# Patient Record
Sex: MALE | Race: WHITE | NOT HISPANIC OR LATINO | URBAN - METROPOLITAN AREA
[De-identification: names, ages, dates, MRNs, and addresses within clinical notes are randomized per-mention and may not be internally consistent; named-entity substitution may affect disease eponyms.]

---

## 2017-02-22 ENCOUNTER — INPATIENT (INPATIENT)
Facility: HOSPITAL | Age: 77
LOS: 0 days | Discharge: HOME | DRG: 312 | End: 2017-02-23
Attending: INTERNAL MEDICINE | Admitting: HOSPITALIST
Payer: COMMERCIAL

## 2017-02-22 VITALS
TEMPERATURE: 98 F | HEIGHT: 74 IN | RESPIRATION RATE: 20 BRPM | SYSTOLIC BLOOD PRESSURE: 170 MMHG | DIASTOLIC BLOOD PRESSURE: 80 MMHG | OXYGEN SATURATION: 97 % | HEART RATE: 83 BPM | WEIGHT: 205.03 LBS

## 2017-02-22 DIAGNOSIS — R55 SYNCOPE AND COLLAPSE: ICD-10-CM

## 2017-02-22 DIAGNOSIS — I10 ESSENTIAL (PRIMARY) HYPERTENSION: ICD-10-CM

## 2017-02-22 DIAGNOSIS — I48.92 UNSPECIFIED ATRIAL FLUTTER: ICD-10-CM

## 2017-02-22 DIAGNOSIS — E78.5 HYPERLIPIDEMIA, UNSPECIFIED: ICD-10-CM

## 2017-02-22 DIAGNOSIS — K21.9 GASTRO-ESOPHAGEAL REFLUX DISEASE WITHOUT ESOPHAGITIS: ICD-10-CM

## 2017-02-22 DIAGNOSIS — E11.9 TYPE 2 DIABETES MELLITUS WITHOUT COMPLICATIONS: ICD-10-CM

## 2017-02-22 LAB
ALBUMIN SERPL ELPH-MCNC: 4.4 G/DL — SIGNIFICANT CHANGE UP (ref 3.3–5.2)
ALP SERPL-CCNC: 83 U/L — SIGNIFICANT CHANGE UP (ref 40–120)
ALT FLD-CCNC: 27 U/L — SIGNIFICANT CHANGE UP
ANION GAP SERPL CALC-SCNC: 13 MMOL/L — SIGNIFICANT CHANGE UP (ref 5–17)
APTT BLD: 31.8 SEC — SIGNIFICANT CHANGE UP (ref 27.5–37.4)
AST SERPL-CCNC: 22 U/L — SIGNIFICANT CHANGE UP
BILIRUB SERPL-MCNC: 0.9 MG/DL — SIGNIFICANT CHANGE UP (ref 0.4–2)
BUN SERPL-MCNC: 29 MG/DL — HIGH (ref 8–20)
CALCIUM SERPL-MCNC: 9.1 MG/DL — SIGNIFICANT CHANGE UP (ref 8.6–10.2)
CHLORIDE SERPL-SCNC: 105 MMOL/L — SIGNIFICANT CHANGE UP (ref 98–107)
CO2 SERPL-SCNC: 23 MMOL/L — SIGNIFICANT CHANGE UP (ref 22–29)
CREAT SERPL-MCNC: 0.72 MG/DL — SIGNIFICANT CHANGE UP (ref 0.5–1.3)
GLUCOSE SERPL-MCNC: 173 MG/DL — HIGH (ref 70–115)
HCT VFR BLD CALC: 44.2 % — SIGNIFICANT CHANGE UP (ref 42–52)
HGB BLD-MCNC: 15.9 G/DL — SIGNIFICANT CHANGE UP (ref 14–18)
INR BLD: 1.03 RATIO — SIGNIFICANT CHANGE UP (ref 0.88–1.16)
MCHC RBC-ENTMCNC: 32.7 PG — HIGH (ref 27–31)
MCHC RBC-ENTMCNC: 36 G/DL — SIGNIFICANT CHANGE UP (ref 32–36)
MCV RBC AUTO: 90.9 FL — SIGNIFICANT CHANGE UP (ref 80–94)
PLATELET # BLD AUTO: 180 K/UL — SIGNIFICANT CHANGE UP (ref 150–400)
POTASSIUM SERPL-MCNC: 3.7 MMOL/L — SIGNIFICANT CHANGE UP (ref 3.5–5.3)
POTASSIUM SERPL-SCNC: 3.7 MMOL/L — SIGNIFICANT CHANGE UP (ref 3.5–5.3)
PROT SERPL-MCNC: 7.3 G/DL — SIGNIFICANT CHANGE UP (ref 6.6–8.7)
PROTHROM AB SERPL-ACNC: 11.3 SEC — SIGNIFICANT CHANGE UP (ref 10–13.1)
RBC # BLD: 4.86 M/UL — SIGNIFICANT CHANGE UP (ref 4.6–6.2)
RBC # FLD: 13.3 % — SIGNIFICANT CHANGE UP (ref 11–15.6)
SODIUM SERPL-SCNC: 141 MMOL/L — SIGNIFICANT CHANGE UP (ref 135–145)
TROPONIN T SERPL-MCNC: <0.01 NG/ML — SIGNIFICANT CHANGE UP (ref 0–0.06)
WBC # BLD: 5.4 K/UL — SIGNIFICANT CHANGE UP (ref 4.8–10.8)
WBC # FLD AUTO: 5.4 K/UL — SIGNIFICANT CHANGE UP (ref 4.8–10.8)

## 2017-02-22 PROCEDURE — 99223 1ST HOSP IP/OBS HIGH 75: CPT

## 2017-02-22 PROCEDURE — 99285 EMERGENCY DEPT VISIT HI MDM: CPT

## 2017-02-22 PROCEDURE — 70450 CT HEAD/BRAIN W/O DYE: CPT | Mod: 26

## 2017-02-22 PROCEDURE — 71010: CPT | Mod: 26

## 2017-02-22 PROCEDURE — 93010 ELECTROCARDIOGRAM REPORT: CPT

## 2017-02-22 RX ORDER — PANTOPRAZOLE SODIUM 20 MG/1
40 TABLET, DELAYED RELEASE ORAL
Qty: 0 | Refills: 0 | Status: DISCONTINUED | OUTPATIENT
Start: 2017-02-22 | End: 2017-02-23

## 2017-02-22 RX ORDER — GLUCAGON INJECTION, SOLUTION 0.5 MG/.1ML
1 INJECTION, SOLUTION SUBCUTANEOUS ONCE
Qty: 0 | Refills: 0 | Status: DISCONTINUED | OUTPATIENT
Start: 2017-02-22 | End: 2017-02-23

## 2017-02-22 RX ORDER — DEXTROSE 50 % IN WATER 50 %
12.5 SYRINGE (ML) INTRAVENOUS ONCE
Qty: 0 | Refills: 0 | Status: DISCONTINUED | OUTPATIENT
Start: 2017-02-22 | End: 2017-02-23

## 2017-02-22 RX ORDER — SODIUM CHLORIDE 9 MG/ML
1000 INJECTION, SOLUTION INTRAVENOUS
Qty: 0 | Refills: 0 | Status: DISCONTINUED | OUTPATIENT
Start: 2017-02-22 | End: 2017-02-23

## 2017-02-22 RX ORDER — DEXTROSE 50 % IN WATER 50 %
25 SYRINGE (ML) INTRAVENOUS ONCE
Qty: 0 | Refills: 0 | Status: DISCONTINUED | OUTPATIENT
Start: 2017-02-22 | End: 2017-02-23

## 2017-02-22 RX ORDER — AMLODIPINE BESYLATE 2.5 MG/1
10 TABLET ORAL DAILY
Qty: 0 | Refills: 0 | Status: DISCONTINUED | OUTPATIENT
Start: 2017-02-22 | End: 2017-02-23

## 2017-02-22 RX ORDER — DEXTROSE 50 % IN WATER 50 %
1 SYRINGE (ML) INTRAVENOUS ONCE
Qty: 0 | Refills: 0 | Status: DISCONTINUED | OUTPATIENT
Start: 2017-02-22 | End: 2017-02-23

## 2017-02-22 RX ORDER — METOPROLOL TARTRATE 50 MG
100 TABLET ORAL DAILY
Qty: 0 | Refills: 0 | Status: DISCONTINUED | OUTPATIENT
Start: 2017-02-22 | End: 2017-02-23

## 2017-02-22 RX ORDER — LISINOPRIL 2.5 MG/1
40 TABLET ORAL DAILY
Qty: 0 | Refills: 0 | Status: DISCONTINUED | OUTPATIENT
Start: 2017-02-22 | End: 2017-02-23

## 2017-02-22 RX ORDER — ASPIRIN/CALCIUM CARB/MAGNESIUM 324 MG
81 TABLET ORAL DAILY
Qty: 0 | Refills: 0 | Status: DISCONTINUED | OUTPATIENT
Start: 2017-02-22 | End: 2017-02-23

## 2017-02-22 RX ORDER — ATORVASTATIN CALCIUM 80 MG/1
10 TABLET, FILM COATED ORAL AT BEDTIME
Qty: 0 | Refills: 0 | Status: DISCONTINUED | OUTPATIENT
Start: 2017-02-22 | End: 2017-02-23

## 2017-02-22 RX ORDER — INSULIN LISPRO 100/ML
VIAL (ML) SUBCUTANEOUS
Qty: 0 | Refills: 0 | Status: DISCONTINUED | OUTPATIENT
Start: 2017-02-22 | End: 2017-02-23

## 2017-02-22 RX ORDER — RAMIPRIL 5 MG
0 CAPSULE ORAL
Qty: 0 | Refills: 0 | COMMUNITY

## 2017-02-22 RX ORDER — ENOXAPARIN SODIUM 100 MG/ML
90 INJECTION SUBCUTANEOUS EVERY 12 HOURS
Qty: 0 | Refills: 0 | Status: DISCONTINUED | OUTPATIENT
Start: 2017-02-22 | End: 2017-02-23

## 2017-02-22 RX ADMIN — ENOXAPARIN SODIUM 90 MILLIGRAM(S): 100 INJECTION SUBCUTANEOUS at 23:34

## 2017-02-22 NOTE — H&P ADULT. - RS GEN PE MLT RESP DETAILS PC
breath sounds equal/no rales/no rhonchi/respirations non-labored/no intercostal retractions/no chest wall tenderness/good air movement/clear to auscultation bilaterally/airway patent

## 2017-02-22 NOTE — ED PROVIDER NOTE - PSH
Cosmetic Surgery  chin implant 2004  Male stress incontinence  s/p artifical urinary sphincter 5/2012  S/P Appendectomy  1950  S/P arthroscopy  right shoulder 12/11  S/P Colonoscopy with Polypectomy  2009  S/P prostatectomy  radical robotic 6/10  Varicose vein-s/p vein stripping 5 years ago

## 2017-02-22 NOTE — ED PROVIDER NOTE - PMH
Afib  2006 s/p ablation 2006 , afib resolved  Appendicitis    Benign colon polyp    Bilateral cataracts    DM (Diabetes Mellitus)  X 3 years  GERD (Gastroesophageal Reflux Disease)  X 10 years  HTN - Hypertension  X 10 years, controlled  Hyperlipidemia  X 4 years  Kidney stone  "passed on own"  Male stress incontinence    MVP (Mitral Valve Prolapse)  X 8 years  Prostate cancer  2010  Renal Calculi  2008  Varicose vein  (L) 5 years ago

## 2017-02-22 NOTE — ED PROVIDER NOTE - NS ED MD SCRIBE ATTENDING SCRIBE SECTIONS
DISPOSITION/VITAL SIGNS( Pullset)/HISTORY OF PRESENT ILLNESS/PHYSICAL EXAM/RESULTS/PAST MEDICAL/SURGICAL/SOCIAL HISTORY/REVIEW OF SYSTEMS

## 2017-02-22 NOTE — ED PROVIDER NOTE - OBJECTIVE STATEMENT
A 76 year old male pt with a hx of DM, HTN, presents to the ED s/p syncopal episode. The pt was talking to his daughter, walking towards her when he experienced a syncopal episode. Pt states he remembers hitting his head on the chair but does not remember anything else. He has not experienced syncope in the past and denies HA, dizziness, CP, arm pain, headache prior to the fall. He does report intermittent jaw tightness over the past two weeks, and has been feeling dizzy since the syncopal episode. The pt's daughter states he was talking to her when he suddenly fell to the ground. She states that she did not notice the pt experiencing any diaphoresis. His PMD is Dr. Willis and his cardiologist is Dr. Aaron Guevara. He has a hx of A-fib with ablation. No further complaints at this time.

## 2017-02-22 NOTE — ED ADULT NURSE NOTE - OBJECTIVE STATEMENT
pt presents to ED s/p syncopal episode. pt is a/3, states he just "passed out suddenly" and reports hitting head. reports loc "of approx 10 sec" and was witnessed by daughter. pt on cardiac monitor. denies sob, cp, dizziness, weakness. no further complaints.

## 2017-02-22 NOTE — ED ADULT TRIAGE NOTE - CHIEF COMPLAINT QUOTE
Patient arrived to ED today with c/o syncopal episode at about 6pm.  Patient states since he has felt dizziness after passing out.

## 2017-02-22 NOTE — ED PROVIDER NOTE - PROGRESS NOTE DETAILS
Case discussed with Dr. Aparicio([facility]). Pt. will be seen in the morning. Pt. currently is asymptomatic. Case discussed with Dr. Saucedo for admission. Head CT still pending. He will follow up the cat scan.

## 2017-02-22 NOTE — H&P ADULT. - HISTORY OF PRESENT ILLNESS
77 y/o M w/ hx htn, prostate ca s/p resection,  pre-diabetes,gerd,hld, afib s/p ablation twice. Presents to the ED c/o syncope. Pt was at home, standing up, speaking to his daughter, when suddenly he collapsed and hit his head. . he loc approx 10 min, no warning signs prior to the event.. After recovering, he became very lightheaded. Denies any cp, palpitations, weakness, headche, blurred vision, abd pain, urin incontinence.. Pt states that he goes for EPS studies q/6 m, as per pt everything has been wnl, and he was due for another EPS next month. 1st time he experienced this episode, no recent change to his bp meds.. no exacerb or atten fact.

## 2017-02-22 NOTE — ED PROVIDER NOTE - MEDICAL DECISION MAKING DETAILS
A 76 year old male pt presents to the ED s/p syncopal episode. Will check labs, CXR, EKG, CT head, and admit for syncopal episode.

## 2017-02-22 NOTE — H&P ADULT. - ASSESSMENT
Esperanza Aiken [id number] pmd Dr Willis cardio consult dr cm.   77 y/o M w/ hx htn, prostate ca s/p resection,  pre-diabetes,gerd,hld, afib s/p ablation twice. Presents to the ED c/o syncope. Pt was at home, standing up, speaking to his daughter, when suddenly he collapsed and hit his head. . he loc approx 10 min, no warning signs prior to the event.. After recovering, he became very lightheaded. Denies any cp, palpitations, weakness, headche, blurred vision, abd pain, urin incontinence.. Pt states that he goes for EPS studies q/6 m, as per pt everything has been wnl, and he was due for another EPS next month. 1st time he experienced this episode, no recent change to his bp meds.  no exacerb or atten fact.

## 2017-02-23 ENCOUNTER — TRANSCRIPTION ENCOUNTER (OUTPATIENT)
Age: 77
End: 2017-02-23

## 2017-02-23 VITALS
OXYGEN SATURATION: 97 % | RESPIRATION RATE: 20 BRPM | SYSTOLIC BLOOD PRESSURE: 107 MMHG | HEART RATE: 73 BPM | TEMPERATURE: 99 F | DIASTOLIC BLOOD PRESSURE: 68 MMHG

## 2017-02-23 LAB
ALBUMIN SERPL ELPH-MCNC: 4.2 G/DL — SIGNIFICANT CHANGE UP (ref 3.3–5.2)
ALP SERPL-CCNC: 75 U/L — SIGNIFICANT CHANGE UP (ref 40–120)
ALT FLD-CCNC: 25 U/L — SIGNIFICANT CHANGE UP
ANION GAP SERPL CALC-SCNC: 12 MMOL/L — SIGNIFICANT CHANGE UP (ref 5–17)
AST SERPL-CCNC: 20 U/L — SIGNIFICANT CHANGE UP
BASOPHILS # BLD AUTO: 0 K/UL — SIGNIFICANT CHANGE UP (ref 0–0.2)
BASOPHILS NFR BLD AUTO: 0.3 % — SIGNIFICANT CHANGE UP (ref 0–2)
BILIRUB SERPL-MCNC: 1.7 MG/DL — SIGNIFICANT CHANGE UP (ref 0.4–2)
BUN SERPL-MCNC: 19 MG/DL — SIGNIFICANT CHANGE UP (ref 8–20)
CALCIUM SERPL-MCNC: 8.5 MG/DL — LOW (ref 8.6–10.2)
CHLORIDE SERPL-SCNC: 103 MMOL/L — SIGNIFICANT CHANGE UP (ref 98–107)
CK MB CFR SERPL CALC: 6.6 NG/ML — SIGNIFICANT CHANGE UP (ref 0–6.7)
CK SERPL-CCNC: 260 U/L — HIGH (ref 30–200)
CO2 SERPL-SCNC: 27 MMOL/L — SIGNIFICANT CHANGE UP (ref 22–29)
CREAT SERPL-MCNC: 0.76 MG/DL — SIGNIFICANT CHANGE UP (ref 0.5–1.3)
EOSINOPHIL # BLD AUTO: 0.3 K/UL — SIGNIFICANT CHANGE UP (ref 0–0.5)
EOSINOPHIL NFR BLD AUTO: 4.1 % — SIGNIFICANT CHANGE UP (ref 0–5)
GLUCOSE SERPL-MCNC: 159 MG/DL — HIGH (ref 70–115)
HBA1C BLD-MCNC: 6.2 % — HIGH (ref 4–5.6)
HCT VFR BLD CALC: 44.9 % — SIGNIFICANT CHANGE UP (ref 42–52)
HGB BLD-MCNC: 15.9 G/DL — SIGNIFICANT CHANGE UP (ref 14–18)
LYMPHOCYTES # BLD AUTO: 1.6 K/UL — SIGNIFICANT CHANGE UP (ref 1–4.8)
LYMPHOCYTES # BLD AUTO: 25.6 % — SIGNIFICANT CHANGE UP (ref 20–55)
MCHC RBC-ENTMCNC: 32.3 PG — HIGH (ref 27–31)
MCHC RBC-ENTMCNC: 35.4 G/DL — SIGNIFICANT CHANGE UP (ref 32–36)
MCV RBC AUTO: 91.3 FL — SIGNIFICANT CHANGE UP (ref 80–94)
MONOCYTES # BLD AUTO: 0.6 K/UL — SIGNIFICANT CHANGE UP (ref 0–0.8)
MONOCYTES NFR BLD AUTO: 9.8 % — SIGNIFICANT CHANGE UP (ref 3–10)
NEUTROPHILS # BLD AUTO: 3.8 K/UL — SIGNIFICANT CHANGE UP (ref 1.8–8)
NEUTROPHILS NFR BLD AUTO: 60 % — SIGNIFICANT CHANGE UP (ref 37–73)
PLATELET # BLD AUTO: 168 K/UL — SIGNIFICANT CHANGE UP (ref 150–400)
POTASSIUM SERPL-MCNC: 3.9 MMOL/L — SIGNIFICANT CHANGE UP (ref 3.5–5.3)
POTASSIUM SERPL-SCNC: 3.9 MMOL/L — SIGNIFICANT CHANGE UP (ref 3.5–5.3)
PROT SERPL-MCNC: 6.8 G/DL — SIGNIFICANT CHANGE UP (ref 6.6–8.7)
RBC # BLD: 4.92 M/UL — SIGNIFICANT CHANGE UP (ref 4.6–6.2)
RBC # FLD: 13.4 % — SIGNIFICANT CHANGE UP (ref 11–15.6)
SODIUM SERPL-SCNC: 142 MMOL/L — SIGNIFICANT CHANGE UP (ref 135–145)
TROPONIN T SERPL-MCNC: <0.01 NG/ML — SIGNIFICANT CHANGE UP (ref 0–0.06)
WBC # BLD: 6.3 K/UL — SIGNIFICANT CHANGE UP (ref 4.8–10.8)
WBC # FLD AUTO: 6.3 K/UL — SIGNIFICANT CHANGE UP (ref 4.8–10.8)

## 2017-02-23 PROCEDURE — 85610 PROTHROMBIN TIME: CPT

## 2017-02-23 PROCEDURE — 85027 COMPLETE CBC AUTOMATED: CPT

## 2017-02-23 PROCEDURE — 36415 COLL VENOUS BLD VENIPUNCTURE: CPT

## 2017-02-23 PROCEDURE — 93010 ELECTROCARDIOGRAM REPORT: CPT

## 2017-02-23 PROCEDURE — 99285 EMERGENCY DEPT VISIT HI MDM: CPT | Mod: 25

## 2017-02-23 PROCEDURE — 83036 HEMOGLOBIN GLYCOSYLATED A1C: CPT

## 2017-02-23 PROCEDURE — 80053 COMPREHEN METABOLIC PANEL: CPT

## 2017-02-23 PROCEDURE — 85730 THROMBOPLASTIN TIME PARTIAL: CPT

## 2017-02-23 PROCEDURE — 93880 EXTRACRANIAL BILAT STUDY: CPT | Mod: 26

## 2017-02-23 PROCEDURE — 99238 HOSP IP/OBS DSCHRG MGMT 30/<: CPT

## 2017-02-23 PROCEDURE — 71045 X-RAY EXAM CHEST 1 VIEW: CPT

## 2017-02-23 PROCEDURE — 93005 ELECTROCARDIOGRAM TRACING: CPT

## 2017-02-23 PROCEDURE — 82553 CREATINE MB FRACTION: CPT

## 2017-02-23 PROCEDURE — 93880 EXTRACRANIAL BILAT STUDY: CPT

## 2017-02-23 PROCEDURE — 70450 CT HEAD/BRAIN W/O DYE: CPT

## 2017-02-23 PROCEDURE — 84484 ASSAY OF TROPONIN QUANT: CPT

## 2017-02-23 PROCEDURE — 82550 ASSAY OF CK (CPK): CPT

## 2017-02-23 RX ORDER — APIXABAN 2.5 MG/1
1 TABLET, FILM COATED ORAL
Qty: 60 | Refills: 0 | OUTPATIENT
Start: 2017-02-23 | End: 2017-03-25

## 2017-02-23 RX ORDER — APIXABAN 2.5 MG/1
5 TABLET, FILM COATED ORAL
Qty: 0 | Refills: 0 | Status: DISCONTINUED | OUTPATIENT
Start: 2017-02-23 | End: 2017-02-23

## 2017-02-23 RX ADMIN — Medication 100 MILLIGRAM(S): at 06:11

## 2017-02-23 RX ADMIN — AMLODIPINE BESYLATE 10 MILLIGRAM(S): 2.5 TABLET ORAL at 06:10

## 2017-02-23 RX ADMIN — Medication 81 MILLIGRAM(S): at 11:52

## 2017-02-23 RX ADMIN — ENOXAPARIN SODIUM 90 MILLIGRAM(S): 100 INJECTION SUBCUTANEOUS at 11:51

## 2017-02-23 RX ADMIN — PANTOPRAZOLE SODIUM 40 MILLIGRAM(S): 20 TABLET, DELAYED RELEASE ORAL at 08:26

## 2017-02-23 RX ADMIN — LISINOPRIL 40 MILLIGRAM(S): 2.5 TABLET ORAL at 06:11

## 2017-02-23 RX ADMIN — Medication 2: at 08:26

## 2017-02-23 NOTE — PROGRESS NOTE ADULT - ASSESSMENT
75 y/o M w/ hx htn, prostate ca s/p resection,  pre-diabetes,gerd,hld, afib s/p ablation twice. Presents to the ED c/o syncope. Pt was at home, standing up, speaking to his daughter, when suddenly he collapsed and hit his head. . he loc approx 10 min, no warning signs prior to the event.. After recovering, he became very lightheaded. Denies any cp, palpitations, weakness, headche, blurred vision, abd pain, urin incontinence.. Pt states that he goes for EPS studies q/6 m, as per pt everything has been wnl, and he was due for another EPS next month. 1st time he experienced this episode, no recent change to his bp meds.. no exacerb or atten fact.     CT head negative. NO acute ekg changes. Evaluated by Dr Aparicio cardiology, recommended eliquis for anticoagulation. Carotid duplex.    Assessment/plan:    1. Syncope: Evaluated by cardiology recommends carotid duplex. Outpatient follow up with EP in 1-2 days for loop recorder placement   Nuclear stress 10/29/15 No ischemia  EF=54%    Cardiac cath 12/30/15 EF=60%. Normal coronary anatomy    Holter 9/9/16 SR. Avg=60bpm.  OR=224/hr, single and paired bts, bigeminy, trigeminy. One 25bt run of VT at a rate of 102bpm.  /hr. 6bt SVT at rate of 116 bpm    Carotid 1/5/15 bilateral 16-49% stenosis    Echo 11/13/15  EF=55-60%, mild MR,TR and Tr AR, Aortic root=4.4cm and unchanged.    2. Atrial flutter; Start elquis BID      Discharge home if carotid duplex normal

## 2017-02-23 NOTE — DISCHARGE NOTE ADULT - CARE PROVIDER_API CALL
Demarco Sellers), Cardiac Electrophysiology; Cardiovascular Disease  [address]  [place] [address]  Phone: [phone]  Fax: [phone]

## 2017-02-23 NOTE — DISCHARGE NOTE ADULT - HOSPITAL COURSE
77 y/o M w/ hx htn, prostate ca s/p resection,  pre-diabetes,gerd,hld, afib s/p ablation twice. Presents to the ED c/o syncope. Pt was at home, standing up, speaking to his daughter, when suddenly he collapsed and hit his head. . he loc approx 10 min, no warning signs prior to the event.. After recovering, he became very lightheaded. Denies any cp, palpitations, weakness, headche, blurred vision, abd pain, urin incontinence.. Pt states that he goes for EPS studies q/6 m, as per pt everything has been wnl, and he was due for another EPS next month. 1st time he experienced this episode, no recent change to his bp meds.. no exacerb or atten fact.     CT head negative. NO acute ekg changes. Evaluated by Dr Aparicio cardiology, recommended eliquis for anticoagulation. Carotid duplex. Discharge home to follow up with EP in 1-2 days for loop recorder placement.

## 2017-02-23 NOTE — CONSULT NOTE ADULT - SUBJECTIVE AND OBJECTIVE BOX
[facility], [facility]                                                    [address], [address], [place] [address]                                                         PHONE: [phone]    FAX: [phone] [address], [address], [place] [address]                                                 PHONE: [phone]    FAX: [phone]  *******************************************************************************    Reason for Consult:    HPI:  JERRY COPPOLA is a 76y Male    PAST MEDICAL & SURGICAL HISTORY:  Male stress incontinence  Kidney stone: &quot;passed on own&quot;  Varicose vein: (L) 5 years ago  Bilateral cataracts  Appendicitis  Benign colon polyp  Prostate cancer: 2010  Afib: 2006 s/p ablation 2006 , afib resolved  Hyperlipidemia: X 4 years  GERD (Gastroesophageal Reflux Disease): X 10 years  DM (Diabetes Mellitus): X 3 years  Renal Calculi: 2008  MVP (Mitral Valve Prolapse): X 8 years  HTN - Hypertension: X 10 years, controlled  Male stress incontinence: s/p artifical urinary sphincter 5/2012  Varicose vein-s/p vein stripping 5 years ago  S/P arthroscopy: right shoulder 12/11  S/P prostatectomy: radical robotic 6/10  Cosmetic Surgery: chin implant 2004  S/P Colonoscopy with Polypectomy: 2009  S/P Appendectomy: 1950      No Known Allergies      MEDICATIONS  (STANDING):  enoxaparin Injectable 90milliGRAM(s) SubCutaneous every 12 hours  aspirin enteric coated 81milliGRAM(s) Oral daily  lisinopril 40milliGRAM(s) Oral daily  atorvastatin 10milliGRAM(s) Oral at bedtime  amLODIPine   Tablet 10milliGRAM(s) Oral daily  metoprolol 100milliGRAM(s) Oral daily  pantoprazole    Tablet 40milliGRAM(s) Oral before breakfast  insulin lispro (HumaLOG) corrective regimen sliding scale  SubCutaneous Before meals and at bedtime  dextrose 5%. 1000milliLiter(s) IV Continuous <Continuous>  dextrose 50% Injectable 12.5Gram(s) IV Push once  dextrose 50% Injectable 25Gram(s) IV Push once  dextrose 50% Injectable 25Gram(s) IV Push once    MEDICATIONS  (PRN):  dextrose Gel 1Dose(s) Oral once PRN Blood Glucose LESS THAN 70 milliGRAM(s)/deciliter  glucagon  Injectable 1milliGRAM(s) IntraMuscular once PRN Glucose LESS THAN 70 milligrams/deciliter      Social History: no active tobacco / EtOH / IVDA    Family History: No pertinent family history in first degree relatives      ROS: As noted above, otherwise unremarkable.    Vital Signs Last 24 Hrs  T(C): 36.4, Max: 36.4 (02-22 @ 18:43)  T(F): 97.5, Max: 97.5 (02-22 @ 18:43)  HR: 76 (76 - 83)  BP: 119/78 (119/78 - 170/80)  BP(mean): --  RR: 20 (20 - 20)  SpO2: 98% (97% - 99%)    I&O's Detail    I&O's Summary          PHYSICAL EXAM:  General: Appears well developed, well nourished, no acute distress  HEENT: Head: normocephalic, atraumatic  Eyes: Pupils equal and reactive  Neck: Supple, no carotid bruit, no JVD, no HJR  CARDIOVASCULAR: irreg irreg S1 and S2, no murmur, rub, or gallop  LUNGS: Clear to auscultation bilaterally, no rales, rhonchi or wheeze  ABDOMEN: Soft, nontender, non-distended, positive bowel sounds, no mass or bruit  EXTREMITIES: No edema, distal pulses WNL  SKIN: Warm and dry with normal turgor  NEURO: Alert & oriented x 3, grossly intact  PSYCH: normal mood and affect    LABS:                        15.9   6.3   )-----------( 168      ( 23 Feb 2017 07:16 )             44.9     23 Feb 2017 07:16    142    |  103    |  19.0   ----------------------------<  159    3.9     |  27.0   |  0.76     Ca    8.5        23 Feb 2017 07:16    TPro  6.8    /  Alb  4.2    /  TBili  1.7    /  DBili  x      /  AST  20     /  ALT  25     /  AlkPhos  75     23 Feb 2017 07:16    CARDIAC MARKERS ( 23 Feb 2017 07:16 )  x     / <0.01 ng/mL / 260 U/L / x     / 6.6 ng/mL  CARDIAC MARKERS ( 22 Feb 2017 19:11 )  x     / <0.01 ng/mL / x     / x     / x          PT/INR - ( 22 Feb 2017 19:11 )   PT: 11.3 sec;   INR: 1.03 ratio         PTT - ( 22 Feb 2017 19:11 )  PTT:31.8 sec    RADIOLOGY & ADDITIONAL STUDIES:    ECG: AF CVR NSSTT PVC    Head CT:IMPRESSION:  No hemorrhage or mass effect.     If there are new or persistent symptoms, consider further evaluation with   brain MRI if clinically warranted and if there are no contraindications.    CXR: IMPRESSION:      Clear lungs, as on 01/27/2009          Assessment and Plan:  In summary, JERRY COPPOLA is a 76y Male with cc of dizziness and a syncopal episode with head trauma.  AF with CVR on presentation. Pt past medical history significant for known PAF, HTN and hx of CM with improvement of LV function. Past AF ablation Dr Chacon at [facility] 2005. Hx of subsequent documented A Flutter.  Was in A flutter 1/11/17 when seen in office, rate controlled.  No CP or SOB. +dizziness and syncope with head injury.    Nuclear stress 10/29/15 No ischemia  EF=54%    Cardiac cath 12/30/15 EF=60%. Normal coronary anatomy    Holter 9/9/16 SR. Avg=60bpm.  OS=911/hr, single and paired bts, bigeminy, trigeminy. One 25bt run of VT at a rate of 102bpm.  /hr. 6bt SVT at rate of 116 bpm    Carotid 1/5/15 bilateral 16-49% stenosis    Echo 11/13/15  EF=55-60%, mild MR,TR and Tr AR, Aortic root=4.4cm and unchanged. [facility], [facility]                                                    [address], [address], [place] [address]                                                         PHONE: [phone]    FAX: [phone] [address], [address], [place] [address]                                                 PHONE: [phone]    FAX: [phone]  *******************************************************************************    Reason for Consult:    HPI:  JERRY COPPOLA is a 76y Male    PAST MEDICAL & SURGICAL HISTORY:  Male stress incontinence  Kidney stone: &quot;passed on own&quot;  Varicose vein: (L) 5 years ago  Bilateral cataracts  Appendicitis  Benign colon polyp  Prostate cancer: 2010  Afib: 2006 s/p ablation 2006 , afib resolved  Hyperlipidemia: X 4 years  GERD (Gastroesophageal Reflux Disease): X 10 years  DM (Diabetes Mellitus): X 3 years  Renal Calculi: 2008  MVP (Mitral Valve Prolapse): X 8 years  HTN - Hypertension: X 10 years, controlled  Male stress incontinence: s/p artifical urinary sphincter 5/2012  Varicose vein-s/p vein stripping 5 years ago  S/P arthroscopy: right shoulder 12/11  S/P prostatectomy: radical robotic 6/10  Cosmetic Surgery: chin implant 2004  S/P Colonoscopy with Polypectomy: 2009  S/P Appendectomy: 1950      No Known Allergies      MEDICATIONS  (STANDING):  enoxaparin Injectable 90milliGRAM(s) SubCutaneous every 12 hours  aspirin enteric coated 81milliGRAM(s) Oral daily  lisinopril 40milliGRAM(s) Oral daily  atorvastatin 10milliGRAM(s) Oral at bedtime  amLODIPine   Tablet 10milliGRAM(s) Oral daily  metoprolol 100milliGRAM(s) Oral daily  pantoprazole    Tablet 40milliGRAM(s) Oral before breakfast  insulin lispro (HumaLOG) corrective regimen sliding scale  SubCutaneous Before meals and at bedtime  dextrose 5%. 1000milliLiter(s) IV Continuous <Continuous>  dextrose 50% Injectable 12.5Gram(s) IV Push once  dextrose 50% Injectable 25Gram(s) IV Push once  dextrose 50% Injectable 25Gram(s) IV Push once    MEDICATIONS  (PRN):  dextrose Gel 1Dose(s) Oral once PRN Blood Glucose LESS THAN 70 milliGRAM(s)/deciliter  glucagon  Injectable 1milliGRAM(s) IntraMuscular once PRN Glucose LESS THAN 70 milligrams/deciliter      Social History: no active tobacco / EtOH / IVDA    Family History: No pertinent family history in first degree relatives      ROS: As noted above, otherwise unremarkable.    Vital Signs Last 24 Hrs  T(C): 36.4, Max: 36.4 (02-22 @ 18:43)  T(F): 97.5, Max: 97.5 (02-22 @ 18:43)  HR: 76 (76 - 83)  BP: 119/78 (119/78 - 170/80)  BP(mean): --  RR: 20 (20 - 20)  SpO2: 98% (97% - 99%)    I&O's Detail    I&O's Summary          PHYSICAL EXAM:  General: Appears well developed, well nourished, no acute distress  HEENT: Head: normocephalic, atraumatic  Eyes: Pupils equal and reactive  Neck: Supple, no carotid bruit, no JVD, no HJR  CARDIOVASCULAR: irreg irreg S1 and S2, no murmur, rub, or gallop  LUNGS: Clear to auscultation bilaterally, no rales, rhonchi or wheeze  ABDOMEN: Soft, nontender, non-distended, positive bowel sounds, no mass or bruit  EXTREMITIES: No edema, distal pulses WNL  SKIN: Warm and dry with normal turgor  NEURO: Alert & oriented x 3, grossly intact  PSYCH: normal mood and affect    LABS:                        15.9   6.3   )-----------( 168      ( 23 Feb 2017 07:16 )             44.9     23 Feb 2017 07:16    142    |  103    |  19.0   ----------------------------<  159    3.9     |  27.0   |  0.76     Ca    8.5        23 Feb 2017 07:16    TPro  6.8    /  Alb  4.2    /  TBili  1.7    /  DBili  x      /  AST  20     /  ALT  25     /  AlkPhos  75     23 Feb 2017 07:16    CARDIAC MARKERS ( 23 Feb 2017 07:16 )  x     / <0.01 ng/mL / 260 U/L / x     / 6.6 ng/mL  CARDIAC MARKERS ( 22 Feb 2017 19:11 )  x     / <0.01 ng/mL / x     / x     / x          PT/INR - ( 22 Feb 2017 19:11 )   PT: 11.3 sec;   INR: 1.03 ratio         PTT - ( 22 Feb 2017 19:11 )  PTT:31.8 sec    RADIOLOGY & ADDITIONAL STUDIES:    ECG: AF CVR NSSTT PVC    Head CT:IMPRESSION:  No hemorrhage or mass effect.     If there are new or persistent symptoms, consider further evaluation with   brain MRI if clinically warranted and if there are no contraindications.    CXR: IMPRESSION:      Clear lungs, as on 01/27/2009          Assessment and Plan:  In summary, JERRY COPPOLA is a 76y Male with cc of dizziness and a syncopal episode with no warning.  AF with CVR on presentation. Pt past medical history significant for known PAF, HTN and hx of CM with improvement of LV function. Hx of Type 2 NIDDM, former smoker, quit 42yrs ago.  Past AF ablation Dr Chacon at [facility] 2005. Hx of subsequent documented A Flutter.  Was in A flutter 1/11/17 when seen in office, rate controlled.  No CP or SOB. +dizziness and syncope with head injury.    Nuclear stress 10/29/15 No ischemia  EF=54%    Cardiac cath 12/30/15 EF=60%. Normal coronary anatomy    Holter 9/9/16 SR. Avg=60bpm.  IH=211/hr, single and paired bts, bigeminy, trigeminy. One 25bt run of VT at a rate of 102bpm.  /hr. 6bt SVT at rate of 116 bpm    Carotid 1/5/15 bilateral 16-49% stenosis    Echo 11/13/15  EF=55-60%, mild MR,TR and Tr AR, Aortic root=4.4cm and unchanged.    Imp: syncope.  Past AF ablation.  Recurrent A flutter January of this year.  Rate has been controlled in ER. CE negative x 2.  Head CT without acute findings.  Nonfocal exam.    - would place on eliquis 5mg po BID on DC due to persistent AF with controlled rate (currently on lovenox in hospital). Eliquis with low bleeding risk.  DW pt  - I have asked pt to see Dr. Sellers on DC for possible ILR  - Recent nation as above. CE negative. No acute ST changes  - ASA. lipitor. lisinopril 40mg daily  - metoprolol 100mg daily  - Normal coronaries on cath 12/30/15. Normal LV function.  No significant valve dysfunction.  - If carotid studies with no significant progression, recommend DC for outpt fu with Dr. Sellers this week.  Message left for Dr. Sellers regarding plan.

## 2017-02-23 NOTE — DISCHARGE NOTE ADULT - CARE PLAN
Principal Discharge DX:	Syncope, unspecified syncope type  Goal:	Monitor for recurrent episodes  Instructions for follow-up, activity and diet:	Follow up with Dr Vargas in 1-2 days for loop recorder placement  Secondary Diagnosis:	Atrial flutter, unspecified type  Instructions for follow-up, activity and diet:	Started on eliquis PO BID

## 2017-02-23 NOTE — PROGRESS NOTE ADULT - SUBJECTIVE AND OBJECTIVE BOX
INTERVAL HPI/OVERNIGHT EVENTS:    CC: Syncope    Patient seen and examined, feels fine. Dirk chest pain, palpitations or shortness of breath.       Vital Signs Last 24 Hrs  T(C): 37.1, Max: 37.1 (02-23 @ 08:51)  T(F): 98.7, Max: 98.7 (02-23 @ 08:51)  HR: 73 (73 - 83)  BP: 107/68 (107/68 - 170/80)  BP(mean): --  RR: 20 (20 - 20)  SpO2: 97% (97% - 99%)    PHYSICAL EXAM:    GENERAL: NAD, AOX3  HEAD:  Atraumatic, Normocephalic  EYES: EOMI, PERRLA, conjunctiva and sclera clear  ENMT: Moist mucous membranes  NECK: Supple, No JVD  NERVOUS SYSTEM:  Alert & Oriented X3, Motor Strength 5/5 B/L upper and lower extremities; DTRs 2+ intact and symmetric  CHEST/LUNG: Clear to auscultation bilaterally; No rales, rhonchi, wheezing, or rubs  HEART: Regular rate and rhythm; No murmurs, rubs, or gallops  ABDOMEN: Soft, Nontender, Nondistended; Bowel sounds present  EXTREMITIES:  2+ Peripheral Pulses, No clubbing, cyanosis, or edema        MEDICATIONS  (STANDING):  aspirin enteric coated 81milliGRAM(s) Oral daily  lisinopril 40milliGRAM(s) Oral daily  atorvastatin 10milliGRAM(s) Oral at bedtime  amLODIPine   Tablet 10milliGRAM(s) Oral daily  metoprolol 100milliGRAM(s) Oral daily  pantoprazole    Tablet 40milliGRAM(s) Oral before breakfast  insulin lispro (HumaLOG) corrective regimen sliding scale  SubCutaneous Before meals and at bedtime  dextrose 5%. 1000milliLiter(s) IV Continuous <Continuous>  dextrose 50% Injectable 12.5Gram(s) IV Push once  dextrose 50% Injectable 25Gram(s) IV Push once  dextrose 50% Injectable 25Gram(s) IV Push once  apixaban 5milliGRAM(s) Oral two times a day    MEDICATIONS  (PRN):  dextrose Gel 1Dose(s) Oral once PRN Blood Glucose LESS THAN 70 milliGRAM(s)/deciliter  glucagon  Injectable 1milliGRAM(s) IntraMuscular once PRN Glucose LESS THAN 70 milligrams/deciliter      Allergies    No Known Allergies    Intolerances          LABS:                          15.9   6.3   )-----------( 168      ( 23 Feb 2017 07:16 )             44.9     23 Feb 2017 07:16    142    |  103    |  19.0   ----------------------------<  159    3.9     |  27.0   |  0.76     Ca    8.5        23 Feb 2017 07:16    TPro  6.8    /  Alb  4.2    /  TBili  1.7    /  DBili  x      /  AST  20     /  ALT  25     /  AlkPhos  75     23 Feb 2017 07:16    PT/INR - ( 22 Feb 2017 19:11 )   PT: 11.3 sec;   INR: 1.03 ratio         PTT - ( 22 Feb 2017 19:11 )  PTT:31.8 sec      RADIOLOGY & ADDITIONAL TESTS:

## 2017-02-23 NOTE — DISCHARGE NOTE ADULT - PATIENT PORTAL LINK FT
“You can access the [employer] Patient Portal, offered by [facility], by registering with the following website: [email or web address]”

## 2017-02-23 NOTE — SBIRT NOTE. - NSSBIRTSERVICES_GEN_A_ED_FT
Provided SBIRT services: Full screen positive. Brief Intervention Performed. Screening results were reviewed with the patient and patient was provided information about healthy guidelines and potential negative consequences associated with level of risk. Motivation and readiness to reduce or stop use was discussed and goals and activities to make changes were suggested/offered.  Audit Score: 6  DAST Score: 0  Duration = 15 Minutes

## 2017-02-23 NOTE — DISCHARGE NOTE ADULT - PLAN OF CARE
Monitor for recurrent episodes Follow up with Dr Vargas in 1-2 days for loop recorder placement Started on eliquis PO BID

## 2017-06-07 ENCOUNTER — EMERGENCY (EMERGENCY)
Facility: HOSPITAL | Age: 77
LOS: 1 days | Discharge: HOME | End: 2017-06-07
Attending: EMERGENCY MEDICINE
Payer: MEDICARE

## 2017-06-07 VITALS
DIASTOLIC BLOOD PRESSURE: 64 MMHG | TEMPERATURE: 98 F | HEART RATE: 56 BPM | SYSTOLIC BLOOD PRESSURE: 117 MMHG | HEIGHT: 74 IN | OXYGEN SATURATION: 99 % | WEIGHT: 205.03 LBS | RESPIRATION RATE: 20 BRPM

## 2017-06-07 VITALS
HEART RATE: 55 BPM | TEMPERATURE: 98 F | OXYGEN SATURATION: 100 % | DIASTOLIC BLOOD PRESSURE: 63 MMHG | SYSTOLIC BLOOD PRESSURE: 112 MMHG | RESPIRATION RATE: 20 BRPM

## 2017-06-07 DIAGNOSIS — Z98.890 OTHER SPECIFIED POSTPROCEDURAL STATES: Chronic | ICD-10-CM

## 2017-06-07 LAB
ALBUMIN SERPL ELPH-MCNC: 3.7 G/DL — SIGNIFICANT CHANGE UP (ref 3.3–5.2)
ALP SERPL-CCNC: 77 U/L — SIGNIFICANT CHANGE UP (ref 40–120)
ALT FLD-CCNC: 20 U/L — SIGNIFICANT CHANGE UP
ANION GAP SERPL CALC-SCNC: 11 MMOL/L — SIGNIFICANT CHANGE UP (ref 5–17)
AST SERPL-CCNC: 20 U/L — SIGNIFICANT CHANGE UP
BASOPHILS # BLD AUTO: 0 K/UL — SIGNIFICANT CHANGE UP (ref 0–0.2)
BASOPHILS NFR BLD AUTO: 0.5 % — SIGNIFICANT CHANGE UP (ref 0–2)
BILIRUB SERPL-MCNC: 0.9 MG/DL — SIGNIFICANT CHANGE UP (ref 0.4–2)
BUN SERPL-MCNC: 28 MG/DL — HIGH (ref 8–20)
CALCIUM SERPL-MCNC: 8.6 MG/DL — SIGNIFICANT CHANGE UP (ref 8.6–10.2)
CHLORIDE SERPL-SCNC: 107 MMOL/L — SIGNIFICANT CHANGE UP (ref 98–107)
CK MB CFR SERPL CALC: 8.8 NG/ML — HIGH (ref 0–6.7)
CK SERPL-CCNC: 294 U/L — HIGH (ref 30–200)
CO2 SERPL-SCNC: 25 MMOL/L — SIGNIFICANT CHANGE UP (ref 22–29)
CREAT SERPL-MCNC: 0.86 MG/DL — SIGNIFICANT CHANGE UP (ref 0.5–1.3)
EOSINOPHIL # BLD AUTO: 0.2 K/UL — SIGNIFICANT CHANGE UP (ref 0–0.5)
EOSINOPHIL NFR BLD AUTO: 4.5 % — SIGNIFICANT CHANGE UP (ref 0–5)
GLUCOSE SERPL-MCNC: 142 MG/DL — HIGH (ref 70–115)
HCT VFR BLD CALC: 43 % — SIGNIFICANT CHANGE UP (ref 42–52)
HGB BLD-MCNC: 14.8 G/DL — SIGNIFICANT CHANGE UP (ref 14–18)
LYMPHOCYTES # BLD AUTO: 1.3 K/UL — SIGNIFICANT CHANGE UP (ref 1–4.8)
LYMPHOCYTES # BLD AUTO: 30 % — SIGNIFICANT CHANGE UP (ref 20–55)
MAGNESIUM SERPL-MCNC: 2 MG/DL — SIGNIFICANT CHANGE UP (ref 1.6–2.6)
MCHC RBC-ENTMCNC: 31.4 PG — HIGH (ref 27–31)
MCHC RBC-ENTMCNC: 34.4 G/DL — SIGNIFICANT CHANGE UP (ref 32–36)
MCV RBC AUTO: 91.3 FL — SIGNIFICANT CHANGE UP (ref 80–94)
MONOCYTES # BLD AUTO: 0.5 K/UL — SIGNIFICANT CHANGE UP (ref 0–0.8)
MONOCYTES NFR BLD AUTO: 11.9 % — HIGH (ref 3–10)
NEUTROPHILS # BLD AUTO: 2.4 K/UL — SIGNIFICANT CHANGE UP (ref 1.8–8)
NEUTROPHILS NFR BLD AUTO: 53.1 % — SIGNIFICANT CHANGE UP (ref 37–73)
PLATELET # BLD AUTO: 142 K/UL — LOW (ref 150–400)
POTASSIUM SERPL-MCNC: 3.5 MMOL/L — SIGNIFICANT CHANGE UP (ref 3.5–5.3)
POTASSIUM SERPL-SCNC: 3.5 MMOL/L — SIGNIFICANT CHANGE UP (ref 3.5–5.3)
PROT SERPL-MCNC: 6 G/DL — LOW (ref 6.6–8.7)
RBC # BLD: 4.71 M/UL — SIGNIFICANT CHANGE UP (ref 4.6–6.2)
RBC # FLD: 13.1 % — SIGNIFICANT CHANGE UP (ref 11–15.6)
SODIUM SERPL-SCNC: 143 MMOL/L — SIGNIFICANT CHANGE UP (ref 135–145)
TROPONIN T SERPL-MCNC: <0.01 NG/ML — SIGNIFICANT CHANGE UP (ref 0–0.06)
TROPONIN T SERPL-MCNC: <0.01 NG/ML — SIGNIFICANT CHANGE UP (ref 0–0.06)
WBC # BLD: 4.4 K/UL — LOW (ref 4.8–10.8)
WBC # FLD AUTO: 4.4 K/UL — LOW (ref 4.8–10.8)

## 2017-06-07 PROCEDURE — 99284 EMERGENCY DEPT VISIT MOD MDM: CPT

## 2017-06-07 PROCEDURE — 83735 ASSAY OF MAGNESIUM: CPT

## 2017-06-07 PROCEDURE — 99284 EMERGENCY DEPT VISIT MOD MDM: CPT | Mod: 25

## 2017-06-07 PROCEDURE — 84484 ASSAY OF TROPONIN QUANT: CPT

## 2017-06-07 PROCEDURE — 82550 ASSAY OF CK (CPK): CPT

## 2017-06-07 PROCEDURE — 71045 X-RAY EXAM CHEST 1 VIEW: CPT

## 2017-06-07 PROCEDURE — 93005 ELECTROCARDIOGRAM TRACING: CPT

## 2017-06-07 PROCEDURE — 93010 ELECTROCARDIOGRAM REPORT: CPT

## 2017-06-07 PROCEDURE — 71010: CPT | Mod: 26

## 2017-06-07 PROCEDURE — 82553 CREATINE MB FRACTION: CPT

## 2017-06-07 PROCEDURE — 85027 COMPLETE CBC AUTOMATED: CPT

## 2017-06-07 PROCEDURE — 80053 COMPREHEN METABOLIC PANEL: CPT

## 2017-06-07 RX ORDER — ASPIRIN/CALCIUM CARB/MAGNESIUM 324 MG
0 TABLET ORAL
Qty: 0 | Refills: 0 | COMMUNITY

## 2017-06-07 RX ORDER — PANTOPRAZOLE SODIUM 20 MG/1
0 TABLET, DELAYED RELEASE ORAL
Qty: 0 | Refills: 0 | COMMUNITY

## 2017-06-07 RX ORDER — ATORVASTATIN CALCIUM 80 MG/1
0 TABLET, FILM COATED ORAL
Qty: 0 | Refills: 0 | COMMUNITY

## 2017-06-07 RX ORDER — RAMIPRIL 5 MG
1 CAPSULE ORAL
Qty: 0 | Refills: 0 | COMMUNITY

## 2017-06-07 NOTE — ED PROVIDER NOTE - OBJECTIVE STATEMENT
75 yo male with h/o a.flutter (s/p cardioversion several months ago) and currently on eliquis, until he stopped it yesterday for a dental procedure, presents to ER with near syncopal episodes.  symptoms started yesterday. 75 yo male with h/o a.flutter (s/p cardioversion several months ago) and currently on eliquis, until he stopped it yesterday for a dental procedure, presents to ER with near syncopal episodes.  symptoms started yesterday.  not exertional.  pt feels "woozy" when symptoms occur. no palpitations.

## 2017-06-07 NOTE — CONSULT NOTE ADULT - SUBJECTIVE AND OBJECTIVE BOX
[facility]                                                    [address], [address], [place] [address]                                                         PHONE: [phone]    FAX: [phone] [address], [address], [place] [address]                                                 PHONE: [phone]    FAX: [phone]  *******************************************************************************    Reason for Consult: Dizziness    HPI:  JERRY COPPOLA is a 76y Male with h/o AF s/p ablation & AFlutter s/p cardioversion, DM, HTN, HL who recently returned from a cruise and now reports 1 day of intermittent dizzy spells lasting 5 seconds at a time.  He recently decreased salt in his diet and reports BP has been lower (usually SBP 140s most recently 113/58 at home).  EKG with AFlutter.  No chest pain, SOB, palpitations, LH, syncope, orthopnea, PND, LE edema.  Patient holding Eliquis for 2 days for a dental cleaning this week (okay as per EP Dr. Sellers).    PAST MEDICAL & SURGICAL HISTORY:  Male stress incontinence  Kidney stone: &quot;passed on own&quot;  Varicose vein: (L) 5 years ago  Bilateral cataracts  Appendicitis  Benign colon polyp  Prostate cancer: 2010  Afib: 2006 s/p ablation 2006 , afib resolved  Hyperlipidemia: X 4 years  GERD (Gastroesophageal Reflux Disease): X 10 years  DM (Diabetes Mellitus): X 3 years  Renal Calculi: 2008  MVP (Mitral Valve Prolapse): X 8 years  HTN - Hypertension: X 10 years, controlled  S/P ablation operation for arrhythmia  Male stress incontinence: s/p artifical urinary sphincter 5/2012  Varicose vein-s/p vein stripping 5 years ago  S/P arthroscopy: right shoulder 12/11  S/P prostatectomy: radical robotic 6/10  Cosmetic Surgery: chin implant 2004  S/P Colonoscopy with Polypectomy: 2009  S/P Appendectomy: 1950      No Known Allergies      MEDICATIONS  (STANDING):    MEDICATIONS  (PRN):      Social History: no active tobacco / EtOH / IVDA    Family History: No pertinent family history in first degree relatives      ROS: As noted above, otherwise unremarkable.    Vital Signs Last 24 Hrs  T(C): 36.4, Max: 36.4 (06-07 @ 03:09)  T(F): 97.5, Max: 97.5 (06-07 @ 03:09)  HR: 56 (56 - 56)  BP: 117/64 (117/64 - 117/64)  BP(mean): --  RR: 20 (20 - 20)  SpO2: 99% (99% - 99%)    I&O's Detail    I&O's Summary          PHYSICAL EXAM:  General: Appears well developed, well nourished, no acute distress  HEENT: Head: normocephalic, atraumatic  Eyes: Pupils equal and reactive  Neck: Supple, no carotid bruit, no JVD, no HJR  CARDIOVASCULAR: Normal S1 and S2, no murmur, rub, or gallop  LUNGS: Clear to auscultation bilaterally, no rales, rhonchi or wheeze  ABDOMEN: Soft, nontender, non-distended, positive bowel sounds, no mass or bruit  EXTREMITIES: No edema, distal pulses WNL  SKIN: Warm and dry with normal turgor  NEURO: Alert & oriented x 3, grossly intact  PSYCH: normal mood and affect    LABS:                        14.8   4.4   )-----------( 142      ( 07 Jun 2017 03:55 )             43.0     06-07    143  |  107  |  28.0<H>  ----------------------------<  142<H>  3.5   |  25.0  |  0.86    Ca    8.6      07 Jun 2017 03:55  Mg     2.0     06-07    TPro  6.0<L>  /  Alb  3.7  /  TBili  0.9  /  DBili  x   /  AST  20  /  ALT  20  /  AlkPhos  77  06-07    CARDIAC MARKERS ( 07 Jun 2017 03:55 )  x     / <0.01 ng/mL / 294 U/L / x     / 8.8 ng/mL            RADIOLOGY & ADDITIONAL STUDIES:    ECG: AFlutter w/ 4:1 conduction, NSRA      Assessment and Plan:  In summary, JERRY COPPOLA is a 76M p/w dizziness, h/o AF s/p ablation & AFlutter s/p cardioversion, DM, HTN, HL  - Stable cardiovascular status, no evidence of ischemia or CHF clinically, no chest pain or acute EKG changes, troponin negative.  Continue medical management.  If repeat troponin & EKG negative, plan for d/c home with outpatient follow-up at [facility] with Dr. Sellers (EP) this week.  I discussed with the patient potential admission to the hospital and monitoring vs outpatient follow-up & testing.  He clearly understands the risks, benefits & alternatives is requesting outpatient work-up which will be arranged.  - Monitor on telemetry for now  - HR well-controlled in AFlutter, BP stable.  Patient to monitor his BP closely at home and will log his readings.  He will continue Metoprolol as prescribed but if his BP is lower than baseline he will hold his Emily & Ramipril.  He will call the office with any increased or low BP readings.  - Please call with any cardiovascular questions/issues.    We will follow with you.  Thank you for allowing me to participate in the care of your patient.      Sincerely,    Mulugeta Marcial MD

## 2017-06-07 NOTE — ED ADULT NURSE NOTE - PSH
Cosmetic Surgery  chin implant 2004  Male stress incontinence  s/p artifical urinary sphincter 5/2012  S/P ablation operation for arrhythmia    S/P Appendectomy  1950  S/P arthroscopy  right shoulder 12/11  S/P Colonoscopy with Polypectomy  2009  S/P prostatectomy  radical robotic 6/10  Varicose vein-s/p vein stripping 5 years ago

## 2017-06-07 NOTE — ED PROVIDER NOTE - PROGRESS NOTE DETAILS
asymptomatic in ER.  seen by cardiology, dr. urena, in ER  agrees with plan to d/c if second CE is negative.  follow up in office.

## 2017-06-07 NOTE — ED ADULT NURSE NOTE - OBJECTIVE STATEMENT
Pt states "I took my blood pressure yesterday and it was lower than usual for me, in the middle of the night around 0230 I was tossing and turning and couldn't sleep and felt like I was having palpitations, took my blood pressure again and it was low for me", pt denies n/v, denies dizziness/weakness, denies pain, had a cardioversion 4 months ago @ [facility], hx of arrythmia and ablation

## 2017-06-07 NOTE — ED ADULT NURSE REASSESSMENT NOTE - NS ED NURSE REASSESS COMMENT FT1
Pt able to ambulate safely and steadily w/out assistance, denies pain/n/v, denies palpitations, preparing for d/c home with wife, pt made aware to come back to ED if symptoms progress

## 2017-12-05 ENCOUNTER — APPOINTMENT (OUTPATIENT)
Dept: DERMATOLOGY | Facility: CLINIC | Age: 77
End: 2017-12-05

## 2017-12-20 ENCOUNTER — APPOINTMENT (OUTPATIENT)
Dept: PULMONOLOGY | Facility: CLINIC | Age: 77
End: 2017-12-20

## 2018-04-24 ENCOUNTER — APPOINTMENT (OUTPATIENT)
Dept: PULMONOLOGY | Facility: CLINIC | Age: 78
End: 2018-04-24
Payer: MEDICARE

## 2018-04-24 VITALS
WEIGHT: 214 LBS | HEART RATE: 68 BPM | DIASTOLIC BLOOD PRESSURE: 70 MMHG | OXYGEN SATURATION: 97 % | BODY MASS INDEX: 28.67 KG/M2 | SYSTOLIC BLOOD PRESSURE: 120 MMHG | HEIGHT: 72.44 IN

## 2018-04-24 DIAGNOSIS — G47.33 OBSTRUCTIVE SLEEP APNEA (ADULT) (PEDIATRIC): ICD-10-CM

## 2018-04-24 PROCEDURE — 99204 OFFICE O/P NEW MOD 45 MIN: CPT

## 2018-04-24 RX ORDER — METOPROLOL SUCCINATE 50 MG/1
50 TABLET, EXTENDED RELEASE ORAL
Refills: 0 | Status: ACTIVE | COMMUNITY

## 2018-08-01 ENCOUNTER — INPATIENT (INPATIENT)
Facility: HOSPITAL | Age: 78
LOS: 1 days | Discharge: HOME | DRG: 310 | End: 2018-08-03
Attending: INTERNAL MEDICINE | Admitting: INTERNAL MEDICINE
Payer: MEDICARE

## 2018-08-01 VITALS — HEIGHT: 72 IN | WEIGHT: 199.96 LBS

## 2018-08-01 DIAGNOSIS — E11.9 TYPE 2 DIABETES MELLITUS WITHOUT COMPLICATIONS: ICD-10-CM

## 2018-08-01 DIAGNOSIS — R00.1 BRADYCARDIA, UNSPECIFIED: ICD-10-CM

## 2018-08-01 DIAGNOSIS — I48.92 UNSPECIFIED ATRIAL FLUTTER: ICD-10-CM

## 2018-08-01 DIAGNOSIS — Z98.890 OTHER SPECIFIED POSTPROCEDURAL STATES: Chronic | ICD-10-CM

## 2018-08-01 DIAGNOSIS — R74.0 NONSPECIFIC ELEVATION OF LEVELS OF TRANSAMINASE AND LACTIC ACID DEHYDROGENASE [LDH]: ICD-10-CM

## 2018-08-01 LAB
ALBUMIN SERPL ELPH-MCNC: 3.9 G/DL — SIGNIFICANT CHANGE UP (ref 3.3–5.2)
ALP SERPL-CCNC: 128 U/L — HIGH (ref 40–120)
ALT FLD-CCNC: 324 U/L — HIGH
ANION GAP SERPL CALC-SCNC: 14 MMOL/L — SIGNIFICANT CHANGE UP (ref 5–17)
APTT BLD: 35.4 SEC — SIGNIFICANT CHANGE UP (ref 27.5–37.4)
AST SERPL-CCNC: 290 U/L — HIGH
BASOPHILS # BLD AUTO: 0 K/UL — SIGNIFICANT CHANGE UP (ref 0–0.2)
BILIRUB SERPL-MCNC: 3.2 MG/DL — HIGH (ref 0.4–2)
BUN SERPL-MCNC: 38 MG/DL — HIGH (ref 8–20)
CALCIUM SERPL-MCNC: 8.9 MG/DL — SIGNIFICANT CHANGE UP (ref 8.6–10.2)
CHLORIDE SERPL-SCNC: 103 MMOL/L — SIGNIFICANT CHANGE UP (ref 98–107)
CK SERPL-CCNC: 86 U/L — SIGNIFICANT CHANGE UP (ref 30–200)
CO2 SERPL-SCNC: 25 MMOL/L — SIGNIFICANT CHANGE UP (ref 22–29)
CREAT SERPL-MCNC: 1.22 MG/DL — SIGNIFICANT CHANGE UP (ref 0.5–1.3)
EOSINOPHIL # BLD AUTO: 0.1 K/UL — SIGNIFICANT CHANGE UP (ref 0–0.5)
EOSINOPHIL NFR BLD AUTO: 1 % — SIGNIFICANT CHANGE UP (ref 0–6)
GLUCOSE BLDC GLUCOMTR-MCNC: 143 MG/DL — HIGH (ref 70–99)
GLUCOSE BLDC GLUCOMTR-MCNC: 175 MG/DL — HIGH (ref 70–99)
GLUCOSE BLDC GLUCOMTR-MCNC: 182 MG/DL — HIGH (ref 70–99)
GLUCOSE SERPL-MCNC: 188 MG/DL — HIGH (ref 70–115)
HCT VFR BLD CALC: 45.8 % — SIGNIFICANT CHANGE UP (ref 42–52)
HGB BLD-MCNC: 15.5 G/DL — SIGNIFICANT CHANGE UP (ref 14–18)
INR BLD: 1.28 RATIO — HIGH (ref 0.88–1.16)
LYMPHOCYTES # BLD AUTO: 0.7 K/UL — LOW (ref 1–4.8)
LYMPHOCYTES # BLD AUTO: 6 % — LOW (ref 20–55)
MCHC RBC-ENTMCNC: 31.3 PG — HIGH (ref 27–31)
MCHC RBC-ENTMCNC: 33.8 G/DL — SIGNIFICANT CHANGE UP (ref 32–36)
MCV RBC AUTO: 92.5 FL — SIGNIFICANT CHANGE UP (ref 80–94)
MONOCYTES # BLD AUTO: 0.7 K/UL — SIGNIFICANT CHANGE UP (ref 0–0.8)
MONOCYTES NFR BLD AUTO: 5 % — SIGNIFICANT CHANGE UP (ref 3–10)
NEUTROPHILS # BLD AUTO: 10.1 K/UL — HIGH (ref 1.8–8)
NEUTROPHILS NFR BLD AUTO: 86 % — HIGH (ref 37–73)
NEUTS BAND # BLD: 2 % — SIGNIFICANT CHANGE UP (ref 0–8)
PLAT MORPH BLD: NORMAL — SIGNIFICANT CHANGE UP
PLATELET # BLD AUTO: 198 K/UL — SIGNIFICANT CHANGE UP (ref 150–400)
POTASSIUM SERPL-MCNC: 4.1 MMOL/L — SIGNIFICANT CHANGE UP (ref 3.5–5.3)
POTASSIUM SERPL-SCNC: 4.1 MMOL/L — SIGNIFICANT CHANGE UP (ref 3.5–5.3)
PROT SERPL-MCNC: 6.4 G/DL — LOW (ref 6.6–8.7)
PROTHROM AB SERPL-ACNC: 14.2 SEC — HIGH (ref 9.8–12.7)
RBC # BLD: 4.95 M/UL — SIGNIFICANT CHANGE UP (ref 4.6–6.2)
RBC # FLD: 13.6 % — SIGNIFICANT CHANGE UP (ref 11–15.6)
RBC BLD AUTO: NORMAL — SIGNIFICANT CHANGE UP
SODIUM SERPL-SCNC: 142 MMOL/L — SIGNIFICANT CHANGE UP (ref 135–145)
TROPONIN T SERPL-MCNC: <0.01 NG/ML — SIGNIFICANT CHANGE UP (ref 0–0.06)
TROPONIN T SERPL-MCNC: <0.01 NG/ML — SIGNIFICANT CHANGE UP (ref 0–0.06)
WBC # BLD: 11.5 K/UL — HIGH (ref 4.8–10.8)
WBC # FLD AUTO: 11.5 K/UL — HIGH (ref 4.8–10.8)

## 2018-08-01 PROCEDURE — 99285 EMERGENCY DEPT VISIT HI MDM: CPT

## 2018-08-01 PROCEDURE — 71045 X-RAY EXAM CHEST 1 VIEW: CPT | Mod: 26

## 2018-08-01 PROCEDURE — 99223 1ST HOSP IP/OBS HIGH 75: CPT | Mod: AI

## 2018-08-01 PROCEDURE — 93010 ELECTROCARDIOGRAM REPORT: CPT

## 2018-08-01 RX ORDER — AMLODIPINE BESYLATE AND OLMESARTRAN MEDOXOMIL 10; 40 MG/1; MG/1
1 TABLET, FILM COATED ORAL
Qty: 0 | Refills: 0 | COMMUNITY

## 2018-08-01 RX ORDER — SODIUM CHLORIDE 9 MG/ML
1000 INJECTION, SOLUTION INTRAVENOUS
Qty: 0 | Refills: 0 | Status: DISCONTINUED | OUTPATIENT
Start: 2018-08-01 | End: 2018-08-03

## 2018-08-01 RX ORDER — PANTOPRAZOLE SODIUM 20 MG/1
40 TABLET, DELAYED RELEASE ORAL
Qty: 0 | Refills: 0 | Status: DISCONTINUED | OUTPATIENT
Start: 2018-08-01 | End: 2018-08-03

## 2018-08-01 RX ORDER — METOPROLOL TARTRATE 50 MG
0 TABLET ORAL
Qty: 0 | Refills: 0 | COMMUNITY

## 2018-08-01 RX ORDER — GLUCAGON INJECTION, SOLUTION 0.5 MG/.1ML
1 INJECTION, SOLUTION SUBCUTANEOUS ONCE
Qty: 0 | Refills: 0 | Status: DISCONTINUED | OUTPATIENT
Start: 2018-08-01 | End: 2018-08-03

## 2018-08-01 RX ORDER — DEXTROSE 50 % IN WATER 50 %
12.5 SYRINGE (ML) INTRAVENOUS ONCE
Qty: 0 | Refills: 0 | Status: DISCONTINUED | OUTPATIENT
Start: 2018-08-01 | End: 2018-08-03

## 2018-08-01 RX ORDER — DEXTROSE 50 % IN WATER 50 %
25 SYRINGE (ML) INTRAVENOUS ONCE
Qty: 0 | Refills: 0 | Status: DISCONTINUED | OUTPATIENT
Start: 2018-08-01 | End: 2018-08-03

## 2018-08-01 RX ORDER — DEXTROSE 50 % IN WATER 50 %
15 SYRINGE (ML) INTRAVENOUS ONCE
Qty: 0 | Refills: 0 | Status: DISCONTINUED | OUTPATIENT
Start: 2018-08-01 | End: 2018-08-03

## 2018-08-01 RX ORDER — INSULIN LISPRO 100/ML
VIAL (ML) SUBCUTANEOUS
Qty: 0 | Refills: 0 | Status: DISCONTINUED | OUTPATIENT
Start: 2018-08-01 | End: 2018-08-03

## 2018-08-01 RX ORDER — ASPIRIN/CALCIUM CARB/MAGNESIUM 324 MG
1 TABLET ORAL
Qty: 0 | Refills: 0 | COMMUNITY

## 2018-08-01 RX ORDER — SODIUM CHLORIDE 9 MG/ML
3 INJECTION INTRAMUSCULAR; INTRAVENOUS; SUBCUTANEOUS ONCE
Qty: 0 | Refills: 0 | Status: COMPLETED | OUTPATIENT
Start: 2018-08-01 | End: 2018-08-01

## 2018-08-01 RX ORDER — APIXABAN 2.5 MG/1
5 TABLET, FILM COATED ORAL EVERY 12 HOURS
Qty: 0 | Refills: 0 | Status: DISCONTINUED | OUTPATIENT
Start: 2018-08-01 | End: 2018-08-03

## 2018-08-01 RX ORDER — FLECAINIDE ACETATE 50 MG
50 TABLET ORAL EVERY 12 HOURS
Qty: 0 | Refills: 0 | Status: DISCONTINUED | OUTPATIENT
Start: 2018-08-01 | End: 2018-08-03

## 2018-08-01 RX ORDER — AMLODIPINE BESYLATE 2.5 MG/1
10 TABLET ORAL DAILY
Qty: 0 | Refills: 0 | Status: DISCONTINUED | OUTPATIENT
Start: 2018-08-01 | End: 2018-08-03

## 2018-08-01 RX ADMIN — SODIUM CHLORIDE 3 MILLILITER(S): 9 INJECTION INTRAMUSCULAR; INTRAVENOUS; SUBCUTANEOUS at 09:17

## 2018-08-01 RX ADMIN — APIXABAN 5 MILLIGRAM(S): 2.5 TABLET, FILM COATED ORAL at 19:33

## 2018-08-01 RX ADMIN — Medication 2: at 19:35

## 2018-08-01 RX ADMIN — Medication 50 MILLIGRAM(S): at 19:33

## 2018-08-01 RX ADMIN — Medication 50 MILLIGRAM(S): at 14:52

## 2018-08-01 NOTE — ED ADULT TRIAGE NOTE - CHIEF COMPLAINT QUOTE
Patient states that he has been having dizziness since last night, patient states that he feels like his pulse is low and has had issues like this in the past. Patient states that he was also having mid sternal chest pressure last night but denies any pain at this time.

## 2018-08-01 NOTE — ED PROVIDER NOTE - PROGRESS NOTE DETAILS
Pt seen and eval by Cardio/Dr. LISSA Guevara and requesting admission for monitoring and hold B-blocker.  Case d/w Hospitalist/Dr. Pena and will admit

## 2018-08-01 NOTE — ED PROVIDER NOTE - OBJECTIVE STATEMENT
76 yo M presents to ED with wife c/o feeling dizzy this AM.  Pt checked his pulse and noted it ti be in the low 40's.  Pt admits to episode of chest pain lasting a few hours yesterday, but did not seek medical attention.  Pt with hx of A-fib, s/p ablations in the past and now on Metoprolol and Flecainide daily.  Pt denies any assoc syncope, SOB, N/V, diaphoresis ot focal weakness.  Pt followed by [facility]/Dr. LISSA Guevara and Dr. Sellers.  Pt did not take any of his AM meds

## 2018-08-01 NOTE — H&P ADULT - ASSESSMENT
76 yo M w/ hx Aflutter s/p ablation, DM2, prostate CA presents with dizziness this am associated with nausea and CHACON.  Night prior, patient states he had substernal "anginal" chest pressure without radiation. Resolved after 2 hrs.  Currently without symptoms. in ER found to have bradycardia.

## 2018-08-01 NOTE — H&P ADULT - HISTORY OF PRESENT ILLNESS
78 yo M w/ hx Aflutter s/p ablation, DM2, prostate CA presents with dizziness this am associated with nausea and CHACON.  Night prior, patient states he had substernal "anginal" chest pressure without radiation. Resolved after 2 hrs.  Currently without symptoms.  recent echo/stress test as outpatient with cardiology.   In ER found to have bradycardia.

## 2018-08-01 NOTE — CONSULT NOTE ADULT - ASSESSMENT
77M with hx of AF, S/P ablation therapy.  Presents to ER with c/o chest discomfort.  Now resolved.  Noted to have HR of 40 in the ER. CP free now.    Suggest monitor in telemetry bed, D/C Lopressor. Ck TFTs, troponins, ECG.  May demonstrate need for PPM.  Thank you, will follow.

## 2018-08-01 NOTE — CONSULT NOTE ADULT - SUBJECTIVE AND OBJECTIVE BOX
77M with lightheadedness, dizziness with brief CP.  Now resolved.  Noted to have low HR in the ER.  Hx of AF ablation in the past, HTN, MVP, HL.       REVIEW OF SYSTEMS:  CONSTITUTIONAL: No fever, weight loss, or fatigue  EYES: No eye pain, visual disturbances, or discharge  ENMT:  No difficulty hearing, tinnitus, vertigo; No sinus or throat pain  NECK: No pain or stiffness  RESPIRATORY: No cough, wheezing, chills or hemoptysis; No Shortness of Breath  GASTROINTESTINAL: No abdominal or epigastric pain. No nausea, vomiting, or hematemesis; No diarrhea or constipation. No melena or hematochezia.  GENITOURINARY: No dysuria, frequency, hematuria, or incontinence  NEUROLOGICAL: No headaches, memory loss, loss of strength, numbness, or tremors  SKIN: No itching, burning, rashes, or lesions   LYMPH Nodes: No enlarged glands  ENDOCRINE: No heat or cold intolerance; No hair loss  MUSCULOSKELETAL: No joint pain or swelling; No muscle, back, or extremity pain  PSYCHIATRIC: No depression, anxiety, mood swings, or difficulty sleeping  HEME/LYMPH: No easy bruising, or bleeding gums  ALLERY AND IMMUNOLOGIC: No hives or eczema	    Vital Signs Last 24 Hrs  T(C): 36.7 (01 Aug 2018 07:51), Max: 36.7 (01 Aug 2018 07:51)  T(F): 98 (01 Aug 2018 07:51), Max: 98 (01 Aug 2018 07:51)  HR: 41 (01 Aug 2018 09:17) (41 - 48)  BP: 114/56 (01 Aug 2018 09:17) (112/55 - 139/65)  BP(mean): --  RR: 18 (01 Aug 2018 08:10) (18 - 18)  SpO2: 99% (01 Aug 2018 08:10) (97% - 99%)    Daily Height in cm: 182.88 (01 Aug 2018 07:47)    Daily     I&O's Detail      PHYSICAL EXAM:  Appearance: Normal, well nourished		  Cardiovascular: Normal S1 S2, No JVD, No cardiac murmurs, No carotid bruits, No peripheral edema  Respiratory: Lungs clear to auscultation	  Gastrointestinal:  Soft, Non-tender, + BS, no bruits	  Skin: No rashes, No ecchymoses, No cyanosis  Neurologic: Grossly non-focal with full strength in all four extremities  Extremities: Normal range of motion, No clubbing, cyanosis or edema  Vascular: Peripheral pulses palpable 2+ bilaterally      INTERPRETATION OF TELEMETRY:    ECG: Sinus bradycardia    LABS:                        15.5   11.5  )-----------( 198      ( 01 Aug 2018 08:24 )             45.8     08-01    142  |  103  |  38.0<H>  ----------------------------<  188<H>  4.1   |  25.0  |  1.22    Ca    8.9      01 Aug 2018 08:24    TPro  6.4<L>  /  Alb  3.9  /  TBili  3.2<H>  /  DBili  x   /  AST  290<H>  /  ALT  324<H>  /  AlkPhos  128<H>  08-01    CARDIAC MARKERS ( 01 Aug 2018 08:24 )  x     / <0.01 ng/mL / 86 U/L / x     / x          PT/INR - ( 01 Aug 2018 08:24 )   PT: 14.2 sec;   INR: 1.28 ratio         PTT - ( 01 Aug 2018 08:24 )  PTT:35.4 sec    I&O's Summary    BNP  RADIOLOGY & ADDITIONAL STUDIES:

## 2018-08-01 NOTE — ED ADULT NURSE NOTE - OBJECTIVE STATEMENT
76 y/o male presents from home with CP that lasted for 2-3 hours last night and dissipated with new onset of dizziness this morning. Pt. is bradycardic and irregular on monitor. Placed on cardiac pads. S1,S2 distant and radial pulses +1 B/L. Skin is warm, dry, and non diaphoretic. Pt. denies any CP at this time. Will continue to monitor pt.

## 2018-08-01 NOTE — ED PROVIDER NOTE - MEDICAL DECISION MAKING DETAILS
EKG slow A-fib with no acute changes , Check labs, monitor, pacer pads in place and Cardio consult/Dr. LISSA Guevara

## 2018-08-01 NOTE — ED ADULT NURSE REASSESSMENT NOTE - NS ED NURSE REASSESS COMMENT FT1
Informed MD [place] of pt. dizziness. Placed patient in supine position and alleviated dizziness. Awaiting Cardiac consult.

## 2018-08-01 NOTE — ED ADULT NURSE NOTE - INTERVENTIONS DEFINITIONS
Physically safe environment: no spills, clutter or unnecessary equipment/Instruct patient to call for assistance/[place] to call system/Call bell, personal items and telephone within reach/Non-slip footwear when patient is off stretcher/Stretcher in lowest position, wheels locked, appropriate side rails in place

## 2018-08-02 LAB
ALBUMIN SERPL ELPH-MCNC: 3.4 G/DL — SIGNIFICANT CHANGE UP (ref 3.3–5.2)
ALP SERPL-CCNC: 111 U/L — SIGNIFICANT CHANGE UP (ref 40–120)
ALT FLD-CCNC: 196 U/L — HIGH
ANION GAP SERPL CALC-SCNC: 11 MMOL/L — SIGNIFICANT CHANGE UP (ref 5–17)
AST SERPL-CCNC: 88 U/L — HIGH
BASOPHILS # BLD AUTO: 0 K/UL — SIGNIFICANT CHANGE UP (ref 0–0.2)
BASOPHILS NFR BLD AUTO: 0.3 % — SIGNIFICANT CHANGE UP (ref 0–2)
BILIRUB SERPL-MCNC: 1.4 MG/DL — SIGNIFICANT CHANGE UP (ref 0.4–2)
BUN SERPL-MCNC: 39 MG/DL — HIGH (ref 8–20)
CALCIUM SERPL-MCNC: 8.6 MG/DL — SIGNIFICANT CHANGE UP (ref 8.6–10.2)
CHLORIDE SERPL-SCNC: 105 MMOL/L — SIGNIFICANT CHANGE UP (ref 98–107)
CO2 SERPL-SCNC: 25 MMOL/L — SIGNIFICANT CHANGE UP (ref 22–29)
CREAT SERPL-MCNC: 1.03 MG/DL — SIGNIFICANT CHANGE UP (ref 0.5–1.3)
EOSINOPHIL # BLD AUTO: 0.2 K/UL — SIGNIFICANT CHANGE UP (ref 0–0.5)
EOSINOPHIL NFR BLD AUTO: 2.8 % — SIGNIFICANT CHANGE UP (ref 0–5)
GLUCOSE BLDC GLUCOMTR-MCNC: 144 MG/DL — HIGH (ref 70–99)
GLUCOSE BLDC GLUCOMTR-MCNC: 146 MG/DL — HIGH (ref 70–99)
GLUCOSE BLDC GLUCOMTR-MCNC: 159 MG/DL — HIGH (ref 70–99)
GLUCOSE BLDC GLUCOMTR-MCNC: 187 MG/DL — HIGH (ref 70–99)
GLUCOSE BLDC GLUCOMTR-MCNC: 197 MG/DL — HIGH (ref 70–99)
GLUCOSE SERPL-MCNC: 145 MG/DL — HIGH (ref 70–115)
HBA1C BLD-MCNC: 6.2 % — HIGH (ref 4–5.6)
HCT VFR BLD CALC: 43.3 % — SIGNIFICANT CHANGE UP (ref 42–52)
HGB BLD-MCNC: 14.3 G/DL — SIGNIFICANT CHANGE UP (ref 14–18)
LYMPHOCYTES # BLD AUTO: 1 K/UL — SIGNIFICANT CHANGE UP (ref 1–4.8)
LYMPHOCYTES # BLD AUTO: 12.6 % — LOW (ref 20–55)
MCHC RBC-ENTMCNC: 30.6 PG — SIGNIFICANT CHANGE UP (ref 27–31)
MCHC RBC-ENTMCNC: 33 G/DL — SIGNIFICANT CHANGE UP (ref 32–36)
MCV RBC AUTO: 92.5 FL — SIGNIFICANT CHANGE UP (ref 80–94)
MONOCYTES # BLD AUTO: 0.9 K/UL — HIGH (ref 0–0.8)
MONOCYTES NFR BLD AUTO: 11.9 % — HIGH (ref 3–10)
NEUTROPHILS # BLD AUTO: 5.7 K/UL — SIGNIFICANT CHANGE UP (ref 1.8–8)
NEUTROPHILS NFR BLD AUTO: 72.1 % — SIGNIFICANT CHANGE UP (ref 37–73)
PLATELET # BLD AUTO: 144 K/UL — LOW (ref 150–400)
POTASSIUM SERPL-MCNC: 4.1 MMOL/L — SIGNIFICANT CHANGE UP (ref 3.5–5.3)
POTASSIUM SERPL-SCNC: 4.1 MMOL/L — SIGNIFICANT CHANGE UP (ref 3.5–5.3)
PROT SERPL-MCNC: 5.8 G/DL — LOW (ref 6.6–8.7)
RBC # BLD: 4.68 M/UL — SIGNIFICANT CHANGE UP (ref 4.6–6.2)
RBC # FLD: 13.4 % — SIGNIFICANT CHANGE UP (ref 11–15.6)
SODIUM SERPL-SCNC: 141 MMOL/L — SIGNIFICANT CHANGE UP (ref 135–145)
TROPONIN T SERPL-MCNC: <0.01 NG/ML — SIGNIFICANT CHANGE UP (ref 0–0.06)
TSH SERPL-MCNC: 1.23 UIU/ML — SIGNIFICANT CHANGE UP (ref 0.27–4.2)
WBC # BLD: 7.8 K/UL — SIGNIFICANT CHANGE UP (ref 4.8–10.8)
WBC # FLD AUTO: 7.8 K/UL — SIGNIFICANT CHANGE UP (ref 4.8–10.8)

## 2018-08-02 PROCEDURE — 99232 SBSQ HOSP IP/OBS MODERATE 35: CPT

## 2018-08-02 RX ADMIN — AMLODIPINE BESYLATE 10 MILLIGRAM(S): 2.5 TABLET ORAL at 05:50

## 2018-08-02 RX ADMIN — APIXABAN 5 MILLIGRAM(S): 2.5 TABLET, FILM COATED ORAL at 09:05

## 2018-08-02 RX ADMIN — PANTOPRAZOLE SODIUM 40 MILLIGRAM(S): 20 TABLET, DELAYED RELEASE ORAL at 05:50

## 2018-08-02 RX ADMIN — Medication 2: at 17:30

## 2018-08-02 RX ADMIN — APIXABAN 5 MILLIGRAM(S): 2.5 TABLET, FILM COATED ORAL at 21:00

## 2018-08-02 RX ADMIN — Medication 50 MILLIGRAM(S): at 17:52

## 2018-08-02 NOTE — PROGRESS NOTE ADULT - SUBJECTIVE AND OBJECTIVE BOX
seen for bradycardia    mild dizziness on ambulation  no cp/sob  ros otherwise negative  telemetry with HR 40-50s     MEDICATIONS  (STANDING):  amLODIPine   Tablet 10 milliGRAM(s) Oral daily  apixaban 5 milliGRAM(s) Oral every 12 hours  dextrose 5%. 1000 milliLiter(s) (50 mL/Hr) IV Continuous <Continuous>  dextrose 50% Injectable 12.5 Gram(s) IV Push once  dextrose 50% Injectable 25 Gram(s) IV Push once  dextrose 50% Injectable 25 Gram(s) IV Push once  flecainide 50 milliGRAM(s) Oral every 12 hours  insulin lispro (HumaLOG) corrective regimen sliding scale   SubCutaneous three times a day before meals  pantoprazole    Tablet 40 milliGRAM(s) Oral before breakfast    MEDICATIONS  (PRN):  dextrose 40% Gel 15 Gram(s) Oral once PRN Blood Glucose LESS THAN 70 milliGRAM(s)/deciliter  glucagon  Injectable 1 milliGRAM(s) IntraMuscular once PRN Glucose LESS THAN 70 milligrams/deciliter      Allergies    No Known Allergies      Vital Signs Last 24 Hrs  T(C): 36.9 (02 Aug 2018 11:25), Max: 37.6 (02 Aug 2018 05:48)  T(F): 98.4 (02 Aug 2018 11:25), Max: 99.6 (02 Aug 2018 05:48)  HR: 54 (02 Aug 2018 11:25) (29 - 54)  BP: 122/78 (02 Aug 2018 11:25) (113/57 - 144/64)  BP(mean): --  RR: 18 (02 Aug 2018 11:25) (18 - 18)  SpO2: 98% (02 Aug 2018 11:25) (95% - 99%)    PHYSICAL EXAM:    GENERAL: NAD  CHEST/LUNG: Clear to percussion bilaterally;  HEART: Regular rate and rhythm; S1 S2  ABDOMEN: Soft, Nontender, Nondistended; Bowel sounds present  EXTREMITIES:  no edema  NERVOUS SYSTEM:  Alert & Oriented X3, nonfocal    LABS:                        14.3   7.8   )-----------( 144      ( 02 Aug 2018 05:55 )             43.3     08-02    141  |  105  |  39.0<H>  ----------------------------<  145<H>  4.1   |  25.0  |  1.03    Ca    8.6      02 Aug 2018 05:55    TPro  5.8<L>  /  Alb  3.4  /  TBili  1.4  /  DBili  x   /  AST  88<H>  /  ALT  196<H>  /  AlkPhos  111  08-02    PT/INR - ( 01 Aug 2018 08:24 )   PT: 14.2 sec;   INR: 1.28 ratio         PTT - ( 01 Aug 2018 08:24 )  PTT:35.4 sec      CAPILLARY BLOOD GLUCOSE      POCT Blood Glucose.: 197 mg/dL (02 Aug 2018 11:55)  POCT Blood Glucose.: 146 mg/dL (02 Aug 2018 08:14)  POCT Blood Glucose.: 182 mg/dL (01 Aug 2018 23:06)  POCT Blood Glucose.: 175 mg/dL (01 Aug 2018 18:10)  POCT Blood Glucose.: 143 mg/dL (01 Aug 2018 12:49)        RADIOLOGY & ADDITIONAL TESTS:

## 2018-08-02 NOTE — PROGRESS NOTE ADULT - SUBJECTIVE AND OBJECTIVE BOX
INTERVAL HISTORY: Denies CP,SOB,palpitation  	  MEDICATIONS:  amLODIPine   Tablet 10 milliGRAM(s) Oral daily  flecainide 50 milliGRAM(s) Oral every 12 hours  pantoprazole    Tablet 40 milliGRAM(s) Oral before breakfast  dextrose 40% Gel 15 Gram(s) Oral once PRN  dextrose 50% Injectable 12.5 Gram(s) IV Push once  dextrose 50% Injectable 25 Gram(s) IV Push once  dextrose 50% Injectable 25 Gram(s) IV Push once  glucagon  Injectable 1 milliGRAM(s) IntraMuscular once PRN  insulin lispro (HumaLOG) corrective regimen sliding scale   SubCutaneous three times a day before meals  apixaban 5 milliGRAM(s) Oral every 12 hours  dextrose 5%. 1000 milliLiter(s) IV Continuous <Continuous>        PHYSICAL EXAM:  T(C): 37.6 (08-02-18 @ 05:48), Max: 37.6 (08-02-18 @ 05:48)  HR: 38 (08-02-18 @ 05:48) (29 - 50)  BP: 126/52 (08-02-18 @ 05:48) (113/57 - 144/64)  RR: 18 (08-02-18 @ 05:48) (18 - 18)  SpO2: 96% (08-02-18 @ 05:48) (95% - 99%)  Wt(kg): --  I&O's Summary    01 Aug 2018 07:01  -  02 Aug 2018 07:00  --------------------------------------------------------  IN: 120 mL / OUT: 280 mL / NET: -160 mL          Appearance: Normal	  HEENT:   Normal oral mucosa  Cardiovascular: Normal S1 S2, No JVD, No murmurs, No edema  Respiratory: Lungs clear to auscultation	  Psychiatry: A & O x 3, Mood & affect appropriate  Gastrointestinal:  Soft, Non-tender, + BS	  Skin: No rashes, No ecchymoses, No cyanosis  Neurologic: Non-focal  Extremities: Normal range of motion, No clubbing, cyanosis or edema  Vascular: Peripheral pulses palpable 2+ bilaterally    TELEMETRY: A Flutter HR 50s	        LABS:	 	                          14.3   7.8   )-----------( 144      ( 02 Aug 2018 05:55 )             43.3     08-02    141  |  105  |  39.0<H>  ----------------------------<  145<H>  4.1   |  25.0  |  1.03    Ca    8.6      02 Aug 2018 05:55    TPro  5.8<L>  /  Alb  3.4  /  TBili  1.4  /  DBili  x   /  AST  88<H>  /  ALT  196<H>  /  AlkPhos  111  08-02      HgA1c: Hemoglobin A1C, Whole Blood: 6.2 % (08-02 @ 05:55)    TSH: Thyroid Stimulating Hormone, Serum: 1.23 uIU/mL (08-02 @ 05:55)      ASSESSMENT/PLAN: 77M with hx of AF, S/P ablation therapy.  Presents to ER with c/o chest discomfort.  Now resolved.  Noted to have HR of 40 in the ER. Now off of Beta Blocker due to bradycardia.   Continue to monitor in telemetry bed.  Ck TFTs.  May demonstrate need for PPM.

## 2018-08-03 ENCOUNTER — TRANSCRIPTION ENCOUNTER (OUTPATIENT)
Age: 78
End: 2018-08-03

## 2018-08-03 VITALS
TEMPERATURE: 98 F | HEART RATE: 52 BPM | RESPIRATION RATE: 18 BRPM | SYSTOLIC BLOOD PRESSURE: 122 MMHG | OXYGEN SATURATION: 97 % | DIASTOLIC BLOOD PRESSURE: 72 MMHG

## 2018-08-03 LAB
ALBUMIN SERPL ELPH-MCNC: 3.5 G/DL — SIGNIFICANT CHANGE UP (ref 3.3–5.2)
ALP SERPL-CCNC: 108 U/L — SIGNIFICANT CHANGE UP (ref 40–120)
ALT FLD-CCNC: 127 U/L — HIGH
ANION GAP SERPL CALC-SCNC: 12 MMOL/L — SIGNIFICANT CHANGE UP (ref 5–17)
AST SERPL-CCNC: 37 U/L — SIGNIFICANT CHANGE UP
BILIRUB SERPL-MCNC: 1.6 MG/DL — SIGNIFICANT CHANGE UP (ref 0.4–2)
BUN SERPL-MCNC: 23 MG/DL — HIGH (ref 8–20)
CALCIUM SERPL-MCNC: 8.7 MG/DL — SIGNIFICANT CHANGE UP (ref 8.6–10.2)
CHLORIDE SERPL-SCNC: 105 MMOL/L — SIGNIFICANT CHANGE UP (ref 98–107)
CO2 SERPL-SCNC: 23 MMOL/L — SIGNIFICANT CHANGE UP (ref 22–29)
CREAT SERPL-MCNC: 0.95 MG/DL — SIGNIFICANT CHANGE UP (ref 0.5–1.3)
GLUCOSE BLDC GLUCOMTR-MCNC: 158 MG/DL — HIGH (ref 70–99)
GLUCOSE SERPL-MCNC: 147 MG/DL — HIGH (ref 70–115)
POTASSIUM SERPL-MCNC: 4.1 MMOL/L — SIGNIFICANT CHANGE UP (ref 3.5–5.3)
POTASSIUM SERPL-SCNC: 4.1 MMOL/L — SIGNIFICANT CHANGE UP (ref 3.5–5.3)
PROT SERPL-MCNC: 6.4 G/DL — LOW (ref 6.6–8.7)
SODIUM SERPL-SCNC: 140 MMOL/L — SIGNIFICANT CHANGE UP (ref 135–145)

## 2018-08-03 PROCEDURE — 99239 HOSP IP/OBS DSCHRG MGMT >30: CPT

## 2018-08-03 RX ORDER — METOPROLOL TARTRATE 50 MG
50 TABLET ORAL
Qty: 0 | Refills: 0 | COMMUNITY

## 2018-08-03 RX ORDER — FLECAINIDE ACETATE 50 MG
1 TABLET ORAL
Qty: 0 | Refills: 0 | COMMUNITY

## 2018-08-03 RX ORDER — RAMIPRIL 5 MG
1 CAPSULE ORAL
Qty: 0 | Refills: 0 | COMMUNITY

## 2018-08-03 RX ORDER — AMLODIPINE BESYLATE 2.5 MG/1
1 TABLET ORAL
Qty: 0 | Refills: 0 | COMMUNITY

## 2018-08-03 RX ADMIN — APIXABAN 5 MILLIGRAM(S): 2.5 TABLET, FILM COATED ORAL at 09:20

## 2018-08-03 RX ADMIN — AMLODIPINE BESYLATE 10 MILLIGRAM(S): 2.5 TABLET ORAL at 05:56

## 2018-08-03 RX ADMIN — PANTOPRAZOLE SODIUM 40 MILLIGRAM(S): 20 TABLET, DELAYED RELEASE ORAL at 05:56

## 2018-08-03 RX ADMIN — Medication 50 MILLIGRAM(S): at 05:56

## 2018-08-03 RX ADMIN — Medication 2: at 09:20

## 2018-08-03 NOTE — DISCHARGE NOTE ADULT - MEDICATION SUMMARY - MEDICATIONS TO STOP TAKING
I will STOP taking the medications listed below when I get home from the hospital:    amlodipine-olmesartan 10 mg-40 mg oral tablet  -- 1 tab(s) by mouth once a day    flecainide 50 mg oral tablet  -- 1 tab(s) by mouth every 12 hours    metoprolol  -- 50 milligram(s) by mouth 2 times a day I will STOP taking the medications listed below when I get home from the hospital:    ramipril 10 mg oral capsule  -- 1 cap(s) by mouth once a day    flecainide 50 mg oral tablet  -- 1 tab(s) by mouth every 12 hours    metoprolol  -- 50 milligram(s) by mouth 2 times a day    Norvasc 10 mg oral tablet  -- 1 tab(s) by mouth once a day

## 2018-08-03 NOTE — DISCHARGE NOTE ADULT - HOSPITAL COURSE
76 yo M w/ hx Aflutter s/p ablation, DM2, prostate CA presents with dizziness this am associated with nausea and CHACON.  Night prior, patient states he had substernal "anginal" chest pressure without radiation. Resolved after 2 hrs.  Currently without symptoms. in ER found to have bradycardia.   Monitored off metoprolol with improvement in HR.  per cardiology, fleicainide to be stopped as well and to follow up as outpatient.  patient already has a loop recorder.    stable for discharge.  dc planning 32 minutes.  vitals stable HR 60's. no acute complaints rrr s1s2 ctab soft abdomen no LE edema nonfocal neuro.

## 2018-08-03 NOTE — PROGRESS NOTE ADULT - SUBJECTIVE AND OBJECTIVE BOX
INTERVAL HISTORY: Feeling well; no CP, palpitations, shortness of breath, syncope or near syncope  	  MEDICATIONS:  amLODIPine   Tablet 10 milliGRAM(s) Oral daily  flecainide 50 milliGRAM(s) Oral every 12 hours  pantoprazole    Tablet 40 milliGRAM(s) Oral before breakfast  dextrose 40% Gel 15 Gram(s) Oral once PRN  dextrose 50% Injectable 12.5 Gram(s) IV Push once  dextrose 50% Injectable 25 Gram(s) IV Push once  dextrose 50% Injectable 25 Gram(s) IV Push once  glucagon  Injectable 1 milliGRAM(s) IntraMuscular once PRN  insulin lispro (HumaLOG) corrective regimen sliding scale   SubCutaneous three times a day before meals  apixaban 5 milliGRAM(s) Oral every 12 hours  dextrose 5%. 1000 milliLiter(s) IV Continuous <Continuous>      PHYSICAL EXAM:  Vital Signs Last 24 Hrs  T(C): 37.1 (03 Aug 2018 05:52), Max: 37.4 (02 Aug 2018 16:38)  T(F): 98.7 (03 Aug 2018 05:52), Max: 99.4 (02 Aug 2018 16:38)  HR: 72 (03 Aug 2018 05:52) (54 - 72)  BP: 156/70 (03 Aug 2018 05:52) (122/78 - 156/70)  BP(mean): --  RR: 18 (03 Aug 2018 05:52) (18 - 18)  SpO2: 96% (03 Aug 2018 05:52) (96% - 98%)    GENERAL: sitting up comfortably in bed in nad	  Neck: supple without JVD or bruit  Cardiovascular: irregularly irregular bradycardia;  S1 S2, No JVD, No murmurs, No edema  Respiratory: Lungs clear to auscultation	  Psychiatry: A & O x 3, Mood & affect appropriate  Gastrointestinal:  Soft, Non-tender, + BS	  Skin: No rashes, No ecchymoses, No cyanosis  Neurologic: Non-focal  Extremities: Normal range of motion, No clubbing, cyanosis or edema  Vascular: Peripheral pulses palpable 2+ bilaterally    TELEMETRY: A Flutter HR 50s	        LABS:	 	                          14.3   7.8   )-----------( 144      ( 02 Aug 2018 05:55 )             43.3     08-02    141  |  105  |  39.0<H>  ----------------------------<  145<H>  4.1   |  25.0  |  1.03    Ca    8.6      02 Aug 2018 05:55    TPro  5.8<L>  /  Alb  3.4  /  TBili  1.4  /  DBili  x   /  AST  88<H>  /  ALT  196<H>  /  AlkPhos  111  08-02      HgA1c: Hemoglobin A1C, Whole Blood: 6.2 % (08-02 @ 05:55)    TSH: Thyroid Stimulating Hormone, Serum: 1.23 uIU/mL (08-02 @ 05:55)    Echocardiogram (    ASSESSMENT/PLAN: 77 y.o. man with significant history of paroxsymal afib (s/p ablation) and paroxsymal flutter (s/p implantable loop recorder), borderline DMII, admitted with symptomatic bradycardia.     - The patient has been chest pain free since admission without ischemic ECG abnormalities and negative serial troponin and has ruled out for acute MI. Plan for outpatient stress testing if not done recently for further ischemic risk stratification.  - Symptomatic bradycardia: now with marked clinical and symptomatic improvement following withdrawal of beta blocker.  Will also hold flecainide given persistent flutter off beta blockers.   - Persistent flutter with elevated CHADSVASc score: continue eliquis at current dose; risks, benefits, and alternatives discussed.   - TSH normal  - No further inpatient cardiovascular work up indicated.   - Early follow up next week with Dr. Sellers regarding next best steps in the management of atrial flutter and bradycardia.

## 2018-08-03 NOTE — DISCHARGE NOTE ADULT - CARE PLAN
Principal Discharge DX:	Symptomatic bradycardia  Goal:	prevention  Assessment and plan of treatment:	hold metoprolol and fleicanide.  can use a pulse oximeter to monitor your heart rate  follow up with Dr Sellers in 1-2 weeks.  Secondary Diagnosis:	Atrial flutter by electrocardiogram  Assessment and plan of treatment:	continue eliquis.  Secondary Diagnosis:	Transaminitis  Assessment and plan of treatment:	resolved  repeat as outpatient  Secondary Diagnosis:	Type 2 diabetes mellitus without complication, without long-term current use of insulin  Assessment and plan of treatment:	continue home medications

## 2018-08-03 NOTE — DISCHARGE NOTE ADULT - MEDICATION SUMMARY - MEDICATIONS TO TAKE
I will START or STAY ON the medications listed below when I get home from the hospital:    ramipril 10 mg oral capsule  -- 1 cap(s) by mouth once a day  -- Indication: For hypertension    Eliquis 5 mg oral tablet  -- 1 tab(s) by mouth 2 times a day  -- Indication: For Atrial flutter by electrocardiogram    glipiZIDE 5 mg oral tablet  -- 1 tab(s) by mouth once a day  -- Indication: For diabetes    atorvastatin 10 mg oral tablet  -- 1 tab(s) by mouth once a day  -- Indication: For high cholesterol    Norvasc 10 mg oral tablet  -- 1 tab(s) by mouth once a day  -- Indication: For hypertension    pantoprazole 40 mg oral delayed release tablet  -- 1 tab(s) by mouth once a day  -- Indication: For reflux I will START or STAY ON the medications listed below when I get home from the hospital:    Eliquis 5 mg oral tablet  -- 1 tab(s) by mouth 2 times a day  -- Indication: For Atrial flutter by electrocardiogram    glipiZIDE 5 mg oral tablet  -- 1 tab(s) by mouth once a day  -- Indication: For diabetes    atorvastatin 10 mg oral tablet  -- 1 tab(s) by mouth once a day  -- Indication: For high cholesterol    amlodipine-olmesartan 10 mg-20 mg oral tablet  -- 1 tab(s) by mouth once a day  -- Indication: For hypertension    pantoprazole 40 mg oral delayed release tablet  -- 1 tab(s) by mouth once a day  -- Indication: For reflux

## 2018-08-03 NOTE — DISCHARGE NOTE ADULT - SECONDARY DIAGNOSIS.
Atrial flutter by electrocardiogram Transaminitis Type 2 diabetes mellitus without complication, without long-term current use of insulin

## 2018-08-03 NOTE — DISCHARGE NOTE ADULT - PLAN OF CARE
prevention hold metoprolol and fleicanide.  can use a pulse oximeter to monitor your heart rate  follow up with Dr Sellers in 1-2 weeks. continue eliquis. resolved  repeat as outpatient continue home medications

## 2018-08-03 NOTE — DISCHARGE NOTE ADULT - CARE PROVIDER_API CALL
Demarco Sellers), Cardiac Electrophysiology; Cardiovascular Disease  [address]  [address]  [place] [address]  Phone: [phone]  Fax: [phone]

## 2018-08-05 PROCEDURE — 85610 PROTHROMBIN TIME: CPT

## 2018-08-05 PROCEDURE — 71045 X-RAY EXAM CHEST 1 VIEW: CPT

## 2018-08-05 PROCEDURE — 82962 GLUCOSE BLOOD TEST: CPT

## 2018-08-05 PROCEDURE — 80053 COMPREHEN METABOLIC PANEL: CPT

## 2018-08-05 PROCEDURE — 36415 COLL VENOUS BLD VENIPUNCTURE: CPT

## 2018-08-05 PROCEDURE — 84443 ASSAY THYROID STIM HORMONE: CPT

## 2018-08-05 PROCEDURE — 85027 COMPLETE CBC AUTOMATED: CPT

## 2018-08-05 PROCEDURE — 85730 THROMBOPLASTIN TIME PARTIAL: CPT

## 2018-08-05 PROCEDURE — 99285 EMERGENCY DEPT VISIT HI MDM: CPT | Mod: 25

## 2018-08-05 PROCEDURE — 84484 ASSAY OF TROPONIN QUANT: CPT

## 2018-08-05 PROCEDURE — 83036 HEMOGLOBIN GLYCOSYLATED A1C: CPT

## 2018-08-05 PROCEDURE — 82550 ASSAY OF CK (CPK): CPT

## 2018-08-05 PROCEDURE — 93005 ELECTROCARDIOGRAM TRACING: CPT

## 2018-08-10 ENCOUNTER — INPATIENT (INPATIENT)
Facility: HOSPITAL | Age: 78
LOS: 3 days | Discharge: OTHER INSTITUTION | DRG: 435 | End: 2018-08-14
Attending: INTERNAL MEDICINE | Admitting: FAMILY MEDICINE
Payer: MEDICARE

## 2018-08-10 VITALS — WEIGHT: 199.96 LBS | HEIGHT: 72 IN

## 2018-08-10 DIAGNOSIS — Z98.890 OTHER SPECIFIED POSTPROCEDURAL STATES: Chronic | ICD-10-CM

## 2018-08-10 LAB
ALBUMIN SERPL ELPH-MCNC: 3.9 G/DL — SIGNIFICANT CHANGE UP (ref 3.3–5.2)
ALP SERPL-CCNC: 292 U/L — HIGH (ref 40–120)
ALT FLD-CCNC: 301 U/L — HIGH
ANION GAP SERPL CALC-SCNC: 14 MMOL/L — SIGNIFICANT CHANGE UP (ref 5–17)
APPEARANCE UR: CLEAR — SIGNIFICANT CHANGE UP
APTT BLD: 41.5 SEC — HIGH (ref 27.5–37.4)
AST SERPL-CCNC: 150 U/L — HIGH
BASOPHILS # BLD AUTO: 0 K/UL — SIGNIFICANT CHANGE UP (ref 0–0.2)
BASOPHILS NFR BLD AUTO: 0.3 % — SIGNIFICANT CHANGE UP (ref 0–2)
BILIRUB SERPL-MCNC: 6.7 MG/DL — HIGH (ref 0.4–2)
BILIRUB UR-MCNC: ABNORMAL
BLD GP AB SCN SERPL QL: SIGNIFICANT CHANGE UP
BUN SERPL-MCNC: 27 MG/DL — HIGH (ref 8–20)
CALCIUM SERPL-MCNC: 9.1 MG/DL — SIGNIFICANT CHANGE UP (ref 8.6–10.2)
CHLORIDE SERPL-SCNC: 99 MMOL/L — SIGNIFICANT CHANGE UP (ref 98–107)
CO2 SERPL-SCNC: 27 MMOL/L — SIGNIFICANT CHANGE UP (ref 22–29)
COLOR SPEC: ABNORMAL
COMMENT - URINE: SIGNIFICANT CHANGE UP
CREAT SERPL-MCNC: 0.86 MG/DL — SIGNIFICANT CHANGE UP (ref 0.5–1.3)
DIFF PNL FLD: NEGATIVE — SIGNIFICANT CHANGE UP
EOSINOPHIL # BLD AUTO: 0.1 K/UL — SIGNIFICANT CHANGE UP (ref 0–0.5)
EOSINOPHIL NFR BLD AUTO: 1.9 % — SIGNIFICANT CHANGE UP (ref 0–5)
EPI CELLS # UR: SIGNIFICANT CHANGE UP
GLUCOSE SERPL-MCNC: 197 MG/DL — HIGH (ref 70–115)
GLUCOSE UR QL: NEGATIVE MG/DL — SIGNIFICANT CHANGE UP
HCT VFR BLD CALC: 43.1 % — SIGNIFICANT CHANGE UP (ref 42–52)
HGB BLD-MCNC: 15.1 G/DL — SIGNIFICANT CHANGE UP (ref 14–18)
HYALINE CASTS # UR AUTO: ABNORMAL /LPF
INR BLD: 1.38 RATIO — HIGH (ref 0.88–1.16)
KETONES UR-MCNC: NEGATIVE — SIGNIFICANT CHANGE UP
LEUKOCYTE ESTERASE UR-ACNC: NEGATIVE — SIGNIFICANT CHANGE UP
LYMPHOCYTES # BLD AUTO: 1 K/UL — SIGNIFICANT CHANGE UP (ref 1–4.8)
LYMPHOCYTES # BLD AUTO: 14.1 % — LOW (ref 20–55)
MCHC RBC-ENTMCNC: 31.7 PG — HIGH (ref 27–31)
MCHC RBC-ENTMCNC: 35 G/DL — SIGNIFICANT CHANGE UP (ref 32–36)
MCV RBC AUTO: 90.5 FL — SIGNIFICANT CHANGE UP (ref 80–94)
MONOCYTES # BLD AUTO: 0.6 K/UL — SIGNIFICANT CHANGE UP (ref 0–0.8)
MONOCYTES NFR BLD AUTO: 8.5 % — SIGNIFICANT CHANGE UP (ref 3–10)
NEUTROPHILS # BLD AUTO: 5.1 K/UL — SIGNIFICANT CHANGE UP (ref 1.8–8)
NEUTROPHILS NFR BLD AUTO: 74.9 % — HIGH (ref 37–73)
NITRITE UR-MCNC: NEGATIVE — SIGNIFICANT CHANGE UP
PH UR: 5 — SIGNIFICANT CHANGE UP (ref 5–8)
PLATELET # BLD AUTO: 221 K/UL — SIGNIFICANT CHANGE UP (ref 150–400)
POTASSIUM SERPL-MCNC: 4 MMOL/L — SIGNIFICANT CHANGE UP (ref 3.5–5.3)
POTASSIUM SERPL-SCNC: 4 MMOL/L — SIGNIFICANT CHANGE UP (ref 3.5–5.3)
PROT SERPL-MCNC: 6.9 G/DL — SIGNIFICANT CHANGE UP (ref 6.6–8.7)
PROT UR-MCNC: NEGATIVE MG/DL — SIGNIFICANT CHANGE UP
PROTHROM AB SERPL-ACNC: 15.3 SEC — HIGH (ref 9.8–12.7)
RBC # BLD: 4.76 M/UL — SIGNIFICANT CHANGE UP (ref 4.6–6.2)
RBC # FLD: 13.3 % — SIGNIFICANT CHANGE UP (ref 11–15.6)
RBC CASTS # UR COMP ASSIST: SIGNIFICANT CHANGE UP /HPF (ref 0–4)
SODIUM SERPL-SCNC: 140 MMOL/L — SIGNIFICANT CHANGE UP (ref 135–145)
SP GR SPEC: 1.01 — SIGNIFICANT CHANGE UP (ref 1.01–1.02)
TYPE + AB SCN PNL BLD: SIGNIFICANT CHANGE UP
UROBILINOGEN FLD QL: 4 MG/DL
WBC # BLD: 6.8 K/UL — SIGNIFICANT CHANGE UP (ref 4.8–10.8)
WBC # FLD AUTO: 6.8 K/UL — SIGNIFICANT CHANGE UP (ref 4.8–10.8)
WBC UR QL: SIGNIFICANT CHANGE UP

## 2018-08-10 PROCEDURE — 74176 CT ABD & PELVIS W/O CONTRAST: CPT | Mod: 26

## 2018-08-10 PROCEDURE — 71045 X-RAY EXAM CHEST 1 VIEW: CPT | Mod: 26

## 2018-08-10 PROCEDURE — 99285 EMERGENCY DEPT VISIT HI MDM: CPT

## 2018-08-10 RX ORDER — ACETAMINOPHEN 500 MG
975 TABLET ORAL ONCE
Qty: 0 | Refills: 0 | Status: COMPLETED | OUTPATIENT
Start: 2018-08-10 | End: 2018-08-10

## 2018-08-10 RX ORDER — SODIUM CHLORIDE 9 MG/ML
1000 INJECTION INTRAMUSCULAR; INTRAVENOUS; SUBCUTANEOUS ONCE
Qty: 0 | Refills: 0 | Status: COMPLETED | OUTPATIENT
Start: 2018-08-10 | End: 2018-08-10

## 2018-08-10 RX ADMIN — SODIUM CHLORIDE 1000 MILLILITER(S): 9 INJECTION INTRAMUSCULAR; INTRAVENOUS; SUBCUTANEOUS at 18:06

## 2018-08-10 RX ADMIN — SODIUM CHLORIDE 1000 MILLILITER(S): 9 INJECTION INTRAMUSCULAR; INTRAVENOUS; SUBCUTANEOUS at 19:00

## 2018-08-10 RX ADMIN — Medication 975 MILLIGRAM(S): at 18:06

## 2018-08-10 NOTE — ED PROVIDER NOTE - SHIFT CHANGE DETAILS
SEEN WITH PA JAYLEN   CVA TENDERNESS AND DARK URINE ON ELOQUIS  FOLLOW UP LABS AND UA, PT/INR  HAS CHRONIC INTERMITTENT CHEST TIGHTNESS - RECENT ADMISSION AND EVALUATION FOR SAME - NO CHANGES FOUND. NEXT CARDIO APPT IS 2 WEEKS  DISPO

## 2018-08-10 NOTE — ED PROVIDER NOTE - CARE PLAN
Principal Discharge DX:	Hematuria, unspecified type Principal Discharge DX:	Abdominal pain  Secondary Diagnosis:	Bile duct abnormality  Secondary Diagnosis:	Elevated LFTs

## 2018-08-10 NOTE — ED PROVIDER NOTE - MEDICAL DECISION MAKING DETAILS
78 yo male presents with hematuria. Will complete labs, renal stone CT, and UA to eval for renal stone. reevaluate.

## 2018-08-10 NOTE — ED PROVIDER NOTE - PROGRESS NOTE DETAILS
pt is sitting comfortably in bed and in no distress. He is informed of his lab and urine results and notified that we are now going to have a u/s done to examine his gallbladder. US results as noted.  Pt continues to have pain.  Case d/w Hospitalist/Dr. Benton and will admit

## 2018-08-10 NOTE — ED ADULT NURSE NOTE - CHPI ED NUR RELIEVING FX
PLASTIC SURGERY OPERATIVE NOTE  Patient Name:  Lilli Steven     Date of Service:  3/21/2018     PREOPERATIVE DIAGNOSES:   1.  ADBOMINAL SCAR    POSTOPERATIVE DIAGNOSES:   1.  ADBOMINAL SACR    SURGEON:  Inés Harrell MD   OR STAFF:  Circulator: Cici Wu RN  Relief Scrub: Dorian Arevalo  Scrub Person: Ashley Cantrell     ANESTHESIA:  General     ESTIMATED BLOOD LOSS:  * No blood loss amount entered *   SPECIMEN(S):  * No specimens in log *     INDICATIONS:    Patient is a 70-year-old female who underwent a hernia repair approximately 1 year ago.  Her surgery was complicated by postoperative seromas and infection.  This necessitated secondary healing of a portion of her abdominal incision.  The wound has now been completely healed for 6 months but the patient has abdominal wall discomfort and contour irregularity secondary to the tethering of the scar to the abdominal wall.  She presents for a scar revision.    PROCEDURES:   1.  Abdominal scar revision with complex closure of 30 cm      DESCRIPTION OF PROCEDURE:  Patient was marked for incisions and taken to the operating room.  General anesthesia was administered.  The abdominal area was prepped and draped in a sterile manner.  An incision was made along the lower abdomen following the previous scar and dissection was carried down to underlying abdominal wall.  There is dense scar associated with the area involved and secondary healing.  The scar tissue was released with electrocautery.  Dissection was carried cephalad as the patient also had dense scar around her umbilicus from her previous seroma.  This was released as well with electrocautery.  Hemostasis was achieved.    Patient was brought the upright position excess skin on the lower abdomen was then marked.  Patient was returned to supine position this excess skin was sharply excised.  A 15 Georgian RITCHIE drain was placed along the lower abdomen and a second was placed in the epigastric  area.  The abdominal incision was then reapproximated interrupted 2-0 PDS in the superficial fascia followed by interrupted 3-0 Monocryl in the subcutaneous tissue and a running 4-0 subcuticular Monocryl suture.  Prior to closure, small amount liposuction was done to improve contours.    The umbilicus was then re--inset using interrupted 5-0 nylon suture.  Xeroform and a light dressing was applied.    Xeroform and a light dressing were applied.  Abdominal binder was placed.  There was 450 cc of epinephrine solution infiltrated and 450 cc of lipoaspirate obtained.    IMPLANTS:  * No implants in log *    Inés Harrell MD  Plastic Surgery  [facility]  [phone] (office)       none

## 2018-08-10 NOTE — ED PROVIDER NOTE - OBJECTIVE STATEMENT
76 yo male with a pmh of afib, a flutter, HTN, HLD, mitral valve prolapse, DM, and GERD presents with dark colored urine. This started 2 days ago and he describes it as being chocolate colored. 76 yo male with a pmh of afib, a flutter, HTN, HLD, mitral valve prolapse, DM, and GERD presents with dark colored urine. This started 2 days ago and he describes the urine as being chocolate colored. He has pain to his bilateral flank area that is dull in nature and rated a 5/10. The pain has been intermittent and he also admits to episodes of chills and nausea yesterday. He has not noted anything that aggravates or relieves the pain and states it does not radiate. He has not had any episodes of vomiting and denies any pain or frequency in urination. He complains of chest tightness at this time that feel the same as the tightness he fells intermittent on a regular bases. He denies any fevers, vomiting, abdominal pain, chest pain, or SOB.

## 2018-08-10 NOTE — ED PROVIDER NOTE - ATTENDING CONTRIBUTION TO CARE
MD/PA VISIT  PT ON ELOQUIS. DARK URINE . SLIGHT CVA TENDER. CHECK LABS AND URINE.  AGREE WITH HX PE TX DISPO

## 2018-08-10 NOTE — ED ADULT NURSE NOTE - NSIMPLEMENTINTERV_GEN_ALL_ED
Implemented All Universal Safety Interventions:  [place] to call system. Call bell, personal items and telephone within reach. Instruct patient to call for assistance. Room bathroom lighting operational. Non-slip footwear when patient is off stretcher. Physically safe environment: no spills, clutter or unnecessary equipment. Stretcher in lowest position, wheels locked, appropriate side rails in place.

## 2018-08-11 DIAGNOSIS — K83.8 OTHER SPECIFIED DISEASES OF BILIARY TRACT: ICD-10-CM

## 2018-08-11 DIAGNOSIS — E78.5 HYPERLIPIDEMIA, UNSPECIFIED: ICD-10-CM

## 2018-08-11 DIAGNOSIS — I48.2 CHRONIC ATRIAL FIBRILLATION: ICD-10-CM

## 2018-08-11 DIAGNOSIS — E11.9 TYPE 2 DIABETES MELLITUS WITHOUT COMPLICATIONS: ICD-10-CM

## 2018-08-11 DIAGNOSIS — R10.9 UNSPECIFIED ABDOMINAL PAIN: ICD-10-CM

## 2018-08-11 DIAGNOSIS — K63.5 POLYP OF COLON: ICD-10-CM

## 2018-08-11 DIAGNOSIS — K21.9 GASTRO-ESOPHAGEAL REFLUX DISEASE WITHOUT ESOPHAGITIS: ICD-10-CM

## 2018-08-11 LAB
ALBUMIN SERPL ELPH-MCNC: 3.5 G/DL — SIGNIFICANT CHANGE UP (ref 3.3–5.2)
ALP SERPL-CCNC: 272 U/L — HIGH (ref 40–120)
ALT FLD-CCNC: 266 U/L — HIGH
ANION GAP SERPL CALC-SCNC: 12 MMOL/L — SIGNIFICANT CHANGE UP (ref 5–17)
AST SERPL-CCNC: 128 U/L — HIGH
BILIRUB SERPL-MCNC: 6.7 MG/DL — HIGH (ref 0.4–2)
BUN SERPL-MCNC: 15 MG/DL — SIGNIFICANT CHANGE UP (ref 8–20)
CALCIUM SERPL-MCNC: 8.8 MG/DL — SIGNIFICANT CHANGE UP (ref 8.6–10.2)
CHLORIDE SERPL-SCNC: 100 MMOL/L — SIGNIFICANT CHANGE UP (ref 98–107)
CO2 SERPL-SCNC: 27 MMOL/L — SIGNIFICANT CHANGE UP (ref 22–29)
CREAT SERPL-MCNC: 0.64 MG/DL — SIGNIFICANT CHANGE UP (ref 0.5–1.3)
CULTURE RESULTS: NO GROWTH — SIGNIFICANT CHANGE UP
GLUCOSE BLDC GLUCOMTR-MCNC: 143 MG/DL — HIGH (ref 70–99)
GLUCOSE BLDC GLUCOMTR-MCNC: 148 MG/DL — HIGH (ref 70–99)
GLUCOSE BLDC GLUCOMTR-MCNC: 204 MG/DL — HIGH (ref 70–99)
GLUCOSE SERPL-MCNC: 203 MG/DL — HIGH (ref 70–115)
LIDOCAIN IGE QN: 22 U/L — SIGNIFICANT CHANGE UP (ref 22–51)
POTASSIUM SERPL-MCNC: 3.9 MMOL/L — SIGNIFICANT CHANGE UP (ref 3.5–5.3)
POTASSIUM SERPL-SCNC: 3.9 MMOL/L — SIGNIFICANT CHANGE UP (ref 3.5–5.3)
PROT SERPL-MCNC: 6.3 G/DL — LOW (ref 6.6–8.7)
SODIUM SERPL-SCNC: 139 MMOL/L — SIGNIFICANT CHANGE UP (ref 135–145)
SPECIMEN SOURCE: SIGNIFICANT CHANGE UP

## 2018-08-11 PROCEDURE — 99222 1ST HOSP IP/OBS MODERATE 55: CPT

## 2018-08-11 PROCEDURE — 76705 ECHO EXAM OF ABDOMEN: CPT | Mod: 26

## 2018-08-11 PROCEDURE — 12345: CPT

## 2018-08-11 PROCEDURE — 99223 1ST HOSP IP/OBS HIGH 75: CPT

## 2018-08-11 PROCEDURE — 99497 ADVNCD CARE PLAN 30 MIN: CPT | Mod: 25

## 2018-08-11 RX ORDER — DEXTROSE 50 % IN WATER 50 %
25 SYRINGE (ML) INTRAVENOUS ONCE
Qty: 0 | Refills: 0 | Status: DISCONTINUED | OUTPATIENT
Start: 2018-08-11 | End: 2018-08-14

## 2018-08-11 RX ORDER — MORPHINE SULFATE 50 MG/1
2 CAPSULE, EXTENDED RELEASE ORAL EVERY 6 HOURS
Qty: 0 | Refills: 0 | Status: DISCONTINUED | OUTPATIENT
Start: 2018-08-11 | End: 2018-08-12

## 2018-08-11 RX ORDER — MORPHINE SULFATE 50 MG/1
2 CAPSULE, EXTENDED RELEASE ORAL EVERY 4 HOURS
Qty: 0 | Refills: 0 | Status: DISCONTINUED | OUTPATIENT
Start: 2018-08-11 | End: 2018-08-12

## 2018-08-11 RX ORDER — AMLODIPINE BESYLATE AND OLMESARTRAN MEDOXOMIL 10; 40 MG/1; MG/1
1 TABLET, FILM COATED ORAL
Qty: 0 | Refills: 0 | COMMUNITY

## 2018-08-11 RX ORDER — MORPHINE SULFATE 50 MG/1
4 CAPSULE, EXTENDED RELEASE ORAL ONCE
Qty: 0 | Refills: 0 | Status: DISCONTINUED | OUTPATIENT
Start: 2018-08-11 | End: 2018-08-11

## 2018-08-11 RX ORDER — SODIUM CHLORIDE 9 MG/ML
1000 INJECTION, SOLUTION INTRAVENOUS
Qty: 0 | Refills: 0 | Status: DISCONTINUED | OUTPATIENT
Start: 2018-08-11 | End: 2018-08-14

## 2018-08-11 RX ORDER — GLUCAGON INJECTION, SOLUTION 0.5 MG/.1ML
1 INJECTION, SOLUTION SUBCUTANEOUS ONCE
Qty: 0 | Refills: 0 | Status: DISCONTINUED | OUTPATIENT
Start: 2018-08-11 | End: 2018-08-14

## 2018-08-11 RX ORDER — DEXTROSE 50 % IN WATER 50 %
12.5 SYRINGE (ML) INTRAVENOUS ONCE
Qty: 0 | Refills: 0 | Status: DISCONTINUED | OUTPATIENT
Start: 2018-08-11 | End: 2018-08-14

## 2018-08-11 RX ORDER — PANTOPRAZOLE SODIUM 20 MG/1
40 TABLET, DELAYED RELEASE ORAL DAILY
Qty: 0 | Refills: 0 | Status: DISCONTINUED | OUTPATIENT
Start: 2018-08-11 | End: 2018-08-14

## 2018-08-11 RX ORDER — DEXTROSE 50 % IN WATER 50 %
15 SYRINGE (ML) INTRAVENOUS ONCE
Qty: 0 | Refills: 0 | Status: DISCONTINUED | OUTPATIENT
Start: 2018-08-11 | End: 2018-08-14

## 2018-08-11 RX ORDER — INSULIN LISPRO 100/ML
VIAL (ML) SUBCUTANEOUS
Qty: 0 | Refills: 0 | Status: DISCONTINUED | OUTPATIENT
Start: 2018-08-11 | End: 2018-08-14

## 2018-08-11 RX ORDER — APIXABAN 2.5 MG/1
5 TABLET, FILM COATED ORAL EVERY 12 HOURS
Qty: 0 | Refills: 0 | Status: DISCONTINUED | OUTPATIENT
Start: 2018-08-11 | End: 2018-08-11

## 2018-08-11 RX ORDER — SODIUM CHLORIDE 9 MG/ML
1000 INJECTION, SOLUTION INTRAVENOUS
Qty: 0 | Refills: 0 | Status: COMPLETED | OUTPATIENT
Start: 2018-08-11 | End: 2018-08-11

## 2018-08-11 RX ORDER — AMLODIPINE BESYLATE 2.5 MG/1
10 TABLET ORAL DAILY
Qty: 0 | Refills: 0 | Status: DISCONTINUED | OUTPATIENT
Start: 2018-08-11 | End: 2018-08-14

## 2018-08-11 RX ORDER — SODIUM CHLORIDE 9 MG/ML
1000 INJECTION, SOLUTION INTRAVENOUS
Qty: 0 | Refills: 0 | Status: DISCONTINUED | OUTPATIENT
Start: 2018-08-11 | End: 2018-08-12

## 2018-08-11 RX ORDER — LOSARTAN POTASSIUM 100 MG/1
100 TABLET, FILM COATED ORAL DAILY
Qty: 0 | Refills: 0 | Status: DISCONTINUED | OUTPATIENT
Start: 2018-08-11 | End: 2018-08-14

## 2018-08-11 RX ADMIN — AMLODIPINE BESYLATE 10 MILLIGRAM(S): 2.5 TABLET ORAL at 12:55

## 2018-08-11 RX ADMIN — PANTOPRAZOLE SODIUM 40 MILLIGRAM(S): 20 TABLET, DELAYED RELEASE ORAL at 12:16

## 2018-08-11 RX ADMIN — MORPHINE SULFATE 2 MILLIGRAM(S): 50 CAPSULE, EXTENDED RELEASE ORAL at 20:57

## 2018-08-11 RX ADMIN — LOSARTAN POTASSIUM 100 MILLIGRAM(S): 100 TABLET, FILM COATED ORAL at 12:55

## 2018-08-11 RX ADMIN — Medication 4: at 12:15

## 2018-08-11 RX ADMIN — MORPHINE SULFATE 4 MILLIGRAM(S): 50 CAPSULE, EXTENDED RELEASE ORAL at 05:39

## 2018-08-11 RX ADMIN — MORPHINE SULFATE 2 MILLIGRAM(S): 50 CAPSULE, EXTENDED RELEASE ORAL at 14:55

## 2018-08-11 RX ADMIN — SODIUM CHLORIDE 120 MILLILITER(S): 9 INJECTION, SOLUTION INTRAVENOUS at 06:08

## 2018-08-11 RX ADMIN — MORPHINE SULFATE 2 MILLIGRAM(S): 50 CAPSULE, EXTENDED RELEASE ORAL at 15:10

## 2018-08-11 RX ADMIN — MORPHINE SULFATE 4 MILLIGRAM(S): 50 CAPSULE, EXTENDED RELEASE ORAL at 05:09

## 2018-08-11 RX ADMIN — MORPHINE SULFATE 2 MILLIGRAM(S): 50 CAPSULE, EXTENDED RELEASE ORAL at 21:20

## 2018-08-11 NOTE — H&P ADULT - HISTORY OF PRESENT ILLNESS
78 yo male with a pmh/o HTN, prediabetes, HLD, PVC, MVP, GERD, atrial fibrillation, who presents to ED c/o 2 day h/o dark urine associated with dull b/l flank pain which has been intermittent and fluctuating in intensity up to 10/10 at times and is associated with nausea and is dull/achy and sometimes sharp in nature. Pt denies any a/a factors. Pt denies fever, vomiting, HA, cp, palpitations, diarrhea, sick contacts, recent travel, recent medication changes.

## 2018-08-11 NOTE — H&P ADULT - PROBLEM SELECTOR PLAN 1
admit to any bed  GI consulted for possible MRCP  NPO except meds  pain control with IV medication  VCD boots

## 2018-08-11 NOTE — CONSULT NOTE ADULT - PROBLEM SELECTOR RECOMMENDATION 9
most likely pancreatic or cholangiocarcinoma with CBD stone somewhat less likely but no excluded. The right flank pain may due due to the process, ie pancreatic or cholangioca. Suggest abdominal sono to look for gallstone in gallbladdder and CBD. Needs IV fluids while NPO. Also need cardiac f/u regarding wether MRI is possible in presence of loop recorder. Also suggest repeat cat scan with oral and IV contrast if MRI not possible. May ultimately need EUS and ERCP but Dr. Silver away for next two weeks. Hold eliquis

## 2018-08-11 NOTE — ED ADULT NURSE REASSESSMENT NOTE - NS ED NURSE REASSESS COMMENT FT1
Report received at change of shift from outgoing RICHARD Chin and assumed care of pt at that time. Pt received on stretcher awake, alert, oriented x 4, no acute distress. Report given and pt endorsed to RICHARD Busby for follow up and continuity of care.

## 2018-08-11 NOTE — CONSULT NOTE ADULT - SUBJECTIVE AND OBJECTIVE BOX
[facility]                                                    [address], [address], [place] [address]                                                         PHONE: [phone]    FAX: [phone] [address], [address], [place] [address]                                                 PHONE: [phone]    FAX: [phone]  *******************************************************************************    Reason for Consult: clearance for ERCP    HPI:  JERRY COPPOLA is a 77y Male  HTN, prediabetes, HL, PVC, MVP, GERD, chronic  atrial fibrillation and CM with normalization of LV function, ILR, who presents to ED c/o 2 day h/o dark urine associated with dull right  flank pain which has been intermittent and fluctuating in intensity up to 10/10 in intensity. No CP SOB, palpitations, PND or orthopnea. Recent hospitalization for slow HR for which flecainide and metoprolol were DC'd. Past AF ablation 2005.  Pt with ILR. Asked to see pt for clearance to proceed with MRCP.    Nuclear stress 6/20/18 no ischemia EF=66%    Echo 3/22/17 EF=60%. Aortic root=4.3cm. Mild MVP, mild MR.    PAST MEDICAL & SURGICAL HISTORY:  Male stress incontinence  Kidney stone: &quot;passed on own&quot;  Varicose vein: (L) 5 years ago  Bilateral cataracts  Appendicitis  Benign colon polyp  Prostate cancer: 2010  Afib: 2006 s/p ablation 2006 , afib resolved  Hyperlipidemia: X 4 years  GERD (Gastroesophageal Reflux Disease): X 10 years  DM (Diabetes Mellitus): X 3 years  Renal Calculi: 2008  MVP (Mitral Valve Prolapse): X 8 years  HTN - Hypertension: X 10 years, controlled  S/P ablation operation for arrhythmia  Male stress incontinence: s/p artifical urinary sphincter 5/2012  Varicose vein-s/p vein stripping 5 years ago  S/P arthroscopy: right shoulder 12/11  S/P prostatectomy: radical robotic 6/10  Cosmetic Surgery: chin implant 2004  S/P Colonoscopy with Polypectomy: 2009  S/P Appendectomy: 1950      No Known Allergies      MEDICATIONS  (STANDING):  amLODIPine   Tablet 10 milliGRAM(s) Oral daily  dextrose 5%. 1000 milliLiter(s) (50 mL/Hr) IV Continuous <Continuous>  dextrose 50% Injectable 12.5 Gram(s) IV Push once  dextrose 50% Injectable 25 Gram(s) IV Push once  dextrose 50% Injectable 25 Gram(s) IV Push once  insulin lispro (HumaLOG) corrective regimen sliding scale   SubCutaneous three times a day before meals  losartan 100 milliGRAM(s) Oral daily  multiple electrolytes Injection Type 1 1000 milliLiter(s) (75 mL/Hr) IV Continuous <Continuous>  pantoprazole  Injectable 40 milliGRAM(s) IV Push daily    MEDICATIONS  (PRN):  dextrose 40% Gel 15 Gram(s) Oral once PRN Blood Glucose LESS THAN 70 milliGRAM(s)/deciliter  glucagon  Injectable 1 milliGRAM(s) IntraMuscular once PRN Glucose LESS THAN 70 milligrams/deciliter  morphine  - Injectable 2 milliGRAM(s) IV Push every 4 hours PRN Severe Pain (7 - 10)  morphine  - Injectable 2 milliGRAM(s) IV Push every 6 hours PRN Moderate Pain (4 - 6)      Social History: former tobacco / No EtOH /No  IVDA    Family History: No pertinent family history in first degree relatives    Vital Signs Last 24 Hrs  T(C): 36.8 (11 Aug 2018 08:07), Max: 36.8 (11 Aug 2018 08:07)  T(F): 98.2 (11 Aug 2018 08:07), Max: 98.2 (11 Aug 2018 08:07)  HR: 92 (11 Aug 2018 08:07) (92 - 126)  BP: 144/94 (11 Aug 2018 08:07) (114/76 - 144/94)  BP(mean): --  RR: 16 (11 Aug 2018 08:07) (16 - 20)  SpO2: 97% (11 Aug 2018 08:07) (96% - 98%)    I&O's Detail    I&O's Summary      PHYSICAL EXAM:  General: Appears well developed, well nourished, no acute distress  HEENT: Head: normocephalic, atraumatic  Eyes: Pupils equal and reactive  Neck: Supple, no carotid bruit, no JVD, no HJR  CARDIOVASCULAR: irreg irreg S1 and S2, no murmur, rub, or gallop  LUNGS: Clear to auscultation bilaterally, no rales, rhonchi or wheeze  ABDOMEN: Soft, nontender, non-distended, positive bowel sounds, no mass or bruit  EXTREMITIES: No edema, distal pulses WNL  SKIN: Warm and dry with normal turgor  NEURO: Alert & oriented x 3, grossly intact  PSYCH: normal mood and affect    REVIEW OF SYSTEMS:  CONSTITUTIONAL: No fever, weight loss, or fatigue  EYES: No eye pain, visual disturbances, or discharge  ENMT:  No difficulty hearing, tinnitus, vertigo; No sinus or throat pain  NECK: No pain or stiffness  RESPIRATORY: No cough, wheezing, chills or hemoptysis; No Shortness of Breath  CARDIOVASCULAR: No chest pain, palpitations, passing out, dizziness, or leg swelling  GASTROINTESTINAL: No abdominal or epigastric pain. No nausea, vomiting, or hematemesis; No diarrhea or constipation. No melena or hematochezia.  GENITOURINARY: No dysuria, frequency, hematuria, or incontinence +dark urine  NEUROLOGICAL: No headaches, memory loss, loss of strength, numbness, or tremors  SKIN: No itching, burning, rashes, or lesions   LYMPH Nodes: No enlarged glands  ENDOCRINE: No heat or cold intolerance; No hair loss  MUSCULOSKELETAL: No joint pain or swelling; No muscle, back, or extremity pain  PSYCHIATRIC: No depression, anxiety, mood swings, or difficulty sleeping  HEME/LYMPH: No easy bruising, or bleeding gums  ALLERY AND IMMUNOLOGIC: No hives or eczema    LABS:                        15.1   6.8   )-----------( 221      ( 10 Aug 2018 18:19 )             43.1     08-10    140  |  99  |  27.0<H>  ----------------------------<  197<H>  4.0   |  27.0  |  0.86    Ca    9.1      10 Aug 2018 18:19    TPro  6.9  /  Alb  3.9  /  TBili  6.7<H>  /  DBili  x   /  AST  150<H>  /  ALT  301<H>  /  AlkPhos  292<H>  08-10        PT/INR - ( 10 Aug 2018 18:20 )   PT: 15.3 sec;   INR: 1.38 ratio         PTT - ( 10 Aug 2018 18:20 )  PTT:41.5 sec    RADIOLOGY & ADDITIONAL STUDIES:    ECG: AF CVR LAD PVC    < from: US Gallbladder (08.11.18 @ 02:51) >  IMPRESSION:    No gallstones are seen. The CBD is dilated measuring up to 17 mm.   MRI/MRCP recommended for further evaluation if there are no   contraindications to evaluate for cause of biliary ductal dilatation.    < end of copied text >    < from: CT Renal Stone Hunt (08.10.18 @ 20:07) >  IMPRESSION:   No renal calculi or obstruction recognized.        Severe extrahepatic biliary duct obstruction with a 2 cm dilatation of   common bile duct and marked intrahepatic blurry ductal dilatation as   described. Further investigation recommended.     CT scan    < end of copied text >    < from: Xray Chest 1 View- PORTABLE-Urgent (08.10.18 @ 16:58) >  IMPRESSION: No evidence of active chest disease.       < end of copied text >    Assessment and Plan:  In summary, JERRY COPPOLA is a 77y Male with past medical history significant for HTN, prediabetes, HL, PVC, MVP, GERD, chronic  atrial fibrillation and CM with normalization of LV function, ILR, who presents to ED c/o 2 day h/o dark urine associated with dull right  flank pain which has been intermittent and fluctuating in intensity up to 10/10 in intensity. No CP SOB, palpitations, PND or orthopnea. Recent hospitalization for slow HR for which flecainide and metoprolol were DC'd. Past AF ablation 2005.  Pt with ILR. Asked to see pt for clearance to proceed with MRCP.    - CAF. Rate is controlled. Will maintain outpt cardiac medications. Apixaban on hold for impending procedures    - ILR.  Pt has an ILR. This does not preclude the pt from proceeding with MRI    - HTN. BP is controlled on his current medications. Maintain amlodipine and losartan.    - Obstructive Jaundice. Pt is NPO. ILR does not preclude MRI or nation for obstructive jaundice    - Clearance: No active CP, SOB or symptoms to suggest hemodynamic compromise.  No clear cut cardiac contraindication to proceeding with GI nation as planned. Pt is cleared to proceed.    Thank you for allowing me to participate in the care of your patient.
Patient is a 77y old  Male who presents with a chief complaint of abdominal pain (11 Aug 2018 05:45)      HPI:  76 yo male with a pmh/o HTN, prediabetes, HLD, PVC, MVP, GERD, atrial fibrillation, who presents to ED c/o 2 day h/o dark urine associated with dull right  flank pain which has been intermittent and fluctuating in intensity up to 10/10 at times and is associated with nausea and is dull/achy and sometimes sharp in nature. Pt denies any a/a factors. Pt denies fever, vomiting, HA, cp, palpitations, diarrhea, sick contacts, recent travel, recent medication changes. (11 Aug 2018 05:45)   Today for the first time he notes jaundice. The cat scan shows a dilated CBD up to two cm with some type of soft tissue dnesity in the distal duct. The cat scan was without oral or IV contrast. For the past three weeks there is a decrease in his appetite and 10 pound weight loss. No prior hx of liver disease or alcohol abuse. He takes Eliquis for a fib and has a loop recorder with an episode of syncopy about 6 months ago. There is no abdominal pain or vomiting. He has GERD and takes pantoprazole every toehr day. Prior EGD by Dr. Mars unremarkable according to patient. He has prior colon polyps but none on his last colonoscopy one year ago by same GI doc. There is no diarrhea, constipation, rectal bleeding or melena.      REVIEW OF SYSTEMS:    CONSTITUTIONAL: No fever, weight loss, or fatigue  EYES: No eye pain, visual disturbances, or discharge  ENMT:  No difficulty hearing, tinnitus, vertigo; No sinus or throat pain  NECK: No pain or stiffness  RESPIRATORY: No cough, wheezing, chills or hemoptysis; No shortness of breath  CARDIOVASCULAR: No chest pain, palpitations, dizziness, or leg swelling  GASTROINTESTINAL: as above  NEUROLOGICAL: No headaches, memory loss, loss of strength, numbness, or tremors  SKIN: No itching, burning, rashes, or lesions   LYMPH NODES: No enlarged glands  MUSCULOSKELETAL: No joint pain or swelling; No muscle, back, or extremity pain  PSYCHIATRIC: No depression, anxiety, mood swings, or difficulty sleeping  HEME/LYMPH: No easy bruising, or bleeding gums  ALLERY AND IMMUNOLOGIC: No hives or eczema      PAST MEDICAL & SURGICAL HISTORY:  Male stress incontinence  Kidney stones  Varicose vein: (L) 5 years ago  Bilateral cataracts  Appendicitis  Benign colon polyp  Prostate cancer: 2010  Afib: 2006 s/p ablation 2006 , afib resolved  Hyperlipidemia: X 4 years  GERD (Gastroesophageal Reflux Disease): X 10 years  DM (Diabetes Mellitus): X 3 years  Renal Calculi: 2008  MVP (Mitral Valve Prolapse): X 8 years  HTN - Hypertension: X 10 years, controlled  S/P ablation operation for arrhythmia  Male stress incontinence: s/p artifical urinary sphincter 5/2012  Varicose vein-s/p vein stripping 5 years ago  S/P arthroscopy: right shoulder 12/11  S/P prostatectomy: radical robotic 6/10  Cosmetic Surgery: chin implant 2004  S/P Colonoscopy with Polypectomy: 2009  S/P Appendectomy: 1950      FAMILY HISTORY:  No pertinent family history in first degree relatives      SOCIAL HISTORY:  Smoking Status: [ ] Current, [ X] Former, [ ] Never-quit cigs many years ago  Pack Years:  Alcohol Use: rare    Home Medications:  amlodipine-olmesartan 10 mg-40 mg oral tablet: 1 tab(s) orally once a day (11 Aug 2018 06:19)  atorvastatin 10 mg oral tablet: 1 tab(s) orally once a day (11 Aug 2018 06:19)  Eliquis 5 mg oral tablet: 1 tab(s) orally 2 times a day (11 Aug 2018 06:19)  glipiZIDE 5 mg oral tablet: 1 tab(s) orally once a day (11 Aug 2018 06:19)  pantoprazole 40 mg oral delayed release tablet: 1 tab(s) orally once a day (11 Aug 2018 06:19)      MEDICATIONS:  MEDICATIONS  (STANDING):  amLODIPine   Tablet 10 milliGRAM(s) Oral daily  dextrose 5%. 1000 milliLiter(s) (50 mL/Hr) IV Continuous <Continuous>  dextrose 50% Injectable 12.5 Gram(s) IV Push once  dextrose 50% Injectable 25 Gram(s) IV Push once  dextrose 50% Injectable 25 Gram(s) IV Push once  insulin lispro (HumaLOG) corrective regimen sliding scale   SubCutaneous three times a day before meals  losartan 100 milliGRAM(s) Oral daily  pantoprazole  Injectable 40 milliGRAM(s) IV Push daily    MEDICATIONS  (PRN):  dextrose 40% Gel 15 Gram(s) Oral once PRN Blood Glucose LESS THAN 70 milliGRAM(s)/deciliter  glucagon  Injectable 1 milliGRAM(s) IntraMuscular once PRN Glucose LESS THAN 70 milligrams/deciliter  morphine  - Injectable 2 milliGRAM(s) IV Push every 4 hours PRN Severe Pain (7 - 10)  morphine  - Injectable 2 milliGRAM(s) IV Push every 6 hours PRN Moderate Pain (4 - 6)      Allergies    No Known Allergies    Intolerances        Vital Signs Last 24 Hrs  T(C): 36.1 (11 Aug 2018 03:14), Max: 36.5 (10 Aug 2018 14:58)  T(F): 97 (11 Aug 2018 03:14), Max: 97.7 (10 Aug 2018 14:58)  HR: 94 (11 Aug 2018 03:14) (94 - 126)  BP: 138/76 (11 Aug 2018 03:14) (114/76 - 138/76)  BP(mean): --  RR: 18 (11 Aug 2018 03:14) (18 - 20)  SpO2: 98% (11 Aug 2018 03:14) (96% - 98%)        PHYSICAL EXAM:    General: Well developed; well nourished; in no acute distress, jaundiced  HEENT: MMM, conjunctiva and scleral icterus  Lungs: Clear  Heart: irregularly irregular, No Murmurs. loop recorder palpable in left upper chest  Gastrointestinal: Soft, non-tender non-distended; Normal bowel sounds; No rebound or guarding; No Organomegaly & No Masses  Extremities: Normal range of motion, No clubbing, cyanosis or edema  Neurological: Alert and oriented x3, Non-focal  Skin: Warm and dry. No obvious rash  Rectal: No Masses, soft brown stool      LABS:                        15.1   6.8   )-----------( 221      ( 10 Aug 2018 18:19 )             43.1     08-10    140  |  99  |  27.0<H>  ----------------------------<  197<H>  4.0   |  27.0  |  0.86    Ca    9.1      10 Aug 2018 18:19    TPro  6.9  /  Alb  3.9  /  TBili  6.7<H>  /  DBili  x   /  AST  150<H>  /  ALT  301<H>  /  AlkPhos  292<H>  08-10          RADIOLOGY & ADDITIONAL STUDIES:     < from: CT Renal Stone Hunt (08.10.18 @ 20:07) >    INTERPRETATION:  CT renal stone   (CT of the abdomen and pelvis without   contrast)      CLINICAL INFORMATION:  Bilateral Renal colic.    TECHNIQUE:  Contiguous axial 5 mm sections were obtained using single   helical acquisition through the abdomen and pelvis and were reconstructed   as axial 5 mm sections.  Higher resolution axial and coronal images were   reconstructed through  the kidneys.   Oral and intravenous contrast were   withheld for this indication.      FINDINGS:   CT abdomen 2/26/2009 available for review.  There is extrahepatic biliary duct obstruction with a soft tissue density   in the distal common bile duct of indeterminate etiology,bile duct   measuring 2.3 cm. There is a moderate to severe intrahepatic biliary   ductal dilatation . gallbladder is mildly distended . etiology of   obstruction unclear about this time.    The lung bases are clear.         The liver demonstrates homogeneous attenuation with no focal lesion,   allowing for the noncontrast technique.  Hepatic size and contours are   maintained.  Hepatic and portal veins are not displaced.  The pancreas   atrophic. The spleen is normal in size.    No adrenal masses are found.      No renal calculi are seen.  No perinephric infiltration is seen.  No   hydronephrosis is present.  No suspicious renal mass is recognized,   allowing for the noncontrast technique.  The ureters are not dilated.  No   calculi are recognized in the course of either ureter.  No calculi are   found in the bladder.    Penile implant device noted. Status post prostatectomy. No enlarged lymph   nodes are found.  No ascites is present.  The osseous structures are   intact.    The bowel appears unremarkable.  No obstruction, perforation or abscess   is recognized.       Degenerative spondylosis of thoracal lumbar spine noted without fracture.    IMPRESSION:   No renal calculi or obstruction recognized.        Severe extrahepatic biliary duct obstruction with a 2 cm dilatation of   common bile duct and marked intrahepatic blurry ductal dilatation as   described. Further investigation recommended.     CT scan                CURT JOSHI M.D., ATTENDING RADIOLOGIST  This documenthas been electronically signed. Aug 10 2018  8:25PM                  < end of copied text >

## 2018-08-11 NOTE — CHART NOTE - NSCHARTNOTEFT_GEN_A_CORE
CC: Flank pain       Overnight/AM events:  Patient seen and examined at bedside. No acute events overnight. Patient reports pain is feeling slightly better, has noticed that he has noticed himself becoming more yellow, has had a decrease in his appetite, weight loss of ten pounds over two weeks. Pt understands the plan of care and is in agreement with further cardio eval and GI work up as needed. Patient denies chest pain, SOB, N/V, fever, chills, dysuria or any other complaints. All remainder ROS negative.       Vital Signs Last 24 Hrs  T(C): 36.8 (11 Aug 2018 14:04), Max: 36.8 (11 Aug 2018 08:07)  T(F): 98.3 (11 Aug 2018 14:04), Max: 98.3 (11 Aug 2018 14:04)  HR: 100 (11 Aug 2018 14:04) (92 - 100)  BP: 149/75 (11 Aug 2018 14:04) (138/76 - 169/85)  BP(mean): --  RR: 16 (11 Aug 2018 14:04) (16 - 18)  SpO2: 96% (11 Aug 2018 14:04) (93% - 98%)      CONSTITUTIONAL: Well appearing, well nourished, awake, alert and in no apparent distress  EYES: Icteric sclera   CARDIAC: Normal rate, regular rhythm.  Heart sounds S1, S2.  No murmurs, rubs or gallops   RESPIRATORY: Breath sounds clear and equal bilaterally. No wheezes, rhales or rhonchi  GASTROENTEROLOGY: Soft, NT ND BS + normoactive   EXTREMITIES: No edema, cyanosis or deformity   NEUROLOGICAL: Alert and oriented, no focal deficits, no motor or sensory deficits.  SKIN: No rash, skin turgor wnl, jaundice         A/P: 77 y.o. male with hx of HTN, prediabetes, HLD, PVC, MVP, GERD and atrial fibrillation on eliquis, who presented to the ED c/o 2 days of dark urine with associated b/l flank pain, noted jaundice on exam with transaminitis/ elevated bilirubin on labs and pt admitted with obstructive jaundice unclear etiology. CT renal hunt showed severe extrahepatic biliary duct obstruction with a 2 cm dilatation of common bile duct and marked intrahepatic blurry ductal dilatation, upon further imaging with US showed no evidence of gallstones and CBD dilated measuring up to 17 mm. GI consulted and recommended further imaging with MRCP if ILR is compatible. Cardio consulted and ILR is compatible with MRCP, MRCP ordered. Will evaluate further for possible stricture/ mass or even stone. Cardio clearance noted and appreciated for further GI work up if needed. Pain control with morphine prn based on pain scale. IVF x 24 hours, d/w GI and pt can proceed with low fat/ low fiber diet. If further imaging/ intervention needed with EUS/ERCP pt may need to be transferred to [place] Monday. A fib currently rate controlled on current regimen and will c/w holding eliquis in aniticpation of possible need of procedure. If procedure delayed will d/w cardio for need for interim AC with heparin ggt. HTN with stable bp c/w norvasc and losartan. GERD c/w PPI po qd. HLD holding statin due to transaminitis. DM2 HBA1C: 6.2, pt takes glipizide at home, holding po meds and will c/w accuchecks ACHS TID, HSS and hypoglycemia protocol while inpatient. DVT ppx held in anticipation of need for procedures, pt is ambulating/ scd fabrice b/l. CC: Flank pain       Overnight/AM events:  Patient seen and examined at bedside. No acute events overnight. Patient reports pain is feeling slightly better, has noticed that he has noticed himself becoming more yellow, has had a decrease in his appetite, weight loss of ten pounds over two weeks. Pt understands the plan of care and is in agreement with further cardio eval and GI work up as needed. Patient denies chest pain, SOB, N/V, fever, chills, dysuria or any other complaints. All remainder ROS negative.       Vital Signs Last 24 Hrs  T(C): 36.8 (11 Aug 2018 14:04), Max: 36.8 (11 Aug 2018 08:07)  T(F): 98.3 (11 Aug 2018 14:04), Max: 98.3 (11 Aug 2018 14:04)  HR: 100 (11 Aug 2018 14:04) (92 - 100)  BP: 149/75 (11 Aug 2018 14:04) (138/76 - 169/85)  BP(mean): --  RR: 16 (11 Aug 2018 14:04) (16 - 18)  SpO2: 96% (11 Aug 2018 14:04) (93% - 98%)      CONSTITUTIONAL: Well appearing, well nourished, awake, alert and in no apparent distress  EYES: Icteric sclera   CARDIAC: Irregular irregular.  Heart sounds S1, S2.  No murmurs, rubs or gallops   RESPIRATORY: Breath sounds clear and equal bilaterally. No wheezes, rhales or rhonchi  GASTROENTEROLOGY: Soft, NT ND BS + normoactive   EXTREMITIES: No edema, cyanosis or deformity   NEUROLOGICAL: Alert and oriented, no focal deficits, no motor or sensory deficits.  SKIN: No rash, skin turgor wnl, jaundice         A/P: 77 y.o. male with hx of HTN, prediabetes, HLD, PVC, MVP, GERD and atrial fibrillation on eliquis, who presented to the ED c/o 2 days of dark urine with associated b/l flank pain, noted jaundice on exam with transaminitis/ elevated bilirubin on labs and pt admitted with obstructive jaundice unclear etiology. CT renal hunt showed severe extrahepatic biliary duct obstruction with a 2 cm dilatation of common bile duct and marked intrahepatic blurry ductal dilatation, upon further imaging with US showed no evidence of gallstones and CBD dilated measuring up to 17 mm. GI consulted and recommended further imaging with MRCP if ILR is compatible. Cardio consulted and ILR is compatible with MRCP, MRCP ordered. Will evaluate further for possible stricture/ mass or even stone. Cardio clearance noted and appreciated for further GI work up if needed. Pain control with morphine prn based on pain scale. IVF x 24 hours, d/w GI and pt can proceed with low fat/ low fiber diet. If further imaging/ intervention needed with EUS/ERCP pt may need to be transferred to [place] Monday. A fib currently rate controlled on current regimen and will c/w holding eliquis in aniticpation of possible need of procedure. If procedure delayed will d/w cardio for need for interim AC with heparin ggt. HTN with stable bp c/w norvasc and losartan. GERD c/w PPI po qd. HLD holding statin due to transaminitis. DM2 HBA1C: 6.2, pt takes glipizide at home, holding po meds and will c/w accuchecks ACHS TID, HSS and hypoglycemia protocol while inpatient. DVT ppx held in anticipation of need for procedures, pt is ambulating/ scd boots b/l.

## 2018-08-11 NOTE — H&P ADULT - ATTENDING COMMENTS
30 min time spent discussing advanced care planning including code status, existence of health care proxy, plan of treatment, consultants if called, and prognosis. Pt in agreement with above, all questions answered and concerns addressed.      FULL CODE  agree to NPO for possible MRCP

## 2018-08-12 LAB
ALBUMIN SERPL ELPH-MCNC: 3.6 G/DL — SIGNIFICANT CHANGE UP (ref 3.3–5.2)
ALP SERPL-CCNC: 300 U/L — HIGH (ref 40–120)
ALT FLD-CCNC: 236 U/L — HIGH
ANION GAP SERPL CALC-SCNC: 17 MMOL/L — SIGNIFICANT CHANGE UP (ref 5–17)
AST SERPL-CCNC: 118 U/L — HIGH
BASOPHILS # BLD AUTO: 0 K/UL — SIGNIFICANT CHANGE UP (ref 0–0.2)
BASOPHILS NFR BLD AUTO: 0.3 % — SIGNIFICANT CHANGE UP (ref 0–2)
BILIRUB DIRECT SERPL-MCNC: 6.1 MG/DL — HIGH (ref 0–0.3)
BILIRUB INDIRECT FLD-MCNC: 2 MG/DL — HIGH (ref 0.2–1)
BILIRUB SERPL-MCNC: 8.1 MG/DL — HIGH (ref 0.4–2)
BILIRUB SERPL-MCNC: 8.1 MG/DL — HIGH (ref 0.4–2)
BUN SERPL-MCNC: 12 MG/DL — SIGNIFICANT CHANGE UP (ref 8–20)
CALCIUM SERPL-MCNC: 8.9 MG/DL — SIGNIFICANT CHANGE UP (ref 8.6–10.2)
CHLORIDE SERPL-SCNC: 100 MMOL/L — SIGNIFICANT CHANGE UP (ref 98–107)
CO2 SERPL-SCNC: 23 MMOL/L — SIGNIFICANT CHANGE UP (ref 22–29)
CREAT SERPL-MCNC: 0.5 MG/DL — SIGNIFICANT CHANGE UP (ref 0.5–1.3)
EOSINOPHIL # BLD AUTO: 0.1 K/UL — SIGNIFICANT CHANGE UP (ref 0–0.5)
EOSINOPHIL NFR BLD AUTO: 1.6 % — SIGNIFICANT CHANGE UP (ref 0–5)
GLUCOSE BLDC GLUCOMTR-MCNC: 176 MG/DL — HIGH (ref 70–99)
GLUCOSE BLDC GLUCOMTR-MCNC: 192 MG/DL — HIGH (ref 70–99)
GLUCOSE BLDC GLUCOMTR-MCNC: 204 MG/DL — HIGH (ref 70–99)
GLUCOSE SERPL-MCNC: 144 MG/DL — HIGH (ref 70–115)
HCT VFR BLD CALC: 43.5 % — SIGNIFICANT CHANGE UP (ref 42–52)
HGB BLD-MCNC: 15.1 G/DL — SIGNIFICANT CHANGE UP (ref 14–18)
LYMPHOCYTES # BLD AUTO: 1 K/UL — SIGNIFICANT CHANGE UP (ref 1–4.8)
LYMPHOCYTES # BLD AUTO: 14.9 % — LOW (ref 20–55)
MCHC RBC-ENTMCNC: 31.9 PG — HIGH (ref 27–31)
MCHC RBC-ENTMCNC: 34.7 G/DL — SIGNIFICANT CHANGE UP (ref 32–36)
MCV RBC AUTO: 92 FL — SIGNIFICANT CHANGE UP (ref 80–94)
MONOCYTES # BLD AUTO: 0.8 K/UL — SIGNIFICANT CHANGE UP (ref 0–0.8)
MONOCYTES NFR BLD AUTO: 11.5 % — HIGH (ref 3–10)
NEUTROPHILS # BLD AUTO: 5 K/UL — SIGNIFICANT CHANGE UP (ref 1.8–8)
NEUTROPHILS NFR BLD AUTO: 71.4 % — SIGNIFICANT CHANGE UP (ref 37–73)
PLATELET # BLD AUTO: 229 K/UL — SIGNIFICANT CHANGE UP (ref 150–400)
POTASSIUM SERPL-MCNC: 3.3 MMOL/L — LOW (ref 3.5–5.3)
POTASSIUM SERPL-SCNC: 3.3 MMOL/L — LOW (ref 3.5–5.3)
PROT SERPL-MCNC: 6.6 G/DL — SIGNIFICANT CHANGE UP (ref 6.6–8.7)
RBC # BLD: 4.73 M/UL — SIGNIFICANT CHANGE UP (ref 4.6–6.2)
RBC # FLD: 13.3 % — SIGNIFICANT CHANGE UP (ref 11–15.6)
SODIUM SERPL-SCNC: 140 MMOL/L — SIGNIFICANT CHANGE UP (ref 135–145)
WBC # BLD: 6.9 K/UL — SIGNIFICANT CHANGE UP (ref 4.8–10.8)
WBC # FLD AUTO: 6.9 K/UL — SIGNIFICANT CHANGE UP (ref 4.8–10.8)

## 2018-08-12 PROCEDURE — 99233 SBSQ HOSP IP/OBS HIGH 50: CPT

## 2018-08-12 PROCEDURE — 99232 SBSQ HOSP IP/OBS MODERATE 35: CPT

## 2018-08-12 RX ORDER — POTASSIUM CHLORIDE 20 MEQ
40 PACKET (EA) ORAL ONCE
Qty: 0 | Refills: 0 | Status: COMPLETED | OUTPATIENT
Start: 2018-08-12 | End: 2018-08-12

## 2018-08-12 RX ORDER — POLYETHYLENE GLYCOL 3350 17 G/17G
17 POWDER, FOR SOLUTION ORAL DAILY
Qty: 0 | Refills: 0 | Status: DISCONTINUED | OUTPATIENT
Start: 2018-08-12 | End: 2018-08-14

## 2018-08-12 RX ORDER — DIPHENHYDRAMINE HCL 50 MG
25 CAPSULE ORAL EVERY 6 HOURS
Qty: 0 | Refills: 0 | Status: DISCONTINUED | OUTPATIENT
Start: 2018-08-12 | End: 2018-08-14

## 2018-08-12 RX ORDER — ALPRAZOLAM 0.25 MG
0.5 TABLET ORAL ONCE
Qty: 0 | Refills: 0 | Status: DISCONTINUED | OUTPATIENT
Start: 2018-08-12 | End: 2018-08-13

## 2018-08-12 RX ORDER — SODIUM CHLORIDE 9 MG/ML
1000 INJECTION, SOLUTION INTRAVENOUS
Qty: 0 | Refills: 0 | Status: DISCONTINUED | OUTPATIENT
Start: 2018-08-12 | End: 2018-08-13

## 2018-08-12 RX ORDER — MORPHINE SULFATE 50 MG/1
2 CAPSULE, EXTENDED RELEASE ORAL EVERY 4 HOURS
Qty: 0 | Refills: 0 | Status: DISCONTINUED | OUTPATIENT
Start: 2018-08-12 | End: 2018-08-14

## 2018-08-12 RX ORDER — MORPHINE SULFATE 50 MG/1
3 CAPSULE, EXTENDED RELEASE ORAL EVERY 4 HOURS
Qty: 0 | Refills: 0 | Status: DISCONTINUED | OUTPATIENT
Start: 2018-08-12 | End: 2018-08-14

## 2018-08-12 RX ADMIN — MORPHINE SULFATE 2 MILLIGRAM(S): 50 CAPSULE, EXTENDED RELEASE ORAL at 04:29

## 2018-08-12 RX ADMIN — Medication 4: at 08:11

## 2018-08-12 RX ADMIN — Medication 2: at 11:29

## 2018-08-12 RX ADMIN — AMLODIPINE BESYLATE 10 MILLIGRAM(S): 2.5 TABLET ORAL at 04:30

## 2018-08-12 RX ADMIN — POLYETHYLENE GLYCOL 3350 17 GRAM(S): 17 POWDER, FOR SOLUTION ORAL at 16:57

## 2018-08-12 RX ADMIN — SODIUM CHLORIDE 75 MILLILITER(S): 9 INJECTION, SOLUTION INTRAVENOUS at 04:35

## 2018-08-12 RX ADMIN — MORPHINE SULFATE 2 MILLIGRAM(S): 50 CAPSULE, EXTENDED RELEASE ORAL at 04:59

## 2018-08-12 RX ADMIN — SODIUM CHLORIDE 75 MILLILITER(S): 9 INJECTION, SOLUTION INTRAVENOUS at 00:07

## 2018-08-12 RX ADMIN — SODIUM CHLORIDE 60 MILLILITER(S): 9 INJECTION, SOLUTION INTRAVENOUS at 18:01

## 2018-08-12 RX ADMIN — Medication 25 MILLIGRAM(S): at 12:45

## 2018-08-12 RX ADMIN — MORPHINE SULFATE 2 MILLIGRAM(S): 50 CAPSULE, EXTENDED RELEASE ORAL at 19:48

## 2018-08-12 RX ADMIN — Medication 40 MILLIEQUIVALENT(S): at 12:45

## 2018-08-12 RX ADMIN — Medication 25 MILLIGRAM(S): at 20:25

## 2018-08-12 RX ADMIN — MORPHINE SULFATE 2 MILLIGRAM(S): 50 CAPSULE, EXTENDED RELEASE ORAL at 20:47

## 2018-08-12 RX ADMIN — PANTOPRAZOLE SODIUM 40 MILLIGRAM(S): 20 TABLET, DELAYED RELEASE ORAL at 11:30

## 2018-08-12 RX ADMIN — LOSARTAN POTASSIUM 100 MILLIGRAM(S): 100 TABLET, FILM COATED ORAL at 04:30

## 2018-08-12 RX ADMIN — Medication 2: at 16:57

## 2018-08-12 NOTE — PROGRESS NOTE ADULT - SUBJECTIVE AND OBJECTIVE BOX
Pt seen and examined f/u obstructive jaundice  Last nite he experienced 2-3 hours of diffuse abdominal ache with nausea and recurrant right flank pain that has resolved. No vomiting. Tolerating diet. C/O pruritis. Sono without gallstones but with already noted biliary dilation. Cardiac consult noted and state loop recorder does not preclude MRCP which has been ordered. No BM for three days    REVIEW OF SYSTEMS:    CONSTITUTIONAL: No fever, weight loss, or fatigue  EYES: No eye pain, visual disturbances, or discharge  ENMT:  No difficulty hearing, tinnitus, vertigo; No sinus or throat pain  RESPIRATORY: No cough, wheezing, chills or hemoptysis; No shortness of breath  CARDIOVASCULAR: No chest pain, palpitations, dizziness, or leg swelling  GASTROINTESTINAL: No abdominal or epigastric pain. No nausea, vomiting, or hematemesis; No diarrhea or constipation. No melena or hematochezia.    MEDICATIONS:  MEDICATIONS  (STANDING):  amLODIPine   Tablet 10 milliGRAM(s) Oral daily  dextrose 5%. 1000 milliLiter(s) (50 mL/Hr) IV Continuous <Continuous>  dextrose 50% Injectable 12.5 Gram(s) IV Push once  dextrose 50% Injectable 25 Gram(s) IV Push once  dextrose 50% Injectable 25 Gram(s) IV Push once  insulin lispro (HumaLOG) corrective regimen sliding scale   SubCutaneous three times a day before meals  losartan 100 milliGRAM(s) Oral daily  multiple electrolytes Injection Type 1 1000 milliLiter(s) (60 mL/Hr) IV Continuous <Continuous>  pantoprazole  Injectable 40 milliGRAM(s) IV Push daily    MEDICATIONS  (PRN):  ALPRAZolam 0.5 milliGRAM(s) Oral once PRN 30 minutes before MRCP  dextrose 40% Gel 15 Gram(s) Oral once PRN Blood Glucose LESS THAN 70 milliGRAM(s)/deciliter  glucagon  Injectable 1 milliGRAM(s) IntraMuscular once PRN Glucose LESS THAN 70 milligrams/deciliter  morphine  - Injectable 3 milliGRAM(s) IV Push every 4 hours PRN Severe Pain (7 - 10)  morphine  - Injectable 2 milliGRAM(s) IV Push every 4 hours PRN Moderate Pain (4 - 6)      Allergies    No Known Allergies    Intolerances        Vital Signs Last 24 Hrs  T(C): 36.3 (12 Aug 2018 08:52), Max: 36.9 (11 Aug 2018 16:41)  T(F): 97.4 (12 Aug 2018 08:52), Max: 98.5 (11 Aug 2018 16:41)  HR: 104 (12 Aug 2018 08:52) (81 - 114)  BP: 151/97 (12 Aug 2018 08:52) (140/86 - 152/84)  BP(mean): --  RR: 20 (12 Aug 2018 08:52) (16 - 20)  SpO2: 95% (12 Aug 2018 08:52) (95% - 97%)     @ 07:01  -   @ 07:00  --------------------------------------------------------  IN: 900 mL / OUT: 1800 mL / NET: -900 mL        PHYSICAL EXAM:    General: Well developed; well nourished; in no acute distress, jaundiced  HEENT: MMM, conjunctiva and sclera clear  Gastrointestinal:Abdomen: Soft non-tender non-distended; Normal bowel sounds; No hepatosplenomegaly  Extremities: no cyanosis, clubbing or edema.  Skin: Warm and dry. No obvious rash    LABS:      CBC Full  -  ( 12 Aug 2018 09:04 )  WBC Count : 6.9 K/uL  Hemoglobin : 15.1 g/dL  Hematocrit : 43.5 %  Platelet Count - Automated : 229 K/uL  Mean Cell Volume : 92.0 fl  Mean Cell Hemoglobin : 31.9 pg  Mean Cell Hemoglobin Concentration : 34.7 g/dL  Auto Neutrophil # : 5.0 K/uL  Auto Lymphocyte # : 1.0 K/uL  Auto Monocyte # : 0.8 K/uL  Auto Eosinophil # : 0.1 K/uL  Auto Basophil # : 0.0 K/uL  Auto Neutrophil % : 71.4 %  Auto Lymphocyte % : 14.9 %  Auto Monocyte % : 11.5 %  Auto Eosinophil % : 1.6 %  Auto Basophil % : 0.3 %        140  |  100  |  12.0  ----------------------------<  144<H>  3.3<L>   |  23.0  |  0.50    Ca    8.9      12 Aug 2018 09:04    TPro  6.6  /  Alb  3.6  /  TBili  8.1<H>  /  DBili  6.1<H>  /  AST  118<H>  /  ALT  236<H>  /  AlkPhos  300<H>  08-12    PT/INR - ( 10 Aug 2018 18:20 )   PT: 15.3 sec;   INR: 1.38 ratio         PTT - ( 10 Aug 2018 18:20 )  PTT:41.5 sec      Urinalysis Basic - ( 10 Aug 2018 18:57 )    Color: Tisha / Appearance: Clear / S.015 / pH: x  Gluc: x / Ketone: Negative  / Bili: Moderate / Urobili: 4 mg/dL   Blood: x / Protein: Negative mg/dL / Nitrite: Negative   Leuk Esterase: Negative / RBC: 0-2 /HPF / WBC 0-2   Sq Epi: x / Non Sq Epi: Occasional / Bacteria: x                RADIOLOGY & ADDITIONAL STUDIES (The following images were personally reviewed):  < from: US Gallbladder (18 @ 02:51) >  EXAM:  US GALLBLADDER                          PROCEDURE DATE:  2018          INTERPRETATION:  2018 3:30 AM    CLINICAL HISTORY:  Pain.    TECHNIQUE: Ultrasound of the gallbladder    COMPARISON: CT performed earlier    FINDINGS:    The gallbladder is distended with mild wall thickening measuring up to 5   mm. Tiny focus of comet tail artifact noted along the fundus which may be   related to focal adenomyomatosis. No gallstones are seen.    As seen on the recent CT, the CBD is dilated measuring up to 17 mm within   the pancreatic head. Negative sonographic Hogue sign.    IMPRESSION:    No gallstones are seen. The CBD is dilated measuring up to 17 mm.   MRI/MRCP recommended for further evaluation if there are no   contraindications to evaluate for cause of biliary ductal dilatation.                KATELYN HORN M.D., ATTENDING RADIOLOGIST  This document has been electronically signed. Aug 11 2018  3:37AM                < end of copied text >

## 2018-08-12 NOTE — PROGRESS NOTE ADULT - ASSESSMENT
77 y.o. male with hx of HTN, prediabetes, HLD, PVC, MVP, GERD and atrial fibrillation on eliquis, who presented to the ED c/o 2 days of dark urine with associated b/l flank pain, noted jaundice on exam with transaminitis/ elevated bilirubin on labs and pt admitted with obstructive jaundice unclear etiology. CT renal hunt showed severe extrahepatic biliary duct obstruction with a 2 cm dilatation of common bile duct and marked intrahepatic blurry ductal dilatation, upon further imaging with US showed no evidence of gallstones and CBD dilated measuring up to 17 mm. GI consulted and recommended further imaging with MRCP. As per cardio ILR is compatible so pt pending MRCP for further evaluation.       1- Obstructive jaundice unclear etiology will evaluate further for possible stricture/ mass, stone vs other etiology   -pt with complaints of itching, intermittent right flank pain and dull R sided abd pain 3/10- pain not well controlled   -transaminitis and elevated bilirubin persists   -started benadryl 25mg po q8hrs prn   -will consider cholestyramine if no improvement with itching   -c/w miralax for constipation   -c/w low fat/low fiber diet   -Low dose xanax 30 minutes prior to MRCP b/c pt has claustrophobia   -c/w morphine 2-3 mg IV q6hrs prn dependent on pain scale adjusted dosages   -pending MRCP for further evaluation and possible need for further intervention with EUS/ERCP depending on results.  -Cardio clearance noted and appreciated for further GI work up if needed. AC on hold for now.   -GI f/u noted and appreciated      2- Atrial fibrillation - rate controlled  -holding eliquis for possible need for procedure   -ILR in place and is compatible with MRI as per cardio   -cardio f/u noted and appreciated     3-HTN  -BP stable   - c/w norvasc 10mg po qd   -c/w losartan 100mg po qd     4-HLD  - holding statin due to transaminitis    5-DM2 HBA1C: 6.2  - pt takes glipizide at home, holding po meds  - c/w accuchecks ACHS TID  -c/w HSS while eating and hold when npo  -c/w hypoglycemia protocol     6-DVT ppx held in anticipation of need for procedures  -pt is ambulating/ scd boots b/l    Dispo: Depending on findings of MRCP if intervention needed possible transfer. 77 y.o. male with hx of HTN, prediabetes, HLD, PVC, MVP, GERD and atrial fibrillation on eliquis, who presented to the ED c/o 2 days of dark urine with associated b/l flank pain, noted jaundice on exam with transaminitis/ elevated bilirubin on labs and pt admitted with obstructive jaundice unclear etiology. CT renal hunt showed severe extrahepatic biliary duct obstruction with a 2 cm dilatation of common bile duct and marked intrahepatic blurry ductal dilatation, upon further imaging with US showed no evidence of gallstones and CBD dilated measuring up to 17 mm. GI consulted and recommended further imaging with MRCP. As per cardio ILR is compatible so pt pending MRCP for further evaluation.       1- Obstructive jaundice unclear etiology will evaluate further for possible stricture/ mass, stone vs other etiology   -pt with complaints of itching, intermittent right flank pain and dull R sided abd pain 3/10- pain not well controlled   -transaminitis and elevated bilirubin persists   -will c/w trending labs   -started benadryl 25mg po q8hrs prn   -will consider cholestyramine if no improvement with itching   -c/w miralax for constipation   -c/w low fat/low fiber diet   -Low dose xanax 30 minutes prior to MRCP b/c pt has claustrophobia   -c/w morphine 2-3 mg IV q6hrs prn dependent on pain scale adjusted dosages   -hypokalemia replaced with potassium chloride 40meq x 1 dose and repeat labs for am ordered   -pending MRCP for further evaluation and possible need for further intervention with EUS/ERCP depending on results.  -Cardio clearance noted and appreciated for further GI work up if needed. AC on hold for now.   -GI f/u noted and appreciated      2- Atrial fibrillation - rate controlled  -holding eliquis for possible need for procedure   -ILR in place and is compatible with MRI as per cardio   -cardio f/u noted and appreciated     3-HTN  -BP stable   - c/w norvasc 10mg po qd   -c/w losartan 100mg po qd     4-HLD  - holding statin due to transaminitis    5-DM2 HBA1C: 6.2  - pt takes glipizide at home, holding po meds  - c/w accuchecks ACHS TID  -c/w HSS while eating and hold when npo  -c/w hypoglycemia protocol     6-DVT ppx held in anticipation of need for procedures  -pt is ambulating/ scd boots b/l    Dispo: Depending on findings of MRCP if intervention needed possible transfer.

## 2018-08-12 NOTE — PROGRESS NOTE ADULT - SUBJECTIVE AND OBJECTIVE BOX
Patient is a 77y old  Male who presents with a chief complaint of abdominal pain (11 Aug 2018 05:45)      HEALTH ISSUES - PROBLEM Dx:  Benign colon polyp: Benign colon polyp  Obstructive jaundice: Obstructive jaundice  Hyperlipidemia, unspecified hyperlipidemia type: Hyperlipidemia, unspecified hyperlipidemia type  Gastroesophageal reflux disease without esophagitis: Gastroesophageal reflux disease without esophagitis  Type 2 diabetes mellitus without complication, without long-term current use of insulin: Type 2 diabetes mellitus without complication, without long-term current use of insulin  Chronic atrial fibrillation: Chronic atrial fibrillation  Common bile duct dilatation: Common bile duct dilatation  Intractable abdominal pain: Intractable abdominal pain      INTERVAL HPI/OVERNIGHT EVENTS:  Patient seen and examined at bedside. No acute events overnight. Patient states he is feeling very itchy, pain persists but remains dull 3/10 and when really awful is 5-7/10/ Reports that current pain control regimen is not sufficient. Aside from this has no other complaints and understands that he will proceed with the MRCP. Wife at bedside and also understands the plan of care. Patient denies chest pain, SOB,  N/V, fever, chills, dysuria or any other complaints. All remainder ROS negative.       Vital Signs Last 24 Hrs  T(C): 36.3 (12 Aug 2018 08:52), Max: 36.9 (11 Aug 2018 16:41)  T(F): 97.4 (12 Aug 2018 08:52), Max: 98.5 (11 Aug 2018 16:41)  HR: 104 (12 Aug 2018 08:52) (81 - 114)  BP: 151/97 (12 Aug 2018 08:52) (140/86 - 152/84)  BP(mean): --  RR: 20 (12 Aug 2018 08:52) (16 - 20)  SpO2: 95% (12 Aug 2018 08:52) (95% - 97%)    CAPILLARY BLOOD GLUCOSE  POCT Blood Glucose.: 192 mg/dL (12 Aug 2018 11:27)  POCT Blood Glucose.: 204 mg/dL (12 Aug 2018 08:09)  POCT Blood Glucose.: 148 mg/dL (11 Aug 2018 16:37)      I&O's Summary    11 Aug 2018 07:01  -  12 Aug 2018 07:00  --------------------------------------------------------  IN: 900 mL / OUT: 1800 mL / NET: -900 mL      CONSTITUTIONAL: Well appearing, well nourished, awake, alert and in no apparent distress  EYES: Icteric sclera   CARDIAC: Irregular irregular.  Heart sounds S1, S2.  No murmurs, rubs or gallops   Loop recorder in place   RESPIRATORY: Breath sounds clear and equal bilaterally. No wheezes, rhales or rhonchi  GASTROENTEROLOGY: Soft, NT ND BS + normoactive   EXTREMITIES: No edema, cyanosis or deformity   NEUROLOGICAL: Alert and oriented, no focal deficits, no motor or sensory deficits.  SKIN: No rash, skin turgor wnl, jaundice     MEDICATIONS  (STANDING):  amLODIPine   Tablet 10 milliGRAM(s) Oral daily  dextrose 5%. 1000 milliLiter(s) (50 mL/Hr) IV Continuous <Continuous>  dextrose 50% Injectable 12.5 Gram(s) IV Push once  dextrose 50% Injectable 25 Gram(s) IV Push once  dextrose 50% Injectable 25 Gram(s) IV Push once  insulin lispro (HumaLOG) corrective regimen sliding scale   SubCutaneous three times a day before meals  losartan 100 milliGRAM(s) Oral daily  multiple electrolytes Injection Type 1 1000 milliLiter(s) (60 mL/Hr) IV Continuous <Continuous>  pantoprazole  Injectable 40 milliGRAM(s) IV Push daily  polyethylene glycol 3350 17 Gram(s) Oral daily    MEDICATIONS  (PRN):  ALPRAZolam 0.5 milliGRAM(s) Oral once PRN 30 minutes before MRCP  dextrose 40% Gel 15 Gram(s) Oral once PRN Blood Glucose LESS THAN 70 milliGRAM(s)/deciliter  diphenhydrAMINE   Capsule 25 milliGRAM(s) Oral every 6 hours PRN Rash and/or Itching  glucagon  Injectable 1 milliGRAM(s) IntraMuscular once PRN Glucose LESS THAN 70 milligrams/deciliter  morphine  - Injectable 3 milliGRAM(s) IV Push every 4 hours PRN Severe Pain (7 - 10)  morphine  - Injectable 2 milliGRAM(s) IV Push every 4 hours PRN Moderate Pain (4 - 6)      LABS:                        15.1   6.9   )-----------( 229      ( 12 Aug 2018 09:04 )             43.5     08-12    140  |  100  |  12.0  ----------------------------<  144<H>  3.3<L>   |  23.0  |  0.50    Ca    8.9      12 Aug 2018 09:04    TPro  6.6  /  Alb  3.6  /  TBili  8.1<H>  /  DBili  6.1<H>  /  AST  118<H>  /  ALT  236<H>  /  AlkPhos  300<H>      PT/INR - ( 10 Aug 2018 18:20 )   PT: 15.3 sec;   INR: 1.38 ratio         PTT - ( 10 Aug 2018 18:20 )  PTT:41.5 sec  Urinalysis Basic - ( 10 Aug 2018 18:57 )    Color: Tisha / Appearance: Clear / S.015 / pH: x  Gluc: x / Ketone: Negative  / Bili: Moderate / Urobili: 4 mg/dL   Blood: x / Protein: Negative mg/dL / Nitrite: Negative   Leuk Esterase: Negative / RBC: 0-2 /HPF / WBC 0-2   Sq Epi: x / Non Sq Epi: Occasional / Bacteria: x      LIVER FUNCTIONS - ( 12 Aug 2018 09:04 )  Alb: 3.6 g/dL / Pro: 6.6 g/dL / ALK PHOS: 300 U/L / ALT: 236 U/L / AST: 118 U/L / GGT: x             RADIOLOGY & ADDITIONAL TESTS:  No new imaging studies

## 2018-08-13 LAB
ALBUMIN SERPL ELPH-MCNC: 3.7 G/DL — SIGNIFICANT CHANGE UP (ref 3.3–5.2)
ALP SERPL-CCNC: 326 U/L — HIGH (ref 40–120)
ALT FLD-CCNC: 218 U/L — HIGH
ANION GAP SERPL CALC-SCNC: 16 MMOL/L — SIGNIFICANT CHANGE UP (ref 5–17)
AST SERPL-CCNC: 119 U/L — HIGH
BILIRUB DIRECT SERPL-MCNC: 6.7 MG/DL — HIGH (ref 0–0.3)
BILIRUB DIRECT SERPL-MCNC: 6.7 MG/DL — HIGH (ref 0–0.3)
BILIRUB INDIRECT FLD-MCNC: 2 MG/DL — HIGH (ref 0.2–1)
BILIRUB INDIRECT FLD-MCNC: 2 MG/DL — HIGH (ref 0.2–1)
BILIRUB SERPL-MCNC: 8.7 MG/DL — HIGH (ref 0.4–2)
BILIRUB SERPL-MCNC: 8.7 MG/DL — HIGH (ref 0.4–2)
BUN SERPL-MCNC: 14 MG/DL — SIGNIFICANT CHANGE UP (ref 8–20)
CALCIUM SERPL-MCNC: 9.2 MG/DL — SIGNIFICANT CHANGE UP (ref 8.6–10.2)
CHLORIDE SERPL-SCNC: 102 MMOL/L — SIGNIFICANT CHANGE UP (ref 98–107)
CO2 SERPL-SCNC: 23 MMOL/L — SIGNIFICANT CHANGE UP (ref 22–29)
CREAT SERPL-MCNC: 0.52 MG/DL — SIGNIFICANT CHANGE UP (ref 0.5–1.3)
GLUCOSE BLDC GLUCOMTR-MCNC: 167 MG/DL — HIGH (ref 70–99)
GLUCOSE BLDC GLUCOMTR-MCNC: 190 MG/DL — HIGH (ref 70–99)
GLUCOSE BLDC GLUCOMTR-MCNC: 209 MG/DL — HIGH (ref 70–99)
GLUCOSE BLDC GLUCOMTR-MCNC: 220 MG/DL — HIGH (ref 70–99)
GLUCOSE SERPL-MCNC: 165 MG/DL — HIGH (ref 70–115)
HCT VFR BLD CALC: 45.2 % — SIGNIFICANT CHANGE UP (ref 42–52)
HGB BLD-MCNC: 15.7 G/DL — SIGNIFICANT CHANGE UP (ref 14–18)
INR BLD: 1.12 RATIO — SIGNIFICANT CHANGE UP (ref 0.88–1.16)
MCHC RBC-ENTMCNC: 31.5 PG — HIGH (ref 27–31)
MCHC RBC-ENTMCNC: 34.7 G/DL — SIGNIFICANT CHANGE UP (ref 32–36)
MCV RBC AUTO: 90.6 FL — SIGNIFICANT CHANGE UP (ref 80–94)
PLATELET # BLD AUTO: 260 K/UL — SIGNIFICANT CHANGE UP (ref 150–400)
POTASSIUM SERPL-MCNC: 3.4 MMOL/L — LOW (ref 3.5–5.3)
POTASSIUM SERPL-SCNC: 3.4 MMOL/L — LOW (ref 3.5–5.3)
PROT SERPL-MCNC: 7 G/DL — SIGNIFICANT CHANGE UP (ref 6.6–8.7)
PROTHROM AB SERPL-ACNC: 12.4 SEC — SIGNIFICANT CHANGE UP (ref 9.8–12.7)
RBC # BLD: 4.99 M/UL — SIGNIFICANT CHANGE UP (ref 4.6–6.2)
RBC # FLD: 13.7 % — SIGNIFICANT CHANGE UP (ref 11–15.6)
SODIUM SERPL-SCNC: 141 MMOL/L — SIGNIFICANT CHANGE UP (ref 135–145)
WBC # BLD: 9.6 K/UL — SIGNIFICANT CHANGE UP (ref 4.8–10.8)
WBC # FLD AUTO: 9.6 K/UL — SIGNIFICANT CHANGE UP (ref 4.8–10.8)

## 2018-08-13 PROCEDURE — 99233 SBSQ HOSP IP/OBS HIGH 50: CPT

## 2018-08-13 PROCEDURE — 74183 MRI ABD W/O CNTR FLWD CNTR: CPT | Mod: 26

## 2018-08-13 PROCEDURE — 93010 ELECTROCARDIOGRAM REPORT: CPT

## 2018-08-13 RX ORDER — METOPROLOL TARTRATE 50 MG
25 TABLET ORAL
Qty: 0 | Refills: 0 | Status: DISCONTINUED | OUTPATIENT
Start: 2018-08-13 | End: 2018-08-14

## 2018-08-13 RX ORDER — POTASSIUM CHLORIDE 20 MEQ
20 PACKET (EA) ORAL ONCE
Qty: 0 | Refills: 0 | Status: COMPLETED | OUTPATIENT
Start: 2018-08-13 | End: 2018-08-13

## 2018-08-13 RX ADMIN — MORPHINE SULFATE 2 MILLIGRAM(S): 50 CAPSULE, EXTENDED RELEASE ORAL at 04:01

## 2018-08-13 RX ADMIN — Medication 25 MILLIGRAM(S): at 16:57

## 2018-08-13 RX ADMIN — AMLODIPINE BESYLATE 10 MILLIGRAM(S): 2.5 TABLET ORAL at 05:20

## 2018-08-13 RX ADMIN — LOSARTAN POTASSIUM 100 MILLIGRAM(S): 100 TABLET, FILM COATED ORAL at 05:20

## 2018-08-13 RX ADMIN — MORPHINE SULFATE 2 MILLIGRAM(S): 50 CAPSULE, EXTENDED RELEASE ORAL at 15:36

## 2018-08-13 RX ADMIN — POLYETHYLENE GLYCOL 3350 17 GRAM(S): 17 POWDER, FOR SOLUTION ORAL at 11:32

## 2018-08-13 RX ADMIN — Medication 0.5 MILLIGRAM(S): at 08:48

## 2018-08-13 RX ADMIN — Medication 20 MILLIEQUIVALENT(S): at 15:32

## 2018-08-13 RX ADMIN — Medication 4: at 16:15

## 2018-08-13 RX ADMIN — MORPHINE SULFATE 2 MILLIGRAM(S): 50 CAPSULE, EXTENDED RELEASE ORAL at 15:50

## 2018-08-13 RX ADMIN — PANTOPRAZOLE SODIUM 40 MILLIGRAM(S): 20 TABLET, DELAYED RELEASE ORAL at 11:33

## 2018-08-13 RX ADMIN — Medication 2: at 11:39

## 2018-08-13 RX ADMIN — MORPHINE SULFATE 2 MILLIGRAM(S): 50 CAPSULE, EXTENDED RELEASE ORAL at 04:55

## 2018-08-13 NOTE — PROGRESS NOTE ADULT - SUBJECTIVE AND OBJECTIVE BOX
Patient is a 77y old  Male who presents with a chief complaint of abdominal pain (11 Aug 2018 05:45)      HEALTH ISSUES - PROBLEM Dx:  Benign colon polyp: Benign colon polyp  Obstructive jaundice: Obstructive jaundice  Hyperlipidemia, unspecified hyperlipidemia type: Hyperlipidemia, unspecified hyperlipidemia type  Gastroesophageal reflux disease without esophagitis: Gastroesophageal reflux disease without esophagitis  Type 2 diabetes mellitus without complication, without long-term current use of insulin: Type 2 diabetes mellitus without complication, without long-term current use of insulin  Chronic atrial fibrillation: Chronic atrial fibrillation  Common bile duct dilatation: Common bile duct dilatation  Intractable abdominal pain: Intractable abdominal pain    INTERVAL HPI/OVERNIGHT EVENTS:  Patient seen and examined at bedside. No acute events overnight. Patient reports feeling well, states his abdominal pain is well controlled on the current regimen, itching persists but improves with the benadryl. Pt had MRCP and is anxious to know the results. Patient denies chest pain, SOB,  N/V, fever, chills, dysuria or any other complaints. All remainder ROS negative.     Vital Signs Last 24 Hrs  T(C): 37 (13 Aug 2018 08:49), Max: 37.2 (12 Aug 2018 23:51)  T(F): 98.6 (13 Aug 2018 08:49), Max: 99 (12 Aug 2018 23:51)  HR: 115 (13 Aug 2018 11:14) (93 - 123)  BP: 125/88 (13 Aug 2018 11:14) (124/76 - 158/101)  BP(mean): --  RR: 18 (13 Aug 2018 08:49) (18 - 20)  SpO2: 98% (13 Aug 2018 08:01) (96% - 98%)    CAPILLARY BLOOD GLUCOSE  POCT Blood Glucose.: 190 mg/dL (13 Aug 2018 11:30)  POCT Blood Glucose.: 167 mg/dL (13 Aug 2018 07:54)  POCT Blood Glucose.: 176 mg/dL (12 Aug 2018 16:56)      I&O's Summary    12 Aug 2018 07:01  -  13 Aug 2018 07:00  --------------------------------------------------------  IN: 660 mL / OUT: 1000 mL / NET: -340 mL        CONSTITUTIONAL: Well appearing, well nourished, awake, alert and in no apparent distress  EYES: Icteric sclera   CARDIAC: Irregular irregular.  Heart sounds S1, S2.  No murmurs, rubs or gallops   Loop recorder in place   RESPIRATORY: Breath sounds clear and equal bilaterally. No wheezes, rhales or rhonchi  GASTROENTEROLOGY: Soft, NT ND BS + normoactive   EXTREMITIES: No edema, cyanosis or deformity   NEUROLOGICAL: Alert and oriented, no focal deficits, no motor or sensory deficits.  SKIN: No rash, skin turgor wnl, jaundice     MEDICATIONS  (STANDING):  amLODIPine   Tablet 10 milliGRAM(s) Oral daily  dextrose 5%. 1000 milliLiter(s) (50 mL/Hr) IV Continuous <Continuous>  dextrose 50% Injectable 12.5 Gram(s) IV Push once  dextrose 50% Injectable 25 Gram(s) IV Push once  dextrose 50% Injectable 25 Gram(s) IV Push once  insulin lispro (HumaLOG) corrective regimen sliding scale   SubCutaneous three times a day before meals  losartan 100 milliGRAM(s) Oral daily  multiple electrolytes Injection Type 1 1000 milliLiter(s) (60 mL/Hr) IV Continuous <Continuous>  pantoprazole  Injectable 40 milliGRAM(s) IV Push daily  polyethylene glycol 3350 17 Gram(s) Oral daily  potassium chloride    Tablet ER 20 milliEquivalent(s) Oral once    MEDICATIONS  (PRN):  dextrose 40% Gel 15 Gram(s) Oral once PRN Blood Glucose LESS THAN 70 milliGRAM(s)/deciliter  diphenhydrAMINE   Capsule 25 milliGRAM(s) Oral every 6 hours PRN Rash and/or Itching  glucagon  Injectable 1 milliGRAM(s) IntraMuscular once PRN Glucose LESS THAN 70 milligrams/deciliter  morphine  - Injectable 3 milliGRAM(s) IV Push every 4 hours PRN Severe Pain (7 - 10)  morphine  - Injectable 2 milliGRAM(s) IV Push every 4 hours PRN Moderate Pain (4 - 6)      LABS:                        15.7   9.6   )-----------( 260      ( 13 Aug 2018 08:55 )             45.2     08-13    141  |  102  |  14.0  ----------------------------<  165<H>  3.4<L>   |  23.0  |  0.52    Ca    9.2      13 Aug 2018 08:55    TPro  7.0  /  Alb  3.7  /  TBili  8.7<H>  /  DBili  6.7<H>  /  AST  119<H>  /  ALT  218<H>  /  AlkPhos  326<H>  08-13    PT/INR - ( 13 Aug 2018 08:55 )   PT: 12.4 sec;   INR: 1.12 ratio             LIVER FUNCTIONS - ( 13 Aug 2018 08:55 )  Alb: 3.7 g/dL / Pro: 7.0 g/dL / ALK PHOS: 326 U/L / ALT: 218 U/L / AST: 119 U/L / GGT: x             RADIOLOGY & ADDITIONAL TESTS:  No new imaging studies

## 2018-08-13 NOTE — PROGRESS NOTE ADULT - ASSESSMENT
77 y.o. male with hx of HTN, prediabetes, HLD, PVC, MVP, GERD and atrial fibrillation on eliquis, who presented to the ED c/o 2 days of dark urine with associated b/l flank pain, noted jaundice on exam with transaminitis/ elevated bilirubin (D>I) on labs and pt admitted with obstructive jaundice of unclear etiology. CT renal hunt showed severe extrahepatic biliary duct obstruction with a 2 cm dilatation of common bile duct and marked intrahepatic blurry ductal dilatation, upon further imaging with US showed no evidence of gallstones and CBD dilated measuring up to 17 mm. GI consulted and recommended further imaging with MRCP. As per cardio ILR is compatible so pt pending MRCP for further evaluation.       1- Obstructive jaundice unclear etiology will evaluate further for possible stricture/ mass, stone vs other etiology   -pt with improvement in itching and abdominal pain   -transaminitis and elevated bilirubin persists   -will c/w trending labs   -c/w benadryl 25mg po q8hrs prn   -will consider cholestyramine if no improvement with itching   -c/w miralax for constipation   -c/w low fat/low fiber diet   -c/w morphine 2-3 mg IV q6hrs prn dependent on pain scale adjusted dosages   -hypokalemia replaced with potassium chloride 20meq x 1 dose and repeat labs for am ordered   -pending MRCP for further evaluation and possible need for further intervention with EUS/ERCP depending on results.  -Cardio clearance noted and appreciated for further GI work up if needed. AC on hold for now.   -GI f/u noted and appreciated      2- Atrial fibrillation - rate uncontrolled  -holding eliquis for possible need for procedure, has been held for 3 days will ask cardio if pt needs interim full dose lovenox   -ILR in place and is compatible with MRI as per cardio   -pt could benefit from low dose BB, reported he was recently taken off of flecainide and that he was on BB prior to this but was discontinued   -cardio f/u noted and appreciated and requested follow up for medication adjustment and optimization     3-HTN  -BP stable   - c/w norvasc 10mg po qd   -c/w losartan 100mg po qd     4-HLD  - holding statin due to transaminitis    5-DM2 HBA1C: 6.2  - pt takes glipizide at home, holding po meds  -fs stable   - c/w accuchecks ACHS TID  -c/w HSS while eating and hold when npo  -c/w hypoglycemia protocol     6-DVT ppx held in anticipation of need for procedures  -pt is ambulating/ scd boots b/l    Dispo: Depending on findings of MRCP if intervention needed possible transfer. 77 y.o. male with hx of HTN, prediabetes, HLD, PVC, MVP, GERD and atrial fibrillation on eliquis, who presented to the ED c/o 2 days of dark urine with associated b/l flank pain, noted jaundice on exam with transaminitis/ elevated bilirubin (D>I) on labs and pt admitted with obstructive jaundice of unclear etiology. CT renal hunt showed severe extrahepatic biliary duct obstruction with a 2 cm dilatation of common bile duct and marked intrahepatic blurry ductal dilatation, upon further imaging with US showed no evidence of gallstones and CBD dilated measuring up to 17 mm. GI consulted and recommended further imaging with MRCP. As per cardio ILR is compatible so pt pending MRCP for further evaluation.       1- Obstructive jaundice unclear etiology will evaluate further for possible stricture/ mass, stone vs other etiology   -pt with improvement in itching and abdominal pain   -transaminitis and elevated bilirubin persists   -will c/w trending labs   -c/w benadryl 25mg po q8hrs prn   -will consider cholestyramine if no improvement with itching   -c/w miralax for constipation   -c/w low fat/low fiber diet   -c/w morphine 2-3 mg IV q6hrs prn dependent on pain scale adjusted dosages   -hypokalemia replaced with potassium chloride 20meq x 1 dose and repeat labs for am ordered   -pending MRCP for further evaluation and possible need for further intervention with EUS/ERCP depending on results.  -Cardio clearance noted and appreciated for further GI work up if needed. AC on hold for now.   -GI f/u noted and appreciated      2- Atrial fibrillation - rate uncontrolled  -holding eliquis for possible need for procedure, has been held for 3 days no need for bridging as per cardio at this time   -ILR in place and is compatible with MRI as per cardio   -pt could benefit from low dose BB, reported he was recently taken off of flecainide and that he was on BB prior to this but was discontinued   -cardio f/u noted and appreciated and requested follow up for medication adjustment and optimization and d/w Dr. Kraus will initiate low dose BB     3-HTN  -BP stable   - c/w norvasc 10mg po qd   -c/w losartan 100mg po qd     4-HLD  - holding statin due to transaminitis    5-DM2 HBA1C: 6.2  - pt takes glipizide at home, holding po meds  -fs stable   - c/w accuchecks ACHS TID  -c/w HSS while eating and hold when npo  -c/w hypoglycemia protocol     6-DVT ppx held in anticipation of need for procedures  -pt is ambulating/ scd boots b/l    Dispo: Depending on findings of MRCP if intervention needed possible transfer.

## 2018-08-14 ENCOUNTER — INPATIENT (INPATIENT)
Facility: HOSPITAL | Age: 78
LOS: 6 days | Discharge: HOME | DRG: 444 | End: 2018-08-21
Attending: HOSPITALIST | Admitting: HOSPITALIST
Payer: MEDICARE

## 2018-08-14 ENCOUNTER — TRANSCRIPTION ENCOUNTER (OUTPATIENT)
Age: 78
End: 2018-08-14

## 2018-08-14 VITALS
RESPIRATION RATE: 18 BRPM | WEIGHT: 201.72 LBS | HEART RATE: 98 BPM | SYSTOLIC BLOOD PRESSURE: 154 MMHG | DIASTOLIC BLOOD PRESSURE: 94 MMHG | OXYGEN SATURATION: 98 % | HEIGHT: 72 IN | TEMPERATURE: 98 F

## 2018-08-14 VITALS
TEMPERATURE: 98 F | SYSTOLIC BLOOD PRESSURE: 147 MMHG | RESPIRATION RATE: 18 BRPM | DIASTOLIC BLOOD PRESSURE: 85 MMHG | HEART RATE: 121 BPM

## 2018-08-14 DIAGNOSIS — R10.30 LOWER ABDOMINAL PAIN, UNSPECIFIED: ICD-10-CM

## 2018-08-14 DIAGNOSIS — Z98.890 OTHER SPECIFIED POSTPROCEDURAL STATES: Chronic | ICD-10-CM

## 2018-08-14 LAB
ALBUMIN SERPL ELPH-MCNC: 3.2 G/DL — LOW (ref 3.3–5.2)
ALP SERPL-CCNC: 295 U/L — HIGH (ref 40–120)
ALT FLD-CCNC: 176 U/L — HIGH
ANION GAP SERPL CALC-SCNC: 14 MMOL/L — SIGNIFICANT CHANGE UP (ref 5–17)
APPEARANCE UR: CLEAR — SIGNIFICANT CHANGE UP
AST SERPL-CCNC: 97 U/L — HIGH
BILIRUB DIRECT SERPL-MCNC: 8.2 MG/DL — HIGH (ref 0–0.3)
BILIRUB DIRECT SERPL-MCNC: 8.2 MG/DL — HIGH (ref 0–0.3)
BILIRUB INDIRECT FLD-MCNC: 2.3 MG/DL — HIGH (ref 0.2–1)
BILIRUB INDIRECT FLD-MCNC: 2.3 MG/DL — HIGH (ref 0.2–1)
BILIRUB SERPL-MCNC: 10.5 MG/DL — HIGH (ref 0.4–2)
BILIRUB SERPL-MCNC: 10.5 MG/DL — HIGH (ref 0.4–2)
BILIRUB UR-MCNC: ABNORMAL
BUN SERPL-MCNC: 15 MG/DL — SIGNIFICANT CHANGE UP (ref 8–20)
CALCIUM SERPL-MCNC: 8.8 MG/DL — SIGNIFICANT CHANGE UP (ref 8.6–10.2)
CANCER AG125 SERPL-ACNC: 10 U/ML — SIGNIFICANT CHANGE UP
CANCER AG19-9 SERPL-ACNC: 76.3 U/ML — HIGH
CEA SERPL-MCNC: 4.2 NG/ML — HIGH (ref 0–3.8)
CHLORIDE SERPL-SCNC: 103 MMOL/L — SIGNIFICANT CHANGE UP (ref 98–107)
CO2 SERPL-SCNC: 25 MMOL/L — SIGNIFICANT CHANGE UP (ref 22–29)
COLOR SPEC: YELLOW — SIGNIFICANT CHANGE UP
CREAT SERPL-MCNC: 0.71 MG/DL — SIGNIFICANT CHANGE UP (ref 0.5–1.3)
DIFF PNL FLD: ABNORMAL
EPI CELLS # UR: SIGNIFICANT CHANGE UP
GLUCOSE BLDC GLUCOMTR-MCNC: 164 MG/DL — HIGH (ref 70–99)
GLUCOSE BLDC GLUCOMTR-MCNC: 168 MG/DL — HIGH (ref 70–99)
GLUCOSE BLDC GLUCOMTR-MCNC: 187 MG/DL — HIGH (ref 70–99)
GLUCOSE SERPL-MCNC: 191 MG/DL — HIGH (ref 70–115)
GLUCOSE UR QL: NEGATIVE MG/DL — SIGNIFICANT CHANGE UP
KETONES UR-MCNC: ABNORMAL
LACTATE SERPL-SCNC: 1.3 MMOL/L — SIGNIFICANT CHANGE UP (ref 0.5–2)
LEUKOCYTE ESTERASE UR-ACNC: ABNORMAL
NITRITE UR-MCNC: POSITIVE
PH UR: 6 — SIGNIFICANT CHANGE UP (ref 5–8)
POTASSIUM SERPL-MCNC: 3.4 MMOL/L — LOW (ref 3.5–5.3)
POTASSIUM SERPL-SCNC: 3.4 MMOL/L — LOW (ref 3.5–5.3)
PROCALCITONIN SERPL-MCNC: 0.83 NG/ML — HIGH (ref 0–0.04)
PROT SERPL-MCNC: 6.3 G/DL — LOW (ref 6.6–8.7)
PROT UR-MCNC: 100 MG/DL
SODIUM SERPL-SCNC: 142 MMOL/L — SIGNIFICANT CHANGE UP (ref 135–145)
SP GR SPEC: 1.01 — SIGNIFICANT CHANGE UP (ref 1.01–1.02)
UROBILINOGEN FLD QL: 8 MG/DL
WBC UR QL: SIGNIFICANT CHANGE UP

## 2018-08-14 PROCEDURE — 93005 ELECTROCARDIOGRAM TRACING: CPT

## 2018-08-14 PROCEDURE — 82248 BILIRUBIN DIRECT: CPT

## 2018-08-14 PROCEDURE — 82378 CARCINOEMBRYONIC ANTIGEN: CPT

## 2018-08-14 PROCEDURE — 86901 BLOOD TYPING SEROLOGIC RH(D): CPT

## 2018-08-14 PROCEDURE — 83605 ASSAY OF LACTIC ACID: CPT

## 2018-08-14 PROCEDURE — 76705 ECHO EXAM OF ABDOMEN: CPT

## 2018-08-14 PROCEDURE — 82962 GLUCOSE BLOOD TEST: CPT

## 2018-08-14 PROCEDURE — 36415 COLL VENOUS BLD VENIPUNCTURE: CPT

## 2018-08-14 PROCEDURE — 93010 ELECTROCARDIOGRAM REPORT: CPT

## 2018-08-14 PROCEDURE — 83690 ASSAY OF LIPASE: CPT

## 2018-08-14 PROCEDURE — 71045 X-RAY EXAM CHEST 1 VIEW: CPT

## 2018-08-14 PROCEDURE — 81001 URINALYSIS AUTO W/SCOPE: CPT

## 2018-08-14 PROCEDURE — 84145 PROCALCITONIN (PCT): CPT

## 2018-08-14 PROCEDURE — 86304 IMMUNOASSAY TUMOR CA 125: CPT

## 2018-08-14 PROCEDURE — 87086 URINE CULTURE/COLONY COUNT: CPT

## 2018-08-14 PROCEDURE — 86850 RBC ANTIBODY SCREEN: CPT

## 2018-08-14 PROCEDURE — 85610 PROTHROMBIN TIME: CPT

## 2018-08-14 PROCEDURE — 80048 BASIC METABOLIC PNL TOTAL CA: CPT

## 2018-08-14 PROCEDURE — 99223 1ST HOSP IP/OBS HIGH 75: CPT | Mod: GC

## 2018-08-14 PROCEDURE — 74176 CT ABD & PELVIS W/O CONTRAST: CPT

## 2018-08-14 PROCEDURE — 86900 BLOOD TYPING SEROLOGIC ABO: CPT

## 2018-08-14 PROCEDURE — 99233 SBSQ HOSP IP/OBS HIGH 50: CPT

## 2018-08-14 PROCEDURE — 74183 MRI ABD W/O CNTR FLWD CNTR: CPT

## 2018-08-14 PROCEDURE — 85730 THROMBOPLASTIN TIME PARTIAL: CPT

## 2018-08-14 PROCEDURE — 80053 COMPREHEN METABOLIC PANEL: CPT

## 2018-08-14 PROCEDURE — 87040 BLOOD CULTURE FOR BACTERIA: CPT

## 2018-08-14 PROCEDURE — 86301 IMMUNOASSAY TUMOR CA 19-9: CPT

## 2018-08-14 PROCEDURE — 96361 HYDRATE IV INFUSION ADD-ON: CPT

## 2018-08-14 PROCEDURE — 85027 COMPLETE CBC AUTOMATED: CPT

## 2018-08-14 PROCEDURE — 96374 THER/PROPH/DIAG INJ IV PUSH: CPT

## 2018-08-14 PROCEDURE — 80076 HEPATIC FUNCTION PANEL: CPT

## 2018-08-14 PROCEDURE — 99285 EMERGENCY DEPT VISIT HI MDM: CPT | Mod: 25

## 2018-08-14 PROCEDURE — 99239 HOSP IP/OBS DSCHRG MGMT >30: CPT

## 2018-08-14 RX ORDER — MORPHINE SULFATE 50 MG/1
2 CAPSULE, EXTENDED RELEASE ORAL EVERY 6 HOURS
Qty: 0 | Refills: 0 | Status: DISCONTINUED | OUTPATIENT
Start: 2018-08-14 | End: 2018-08-21

## 2018-08-14 RX ORDER — CEFEPIME 1 G/1
INJECTION, POWDER, FOR SOLUTION INTRAMUSCULAR; INTRAVENOUS
Qty: 0 | Refills: 0 | Status: DISCONTINUED | OUTPATIENT
Start: 2018-08-14 | End: 2018-08-14

## 2018-08-14 RX ORDER — IBUPROFEN 200 MG
600 TABLET ORAL EVERY 8 HOURS
Qty: 0 | Refills: 0 | Status: DISCONTINUED | OUTPATIENT
Start: 2018-08-14 | End: 2018-08-14

## 2018-08-14 RX ORDER — AMLODIPINE BESYLATE 2.5 MG/1
1 TABLET ORAL
Qty: 0 | Refills: 0 | COMMUNITY
Start: 2018-08-14

## 2018-08-14 RX ORDER — SODIUM CHLORIDE 9 MG/ML
1000 INJECTION, SOLUTION INTRAVENOUS
Qty: 0 | Refills: 0 | Status: DISCONTINUED | OUTPATIENT
Start: 2018-08-14 | End: 2018-08-14

## 2018-08-14 RX ORDER — IBUPROFEN 200 MG
1 TABLET ORAL
Qty: 0 | Refills: 0 | COMMUNITY
Start: 2018-08-14

## 2018-08-14 RX ORDER — POLYETHYLENE GLYCOL 3350 17 G/17G
17 POWDER, FOR SOLUTION ORAL
Qty: 0 | Refills: 0 | COMMUNITY
Start: 2018-08-14

## 2018-08-14 RX ORDER — ATORVASTATIN CALCIUM 80 MG/1
10 TABLET, FILM COATED ORAL AT BEDTIME
Qty: 0 | Refills: 0 | Status: DISCONTINUED | OUTPATIENT
Start: 2018-08-14 | End: 2018-08-21

## 2018-08-14 RX ORDER — METOPROLOL TARTRATE 50 MG
12.5 TABLET ORAL
Qty: 0 | Refills: 0 | Status: DISCONTINUED | OUTPATIENT
Start: 2018-08-14 | End: 2018-08-14

## 2018-08-14 RX ORDER — LOSARTAN POTASSIUM 100 MG/1
1 TABLET, FILM COATED ORAL
Qty: 0 | Refills: 0 | COMMUNITY
Start: 2018-08-14

## 2018-08-14 RX ORDER — CEFEPIME 1 G/1
2 INJECTION, POWDER, FOR SOLUTION INTRAMUSCULAR; INTRAVENOUS
Qty: 0 | Refills: 0 | COMMUNITY
Start: 2018-08-14

## 2018-08-14 RX ORDER — DIPHENHYDRAMINE HCL 50 MG
1 CAPSULE ORAL
Qty: 0 | Refills: 0 | COMMUNITY
Start: 2018-08-14

## 2018-08-14 RX ORDER — APIXABAN 2.5 MG/1
1 TABLET, FILM COATED ORAL
Qty: 0 | Refills: 0 | COMMUNITY

## 2018-08-14 RX ORDER — IBUPROFEN 200 MG
600 TABLET ORAL EVERY 8 HOURS
Qty: 0 | Refills: 0 | Status: DISCONTINUED | OUTPATIENT
Start: 2018-08-14 | End: 2018-08-21

## 2018-08-14 RX ORDER — ATORVASTATIN CALCIUM 80 MG/1
1 TABLET, FILM COATED ORAL
Qty: 0 | Refills: 0 | COMMUNITY

## 2018-08-14 RX ORDER — INSULIN LISPRO 100/ML
0 VIAL (ML) SUBCUTANEOUS
Qty: 0 | Refills: 0 | COMMUNITY

## 2018-08-14 RX ORDER — CEFEPIME 1 G/1
2000 INJECTION, POWDER, FOR SOLUTION INTRAMUSCULAR; INTRAVENOUS ONCE
Qty: 0 | Refills: 0 | Status: COMPLETED | OUTPATIENT
Start: 2018-08-14 | End: 2018-08-14

## 2018-08-14 RX ORDER — DIPHENHYDRAMINE HCL 50 MG
25 CAPSULE ORAL EVERY 6 HOURS
Qty: 0 | Refills: 0 | Status: DISCONTINUED | OUTPATIENT
Start: 2018-08-14 | End: 2018-08-21

## 2018-08-14 RX ORDER — MORPHINE SULFATE 50 MG/1
2 CAPSULE, EXTENDED RELEASE ORAL
Qty: 0 | Refills: 0 | COMMUNITY
Start: 2018-08-14

## 2018-08-14 RX ORDER — METOPROLOL TARTRATE 50 MG
12.5 TABLET ORAL
Qty: 0 | Refills: 0 | COMMUNITY
Start: 2018-08-14

## 2018-08-14 RX ORDER — AMLODIPINE BESYLATE AND OLMESARTRAN MEDOXOMIL 10; 40 MG/1; MG/1
1 TABLET, FILM COATED ORAL
Qty: 0 | Refills: 0 | COMMUNITY

## 2018-08-14 RX ORDER — CEFEPIME 1 G/1
2000 INJECTION, POWDER, FOR SOLUTION INTRAMUSCULAR; INTRAVENOUS EVERY 8 HOURS
Qty: 0 | Refills: 0 | Status: DISCONTINUED | OUTPATIENT
Start: 2018-08-14 | End: 2018-08-14

## 2018-08-14 RX ADMIN — MORPHINE SULFATE 2 MILLIGRAM(S): 50 CAPSULE, EXTENDED RELEASE ORAL at 14:36

## 2018-08-14 RX ADMIN — LOSARTAN POTASSIUM 100 MILLIGRAM(S): 100 TABLET, FILM COATED ORAL at 04:29

## 2018-08-14 RX ADMIN — Medication 12.5 MILLIGRAM(S): at 17:42

## 2018-08-14 RX ADMIN — MORPHINE SULFATE 2 MILLIGRAM(S): 50 CAPSULE, EXTENDED RELEASE ORAL at 14:21

## 2018-08-14 RX ADMIN — Medication 600 MILLIGRAM(S): at 17:03

## 2018-08-14 RX ADMIN — MORPHINE SULFATE 2 MILLIGRAM(S): 50 CAPSULE, EXTENDED RELEASE ORAL at 01:02

## 2018-08-14 RX ADMIN — PANTOPRAZOLE SODIUM 40 MILLIGRAM(S): 20 TABLET, DELAYED RELEASE ORAL at 11:48

## 2018-08-14 RX ADMIN — Medication 25 MILLIGRAM(S): at 04:29

## 2018-08-14 RX ADMIN — MORPHINE SULFATE 2 MILLIGRAM(S): 50 CAPSULE, EXTENDED RELEASE ORAL at 01:17

## 2018-08-14 RX ADMIN — CEFEPIME 100 MILLIGRAM(S): 1 INJECTION, POWDER, FOR SOLUTION INTRAMUSCULAR; INTRAVENOUS at 16:51

## 2018-08-14 RX ADMIN — Medication 600 MILLIGRAM(S): at 16:13

## 2018-08-14 RX ADMIN — SODIUM CHLORIDE 75 MILLILITER(S): 9 INJECTION, SOLUTION INTRAVENOUS at 16:14

## 2018-08-14 RX ADMIN — SODIUM CHLORIDE 40 MILLILITER(S): 9 INJECTION, SOLUTION INTRAVENOUS at 09:23

## 2018-08-14 RX ADMIN — AMLODIPINE BESYLATE 10 MILLIGRAM(S): 2.5 TABLET ORAL at 04:29

## 2018-08-14 NOTE — H&P ADULT - NSHPREVIEWOFSYSTEMS_GEN_ALL_CORE
CONSTITUTIONAL: No weakness, fevers, POSITIVE chills  EYES/ENT: No visual changes;  No vertigo or throat pain   NECK: No pain or stiffness  RESPIRATORY: No cough, wheezing, hemoptysis; No shortness of breath  CARDIOVASCULAR: No chest pain or palpitations  GASTROINTESTINAL: No abdominal or epigastric pain. No nausea, vomiting, or hematemesis; No diarrhea or constipation. No melena or hematochezia, POSITIVE jaundice  GENITOURINARY: No dysuria, frequency or hematuria  NEUROLOGICAL: No numbness or weakness  SKIN: No itching, burning, rashes, or lesions   All other review of systems is negative unless indicated above.

## 2018-08-14 NOTE — H&P ADULT - ASSESSMENT
76 yo man with PMH of HTN, prediabetes, HLD, afib on eliquis with ILR, GERD initially presented to [place] ED on 8/9 with 2 days of dark urine found to have elevated direct bilirubin, jaundice with MRCP showing 1.8 cm mass in distal CBD transferred to [facility] for ERCP

## 2018-08-14 NOTE — DISCHARGE NOTE ADULT - SECONDARY DIAGNOSIS.
Chronic atrial fibrillation Essential hypertension Hyperlipidemia, unspecified hyperlipidemia type Prediabetes Gastroesophageal reflux disease without esophagitis

## 2018-08-14 NOTE — DISCHARGE NOTE ADULT - HOSPITAL COURSE
77 y.o. male with hx of HTN, prediabetes, HLD, PVC, MVP, GERD and atrial fibrillation on eliquis, who presented to the ED c/o 2 days of dark urine with associated b/l flank pain, noted jaundice on exam with transaminitis/ elevated bilirubin (D>I) on labs and pt admitted with obstructive jaundice of unclear etiology. CT renal hunt showed severe extrahepatic biliary duct obstruction with a 2 cm dilatation of common bile duct and marked intrahepatic blurry ductal dilatation, upon further imaging with US showed no evidence of gallstones and CBD dilated measuring up to 17 mm. GI consulted and recommended further imaging with MRCP. 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 Given current findings and need for interventional GI, pt to be transferred for further work up for ERCP/EUS; stent  placement and biopsy. Recommend oncology consultation at the facility. CEA elevated,  wnl, CA 19-9 pending results. Pt also is followed by [facility], eliquis for A fib held for impending procedures and as per cardio group no need for bridging with full dose lovenox. Has had episodes of A fib RVR to 120 range so low dose metoprolol was started 12.5mg po bid with improvement in rate. Pt is sensitive to BB and recently had flecainide discontinued so please monitor bp/HR closely on BB therapy. Patient is medically stable to be transferred.       Discharge planning took 45 minutes 77 y.o. male with hx of HTN, prediabetes, HLD, PVC, MVP, GERD and atrial fibrillation on eliquis, who presented to the ED c/o 2 days of dark urine with associated b/l flank pain, noted jaundice on exam with transaminitis/ elevated bilirubin (D>I) on labs and pt admitted with obstructive jaundice of unclear etiology. CT renal hunt showed severe extrahepatic biliary duct obstruction with a 2 cm dilatation of common bile duct and marked intrahepatic blurry ductal dilatation, upon further imaging with US showed no evidence of gallstones and CBD dilated measuring up to 17 mm. GI consulted and recommended further imaging with MRCP. 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 Given current findings and need for interventional GI, pt to be transferred for further work up for ERCP/EUS; stent  placement and biopsy. Recommend oncology consultation at the facility. CEA elevated,  wnl, CA 19-9 pending results. Pt placed on NPO for now. Pt also is followed by [facility], eliquis for A fib held for impending procedures and as per cardio group no need for bridging with full dose lovenox. Has had episodes of A fib RVR to 120 range so low dose metoprolol was started 12.5mg po bid with improvement in rate. Pt is sensitive to BB and recently had flecainide discontinued so please monitor bp/HR closely on BB therapy. Patient is medically stable to be transferred.       Discharge planning took 45 minutes 77 y.o. male with hx of HTN, prediabetes, HLD, PVC, MVP, GERD and atrial fibrillation on eliquis, who presented to the ED c/o 2 days of dark urine with associated b/l flank pain, noted jaundice on exam with transaminitis/ elevated bilirubin (D>I) on labs and pt admitted with obstructive jaundice of unclear etiology. CT renal hunt showed severe extrahepatic biliary duct obstruction with a 2 cm dilatation of common bile duct and marked intrahepatic blurry ductal dilatation, upon further imaging with US showed no evidence of gallstones and CBD dilated measuring up to 17 mm. GI consulted and recommended further imaging with MRCP. 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 Given current findings and need for interventional GI, pt to be transferred for further work up for ERCP/EUS; stent  placement and biopsy. Recommend oncology consultation at the facility. CEA elevated,  wnl, CA 19-9 pending results. Course complicated by patient spiking fever tmax: 100.5, pan cx sent and pt empirically started on cefepime for suspected cholangitis. Pt placed on NPO for now. Pt also is followed by [facility], eliquis for A fib held for impending procedures and as per cardio group no need for bridging with full dose lovenox. Has had episodes of A fib RVR to 120 range so low dose metoprolol was started 12.5mg po bid with improvement in rate. Pt is sensitive to BB and recently had flecainide discontinued so please monitor bp/HR closely on BB therapy. Patient is medically stable to be transferred.       Discharge planning took 45 minutes

## 2018-08-14 NOTE — DISCHARGE NOTE ADULT - PLAN OF CARE
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 Patient to be transferred for further management with ERCP/EUS, stent placement and biopsy. Continue with benadryl prn for itching, morphine prn for pain. Eliquis is on hold for pending procedures and there is no need for bridging at this time, when procedures are completed please restart anticoagulation. Recently initiated metoprolol 25mg orally twice a day on 8/13/18, which dropped patients HR from 120 to 60 range, decreased dose of metoprolol to 12.5mg orally twice a day. Monitor HR closely, patient is very sensitive to BB therapy. Continue with losartan, metoprolol and norvasc. Atorvastatin on hold due to transaminitis 3x upper limit of normal. HBA1C: 6.3 Continue with low carb diet. Patient's on glipizide which was being held at this time. Continue with protonix therapy. 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 Patient to be transferred for further management with ERCP/EUS, stent placement and biopsy. Continue with benadryl prn for itching, morphine prn for pain. Patient had fever spike, 100.5, pan cultures were sent, given high risk for cholangitis patient started empirically on cefepime.

## 2018-08-14 NOTE — DISCHARGE NOTE ADULT - MEDICATION SUMMARY - MEDICATIONS TO TAKE
I will START or STAY ON the medications listed below when I get home from the hospital:    morphine  -- 2 milligram(s) intravenous every 6 hours, As Needed moderate pain   -- Indication: For moderate pain     losartan 100 mg oral tablet  -- 1 tab(s) by mouth once a day  -- Indication: For Hypertension     HumaLOG 100 units/mL subcutaneous solution  -- 2 Unit(s) if Glucose 151 - 200  4 Unit(s) if Glucose 201 - 250  6 Unit(s) if Glucose 251 - 300  8 Unit(s) if Glucose 301 - 350  10 Unit(s) if Glucose 351 - 400  12 Unit(s) if Glucose Greater Than 400  -- Indication: For diabetes mellitus type 2     diphenhydrAMINE 25 mg oral capsule  -- 1 cap(s) by mouth every 6 hours, As needed, Rash and/or Itching  -- Indication: For Itching     metoprolol  -- 12.5 milligram(s) by mouth 2 times a day  -- Indication: For Atrial fibrillation     amLODIPine 10 mg oral tablet  -- 1 tab(s) by mouth once a day  -- Indication: For Hypertension     polyethylene glycol 3350 oral powder for reconstitution  -- 17 gram(s) by mouth once a day, As Needed  -- Indication: For Constipation     pantoprazole 40 mg oral delayed release tablet  -- 1 tab(s) by mouth once a day  -- Indication: For prophylaxis I will START or STAY ON the medications listed below when I get home from the hospital:    morphine  -- 2 milligram(s) intravenous every 6 hours, As Needed moderate pain   -- Indication: For moderate pain     ibuprofen 600 mg oral tablet  -- 1 tab(s) by mouth every 8 hours, As needed, fever > 100.4  -- Indication: For fever     losartan 100 mg oral tablet  -- 1 tab(s) by mouth once a day  -- Indication: For Hypertension     HumaLOG 100 units/mL subcutaneous solution  -- 2 Unit(s) if Glucose 151 - 200  4 Unit(s) if Glucose 201 - 250  6 Unit(s) if Glucose 251 - 300  8 Unit(s) if Glucose 301 - 350  10 Unit(s) if Glucose 351 - 400  12 Unit(s) if Glucose Greater Than 400  -- Indication: For diabetes mellitus type 2     diphenhydrAMINE 25 mg oral capsule  -- 1 cap(s) by mouth every 6 hours, As needed, Rash and/or Itching  -- Indication: For Itching     metoprolol  -- 12.5 milligram(s) by mouth 2 times a day  -- Indication: For Atrial fibrillation     amLODIPine 10 mg oral tablet  -- 1 tab(s) by mouth once a day  -- Indication: For Hypertension     cefepime  -- 2 gram(s) intravenous every 8 hours  -- Indication: For suspected cholangitis - fever     polyethylene glycol 3350 oral powder for reconstitution  -- 17 gram(s) by mouth once a day, As Needed  -- Indication: For Constipation     pantoprazole 40 mg oral delayed release tablet  -- 1 tab(s) by mouth once a day  -- Indication: For prophylaxis

## 2018-08-14 NOTE — H&P ADULT - PROBLEM SELECTOR PLAN 5
Diet: NPO for Interventional GI   VTE: holding in setting of possible procedure tomorrow. - obtaining CMP with magnesium and phosphorus for repletion. - UA showing positive nitrite and LEC  - pt has AMS urinary control system  - no dysuria, increase frequency at this time  - if patient needs indwelling catheter should be adminstered through urology

## 2018-08-14 NOTE — PROGRESS NOTE ADULT - PROVIDER SPECIALTY LIST ADULT
Cardiology
Cardiology
Gastroenterology
Gastroenterology
Internal Medicine

## 2018-08-14 NOTE — PROGRESS NOTE ADULT - ASSESSMENT
77 y.o. male with hx of HTN, prediabetes, HLD, PVC, MVP, GERD and atrial fibrillation on eliquis, who presented to the ED c/o 2 days of dark urine with associated b/l flank pain, noted jaundice on exam with transaminitis/ elevated bilirubin (D>I) on labs and pt admitted with obstructive jaundice of unclear etiology. CT renal hunt showed severe extrahepatic biliary duct obstruction with a 2 cm dilatation of common bile duct and marked intrahepatic blurry ductal dilatation, upon further imaging with US showed no evidence of gallstones and CBD dilated measuring up to 17 mm. GI consulted and recommended further imaging with MRCP. 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 Given current findings and need for interventional GI, pt to be transferred for further work up for ERCP/EUS; stent  placement and biopsy. Recommend oncology consultation at the facility. CEA elevated,  wnl, CA 19-9 pending results. Pt placed on NPO for now. Pt also is followed by [facility], eliquis for A fib held for impending procedures and as per cardio group no need for bridging with full dose lovenox. Has had episodes of A fib RVR to 120 range so low dose metoprolol was started 12.5mg po bid with improvement in rate. Pt is sensitive to BB and recently had flecainide discontinued so please monitor bp/HR closely on BB therapy. Patient is medically stable to be transferred.     1- Obstructive jaundice secondary to soft tissue mass (1.8cm) unclear if ampullary vs cholangiocarcinoma   -pt with improvement in itching and abdominal pain   -transaminitis and elevated bilirubin persists   -c/w benadryl 25mg po q8hrs prn     -c/w miralax for constipation   -c/w morphine 2-3 mg IV q6hrs prn dependent on pain scale adjusted dosages ed   -GI f/u noted and appreciated a.d D/w Dr. Lam pt planned for transfer to [facility]. d/w hospitalist Dr. Benavidez and GI fellow for acceptance. Pt kept NPO for now for possible ERCP/ EUS stent and bx.     2- Atrial fibrillation - rate better controlled HR <100   -c/w decreased dose of metoprolol 12.5mg po bid   -holding eliquis for possible need for procedure, has been held for 4 days no need for bridging as per cardio at this time   -cardio f/u noted and appreciated      3-HTN  -BP stable   - c/w norvasc 10mg po qd   -c/w losartan 100mg po qd     4-HLD  - holding statin due to transaminitis    5-DM2 HBA1C: 6.2  - pt takes glipizide at home, holding po meds  -fs stable   - c/w accuchecks ACHS TID  -c/w HSS while eating and hold when npo  -c/w hypoglycemia protocol     6-DVT ppx held in anticipation of need for procedures  -pt is ambulating/ scd boots b/l    Dispo: Transfer to [facility] when bed available for further work up and management.

## 2018-08-14 NOTE — DISCHARGE NOTE ADULT - CARE PROVIDER_API CALL
Derek Guajardo), Gastroenterology; Internal Medicine  [address] [place] [address]  Phone: [phone]  Fax: [phone]

## 2018-08-14 NOTE — H&P ADULT - PROBLEM SELECTOR PLAN 3
- patient currently normotensive  - hold amlodpine and olesartan  - vitals q4 - patient currently normotensive  - hold amlodpine and olesartan; c/w IVF and reassess  - vitals q4

## 2018-08-14 NOTE — H&P ADULT - NSHPLABSRESULTS_GEN_ALL_CORE
15.7   9.6   )-----------( 260      ( 13 Aug 2018 08:55 )             45.2   08-14    142  |  103  |  15.0  ----------------------------<  191<H>  3.4<L>   |  25.0  |  0.71    Ca    8.8      14 Aug 2018 08:29    TPro  6.3<L>  /  Alb  3.2<L>  /  TBili  10.5<H>  /  DBili  8.2<H>  /  AST  97<H>  /  ALT  176<H>  /  AlkPhos  295<H>  08-14  < from: MR MRCP w/wo IV Cont (08.13.18 @ 12:16) >    1.8 cm enhancing soft tissue mass in the distal common bile duct causing   moderate biliary dilatation. Ampullary neoplasm versus   cholangiocarcinoma. 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 No pancreatic head mass. No   evidence of metastatic disease in the abdomen.    < end of copied text > Labs reviewed    15.7   9.6   )-----------( 260      ( 13 Aug 2018 08:55 )             45.2   08-14    142  |  103  |  15.0  ----------------------------<  191<H>  3.4<L>   |  25.0  |  0.71    Ca    8.8      14 Aug 2018 08:29    TPro  6.3<L>  /  Alb  3.2<L>  /  TBili  10.5<H>  /  DBili  8.2<H>  /  AST  97<H>  /  ALT  176<H>  /  AlkPhos  295<H>  08-14  MR MRCP w/wo IV Cont reviewed    1.8 cm enhancing soft tissue mass in the distal common bile duct causing   moderate biliary dilatation. Ampullary neoplasm versus   cholangiocarcinoma. 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 No pancreatic head mass. No   evidence of metastatic disease in the abdomen.    EKG ordered

## 2018-08-14 NOTE — H&P ADULT - PROBLEM SELECTOR PLAN 1
- pt currently afebrile T 98.4, normotensive BP systolic 133. Jaundice in sclera and under tongue on exam. No RUQ pain, negative murphys. A+Ox3. On exam initially patient actively rigoring. Cites no change in urinary frequency but dark urine and light stools.   - given findings on MRCP 1.8 cm mass in distal CBD suspicion for cholangiocarcinoma, pt will need ERCP. Dr. Luca Guevara notified of transfer.  - no leukocytosis, normal lactate, afebrile, tachycardic on monitor, normotensive. Not actively septic however rigoring specific for GNR bacteremia most likely 2/2 moderate cholangitis (no cardiovascular, neurologic, respiratory, renal, hematologic dysfunction). Will treat with zosyn.  - pt has pruritis will treat with benadryl   - f/u blood cultures from [place] taking 8/14  - NPO at midnight for procedures  - hold eliquis - pt currently afebrile T 98.4, normotensive BP systolic 133. Jaundice in sclera . No RUQ pain, negative murphys. A+Ox3. On exam initially patient actively rigoring.  no change in urinary frequency but dark urine and light stools.   - given findings on MRCP 1.8 cm mass in distal CBD suspicion for cholangiocarcinoma, pt will need EUS/ERCP w/ possible stenting; Dr. Luca Guevara notified of transfer.  - no leukocytosis, normal lactate, afebrile, tachycardic on monitor, normotensive. Not actively septic however rigoring suspicious for GNR bacteremia most likely 2/2 moderate cholangitis (no cardiovascular, neurologic, respiratory, renal, hematologic dysfunction). Will treat with IV zosyn.  - pt has pruritis will treat with benadryl   - f/u blood cultures from [place] taken 8/14  - NPO at midnight for procedures  - hold eliquis (no need for bridging per cardiology at [place])

## 2018-08-14 NOTE — PROGRESS NOTE ADULT - ASSESSMENT
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    Will need EUS and possible ERCP and bx ans possible stenting prior to consideration for surgical intervention.    Contacted Dr Luca Guevara, Interventional GI at [facility].  Willing to accept pt in transfer for procedures and continued work up.  Discussed with medicine, who will arrange for transfer.  EUS/ advanced ERCP not currently available here.

## 2018-08-14 NOTE — PROGRESS NOTE ADULT - SUBJECTIVE AND OBJECTIVE BOX
Patient is a 77y old  Male who presents with a chief complaint of abdominal pain (14 Aug 2018 08:03)      HEALTH ISSUES - PROBLEM Dx:  Benign colon polyp: Benign colon polyp  Obstructive jaundice: Obstructive jaundice  Hyperlipidemia, unspecified hyperlipidemia type: Hyperlipidemia, unspecified hyperlipidemia type  Gastroesophageal reflux disease without esophagitis: Gastroesophageal reflux disease without esophagitis  Type 2 diabetes mellitus without complication, without long-term current use of insulin: Type 2 diabetes mellitus without complication, without long-term current use of insulin  Chronic atrial fibrillation: Chronic atrial fibrillation  Common bile duct dilatation: Common bile duct dilatation  Intractable abdominal pain: Intractable abdominal pain      INTERVAL HPI/OVERNIGHT EVENTS:  Patient seen and examined at bedside. No acute events overnight. Patient states he is feeling well, his spirits are low, wife at bedside and just concerned. D/w them will keep pt npo in an attempt to get him transferred and see if possible procedure can be done today. They are in agreement with the plan of care and understand that once bed available will be transferred. Patient denies chest pain, SOB, N/V, fever, chills, dysuria or any other complaints. All remainder ROS negative.     Vital Signs Last 24 Hrs  T(C): 37.3 (14 Aug 2018 08:21), Max: 37.5 (13 Aug 2018 23:38)  T(F): 99.2 (14 Aug 2018 08:21), Max: 99.5 (13 Aug 2018 23:38)  HR: 108 (14 Aug 2018 08:21) (59 - 121)  BP: 150/80 (14 Aug 2018 08:21) (150/80 - 159/77)  BP(mean): --  RR: 18 (14 Aug 2018 08:21) (18 - 18)  SpO2: 98% (14 Aug 2018 08:21) (97% - 98%)    CAPILLARY BLOOD GLUCOSE  POCT Blood Glucose.: 168 mg/dL (14 Aug 2018 11:08)  POCT Blood Glucose.: 187 mg/dL (14 Aug 2018 08:16)  POCT Blood Glucose.: 220 mg/dL (13 Aug 2018 20:47)  POCT Blood Glucose.: 209 mg/dL (13 Aug 2018 16:14)      CONSTITUTIONAL: Well appearing, well nourished, awake, alert and in no apparent distress  EYES: Icteric sclera   CARDIAC: Irregular irregular.  Heart sounds S1, S2.  No murmurs, rubs or gallops   Loop recorder in place   RESPIRATORY: Breath sounds clear and equal bilaterally. No wheezes, rhales or rhonchi  GASTROENTEROLOGY: Soft, NT ND BS + normoactive   EXTREMITIES: No edema, cyanosis or deformity   NEUROLOGICAL: Alert and oriented, no focal deficits, no motor or sensory deficits.  SKIN: No rash, skin turgor wnl, jaundice     MEDICATIONS  (STANDING):  amLODIPine   Tablet 10 milliGRAM(s) Oral daily  dextrose 5%. 1000 milliLiter(s) (50 mL/Hr) IV Continuous <Continuous>  dextrose 50% Injectable 12.5 Gram(s) IV Push once  dextrose 50% Injectable 25 Gram(s) IV Push once  dextrose 50% Injectable 25 Gram(s) IV Push once  insulin lispro (HumaLOG) corrective regimen sliding scale   SubCutaneous three times a day before meals  losartan 100 milliGRAM(s) Oral daily  metoprolol tartrate 12.5 milliGRAM(s) Oral two times a day  multiple electrolytes Injection Type 1 1000 milliLiter(s) (40 mL/Hr) IV Continuous <Continuous>  pantoprazole  Injectable 40 milliGRAM(s) IV Push daily  polyethylene glycol 3350 17 Gram(s) Oral daily    MEDICATIONS  (PRN):  dextrose 40% Gel 15 Gram(s) Oral once PRN Blood Glucose LESS THAN 70 milliGRAM(s)/deciliter  diphenhydrAMINE   Capsule 25 milliGRAM(s) Oral every 6 hours PRN Rash and/or Itching  glucagon  Injectable 1 milliGRAM(s) IntraMuscular once PRN Glucose LESS THAN 70 milligrams/deciliter  morphine  - Injectable 3 milliGRAM(s) IV Push every 4 hours PRN Severe Pain (7 - 10)  morphine  - Injectable 2 milliGRAM(s) IV Push every 4 hours PRN Moderate Pain (4 - 6)      LABS:                        15.7   9.6   )-----------( 260      ( 13 Aug 2018 08:55 )             45.2     08-14    142  |  103  |  15.0  ----------------------------<  191<H>  3.4<L>   |  25.0  |  0.71    Ca    8.8      14 Aug 2018 08:29    TPro  6.3<L>  /  Alb  3.2<L>  /  TBili  10.5<H>  /  DBili  8.2<H>  /  AST  97<H>  /  ALT  176<H>  /  AlkPhos  295<H>  08-14    PT/INR - ( 13 Aug 2018 08:55 )   PT: 12.4 sec;   INR: 1.12 ratio             LIVER FUNCTIONS - ( 14 Aug 2018 08:29 )  Alb: 3.2 g/dL / Pro: 6.3 g/dL / ALK PHOS: 295 U/L / ALT: 176 U/L / AST: 97 U/L / GGT: x             RADIOLOGY & ADDITIONAL TESTS:  No new imaging studies

## 2018-08-14 NOTE — PROGRESS NOTE ADULT - SUBJECTIVE AND OBJECTIVE BOX
Patient seen and examined;  feels OK.  Still with pruritis.  No fever.  No anorexia.  Eliquis held since admitted here 2 days ago.    MRCP results reviewed.  No Gallstones.  Distal CBD mass, 1.8 cm.  with proximal Biliary dilitation.  No adenopathy.  No liver mets.  Suggestive of Primary cholangiocarcinoma over Pancreatic neoplasm.      PAST MEDICAL & SURGICAL HISTORY:  Male stress incontinence  Kidney stone: &quot;passed on own&quot;  Varicose vein: (L) 5 years ago  Bilateral cataracts  Appendicitis  Benign colon polyp  Prostate cancer: 2010  Afib: 2006 s/p ablation 2006 , afib resolved  Hyperlipidemia: X 4 years  GERD (Gastroesophageal Reflux Disease): X 10 years  DM (Diabetes Mellitus): X 3 years  Renal Calculi: 2008  MVP (Mitral Valve Prolapse): X 8 years  HTN - Hypertension: X 10 years, controlled  S/P ablation operation for arrhythmia  Male stress incontinence: s/p artifical urinary sphincter 5/2012  Varicose vein-s/p vein stripping 5 years ago  S/P arthroscopy: right shoulder 12/11  S/P prostatectomy: radical robotic 6/10  Cosmetic Surgery: chin implant 2004  S/P Colonoscopy with Polypectomy: 2009  S/P Appendectomy: 1950      ROS:  No Heartburn, regurgitation, dysphagia, odynophagia.  No dyspepsia  No abdominal pain.    No Nausea, vomiting.  No Bleeding.  No hematemesis.   No diarrhea.    No hematochesia.  No weight loss, anorexia.  No edema.      MEDICATIONS  (STANDING):  amLODIPine   Tablet 10 milliGRAM(s) Oral daily  dextrose 5%. 1000 milliLiter(s) (50 mL/Hr) IV Continuous <Continuous>  dextrose 50% Injectable 12.5 Gram(s) IV Push once  dextrose 50% Injectable 25 Gram(s) IV Push once  dextrose 50% Injectable 25 Gram(s) IV Push once  insulin lispro (HumaLOG) corrective regimen sliding scale   SubCutaneous three times a day before meals  losartan 100 milliGRAM(s) Oral daily  metoprolol tartrate 12.5 milliGRAM(s) Oral two times a day  multiple electrolytes Injection Type 1 1000 milliLiter(s) (40 mL/Hr) IV Continuous <Continuous>  pantoprazole  Injectable 40 milliGRAM(s) IV Push daily  polyethylene glycol 3350 17 Gram(s) Oral daily    MEDICATIONS  (PRN):  dextrose 40% Gel 15 Gram(s) Oral once PRN Blood Glucose LESS THAN 70 milliGRAM(s)/deciliter  diphenhydrAMINE   Capsule 25 milliGRAM(s) Oral every 6 hours PRN Rash and/or Itching  glucagon  Injectable 1 milliGRAM(s) IntraMuscular once PRN Glucose LESS THAN 70 milligrams/deciliter  morphine  - Injectable 3 milliGRAM(s) IV Push every 4 hours PRN Severe Pain (7 - 10)  morphine  - Injectable 2 milliGRAM(s) IV Push every 4 hours PRN Moderate Pain (4 - 6)      Allergies    No Known Allergies    Intolerances        Vital Signs Last 24 Hrs  T(C): 37.3 (14 Aug 2018 08:21), Max: 37.5 (13 Aug 2018 23:38)  T(F): 99.2 (14 Aug 2018 08:21), Max: 99.5 (13 Aug 2018 23:38)  HR: 108 (14 Aug 2018 08:21) (59 - 121)  BP: 150/80 (14 Aug 2018 08:21) (150/80 - 159/77)  BP(mean): --  RR: 18 (14 Aug 2018 08:21) (18 - 18)  SpO2: 98% (14 Aug 2018 08:21) (97% - 98%)    PHYSICAL EXAM:    GENERAL: NAD, well-groomed, well-developed  HEAD:  Atraumatic, Normocephalic  EYES: EOMI, PERRLA, conjunctiva and sclera clear  ENMT: No tonsillar erythema, exudates, or enlargement; Moist mucous membranes, Good dentition, No lesions  NECK: Supple, No JVD, Normal thyroid  CHEST/LUNG: Clear to percussion bilaterally; No rales, rhonchi, wheezing, or rubs  HEART: Regular rate and rhythm; No murmurs, rubs, or gallops  ABDOMEN: Soft, Nontender, Nondistended; Bowel sounds present  EXTREMITIES:  2+ Peripheral Pulses, No clubbing, cyanosis, or edema  LYMPH: No lymphadenopathy noted  SKIN: No rashes or lesions      LABS:                        15.7   9.6   )-----------( 260      ( 13 Aug 2018 08:55 )             45.2     08-14    142  |  103  |  15.0  ----------------------------<  191<H>  3.4<L>   |  25.0  |  0.71    Ca    8.8      14 Aug 2018 08:29    TPro  6.3<L>  /  Alb  3.2<L>  /  TBili  10.5<H>  /  DBili  8.2<H>  /  AST  97<H>  /  ALT  176<H>  /  AlkPhos  295<H>  08-14    PT/INR - ( 13 Aug 2018 08:55 )   PT: 12.4 sec;   INR: 1.12 ratio                  RADIOLOGY & ADDITIONAL STUDIES:  MRCP results as above.

## 2018-08-14 NOTE — H&P ADULT - PROBLEM SELECTOR PLAN 4
- UA showing positive nitrite and LEC  - pt has AMS urinary control system  - no dysuria, increase frequency at this time  - if patient needs indwelling catheter should be adminstered through urology on glipizide at home  currently NPO   c/w sliding scale

## 2018-08-14 NOTE — PROGRESS NOTE ADULT - SUBJECTIVE AND OBJECTIVE BOX
[facility]                                                    [address], [address], [place] [address]                                                         PHONE: [phone]    FAX: [phone] [address], [address], [place] [address]                                                 PHONE: [phone]    FAX: [phone]  *******************************************************************************  cc: clearance for ERCP    HPI:  JERRY COPPOLA is a 77y Male  HTN, prediabetes, HL, PVC, MVP, GERD, chronic  atrial fibrillation and CM with normalization of LV function, ILR, who presents to ED c/o 2 day h/o dark urine associated with dull right  flank pain which has been intermittent and fluctuating in intensity up to 10/10 in intensity. No CP SOB, palpitations, PND or orthopnea. Recent hospitalization for slow HR for which flecainide and metoprolol were DC'd. Past AF ablation 2005.  Pt with ILR. Asked to see pt for clearance to proceed with MRCP. Now s/p MRCP    Nuclear stress 6/20/18 no ischemia EF=66%    Echo 3/22/17 EF=60%. Aortic root=4.3cm. Mild MVP, mild MR.    PAST MEDICAL & SURGICAL HISTORY:  Male stress incontinence  Kidney stone: &quot;passed on own&quot;  Varicose vein: (L) 5 years ago  Bilateral cataracts  Appendicitis  Benign colon polyp  Prostate cancer: 2010  Afib: 2006 s/p ablation 2006 , afib resolved  Hyperlipidemia: X 4 years  GERD (Gastroesophageal Reflux Disease): X 10 years  DM (Diabetes Mellitus): X 3 years  Renal Calculi: 2008  MVP (Mitral Valve Prolapse): X 8 years  HTN - Hypertension: X 10 years, controlled  S/P ablation operation for arrhythmia  Male stress incontinence: s/p artifical urinary sphincter 5/2012  Varicose vein-s/p vein stripping 5 years ago  S/P arthroscopy: right shoulder 12/11  S/P prostatectomy: radical robotic 6/10  Cosmetic Surgery: chin implant 2004  S/P Colonoscopy with Polypectomy: 2009  S/P Appendectomy: 1950      No Known Allergies    Family Hx: no pertinent family hx in 1st degree relatives    Social History: former tobacco / No EtOH /No  IVDA    Overnight events/Subjective Assessment: no new symptoms      MEDICATIONS  (STANDING):  amLODIPine   Tablet 10 milliGRAM(s) Oral daily  dextrose 5%. 1000 milliLiter(s) (50 mL/Hr) IV Continuous <Continuous>  dextrose 50% Injectable 12.5 Gram(s) IV Push once  dextrose 50% Injectable 25 Gram(s) IV Push once  dextrose 50% Injectable 25 Gram(s) IV Push once  insulin lispro (HumaLOG) corrective regimen sliding scale   SubCutaneous three times a day before meals  losartan 100 milliGRAM(s) Oral daily  metoprolol tartrate 25 milliGRAM(s) Oral two times a day  pantoprazole  Injectable 40 milliGRAM(s) IV Push daily  polyethylene glycol 3350 17 Gram(s) Oral daily    MEDICATIONS  (PRN):  dextrose 40% Gel 15 Gram(s) Oral once PRN Blood Glucose LESS THAN 70 milliGRAM(s)/deciliter  diphenhydrAMINE   Capsule 25 milliGRAM(s) Oral every 6 hours PRN Rash and/or Itching  glucagon  Injectable 1 milliGRAM(s) IntraMuscular once PRN Glucose LESS THAN 70 milligrams/deciliter  morphine  - Injectable 3 milliGRAM(s) IV Push every 4 hours PRN Severe Pain (7 - 10)  morphine  - Injectable 2 milliGRAM(s) IV Push every 4 hours PRN Moderate Pain (4 - 6)      Vital Signs Last 24 Hrs  T(C): 37.5 (13 Aug 2018 23:38), Max: 37.5 (13 Aug 2018 23:38)  T(F): 99.5 (13 Aug 2018 23:38), Max: 99.5 (13 Aug 2018 23:38)  HR: 90 (13 Aug 2018 23:38) (59 - 123)  BP: 157/89 (13 Aug 2018 23:38) (124/76 - 159/77)  BP(mean): --  RR: 18 (13 Aug 2018 23:38) (18 - 18)  SpO2: 97% (13 Aug 2018 23:38) (97% - 98%)    I&O's Detail    I&O's Summary          PHYSICAL EXAM:  General: Appears well developed, well nourished, no acute distress  HEENT: Head: normocephalic, atraumatic  Eyes: Pupils equal and reactive  Neck: Supple, no carotid bruit, no JVD, no HJR  CARDIOVASCULAR: Normal S1 and S2, no murmur, rub, or gallop  LUNGS: Clear to auscultation bilaterally, no rales, rhonchi or wheeze  ABDOMEN: Soft, nontender, non-distended, positive bowel sounds, no mass or bruit  EXTREMITIES: No edema, distal pulses WNL  SKIN: Warm and dry with normal turgor  NEURO: Alert & oriented x 3, grossly intact  PSYCH: normal mood and affect    REVIEW OF SYSTEMS:  CONSTITUTIONAL: No fever, weight loss, or fatigue  EYES: No eye pain, visual disturbances, or discharge  ENMT:  No difficulty hearing, tinnitus, vertigo; No sinus or throat pain  NECK: No pain or stiffness  RESPIRATORY: No cough, wheezing, chills or hemoptysis; No Shortness of Breath  CARDIOVASCULAR: No chest pain, palpitations, passing out, dizziness, or leg swelling  GASTROINTESTINAL: No abdominal or epigastric pain. No nausea, vomiting, or hematemesis; No diarrhea or constipation. No melena or hematochezia.  GENITOURINARY: No dysuria, frequency, hematuria, or incontinence  NEUROLOGICAL: No headaches, memory loss, loss of strength, numbness, or tremors  SKIN: No itching, burning, rashes, or lesions   LYMPH Nodes: No enlarged glands  ENDOCRINE: No heat or cold intolerance; No hair loss  MUSCULOSKELETAL: No joint pain or swelling; No muscle, back, or extremity pain  PSYCHIATRIC: No depression, anxiety, mood swings, or difficulty sleeping  HEME/LYMPH: No easy bruising, or bleeding gums  ALLERGY AND IMMUNOLOGIC: No hives or eczema        LABS:                        15.7   9.6   )-----------( 260      ( 13 Aug 2018 08:55 )             45.2     08-13    141  |  102  |  14.0  ----------------------------<  165<H>  3.4<L>   |  23.0  |  0.52    Ca    9.2      13 Aug 2018 08:55    TPro  7.0  /  Alb  3.7  /  TBili  8.7<H>  /  DBili  6.7<H>  /  AST  119<H>  /  ALT  218<H>  /  AlkPhos  326<H>  08-13        PT/INR - ( 13 Aug 2018 08:55 )   PT: 12.4 sec;   INR: 1.12 ratio           serum  Lipids:   Hemoglobin A1C, Whole Blood: 6.2 % (08-02 @ 05:55)    Thyroid Stimulating Hormone, Serum: 1.23 uIU/mL (08-02 @ 05:55)      RADIOLOGY & ADDITIONAL STUDIES:    < from: MR MRCP w/wo IV Cont (08.13.18 @ 12:16) >  IMPRESSION:    1.8 cm enhancing soft tissue mass in the distal common bile duct causing   moderate biliary dilatation. Ampullary neoplasm versus   cholangiocarcinoma. 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 No pancreatic head mass. No   evidence of metastatic disease in the abdomen.    < end of copied text >      ASSESSMENT AND PLAN:  In summary, JERRY COPPOLA is a 77y Male with past medical history significant for   HTN, prediabetes, HL, PVC, MVP, GERD, chronic  atrial fibrillation and CM with normalization of LV function, ILR, who presents to ED c/o 2 day h/o dark urine associated with dull right  flank pain which has been intermittent and fluctuating in intensity up to 10/10 in intensity. No CP SOB, palpitations, PND or orthopnea. Recent hospitalization for slow HR for which flecainide and metoprolol were DC'd. Past AF ablation 2005.  Pt with ILR. Asked to see pt for clearance to proceed with MRCP. Now s/p MRCP    - CAF. Rate is controlled. Will maintain outpt cardiac medications. Apixaban on hold for impending procedures    - Increased HR. Pt with increased HR yesterday. Hx of bradyarrhythmia on flecainide and metoprolol on prior admit.  Will lower metoprolol to 12.5mg po BID.    - ILR.  Pt has an ILR. This does not preclude MRI procedures.    - HTN. BP is controlled on his current medications. Maintain amlodipine and losartan. Low dose metoprolol    - Obstructive Jaundice. s/p MRCP. Findings: 1.8 cm enhancing soft tissue mass in the distal common bile duct causing moderate biliary dilatation. Ampullary neoplasm versus cholangiocarcinoma. 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 No pancreatic head mass. No   evidence of metastatic disease in the abdomen.        - Clearance: No active CP, SOB or symptoms to suggest hemodynamic compromise.  No clear cut cardiac contraindication to proceeding with GI nation/surgery as needed.  Recent nuclear stress no ischemia. Normal LV function. Pt is cleared to proceed.      Jeri Aparicio MD

## 2018-08-14 NOTE — DISCHARGE NOTE ADULT - ADDITIONAL INSTRUCTIONS
Please follow up with hospitalist at the accepting facility, further management and recommendations as per gastroenterologist, likely ERCP/EUS with stent placement and biopsy. Recommend involvement of oncology.

## 2018-08-14 NOTE — DISCHARGE NOTE ADULT - PATIENT PORTAL LINK FT
You can access the [facility], offered by [facility], by registering with the following website: [email or web address]

## 2018-08-14 NOTE — H&P ADULT - PSH
Cosmetic Surgery  chin implant 2004  Male stress incontinence  s/p artifical urinary sphincter 5/2012  S/P ablation operation for arrhythmia    S/P Appendectomy  1950  S/P arthroscopy  right shoulder 12/11  S/P Colonoscopy with Polypectomy  2009  S/P prostatectomy  radical robotic 6/10  Varicose vein-s/p vein stripping 5 years ago Cosmetic Surgery  chin implant 2004  Male stress incontinence  s/p artifical urinary sphincter 5/2012  S/P ablation operation for arrhythmia    S/P Appendectomy  1950  S/P arthroscopy  right shoulder 12/11  S/P Colonoscopy with Polypectomy  2009  S/P prostatectomy  radical robotic 6/10  Status post left knee replacement    Varicose vein-s/p vein stripping 5 years ago

## 2018-08-14 NOTE — H&P ADULT - NSHPPHYSICALEXAM_GEN_ALL_CORE
General: WN/WD NAD  Neurology: A&Ox3, nonfocal, BERRY x 4  Eyes: PERRLA/ EOMI, Gross vision intact, yellow sclera b/l  ENT/Neck: Neck supple, trachea midline, No JVD, Gross hearing intact, yellow membrane sublingual  Respiratory: CTA B/L, No wheezing, rales, rhonchi  CV: RRR, S1S2, no murmurs, rubs or gallops  Abdominal: Soft, NT, ND +BS,  negative murphys  Extremities: No edema, + peripheral pulses  Skin: No Rashes, Hematoma, Ecchymosis  Incisions:   Tubes: PHYSICAL EXAM:  Vital Signs Last 24 Hrs  T(C): 36.9 (08-15-18 @ 01:19)  T(F): 98.4 (08-15-18 @ 01:19), Max: 100.5 (08-14-18 @ 15:20)  HR: 122 (08-15-18 @ 01:19) (98 - 122)  BP: 110/72 (08-15-18 @ 01:19)  BP(mean): --  RR: 18 (08-15-18 @ 01:19) (18 - 18)  SpO2: 96% (08-15-18 @ 01:19) (96% - 98%)  Wt(kg): --    Constitutional: NAD, awake and alert  EYES: EOMI; +icterus   ENMT:  Normal Hearing, no tonsillar exudates ; +dry mucous membrane  Neck: Soft and supple, No JVD  Lungs: Breath sounds are clear bilaterally, No wheezing, rales or rhonchi  Heart: S1 and S2, irregularly irregular  Abdomen: Bowel Sounds present, soft, nontender, nondistended, no guarding, no rebound  Extremities: No cyanosis or clubbing; warm to touch  Vascular: 2+ peripheral pulses lower ex  Neurological: A/O x 3, no focal deficits  Musculoskeletal: 5/5 strength b/l upper and lower extremities  Skin: No rashes  Psych: no depression or anhedonia  HEME: no bruises, no nose bleeds

## 2018-08-14 NOTE — DISCHARGE NOTE ADULT - MEDICATION SUMMARY - MEDICATIONS TO STOP TAKING
I will STOP taking the medications listed below when I get home from the hospital:    atorvastatin 10 mg oral tablet  -- 1 tab(s) by mouth once a day    glipiZIDE 5 mg oral tablet  -- 1 tab(s) by mouth once a day    Eliquis 5 mg oral tablet  -- 1 tab(s) by mouth 2 times a day

## 2018-08-14 NOTE — DISCHARGE NOTE ADULT - CARE PLAN
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 Patient to be transferred for further management with ERCP/EUS, stent placement and biopsy. Continue with benadryl prn for itching, morphine prn for pain.  Secondary Diagnosis:	Chronic atrial fibrillation  Assessment and plan of treatment:	Eliquis is on hold for pending procedures and there is no need for bridging at this time, when procedures are completed please restart anticoagulation. Recently initiated metoprolol 25mg orally twice a day on 8/13/18, which dropped patients HR from 120 to 60 range, decreased dose of metoprolol to 12.5mg orally twice a day. Monitor HR closely, patient is very sensitive to BB therapy.  Secondary Diagnosis:	Essential hypertension  Assessment and plan of treatment:	Continue with losartan, metoprolol and norvasc.  Secondary Diagnosis:	Hyperlipidemia, unspecified hyperlipidemia type  Assessment and plan of treatment:	Atorvastatin on hold due to transaminitis 3x upper limit of normal.  Secondary Diagnosis:	Prediabetes  Goal:	HBA1C: 6.3  Assessment and plan of treatment:	Continue with low carb diet. Patient's on glipizide which was being held at this time.  Secondary Diagnosis:	Gastroesophageal reflux disease without esophagitis  Assessment and plan of treatment:	Continue with protonix therapy. 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 Patient to be transferred for further management with ERCP/EUS, stent placement and biopsy. Continue with benadryl prn for itching, morphine prn for pain. Patient had fever spike, 100.5, pan cultures were sent, given high risk for cholangitis patient started empirically on cefepime.  Secondary Diagnosis:	Chronic atrial fibrillation  Assessment and plan of treatment:	Eliquis is on hold for pending procedures and there is no need for bridging at this time, when procedures are completed please restart anticoagulation. Recently initiated metoprolol 25mg orally twice a day on 8/13/18, which dropped patients HR from 120 to 60 range, decreased dose of metoprolol to 12.5mg orally twice a day. Monitor HR closely, patient is very sensitive to BB therapy.  Secondary Diagnosis:	Essential hypertension  Assessment and plan of treatment:	Continue with losartan, metoprolol and norvasc.  Secondary Diagnosis:	Hyperlipidemia, unspecified hyperlipidemia type  Assessment and plan of treatment:	Atorvastatin on hold due to transaminitis 3x upper limit of normal.  Secondary Diagnosis:	Prediabetes  Goal:	HBA1C: 6.3  Assessment and plan of treatment:	Continue with low carb diet. Patient's on glipizide which was being held at this time.  Secondary Diagnosis:	Gastroesophageal reflux disease without esophagitis  Assessment and plan of treatment:	Continue with protonix therapy.

## 2018-08-14 NOTE — H&P ADULT - PROBLEM SELECTOR PLAN 2
- pt has history of atiral fibrillation for several years with albation in 05. Has ILR. Followed by [facility]. During admission at [facility] patient had RVR event and was started on low dose metoprolol 12.5 mg bid. Per documentation review patient had recent hospitalization for slow HR for which metoprolol and fecainde were discontinued.   - in setting of possible active systemic infection will hold metoprolol  - telemetry monitoring  - pt on Eliquis 5 mg bid, will hold in setting of possible GI intervention - pt has history of atiral fibrillation for several years with albation in 05. Has ILR. Followed by [place] [facility] group. During admission at [facility] patient had RVR event and was started on low dose metoprolol 12.5 mg bid. Per documentation review patient had recent hospitalization for slow HR for which metoprolol and fecainde were discontinued.   - in setting of possible active systemic infection will hold metoprolol  - telemetry monitoring  - pt on Eliquis 5 mg bid, will hold in setting of possible GI intervention  - noted hypokalemia on prior labs, obtaining stat labs now for repletion. - pt has history of atiral fibrillation for several years with ablation in 2005. Has ILR. Followed by [facility] group. During admission at [facility] patient had RVR event and was started on low dose metoprolol 12.5 mg bid. Per documentation review patient had recent hospitalization for slow HR for which metoprolol and fecainde were discontinued.   - in setting of possible active systemic infection will hold metoprolol for now, unless continues to be in RVR; will give fluid and reassess  - telemetry monitoring  - pt on Eliquis 5 mg bid, will hold in setting of possible GI intervention  - noted hypokalemia on prior labs, obtaining stat labs now for repletion.

## 2018-08-14 NOTE — DISCHARGE NOTE ADULT - MEDICATION SUMMARY - MEDICATIONS TO CHANGE
I will SWITCH the dose or number of times a day I take the medications listed below when I get home from the hospital:    amlodipine-olmesartan 10 mg-40 mg oral tablet  -- 1 tab(s) by mouth once a day

## 2018-08-14 NOTE — H&P ADULT - HISTORY OF PRESENT ILLNESS
The patient is a 76 yo man with PMH of HTN, prediabetes, HLD, afib on eliquis with ILR, GERD initially presented to [facility] ED on 8/9 with 2 days of dark urine. The patient was fine without symptoms on 8/8. No fevers, chills, SOB, CP, abdominal pain, hematuria, dysuria at time of admission to [facility]. On admission patient noted to have scleral jaundice, transaminitis, elevated direct bilirubin and was admitted for obstructive jaundice. Initial CT stone hunt showed severe extrahepatic biliary duct obstruction with 2 cm dilation of CBD with marked intrahepatic biliary duct dilation. RUQ US showed no evidence of gallstones. The patient underwent an MRCP per GI after cardiac clearence for LR. 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 During admission patient spiked T max 100.5 and was started on cefepime for suspected cholangitis. The patient developed episode of Afib with RVR to 120's was started on low dose metoprolol 12.5 mg po. Oncologic w/u revelaed an elevated CEA level 4.2, elevated CA 19-9 to 76.3 and normal CA-125. The patient was subsequently transferred to [facility] for advanced endoscopy intervation requiring ERCP with possible stent and biopsy.     Physicians:   Dr. Bina Willis [phone] (PCP)  Dr. Aaron Guevara [phone] (Cardiology)  Dr. Danielle Sellers [phone] (EP) The patient is a 78 yo man with PMH of HTN, prediabetes, HLD, afib on eliquis with ILR, GERD initially presented to [facility] ED on 8/9 with 2 days of dark urine. The patient was fine without symptoms on 8/8. No fevers, chills, SOB, CP, abdominal pain, hematuria, dysuria at time of admission to [facility]. On admission patient noted to have scleral jaundice, transaminitis, elevated direct bilirubin and was admitted for obstructive jaundice. Initial CT stone hunt showed severe extrahepatic biliary duct obstruction with 2 cm dilation of CBD with marked intrahepatic biliary duct dilation. RUQ US showed no evidence of gallstones. The patient underwent an MRCP per GI after cardiac clearence for LR. 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 During admission patient spiked T max 100.5 and was started on cefepime for suspected cholangitis. The patient developed episode of Afib with RVR to 120's was started on low dose metoprolol 12.5 mg po. Oncologic w/u revelaed an elevated CEA level 4.2, elevated CA 19-9 to 76.3 and normal CA-125. The patient was subsequently transferred to [facility] for advanced endoscopy intervation requiring ERCP with possible stent and biopsy. On presentation at [facility] the patient was actively rigoring with jaundice with continued dark urine and pale stools. No complaints of fever or abdominal pain.     Physicians:   Dr. Bina Willis [phone] (PCP)  Dr. Aaron Guevara [phone] (Cardiology)  Dr. Danielle Sellers [phone] (EP)

## 2018-08-14 NOTE — H&P ADULT - PROBLEM SELECTOR PLAN 6
Diet: NPO for Interventional GI   VTE: holding in setting of possible procedure tomorrow. - obtaining CMP with magnesium and phosphorus for repletion.

## 2018-08-15 ENCOUNTER — RESULT REVIEW (OUTPATIENT)
Age: 78
End: 2018-08-15

## 2018-08-15 DIAGNOSIS — Z96.652 PRESENCE OF LEFT ARTIFICIAL KNEE JOINT: Chronic | ICD-10-CM

## 2018-08-15 DIAGNOSIS — E11.9 TYPE 2 DIABETES MELLITUS WITHOUT COMPLICATIONS: ICD-10-CM

## 2018-08-15 DIAGNOSIS — K83.8 OTHER SPECIFIED DISEASES OF BILIARY TRACT: ICD-10-CM

## 2018-08-15 DIAGNOSIS — I10 ESSENTIAL (PRIMARY) HYPERTENSION: ICD-10-CM

## 2018-08-15 DIAGNOSIS — Q64.9 CONGENITAL MALFORMATION OF URINARY SYSTEM, UNSPECIFIED: ICD-10-CM

## 2018-08-15 DIAGNOSIS — Z29.9 ENCOUNTER FOR PROPHYLACTIC MEASURES, UNSPECIFIED: ICD-10-CM

## 2018-08-15 DIAGNOSIS — E87.8 OTHER DISORDERS OF ELECTROLYTE AND FLUID BALANCE, NOT ELSEWHERE CLASSIFIED: ICD-10-CM

## 2018-08-15 DIAGNOSIS — I48.2 CHRONIC ATRIAL FIBRILLATION: ICD-10-CM

## 2018-08-15 DIAGNOSIS — I48.91 UNSPECIFIED ATRIAL FIBRILLATION: ICD-10-CM

## 2018-08-15 LAB
ALBUMIN SERPL ELPH-MCNC: 2.9 G/DL — LOW (ref 3.3–5)
ALBUMIN SERPL ELPH-MCNC: 3.6 G/DL — SIGNIFICANT CHANGE UP (ref 3.3–5)
ALP SERPL-CCNC: 273 U/L — HIGH (ref 40–120)
ALP SERPL-CCNC: 320 U/L — HIGH (ref 40–120)
ALT FLD-CCNC: 153 U/L — HIGH (ref 10–45)
ALT FLD-CCNC: 171 U/L — HIGH (ref 10–45)
ANION GAP SERPL CALC-SCNC: 13 MMOL/L — SIGNIFICANT CHANGE UP (ref 5–17)
ANION GAP SERPL CALC-SCNC: 14 MMOL/L — SIGNIFICANT CHANGE UP (ref 5–17)
AST SERPL-CCNC: 79 U/L — HIGH (ref 10–40)
AST SERPL-CCNC: 88 U/L — HIGH (ref 10–40)
BASOPHILS # BLD AUTO: 0.02 K/UL — SIGNIFICANT CHANGE UP (ref 0–0.2)
BASOPHILS NFR BLD AUTO: 0.2 % — SIGNIFICANT CHANGE UP (ref 0–2)
BILIRUB SERPL-MCNC: 14.2 MG/DL — HIGH (ref 0.2–1.2)
BILIRUB SERPL-MCNC: 16.2 MG/DL — HIGH (ref 0.2–1.2)
BUN SERPL-MCNC: 22 MG/DL — SIGNIFICANT CHANGE UP (ref 7–23)
BUN SERPL-MCNC: 22 MG/DL — SIGNIFICANT CHANGE UP (ref 7–23)
CALCIUM SERPL-MCNC: 8.6 MG/DL — SIGNIFICANT CHANGE UP (ref 8.4–10.5)
CALCIUM SERPL-MCNC: 8.8 MG/DL — SIGNIFICANT CHANGE UP (ref 8.4–10.5)
CHLORIDE SERPL-SCNC: 101 MMOL/L — SIGNIFICANT CHANGE UP (ref 96–108)
CHLORIDE SERPL-SCNC: 101 MMOL/L — SIGNIFICANT CHANGE UP (ref 96–108)
CO2 SERPL-SCNC: 23 MMOL/L — SIGNIFICANT CHANGE UP (ref 22–31)
CO2 SERPL-SCNC: 24 MMOL/L — SIGNIFICANT CHANGE UP (ref 22–31)
CREAT SERPL-MCNC: 0.94 MG/DL — SIGNIFICANT CHANGE UP (ref 0.5–1.3)
CREAT SERPL-MCNC: 0.95 MG/DL — SIGNIFICANT CHANGE UP (ref 0.5–1.3)
CULTURE RESULTS: SIGNIFICANT CHANGE UP
EOSINOPHIL # BLD AUTO: 0.03 K/UL — SIGNIFICANT CHANGE UP (ref 0–0.5)
EOSINOPHIL NFR BLD AUTO: 0.3 % — SIGNIFICANT CHANGE UP (ref 0–6)
GLUCOSE BLDC GLUCOMTR-MCNC: 167 MG/DL — HIGH (ref 70–99)
GLUCOSE BLDC GLUCOMTR-MCNC: 177 MG/DL — HIGH (ref 70–99)
GLUCOSE BLDC GLUCOMTR-MCNC: 180 MG/DL — HIGH (ref 70–99)
GLUCOSE BLDC GLUCOMTR-MCNC: 193 MG/DL — HIGH (ref 70–99)
GLUCOSE BLDC GLUCOMTR-MCNC: 202 MG/DL — HIGH (ref 70–99)
GLUCOSE SERPL-MCNC: 176 MG/DL — HIGH (ref 70–99)
GLUCOSE SERPL-MCNC: 200 MG/DL — HIGH (ref 70–99)
HCT VFR BLD CALC: 41.8 % — SIGNIFICANT CHANGE UP (ref 39–50)
HCT VFR BLD CALC: 44.2 % — SIGNIFICANT CHANGE UP (ref 39–50)
HGB BLD-MCNC: 14.2 G/DL — SIGNIFICANT CHANGE UP (ref 13–17)
HGB BLD-MCNC: 15.1 G/DL — SIGNIFICANT CHANGE UP (ref 13–17)
IMM GRANULOCYTES NFR BLD AUTO: 0.3 % — SIGNIFICANT CHANGE UP (ref 0–1.5)
LYMPHOCYTES # BLD AUTO: 0.35 K/UL — LOW (ref 1–3.3)
LYMPHOCYTES # BLD AUTO: 3.7 % — LOW (ref 13–44)
MAGNESIUM SERPL-MCNC: 1.6 MG/DL — SIGNIFICANT CHANGE UP (ref 1.6–2.6)
MAGNESIUM SERPL-MCNC: 1.7 MG/DL — SIGNIFICANT CHANGE UP (ref 1.6–2.6)
MANUAL SMEAR VERIFICATION: SIGNIFICANT CHANGE UP
MCHC RBC-ENTMCNC: 31.6 PG — SIGNIFICANT CHANGE UP (ref 27–34)
MCHC RBC-ENTMCNC: 32.1 PG — SIGNIFICANT CHANGE UP (ref 27–34)
MCHC RBC-ENTMCNC: 34 GM/DL — SIGNIFICANT CHANGE UP (ref 32–36)
MCHC RBC-ENTMCNC: 34.1 GM/DL — SIGNIFICANT CHANGE UP (ref 32–36)
MCV RBC AUTO: 92.9 FL — SIGNIFICANT CHANGE UP (ref 80–100)
MCV RBC AUTO: 94 FL — SIGNIFICANT CHANGE UP (ref 80–100)
MONOCYTES # BLD AUTO: 0.74 K/UL — SIGNIFICANT CHANGE UP (ref 0–0.9)
MONOCYTES NFR BLD AUTO: 7.8 % — SIGNIFICANT CHANGE UP (ref 2–14)
NEUTROPHILS # BLD AUTO: 8.37 K/UL — HIGH (ref 1.8–7.4)
NEUTROPHILS NFR BLD AUTO: 87.7 % — HIGH (ref 43–77)
PHOSPHATE SERPL-MCNC: 1.9 MG/DL — LOW (ref 2.5–4.5)
PHOSPHATE SERPL-MCNC: 2.6 MG/DL — SIGNIFICANT CHANGE UP (ref 2.5–4.5)
PLAT MORPH BLD: NORMAL — SIGNIFICANT CHANGE UP
PLATELET # BLD AUTO: 218 K/UL — SIGNIFICANT CHANGE UP (ref 150–400)
PLATELET # BLD AUTO: 222 K/UL — SIGNIFICANT CHANGE UP (ref 150–400)
POTASSIUM SERPL-MCNC: 3.3 MMOL/L — LOW (ref 3.5–5.3)
POTASSIUM SERPL-MCNC: 3.4 MMOL/L — LOW (ref 3.5–5.3)
POTASSIUM SERPL-SCNC: 3.3 MMOL/L — LOW (ref 3.5–5.3)
POTASSIUM SERPL-SCNC: 3.4 MMOL/L — LOW (ref 3.5–5.3)
PROT SERPL-MCNC: 5.7 G/DL — LOW (ref 6–8.3)
PROT SERPL-MCNC: 6.4 G/DL — SIGNIFICANT CHANGE UP (ref 6–8.3)
RBC # BLD: 4.5 M/UL — SIGNIFICANT CHANGE UP (ref 4.2–5.8)
RBC # BLD: 4.7 M/UL — SIGNIFICANT CHANGE UP (ref 4.2–5.8)
RBC # FLD: 13.2 % — SIGNIFICANT CHANGE UP (ref 10.3–14.5)
RBC # FLD: 14 % — SIGNIFICANT CHANGE UP (ref 10.3–14.5)
RBC BLD AUTO: SIGNIFICANT CHANGE UP
SODIUM SERPL-SCNC: 137 MMOL/L — SIGNIFICANT CHANGE UP (ref 135–145)
SODIUM SERPL-SCNC: 139 MMOL/L — SIGNIFICANT CHANGE UP (ref 135–145)
SPECIMEN SOURCE: SIGNIFICANT CHANGE UP
WBC # BLD: 9.3 K/UL — SIGNIFICANT CHANGE UP (ref 3.8–10.5)
WBC # BLD: 9.54 K/UL — SIGNIFICANT CHANGE UP (ref 3.8–10.5)
WBC # FLD AUTO: 9.3 K/UL — SIGNIFICANT CHANGE UP (ref 3.8–10.5)
WBC # FLD AUTO: 9.54 K/UL — SIGNIFICANT CHANGE UP (ref 3.8–10.5)

## 2018-08-15 PROCEDURE — 88305 TISSUE EXAM BY PATHOLOGIST: CPT | Mod: 26

## 2018-08-15 PROCEDURE — 99223 1ST HOSP IP/OBS HIGH 75: CPT | Mod: GC

## 2018-08-15 PROCEDURE — 43259 EGD US EXAM DUODENUM/JEJUNUM: CPT | Mod: GC

## 2018-08-15 PROCEDURE — 93010 ELECTROCARDIOGRAM REPORT: CPT

## 2018-08-15 PROCEDURE — 74330 X-RAY BILE/PANC ENDOSCOPY: CPT | Mod: 26,GC

## 2018-08-15 PROCEDURE — 88312 SPECIAL STAINS GROUP 1: CPT | Mod: 26

## 2018-08-15 PROCEDURE — 99233 SBSQ HOSP IP/OBS HIGH 50: CPT

## 2018-08-15 PROCEDURE — 43274 ERCP DUCT STENT PLACEMENT: CPT | Mod: GC

## 2018-08-15 RX ORDER — MAGNESIUM SULFATE 500 MG/ML
1 VIAL (ML) INJECTION ONCE
Qty: 0 | Refills: 0 | Status: COMPLETED | OUTPATIENT
Start: 2018-08-15 | End: 2018-08-15

## 2018-08-15 RX ORDER — METOPROLOL TARTRATE 50 MG
2.5 TABLET ORAL ONCE
Qty: 0 | Refills: 0 | Status: DISCONTINUED | OUTPATIENT
Start: 2018-08-15 | End: 2018-08-15

## 2018-08-15 RX ORDER — GLUCAGON INJECTION, SOLUTION 0.5 MG/.1ML
1 INJECTION, SOLUTION SUBCUTANEOUS ONCE
Qty: 0 | Refills: 0 | Status: DISCONTINUED | OUTPATIENT
Start: 2018-08-15 | End: 2018-08-21

## 2018-08-15 RX ORDER — METOPROLOL TARTRATE 50 MG
5 TABLET ORAL ONCE
Qty: 0 | Refills: 0 | Status: COMPLETED | OUTPATIENT
Start: 2018-08-15 | End: 2018-08-15

## 2018-08-15 RX ORDER — PIPERACILLIN AND TAZOBACTAM 4; .5 G/20ML; G/20ML
3.38 INJECTION, POWDER, LYOPHILIZED, FOR SOLUTION INTRAVENOUS EVERY 8 HOURS
Qty: 0 | Refills: 0 | Status: DISCONTINUED | OUTPATIENT
Start: 2018-08-15 | End: 2018-08-21

## 2018-08-15 RX ORDER — METOPROLOL TARTRATE 50 MG
25 TABLET ORAL
Qty: 0 | Refills: 0 | Status: DISCONTINUED | OUTPATIENT
Start: 2018-08-15 | End: 2018-08-21

## 2018-08-15 RX ORDER — METOPROLOL TARTRATE 50 MG
2.5 TABLET ORAL ONCE
Qty: 0 | Refills: 0 | Status: COMPLETED | OUTPATIENT
Start: 2018-08-15 | End: 2018-08-15

## 2018-08-15 RX ORDER — SODIUM CHLORIDE 9 MG/ML
1000 INJECTION INTRAMUSCULAR; INTRAVENOUS; SUBCUTANEOUS
Qty: 0 | Refills: 0 | Status: DISCONTINUED | OUTPATIENT
Start: 2018-08-15 | End: 2018-08-18

## 2018-08-15 RX ORDER — DEXTROSE 50 % IN WATER 50 %
25 SYRINGE (ML) INTRAVENOUS ONCE
Qty: 0 | Refills: 0 | Status: DISCONTINUED | OUTPATIENT
Start: 2018-08-15 | End: 2018-08-21

## 2018-08-15 RX ORDER — POTASSIUM CHLORIDE 20 MEQ
10 PACKET (EA) ORAL
Qty: 0 | Refills: 0 | Status: COMPLETED | OUTPATIENT
Start: 2018-08-15 | End: 2018-08-15

## 2018-08-15 RX ORDER — POTASSIUM CHLORIDE 20 MEQ
20 PACKET (EA) ORAL ONCE
Qty: 0 | Refills: 0 | Status: DISCONTINUED | OUTPATIENT
Start: 2018-08-15 | End: 2018-08-15

## 2018-08-15 RX ORDER — METOPROLOL TARTRATE 50 MG
12.5 TABLET ORAL
Qty: 0 | Refills: 0 | Status: DISCONTINUED | OUTPATIENT
Start: 2018-08-15 | End: 2018-08-15

## 2018-08-15 RX ORDER — SODIUM CHLORIDE 9 MG/ML
1000 INJECTION, SOLUTION INTRAVENOUS
Qty: 0 | Refills: 0 | Status: DISCONTINUED | OUTPATIENT
Start: 2018-08-15 | End: 2018-08-21

## 2018-08-15 RX ORDER — DEXTROSE 50 % IN WATER 50 %
15 SYRINGE (ML) INTRAVENOUS ONCE
Qty: 0 | Refills: 0 | Status: DISCONTINUED | OUTPATIENT
Start: 2018-08-15 | End: 2018-08-21

## 2018-08-15 RX ORDER — DEXTROSE 50 % IN WATER 50 %
12.5 SYRINGE (ML) INTRAVENOUS ONCE
Qty: 0 | Refills: 0 | Status: DISCONTINUED | OUTPATIENT
Start: 2018-08-15 | End: 2018-08-21

## 2018-08-15 RX ORDER — INSULIN LISPRO 100/ML
VIAL (ML) SUBCUTANEOUS EVERY 6 HOURS
Qty: 0 | Refills: 0 | Status: DISCONTINUED | OUTPATIENT
Start: 2018-08-15 | End: 2018-08-17

## 2018-08-15 RX ADMIN — PIPERACILLIN AND TAZOBACTAM 25 GRAM(S): 4; .5 INJECTION, POWDER, LYOPHILIZED, FOR SOLUTION INTRAVENOUS at 21:55

## 2018-08-15 RX ADMIN — Medication 100 GRAM(S): at 20:17

## 2018-08-15 RX ADMIN — Medication 100 MILLIEQUIVALENT(S): at 10:41

## 2018-08-15 RX ADMIN — PIPERACILLIN AND TAZOBACTAM 25 GRAM(S): 4; .5 INJECTION, POWDER, LYOPHILIZED, FOR SOLUTION INTRAVENOUS at 14:09

## 2018-08-15 RX ADMIN — Medication 100 MILLIEQUIVALENT(S): at 08:59

## 2018-08-15 RX ADMIN — Medication 25 MILLIGRAM(S): at 20:17

## 2018-08-15 RX ADMIN — Medication 1: at 14:12

## 2018-08-15 RX ADMIN — SODIUM CHLORIDE 100 MILLILITER(S): 9 INJECTION INTRAMUSCULAR; INTRAVENOUS; SUBCUTANEOUS at 01:39

## 2018-08-15 RX ADMIN — Medication 2: at 09:16

## 2018-08-15 RX ADMIN — Medication 100 MILLIEQUIVALENT(S): at 05:56

## 2018-08-15 RX ADMIN — Medication 5 MILLIGRAM(S): at 14:05

## 2018-08-15 RX ADMIN — ATORVASTATIN CALCIUM 10 MILLIGRAM(S): 80 TABLET, FILM COATED ORAL at 21:55

## 2018-08-15 RX ADMIN — Medication 2.5 MILLIGRAM(S): at 10:29

## 2018-08-15 NOTE — PROGRESS NOTE ADULT - SUBJECTIVE AND OBJECTIVE BOX
Pre-Endoscopy Evaluation      Referring Physician:                                    Procedure: EUS/ERCP    Indication for Procedure:    Pertinent History:    Sedation by Anesthesia [X]    PAST MEDICAL & SURGICAL HISTORY:  Male stress incontinence  Kidney stone: &quot;passed on own&quot;  Varicose vein: (L) 5 years ago  Bilateral cataracts  Appendicitis  Benign colon polyp  Prostate cancer: 2010  Afib: 2006 s/p ablation 2006 , afib resolved  Hyperlipidemia: X 4 years  GERD (Gastroesophageal Reflux Disease): X 10 years  DM (Diabetes Mellitus): X 3 years  Renal Calculi: 2008  MVP (Mitral Valve Prolapse): X 8 years  HTN - Hypertension: X 10 years, controlled  Status post left knee replacement  S/P ablation operation for arrhythmia  Male stress incontinence: s/p artifical urinary sphincter 5/2012  Varicose vein-s/p vein stripping 5 years ago  S/P arthroscopy: right shoulder 12/11  S/P prostatectomy: radical robotic 6/10  Cosmetic Surgery: chin implant 2004  S/P Colonoscopy with Polypectomy: 2009  S/P Appendectomy: 1950      PMH of Gastroparesis [ ]  Gastric Surgery [ ]  Gastric Outlet Obstruction [ ]    Allergies;    No Known Allergies    Intolerances:    Latex allergy: [ ] yes [X] no    Medications:MEDICATIONS  (STANDING):  atorvastatin 10 milliGRAM(s) Oral at bedtime  dextrose 5%. 1000 milliLiter(s) (50 mL/Hr) IV Continuous <Continuous>  dextrose 50% Injectable 12.5 Gram(s) IV Push once  dextrose 50% Injectable 25 Gram(s) IV Push once  dextrose 50% Injectable 25 Gram(s) IV Push once  insulin lispro (HumaLOG) corrective regimen sliding scale   SubCutaneous every 6 hours  magnesium sulfate  IVPB 1 Gram(s) IV Intermittent once  metoprolol tartrate 25 milliGRAM(s) Oral two times a day  piperacillin/tazobactam IVPB. 3.375 Gram(s) IV Intermittent every 8 hours  sodium chloride 0.9%. 1000 milliLiter(s) (100 mL/Hr) IV Continuous <Continuous>    MEDICATIONS  (PRN):  dextrose 40% Gel 15 Gram(s) Oral once PRN Blood Glucose LESS THAN 70 milliGRAM(s)/deciliter  diphenhydrAMINE   Capsule 25 milliGRAM(s) Oral every 6 hours PRN Rash and/or Itching  glucagon  Injectable 1 milliGRAM(s) IntraMuscular once PRN Glucose LESS THAN 70 milligrams/deciliter  ibuprofen  Tablet 600 milliGRAM(s) Oral every 8 hours PRN fever > 100.4  morphine  - Injectable 2 milliGRAM(s) IV Push every 6 hours PRN Severe Pain (7 - 10)      Smoking: [ ] yes  [X] no    AICD/PPM: [ ] yes   [X] no    Pertinent lab data:                        14.2   9.54  )-----------( 218      ( 15 Aug 2018 07:31 )             41.8     08-15    139  |  101  |  22  ----------------------------<  200<H>  3.3<L>   |  24  |  0.95    Ca    8.6      15 Aug 2018 05:57  Phos  2.6     08-15  Mg     1.7     08-15    TPro  5.7<L>  /  Alb  2.9<L>  /  TBili  14.2<H>  /  DBili  x   /  AST  79<H>  /  ALT  153<H>  /  AlkPhos  273<H>  08-15            Physical Examination:  Daily Height in cm: 182.88 (14 Aug 2018 21:37)    Daily   Vital Signs Last 24 Hrs  T(C): 36.6 (15 Aug 2018 13:58), Max: 38.1 (14 Aug 2018 15:20)  T(F): 97.8 (15 Aug 2018 13:58), Max: 100.5 (14 Aug 2018 15:20)  HR: 116 (15 Aug 2018 13:58) (98 - 124)  BP: 127/80 (15 Aug 2018 13:58) (110/72 - 154/94)  BP(mean): --  RR: 18 (15 Aug 2018 13:58) (18 - 18)  SpO2: 97% (15 Aug 2018 13:58) (96% - 98%)    Drug Dosing Weight  Height (cm): 182.88 (14 Aug 2018 21:37)  Weight (kg): 91.5 (14 Aug 2018 21:37)  BMI (kg/m2): 27.4 (14 Aug 2018 21:37)  BSA (m2): 2.14 (14 Aug 2018 21:37)    Constitutional: NAD  Neck:  No JVD  Respiratory: CTAB/L  Cardiovascular: S1 and S2  Gastrointestinal: BS+, soft, NT/ND  Extremities: No peripheral edema  Neurological: A/O x 3, no focal deficits  : No Ness  Skin: No rashes    Comments:    ASA Class: I [ ]  II [ ]  III [ ]  IV [ ]    The patient is a suitable candidate for the planned procedure unless box checked [ ]  No, explain:

## 2018-08-15 NOTE — CONSULT NOTE ADULT - NSHPATTENDINGPLANDISCUSS_GEN_ALL_CORE
cardiology fellow, Dr. CHINA Harrell; patient seen and examined.  Hx., exam and labs as above.  I agree with the assessment and recommendations.

## 2018-08-15 NOTE — PROGRESS NOTE ADULT - PROBLEM SELECTOR PLAN 3
Pt is followed by Dr Sellers   COnt Metoprolol which will be increased to 25 mg BID   WIll contact the EP   Cardio consult   will get TSH   pt has history of atiral fibrillation for several years with ablation in 2005. Has ILR. Followed by [facility]. During admission at [facility] patient had RVR event and was started on low dose metoprolol 12.5 mg bid. Per documentation review patient had recent hospitalization for slow HR for which metoprolol and fecainde were discontinued.   - in setting of possible active systemic infection will hold metoprolol for now, unless continues to be in RVR; will give fluid and reassess  - telemetry monitoring  - pt on Eliquis 5 mg bid, will hold in setting of possible GI intervention  - noted hypokalemia on prior labs, obtaining stat labs now for repletion. Pt is followed by Dr Sellers   COnt Metoprolol which will be increased to 25 mg BID   WIll contact Pt's  EP   Cardio consult   pt has history of atiral fibrillation for several years with ablation in 2005. Has ILR. Followed by [place] [facility] group. During admission at [facility] patient had RVR event and was started on low dose metoprolol 12.5 mg bid. Per documentation review patient had recent hospitalization for slow HR for which metoprolol and flecainide were discontinued.   - telemetry monitoring  - pt on Eliquis 5 mg bid, held in setting of possible GI intervention/ to be restarted once ok with GI   - noted hypokalemia / repeleated / Mg supplemented   f/up repeat electrolytes

## 2018-08-15 NOTE — CONSULT NOTE ADULT - ASSESSMENT
Impression:  1. Obstructive jaundice- DDX includes cholangiocarcinoma, ampullary neoplasm, pancreatic head lesion, less likely cbd stone    Plan:  1. Recommend ERCP/EUS today  2. Keep NPO  3. Adequate control of Afib  4. hold AC before ERCP

## 2018-08-15 NOTE — CONSULT NOTE ADULT - SUBJECTIVE AND OBJECTIVE BOX
Patient seen and evaluated @ 1:30 PM  Chief Complaint: Jaundice    HPI:  76 yo man with PMH of HTN, prediabetes, HLD, afib s/p ablation 2004/2005 and DCCV 2017 on eliquis with ILR, GERD initially presented to [facility] ED on 8/9 with 2 days of dark urine. The patient was fine without symptoms on 8/8. No fevers, chills, SOB, CP, abdominal pain, hematuria, dysuria at time of admission to [facility]. On admission patient noted to have scleral jaundice, transaminitis, elevated direct bilirubin and was admitted for obstructive jaundice. Initial CT stone hunt showed severe extrahepatic biliary duct obstruction with 2 cm dilation of CBD with marked intrahepatic biliary duct dilation. RUQ US showed no evidence of gallstones. The patient underwent an MRCP per GI after cardiac clearence for LR. 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 During admission patient spiked T max 100.5 and was started on cefepime for suspected cholangitis. The patient developed episode of Afib with RVR to 120's was started on low dose metoprolol 12.5 mg po. Oncologic w/u revelaed an elevated CEA level 4.2, elevated CA 19-9 to 76.3 and normal CA-125. The patient was subsequently transferred to [facility] for advanced endoscopy intervation requiring ERCP with possible stent and biopsy. On presentation at [place] the patient was actively rigoring with jaundice with continued dark urine and pale stools. No complaints of fever or abdominal pain.    2 weeks prior patient discontinued metoprolol 50 mg and flecainide due to HR in 30s in ED. No plan for PPM at that time. Patient monitored off meds. At [facility] reportedly receiving 12.5 mg metoprolol.     Patient here with afib 90-120s, no pauses or slow rates. Received 2.5 mg IV metoprolol x 2.    Prior TTE 3/2017 with EF 60%, mild MR. Nuclear stress test 6/20 wnl.    Physicians:   Dr. Bina Willis [phone] (PCP)  Dr. Aaron Guevara [phone] (Cardiology)  Dr. Danielle Sellers [phone] (EP) (14 Aug 2018 23:50)    PMH:   Male stress incontinence  Kidney stone  Varicose vein  Bilateral cataracts  Appendicitis  Benign colon polyp  Prostate cancer  Afib  Hyperlipidemia  GERD (Gastroesophageal Reflux Disease)  DM (Diabetes Mellitus)  Renal Calculi  MVP (Mitral Valve Prolapse)  HTN - Hypertension    PSH:   Status post left knee replacement  S/P ablation operation for arrhythmia  Male stress incontinence  Varicose vein-s/p vein stripping 5 years ago  S/P arthroscopy  S/P prostatectomy  Cosmetic Surgery  S/P Colonoscopy with Polypectomy  S/P Appendectomy    Medications:   atorvastatin 10 milliGRAM(s) Oral at bedtime  dextrose 40% Gel 15 Gram(s) Oral once PRN  dextrose 5%. 1000 milliLiter(s) IV Continuous <Continuous>  dextrose 50% Injectable 12.5 Gram(s) IV Push once  dextrose 50% Injectable 25 Gram(s) IV Push once  dextrose 50% Injectable 25 Gram(s) IV Push once  diphenhydrAMINE   Capsule 25 milliGRAM(s) Oral every 6 hours PRN  glucagon  Injectable 1 milliGRAM(s) IntraMuscular once PRN  ibuprofen  Tablet 600 milliGRAM(s) Oral every 8 hours PRN  insulin lispro (HumaLOG) corrective regimen sliding scale   SubCutaneous every 6 hours  metoprolol tartrate 25 milliGRAM(s) Oral two times a day  morphine  - Injectable 2 milliGRAM(s) IV Push every 6 hours PRN  piperacillin/tazobactam IVPB. 3.375 Gram(s) IV Intermittent every 8 hours  sodium chloride 0.9%. 1000 milliLiter(s) IV Continuous <Continuous>    Allergies:  No Known Allergies    FAMILY HISTORY:  No pertinent family history in first degree relatives    Social History:  [place]    Review of Systems:  Constitutional: [ ] Fever [ ] Chills [ ] Fatigue [ ] Weight change   HEENT: [ ] Blurred vision [ ] Eye Pain [ ] Headache [ ] Runny nose [ ] Sore Throat   Respiratory: [ ] Cough [ ] Wheezing [ ] Shortness of breath  Cardiovascular: [ ] Chest Pain [ ] Palpitations [ ] CHACON [ ] PND [ ] Orthopnea  Gastrointestinal: [ ] Abdominal Pain [ ] Diarrhea [ ] Constipation [ ] Hemorrhoids [ ] Nausea [ ] Vomiting  Genitourinary: [ ] Nocturia [ ] Dysuria [ ] Incontinence  Extremities: [ ] Swelling [ ] Joint Pain  Neurologic: [ ] Focal deficit [ ] Paresthesias [ ] Syncope  Lymphatic: [ ] Swelling [ ] Lymphadenopathy   Skin: [ ] Rash [ ] Ecchymoses [ ] Wounds [ ] Lesions  Psychiatry: [ ] Depression [ ] Suicidal/Homicidal Ideation [ ] Anxiety [ ] Sleep Disturbances  [x] 10 point review of systems is otherwise negative except as mentioned above            [ ]Unable to obtain    Physical Exam:  T(C): 36.6 (08-15-18 @ 13:58), Max: 38.1 (08-14-18 @ 15:20)  HR: 116 (08-15-18 @ 13:58) (98 - 124)  BP: 127/80 (08-15-18 @ 13:58) (110/72 - 154/94)  RR: 18 (08-15-18 @ 13:58) (18 - 18)  SpO2: 97% (08-15-18 @ 13:58) (96% - 98%)  Wt(kg): --    08-14 @ 07:01  -  08-15 @ 07:00  --------------------------------------------------------  IN: 720 mL / OUT: 2 mL / NET: 718 mL    08-15 @ 07:01  -  08-15 @ 14:12  --------------------------------------------------------  IN: 200 mL / OUT: 401 mL / NET: -201 mL      Daily Height in cm: 182.88 (14 Aug 2018 21:37)    Daily     Appearance: NAD  Eyes: PERRL, EOMI  HENT: Normal oral mus[place]a, NC/AT  Cardiovascular: normal S1 and S2, RRR, no m/r/g, no edema, normal JVP  Respiratory: Clear to auscultation bilaterally  Gastrointestinal: Soft, non-tender, non-distended, BS+  Musculoskeletal: No clubbing, no joint deformity   Neurologic: Non-focal  Lymphatic: No lymphadenopathy  Psychiatry: AAOx3, mood & affect appropriate  Skin: No rashes, no ecchymoses, no cyanosis    Cardiovascular Diagnostic Testing:  ECG: sinus tachycardia 120s    Echo:  as above    Stress Testing: as above    Cath: none    Interpretation of Telemetry: afib 90-120s    Imaging:  CXR  IMPRESSION: No evidence of active chest disease.     Labs:                        14.2   9.54  )-----------( 218      ( 15 Aug 2018 07:31 )             41.8     08-15    139  |  101  |  22  ----------------------------<  200<H>  3.3<L>   |  24  |  0.95    Ca    8.6      15 Aug 2018 05:57  Phos  2.6     08-15  Mg     1.7     08-15    TPro  5.7<L>  /  Alb  2.9<L>  /  TBili  14.2<H>  /  DBili  x   /  AST  79<H>  /  ALT  153<H>  /  AlkPhos  273<H>  08-15 76 yo man with PMH of HTN, prediabetes, HLD, afib s/p ablation 2004/2005 and DCCV 2017 on Eliquis with ILR, GERD.    Initially presented to [facility] ED on 8/9 with 2 days of dark urine. No fevers, chills, SOB, CP, abdominal pain, hematuria, dysuria at time of admission to [facility]. On admission, patient noted to have scleral jaundice, transaminitis, elevated direct bilirubin and was admitted for obstructive jaundice.     CT showed severe extrahepatic biliary duct obstruction with 2 cm dilation of CBD with marked intrahepatic biliary duct dilation. RUQ US showed no evidence of gallstones. The patient underwent an MRCP per GI after cardiac clearence for LR. 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 During admission, patient spiked T max 100.5 and was started on cefepime for suspected cholangitis.     The patient developed episode of Afib with RVR to 120's was started on low dose metoprolol 12.5 mg po. Oncologic w/u revealed an elevated CEA level 4.2, elevated CA 19-9 to 76.3 and normal CA-125. The patient was subsequently transferred to [facility] for advanced endoscopy intervention requiring ERCP with possible stent and biopsy. On presentation, at [place] the patient was actively rigoring with jaundice with continued dark urine and pale stools. No complaints of fever or abdominal pain.    2 weeks prior patient discontinued metoprolol 50 mg and flecainide due to HR in 30s in ED. No plan for PPM at that time. Patient monitored off meds. At [facility], reportedly receiving 12.5 mg metoprolol.     Patient here with afib 90-120s, no pauses or slow rates. Received 2.5 mg IV metoprolol x 2.    Prior TTE 3/2017 with EF 60%, mild MR. Nuclear stress test 6/20 wnl.    Physicians:   Dr. Bina Willis [phone] (PCP)  Dr. Aaron Guevara [phone] (Cardiology)  Dr. Danielle Sellers [phone] (EP) (14 Aug 2018 23:50)    PMH:   Male stress incontinence  Kidney stone  Varicose vein  Bilateral cataracts  Appendicitis  Benign colon polyp  Prostate cancer  Afib  Hyperlipidemia  GERD (Gastroesophageal Reflux Disease)  DM (Diabetes Mellitus)  Renal Calculi  MVP (Mitral Valve Prolapse)  HTN - Hypertension    PSH:   Status post left knee replacement  S/P ablation operation for arrhythmia  Male stress incontinence  Varicose vein-s/p vein stripping 5 years ago  S/P arthroscopy  S/P prostatectomy  Cosmetic Surgery  S/P Colonoscopy with Polypectomy  S/P Appendectomy    Medications:   atorvastatin 10 milliGRAM(s) Oral at bedtime  dextrose 40% Gel 15 Gram(s) Oral once PRN  dextrose 5%. 1000 milliLiter(s) IV Continuous <Continuous>  dextrose 50% Injectable 12.5 Gram(s) IV Push once  dextrose 50% Injectable 25 Gram(s) IV Push once  dextrose 50% Injectable 25 Gram(s) IV Push once  diphenhydrAMINE   Capsule 25 milliGRAM(s) Oral every 6 hours PRN  glucagon  Injectable 1 milliGRAM(s) IntraMuscular once PRN  ibuprofen  Tablet 600 milliGRAM(s) Oral every 8 hours PRN  insulin lispro (HumaLOG) corrective regimen sliding scale   SubCutaneous every 6 hours  metoprolol tartrate 25 milliGRAM(s) Oral two times a day  morphine  - Injectable 2 milliGRAM(s) IV Push every 6 hours PRN  piperacillin/tazobactam IVPB. 3.375 Gram(s) IV Intermittent every 8 hours  sodium chloride 0.9%. 1000 milliLiter(s) IV Continuous <Continuous>    Allergies:  No Known Allergies    FAMILY HISTORY:  No family history heart dz.  in first degree relatives    Social History:  No tobacco or ETOH    Review of Systems:  Constitutional: [ ] Fever [ ] Chills [ ] Fatigue [ ] Weight change   HEENT: [ ] Blurred vision [ ] Eye Pain [ ] Headache [ ] Runny nose [ ] Sore Throat   Respiratory: [ ] Cough [ ] Wheezing [ ] Shortness of breath  Cardiovascular: [ ] Chest Pain [ ] Palpitations [ ] CHACON [ ] PND [ ] Orthopnea  Gastrointestinal: [ ] Abdominal Pain [ ] Diarrhea [ ] Constipation [ ] Hemorrhoids [ ] Nausea [ ] Vomiting  Genitourinary: [ ] Nocturia [ ] Dysuria [ ] Incontinence  Extremities: [ ] Swelling [ ] Joint Pain  Neurologic: [ ] Focal deficit [ ] Paresthesias [ ] Syncope  Lymphatic: [ ] Swelling [ ] Lymphadenopathy   Skin: [ ] Rash [ ] Ecchymoses [ ] Wounds [ ] Lesions  Psychiatry: [ ] Depression [ ] Suicidal/Homicidal Ideation [ ] Anxiety [ ] Sleep Disturbances  [x] 10 point review of systems is otherwise negative except as mentioned above              Physical Exam:  T(C): 36.6 (08-15-18 @ 13:58), Max: 38.1 (08-14-18 @ 15:20)  HR: 116 (08-15-18 @ 13:58) (98 - 124)  BP: 127/80 (08-15-18 @ 13:58) (110/72 - 154/94)  RR: 18 (08-15-18 @ 13:58) (18 - 18)  SpO2: 97% (08-15-18 @ 13:58) (96% - 98%)  Daily Height in cm: 182.88 (14 Aug 2018 21:37)        Appearance: NAD  Eyes: PERRL, EOMI  HENT: Normal oral mucosa, NC/AT  Cardiovascular: normal S1 and S2, RRR, no m/r/g, no edema, normal JVP  Respiratory: Clear to auscultation bilaterally  Gastrointestinal: Soft, non-tender, non-distended, BS+  Musculoskeletal: No clubbing, no joint deformity   Neurologic: Non-focal  Lymphatic: No lymphadenopathy  Psychiatry: AAOx3, mood & affect appropriate  Skin: No rashes, no ecchymoses, no cyanosis      ECG: sinus tachycardia at 103 BPM.  Normal intervals.  Axis -22 degrees.   MINIMAL VOLTAGE CRITERIA FOR LVH, MAY BE NORMAL VARIANT  NONSPECIFIC ST AND T WAVE ABNORMALITY    Interpretation of Telemetry: afib 90-120s        Imaging:  CXR  IMPRESSION: No evidence of active chest disease.     Labs:                        14.2   9.54  )-----------( 218      ( 15 Aug 2018 07:31 )             41.8     08-15    139  |  101  |  22  ----------------------------<  200<H>  3.3<L>   |  24  |  0.95    Ca    8.6      15 Aug 2018 05:57  Phos  2.6     08-15  Mg     1.7     08-15    TPro  5.7<L>  /  Alb  2.9<L>  /  TBili  14.2<H>  /  DBili  x   /  AST  79<H>  /  ALT  153<H>  /  AlkPhos  273<H>  08-15

## 2018-08-15 NOTE — PROGRESS NOTE ADULT - PROBLEM SELECTOR PLAN 2
GLucose is currently controlled   pt is NPO for Procedure today GLucose is currently controlled   pt is NPO for Procedure today  MOnitor blood Gluocose

## 2018-08-15 NOTE — PROGRESS NOTE ADULT - SUBJECTIVE AND OBJECTIVE BOX
Patient is a 77y old  Male who presents with a chief complaint of ERCP (14 Aug 2018 23:50)      INTERVAL HPI/OVERNIGHT EVENTS: Tele  : A fib 90 -125     NO chest pain , no SOB , no abd pain          Medications:MEDICATIONS  (STANDING):  atorvastatin 10 milliGRAM(s) Oral at bedtime  dextrose 5%. 1000 milliLiter(s) (50 mL/Hr) IV Continuous <Continuous>  dextrose 50% Injectable 12.5 Gram(s) IV Push once  dextrose 50% Injectable 25 Gram(s) IV Push once  dextrose 50% Injectable 25 Gram(s) IV Push once  insulin lispro (HumaLOG) corrective regimen sliding scale   SubCutaneous every 6 hours  metoprolol tartrate 25 milliGRAM(s) Oral two times a day  metoprolol tartrate Injectable 5 milliGRAM(s) IV Push once  piperacillin/tazobactam IVPB. 3.375 Gram(s) IV Intermittent every 8 hours  sodium chloride 0.9%. 1000 milliLiter(s) (100 mL/Hr) IV Continuous <Continuous>    MEDICATIONS  (PRN):  dextrose 40% Gel 15 Gram(s) Oral once PRN Blood Glucose LESS THAN 70 milliGRAM(s)/deciliter  diphenhydrAMINE   Capsule 25 milliGRAM(s) Oral every 6 hours PRN Rash and/or Itching  glucagon  Injectable 1 milliGRAM(s) IntraMuscular once PRN Glucose LESS THAN 70 milligrams/deciliter  ibuprofen  Tablet 600 milliGRAM(s) Oral every 8 hours PRN fever > 100.4  morphine  - Injectable 2 milliGRAM(s) IV Push every 6 hours PRN Severe Pain (7 - 10)      Allergies: Allergies    No Known Allergies    Intolerances        REVIEW OF SYSTEMS:  CONSTITUTIONAL: No fever  NECK: No pain or stiffness  RESPIRATORY: No cough,  No shortness of breath  CARDIOVASCULAR: No chest pain, or leg swelling  GASTROINTESTINAL: No abdominal or epigastric pain. No nausea, vomiting, or hematemesis; No diarrhea or constipation. No melena or hematochezia.  GENITOURINARY: No dysuria  NEUROLOGICAL: No headaches  SKIN: No itching, burning, rashes, or lesions   LYMPH NODES: No enlarged glands      T(C): 37 (08-15-18 @ 11:21), Max: 38.1 (18 @ 15:20)  HR: 98 (08-15-18 @ 11:21) (98 - 124)  BP: 134/84 (08-15-18 @ 11:21) (110/72 - 154/94)  RR: 18 (08-15-18 @ 11:21) (18 - 18)  SpO2: 96% (08-15-18 @ 11:21) (96% - 98%)  Wt(kg): --Vital Signs Last 24 Hrs  T(C): 37 (15 Aug 2018 11:21), Max: 38.1 (14 Aug 2018 15:20)  T(F): 98.6 (15 Aug 2018 11:21), Max: 100.5 (14 Aug 2018 15:20)  HR: 98 (15 Aug 2018 11:21) (98 - 124)  BP: 134/84 (15 Aug 2018 11:21) (110/72 - 154/94)  BP(mean): --  RR: 18 (15 Aug 2018 11:21) (18 - 18)  SpO2: 96% (15 Aug 2018 11:21) (96% - 98%)  I&O's Summary    14 Aug 2018 07:  -  15 Aug 2018 07:00  --------------------------------------------------------  IN: 720 mL / OUT: 2 mL / NET: 718 mL    15 Aug 2018 07:01  -  15 Aug 2018 13:52  --------------------------------------------------------  IN: 200 mL / OUT: 201 mL / NET: -1 mL      Last Menstrual Period      PHYSICAL EXAM:  GENERAL: NAD, well-groomed, well-developed  HEAD:  Atraumatic, Normocephalic  EYES: EOMI, PERRLA, conjunctiva and sclera clear  NECK: Supple, No JVD, Normal thyroid  NERVOUS SYSTEM:  Alert & Oriented X3, Good concentration  CHEST/LUNG: Clear to percussion bilaterally; No rales, rhonchi, wheezing, or rubs  HEART: Irregular rate and rhythm; No murmurs, rubs, or gallops  ABDOMEN: Soft, Nontender, Nondistended; Bowel sounds present  EXTREMITIES:  2+ Peripheral Pulses, No clubbing, cyanosis, or edema  SKIN: POs jaundice     Consultant(s) Notes Reviewed:  [x ] YES  [ ] NO  Care Discussed with Consultants/Other Providers [ x] YES  [ ] NO  Name of Consultant  LABS:                        14.2   9.54  )-----------( 218      ( 15 Aug 2018 07:31 )             41.8     08-15    139  |  101  |  22  ----------------------------<  200<H>  3.3<L>   |  24  |  0.95    Ca    8.6      15 Aug 2018 05:57  Phos  2.6     08-15  Mg     1.7     08-15    TPro  5.7<L>  /  Alb  2.9<L>  /  TBili  14.2<H>  /  DBili  x   /  AST  79<H>  /  ALT  153<H>  /  AlkPhos  273<H>  08-15      Urinalysis Basic - ( 14 Aug 2018 17:13 )    Color: Yellow / Appearance: Clear / S.015 / pH: x  Gluc: x / Ketone: Moderate  / Bili: Large / Urobili: 8 mg/dL   Blood: x / Protein: 100 mg/dL / Nitrite: Positive   Leuk Esterase: Trace / RBC: x / WBC 0-2   Sq Epi: x / Non Sq Epi: Occasional / Bacteria: x      CAPILLARY BLOOD GLUCOSE      POCT Blood Glucose.: 180 mg/dL (15 Aug 2018 11:51)  POCT Blood Glucose.: 202 mg/dL (15 Aug 2018 08:55)  POCT Blood Glucose.: 193 mg/dL (15 Aug 2018 07:44)  POCT Blood Glucose.: 177 mg/dL (15 Aug 2018 00:21)  POCT Blood Glucose.: 164 mg/dL (14 Aug 2018 16:16)        Urinalysis Basic - ( 14 Aug 2018 17:13 )    Color: Yellow / Appearance: Clear / S.015 / pH: x  Gluc: x / Ketone: Moderate  / Bili: Large / Urobili: 8 mg/dL   Blood: x / Protein: 100 mg/dL / Nitrite: Positive   Leuk Esterase: Trace / RBC: x / WBC 0-2   Sq Epi: x / Non Sq Epi: Occasional / Bacteria: x        RADIOLOGY & ADDITIONAL TESTS:  EKG :     Imaging Personally Reviewed:  [ ] YES  [ ] NO  HEALTH ISSUES - PROBLEM Dx:  T2DM (type 2 diabetes mellitus): T2DM (type 2 diabetes mellitus)  Electrolyte abnormality: Electrolyte abnormality  Prophylactic measure: Prophylactic measure  Urinary anomaly: Urinary anomaly  Hypertension: Hypertension  Afib: Afib  Obstructive jaundice: Obstructive jaundice Patient is a 77y old  Male who presents with a chief complaint of ERCP (14 Aug 2018 23:50)      INTERVAL HPI/OVERNIGHT EVENTS: Tele  : A fib 90 -125     NO chest pain , no SOB , no abd pain     Medications:MEDICATIONS  (STANDING):  atorvastatin 10 milliGRAM(s) Oral at bedtime  dextrose 5%. 1000 milliLiter(s) (50 mL/Hr) IV Continuous <Continuous>  dextrose 50% Injectable 12.5 Gram(s) IV Push once  dextrose 50% Injectable 25 Gram(s) IV Push once  dextrose 50% Injectable 25 Gram(s) IV Push once  insulin lispro (HumaLOG) corrective regimen sliding scale   SubCutaneous every 6 hours  metoprolol tartrate 25 milliGRAM(s) Oral two times a day  metoprolol tartrate Injectable 5 milliGRAM(s) IV Push once  piperacillin/tazobactam IVPB. 3.375 Gram(s) IV Intermittent every 8 hours  sodium chloride 0.9%. 1000 milliLiter(s) (100 mL/Hr) IV Continuous <Continuous>    MEDICATIONS  (PRN):  dextrose 40% Gel 15 Gram(s) Oral once PRN Blood Glucose LESS THAN 70 milliGRAM(s)/deciliter  diphenhydrAMINE   Capsule 25 milliGRAM(s) Oral every 6 hours PRN Rash and/or Itching  glucagon  Injectable 1 milliGRAM(s) IntraMuscular once PRN Glucose LESS THAN 70 milligrams/deciliter  ibuprofen  Tablet 600 milliGRAM(s) Oral every 8 hours PRN fever > 100.4  morphine  - Injectable 2 milliGRAM(s) IV Push every 6 hours PRN Severe Pain (7 - 10)      Allergies: Allergies    No Known Allergies    Intolerances        REVIEW OF SYSTEMS:  CONSTITUTIONAL: No fever  NECK: No pain or stiffness  RESPIRATORY: No cough,  No shortness of breath  CARDIOVASCULAR: No chest pain, or leg swelling  GASTROINTESTINAL: No abdominal or epigastric pain. No nausea, vomiting, or hematemesis; No diarrhea or constipation. No melena or hematochezia.  GENITOURINARY: No dysuria  NEUROLOGICAL: No headaches  SKIN: pos jaundice   LYMPH NODES: No enlarged glands      T(C): 37 (08-15-18 @ 11:21), Max: 38.1 (18 @ 15:20)  HR: 98 (08-15-18 @ 11:21) (98 - 124)  BP: 134/84 (08-15-18 @ 11:21) (110/72 - 154/94)  RR: 18 (08-15-18 @ 11:21) (18 - 18)  SpO2: 96% (08-15-18 @ 11:21) (96% - 98%)  Wt(kg): --Vital Signs Last 24 Hrs  T(C): 37 (15 Aug 2018 11:21), Max: 38.1 (14 Aug 2018 15:20)  T(F): 98.6 (15 Aug 2018 11:21), Max: 100.5 (14 Aug 2018 15:20)  HR: 98 (15 Aug 2018 11:21) (98 - 124)  BP: 134/84 (15 Aug 2018 11:21) (110/72 - 154/94)  BP(mean): --  RR: 18 (15 Aug 2018 11:21) (18 - 18)  SpO2: 96% (15 Aug 2018 11:21) (96% - 98%)  I&O's Summary    14 Aug 2018 07:01  -  15 Aug 2018 07:00  --------------------------------------------------------  IN: 720 mL / OUT: 2 mL / NET: 718 mL    15 Aug 2018 07:01  -  15 Aug 2018 13:52  --------------------------------------------------------  IN: 200 mL / OUT: 201 mL / NET: -1 mL      Last Menstrual Period      PHYSICAL EXAM:  GENERAL: NAD, well-groomed, well-developed  HEAD:  Atraumatic, Normocephalic  EYES: EOMI, PERRLA, conjunctiva and sclera clear  NECK: Supple, No JVD, Normal thyroid  NERVOUS SYSTEM:  Alert & Oriented X3, Good concentration  CHEST/LUNG: Clear to percussion bilaterally; No rales, rhonchi, wheezing, or rubs  HEART: Irregular rate and rhythm; No murmurs, rubs, or gallops  ABDOMEN: Soft, Nontender, Nondistended; Bowel sounds present  EXTREMITIES:  2+ Peripheral Pulses, No clubbing, cyanosis, or edema  SKIN: POs jaundice     Consultant(s) Notes Reviewed:  [x ] YES  [ ] NO  Care Discussed with Consultants/Other Providers [ x] YES  [ ] NO  Name of Consultant  LABS:                        14.2   9.54  )-----------( 218      ( 15 Aug 2018 07:31 )             41.8     08-15    139  |  101  |  22  ----------------------------<  200<H>  3.3<L>   |  24  |  0.95    Ca    8.6      15 Aug 2018 05:57  Phos  2.6     -15  Mg     1.7     08-15    TPro  5.7<L>  /  Alb  2.9<L>  /  TBili  14.2<H>  /  DBili  x   /  AST  79<H>  /  ALT  153<H>  /  AlkPhos  273<H>  08-15      Urinalysis Basic - ( 14 Aug 2018 17:13 )    Color: Yellow / Appearance: Clear / S.015 / pH: x  Gluc: x / Ketone: Moderate  / Bili: Large / Urobili: 8 mg/dL   Blood: x / Protein: 100 mg/dL / Nitrite: Positive   Leuk Esterase: Trace / RBC: x / WBC 0-2   Sq Epi: x / Non Sq Epi: Occasional / Bacteria: x      CAPILLARY BLOOD GLUCOSE      POCT Blood Glucose.: 180 mg/dL (15 Aug 2018 11:51)  POCT Blood Glucose.: 202 mg/dL (15 Aug 2018 08:55)  POCT Blood Glucose.: 193 mg/dL (15 Aug 2018 07:44)  POCT Blood Glucose.: 177 mg/dL (15 Aug 2018 00:21)  POCT Blood Glucose.: 164 mg/dL (14 Aug 2018 16:16)        Urinalysis Basic - ( 14 Aug 2018 17:13 )    Color: Yellow / Appearance: Clear / S.015 / pH: x  Gluc: x / Ketone: Moderate  / Bili: Large / Urobili: 8 mg/dL   Blood: x / Protein: 100 mg/dL / Nitrite: Positive   Leuk Esterase: Trace / RBC: x / WBC 0-2   Sq Epi: x / Non Sq Epi: Occasional / Bacteria: x        RADIOLOGY & ADDITIONAL TESTS:  EKG :     Imaging Personally Reviewed:  [ ] YES  [ ] NO  HEALTH ISSUES - PROBLEM Dx:  T2DM (type 2 diabetes mellitus): T2DM (type 2 diabetes mellitus)  Electrolyte abnormality: Electrolyte abnormality  Prophylactic measure: Prophylactic measure  Urinary anomaly: Urinary anomaly  Hypertension: Hypertension  Afib: Afib  Obstructive jaundice: Obstructive jaundice

## 2018-08-15 NOTE — CONSULT NOTE ADULT - SUBJECTIVE AND OBJECTIVE BOX
ADVANCED GASTROENTEROLOGY      Chief Complaint:  Patient is a 77y old  Male who presents with a chief complaint of ERCP (14 Aug 2018 23:50)      Interval Events: 78 yo man with PMH of HTN, prediabetes, HLD, afib on eliquis with ILR, GERD initially presented to [place] ED on  with 2 days of dark urine. The patient was fine without symptoms on . No fevers, chills, SOB, CP, abdominal pain, hematuria, dysuria at time of admission to [place]. On admission patient noted to have scleral jaundice, transaminitis, elevated direct bilirubin and was admitted for obstructive jaundice. Initial CT stone hunt showed severe extrahepatic biliary duct obstruction with 2 cm dilation of CBD with marked intrahepatic biliary duct dilation. RUQ US showed no evidence of gallstones. The patient underwent an MRCP per GI after cardiac clearence for LR. 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 During admission patient spiked T max 100.5 and was started on cefepime for suspected cholangitis. The patient developed episode of Afib with RVR to 120's was started on low dose metoprolol 12.5 mg po. Oncologic w/u revelaed an elevated CEA level 4.2, elevated CA 19-9 to 76.3 and normal CA-125. The patient was subsequently transferred to [facility] for advanced endoscopy intervation requiring ERCP with possible stent and biopsy. On presentation at [place] the patient was actively rigoring with jaundice with continued dark urine and pale stools. No complaints of fever or abdominal pain.       Allergies:  No Known Allergies      Hospital Medications:  atorvastatin 10 milliGRAM(s) Oral at bedtime  dextrose 40% Gel 15 Gram(s) Oral once PRN  dextrose 5%. 1000 milliLiter(s) IV Continuous <Continuous>  dextrose 50% Injectable 12.5 Gram(s) IV Push once  dextrose 50% Injectable 25 Gram(s) IV Push once  dextrose 50% Injectable 25 Gram(s) IV Push once  diphenhydrAMINE   Capsule 25 milliGRAM(s) Oral every 6 hours PRN  glucagon  Injectable 1 milliGRAM(s) IntraMuscular once PRN  ibuprofen  Tablet 600 milliGRAM(s) Oral every 8 hours PRN  insulin lispro (HumaLOG) corrective regimen sliding scale   SubCutaneous every 6 hours  morphine  - Injectable 2 milliGRAM(s) IV Push every 6 hours PRN  piperacillin/tazobactam IVPB. 3.375 Gram(s) IV Intermittent every 8 hours  potassium chloride  10 mEq/100 mL IVPB 10 milliEquivalent(s) IV Intermittent every 1 hour  sodium chloride 0.9%. 1000 milliLiter(s) IV Continuous <Continuous>      PMHX/PSHX:  Male stress incontinence  Kidney stone  Varicose vein  Bilateral cataracts  Appendicitis  Benign colon polyp  Prostate cancer  Afib  Hyperlipidemia  GERD (Gastroesophageal Reflux Disease)  DM (Diabetes Mellitus)  Renal Calculi  MVP (Mitral Valve Prolapse)  HTN - Hypertension  Status post left knee replacement  S/P ablation operation for arrhythmia  Male stress incontinence  Varicose vein-s/p vein stripping 5 years ago  S/P arthroscopy  S/P prostatectomy  Cosmetic Surgery  S/P Colonoscopy with Polypectomy  S/P Appendectomy      Family history:  no pertinent history in first degree relative    ROS:     General:  No fevers, chills  Eyes:  Good vision  ENT:  No odynophagia, dysphagia  CV:  No pain, palpitations  Resp:  No dyspnea, cough, tachypnea, wheezing  GI:  See HPI  Muscle:  No pain, weakness  Skin:  No rash, edema      PHYSICAL EXAM:     GENERAL:  No distress  HEENT: conjunctivae clear  CHEST:  Full & symmetric excursion, no increased effort  HEART:  Regular rhythm  ABDOMEN:  Soft, non-tender, non-distended  EXTREMITIES:  no edema  SKIN:  Dry/warm  NEURO:  Alert    Vital Signs:  Vital Signs Last 24 Hrs  T(C): 36.9 (15 Aug 2018 08:49), Max: 38.1 (14 Aug 2018 15:20)  T(F): 98.4 (15 Aug 2018 08:49), Max: 100.5 (14 Aug 2018 15:20)  HR: 124 (15 Aug 2018 08:49) (98 - 124)  BP: 110/72 (15 Aug 2018 08:49) (110/72 - 154/94)  BP(mean): --  RR: 18 (15 Aug 2018 08:49) (18 - 18)  SpO2: 97% (15 Aug 2018 08:49) (96% - 98%)  Daily Height in cm: 182.88 (14 Aug 2018 21:37)    Daily     LABS:                        14.2   9.54  )-----------( 218      ( 15 Aug 2018 07:31 )             41.8     -15    139  |  101  |  22  ----------------------------<  200<H>  3.3<L>   |  24  |  0.95    Ca    8.6      15 Aug 2018 05:57  Phos  2.6     08-15  Mg     1.7     08-15    TPro  5.7<L>  /  Alb  2.9<L>  /  TBili  14.2<H>  /  DBili  x   /  AST  79<H>  /  ALT  153<H>  /  AlkPhos  273<H>  08-15    LIVER FUNCTIONS - ( 15 Aug 2018 05:57 )  Alb: 2.9 g/dL / Pro: 5.7 g/dL / ALK PHOS: 273 U/L / ALT: 153 U/L / AST: 79 U/L / GGT: x             Urinalysis Basic - ( 14 Aug 2018 17:13 )    Color: Yellow / Appearance: Clear / S.015 / pH: x  Gluc: x / Ketone: Moderate  / Bili: Large / Urobili: 8 mg/dL   Blood: x / Protein: 100 mg/dL / Nitrite: Positive   Leuk Esterase: Trace / RBC: x / WBC 0-2   Sq Epi: x / Non Sq Epi: Occasional / Bacteria: x          Imaging:    < from: MR MRCP w/wo IV Cont (18 @ 12:16) >    FINDINGS:    LIVER: Normal. No focal lesion.  SPLEEN: Normal.  PANCREAS: No solid mass. Multiple cystically dilated sidebranches   measuring up to 1.1 cm in the head (2; 21) without main duct dilatation.    GALLBLADDER/BILIARY TREE: 1.8 cm enhancing soft tissue mass in the distal   common bile duct (9; 21) with moderate intra and extrahepatic biliary   dilatation. Common duct measures up to 20 mm. Normal gallbladder.    ADRENALS: Normal.  KIDNEYS: Symmetric nephrograms. No hydronephrosis. Cyst in the left lower   pole with small layering hemorrhagic fluid level.  LYMPHADENOPATHY/RETROPERITONEUM: No adenopathy.  VASCULATURE: Aortic atherosclerosis without aneurysm.  BOWEL: No bowel-related abnormality.  PERITONEUM/ABDOMINAL WALL: No ascites or implants.  SKELETAL: No aggressive lesion.  LUNG BASES: Clear.    IMPRESSION:    1.8 cm enhancing soft tissue mass in the distal common bile duct causing   moderate biliary dilatation. Ampullary neoplasm versus   cholangiocarcinoma. 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 No pancreatic head mass. No   evidence of metastatic disease in the abdomen.        < end of copied text >

## 2018-08-15 NOTE — CONSULT NOTE ADULT - ASSESSMENT
76 yo man with PMH of HTN, prediabetes, HLD, afib s/p ablation 2004/2005 and DCCV 2017 on eliquis with ILR, GERD initially presented to [place] ED on 8/9 with 2 days of dark urine with CBD mass for ERCP.  AFib with RVR, suspicion of tachy-georges syndrome, CHADsVASC 4  Asymptomatic    #AFib with RVR  -- metoprolol 25 mg BID  -- monitor on tele  -- if bradycardia, would notify EP for possible PPM  -- have ILR interrogated (call [phone])  -- currently deferring AC for planned procedures    #HTN  -- continue metoprolol    #HLD  -- continue atorvastatin    Bryan Harrell MD  [id number] 76 yo man with PMH of HTN, prediabetes, HLD, afib s/p ablation 2004/2005 and DCCV 2017 on Eliquis with ILR, GERD.  Initially presented to [place] ED on 8/9 with 2 days of dark urine with CBD mass for ERCP.  AFib with RVR, suspicion of tachy-georges syndrome, CHADsVASC 4  Asymptomatic    #AFib with RVR  -- metoprolol 25 mg BID  -- monitor on tele  -- if bradycardia, would notify EP for possible PPM  -- have ILR interrogated (call [phone])  -- currently deferring A/C for planned procedures    #HTN  -- continue metoprolol    #HLD  -- continue atorvastatin    Bryan Harrell MD  [id number]    Ryder Allan M.D.  Cardiology Consult Service  [phone] or [phone]

## 2018-08-15 NOTE — CONSULT NOTE ADULT - ATTENDING COMMENTS
Agree with above. Pt transferred from [facility] where he initially presented with jaundice and pruritus. Imaging performed there showing significant CBD dilatation with concern for distal CBD/ampullary mass lesion. LFTs elevated. Concern for ampullary ca vs distal cholangio ca vs biliary stone disease. Will plan for EUS/ERCP today.

## 2018-08-15 NOTE — PROGRESS NOTE ADULT - PROBLEM SELECTOR PLAN 1
pt currently afebrile T 98.4, normotensive BP systolic 133. Jaundice in sclera . No RUQ pain, negative murphys. A+Ox3. On exam initially patient actively rigoring.  no change in urinary frequency but dark urine and light stools.   - given findings on MRCP 1.8 cm mass in distal CBD suspicion for cholangiocarcinoma, pt will need EUS/ERCP w/ possible stenting; Dr. Luca Guevara notified of transfer.  - no leukocytosis, normal lactate, afebrile, tachycardic on monitor, normotensive. Not actively septic however rigoring suspicious for GNR bacteremia most likely 2/2 moderate cholangitis (no cardiovascular, neurologic, respiratory, renal, hematologic dysfunction). Will treat with IV zosyn.  - pt has pruritis will treat with benadryl   - f/u blood cultures from [place] taken 8/14  - NPO at midnight for procedures  - hold eliquis (no need for bridging per cardiology at [place]).  GI has seen patient and pt is scheduled for ERCP/EUS today -pt currently afebrile   - given findings on MRCP 1.8 cm mass in distal CBD suspicion for cholangiocarcinoma, pt will need EUS/ERCP w/ possible stenting to be done today   - no leukocytosis, normal lactate, afebrile, tachycardic on monitor, normotensive. - - -C/W  Will treat with IV zosyn.  - c/w benadryl   - f/u blood cultures from [place] taken 8/14  - NPO at midnight for procedures  - hold eliquis (no need for bridging per cardiology at [place]).  GI has seen patient and pt is scheduled for ERCP/EUS today

## 2018-08-15 NOTE — PROGRESS NOTE ADULT - ASSESSMENT
The patient is a 76 yo man with PMH of HTN, prediabetes, HLD, afib on eliquis with ILR, MVP,GERD initially presented to [place] ED on 8/9 with 2 days of dark urine. The patient was fine without symptoms on 8/8. No fevers, chills, SOB, CP, abdominal pain, hematuria, dysuria at time of admission to [place]. On admission patient noted to have scleral jaundice, transaminitis, elevated direct bilirubin and was admitted for obstructive jaundice. Initial CT stone hunt showed severe extrahepatic biliary duct obstruction with 2 cm dilation of CBD with marked intrahepatic biliary duct dilation. RUQ US showed no evidence of gallstones. The patient underwent an MRCP per GI after cardiac clearence for LR. 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  During admission patient spiked T max 100.5 and was started on cefepime for suspected cholangitis. The patient developed episode of Afib with RVR to 120's was started on low dose metoprolol 12.5 mg po. Oncologic w/u revelaed an elevated CEA level 4.2, elevated CA 19-9 to 76.3 and normal CA-125. The patient was subsequently transferred to [facility] for advanced endoscopy intervation requiring ERCP with possible stent and biopsy The patient is a 76 yo man with PMH of HTN, prediabetes, HLD, afib on eliquis with ILR, MVP,GERD initially presented to [place] ED on 8/9 with 2 days of dark urine. The patient was fine without symptoms on 8/8. No fevers, chills, SOB, CP, abdominal pain, hematuria, dysuria at time of admission to [place]. On admission patient noted to have scleral jaundice, transaminitis, elevated direct bilirubin and was admitted for obstructive jaundice. Initial CT stone hunt showed severe extrahepatic biliary duct obstruction with 2 cm dilation of CBD with marked intrahepatic biliary duct dilation. RUQ US showed no evidence of gallstones. The patient underwent an MRCP per GI after cardiac clearence for LR. 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  During admission patient spiked T max 100.5 and was started on cefepime for suspected cholangitis. The patient developed episode of Afib with RVR to 120's was started on low dose metoprolol 12.5 mg po. Oncologic w/u revelaed an elevated CEA level 4.2, elevated CA 19-9 to 76.3 and normal CA-125. The patient was subsequently transferred to [facility] for advanced endoscopy intervation requiring ERCP/EUS with possible stent and biopsy

## 2018-08-16 DIAGNOSIS — K83.9 DISEASE OF BILIARY TRACT, UNSPECIFIED: ICD-10-CM

## 2018-08-16 LAB
ALBUMIN SERPL ELPH-MCNC: 3.1 G/DL — LOW (ref 3.3–5)
ALP SERPL-CCNC: 272 U/L — HIGH (ref 40–120)
ALT FLD-CCNC: 134 U/L — HIGH (ref 10–45)
ANION GAP SERPL CALC-SCNC: 12 MMOL/L — SIGNIFICANT CHANGE UP (ref 5–17)
AST SERPL-CCNC: 85 U/L — HIGH (ref 10–40)
BILIRUB DIRECT SERPL-MCNC: 9.6 MG/DL — HIGH (ref 0–0.2)
BILIRUB INDIRECT FLD-MCNC: 2.8 MG/DL — HIGH (ref 0.2–1)
BILIRUB SERPL-MCNC: 12.4 MG/DL — HIGH (ref 0.2–1.2)
BUN SERPL-MCNC: 28 MG/DL — HIGH (ref 7–23)
CALCIUM SERPL-MCNC: 8.6 MG/DL — SIGNIFICANT CHANGE UP (ref 8.4–10.5)
CHLORIDE SERPL-SCNC: 104 MMOL/L — SIGNIFICANT CHANGE UP (ref 96–108)
CO2 SERPL-SCNC: 23 MMOL/L — SIGNIFICANT CHANGE UP (ref 22–31)
CREAT SERPL-MCNC: 0.99 MG/DL — SIGNIFICANT CHANGE UP (ref 0.5–1.3)
CULTURE RESULTS: NO GROWTH — SIGNIFICANT CHANGE UP
GLUCOSE BLDC GLUCOMTR-MCNC: 146 MG/DL — HIGH (ref 70–99)
GLUCOSE BLDC GLUCOMTR-MCNC: 201 MG/DL — HIGH (ref 70–99)
GLUCOSE BLDC GLUCOMTR-MCNC: 203 MG/DL — HIGH (ref 70–99)
GLUCOSE BLDC GLUCOMTR-MCNC: 220 MG/DL — HIGH (ref 70–99)
GLUCOSE BLDC GLUCOMTR-MCNC: 259 MG/DL — HIGH (ref 70–99)
GLUCOSE SERPL-MCNC: 231 MG/DL — HIGH (ref 70–99)
HCT VFR BLD CALC: 39.8 % — SIGNIFICANT CHANGE UP (ref 39–50)
HGB BLD-MCNC: 13.5 G/DL — SIGNIFICANT CHANGE UP (ref 13–17)
MCHC RBC-ENTMCNC: 31.5 PG — SIGNIFICANT CHANGE UP (ref 27–34)
MCHC RBC-ENTMCNC: 33.9 GM/DL — SIGNIFICANT CHANGE UP (ref 32–36)
MCV RBC AUTO: 93 FL — SIGNIFICANT CHANGE UP (ref 80–100)
PLATELET # BLD AUTO: 232 K/UL — SIGNIFICANT CHANGE UP (ref 150–400)
POTASSIUM SERPL-MCNC: 4.2 MMOL/L — SIGNIFICANT CHANGE UP (ref 3.5–5.3)
POTASSIUM SERPL-SCNC: 4.2 MMOL/L — SIGNIFICANT CHANGE UP (ref 3.5–5.3)
PROT SERPL-MCNC: 5.7 G/DL — LOW (ref 6–8.3)
RBC # BLD: 4.28 M/UL — SIGNIFICANT CHANGE UP (ref 4.2–5.8)
RBC # FLD: 14.3 % — SIGNIFICANT CHANGE UP (ref 10.3–14.5)
SODIUM SERPL-SCNC: 139 MMOL/L — SIGNIFICANT CHANGE UP (ref 135–145)
SPECIMEN SOURCE: SIGNIFICANT CHANGE UP
WBC # BLD: 6.58 K/UL — SIGNIFICANT CHANGE UP (ref 3.8–10.5)
WBC # FLD AUTO: 6.58 K/UL — SIGNIFICANT CHANGE UP (ref 3.8–10.5)

## 2018-08-16 PROCEDURE — 99233 SBSQ HOSP IP/OBS HIGH 50: CPT

## 2018-08-16 PROCEDURE — 47534 PLMT BILIARY DRAINAGE CATH: CPT

## 2018-08-16 PROCEDURE — 99233 SBSQ HOSP IP/OBS HIGH 50: CPT | Mod: GC

## 2018-08-16 RX ORDER — HYDROMORPHONE HYDROCHLORIDE 2 MG/ML
0.25 INJECTION INTRAMUSCULAR; INTRAVENOUS; SUBCUTANEOUS
Qty: 0 | Refills: 0 | Status: DISCONTINUED | OUTPATIENT
Start: 2018-08-16 | End: 2018-08-16

## 2018-08-16 RX ADMIN — ATORVASTATIN CALCIUM 10 MILLIGRAM(S): 80 TABLET, FILM COATED ORAL at 21:15

## 2018-08-16 RX ADMIN — SODIUM CHLORIDE 100 MILLILITER(S): 9 INJECTION INTRAMUSCULAR; INTRAVENOUS; SUBCUTANEOUS at 11:14

## 2018-08-16 RX ADMIN — HYDROMORPHONE HYDROCHLORIDE 0.25 MILLIGRAM(S): 2 INJECTION INTRAMUSCULAR; INTRAVENOUS; SUBCUTANEOUS at 19:20

## 2018-08-16 RX ADMIN — Medication 2: at 06:47

## 2018-08-16 RX ADMIN — HYDROMORPHONE HYDROCHLORIDE 0.25 MILLIGRAM(S): 2 INJECTION INTRAMUSCULAR; INTRAVENOUS; SUBCUTANEOUS at 19:48

## 2018-08-16 RX ADMIN — HYDROMORPHONE HYDROCHLORIDE 0.25 MILLIGRAM(S): 2 INJECTION INTRAMUSCULAR; INTRAVENOUS; SUBCUTANEOUS at 19:09

## 2018-08-16 RX ADMIN — Medication 25 MILLIGRAM(S): at 18:23

## 2018-08-16 RX ADMIN — Medication 2: at 18:30

## 2018-08-16 RX ADMIN — PIPERACILLIN AND TAZOBACTAM 25 GRAM(S): 4; .5 INJECTION, POWDER, LYOPHILIZED, FOR SOLUTION INTRAVENOUS at 21:15

## 2018-08-16 RX ADMIN — SODIUM CHLORIDE 100 MILLILITER(S): 9 INJECTION INTRAMUSCULAR; INTRAVENOUS; SUBCUTANEOUS at 18:32

## 2018-08-16 RX ADMIN — Medication 25 MILLIGRAM(S): at 05:18

## 2018-08-16 RX ADMIN — Medication 2: at 12:20

## 2018-08-16 RX ADMIN — Medication 3: at 00:40

## 2018-08-16 RX ADMIN — HYDROMORPHONE HYDROCHLORIDE 0.25 MILLIGRAM(S): 2 INJECTION INTRAMUSCULAR; INTRAVENOUS; SUBCUTANEOUS at 19:47

## 2018-08-16 RX ADMIN — PIPERACILLIN AND TAZOBACTAM 25 GRAM(S): 4; .5 INJECTION, POWDER, LYOPHILIZED, FOR SOLUTION INTRAVENOUS at 05:18

## 2018-08-16 NOTE — PROGRESS NOTE ADULT - PROBLEM SELECTOR PLAN 5
IR evaluation for for Biliary drainage.  SUrgical evaluation as recommended for possible resection  cont Zosyn

## 2018-08-16 NOTE — PROGRESS NOTE ADULT - PROBLEM SELECTOR PLAN 2
AC is on HOLD for procedures   Might need Heparin if patient will be off of AC for prolonged period of time   COnt  tele   Cont Metoprolol / Might uptitrate for better HR control

## 2018-08-16 NOTE — PROGRESS NOTE ADULT - SUBJECTIVE AND OBJECTIVE BOX
Patient is a 77y old  Male who presents with a chief complaint of ERCP (14 Aug 2018 23:50)      INTERVAL HPI/OVERNIGHT EVENTS:   Patient is s/p ERCP/EUS done 8/15/18 with noted [id number] hyperchoic soft tissue  lesion in CBD with dilated proximal CBD .  No pancreatic mass.  Unsuccessful Biliary cannulation with IR recommended for Biliary drainage.          Medications:MEDICATIONS  (STANDING):  atorvastatin 10 milliGRAM(s) Oral at bedtime  dextrose 5%. 1000 milliLiter(s) (50 mL/Hr) IV Continuous <Continuous>  dextrose 50% Injectable 12.5 Gram(s) IV Push once  dextrose 50% Injectable 25 Gram(s) IV Push once  dextrose 50% Injectable 25 Gram(s) IV Push once  insulin lispro (HumaLOG) corrective regimen sliding scale   SubCutaneous every 6 hours  metoprolol tartrate 25 milliGRAM(s) Oral two times a day  piperacillin/tazobactam IVPB. 3.375 Gram(s) IV Intermittent every 8 hours  sodium chloride 0.9%. 1000 milliLiter(s) (100 mL/Hr) IV Continuous <Continuous>    MEDICATIONS  (PRN):  dextrose 40% Gel 15 Gram(s) Oral once PRN Blood Glucose LESS THAN 70 milliGRAM(s)/deciliter  diphenhydrAMINE   Capsule 25 milliGRAM(s) Oral every 6 hours PRN Rash and/or Itching  glucagon  Injectable 1 milliGRAM(s) IntraMuscular once PRN Glucose LESS THAN 70 milligrams/deciliter  ibuprofen  Tablet 600 milliGRAM(s) Oral every 8 hours PRN fever > 100.4  morphine  - Injectable 2 milliGRAM(s) IV Push every 6 hours PRN Severe Pain (7 - 10)      Allergies: Allergies    No Known Allergies    Intolerances        REVIEW OF SYSTEMS:  CONSTITUTIONAL: No fever  NECK: No pain or stiffness  RESPIRATORY: No cough,  No shortness of breath  CARDIOVASCULAR: No chest pain, or leg swelling  GASTROINTESTINAL: No abdominal or epigastric pain. No nausea, vomiting, or hematemesis; No diarrhea or constipation. No melena or hematochezia.  GENITOURINARY: No dysuria  NEUROLOGICAL: No headaches  SKIN: pos jaundice   LYMPH NODES: No enlarged glands    T(C): 36.3 (18 @ 08:11), Max: 37 (08-15-18 @ 11:21)  HR: 105 (18 @ 08:11) (98 - 118)  BP: 134/87 (18 @ 08:11) (112/75 - 134/87)  RR: 18 (18 @ 08:11) (18 - 19)  SpO2: 96% (18 @ 08:11) (96% - 97%)  Wt(kg): --Vital Signs Last 24 Hrs  T(C): 36.3 (16 Aug 2018 08:11), Max: 37 (15 Aug 2018 11:21)  T(F): 97.4 (16 Aug 2018 08:11), Max: 98.6 (15 Aug 2018 11:21)  HR: 105 (16 Aug 2018 08:11) (98 - 118)  BP: 134/87 (16 Aug 2018 08:11) (112/75 - 134/87)  BP(mean): --  RR: 18 (16 Aug 2018 08:11) (18 - 19)  SpO2: 96% (16 Aug 2018 08:11) (96% - 97%)  I&O's Summary    15 Aug 2018 07:01  -  16 Aug 2018 07:00  --------------------------------------------------------  IN: 200 mL / OUT: 401 mL / NET: -201 mL    16 Aug 2018 07:01  -  16 Aug 2018 10:45  --------------------------------------------------------  IN: 0 mL / OUT: 0 mL / NET: 0 mL      Last Menstrual Period      PHYSICAL EXAM:  GENERAL: NAD, well-groomed, well-developed  HEAD:  Atraumatic, Normocephalic  EYES: EOMI, PERRLA, conjunctiva and sclera clear  NECK: Supple, No JVD, Normal thyroid  NERVOUS SYSTEM:  Alert & Oriented X3, Good concentration  CHEST/LUNG: Clear to percussion bilaterally; No rales, rhonchi, wheezing, or rubs  HEART: Irregular rate and rhythm; No murmurs, rubs, or gallops  ABDOMEN: Soft, Nontender, Nondistended; Bowel sounds present  EXTREMITIES:  2+ Peripheral Pulses, No clubbing, cyanosis, or edema  SKIN: POs jaundice     Consultant(s) Notes Reviewed:  [x ] YES  [ ] NO  Care Discussed with Consultants/Other Providers [ x] YES  [ ] NO  Name of Consultant  LABS:                        13.5   6.58  )-----------( 232      ( 16 Aug 2018 07:27 )             39.8     08-    139  |  104  |  28<H>  ----------------------------<  231<H>  4.2   |  23  |  0.99    Ca    8.6      16 Aug 2018 06:38  Phos  2.6     08-15  Mg     1.7     08-15    TPro  5.7<L>  /  Alb  2.9<L>  /  TBili  14.2<H>  /  DBili  x   /  AST  79<H>  /  ALT  153<H>  /  AlkPhos  273<H>  08-15      Urinalysis Basic - ( 14 Aug 2018 17:13 )    Color: Yellow / Appearance: Clear / S.015 / pH: x  Gluc: x / Ketone: Moderate  / Bili: Large / Urobili: 8 mg/dL   Blood: x / Protein: 100 mg/dL / Nitrite: Positive   Leuk Esterase: Trace / RBC: x / WBC 0-2   Sq Epi: x / Non Sq Epi: Occasional / Bacteria: x      CAPILLARY BLOOD GLUCOSE      POCT Blood Glucose.: 203 mg/dL (16 Aug 2018 06:30)  POCT Blood Glucose.: 259 mg/dL (16 Aug 2018 00:28)  POCT Blood Glucose.: 167 mg/dL (15 Aug 2018 13:55)  POCT Blood Glucose.: 180 mg/dL (15 Aug 2018 11:51)        Urinalysis Basic - ( 14 Aug 2018 17:13 )    Color: Yellow / Appearance: Clear / S.015 / pH: x  Gluc: x / Ketone: Moderate  / Bili: Large / Urobili: 8 mg/dL   Blood: x / Protein: 100 mg/dL / Nitrite: Positive   Leuk Esterase: Trace / RBC: x / WBC 0-2   Sq Epi: x / Non Sq Epi: Occasional / Bacteria: x        RADIOLOGY & ADDITIONAL TESTS:  EKG :     Imaging Personally Reviewed:  [ ] YES  [ ] NO  HEALTH ISSUES - PROBLEM Dx:  Essential hypertension: Essential hypertension  Chronic atrial fibrillation: Chronic atrial fibrillation  T2DM (type 2 diabetes mellitus): T2DM (type 2 diabetes mellitus)  Electrolyte abnormality: Electrolyte abnormality  Prophylactic measure: Prophylactic measure  Urinary anomaly: Urinary anomaly  Hypertension: Hypertension  Afib: Afib  Obstructive jaundice: Obstructive jaundice Patient is a 77y old  Male who presents with a chief complaint of ERCP (14 Aug 2018 23:50)      INTERVAL HPI/OVERNIGHT EVENTS:   Patient is s/p ERCP/EUS done 8/15/18 with noted [id number] hyperchoic soft tissue  lesion in CBD with dilated proximal CBD .  No pancreatic mass.  Unsuccessful Biliary cannulation with IR recommended for Biliary drainage.          Medications:MEDICATIONS  (STANDING):  atorvastatin 10 milliGRAM(s) Oral at bedtime  dextrose 5%. 1000 milliLiter(s) (50 mL/Hr) IV Continuous <Continuous>  dextrose 50% Injectable 12.5 Gram(s) IV Push once  dextrose 50% Injectable 25 Gram(s) IV Push once  dextrose 50% Injectable 25 Gram(s) IV Push once  insulin lispro (HumaLOG) corrective regimen sliding scale   SubCutaneous every 6 hours  metoprolol tartrate 25 milliGRAM(s) Oral two times a day  piperacillin/tazobactam IVPB. 3.375 Gram(s) IV Intermittent every 8 hours  sodium chloride 0.9%. 1000 milliLiter(s) (100 mL/Hr) IV Continuous <Continuous>    MEDICATIONS  (PRN):  dextrose 40% Gel 15 Gram(s) Oral once PRN Blood Glucose LESS THAN 70 milliGRAM(s)/deciliter  diphenhydrAMINE   Capsule 25 milliGRAM(s) Oral every 6 hours PRN Rash and/or Itching  glucagon  Injectable 1 milliGRAM(s) IntraMuscular once PRN Glucose LESS THAN 70 milligrams/deciliter  ibuprofen  Tablet 600 milliGRAM(s) Oral every 8 hours PRN fever > 100.4  morphine  - Injectable 2 milliGRAM(s) IV Push every 6 hours PRN Severe Pain (7 - 10)      Allergies: Allergies    No Known Allergies    Intolerances        REVIEW OF SYSTEMS:  CONSTITUTIONAL: No fever  NECK: No pain or stiffness  RESPIRATORY: No cough,  No shortness of breath  CARDIOVASCULAR: No chest pain, or leg swelling  GASTROINTESTINAL: No abdominal or epigastric pain. No nausea, vomiting, or hematemesis; No diarrhea or constipation. No melena or hematochezia.  GENITOURINARY: No dysuria  NEUROLOGICAL: No headaches  SKIN: pos jaundice       T(C): 36.3 (18 @ 08:11), Max: 37 (08-15-18 @ 11:21)  HR: 105 (18 @ 08:11) (98 - 118)  BP: 134/87 (18 @ 08:11) (112/75 - 134/87)  RR: 18 (18 @ 08:11) (18 - 19)  SpO2: 96% (18 @ 08:11) (96% - 97%)  Wt(kg): --Vital Signs Last 24 Hrs  T(C): 36.3 (16 Aug 2018 08:11), Max: 37 (15 Aug 2018 11:21)  T(F): 97.4 (16 Aug 2018 08:11), Max: 98.6 (15 Aug 2018 11:21)  HR: 105 (16 Aug 2018 08:11) (98 - 118)  BP: 134/87 (16 Aug 2018 08:11) (112/75 - 134/87)  BP(mean): --  RR: 18 (16 Aug 2018 08:11) (18 - 19)  SpO2: 96% (16 Aug 2018 08:11) (96% - 97%)  I&O's Summary    15 Aug 2018 07:  -  16 Aug 2018 07:00  --------------------------------------------------------  IN: 200 mL / OUT: 401 mL / NET: -201 mL    16 Aug 2018 07:01  -  16 Aug 2018 10:45  --------------------------------------------------------  IN: 0 mL / OUT: 0 mL / NET: 0 mL      Last Menstrual Period      PHYSICAL EXAM:  GENERAL: NAD, well-groomed, well-developed  HEAD:  Atraumatic, Normocephalic  EYES: EOMI, PERRLA, conjunctiva and sclera clear  NECK: Supple, No JVD, Normal thyroid  NERVOUS SYSTEM:  Alert & Oriented X3, Good concentration  CHEST/LUNG: Clear to percussion bilaterally; No rales, rhonchi, wheezing, or rubs  HEART: Irregular rate and rhythm; No murmurs, rubs, or gallops  ABDOMEN: Soft, Nontender, Nondistended; Bowel sounds present  EXTREMITIES:  2+ Peripheral Pulses, No clubbing, cyanosis, or edema  SKIN: POs jaundice     Consultant(s) Notes Reviewed:  [x ] YES  [ ] NO  Care Discussed with Consultants/Other Providers [ x] YES  [ ] NO  Name of Consultant  LABS:                        13.5   6.58  )-----------( 232      ( 16 Aug 2018 07:27 )             39.8     08-16    139  |  104  |  28<H>  ----------------------------<  231<H>  4.2   |  23  |  0.99    Ca    8.6      16 Aug 2018 06:38  Phos  2.6     -15  Mg     1.7     08-15    TPro  5.7<L>  /  Alb  2.9<L>  /  TBili  14.2<H>  /  DBili  x   /  AST  79<H>  /  ALT  153<H>  /  AlkPhos  273<H>  08-15      Urinalysis Basic - ( 14 Aug 2018 17:13 )    Color: Yellow / Appearance: Clear / S.015 / pH: x  Gluc: x / Ketone: Moderate  / Bili: Large / Urobili: 8 mg/dL   Blood: x / Protein: 100 mg/dL / Nitrite: Positive   Leuk Esterase: Trace / RBC: x / WBC 0-2   Sq Epi: x / Non Sq Epi: Occasional / Bacteria: x      CAPILLARY BLOOD GLUCOSE      POCT Blood Glucose.: 203 mg/dL (16 Aug 2018 06:30)  POCT Blood Glucose.: 259 mg/dL (16 Aug 2018 00:28)  POCT Blood Glucose.: 167 mg/dL (15 Aug 2018 13:55)  POCT Blood Glucose.: 180 mg/dL (15 Aug 2018 11:51)        Urinalysis Basic - ( 14 Aug 2018 17:13 )    Color: Yellow / Appearance: Clear / S.015 / pH: x  Gluc: x / Ketone: Moderate  / Bili: Large / Urobili: 8 mg/dL   Blood: x / Protein: 100 mg/dL / Nitrite: Positive   Leuk Esterase: Trace / RBC: x / WBC 0-2   Sq Epi: x / Non Sq Epi: Occasional / Bacteria: x        RADIOLOGY & ADDITIONAL TESTS:  EKG :     Imaging Personally Reviewed:  [ ] YES  [ ] NO  HEALTH ISSUES - PROBLEM Dx:  Essential hypertension: Essential hypertension  Chronic atrial fibrillation: Chronic atrial fibrillation  T2DM (type 2 diabetes mellitus): T2DM (type 2 diabetes mellitus)  Electrolyte abnormality: Electrolyte abnormality  Prophylactic measure: Prophylactic measure  Urinary anomaly: Urinary anomaly  Hypertension: Hypertension  Afib: Afib  Obstructive jaundice: Obstructive jaundice

## 2018-08-16 NOTE — PROGRESS NOTE ADULT - ASSESSMENT
The patient is a 76 yo man with PMH of HTN, prediabetes, HLD, afib on eliquis with ILR, MVP,GERD initially presented to [facility] ED on 8/9 with 2 days of dark urine. The patient was fine without symptoms on 8/8. No fevers, chills, SOB, CP, abdominal pain, hematuria, dysuria at time of admission to [facility]. On admission patient noted to have scleral jaundice, transaminitis, elevated direct bilirubin and was admitted for obstructive jaundice. Initial CT stone hunt showed severe extrahepatic biliary duct obstruction with 2 cm dilation of CBD with marked intrahepatic biliary duct dilation. RUQ US showed no evidence of gallstones. The patient underwent an MRCP per GI after cardiac clearence for LR. 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  During admission patient spiked T max 100.5 and was started on cefepime for suspected cholangitis. The patient developed episode of Afib with RVR to 120's was started on low dose metoprolol 12.5 mg po. Oncologic w/u revelaed an elevated CEA level 4.2, elevated CA 19-9 to 76.3 and normal CA-125. The patient was subsequently transferred to [facility] for advanced endoscopy intervation requiring ERCP/EUS with possible stent and biopsy

## 2018-08-16 NOTE — PROGRESS NOTE ADULT - SUBJECTIVE AND OBJECTIVE BOX
VIR Procedure Note    Pre-Procedure Diagnosis: Biliary obstruction. 	  Post-Procedure Diagnosis: Same as pre.    Procedure Details: Successful placement of internal/external biliary drainage catheter     Complications: None  Blood loss: 5cc  Specimen: 5cc of biliary fluid sent for Culture.   Contrast: 15cc   Sedation: IV sedation by anesthesiologist.     Plan:   - PACU x 3hours  - pain control   - f/u Cx   - Flush 5cc every other day to prevent clogging VIR Procedure Note    Pre-Procedure Diagnosis: Biliary obstruction. 	  Post-Procedure Diagnosis: Same as pre.    Procedure Details: Successful placement of internal/external biliary drainage catheter     Complications: None  Blood loss: 5cc  Specimen: 5cc of biliary fluid sent for Culture.   Contrast: 15cc   Sedation: IV sedation by anesthesiologist.     Plan:   - PACU x 3hours  - pain control   - f/u Cx   - Flush 5cc every other day to prevent clogging  - Patient is at risk for bile peritonitis, likely to have had some bile spillage into peritoneum during the procedure. Manage with fluid/Abx/pain control as needed.

## 2018-08-16 NOTE — PROGRESS NOTE ADULT - PROBLEM SELECTOR PLAN 1
s/p ERCP/EUS done 8/15/18 with noted [id number] hyperchoic soft tissue  lesion in CBD with dilated proximal CBD  Unsuccessful Biliary cannulation on 8/15  IR recommended for Biliary drainage.   SUrgical evaluation as recommended for possible resection  COnt ZOsyn

## 2018-08-16 NOTE — PROGRESS NOTE ADULT - SUBJECTIVE AND OBJECTIVE BOX
Cardiology Progress Note    Interval events: ERCP with unsuccessful biliary cannulation. Planned for IR and surgical evaluation. Patient with no complaints.    Tele: aflutter     Medications:  atorvastatin 10 milliGRAM(s) Oral at bedtime  dextrose 40% Gel 15 Gram(s) Oral once PRN  dextrose 5%. 1000 milliLiter(s) IV Continuous <Continuous>  dextrose 50% Injectable 12.5 Gram(s) IV Push once  dextrose 50% Injectable 25 Gram(s) IV Push once  dextrose 50% Injectable 25 Gram(s) IV Push once  diphenhydrAMINE   Capsule 25 milliGRAM(s) Oral every 6 hours PRN  glucagon  Injectable 1 milliGRAM(s) IntraMuscular once PRN  ibuprofen  Tablet 600 milliGRAM(s) Oral every 8 hours PRN  insulin lispro (HumaLOG) corrective regimen sliding scale   SubCutaneous every 6 hours  metoprolol tartrate 25 milliGRAM(s) Oral two times a day  morphine  - Injectable 2 milliGRAM(s) IV Push every 6 hours PRN  piperacillin/tazobactam IVPB. 3.375 Gram(s) IV Intermittent every 8 hours  sodium chloride 0.9%. 1000 milliLiter(s) IV Continuous <Continuous>      Review of Systems:  Constitutional: [ ] Fever [ ] Chills [ ] Fatigue [ ] Weight change   HEENT: [ ] Blurred vision [ ] Eye Pain [ ] Headache [ ] Runny nose [ ] Sore Throat   Respiratory: [ ] Cough [ ] Wheezing [ ] Shortness of breath  Cardiovascular: [ ] Chest Pain [ ] Palpitations [ ] CHACON [ ] PND [ ] Orthopnea  Gastrointestinal: [ ] Abdominal Pain [ ] Diarrhea [ ] Constipation [ ] Hemorrhoids [ ] Nausea [ ] Vomiting  Genitourinary: [ ] Nocturia [ ] Dysuria [ ] Incontinence  Extremities: [ ] Swelling [ ] Joint Pain  Neurologic: [ ] Focal deficit [ ] Paresthesias [ ] Syncope  Lymphatic: [ ] Swelling [ ] Lymphadenopathy   Skin: [ ] Rash [ ] Ecchymoses [ ] Wounds [ ] Lesions  Psychiatry: [ ] Depression [ ] Suicidal/Homicidal Ideation [ ] Anxiety [ ] Sleep Disturbances  [x] 10 point review of systems is otherwise negative except as mentioned above            [ ]Unable to obtain    Vitals:  T(C): 36.9 (08-16-18 @ 04:27), Max: 37 (08-15-18 @ 11:21)  HR: 118 (08-16-18 @ 04:27) (98 - 124)  BP: 112/75 (08-16-18 @ 04:27) (110/72 - 134/84)  BP(mean): --  RR: 18 (08-16-18 @ 04:27) (18 - 18)  SpO2: 96% (08-16-18 @ 04:27) (96% - 97%)  Wt(kg): --  Daily     Daily   I&O's Summary    14 Aug 2018 07:01  -  15 Aug 2018 07:00  --------------------------------------------------------  IN: 720 mL / OUT: 2 mL / NET: 718 mL    15 Aug 2018 07:01  -  16 Aug 2018 06:20  --------------------------------------------------------  IN: 200 mL / OUT: 401 mL / NET: -201 mL        Physical Exam:  Appearance: NAD  Eyes: PERRL, EOMI  HENT: Normal oral mucosa, NC/AT  Cardiovascular: normal S1 and S2, RRR, no m/r/g, no edema, normal JVP  Respiratory: Clear to auscultation bilaterally  Gastrointestinal: Soft, non-tender, non-distended, BS+  Musculoskeletal: No clubbing, no joint deformity   Neurologic: Non-focal  Lymphatic: No lymphadenopathy  Psychiatry: AAOx3, mood & affect appropriate  Skin: No rashes, no ecchymoses, no cyanosis    Labs:                        14.2   9.54  )-----------( 218      ( 15 Aug 2018 07:31 )             41.8     08-15    139  |  101  |  22  ----------------------------<  200<H>  3.3<L>   |  24  |  0.95    Ca    8.6      15 Aug 2018 05:57  Phos  2.6     08-15  Mg     1.7     08-15    TPro  5.7<L>  /  Alb  2.9<L>  /  TBili  14.2<H>  /  DBili  x   /  AST  79<H>  /  ALT  153<H>  /  AlkPhos  273<H>  08-15                  New results/imaging: Cardiology Progress Note    Interval events: ERCP with unsuccessful biliary cannulation. Planned for IR and surgical evaluation. Patient with no complaints.    Tele: aflutter     Medications:  atorvastatin 10 milliGRAM(s) Oral at bedtime  dextrose 40% Gel 15 Gram(s) Oral once PRN  dextrose 5%. 1000 milliLiter(s) IV Continuous <Continuous>  dextrose 50% Injectable 12.5 Gram(s) IV Push once  dextrose 50% Injectable 25 Gram(s) IV Push once  dextrose 50% Injectable 25 Gram(s) IV Push once  diphenhydrAMINE   Capsule 25 milliGRAM(s) Oral every 6 hours PRN  glucagon  Injectable 1 milliGRAM(s) IntraMuscular once PRN  ibuprofen  Tablet 600 milliGRAM(s) Oral every 8 hours PRN  insulin lispro (HumaLOG) corrective regimen sliding scale   SubCutaneous every 6 hours  metoprolol tartrate 25 milliGRAM(s) Oral two times a day  morphine  - Injectable 2 milliGRAM(s) IV Push every 6 hours PRN  piperacillin/tazobactam IVPB. 3.375 Gram(s) IV Intermittent every 8 hours  sodium chloride 0.9%. 1000 milliLiter(s) IV Continuous <Continuous>      Review of Systems:  Constitutional: [ ] Fever [ ] Chills [ ] Fatigue [ ] Weight change   HEENT: [ ] Blurred vision [ ] Eye Pain [ ] Headache [ ] Runny nose [ ] Sore Throat   Respiratory: [ ] Cough [ ] Wheezing [ ] Shortness of breath  Cardiovascular: [ ] Chest Pain [ ] Palpitations [ ] CHACON [ ] PND [ ] Orthopnea  Gastrointestinal: [ ] Abdominal Pain [ ] Diarrhea [ ] Constipation [ ] Hemorrhoids [ ] Nausea [ ] Vomiting  Genitourinary: [ ] Nocturia [ ] Dysuria [ ] Incontinence  Extremities: [ ] Swelling [ ] Joint Pain  Neurologic: [ ] Focal deficit [ ] Paresthesias [ ] Syncope  Lymphatic: [ ] Swelling [ ] Lymphadenopathy   Skin: [ ] Rash [ ] Ecchymoses [ ] Wounds [ ] Lesions  Psychiatry: [ ] Depression [ ] Suicidal/Homicidal Ideation [ ] Anxiety [ ] Sleep Disturbances  [x] 10 point review of systems is otherwise negative except as mentioned above              Vitals:  T(C): 36.9 (08-16-18 @ 04:27), Max: 37 (08-15-18 @ 11:21)  HR: 118 (08-16-18 @ 04:27) (98 - 124)  BP: 112/75 (08-16-18 @ 04:27) (110/72 - 134/84)  RR: 18 (08-16-18 @ 04:27) (18 - 18)  SpO2: 96% (08-16-18 @ 04:27) (96% - 97%)      I&O's Summary    14 Aug 2018 07:01  -  15 Aug 2018 07:00  --------------------------------------------------------  IN: 720 mL / OUT: 2 mL / NET: 718 mL    15 Aug 2018 07:01  -  16 Aug 2018 06:20  --------------------------------------------------------  IN: 200 mL / OUT: 401 mL / NET: -201 mL        Physical Exam:  Appearance: NAD  Eyes: PERRL, EOMI  HENT: Normal oral mucosa, NC/AT  Cardiovascular: normal S1 and S2, RRR, no m/r/g, no edema, normal JVP  Respiratory: Clear to auscultation bilaterally  Gastrointestinal: Soft, non-tender, non-distended, BS+  Musculoskeletal: No clubbing, no joint deformity   Neurologic: Non-focal  Lymphatic: No lymphadenopathy  Psychiatry: AAOx3, mood & affect appropriate  Skin: No rashes, no ecchymoses, no cyanosis    Tele: aflutter         Labs:                        14.2   9.54  )-----------( 218      ( 15 Aug 2018 07:31 )             41.8     08-15    139  |  101  |  22  ----------------------------<  200<H>  3.3<L>   |  24  |  0.95    Ca    8.6      15 Aug 2018 05:57  Phos  2.6     08-15  Mg     1.7     08-15    TPro  5.7<L>  /  Alb  2.9<L>  /  TBili  14.2<H>  /  DBili  x   /  AST  79<H>  /  ALT  153<H>  /  AlkPhos  273<H>  08-15

## 2018-08-16 NOTE — PROGRESS NOTE ADULT - ASSESSMENT
76 yo man with PMH of HTN, prediabetes, HLD, afib s/p ablation 2004/2005 and DCCV 2017 on Eliquis with ILR, GERD.  Initially presented to [place] ED on 8/9 with 2 days of dark urine with CBD mass for ERCP.  AFib with RVR, suspicion of tachy-georges syndrome, CHADsVASC 4  Asymptomatic    #AFib with RVR  -- metoprolol 25 mg BID  -- monitor on tele  -- if bradycardia, would notify EP for possible PPM  -- have ILR interrogated (call [phone])  -- currently deferring A/C for planned procedures    #HTN  -- continue metoprolol    #HLD  -- continue atorvastatin    Bryan Harrlel MD  [id number] 76 yo man with PMH of HTN, prediabetes, HLD, afib s/p ablation 2004/2005 and DCCV 2017 on Eliquis with ILR, GERD.  Initially presented to [place] ED on 8/9 with 2 days of dark urine with CBD mass for ERCP.  AFib with RVR, suspicion of tachy-georges syndrome, CHADsVASC 4  Asymptomatic    #AFib with RVR  -- metoprolol 25 mg BID  -- monitor on tele  -- if bradycardia, would notify EP for possible PPM  -- have ILR interrogated (call [phone])  -- currently deferring A/C for planned procedures    #HTN  -- continue metoprolol    #HLD  -- continue atorvastatin    Bryan Harrell MD  [id number]    Ryder Allan M.D.  Cardiology Consult Service  [phone] or [phone]

## 2018-08-16 NOTE — PROGRESS NOTE ADULT - SUBJECTIVE AND OBJECTIVE BOX
Interventional Radiology  Pre-Procedure Note    HPI:  The patient is a 76 yo man with PMH of HTN, prediabetes, HLD, afib on eliquis with ILR, GERD who initially presented to the [place] ED with dark urine, jaundice, elevated LFTs, elevated bilirubin. CT showing a 2 cm CBD with marked intrahepatic biliary duct dilation. RUQ US showed no evidence of gallstones. 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 During course the patient spiked T max 100.5 and was started on ABx for suspected cholangitis. The patient developed episode of Afib with RVR to 120's was started on low dose metoprolol 12.5 mg po. The patient was subsequently transferred to [facility] for advanced endoscopy intervation requiring ERCP which was performed on 8/15/2018. EUS showed a 1.1 x 1 cm distal CBD lesion, No lesion was seen in the pancreatic head. A 5Fr x 4cm pancreatic stent was placed under endoscopic and fluoroscopic guidance. Fluorscopy confirmed position. ERCP was unsuccessful at traversing past the mass into the proximal CBD. The patient referred to IR for percutaneous biliary drainage.     PAST MEDICAL & SURGICAL HISTORY:  Male stress incontinence  Kidney stone: &quot;passed on own&quot;  Varicose vein: (L) 5 years ago  Bilateral cataracts  Appendicitis  Benign colon polyp  Prostate cancer: 2010  Afib: 2006 s/p ablation 2006 , afib resolved  Hyperlipidemia: X 4 years  GERD (Gastroesophageal Reflux Disease): X 10 years  DM (Diabetes Mellitus): X 3 years  Renal Calculi: 2008  MVP (Mitral Valve Prolapse): X 8 years  HTN - Hypertension: X 10 years, controlled  Status post left knee replacement  S/P ablation operation for arrhythmia  Male stress incontinence: s/p artifical urinary sphincter 5/2012  Varicose vein-s/p vein stripping 5 years ago  S/P arthroscopy: right shoulder 12/11  S/P prostatectomy: radical robotic 6/10  Cosmetic Surgery: chin implant 2004  S/P Colonoscopy with Polypectomy: 2009  S/P Appendectomy: 1950    Allergies: No Known Allergies    Current Medications:   atorvastatin 10 milliGRAM(s) Oral at bedtime  dextrose 40% Gel 15 Gram(s) Oral once PRN  dextrose 5%. 1000 milliLiter(s) IV Continuous <Continuous>  dextrose 50% Injectable 12.5 Gram(s) IV Push once  dextrose 50% Injectable 25 Gram(s) IV Push once  dextrose 50% Injectable 25 Gram(s) IV Push once  diphenhydrAMINE   Capsule 25 milliGRAM(s) Oral every 6 hours PRN  glucagon  Injectable 1 milliGRAM(s) IntraMuscular once PRN  ibuprofen  Tablet 600 milliGRAM(s) Oral every 8 hours PRN  insulin lispro (HumaLOG) corrective regimen sliding scale   SubCutaneous every 6 hours  metoprolol tartrate 25 milliGRAM(s) Oral two times a day  morphine  - Injectable 2 milliGRAM(s) IV Push every 6 hours PRN  piperacillin/tazobactam IVPB. 3.375 Gram(s) IV Intermittent every 8 hours  sodium chloride 0.9%. 1000 milliLiter(s) IV Continuous <Continuous>    Labs:                         13.5   6.58  )-----------( 232      ( 16 Aug 2018 07:27 )             39.8       08-16    139  |  104  |  28<H>  ----------------------------<  231<H>  4.2   |  23  |  0.99    Ca    8.6      16 Aug 2018 06:38  Phos  2.6     08-15  Mg     1.7     08-15    TPro  5.7<L>  /  Alb  3.1<L>  /  TBili  12.4<H>  /  DBili  9.6<H>  /  AST  85<H>  /  ALT  134<H>  /  AlkPhos  272<H>  08-16      Assessment/Plan:   This is a 78yo Male  presented with jaundice, elevated LFTs, elevated T-bili found to have biliary ductal dilatation and a Distal CBD mass, 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 S/p pancreatic duct stent placement. ERCP was unsuccessful. referred for percutaneous drainage.       Procedure/ risks/ benefits/ goals/ alternatives were explained. All questions answered. Informed content obtained from patient. Consent placed in chart.

## 2018-08-17 LAB
ALBUMIN SERPL ELPH-MCNC: 3.1 G/DL — LOW (ref 3.3–5)
ALP SERPL-CCNC: 269 U/L — HIGH (ref 40–120)
ALT FLD-CCNC: 128 U/L — HIGH (ref 10–45)
ANION GAP SERPL CALC-SCNC: 12 MMOL/L — SIGNIFICANT CHANGE UP (ref 5–17)
AST SERPL-CCNC: 75 U/L — HIGH (ref 10–40)
BILIRUB SERPL-MCNC: 7.5 MG/DL — HIGH (ref 0.2–1.2)
BUN SERPL-MCNC: 23 MG/DL — SIGNIFICANT CHANGE UP (ref 7–23)
CALCIUM SERPL-MCNC: 8.2 MG/DL — LOW (ref 8.4–10.5)
CHLORIDE SERPL-SCNC: 104 MMOL/L — SIGNIFICANT CHANGE UP (ref 96–108)
CO2 SERPL-SCNC: 23 MMOL/L — SIGNIFICANT CHANGE UP (ref 22–31)
CREAT SERPL-MCNC: 0.88 MG/DL — SIGNIFICANT CHANGE UP (ref 0.5–1.3)
GLUCOSE BLDC GLUCOMTR-MCNC: 147 MG/DL — HIGH (ref 70–99)
GLUCOSE BLDC GLUCOMTR-MCNC: 159 MG/DL — HIGH (ref 70–99)
GLUCOSE BLDC GLUCOMTR-MCNC: 192 MG/DL — HIGH (ref 70–99)
GLUCOSE SERPL-MCNC: 165 MG/DL — HIGH (ref 70–99)
GRAM STN FLD: SIGNIFICANT CHANGE UP
HCT VFR BLD CALC: 41.2 % — SIGNIFICANT CHANGE UP (ref 39–50)
HGB BLD-MCNC: 13.3 G/DL — SIGNIFICANT CHANGE UP (ref 13–17)
MCHC RBC-ENTMCNC: 30.3 PG — SIGNIFICANT CHANGE UP (ref 27–34)
MCHC RBC-ENTMCNC: 32.3 GM/DL — SIGNIFICANT CHANGE UP (ref 32–36)
MCV RBC AUTO: 93.8 FL — SIGNIFICANT CHANGE UP (ref 80–100)
PLATELET # BLD AUTO: 240 K/UL — SIGNIFICANT CHANGE UP (ref 150–400)
POTASSIUM SERPL-MCNC: 3.6 MMOL/L — SIGNIFICANT CHANGE UP (ref 3.5–5.3)
POTASSIUM SERPL-SCNC: 3.6 MMOL/L — SIGNIFICANT CHANGE UP (ref 3.5–5.3)
PROT SERPL-MCNC: 6.1 G/DL — SIGNIFICANT CHANGE UP (ref 6–8.3)
RBC # BLD: 4.39 M/UL — SIGNIFICANT CHANGE UP (ref 4.2–5.8)
RBC # FLD: 14.3 % — SIGNIFICANT CHANGE UP (ref 10.3–14.5)
SODIUM SERPL-SCNC: 139 MMOL/L — SIGNIFICANT CHANGE UP (ref 135–145)
SPECIMEN SOURCE: SIGNIFICANT CHANGE UP
WBC # BLD: 7.43 K/UL — SIGNIFICANT CHANGE UP (ref 3.8–10.5)
WBC # FLD AUTO: 7.43 K/UL — SIGNIFICANT CHANGE UP (ref 3.8–10.5)

## 2018-08-17 PROCEDURE — 99233 SBSQ HOSP IP/OBS HIGH 50: CPT | Mod: GC

## 2018-08-17 PROCEDURE — 71260 CT THORAX DX C+: CPT | Mod: 26

## 2018-08-17 PROCEDURE — 99233 SBSQ HOSP IP/OBS HIGH 50: CPT

## 2018-08-17 RX ORDER — HYDROMORPHONE HYDROCHLORIDE 2 MG/ML
1 INJECTION INTRAMUSCULAR; INTRAVENOUS; SUBCUTANEOUS ONCE
Qty: 0 | Refills: 0 | Status: DISCONTINUED | OUTPATIENT
Start: 2018-08-17 | End: 2018-08-17

## 2018-08-17 RX ORDER — INSULIN LISPRO 100/ML
VIAL (ML) SUBCUTANEOUS
Qty: 0 | Refills: 0 | Status: DISCONTINUED | OUTPATIENT
Start: 2018-08-17 | End: 2018-08-21

## 2018-08-17 RX ADMIN — HYDROMORPHONE HYDROCHLORIDE 1 MILLIGRAM(S): 2 INJECTION INTRAMUSCULAR; INTRAVENOUS; SUBCUTANEOUS at 05:00

## 2018-08-17 RX ADMIN — MORPHINE SULFATE 2 MILLIGRAM(S): 50 CAPSULE, EXTENDED RELEASE ORAL at 22:29

## 2018-08-17 RX ADMIN — SODIUM CHLORIDE 100 MILLILITER(S): 9 INJECTION INTRAMUSCULAR; INTRAVENOUS; SUBCUTANEOUS at 15:16

## 2018-08-17 RX ADMIN — PIPERACILLIN AND TAZOBACTAM 25 GRAM(S): 4; .5 INJECTION, POWDER, LYOPHILIZED, FOR SOLUTION INTRAVENOUS at 21:14

## 2018-08-17 RX ADMIN — Medication 1: at 06:20

## 2018-08-17 RX ADMIN — PIPERACILLIN AND TAZOBACTAM 25 GRAM(S): 4; .5 INJECTION, POWDER, LYOPHILIZED, FOR SOLUTION INTRAVENOUS at 15:16

## 2018-08-17 RX ADMIN — Medication 1: at 17:07

## 2018-08-17 RX ADMIN — ATORVASTATIN CALCIUM 10 MILLIGRAM(S): 80 TABLET, FILM COATED ORAL at 21:14

## 2018-08-17 RX ADMIN — Medication 25 MILLIGRAM(S): at 17:07

## 2018-08-17 RX ADMIN — PIPERACILLIN AND TAZOBACTAM 25 GRAM(S): 4; .5 INJECTION, POWDER, LYOPHILIZED, FOR SOLUTION INTRAVENOUS at 05:07

## 2018-08-17 RX ADMIN — Medication 25 MILLIGRAM(S): at 05:07

## 2018-08-17 RX ADMIN — HYDROMORPHONE HYDROCHLORIDE 1 MILLIGRAM(S): 2 INJECTION INTRAMUSCULAR; INTRAVENOUS; SUBCUTANEOUS at 04:17

## 2018-08-17 RX ADMIN — MORPHINE SULFATE 2 MILLIGRAM(S): 50 CAPSULE, EXTENDED RELEASE ORAL at 21:56

## 2018-08-17 RX ADMIN — MORPHINE SULFATE 2 MILLIGRAM(S): 50 CAPSULE, EXTENDED RELEASE ORAL at 01:30

## 2018-08-17 NOTE — PROGRESS NOTE ADULT - PROBLEM SELECTOR PLAN 4
AC is on HOLD for procedures   Might need Heparin if patient will be off of AC for prolonged period of time   COnt  tele   Cont Metoprolol / Might uptitrate for better HR control. Afib s/p ablation 2004/2005 and DCCV 2017 on Eliquis with ILR, GERD ( EP Dr Sellers in [place]).  AC is on HOLD for procedures/ will contact surgery to find out about possible date of surgery if not early next week, will need to most likely place patient back on ELiquis or Heparin drip while waiting and place back post surgery/ will also discuss with Cardio team.    COnt  tele   Cont Metoprolol / Might uptitrate for better HR control and monitor for any bradycardia or decrease BP.  Since patient might undergo pancreaticoduodenectomy with typical reconstruction which is a Major surgery , will not start ASA unless directed b surgery team.

## 2018-08-17 NOTE — PROGRESS NOTE ADULT - SUBJECTIVE AND OBJECTIVE BOX
77y Male s/p internal/external biliary drain 8/16/2018 in Interventional Radiology with Dr. Greer.     Patient seen and examined @ bedside in the AM. Dressing is clean dry and intact. Mild pain at insertion site, no other complaints offered.    T(F): 98.2 (08-17-18 @ 11:45), Max: 98.8 (08-16-18 @ 23:56)  HR: 117 (08-17-18 @ 16:56) (68 - 117)  BP: 168/94 (08-17-18 @ 16:56) (141/90 - 183/107)  RR: 18 (08-17-18 @ 11:45) (18 - 18)  SpO2: 100% (08-17-18 @ 11:45) (93% - 100%)    LABS:                        13.3   7.43  )-----------( 240      ( 17 Aug 2018 07:51 )             41.2     08-17    139  |  104  |  23  ----------------------------<  165<H>  3.6   |  23  |  0.88    Ca    8.2<L>      17 Aug 2018 06:27    TPro  6.1  /  Alb  3.1<L>  /  TBili  7.5<H>  /  DBili  x   /  AST  75<H>  /  ALT  128<H>  /  AlkPhos  269<H>  08-17      I&O's Detail    16 Aug 2018 07:01  -  17 Aug 2018 07:00  --------------------------------------------------------  IN:    sodium chloride 0.9%.: 1100 mL  Total IN: 1100 mL    OUT:    Drain: 420 mL    Voided: 200 mL  Total OUT: 620 mL    Total NET: 480 mL      17 Aug 2018 07:01  -  17 Aug 2018 19:09  --------------------------------------------------------  IN:    Oral Fluid: 360 mL    sodium chloride 0.9%.: 1200 mL  Total IN: 1560 mL    OUT:    Drain: 1400 mL  Total OUT: 1400 mL    Total NET: 160 mL      PHYSICAL EXAM:  General: Nontoxic, in NAD  Neuro:  Alert & oriented x 3  Abdomen: soft, NTND. Dressing is clean and dry  Extremities: no pedal edema or calf tenderness noted     A/P  77y Male s/p internal/external biliary drain 8/16/2018 in Interventional Radiology    Plan:  -continue to monitor ouput    -Flush drain gently [id number] with 5cc NS, DO NOT PULL BACK   -f/u cultures.     Please call IR at extension [phone] with any questions, concerns, or issues regarding above.

## 2018-08-17 NOTE — PROGRESS NOTE ADULT - SUBJECTIVE AND OBJECTIVE BOX
Patient is a 77y old  Male who presents with a chief complaint of ERCP (14 Aug 2018 23:50)      INTERVAL HPI/OVERNIGHT EVENTS:     Medications:MEDICATIONS  (STANDING):  atorvastatin 10 milliGRAM(s) Oral at bedtime  dextrose 5%. 1000 milliLiter(s) (50 mL/Hr) IV Continuous <Continuous>  dextrose 50% Injectable 12.5 Gram(s) IV Push once  dextrose 50% Injectable 25 Gram(s) IV Push once  dextrose 50% Injectable 25 Gram(s) IV Push once  insulin lispro (HumaLOG) corrective regimen sliding scale   SubCutaneous every 6 hours  metoprolol tartrate 25 milliGRAM(s) Oral two times a day  piperacillin/tazobactam IVPB. 3.375 Gram(s) IV Intermittent every 8 hours  sodium chloride 0.9%. 1000 milliLiter(s) (100 mL/Hr) IV Continuous <Continuous>    MEDICATIONS  (PRN):  dextrose 40% Gel 15 Gram(s) Oral once PRN Blood Glucose LESS THAN 70 milliGRAM(s)/deciliter  diphenhydrAMINE   Capsule 25 milliGRAM(s) Oral every 6 hours PRN Rash and/or Itching  glucagon  Injectable 1 milliGRAM(s) IntraMuscular once PRN Glucose LESS THAN 70 milligrams/deciliter  ibuprofen  Tablet 600 milliGRAM(s) Oral every 8 hours PRN fever > 100.4  morphine  - Injectable 2 milliGRAM(s) IV Push every 6 hours PRN Severe Pain (7 - 10)      Allergies: Allergies    No Known Allergies    Intolerances        REVIEW OF SYSTEMS:  CONSTITUTIONAL: No fever  NECK: No pain or stiffness  RESPIRATORY: No cough,  No shortness of breath  CARDIOVASCULAR: No chest pain, or leg swelling  GASTROINTESTINAL: No abdominal or epigastric pain. No nausea, vomiting, or hematemesis; No diarrhea or constipation. No melena or hematochezia.  GENITOURINARY: No dysuria  NEUROLOGICAL: No headaches  SKIN: pos jaundice       T(C): 36.6 (08-17-18 @ 08:15), Max: 37.1 (08-16-18 @ 23:56)  HR: 113 (08-17-18 @ 08:15) (108 - 113)  BP: 145/96 (08-17-18 @ 08:15) (141/90 - 148/97)  RR: 18 (08-17-18 @ 08:15) (18 - 18)  SpO2: 94% (08-17-18 @ 08:15) (93% - 94%)  Wt(kg): --Vital Signs Last 24 Hrs  T(C): 36.6 (17 Aug 2018 08:15), Max: 37.1 (16 Aug 2018 23:56)  T(F): 97.9 (17 Aug 2018 08:15), Max: 98.8 (16 Aug 2018 23:56)  HR: 113 (17 Aug 2018 08:15) (108 - 113)  BP: 145/96 (17 Aug 2018 08:15) (141/90 - 148/97)  BP(mean): --  RR: 18 (17 Aug 2018 08:15) (18 - 18)  SpO2: 94% (17 Aug 2018 08:15) (93% - 94%)  I&O's Summary    16 Aug 2018 07:01  -  17 Aug 2018 07:00  --------------------------------------------------------  IN: 1100 mL / OUT: 620 mL / NET: 480 mL    17 Aug 2018 07:01  -  17 Aug 2018 11:50  --------------------------------------------------------  IN: 0 mL / OUT: 300 mL / NET: -300 mL      Last Menstrual Period      PHYSICAL EXAM:  PHYSICAL EXAM:  GENERAL: NAD, well-groomed, well-developed  HEAD:  Atraumatic, Normocephalic  EYES: EOMI, PERRLA, conjunctiva and sclera clear  NECK: Supple, No JVD, Normal thyroid  NERVOUS SYSTEM:  Alert & Oriented X3, Good concentration  CHEST/LUNG: Clear to percussion bilaterally; No rales, rhonchi, wheezing, or rubs  HEART: Irregular rate and rhythm; No murmurs, rubs, or gallops  ABDOMEN: Soft, Nontender, Nondistended; Bowel sounds present  EXTREMITIES:  2+ Peripheral Pulses, No clubbing, cyanosis, or edema  SKIN: POs jaundice     Consultant(s) Notes Reviewed:  [x ] YES  [ ] NO  Care Discussed with Consultants/Other Providers [ x] YES  [ ] NO  Name of Consultant  LABS:                        13.3   7.43  )-----------( 240      ( 17 Aug 2018 07:51 )             41.2     08-17    139  |  104  |  23  ----------------------------<  165<H>  3.6   |  23  |  0.88    Ca    8.2<L>      17 Aug 2018 06:27    TPro  6.1  /  Alb  3.1<L>  /  TBili  7.5<H>  /  DBili  x   /  AST  75<H>  /  ALT  128<H>  /  AlkPhos  269<H>  08-17        CAPILLARY BLOOD GLUCOSE      POCT Blood Glucose.: 147 mg/dL (17 Aug 2018 11:44)  POCT Blood Glucose.: 159 mg/dL (17 Aug 2018 06:16)  POCT Blood Glucose.: 146 mg/dL (16 Aug 2018 23:51)  POCT Blood Glucose.: 201 mg/dL (16 Aug 2018 18:26)  POCT Blood Glucose.: 220 mg/dL (16 Aug 2018 12:10)            RADIOLOGY & ADDITIONAL TESTS:  EKG :     Imaging Personally Reviewed:  [ ] YES  [ ] NO  HEALTH ISSUES - PROBLEM Dx:  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  NO abd pain , no diarrhea , no fever, no chest pain, no pruritus or palpitation.        Medications:MEDICATIONS  (STANDING):  atorvastatin 10 milliGRAM(s) Oral at bedtime  dextrose 5%. 1000 milliLiter(s) (50 mL/Hr) IV Continuous <Continuous>  dextrose 50% Injectable 12.5 Gram(s) IV Push once  dextrose 50% Injectable 25 Gram(s) IV Push once  dextrose 50% Injectable 25 Gram(s) IV Push once  insulin lispro (HumaLOG) corrective regimen sliding scale   SubCutaneous every 6 hours  metoprolol tartrate 25 milliGRAM(s) Oral two times a day  piperacillin/tazobactam IVPB. 3.375 Gram(s) IV Intermittent every 8 hours  sodium chloride 0.9%. 1000 milliLiter(s) (100 mL/Hr) IV Continuous <Continuous>    MEDICATIONS  (PRN):  dextrose 40% Gel 15 Gram(s) Oral once PRN Blood Glucose LESS THAN 70 milliGRAM(s)/deciliter  diphenhydrAMINE   Capsule 25 milliGRAM(s) Oral every 6 hours PRN Rash and/or Itching  glucagon  Injectable 1 milliGRAM(s) IntraMuscular once PRN Glucose LESS THAN 70 milligrams/deciliter  ibuprofen  Tablet 600 milliGRAM(s) Oral every 8 hours PRN fever > 100.4  morphine  - Injectable 2 milliGRAM(s) IV Push every 6 hours PRN Severe Pain (7 - 10)      Allergies: Allergies    No Known Allergies    Intolerances      REVIEW OF SYSTEMS:  CONSTITUTIONAL: No fever  NECK: No pain or stiffness  RESPIRATORY: No cough,  No shortness of breath  CARDIOVASCULAR: No chest pain, or leg swelling  GASTROINTESTINAL: No abdominal or epigastric pain. No nausea, vomiting, or hematemesis; No diarrhea or constipation. No melena or hematochezia.  GENITOURINARY: No dysuria  NEUROLOGICAL: No headaches  SKIN: pos jaundice , no pruritus       T(C): 36.6 (08-17-18 @ 08:15), Max: 37.1 (08-16-18 @ 23:56)  HR: 113 (08-17-18 @ 08:15) (108 - 113)  BP: 145/96 (08-17-18 @ 08:15) (141/90 - 148/97)  RR: 18 (08-17-18 @ 08:15) (18 - 18)  SpO2: 94% (08-17-18 @ 08:15) (93% - 94%)  Wt(kg): --Vital Signs Last 24 Hrs  T(C): 36.6 (17 Aug 2018 08:15), Max: 37.1 (16 Aug 2018 23:56)  T(F): 97.9 (17 Aug 2018 08:15), Max: 98.8 (16 Aug 2018 23:56)  HR: 113 (17 Aug 2018 08:15) (108 - 113)  BP: 145/96 (17 Aug 2018 08:15) (141/90 - 148/97)  BP(mean): --  RR: 18 (17 Aug 2018 08:15) (18 - 18)  SpO2: 94% (17 Aug 2018 08:15) (93% - 94%)  I&O's Summary    16 Aug 2018 07:01  -  17 Aug 2018 07:00  --------------------------------------------------------  IN: 1100 mL / OUT: 620 mL / NET: 480 mL    17 Aug 2018 07:01  -  17 Aug 2018 11:50  --------------------------------------------------------  IN: 0 mL / OUT: 300 mL / NET: -300 mL        PHYSICAL EXAM:  GENERAL: NAD, well-groomed, well-developed  HEAD:  Atraumatic, Normocephalic  EYES: EOMI, PERRLA, conjunctiva and sclera clear  NECK: Supple, No JVD, Normal thyroid  NERVOUS SYSTEM:  Alert & Oriented X3, Good concentration  CHEST/LUNG: Clear to percussion bilaterally; No rales, rhonchi, wheezing, or rubs  HEART: Irregular rate and rhythm; No murmurs, rubs, or gallops  ABDOMEN: Soft, Nontender, Nondistended; Bowel sounds present, pos RUQ drainage tube in place.    EXTREMITIES:  2+ Peripheral Pulses, No clubbing, cyanosis, or edema  SKIN: POs jaundice     Consultant(s) Notes Reviewed:  [x ] YES  [ ] NO  Care Discussed with Consultants/Other Providers [ x] YES  [ ] NO  Name of Consultant  LABS:                        13.3   7.43  )-----------( 240      ( 17 Aug 2018 07:51 )             41.2     08-17    139  |  104  |  23  ----------------------------<  165<H>  3.6   |  23  |  0.88    Ca    8.2<L>      17 Aug 2018 06:27    TPro  6.1  /  Alb  3.1<L>  /  TBili  7.5<H>  /  DBili  x   /  AST  75<H>  /  ALT  128<H>  /  AlkPhos  269<H>  08-17        CAPILLARY BLOOD GLUCOSE      POCT Blood Glucose.: 147 mg/dL (17 Aug 2018 11:44)  POCT Blood Glucose.: 159 mg/dL (17 Aug 2018 06:16)  POCT Blood Glucose.: 146 mg/dL (16 Aug 2018 23:51)  POCT Blood Glucose.: 201 mg/dL (16 Aug 2018 18:26)  POCT Blood Glucose.: 220 mg/dL (16 Aug 2018 12:10)            RADIOLOGY & ADDITIONAL TESTS:  EKG :     Imaging Personally Reviewed:  [ ] YES  [ ] NO  HEALTH ISSUES - PROBLEM Dx:  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

## 2018-08-17 NOTE — CONSULT NOTE ADULT - SUBJECTIVE AND OBJECTIVE BOX
[facility]   Consultation of Dr. BETSY George    In brief Mr. Mata is a 77 year old male who presented to [facility] on 8/9/18 with hematuria for 48 hours, scleral jaudice, and transaminitis. Concern for obstructive jaundice he underwent an MRCP concerning for 1.8cm soft tissue ampullary mass vs. cholangiocarcinoma in the distal CBD. Tumor marker work-up was significant for CEA 4.2, CA 19-9 76.3 and normal CA-125. He was started on Cefepime due to fevers and suspected cholangitis, transferred to [facility] for further work-up and management. He underwent an EUS/ERCP on 8/15/18 which demonstrated A 11mm x 10mm hyperechoic soft tissue lesion in distal CBD with dilated proximal CBD. No pancreas mass was seen. Vasculature was patent,  ERCP: Unsuccessful biliary cannulation with standard ERCP technique and EUS guided rendezvous technique due to distal biliary mass. s/p pancreatic duct stent. He underwent a percutaneous transhepatic cholangiography and biliary drainage by IR on 8/16/18.     MEDICAL HISTORY:  Male stress incontinence  Nephrolithiasis  Bilateral cataracts  Benign colon polyp  Prostate cancer: 2010  Afib: 2006 s/p ablation 2006 , afib resolved  Hyperlipidemia: X 4 years  GERD (Gastroesophageal Reflux Disease): X 10 years  DM (Diabetes Mellitus): X 3 years  Renal Calculi: 2008  MVP (Mitral Valve Prolapse): X 8 years  HTN - Hypertension: X 10 years, controlled  Status post left knee replacement  S/P ablation operation for arrhythmia  Male stress incontinence: s/p artifical urinary sphincter 5/2012    SURGICAL HISTORY  Varicose vein-s/p vein stripping 5 years ago  S/P arthroscopy: right shoulder 12/11  S/P prostatectomy: radical robotic 6/10  Cosmetic Surgery: chin implant 2004  S/P Colonoscopy with Polypectomy: 2009  S/P Appendectomy: 1950    FAMILY HISTORY: Non contributory  SOCIAL HISTORY:Resides with his spouse Denise and is independent with ADLs and ambulation.   smoke 1 PPD X 12 years, quit 1974, red wine twice a day	    MEDICATIONS  (STANDING):  atorvastatin 10 milliGRAM(s) Oral at bedtime  insulin lispro (HumaLOG) corrective regimen sliding scale   SubCutaneous every 6 hours  metoprolol tartrate 25 milliGRAM(s) Oral two times a day  piperacillin/tazobactam IVPB. 3.375 Gram(s) IV Intermittent every 8 hours  sodium chloride 0.9%. 1000 milliLiter(s) (100 mL/Hr) IV Continuous <Continuous>    MEDICATIONS  (PRN):  dextrose 40% Gel 15 Gram(s) Oral once PRN Blood Glucose LESS THAN 70 milliGRAM(s)/deciliter  diphenhydrAMINE   Capsule 25 milliGRAM(s) Oral every 6 hours PRN Rash and/or Itching  glucagon  Injectable 1 milliGRAM(s) IntraMuscular once PRN Glucose LESS THAN 70 milligrams/deciliter  ibuprofen  Tablet 600 milliGRAM(s) Oral every 8 hours PRN fever > 100.4  morphine  - Injectable 2 milliGRAM(s) IV Push every 6 hours PRN Severe Pain (7 - 10)    Allergies: No Known Allergies    Vital Signs Last 24 Hrs  T(C): 36.6 (17 Aug 2018 08:15), Max: 37.1 (16 Aug 2018 23:56)  HR: 113 (17 Aug 2018 08:15) (79 - 113)  BP: 145/96 (17 Aug 2018 08:15) (141/90 - 149/82)  RR: 18 (17 Aug 2018 08:15) (18 - 18)  SpO2: 94% (17 Aug 2018 08:15) (93% - 98%)      Physical examination:   He was awake, alert and in no distress  He has scleral icterus  and he did not have thrush. His oropharyngeal mucosa was normal. He had reactive pupils and his extra-occular movements were intact. He did not have JVD. He had non-labored respirations. He had symmetrical chest wall movements. He had irregularly irregular heart rhythm. His abdomen was soft, nondistended with tenderness of the PTC site. PTC drainage, bilious. There were no palpable masses or abdominal wall hernias. He had normal external genitalia. He did not have a rash. His extremities were well perfused. He had a normal musculoskeletal exam.                            13.3   7.43  )-----------( 240      ( 17 Aug 2018 07:51 )             41.2     08-17    139  |  104  |  23  ----------------------------<  165<H>  3.6   |  23  |  0.88    Ca    8.2<L>      17 Aug 2018 06:27    TPro  6.1  /  Alb  3.1<L>  /  TBili  7.5<H>  /  DBili  x   /  AST  75<H>  /  ALT  128<H>  /  AlkPhos  269<H>  08-17          Radiographic Findings:   EXAM:  US GALLBLADDER                        PROCEDURE DATE:  08/11/2018    INTERPRETATION:  8/11/2018 3:30 AM  CLINICAL HISTORY:  Pain.    TECHNIQUE: Ultrasound of the gallbladder    COMPARISON: CT performed earlier    FINDINGS:    The gallbladder is distended with mild wall thickening measuring up to 5 mm. Tiny focus of comet tail artifact noted along the fundus which may be related to focal adenomyomatosis. No gallstones are seen. As seen on the recent CT, the CBD is dilated measuring up to 17 mm within the pancreatic head. Negative sonographic Hogue sign.  IMPRESSION:  No gallstones are seen. The CBD is dilated measuring up to 17 mm. MRI/MRCP recommended for further evaluation if there are no contraindications to evaluate for cause of biliary ductal dilatation.KATELYN HORN M.D., ATTENDING RADIOLOGIST  This document has been electronically signed. Aug 11 2018  3:37AM      EXAM:  CT RENAL STONE HUNT                        PROCEDURE DATE:  08/10/2018    INTERPRETATION:  CT renal stone   (CT of the abdomen and pelvis without contrast)  CLINICAL INFORMATION:  Bilateral Renal colic.  TECHNIQUE:  Contiguous axial 5 mm sections were obtained using single helical acquisition through the abdomen and pelvis and were reconstructed as axial 5 mm sections.  Higher resolution axial and coronal images were reconstructed through  the kidneys.   Oral and intravenous contrast were withheld for this indication.    FINDINGS:   CT abdomen 2/26/2009 available for review. There is extrahepatic biliary duct obstruction with a soft tissue density in the distal common bile duct of indeterminate etiology,bile duct measuring 2.3 cm. There is a moderate to severe intrahepatic biliary ductal dilatation . gallbladder is mildly distended . etiology of  obstruction unclear about this time.  The lung bases are clear.       The liver demonstrates homogeneous attenuation with no focal lesion, allowing for the noncontrast technique.  Hepatic size and contours are maintained.  Hepatic and portal veins are not displaced.  The pancreas atrophic. The spleen is normal in size. No adrenal masses are found.    No renal calculi are seen.  No perinephric infiltration is seen.  No hydronephrosis is present.  No suspicious renal mass is recognized, allowing for the noncontrast technique.  The ureters are not dilated.  No calculi are recognized in the course of either ureter.  No calculi are found in the bladder. Penile implant device noted. Status post prostatectomy. No enlarged lymph   nodes are found.  No ascites is present.  The osseous structures are   intact.    The bowel appears unremarkable.  No obstruction, perforation or abscess is recognized.       Degenerative spondylosis of thoracal lumbar spine noted without fracture.    IMPRESSION:   No renal calculi or obstruction recognized.      Severe extrahepatic biliary duct obstruction with a 2 cm dilatation of common bile duct and marked intrahepatic blurry ductal dilatation as described. Further investigation recommended.    CURT JOSHI M.D., ATTENDING RADIOLOGIST  This documenthas been electronically signed. Aug 10 2018  8:25PM  EXAM:  MR MRCP WAW IC                        PROCEDURE DATE:  08/13/2018    INTERPRETATION:  MR MRCP WAW IC  HISTORY:  obstructive jaundice  TECHNIQUE: Multiplanar T1-weighted, T2-weighted, and diffusion weighted   sequences were obtained of the abdomen, including dynamic post-contrast   T1-weighted sequences.  3D heavily T2-weighted thin-section MRCP was also   performed.    Field Strength: 1.5T    Contrast: 9 cc Gadavist.    COMPARISON: CT abdomen and pelvis 8/10/2018.    FINDINGS:    LIVER: Normal. No focal lesion.  SPLEEN: Normal.  PANCREAS: No solid mass. Multiple cystically dilated side branches measuring up to 1.1 cm in the head (2; 21) without main duct dilatation.    GALLBLADDER/BILIARY TREE: 1.8 cm enhancing soft tissue mass in the distal common bile duct (9; 21) with moderate intra and extrahepatic biliary dilatation. Common duct measures up to 20 mm. Normal gallbladder.  ADRENALS: Normal.  KIDNEYS: Symmetric nephrograms. No hydronephrosis. Cyst in the left lower   pole with small layering hemorrhagic fluid level.  LYMPHADENOPATHY/RETROPERITONEUM: No adenopathy.  VASCULATURE: Aortic atherosclerosis without aneurysm.  BOWEL: No bowel-related abnormality.  PERITONEUM/ABDOMINAL WALL: No ascites or implants.  SKELETAL: No aggressive lesion.  LUNG BASES: Clear.    IMPRESSION:    1.8 cm enhancing soft tissue mass in the distal common bile duct causing moderate biliary dilatation. Ampullary neoplasm versus cholangiocarcinoma. 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 No pancreatic head mass. No evidence of metastatic disease in the abdomen.    BINDU ROMERO M.D., ATTENDING RADIOLOGIST  This document has been electronically signed. Aug 13 2018  1:36PM      [facility], [place]  ____________________________________________________________________________________________________  Patient Name: Attila Mata                         MRN: [id number]  Account Number: [id number]                     YOB: 1940  Room: Endoscopy Room 2                           Gender: Male  Attending MD: Gregorio Curtis MD                Procedure Date No Time: 8/15/2018  ____________________________________________________________________________________________________     Procedure:           ERCP  Indications:         77M with obstructive jaundice. Imaging showing dilated CBD with questionable soft tissue lesion in distal CBD/ampulla. EUS/ERCP scheduled for further  evaluation.  Providers:           Gregorio Curtis MD, Gurdeep Parker MD (Fellow)  Referring MD:        Rebecca Babcock  Medicines:           General Anesthesia, on Zosyn, Indomethacin 100mg PRx1, Fluoro: 116mGy/13.8min  Complications:       No immediate complications.  ____________________________________________________________________________________________________  Procedure:           Pre-Anesthesia Assessment:                       - Prior to the procedure, a History and Physical was performed, and patient                        medications, allergies and sensitivities were reviewed. The patient's                        tolerance of previous anesthesia was reviewed.                       - The risks and benefits of theprocedure and the sedation options and risks                        were discussed with the patient. All questions were answered and informed                        consent was obtained.                       - Patient identification and proposed procedure were verified prior to the                        procedure by the physician. The procedure was verified in the endoscopy suite.                       After obtaining informed consent, the scope was passed under direct vision.        Throughout the procedure, the patient's blood pressure, pulse, and oxygen                        saturations were monitored continuously. The endoscopes were introduced                        through the mouth, and advanced to the duodenum. The ERCP was technically                        difficult and complex. The patient tolerated the procedure well.                                                                                                        Findings:       EGD:       - Normal esophagus.       - Thickened gastric antral fold vs gastric antral subepithelial lesion. Biopsied.       - Normal duodenal bulb and D2.       EUS:       - EUS was performed using a linear echoendoscope. The echoendoscope was advanced to the        duodenal bulb. The proximal bile duct was dilated to 17mm. A 11mm x 10mm hyperechoic soft        tissue lesion was seen in the distal CBD. EUS examination of the pancreas was normal. No        pancreas head mass was seen. The portal vein, keerthi-splenic confluence and SMV were patent.        The gallbladder was distended.       ERCP:     The  film was normal. The esophagus was successfully intubated under direct vision. The  scope was advanced to the major papilla in the descending duodenum without detailed examination of the pharynx, larynx and associated structures, and upper GI tract. The ampulla was small. No ampullary mass was seen. Biliary cannulation was technically difficult. Upon initial attempts at biliary cannulation, the pancreatic duct was cannulated. A 5Fr x 4cm pancreatic stent was placed under endoscopic and fluoroscopic guidance. Fluorscopy confirmed        excellent position. Attempts at biliary cannulation using needle knife access sphincterotomy resulted in cannulation of the distal bile duct. Contrast was injected. There was a fixed        distal CBD filling defect. The guidewire could not be advanced through the distal CBD lesion into the proximal bile duct despite using numerous guidewires. Decision was made to proceed        with EUS guided rendezvous technique. The echoendoscope was advanced to the duodenal bulb. The dilated proximal bile duct was targeted. A 19 gauge needle was punctured into the bile        duct under EUS guidance. Aspiration confirmed bilious fluid. Multiple attempts to traverse the guidewire (straight and angled) into the distal CBD were unsuccessful due to distal        biliary mass. Thus, procedure was aborted.                                                                                                Impression:          - EUS: A 11mm x 10mm hyperechoic soft tissue lesion in distal CBD with dilated proximal CBD. No pancreas mass was seen. Vasculature was patent                       - ERCP: Unsuccessful biliary cannulation with standard ERCP technique and EUS guided rendezvous technique due to distal biliary mass. s/p pancreatic duct stent.  Recommendation:      - Return patient to [facility] for [facility].                       - Clear liquid diet today. NPO after midnight.                       - Continue antibiotics.                       - IV hydration with LR.                - Monitor LFTs.                       - IR evaluation for biliary drainage.                       - Surgery evaluation for surgical resection (no evidence of mets or vascular  involvement) - discussed with Dr. George.                                                                                                        Attending Participation:       I was present and participated during the entire procedure, including non-key portions.                                                                                 __________________  Gregorio Curtis MD  8/15/2018 6:05:03 PM  Number of Addenda: 0    Note Initiated On: 8/15/2018 3:21 PM

## 2018-08-17 NOTE — PROGRESS NOTE ADULT - ASSESSMENT
76 yo man with PMH of HTN, prediabetes, HLD, afib s/p ablation 2004/2005 and DCCV 2017 on Eliquis with ILR, GERD.  Initially presented to [place] ED on 8/9 with 2 days of dark urine with CBD mass for ERCP.  AFib with RVR, suspicion of tachy-georges syndrome, CHADsVASC 4  Asymptomatic    #AFib with RVR  -- metoprolol 25 mg BID  -- monitor on tele  -- if bradycardia, would notify EP for possible PPM  -- currently deferring A/C for planned procedures    #HTN  -- continue metoprolol    #HLD  -- continue atorvastatin    Bryan Harrell MD  [id number] 78 yo man with PMH of HTN, pre-diabetes, HLD, afib s/p ablation 2004/2005 and DCCV 2017 on Eliquis with ILR, GERD.  Initially presented to [place] ED on 8/9 with 2 days of dark urine with CBD mass for ERCP.  AFib with RVR, suspicion of tachy-georges syndrome, CHADsVASC 4  Asymptomatic      #AFib with RVR  -- metoprolol 25 mg BID  -- monitor on tele  -- if bradycardia, would notify EP for possible PPM  -- resume A/C with Eliquis when feasible from a surgical standpoint.      #HTN  -- continue metoprolol    #HLD  -- continue atorvastatin      Bryan Harrell MD  [id number]    Ryder Allan M.D.  Cardiology Consult Service  [phone] or [phone]

## 2018-08-17 NOTE — CONSULT NOTE ADULT - ASSESSMENT
Mr. 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 Placement of CBD stent unsuccessful given the obstructing 11mm x 10mm hyperechoic soft tissue lesion in distal CBD with dilated proximal CBD. He underwent a PTC and drainage. The findings are highly suspicious for distal cholangiocarcinoma, initial assessment has ruled out distal metastatic disease and based on imaging modalities there is no vascular involvement thus this lesion is deemed resectable. Recommend CT Chest to  complete the metastatic work-up and cardiac optimization. Will recommend pancreaticoduodenectomy with typical reconstruction.

## 2018-08-17 NOTE — PROGRESS NOTE ADULT - PROBLEM SELECTOR PLAN 1
s/p ERCP/EUS done 8/15/18 with noted [id number] hyperchoic soft tissue  lesion in CBD with dilated proximal CBD  Unsuccessful Biliary cannulation on 8/15  S/p pancreatic duct stent placement 8/15  S/P recommended for Biliary drainage.   pt was seen by surgery team who recommend CT of chest to complete the work up  R/O Mets  COnt ZOsyn. s/p ERCP/EUS done 8/15/18 with noted [id number] hyperchoic soft tissue  lesion in CBD with dilated proximal CBD  Unsuccessful Biliary cannulation on 8/15  S/p pancreatic duct stent placement 8/15  S/P placement of internal/external biliary drainage catheter on 8/16 by IR / f/up culture report   pt was seen by surgery team who recommend CT of chest to complete the work up  R/O Mets  COnt ZOsyn.

## 2018-08-17 NOTE — PROGRESS NOTE ADULT - PROBLEM SELECTOR PLAN 2
s/p ERCP/EUS done 8/15/18 with noted [id number] hyperchoic soft tissue  lesion in CBD with dilated proximal CBD  S/p pancreatic duct stent placement.  Unsuccessful Biliary cannulation on 8/15  S/P  placement of internal/external biliary drainage catheter by IR on 8/16 f/up Fluid culture  pt was seen by surgery team who recommend CT of chest to complete the work up  R/O Mets s/p ERCP/EUS done 8/15/18 with noted [id number] hyperchoic soft tissue  lesion in CBD with dilated proximal CBD  S/p pancreatic duct stent placement.  Unsuccessful Biliary cannulation on 8/15  S/P  placement of internal/external biliary drainage catheter by IR on 8/16 f/up Fluid culture  pt was seen by surgery team who recommend CT of chest to complete the work up  R/O Mets which is ordered / please f/up results

## 2018-08-17 NOTE — CHART NOTE - NSCHARTNOTEFT_GEN_A_CORE
Pt s/p IR biliary drainage. Tbili improving. Please have pancreatobiliary surgeon Dr. George's team consulted for possible resection.

## 2018-08-17 NOTE — PROGRESS NOTE ADULT - SUBJECTIVE AND OBJECTIVE BOX
Cardiology Progress Note    Interval events: S/P perc poonam. Patient with pain at site. No chest pain, sob, palpitations.    Tele: afib/aflutter  with PVCs    Medications:  atorvastatin 10 milliGRAM(s) Oral at bedtime  dextrose 40% Gel 15 Gram(s) Oral once PRN  dextrose 5%. 1000 milliLiter(s) IV Continuous <Continuous>  dextrose 50% Injectable 12.5 Gram(s) IV Push once  dextrose 50% Injectable 25 Gram(s) IV Push once  dextrose 50% Injectable 25 Gram(s) IV Push once  diphenhydrAMINE   Capsule 25 milliGRAM(s) Oral every 6 hours PRN  glucagon  Injectable 1 milliGRAM(s) IntraMuscular once PRN  ibuprofen  Tablet 600 milliGRAM(s) Oral every 8 hours PRN  insulin lispro (HumaLOG) corrective regimen sliding scale   SubCutaneous every 6 hours  metoprolol tartrate 25 milliGRAM(s) Oral two times a day  morphine  - Injectable 2 milliGRAM(s) IV Push every 6 hours PRN  piperacillin/tazobactam IVPB. 3.375 Gram(s) IV Intermittent every 8 hours  sodium chloride 0.9%. 1000 milliLiter(s) IV Continuous <Continuous>      Review of Systems:  Constitutional: [ ] Fever [ ] Chills [ ] Fatigue [ ] Weight change   HEENT: [ ] Blurred vision [ ] Eye Pain [ ] Headache [ ] Runny nose [ ] Sore Throat   Respiratory: [ ] Cough [ ] Wheezing [ ] Shortness of breath  Cardiovascular: [ ] Chest Pain [ ] Palpitations [ ] CHACON [ ] PND [ ] Orthopnea  Gastrointestinal: [ ] Abdominal Pain [ ] Diarrhea [ ] Constipation [ ] Hemorrhoids [ ] Nausea [ ] Vomiting  Genitourinary: [ ] Nocturia [ ] Dysuria [ ] Incontinence  Extremities: [ ] Swelling [ ] Joint Pain  Neurologic: [ ] Focal deficit [ ] Paresthesias [ ] Syncope  Lymphatic: [ ] Swelling [ ] Lymphadenopathy   Skin: [ ] Rash [ ] Ecchymoses [ ] Wounds [ ] Lesions  Psychiatry: [ ] Depression [ ] Suicidal/Homicidal Ideation [ ] Anxiety [ ] Sleep Disturbances  [x] 10 point review of systems is otherwise negative except as mentioned above            [ ]Unable to obtain    Vitals:  T(C): 37 (08-17-18 @ 04:37), Max: 37.1 (08-16-18 @ 23:56)  HR: 108 (08-17-18 @ 04:37) (79 - 112)  BP: 148/97 (08-17-18 @ 04:37) (134/87 - 149/82)  BP(mean): --  RR: 18 (08-17-18 @ 04:37) (18 - 18)  SpO2: 94% (08-17-18 @ 04:37) (93% - 98%)  Wt(kg): --  Daily     Daily   I&O's Summary    15 Aug 2018 07:01  -  16 Aug 2018 07:00  --------------------------------------------------------  IN: 200 mL / OUT: 401 mL / NET: -201 mL    16 Aug 2018 07:01  -  17 Aug 2018 06:20  --------------------------------------------------------  IN: 200 mL / OUT: 200 mL / NET: 0 mL        Physical Exam:  Appearance: NAD  Eyes: PERRL, EOMI  HENT: Normal oral mucosa, NC/AT  Cardiovascular: normal S1 and S2, RRR, no m/r/g, no edema, normal JVP  Respiratory: Clear to auscultation bilaterally  Gastrointestinal: Soft, non-tender, non-distended, BS+  Musculoskeletal: No clubbing, no joint deformity   Neurologic: Non-focal  Lymphatic: No lymphadenopathy  Psychiatry: AAOx3, mood & affect appropriate  Skin: No rashes, no ecchymoses, no cyanosis    Labs:                        13.5   6.58  )-----------( 232      ( 16 Aug 2018 07:27 )             39.8     08-16    139  |  104  |  28<H>  ----------------------------<  231<H>  4.2   |  23  |  0.99    Ca    8.6      16 Aug 2018 06:38    TPro  5.7<L>  /  Alb  3.1<L>  /  TBili  12.4<H>  /  DBili  9.6<H>  /  AST  85<H>  /  ALT  134<H>  /  AlkPhos  272<H>  08-16                  New results/imaging: Cardiology Progress Note    Interval events: S/P perc poonam. Patient with pain at site. No chest pain, sob, palpitations.    Tele: afib/aflutter  with PVCs    Medications:  atorvastatin 10 milliGRAM(s) Oral at bedtime  dextrose 40% Gel 15 Gram(s) Oral once PRN  dextrose 5%. 1000 milliLiter(s) IV Continuous <Continuous>  dextrose 50% Injectable 12.5 Gram(s) IV Push once  dextrose 50% Injectable 25 Gram(s) IV Push once  dextrose 50% Injectable 25 Gram(s) IV Push once  diphenhydrAMINE   Capsule 25 milliGRAM(s) Oral every 6 hours PRN  glucagon  Injectable 1 milliGRAM(s) IntraMuscular once PRN  ibuprofen  Tablet 600 milliGRAM(s) Oral every 8 hours PRN  insulin lispro (HumaLOG) corrective regimen sliding scale   SubCutaneous every 6 hours  metoprolol tartrate 25 milliGRAM(s) Oral two times a day  morphine  - Injectable 2 milliGRAM(s) IV Push every 6 hours PRN  piperacillin/tazobactam IVPB. 3.375 Gram(s) IV Intermittent every 8 hours  sodium chloride 0.9%. 1000 milliLiter(s) IV Continuous <Continuous>      Review of Systems:  Constitutional: [ ] Fever [ ] Chills [ ] Fatigue [ ] Weight change   HEENT: [ ] Blurred vision [ ] Eye Pain [ ] Headache [ ] Runny nose [ ] Sore Throat   Respiratory: [ ] Cough [ ] Wheezing [ ] Shortness of breath  Cardiovascular: [ ] Chest Pain [ ] Palpitations [ ] CHACON [ ] PND [ ] Orthopnea  Gastrointestinal: [ ] Abdominal Pain [ ] Diarrhea [ ] Constipation [ ] Hemorrhoids [ ] Nausea [ ] Vomiting  Genitourinary: [ ] Nocturia [ ] Dysuria [ ] Incontinence  Extremities: [ ] Swelling [ ] Joint Pain  Neurologic: [ ] Focal deficit [ ] Paresthesias [ ] Syncope  Lymphatic: [ ] Swelling [ ] Lymphadenopathy   Skin: [ ] Rash [ ] Ecchymoses [ ] Wounds [ ] Lesions  Psychiatry: [ ] Depression [ ] Suicidal/Homicidal Ideation [ ] Anxiety [ ] Sleep Disturbances  [x] 10 point review of systems is otherwise negative except as mentioned above                Vitals:  T(C): 37 (08-17-18 @ 04:37), Max: 37.1 (08-16-18 @ 23:56)  HR: 108 (08-17-18 @ 04:37) (79 - 112)  BP: 148/97 (08-17-18 @ 04:37) (134/87 - 149/82)  RR: 18 (08-17-18 @ 04:37) (18 - 18)  SpO2: 94% (08-17-18 @ 04:37) (93% - 98%)      I&O's Summary    15 Aug 2018 07:01  -  16 Aug 2018 07:00  --------------------------------------------------------  IN: 200 mL / OUT: 401 mL / NET: -201 mL      Physical Exam:  Appearance: NAD  Eyes: PERRL, EOMI  HENT: Normal oral mucosa, NC/AT  Cardiovascular: normal S1 and S2, RRR, no m/r/g, no edema, normal JVP  Respiratory: Clear to auscultation bilaterally  Gastrointestinal: Soft, non-tender, non-distended, BS+  Musculoskeletal: No clubbing, no joint deformity   Neurologic: Non-focal  Lymphatic: No lymphadenopathy  Psychiatry: AAOx3, mood & affect appropriate  Skin: No rashes, no ecchymoses, no cyanosis    Tele: afib/aflutter  with PVCs    Labs:                      13.5   6.58  )-----------( 232      ( 16 Aug 2018 07:27 )             39.8     08-16  139  |  104  |  28<H>  ----------------------------<  231<H>  4.2   |  23  |  0.99    Ca    8.6      16 Aug 2018 06:38    TPro  5.7<L>  /  Alb  3.1<L>  /  TBili  12.4<H>  /  DBili  9.6<H>  /  AST  85<H>  /  ALT  134<H>  /  AlkPhos  272<H>  08-16

## 2018-08-17 NOTE — CHART NOTE - NSCHARTNOTEFT_GEN_A_CORE
CC: Pain @ site of biliary drainage catheter    HPI:  Pt seen in follow up s/p Successful placement of internal/external biliary drainage catheter, completed yesterday in IR.   Per documentation, there was  some bile spillage into peritoneum during the procedure & pt is at risk for bile peritonitis.    Biliary fluid noted in drainage bag, & emptied by RN.  Patient denies having abdominal pain, n/v, but has pain at insertion site of Rt sided biliary drain. Pt denies CP, SOB, dizziness, or palpitations.      ROS:  CONSTITUTIONAL:  No fever, chills, rigors  CARDIOVASCULAR:  No chest pain or palpitations  RESPIRATORY:   No SOB, cough.  No wheezing  GASTROINTESTINAL:  No abd pain, N/V; + pain at biliary drain site  EXTREMITIES:  No swelling or joint pain      PAST MEDICAL & SURGICAL HISTORY:  Past Medical History:  Afib  2006 s/p ablation 2006 , afib resolved  Appendicitis    Benign colon polyp    Bilateral cataracts    DM (Diabetes Mellitus)  X 3 years  GERD (Gastroesophageal Reflux Disease)  X 10 years  HTN - Hypertension  X 10 years, controlled  Hyperlipidemia  X 4 years  Kidney stone  "passed on own"  Male stress incontinence    MVP (Mitral Valve Prolapse)  X 8 years  Prostate cancer  2010  Renal Calculi  2008  Varicose vein  (L) 5 years ago.     Past Surgical History:  Cosmetic Surgery  chin implant 2004  Male stress incontinence  s/p artifical urinary sphincter 5/2012  S/P ablation operation for arrhythmia    S/P Appendectomy  1950  S/P arthroscopy  right shoulder 12/11  S/P Colonoscopy with Polypectomy  2009  S/P prostatectomy  radical robotic 6/10  Status post left knee replacement    Varicose vein-s/p vein stripping 5 years ago.        Vital Signs Last 24 Hrs  T(C): 37.1 (16 Aug 2018 23:56), Max: 37.1 (16 Aug 2018 23:56)  T(F): 98.8 (16 Aug 2018 23:56), Max: 98.8 (16 Aug 2018 23:56)  HR: 112 (16 Aug 2018 23:56) (79 - 118)  BP: 141/90 (16 Aug 2018 23:56) (112/75 - 149/82)  BP(mean): --  RR: 18 (16 Aug 2018 23:56) (18 - 19)  SpO2: 93% (16 Aug 2018 23:56) (93% - 98%)      Physical Exam:  General: WN/WD NAD, AOx3, nontoxic appearing  Head:  NC/AT  CV: RRR, S1S2   Respiratory: CTA B/L, nonlabored  Abdominal: (+) bowel sounds x4. Soft, non distended, non tender; (+) Rt biliary drain noted in place, drsg clean/dry/intact, draining bilious fluid.                   (+) pain at insertion site.  MSK: No BLLE edema, + peripheral pulses, FROM all 4 extremity  Skin: (+) warm, dry   Psych: Appropriate affect       Labs:                        13.5   6.58  )-----------( 232      ( 16 Aug 2018 07:27 )             39.8     08-16    139  |  104  |  28<H>  ----------------------------<  231<H>  4.2   |  23  |  0.99    Ca    8.6      16 Aug 2018 06:38  Phos  2.6     08-15  Mg     1.7     08-15    TPro  5.7<L>  /  Alb  3.1<L>  /  TBili  12.4<H>  /  DBili  9.6<H>  /  AST  85<H>  /  ALT  134<H>  /  AlkPhos  272<H>  08-16      Urinalysis Basic - ( 08-14 @ 17:13 )    Color: -- / Appearance: Large / SG: -- / pH: Negative  Gluc: Trace / Ketone: Yellow  / Bili: -- / Urobili: --   Blood: Occasional / Protein: -- / Nitrite: Moderate   Leuk Esterase: Trace / RBC: 1.015 / WBC --   Sq Epi: 100 / Non Sq Epi: -- / Bacteria: 6.0        Radiology:          Assessment & Plan:  HPI:  The patient is a 76 yo man with PMH of HTN, prediabetes, HLD, afib on eliquis with ILR, GERD initially presented to [place] ED on 8/9 with 2 days of dark urine. The patient was fine without symptoms on 8/8. No fevers, chills, SOB, CP, abdominal pain, hematuria, dysuria at time of admission to [place]. On admission patient noted to have scleral jaundice, transaminitis, elevated direct bilirubin and was admitted for obstructive jaundice. Initial CT stone hunt showed severe extrahepatic biliary duct obstruction with 2 cm dilation of CBD with marked intrahepatic biliary duct dilation. RUQ US showed no evidence of gallstones. The patient underwent an MRCP per GI after cardiac clearence for LR. 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 During admission patient spiked T max 100.5 and was started on cefepime for suspected cholangitis. The patient developed episode of Afib with RVR to 120's was started on low dose metoprolol 12.5 mg po. Oncologic w/u revelaed an elevated CEA level 4.2, elevated CA 19-9 to 76.3 and normal CA-125. The patient was subsequently transferred to [facility] for advanced endoscopy intervation requiring ERCP with possible stent and biopsy. On presentation at [place] the patient was actively rigoring with jaundice with continued dark urine and pale stools. No complaints of fever or abdominal pain.     Pt seen for follow up post biliary drain insertion in IR, complicated by some bile spillage into the peritoneum.   Based on clinical exam, abdominal exam is benign, no overt signs of bile peritonitis noted. He remains afebrile, VSS.  He does have pain at insertion site. Drsg. intact & dry.    PLAN:  -Continue to monitor  -Will maintain NPO status for now, & continue IVF  -Pain medication as prescribed prn  -Primary Team to follow up in AM, attending to follow       Sruthi Parr PA-C  Dept of Medicine    15 mins. spent during this pt. encounter

## 2018-08-17 NOTE — PROGRESS NOTE ADULT - ASSESSMENT
78 yo man with PMH of HTN, prediabetes, HLD, afib on eliquis, MVP,GERD initially presented to [place] ED on 8/9 with 2 days of dark urine.  On admission patient was noted to have scleral jaundice, transaminitis, elevated direct bilirubin and was admitted for obstructive jaundice. Initial CT stone hunt showed severe extrahepatic biliary duct obstruction with 2 cm dilation of CBD with marked intrahepatic biliary duct dilation. RUQ US showed no evidence of gallstones. 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  During admission patient spiked T max 100.5 and was started on cefepime for suspected cholangitis. The patient developed episode of Afib with RVR to 120's was started on low dose metoprolol 12.5 mg po. Oncologic w/u revealed an elevated CEA level 4.2, elevated CA 19-9 to 76.3 and normal CA-125. The patient was subsequently transferred to [facility] for advanced endoscopy intervation requiring ERCP/EUS with possible stent and biopsy .   Patient is s/p ERCP/EUS done 8/15/18 with noted [id number] hyperchoic soft tissue  lesion in CBD with dilated proximal CBD.  Unsuccessful Biliary cannulation on 8/15.  On 8/16/18, 76 yo man with PMH of HTN, prediabetes, HLD, afib on eliquis, MVP,GERD initially presented to [place] ED on 8/9 with 2 days of dark urine.  On admission patient was noted to have scleral jaundice, transaminitis, elevated direct bilirubin and was admitted for obstructive jaundice. Initial CT stone hunt showed severe extrahepatic biliary duct obstruction with 2 cm dilation of CBD with marked intrahepatic biliary duct dilation. RUQ US showed no evidence of gallstones. 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  During admission patient spiked T max 100.5 and was started on cefepime for suspected cholangitis. The patient developed episode of Afib with RVR to 120's was started on low dose metoprolol 12.5 mg po. Oncologic w/u revealed an elevated CEA level 4.2, elevated CA 19-9 to 76.3 and normal CA-125. The patient was subsequently transferred to [facility] for advanced endoscopy intervation requiring ERCP/EUS with possible stent and biopsy .   Patient is s/p ERCP/EUS done 8/15/18 with noted [id number] hyperchoic soft tissue  lesion in CBD with dilated proximal CBD.  Unsuccessful Biliary cannulation on 8/15.

## 2018-08-18 DIAGNOSIS — E87.6 HYPOKALEMIA: ICD-10-CM

## 2018-08-18 DIAGNOSIS — I48.91 UNSPECIFIED ATRIAL FIBRILLATION: ICD-10-CM

## 2018-08-18 DIAGNOSIS — Z29.9 ENCOUNTER FOR PROPHYLACTIC MEASURES, UNSPECIFIED: ICD-10-CM

## 2018-08-18 DIAGNOSIS — K83.0 CHOLANGITIS: ICD-10-CM

## 2018-08-18 LAB
ALBUMIN SERPL ELPH-MCNC: 3.3 G/DL — SIGNIFICANT CHANGE UP (ref 3.3–5)
ALP SERPL-CCNC: 251 U/L — HIGH (ref 40–120)
ALT FLD-CCNC: 101 U/L — HIGH (ref 10–45)
ANION GAP SERPL CALC-SCNC: 15 MMOL/L — SIGNIFICANT CHANGE UP (ref 5–17)
AST SERPL-CCNC: 46 U/L — HIGH (ref 10–40)
BILIRUB SERPL-MCNC: 7.5 MG/DL — HIGH (ref 0.2–1.2)
BUN SERPL-MCNC: 15 MG/DL — SIGNIFICANT CHANGE UP (ref 7–23)
CALCIUM SERPL-MCNC: 8.9 MG/DL — SIGNIFICANT CHANGE UP (ref 8.4–10.5)
CHLORIDE SERPL-SCNC: 101 MMOL/L — SIGNIFICANT CHANGE UP (ref 96–108)
CO2 SERPL-SCNC: 23 MMOL/L — SIGNIFICANT CHANGE UP (ref 22–31)
CREAT SERPL-MCNC: 0.81 MG/DL — SIGNIFICANT CHANGE UP (ref 0.5–1.3)
GLUCOSE BLDC GLUCOMTR-MCNC: 145 MG/DL — HIGH (ref 70–99)
GLUCOSE BLDC GLUCOMTR-MCNC: 150 MG/DL — HIGH (ref 70–99)
GLUCOSE BLDC GLUCOMTR-MCNC: 198 MG/DL — HIGH (ref 70–99)
GLUCOSE SERPL-MCNC: 164 MG/DL — HIGH (ref 70–99)
HCT VFR BLD CALC: 42.3 % — SIGNIFICANT CHANGE UP (ref 39–50)
HGB BLD-MCNC: 13.8 G/DL — SIGNIFICANT CHANGE UP (ref 13–17)
MAGNESIUM SERPL-MCNC: 1.7 MG/DL — SIGNIFICANT CHANGE UP (ref 1.6–2.6)
MCHC RBC-ENTMCNC: 30.7 PG — SIGNIFICANT CHANGE UP (ref 27–34)
MCHC RBC-ENTMCNC: 32.6 GM/DL — SIGNIFICANT CHANGE UP (ref 32–36)
MCV RBC AUTO: 94.2 FL — SIGNIFICANT CHANGE UP (ref 80–100)
PLATELET # BLD AUTO: 270 K/UL — SIGNIFICANT CHANGE UP (ref 150–400)
POTASSIUM SERPL-MCNC: 3.2 MMOL/L — LOW (ref 3.5–5.3)
POTASSIUM SERPL-SCNC: 3.2 MMOL/L — LOW (ref 3.5–5.3)
PROT SERPL-MCNC: 6.4 G/DL — SIGNIFICANT CHANGE UP (ref 6–8.3)
RBC # BLD: 4.49 M/UL — SIGNIFICANT CHANGE UP (ref 4.2–5.8)
RBC # FLD: 13.9 % — SIGNIFICANT CHANGE UP (ref 10.3–14.5)
SODIUM SERPL-SCNC: 139 MMOL/L — SIGNIFICANT CHANGE UP (ref 135–145)
WBC # BLD: 6.57 K/UL — SIGNIFICANT CHANGE UP (ref 3.8–10.5)
WBC # FLD AUTO: 6.57 K/UL — SIGNIFICANT CHANGE UP (ref 3.8–10.5)

## 2018-08-18 PROCEDURE — 99233 SBSQ HOSP IP/OBS HIGH 50: CPT

## 2018-08-18 PROCEDURE — 99232 SBSQ HOSP IP/OBS MODERATE 35: CPT | Mod: GC

## 2018-08-18 RX ORDER — POTASSIUM CHLORIDE 20 MEQ
40 PACKET (EA) ORAL EVERY 4 HOURS
Qty: 0 | Refills: 0 | Status: COMPLETED | OUTPATIENT
Start: 2018-08-18 | End: 2018-08-18

## 2018-08-18 RX ORDER — ENOXAPARIN SODIUM 100 MG/ML
90 INJECTION SUBCUTANEOUS EVERY 12 HOURS
Qty: 0 | Refills: 0 | Status: DISCONTINUED | OUTPATIENT
Start: 2018-08-18 | End: 2018-08-21

## 2018-08-18 RX ORDER — AMLODIPINE BESYLATE 2.5 MG/1
5 TABLET ORAL DAILY
Qty: 0 | Refills: 0 | Status: DISCONTINUED | OUTPATIENT
Start: 2018-08-18 | End: 2018-08-19

## 2018-08-18 RX ORDER — ONDANSETRON 8 MG/1
4 TABLET, FILM COATED ORAL ONCE
Qty: 0 | Refills: 0 | Status: COMPLETED | OUTPATIENT
Start: 2018-08-18 | End: 2018-08-18

## 2018-08-18 RX ADMIN — ATORVASTATIN CALCIUM 10 MILLIGRAM(S): 80 TABLET, FILM COATED ORAL at 21:02

## 2018-08-18 RX ADMIN — Medication 40 MILLIEQUIVALENT(S): at 10:52

## 2018-08-18 RX ADMIN — MORPHINE SULFATE 2 MILLIGRAM(S): 50 CAPSULE, EXTENDED RELEASE ORAL at 04:29

## 2018-08-18 RX ADMIN — PIPERACILLIN AND TAZOBACTAM 25 GRAM(S): 4; .5 INJECTION, POWDER, LYOPHILIZED, FOR SOLUTION INTRAVENOUS at 14:43

## 2018-08-18 RX ADMIN — ENOXAPARIN SODIUM 90 MILLIGRAM(S): 100 INJECTION SUBCUTANEOUS at 17:12

## 2018-08-18 RX ADMIN — Medication 40 MILLIEQUIVALENT(S): at 14:37

## 2018-08-18 RX ADMIN — AMLODIPINE BESYLATE 5 MILLIGRAM(S): 2.5 TABLET ORAL at 12:18

## 2018-08-18 RX ADMIN — Medication 25 MILLIGRAM(S): at 17:14

## 2018-08-18 RX ADMIN — ONDANSETRON 4 MILLIGRAM(S): 8 TABLET, FILM COATED ORAL at 20:28

## 2018-08-18 RX ADMIN — MORPHINE SULFATE 2 MILLIGRAM(S): 50 CAPSULE, EXTENDED RELEASE ORAL at 23:45

## 2018-08-18 RX ADMIN — MORPHINE SULFATE 2 MILLIGRAM(S): 50 CAPSULE, EXTENDED RELEASE ORAL at 05:00

## 2018-08-18 RX ADMIN — Medication 25 MILLIGRAM(S): at 05:07

## 2018-08-18 RX ADMIN — PIPERACILLIN AND TAZOBACTAM 25 GRAM(S): 4; .5 INJECTION, POWDER, LYOPHILIZED, FOR SOLUTION INTRAVENOUS at 21:02

## 2018-08-18 RX ADMIN — PIPERACILLIN AND TAZOBACTAM 25 GRAM(S): 4; .5 INJECTION, POWDER, LYOPHILIZED, FOR SOLUTION INTRAVENOUS at 05:07

## 2018-08-18 RX ADMIN — Medication 1: at 12:18

## 2018-08-18 RX ADMIN — MORPHINE SULFATE 2 MILLIGRAM(S): 50 CAPSULE, EXTENDED RELEASE ORAL at 23:10

## 2018-08-18 NOTE — PROGRESS NOTE ADULT - ASSESSMENT
76 yo man with PMH of HTN, pre-diabetes, HLD, afib s/p ablation 2004/2005 and DCCV 2017 on Eliquis with ILR, GERD.  Initially presented to [place] ED on 8/9 with 2 days of dark urine with CBD mass for ERCP likely 2/2 cholangiocarcinoma vs ampullary neoplasm without met disease.   AFib with RVR, suspicion of tachy-georges syndrome, CHADsVASC 4  Asymptomatic      #AFib with RVR  -- metoprolol 25 mg BID  -- monitor on tele  -- if bradycardia, would notify EP for possible PPM  -- resume A/C with Eliquis when feasible from a surgical standpoint.      #HTN  -- continue metoprolol    #HLD  -- continue atorvastatin 76 yo man with PMH of HTN, pre-diabetes, HLD, afib s/p ablation 2004/2005 and DCCV 2017 on Eliquis with ILR, GERD.  Initially presented to [place] ED on 8/9 with 2 days of dark urine with CBD mass for ERCP likely 2/2 cholangiocarcinoma vs ampullary neoplasm without met disease.   AFib/flutter with RVR, suspicion of tachy-georges syndrome, CHADsVASC 4  Asymptomatic      #AFib/flutter with RVR currently rate controlled  -- metoprolol tart 25 mg BID  -- monitor on tele  -- if bradycardia, would notify EP for possible PPM  -- resume A/C with Eliquis when feasible from a surgical standpoint.  Patient pending OR possibly on MOnday. Please order for TTE     #HTN  -- continue metoprolol    #HLD  -- continue atorvastatin    [id number]

## 2018-08-18 NOTE — PROGRESS NOTE ADULT - ASSESSMENT
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  Unsuccessful Biliary cannulation on 8/15. Hospital course c/b fever now on cefepime for suspected cholangitis and Afib with RVR.

## 2018-08-18 NOTE — PROGRESS NOTE ADULT - ATTENDING COMMENTS
Patient seen and examined; agree with Cardiology Fellow assessment and plan.  --Patient feels well and HR appears under slightly better control generally in 80s-100s.  --Check TTE.   --Continue with metoprolol for now.  --If significant bradycardia, will require EPS evaluation.  --AC when okay from a surgical perspective.
Rebecca Babcock   Hospitalist   
Rebecca Babcock   [place] Hospitalist   [phone].
Rebecca Babcock   Hospitalist

## 2018-08-18 NOTE — PROGRESS NOTE ADULT - PROBLEM SELECTOR PLAN 4
Afib s/p ablation 2004/2005 and DCCV 2017 on Eliquis with ILR, GERD ( EP Dr Sellers in [place]).  -monitor on tele  -c/w metoprolol 25mg bid  -eliquis on hold for now as planned surgery for monday, will f/u with surgery if can use full dose lovenox or hepain ggt until then  -cardiology following for optomization preop  -TTE pending Afib s/p ablation 2004/2005 and DCCV 2017 on Eliquis with ILR, GERD ( EP Dr Sellers in [place]), [id number] =2  -monitor on tele  -c/w metoprolol 25mg bid  -eliquis on hold for now, will a/c with lovenox 90mg q12 until surgery planned  -TTE pending  -cardio following for surgical pre-optimization

## 2018-08-18 NOTE — PROGRESS NOTE ADULT - PROBLEM SELECTOR PLAN 2
s/p ERCP/EUS done 8/15/18 with noted [id number] hyperechoic soft tissue  lesion in CBD with dilated proximal CBD  S/p pancreatic duct stent placement.  CEA level 4.2, elevated CA 19-9 to 76.3 and normal CA-125.   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  -subsequent MRCP showed 1.8 cm enhancing soft tissue mass in the distal CBD causing biliary dilatation likely Ampullary neoplasm versus cholangiocarcinoma  -s/p ERCP/EUS done 8/15/18 with noted [id number] hyperechoic soft tissue  lesion likely consistent  CEA level 4.2, elevated CA 19-9 to 76.3 and normal CA-125.   -CT  chest with unchanged 2mm lung nodule since 2009, no evidence of mets  -surgery consult appreciated, plan for [facility] this week, f/u date with surgery

## 2018-08-18 NOTE — PROGRESS NOTE ADULT - PROBLEM SELECTOR PLAN 1
-obstructive jaundice due to CBD mass likely cholangio vs ampullary malignancy  -initially CT showed severe extrahepatic biliary duct obstruction 2cm CBD dilation  and marked intrahepatic blurry ductal dilatation.  -subsequent MRCP showed 1.8 cm enhancing soft tissue mass in the distal CBD causing biliary dilatation likely Ampullary neoplasm versus cholangiocarcinoma  -s/p ERCP/EUS done 8/15/18 with noted [id number] hyperchoic soft tissue  lesion in CBD  -Unsuccessful Biliary cannulation on 8/15 but S/p pancreatic duct stent placement by GI  -s/p placement of internal/external biliary drainage catheter on 8/16 by IR  -LFT downtrending slowly -obstructive jaundice due to CBD mass seen on MRCP, ERCP  -s/p ERCP/EUS done 8/15/18 with noted [id number] hyperchoic soft tissue  lesion in CBD  -Unsuccessful Biliary cannulation on 8/15 but S/p pancreatic duct stent placement by GI  -s/p IR guided internal/external biliary drainage catheter on 8/16   -LFT downtrending slowly  -monitor PCT output

## 2018-08-18 NOTE — PROGRESS NOTE ADULT - PROBLEM SELECTOR PLAN 3
CBD dilation with CBD mass c/b fever being treated for cholangitis  -c/w zosyn 3.375 gram q8  -afebrile, no leukocytosis, HD stable  -bile culture NGTD  -appreciate GI recs CBD dilation with CBD mass c/b fever being treated for cholangitis  -c/w zosyn 3.375 gram q8  -afebrile, no leukocytosis, HD stable  -bile culture NGTD  -appreciate GI recs, advance to full liquid diet

## 2018-08-18 NOTE — PROGRESS NOTE ADULT - SUBJECTIVE AND OBJECTIVE BOX
Patient is a 77y old  Male who presents with a chief complaint of ERCP (14 Aug 2018 23:50)      SUBJECTIVE / OVERNIGHT EVENTS:    MEDICATIONS  (STANDING):  atorvastatin 10 milliGRAM(s) Oral at bedtime  dextrose 5%. 1000 milliLiter(s) (50 mL/Hr) IV Continuous <Continuous>  dextrose 50% Injectable 12.5 Gram(s) IV Push once  dextrose 50% Injectable 25 Gram(s) IV Push once  dextrose 50% Injectable 25 Gram(s) IV Push once  insulin lispro (HumaLOG) corrective regimen sliding scale   SubCutaneous three times a day before meals  metoprolol tartrate 25 milliGRAM(s) Oral two times a day  piperacillin/tazobactam IVPB. 3.375 Gram(s) IV Intermittent every 8 hours  sodium chloride 0.9%. 1000 milliLiter(s) (100 mL/Hr) IV Continuous <Continuous>    MEDICATIONS  (PRN):  dextrose 40% Gel 15 Gram(s) Oral once PRN Blood Glucose LESS THAN 70 milliGRAM(s)/deciliter  diphenhydrAMINE   Capsule 25 milliGRAM(s) Oral every 6 hours PRN Rash and/or Itching  glucagon  Injectable 1 milliGRAM(s) IntraMuscular once PRN Glucose LESS THAN 70 milligrams/deciliter  ibuprofen  Tablet 600 milliGRAM(s) Oral every 8 hours PRN fever > 100.4  morphine  - Injectable 2 milliGRAM(s) IV Push every 6 hours PRN Severe Pain (7 - 10)      Vital Signs Last 24 Hrs  T(C): 36.7 (18 Aug 2018 04:14), Max: 36.8 (17 Aug 2018 11:45)  T(F): 98.1 (18 Aug 2018 04:14), Max: 98.2 (17 Aug 2018 11:45)  HR: 62 (18 Aug 2018 04:14) (62 - 117)  BP: 167/89 (18 Aug 2018 04:14) (145/77 - 183/107)  BP(mean): 100 (17 Aug 2018 20:02) (100 - 100)  RR: 18 (18 Aug 2018 04:14) (18 - 18)  SpO2: 96% (18 Aug 2018 04:14) (96% - 100%)  CAPILLARY BLOOD GLUCOSE      POCT Blood Glucose.: 145 mg/dL (18 Aug 2018 07:20)  POCT Blood Glucose.: 192 mg/dL (17 Aug 2018 16:25)  POCT Blood Glucose.: 147 mg/dL (17 Aug 2018 11:44)    I&O's Summary    17 Aug 2018 07:01  -  18 Aug 2018 07:00  --------------------------------------------------------  IN: 1920 mL / OUT: 2090 mL / NET: -170 mL    18 Aug 2018 07:01  -  18 Aug 2018 09:24  --------------------------------------------------------  IN: 0 mL / OUT: 450 mL / NET: -450 mL        PHYSICAL EXAM:  GENERAL: NAD, well-developed  HEAD:  Atraumatic, Normocephalic  EYES: EOMI, PERRLA, conjunctiva and sclera clear  NECK: Supple, No JVD  CHEST/LUNG: Clear to auscultation bilaterally; No wheeze  HEART: Regular rate and rhythm; No murmurs, rubs, or gallops  ABDOMEN: Soft, Nontender, Nondistended; Bowel sounds present  EXTREMITIES:  2+ Peripheral Pulses, No clubbing, cyanosis, or edema  PSYCH: AAOx3  NEUROLOGY: non-focal  SKIN: No rashes or lesions    LABS:                        13.8   6.57  )-----------( 270      ( 18 Aug 2018 06:51 )             42.3     08-18    139  |  101  |  15  ----------------------------<  164<H>  3.2<L>   |  23  |  0.81    Ca    8.9      18 Aug 2018 05:45  Mg     1.7     08-18    TPro  6.4  /  Alb  3.3  /  TBili  7.5<H>  /  DBili  x   /  AST  46<H>  /  ALT  101<H>  /  AlkPhos  251<H>  08-18              Culture - Body Fluid with Gram Stain (collected 16 Aug 2018 22:15)  Source: .Body Fluid Bile Fluid  Gram Stain (17 Aug 2018 00:10):    No polymorphonuclear cells seen per low power field    No organisms seen per oil power field  Preliminary Report (17 Aug 2018 19:44):    No growth to date.    Culture - Urine (collected 15 Aug 2018 13:50)  Source: .Urine Clean Catch (Midstream)  Final Report (16 Aug 2018 11:22):    No growth          RADIOLOGY & ADDITIONAL TESTS:    Imaging Personally Reviewed:    Consultant(s) Notes Reviewed:      Care Discussed with Consultants/Other Providers: Patient is a 77y old  Male who presents with a chief complaint of ERCP (14 Aug 2018 23:50)      SUBJECTIVE / OVERNIGHT EVENTS: No overnight events. Feels well. Denies cp, sob, palpitations, abd pain, nausea, vomiting. Tolerating CLD, wants a more advanced diet.     MEDICATIONS  (STANDING):  atorvastatin 10 milliGRAM(s) Oral at bedtime  dextrose 5%. 1000 milliLiter(s) (50 mL/Hr) IV Continuous <Continuous>  dextrose 50% Injectable 12.5 Gram(s) IV Push once  dextrose 50% Injectable 25 Gram(s) IV Push once  dextrose 50% Injectable 25 Gram(s) IV Push once  insulin lispro (HumaLOG) corrective regimen sliding scale   SubCutaneous three times a day before meals  metoprolol tartrate 25 milliGRAM(s) Oral two times a day  piperacillin/tazobactam IVPB. 3.375 Gram(s) IV Intermittent every 8 hours  sodium chloride 0.9%. 1000 milliLiter(s) (100 mL/Hr) IV Continuous <Continuous>    MEDICATIONS  (PRN):  dextrose 40% Gel 15 Gram(s) Oral once PRN Blood Glucose LESS THAN 70 milliGRAM(s)/deciliter  diphenhydrAMINE   Capsule 25 milliGRAM(s) Oral every 6 hours PRN Rash and/or Itching  glucagon  Injectable 1 milliGRAM(s) IntraMuscular once PRN Glucose LESS THAN 70 milligrams/deciliter  ibuprofen  Tablet 600 milliGRAM(s) Oral every 8 hours PRN fever > 100.4  morphine  - Injectable 2 milliGRAM(s) IV Push every 6 hours PRN Severe Pain (7 - 10)      Vital Signs Last 24 Hrs  T(C): 36.7 (18 Aug 2018 04:14), Max: 36.8 (17 Aug 2018 11:45)  T(F): 98.1 (18 Aug 2018 04:14), Max: 98.2 (17 Aug 2018 11:45)  HR: 62 (18 Aug 2018 04:14) (62 - 117)  BP: 167/89 (18 Aug 2018 04:14) (145/77 - 183/107)  BP(mean): 100 (17 Aug 2018 20:02) (100 - 100)  RR: 18 (18 Aug 2018 04:14) (18 - 18)  SpO2: 96% (18 Aug 2018 04:14) (96% - 100%)  CAPILLARY BLOOD GLUCOSE      POCT Blood Glucose.: 145 mg/dL (18 Aug 2018 07:20)  POCT Blood Glucose.: 192 mg/dL (17 Aug 2018 16:25)  POCT Blood Glucose.: 147 mg/dL (17 Aug 2018 11:44)    I&O's Summary    17 Aug 2018 07:01  -  18 Aug 2018 07:00  --------------------------------------------------------  IN: 1920 mL / OUT: 2090 mL / NET: -170 mL    18 Aug 2018 07:01  -  18 Aug 2018 09:24  --------------------------------------------------------  IN: 0 mL / OUT: 450 mL / NET: -450 mL        PHYSICAL EXAM:  GENERAL: NAD, well-developed  HEAD:  Atraumatic, Normocephalic  EYES: scleral icterus  CHEST/LUNG: Clear to auscultation bilaterally; No wheeze  HEART: Iregular rate and rhythm  ABDOMEN: Soft, Nontender, Nondistended; Bowel sounds present. PCT draining yellow bile, site intact  EXTREMITIES:  2+ Peripheral Pulses, No clubbing, cyanosis, or edema  PSYCH: AAOx3  NEUROLOGY: non-focal  SKIN: jaundice    LABS:                        13.8   6.57  )-----------( 270      ( 18 Aug 2018 06:51 )             42.3     08-18    139  |  101  |  15  ----------------------------<  164<H>  3.2<L>   |  23  |  0.81    Ca    8.9      18 Aug 2018 05:45  Mg     1.7     08-18    TPro  6.4  /  Alb  3.3  /  TBili  7.5<H>  /  DBili  x   /  AST  46<H>  /  ALT  101<H>  /  AlkPhos  251<H>  08-18              Culture - Body Fluid with Gram Stain (collected 16 Aug 2018 22:15)  Source: .Body Fluid Bile Fluid  Gram Stain (17 Aug 2018 00:10):    No polymorphonuclear cells seen per low power field    No organisms seen per oil power field  Preliminary Report (17 Aug 2018 19:44):    No growth to date.    Culture - Urine (collected 15 Aug 2018 13:50)  Source: .Urine Clean Catch (Midstream)  Final Report (16 Aug 2018 11:22):    No growth          RADIOLOGY & ADDITIONAL TESTS:    tele reviewed: Aflutter 70-110s pvcs    Imaging Personally Reviewed: CT chest: NTERPRETATION:  CLINICAL INFORMATION: Obstructive jaundice with common   bile duct mass on ERCP/US. Evaluate for metastatic disease.    COMPARISON: MR abdomen 8/13/2018. Chest radiograph 2/22/2017.    PROCEDURE:   CT of the Chest was performed with intravenous contrast.  90 ml of Omnipaque 350 was injected intravenously. 10 ml were discarded.  Sagittal and coronal reformats were performed.      FINDINGS:    CHEST:     LUNGS AND LARGE AIRWAYS: Patent central airways. Bibasilar passive   atelectasis. 2 mm right middle lobe nodule stable from 2009.  PLEURA: No pleural effusion.  VESSELS: Atheromatous disease of the aorta.  HEART: Heart size is normal.No pericardial effusion.  MEDIASTINUM AND KATARZYNA: No lymphadenopathy.  CHEST WALL AND LOWER NECK: Within normal limits.  VISUALIZED UPPER ABDOMEN: Common bile duct stent with mild fat stranding   adjacent to the gallbladder, common bile duct, and pancreatic head. The   previous described mass in the region of the distal pancreatic   duct/pancreatic head is again noted. Tiny hiatal hernia.   BONES: Degenerative changes of the spine.    IMPRESSION:  2 mm right middle lobe nodule stable from 2009. Lungs otherwise clear.         Consultant(s) Notes Reviewed:  cards, GI, surgery    Care Discussed with Consultants/Other Providers:

## 2018-08-18 NOTE — PROGRESS NOTE ADULT - SUBJECTIVE AND OBJECTIVE BOX
24H hour events:     MEDICATIONS:  metoprolol tartrate 25 milliGRAM(s) Oral two times a day    piperacillin/tazobactam IVPB. 3.375 Gram(s) IV Intermittent every 8 hours      diphenhydrAMINE   Capsule 25 milliGRAM(s) Oral every 6 hours PRN  ibuprofen  Tablet 600 milliGRAM(s) Oral every 8 hours PRN  morphine  - Injectable 2 milliGRAM(s) IV Push every 6 hours PRN      atorvastatin 10 milliGRAM(s) Oral at bedtime  dextrose 40% Gel 15 Gram(s) Oral once PRN  dextrose 50% Injectable 12.5 Gram(s) IV Push once  dextrose 50% Injectable 25 Gram(s) IV Push once  dextrose 50% Injectable 25 Gram(s) IV Push once  glucagon  Injectable 1 milliGRAM(s) IntraMuscular once PRN  insulin lispro (HumaLOG) corrective regimen sliding scale   SubCutaneous three times a day before meals    dextrose 5%. 1000 milliLiter(s) IV Continuous <Continuous>  sodium chloride 0.9%. 1000 milliLiter(s) IV Continuous <Continuous>      REVIEW OF SYSTEMS:  General: no fatigue/malaise, weight loss/gain.  Skin: no rashes.  Ophthalmologic: no blurred vision, no loss of vision. 	  ENT: no sore throat, rhinorrhea, sinus congestion.  Respiratory: no SOB, cough or wheeze.  Gastrointestinal:  no N/V/D, no melena/hematemesis/hematochezia.  Genitourinary: no dysuria/hesitancy or hematuria.  Musculoskeletal: no myalgias or arthralgias.  Neurological: no changes in vision or hearing, no lightheadedness/dizziness, no syncope/near syncope	  Psychiatric: no unusual stress/anxiety.   Hematology/Lymphatics: no unusual bleeding, bruising and no lymphadenopathy.  Endocrine: no unusual thirst.   All others negative except as stated above and in HPI.    PHYSICAL EXAM:  T(C): 36.7 (08-18-18 @ 04:14), Max: 36.8 (08-17-18 @ 11:45)  HR: 62 (08-18-18 @ 04:14) (62 - 117)  BP: 167/89 (08-18-18 @ 04:14) (145/77 - 183/107)  RR: 18 (08-18-18 @ 04:14) (18 - 18)  SpO2: 96% (08-18-18 @ 04:14) (96% - 100%)  Wt(kg): --  I&O's Summary    17 Aug 2018 07:01  -  18 Aug 2018 07:00  --------------------------------------------------------  IN: 1920 mL / OUT: 2090 mL / NET: -170 mL    18 Aug 2018 07:01  -  18 Aug 2018 08:26  --------------------------------------------------------  IN: 0 mL / OUT: 450 mL / NET: -450 mL        Appearance: Normal	  HEENT:   Normal oral mucosa, PERRL, EOMI	  Lymphatic: No lymphadenopathy  Cardiovascular: Normal S1 S2, No JVD, No murmurs, No edema  Respiratory: Lungs clear to auscultation	  Psychiatry: A & O x 3, Mood & affect appropriate  Gastrointestinal:  Soft, Non-tender, + BS	  Skin: No rashes, No ecchymoses, No cyanosis	  Neurologic: Non-focal  Extremities: Normal range of motion, No clubbing, cyanosis or edema  Vascular: Peripheral pulses palpable 2+ bilaterally        LABS:	 	    CBC Full  -  ( 18 Aug 2018 06:51 )  WBC Count : 6.57 K/uL  Hemoglobin : 13.8 g/dL  Hematocrit : 42.3 %  Platelet Count - Automated : 270 K/uL  Mean Cell Volume : 94.2 fl  Mean Cell Hemoglobin : 30.7 pg  Mean Cell Hemoglobin Concentration : 32.6 gm/dL  Auto Neutrophil # : x  Auto Lymphocyte # : x  Auto Monocyte # : x  Auto Eosinophil # : x  Auto Basophil # : x  Auto Neutrophil % : x  Auto Lymphocyte % : x  Auto Monocyte % : x  Auto Eosinophil % : x  Auto Basophil % : x    08-18    139  |  101  |  15  ----------------------------<  164<H>  3.2<L>   |  23  |  0.81  08-17    139  |  104  |  23  ----------------------------<  165<H>  3.6   |  23  |  0.88    Ca    8.9      18 Aug 2018 05:45  Ca    8.2<L>      17 Aug 2018 06:27  Mg     1.7     08-18    TPro  6.4  /  Alb  3.3  /  TBili  7.5<H>  /  DBili  x   /  AST  46<H>  /  ALT  101<H>  /  AlkPhos  251<H>  08-18  TPro  6.1  /  Alb  3.1<L>  /  TBili  7.5<H>  /  DBili  x   /  AST  75<H>  /  ALT  128<H>  /  AlkPhos  269<H>  08-17 24H hour events:   no events  feels well  wife at bedside    tele: variable flutter  currently 81    MEDICATIONS:  metoprolol tartrate 25 milliGRAM(s) Oral two times a day    piperacillin/tazobactam IVPB. 3.375 Gram(s) IV Intermittent every 8 hours      diphenhydrAMINE   Capsule 25 milliGRAM(s) Oral every 6 hours PRN  ibuprofen  Tablet 600 milliGRAM(s) Oral every 8 hours PRN  morphine  - Injectable 2 milliGRAM(s) IV Push every 6 hours PRN      atorvastatin 10 milliGRAM(s) Oral at bedtime  dextrose 40% Gel 15 Gram(s) Oral once PRN  dextrose 50% Injectable 12.5 Gram(s) IV Push once  dextrose 50% Injectable 25 Gram(s) IV Push once  dextrose 50% Injectable 25 Gram(s) IV Push once  glucagon  Injectable 1 milliGRAM(s) IntraMuscular once PRN  insulin lispro (HumaLOG) corrective regimen sliding scale   SubCutaneous three times a day before meals    dextrose 5%. 1000 milliLiter(s) IV Continuous <Continuous>  sodium chloride 0.9%. 1000 milliLiter(s) IV Continuous <Continuous>      REVIEW OF SYSTEMS:  General: no fatigue/malaise, weight loss/gain.  Skin: no rashes.  Ophthalmologic: no blurred vision, no loss of vision. 	  ENT: no sore throat, rhinorrhea, sinus congestion.  Respiratory: no SOB, cough or wheeze.  Gastrointestinal:  no N/V/D, no melena/hematemesis/hematochezia.  Genitourinary: no dysuria/hesitancy or hematuria.  Musculoskeletal: no myalgias or arthralgias.  Neurological: no changes in vision or hearing, no lightheadedness/dizziness, no syncope/near syncope	  Psychiatric: no unusual stress/anxiety.   Hematology/Lymphatics: no unusual bleeding, bruising and no lymphadenopathy.  Endocrine: no unusual thirst.   All others negative except as stated above and in HPI.    PHYSICAL EXAM:  T(C): 36.7 (08-18-18 @ 04:14), Max: 36.8 (08-17-18 @ 11:45)  HR: 62 (08-18-18 @ 04:14) (62 - 117)  BP: 167/89 (08-18-18 @ 04:14) (145/77 - 183/107)  RR: 18 (08-18-18 @ 04:14) (18 - 18)  SpO2: 96% (08-18-18 @ 04:14) (96% - 100%)  Wt(kg): --  I&O's Summary    17 Aug 2018 07:01  -  18 Aug 2018 07:00  --------------------------------------------------------  IN: 1920 mL / OUT: 2090 mL / NET: -170 mL    18 Aug 2018 07:01  -  18 Aug 2018 08:26  --------------------------------------------------------  IN: 0 mL / OUT: 450 mL / NET: -450 mL        Appearance: Normal	  HEENT:   Normal oral mucosa, PERRL, EOMI	  Lymphatic: No lymphadenopathy  Cardiovascular: Normal S1 S2, No JVD, No murmurs, No edema  Respiratory: Lungs clear to auscultation	  Psychiatry: A & O x 3, Mood & affect appropriate  Gastrointestinal:  +Perc poonam drain soft   Skin: No rashes, No ecchymoses, No cyanosis	  Neurologic: Non-focal  Extremities: Normal range of motion, No clubbing, cyanosis or edema  Vascular: Peripheral pulses palpable 2+ bilaterally        LABS:	 	    CBC Full  -  ( 18 Aug 2018 06:51 )  WBC Count : 6.57 K/uL  Hemoglobin : 13.8 g/dL  Hematocrit : 42.3 %  Platelet Count - Automated : 270 K/uL  Mean Cell Volume : 94.2 fl  Mean Cell Hemoglobin : 30.7 pg  Mean Cell Hemoglobin Concentration : 32.6 gm/dL  Auto Neutrophil # : x  Auto Lymphocyte # : x  Auto Monocyte # : x  Auto Eosinophil # : x  Auto Basophil # : x  Auto Neutrophil % : x  Auto Lymphocyte % : x  Auto Monocyte % : x  Auto Eosinophil % : x  Auto Basophil % : x    08-18    139  |  101  |  15  ----------------------------<  164<H>  3.2<L>   |  23  |  0.81  08-17    139  |  104  |  23  ----------------------------<  165<H>  3.6   |  23  |  0.88    Ca    8.9      18 Aug 2018 05:45  Ca    8.2<L>      17 Aug 2018 06:27  Mg     1.7     08-18    TPro  6.4  /  Alb  3.3  /  TBili  7.5<H>  /  DBili  x   /  AST  46<H>  /  ALT  101<H>  /  AlkPhos  251<H>  08-18  TPro  6.1  /  Alb  3.1<L>  /  TBili  7.5<H>  /  DBili  x   /  AST  75<H>  /  ALT  128<H>  /  AlkPhos  269<H>  08-17 24H hour events:   no events  feels well  wife at bedside    tele: variable flutter  currently 81    MEDICATIONS:  metoprolol tartrate 25 milliGRAM(s) Oral two times a day  piperacillin/tazobactam IVPB. 3.375 Gram(s) IV Intermittent every 8 hours  diphenhydrAMINE   Capsule 25 milliGRAM(s) Oral every 6 hours PRN  ibuprofen  Tablet 600 milliGRAM(s) Oral every 8 hours PRN  morphine  - Injectable 2 milliGRAM(s) IV Push every 6 hours PRN  atorvastatin 10 milliGRAM(s) Oral at bedtime  dextrose 40% Gel 15 Gram(s) Oral once PRN  dextrose 50% Injectable 12.5 Gram(s) IV Push once  dextrose 50% Injectable 25 Gram(s) IV Push once  dextrose 50% Injectable 25 Gram(s) IV Push once  glucagon  Injectable 1 milliGRAM(s) IntraMuscular once PRN  insulin lispro (HumaLOG) corrective regimen sliding scale   SubCutaneous three times a day before meals  dextrose 5%. 1000 milliLiter(s) IV Continuous <Continuous>  sodium chloride 0.9%. 1000 milliLiter(s) IV Continuous <Continuous>      REVIEW OF SYSTEMS:  General: no fatigue/malaise, weight loss/gain.  Skin: no rashes.  Ophthalmologic: no blurred vision, no loss of vision. 	  ENT: no sore throat, rhinorrhea, sinus congestion.  Respiratory: no SOB, cough or wheeze.  Gastrointestinal:  no N/V/D, no melena/hematemesis/hematochezia.  Genitourinary: no dysuria/hesitancy or hematuria.  Musculoskeletal: no myalgias or arthralgias.  Neurological: no changes in vision or hearing, no lightheadedness/dizziness, no syncope/near syncope	  Psychiatric: no unusual stress/anxiety.   Hematology/Lymphatics: no unusual bleeding, bruising and no lymphadenopathy.  Endocrine: no unusual thirst.   All others negative except as stated above and in HPI.    PHYSICAL EXAM:  T(C): 36.7 (08-18-18 @ 04:14), Max: 36.8 (08-17-18 @ 11:45)  HR: 62 (08-18-18 @ 04:14) (62 - 117)  BP: 167/89 (08-18-18 @ 04:14) (145/77 - 183/107)  RR: 18 (08-18-18 @ 04:14) (18 - 18)  SpO2: 96% (08-18-18 @ 04:14) (96% - 100%)  Wt(kg): --  I&O's Summary    17 Aug 2018 07:01  -  18 Aug 2018 07:00  --------------------------------------------------------  IN: 1920 mL / OUT: 2090 mL / NET: -170 mL    18 Aug 2018 07:01  -  18 Aug 2018 08:26  --------------------------------------------------------  IN: 0 mL / OUT: 450 mL / NET: -450 mL        Appearance: Normal	  HEENT:   Normal oral mucosa, PERRL, EOMI	  Lymphatic: No lymphadenopathy  Cardiovascular: Normal S1 S2, No JVD, No murmurs, No edema  Respiratory: Lungs clear to auscultation	  Psychiatry: A & O x 3, Mood & affect appropriate  Gastrointestinal:  +Perc poonam drain soft   Skin: No rashes, No ecchymoses, No cyanosis	  Neurologic: Non-focal  Extremities: Normal range of motion, No clubbing, cyanosis or edema  Vascular: Peripheral pulses palpable 2+ bilaterally        LABS:	 	    CBC Full  -  ( 18 Aug 2018 06:51 )  WBC Count : 6.57 K/uL  Hemoglobin : 13.8 g/dL  Hematocrit : 42.3 %  Platelet Count - Automated : 270 K/uL  Mean Cell Volume : 94.2 fl  Mean Cell Hemoglobin : 30.7 pg  Mean Cell Hemoglobin Concentration : 32.6 gm/dL  Auto Neutrophil # : x  Auto Lymphocyte # : x  Auto Monocyte # : x  Auto Eosinophil # : x  Auto Basophil # : x  Auto Neutrophil % : x  Auto Lymphocyte % : x  Auto Monocyte % : x  Auto Eosinophil % : x  Auto Basophil % : x    08-18    139  |  101  |  15  ----------------------------<  164<H>  3.2<L>   |  23  |  0.81  08-17    139  |  104  |  23  ----------------------------<  165<H>  3.6   |  23  |  0.88    Ca    8.9      18 Aug 2018 05:45  Ca    8.2<L>      17 Aug 2018 06:27  Mg     1.7     08-18    TPro  6.4  /  Alb  3.3  /  TBili  7.5<H>  /  DBili  x   /  AST  46<H>  /  ALT  101<H>  /  AlkPhos  251<H>  08-18  TPro  6.1  /  Alb  3.1<L>  /  TBili  7.5<H>  /  DBili  x   /  AST  75<H>  /  ALT  128<H>  /  AlkPhos  269<H>  08-17

## 2018-08-18 NOTE — PROGRESS NOTE ADULT - PROBLEM SELECTOR PLAN 5
BP elevated  -d/c IVF  -c/w metoprolol 25mg bid  -monitor BP BP elevated  -d/c IVF  -start amlodipine 5mg daily (takes 10mg at home), uptitrate as needed  -c/w metoprolol 25mg bid  -monitor BP

## 2018-08-19 LAB
ALBUMIN SERPL ELPH-MCNC: 3.1 G/DL — LOW (ref 3.3–5)
ALP SERPL-CCNC: 207 U/L — HIGH (ref 40–120)
ALT FLD-CCNC: 82 U/L — HIGH (ref 10–45)
ANION GAP SERPL CALC-SCNC: 16 MMOL/L — SIGNIFICANT CHANGE UP (ref 5–17)
AST SERPL-CCNC: 44 U/L — HIGH (ref 10–40)
BILIRUB SERPL-MCNC: 5.9 MG/DL — HIGH (ref 0.2–1.2)
BUN SERPL-MCNC: 16 MG/DL — SIGNIFICANT CHANGE UP (ref 7–23)
CALCIUM SERPL-MCNC: 8.5 MG/DL — SIGNIFICANT CHANGE UP (ref 8.4–10.5)
CHLORIDE SERPL-SCNC: 101 MMOL/L — SIGNIFICANT CHANGE UP (ref 96–108)
CO2 SERPL-SCNC: 23 MMOL/L — SIGNIFICANT CHANGE UP (ref 22–31)
CREAT SERPL-MCNC: 0.81 MG/DL — SIGNIFICANT CHANGE UP (ref 0.5–1.3)
CULTURE RESULTS: SIGNIFICANT CHANGE UP
CULTURE RESULTS: SIGNIFICANT CHANGE UP
GLUCOSE BLDC GLUCOMTR-MCNC: 140 MG/DL — HIGH (ref 70–99)
GLUCOSE BLDC GLUCOMTR-MCNC: 148 MG/DL — HIGH (ref 70–99)
GLUCOSE BLDC GLUCOMTR-MCNC: 153 MG/DL — HIGH (ref 70–99)
GLUCOSE BLDC GLUCOMTR-MCNC: 173 MG/DL — HIGH (ref 70–99)
GLUCOSE SERPL-MCNC: 162 MG/DL — HIGH (ref 70–99)
HCT VFR BLD CALC: 41.6 % — SIGNIFICANT CHANGE UP (ref 39–50)
HGB BLD-MCNC: 14.2 G/DL — SIGNIFICANT CHANGE UP (ref 13–17)
MAGNESIUM SERPL-MCNC: 1.7 MG/DL — SIGNIFICANT CHANGE UP (ref 1.6–2.6)
MCHC RBC-ENTMCNC: 31.7 PG — SIGNIFICANT CHANGE UP (ref 27–34)
MCHC RBC-ENTMCNC: 34.1 GM/DL — SIGNIFICANT CHANGE UP (ref 32–36)
MCV RBC AUTO: 92.9 FL — SIGNIFICANT CHANGE UP (ref 80–100)
PHOSPHATE SERPL-MCNC: 3 MG/DL — SIGNIFICANT CHANGE UP (ref 2.5–4.5)
PLATELET # BLD AUTO: 257 K/UL — SIGNIFICANT CHANGE UP (ref 150–400)
POTASSIUM SERPL-MCNC: 3 MMOL/L — LOW (ref 3.5–5.3)
POTASSIUM SERPL-SCNC: 3 MMOL/L — LOW (ref 3.5–5.3)
PROT SERPL-MCNC: 6.3 G/DL — SIGNIFICANT CHANGE UP (ref 6–8.3)
RBC # BLD: 4.48 M/UL — SIGNIFICANT CHANGE UP (ref 4.2–5.8)
RBC # FLD: 14.1 % — SIGNIFICANT CHANGE UP (ref 10.3–14.5)
SODIUM SERPL-SCNC: 140 MMOL/L — SIGNIFICANT CHANGE UP (ref 135–145)
SPECIMEN SOURCE: SIGNIFICANT CHANGE UP
SPECIMEN SOURCE: SIGNIFICANT CHANGE UP
WBC # BLD: 6.3 K/UL — SIGNIFICANT CHANGE UP (ref 3.8–10.5)
WBC # FLD AUTO: 6.3 K/UL — SIGNIFICANT CHANGE UP (ref 3.8–10.5)

## 2018-08-19 PROCEDURE — 99233 SBSQ HOSP IP/OBS HIGH 50: CPT

## 2018-08-19 RX ORDER — AMLODIPINE BESYLATE 2.5 MG/1
10 TABLET ORAL DAILY
Qty: 0 | Refills: 0 | Status: DISCONTINUED | OUTPATIENT
Start: 2018-08-20 | End: 2018-08-21

## 2018-08-19 RX ORDER — AMLODIPINE BESYLATE 2.5 MG/1
5 TABLET ORAL ONCE
Qty: 0 | Refills: 0 | Status: COMPLETED | OUTPATIENT
Start: 2018-08-19 | End: 2018-08-19

## 2018-08-19 RX ORDER — MAGNESIUM SULFATE 500 MG/ML
1 VIAL (ML) INJECTION ONCE
Qty: 0 | Refills: 0 | Status: COMPLETED | OUTPATIENT
Start: 2018-08-19 | End: 2018-08-19

## 2018-08-19 RX ORDER — POTASSIUM CHLORIDE 20 MEQ
40 PACKET (EA) ORAL EVERY 4 HOURS
Qty: 0 | Refills: 0 | Status: COMPLETED | OUTPATIENT
Start: 2018-08-19 | End: 2018-08-19

## 2018-08-19 RX ADMIN — Medication 40 MILLIEQUIVALENT(S): at 14:55

## 2018-08-19 RX ADMIN — Medication 40 MILLIEQUIVALENT(S): at 17:27

## 2018-08-19 RX ADMIN — AMLODIPINE BESYLATE 5 MILLIGRAM(S): 2.5 TABLET ORAL at 07:52

## 2018-08-19 RX ADMIN — Medication 25 MILLIGRAM(S): at 19:50

## 2018-08-19 RX ADMIN — PIPERACILLIN AND TAZOBACTAM 25 GRAM(S): 4; .5 INJECTION, POWDER, LYOPHILIZED, FOR SOLUTION INTRAVENOUS at 14:55

## 2018-08-19 RX ADMIN — Medication 1: at 12:30

## 2018-08-19 RX ADMIN — Medication 25 MILLIGRAM(S): at 17:27

## 2018-08-19 RX ADMIN — Medication 40 MILLIEQUIVALENT(S): at 09:46

## 2018-08-19 RX ADMIN — Medication 25 MILLIGRAM(S): at 07:52

## 2018-08-19 RX ADMIN — PIPERACILLIN AND TAZOBACTAM 25 GRAM(S): 4; .5 INJECTION, POWDER, LYOPHILIZED, FOR SOLUTION INTRAVENOUS at 05:49

## 2018-08-19 RX ADMIN — ENOXAPARIN SODIUM 90 MILLIGRAM(S): 100 INJECTION SUBCUTANEOUS at 17:27

## 2018-08-19 RX ADMIN — PIPERACILLIN AND TAZOBACTAM 25 GRAM(S): 4; .5 INJECTION, POWDER, LYOPHILIZED, FOR SOLUTION INTRAVENOUS at 21:44

## 2018-08-19 RX ADMIN — ENOXAPARIN SODIUM 90 MILLIGRAM(S): 100 INJECTION SUBCUTANEOUS at 05:49

## 2018-08-19 RX ADMIN — AMLODIPINE BESYLATE 5 MILLIGRAM(S): 2.5 TABLET ORAL at 10:36

## 2018-08-19 RX ADMIN — Medication 100 GRAM(S): at 09:46

## 2018-08-19 RX ADMIN — ATORVASTATIN CALCIUM 10 MILLIGRAM(S): 80 TABLET, FILM COATED ORAL at 21:44

## 2018-08-19 NOTE — PROGRESS NOTE ADULT - ASSESSMENT
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  Unsuccessful Biliary cannulation on 8/15. Hospital course c/b fever now on cefepime for suspected cholangitis and Afib with RVR.

## 2018-08-19 NOTE — PROGRESS NOTE ADULT - PROBLEM SELECTOR PLAN 5
BP uncontrolled  -increase amlodipine to 10g daily (10mg at home)  -c/w metoprolol 25mg bid  -also on olmesartan 40mg at home, if BP uncontrolled will start losartan equivalent  -monitor BP

## 2018-08-19 NOTE — PROGRESS NOTE ADULT - PROBLEM SELECTOR PLAN 3
CBD dilation with CBD mass c/b fever being treated for cholangitis  -c/w zosyn 3.375 gram q8  -afebrile, no leukocytosis, HD stable  -bile culture with gram variable rods, f/u speciation   -appreciate GI recs, advance to full liquid diet

## 2018-08-19 NOTE — PROGRESS NOTE ADULT - PROBLEM SELECTOR PLAN 4
Afib s/p ablation 2004/2005 and DCCV 2017 on Eliquis with ILR, GERD ( EP Dr Sellers in [place]), [id number] =2  -c/w metoprolol 25mg bid  -monitor on tele  -eliquis on hold for now, a/c with lovenox 90mg q12 until surgery planned  -TTE pending  -cardio following for surgical pre-optimization

## 2018-08-19 NOTE — PROGRESS NOTE ADULT - SUBJECTIVE AND OBJECTIVE BOX
Patient is a 77y old  Male who presents with a chief complaint of ERCP (14 Aug 2018 23:50)      SUBJECTIVE / OVERNIGHT EVENTS: No overnight events. Feels well, tolerated full liquid diet, requesting solid diet. No abd pain, f/c, n/v. Denies cp, sob, palpitations, lightheadedness.     MEDICATIONS  (STANDING):  amLODIPine   Tablet 5 milliGRAM(s) Oral once  atorvastatin 10 milliGRAM(s) Oral at bedtime  dextrose 5%. 1000 milliLiter(s) (50 mL/Hr) IV Continuous <Continuous>  dextrose 50% Injectable 12.5 Gram(s) IV Push once  dextrose 50% Injectable 25 Gram(s) IV Push once  dextrose 50% Injectable 25 Gram(s) IV Push once  enoxaparin Injectable 90 milliGRAM(s) SubCutaneous every 12 hours  insulin lispro (HumaLOG) corrective regimen sliding scale   SubCutaneous three times a day before meals  metoprolol tartrate 25 milliGRAM(s) Oral two times a day  piperacillin/tazobactam IVPB. 3.375 Gram(s) IV Intermittent every 8 hours  potassium chloride    Tablet ER 40 milliEquivalent(s) Oral every 4 hours    MEDICATIONS  (PRN):  dextrose 40% Gel 15 Gram(s) Oral once PRN Blood Glucose LESS THAN 70 milliGRAM(s)/deciliter  diphenhydrAMINE   Capsule 25 milliGRAM(s) Oral every 6 hours PRN Rash and/or Itching  glucagon  Injectable 1 milliGRAM(s) IntraMuscular once PRN Glucose LESS THAN 70 milligrams/deciliter  ibuprofen  Tablet 600 milliGRAM(s) Oral every 8 hours PRN fever > 100.4  morphine  - Injectable 2 milliGRAM(s) IV Push every 6 hours PRN Severe Pain (7 - 10)      Vital Signs Last 24 Hrs  T(C): 36.4 (19 Aug 2018 05:01), Max: 37.1 (18 Aug 2018 21:20)  T(F): 97.5 (19 Aug 2018 05:01), Max: 98.7 (18 Aug 2018 21:20)  HR: 87 (19 Aug 2018 05:01) (80 - 103)  BP: 150/95 (19 Aug 2018 05:01) (137/85 - 167/90)  BP(mean): 114 (18 Aug 2018 21:20) (103 - 114)  RR: 18 (19 Aug 2018 05:01) (18 - 18)  SpO2: 96% (19 Aug 2018 05:01) (94% - 97%)  CAPILLARY BLOOD GLUCOSE      POCT Blood Glucose.: 148 mg/dL (19 Aug 2018 07:29)  POCT Blood Glucose.: 150 mg/dL (18 Aug 2018 16:19)  POCT Blood Glucose.: 198 mg/dL (18 Aug 2018 11:57)    I&O's Summary    18 Aug 2018 07:01  -  19 Aug 2018 07:00  --------------------------------------------------------  IN: 940 mL / OUT: 2625 mL / NET: -1685 mL    19 Aug 2018 07:01  -  19 Aug 2018 10:10  --------------------------------------------------------  IN: 0 mL / OUT: 375 mL / NET: -375 mL          PHYSICAL EXAM:  GENERAL: NAD, well-developed  HEAD:  Atraumatic, Normocephalic  EYES: scleral icterus  CHEST/LUNG: Clear to auscultation bilaterally; No wheeze  HEART: Irregular rate and rhythm  ABDOMEN: Soft, Nontender, Nondistended; Bowel sounds present. PCT draining yellow bile, site intact  EXTREMITIES:  2+ Peripheral Pulses, No clubbing, cyanosis, or edema  PSYCH: AAOx3  NEUROLOGY: non-focal  SKIN: jaundice    LABS:                        14.2   6.30  )-----------( 257      ( 19 Aug 2018 07:56 )             41.6     08-19    140  |  101  |  16  ----------------------------<  162<H>  3.0<L>   |  23  |  0.81    Ca    8.5      19 Aug 2018 06:29  Phos  3.0     08-19  Mg     1.7     08-19    TPro  6.3  /  Alb  3.1<L>  /  TBili  5.9<H>  /  DBili  x   /  AST  44<H>  /  ALT  82<H>  /  AlkPhos  207<H>  08-19              Culture - Body Fluid with Gram Stain (collected 16 Aug 2018 22:15)  Source: .Body Fluid Bile Fluid  Gram Stain (17 Aug 2018 00:10):    No polymorphonuclear cells seen per low power field    No organisms seen per oil power field  Preliminary Report (18 Aug 2018 21:42):    Growth in fluid media only Gram Variable Rods          RADIOLOGY & ADDITIONAL TESTS:    tele reviewed: Afib 90-110s, pvcs    Imaging Personally Reviewed:    Consultant(s) Notes Reviewed:  cards    Care Discussed with Consultants/Other Providers:

## 2018-08-19 NOTE — PROGRESS NOTE ADULT - PROBLEM SELECTOR PLAN 2
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   -CT  chest with unchanged 2mm lung nodule since 2009, no evidence of mets  -surgery consult appreciated, plan for Whipple this week, f/u date with surgery

## 2018-08-19 NOTE — PROGRESS NOTE ADULT - PROBLEM SELECTOR PLAN 1
-obstructive jaundice due to CBD mass seen on MRCP, ERCP  -s/p ERCP with Unsuccessful Biliary cannulation on 8/15 but S/p pancreatic duct stent placement by GI  -s/p IR guided internal/external biliary drainage catheter on 8/16   -LFT downtrending  -monitor PCT output, 2.6L/24 hours  -advance diet

## 2018-08-19 NOTE — PROGRESS NOTE ADULT - SUBJECTIVE AND OBJECTIVE BOX
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 Clinically doing well, no abdominal pain. Bilirubin downtrending (5.9).  Awaiting TTE for medical risk optimization for eventual surgery.  Afib on elaquis (currently on hold per medicine team for possibility of surgery).  Was started on therapeutic lovenox yesterday. Patient doing well, no abdominal pain, jaundice improved.    SUBJECTIVE: Pt seen + examined  SOB:  [] YES [x] NO  Dyspnea: []YES [x]NO  Chest Discomfort: [] YES [x] NO    Nausea: [] YES [x] NO           Vomiting: [] YES [x] NO  Flatus: [x] YES [] NO             Bowel Movement: [x] YES [] NO  Diarrhea: [] YES [x] NO         Constipation: [] YES [x] NO     Pain (0-10):   0  Pain Control Adequate: [x] YES [] NO  Void: [x]YES []No      ---------------------------------------------------------------------------------------------   VITALS  T(C): 36.4 (08-19-18 @ 05:01), Max: 37.1 (08-18-18 @ 21:20)  HR: 87 (08-19-18 @ 05:01) (80 - 103)  BP: 150/95 (08-19-18 @ 05:01) (137/85 - 167/90)  RR: 18 (08-19-18 @ 05:01) (18 - 18)  SpO2: 96% (08-19-18 @ 05:01) (94% - 97%)  CAPILLARY BLOOD GLUCOSE      POCT Blood Glucose.: 148 mg/dL (19 Aug 2018 07:29)  POCT Blood Glucose.: 150 mg/dL (18 Aug 2018 16:19)  POCT Blood Glucose.: 198 mg/dL (18 Aug 2018 11:57)      Is/Os    08-18 @ 07:01 - 08-19 @ 07:00  --------------------------------------------------------  IN:    Oral Fluid: 840 mL    Solution: 100 mL  Total IN: 940 mL    OUT:    Drain: 2625 mL  Total OUT: 2625 mL    Total NET: -1685 mL      08-19 @ 07:01 - 08-19 @ 10:13  --------------------------------------------------------  IN:  Total IN: 0 mL    OUT:    Drain: 375 mL  Total OUT: 375 mL    Total NET: -375 mL          ---------------------------------------------------------------------------------------------   PHYSICAL EXAM:   General: NAD, Lying in bed comfortably, NAD  Neuro: alert, oriented x3  HEENT: NC/AT, EOMI, scleral icterus  GI/Abd: Soft, NT/ND, no rebound/guarding, no masses palpated. PTC drain draining bilious fluid.    ---------------------------------------------------------------------------------------------   MEDICATIONS (STANDING): amLODIPine   Tablet 5 milliGRAM(s) Oral once  atorvastatin 10 milliGRAM(s) Oral at bedtime  dextrose 5%. 1000 milliLiter(s) IV Continuous <Continuous>  dextrose 50% Injectable 12.5 Gram(s) IV Push once  dextrose 50% Injectable 25 Gram(s) IV Push once  dextrose 50% Injectable 25 Gram(s) IV Push once  enoxaparin Injectable 90 milliGRAM(s) SubCutaneous every 12 hours  insulin lispro (HumaLOG) corrective regimen sliding scale   SubCutaneous three times a day before meals  metoprolol tartrate 25 milliGRAM(s) Oral two times a day  piperacillin/tazobactam IVPB. 3.375 Gram(s) IV Intermittent every 8 hours  potassium chloride    Tablet ER 40 milliEquivalent(s) Oral every 4 hours    MEDICATIONS (PRN):dextrose 40% Gel 15 Gram(s) Oral once PRN Blood Glucose LESS THAN 70 milliGRAM(s)/deciliter  diphenhydrAMINE   Capsule 25 milliGRAM(s) Oral every 6 hours PRN Rash and/or Itching  glucagon  Injectable 1 milliGRAM(s) IntraMuscular once PRN Glucose LESS THAN 70 milligrams/deciliter  ibuprofen  Tablet 600 milliGRAM(s) Oral every 8 hours PRN fever > 100.4  morphine  - Injectable 2 milliGRAM(s) IV Push every 6 hours PRN Severe Pain (7 - 10)      ---------------------------------------------------------------------------------------------   LABS  CBC (08-19 @ 07:56)                              14.2                           6.30    )----------------(  257        --    % Neutrophils, --    % Lymphocytes, ANC: --                                  41.6    CBC (08-18 @ 06:51)                              13.8                           6.57    )----------------(  270        --    % Neutrophils, --    % Lymphocytes, ANC: --                                  42.3      BMP (08-19 @ 06:29)             140     |  101     |  16    		Ca++ --      Ca 8.5                ---------------------------------( 162<H>		Mg 1.7                3.0<L>  |  23      |  0.81  			Ph 3.0     BMP (08-18 @ 05:45)             139     |  101     |  15    		Ca++ --      Ca 8.9                ---------------------------------( 164<H>		Mg 1.7                3.2<L>  |  23      |  0.81  			Ph --        LFTs (08-19 @ 06:29)      TPro 6.3 / Alb 3.1<L> / TBili 5.9<H> / DBili -- / AST 44<H> / ALT 82<H> / AlkPhos 207<H>  LFTs (08-18 @ 05:45)      TPro 6.4 / Alb 3.3 / TBili 7.5<H> / DBili -- / AST 46<H> / <H> / AlkPhos 251<H>              IMAGING STUDIES      ---------------------------------------------------------------------------------------------

## 2018-08-20 LAB
-  AMIKACIN: SIGNIFICANT CHANGE UP
-  AMOXICILLIN/CLAVULANIC ACID: SIGNIFICANT CHANGE UP
-  AMPICILLIN/SULBACTAM: SIGNIFICANT CHANGE UP
-  AMPICILLIN: SIGNIFICANT CHANGE UP
-  AZTREONAM: SIGNIFICANT CHANGE UP
-  CEFAZOLIN: SIGNIFICANT CHANGE UP
-  CEFEPIME: SIGNIFICANT CHANGE UP
-  CEFOXITIN: SIGNIFICANT CHANGE UP
-  CEFTRIAXONE: SIGNIFICANT CHANGE UP
-  CIPROFLOXACIN: SIGNIFICANT CHANGE UP
-  ERTAPENEM: SIGNIFICANT CHANGE UP
-  GENTAMICIN: SIGNIFICANT CHANGE UP
-  IMIPENEM: SIGNIFICANT CHANGE UP
-  LEVOFLOXACIN: SIGNIFICANT CHANGE UP
-  MEROPENEM: SIGNIFICANT CHANGE UP
-  PIPERACILLIN/TAZOBACTAM: SIGNIFICANT CHANGE UP
-  TOBRAMYCIN: SIGNIFICANT CHANGE UP
-  TRIMETHOPRIM/SULFAMETHOXAZOLE: SIGNIFICANT CHANGE UP
ALBUMIN SERPL ELPH-MCNC: 3.3 G/DL — SIGNIFICANT CHANGE UP (ref 3.3–5)
ALP SERPL-CCNC: 215 U/L — HIGH (ref 40–120)
ALT FLD-CCNC: 88 U/L — HIGH (ref 10–45)
ANION GAP SERPL CALC-SCNC: 13 MMOL/L — SIGNIFICANT CHANGE UP (ref 5–17)
AST SERPL-CCNC: 51 U/L — HIGH (ref 10–40)
BILIRUB SERPL-MCNC: 5.9 MG/DL — HIGH (ref 0.2–1.2)
BUN SERPL-MCNC: 27 MG/DL — HIGH (ref 7–23)
CALCIUM SERPL-MCNC: 9.4 MG/DL — SIGNIFICANT CHANGE UP (ref 8.4–10.5)
CHLORIDE SERPL-SCNC: 104 MMOL/L — SIGNIFICANT CHANGE UP (ref 96–108)
CO2 SERPL-SCNC: 21 MMOL/L — LOW (ref 22–31)
CREAT SERPL-MCNC: 0.94 MG/DL — SIGNIFICANT CHANGE UP (ref 0.5–1.3)
GLUCOSE BLDC GLUCOMTR-MCNC: 142 MG/DL — HIGH (ref 70–99)
GLUCOSE BLDC GLUCOMTR-MCNC: 154 MG/DL — HIGH (ref 70–99)
GLUCOSE BLDC GLUCOMTR-MCNC: 161 MG/DL — HIGH (ref 70–99)
GLUCOSE BLDC GLUCOMTR-MCNC: 208 MG/DL — HIGH (ref 70–99)
GLUCOSE SERPL-MCNC: 183 MG/DL — HIGH (ref 70–99)
HCT VFR BLD CALC: 45 % — SIGNIFICANT CHANGE UP (ref 39–50)
HGB BLD-MCNC: 14.9 G/DL — SIGNIFICANT CHANGE UP (ref 13–17)
MCHC RBC-ENTMCNC: 31.8 PG — SIGNIFICANT CHANGE UP (ref 27–34)
MCHC RBC-ENTMCNC: 33.1 GM/DL — SIGNIFICANT CHANGE UP (ref 32–36)
MCV RBC AUTO: 95.9 FL — SIGNIFICANT CHANGE UP (ref 80–100)
METHOD TYPE: SIGNIFICANT CHANGE UP
PLATELET # BLD AUTO: 314 K/UL — SIGNIFICANT CHANGE UP (ref 150–400)
POTASSIUM SERPL-MCNC: 4.1 MMOL/L — SIGNIFICANT CHANGE UP (ref 3.5–5.3)
POTASSIUM SERPL-SCNC: 4.1 MMOL/L — SIGNIFICANT CHANGE UP (ref 3.5–5.3)
PROT SERPL-MCNC: 6.8 G/DL — SIGNIFICANT CHANGE UP (ref 6–8.3)
RBC # BLD: 4.69 M/UL — SIGNIFICANT CHANGE UP (ref 4.2–5.8)
RBC # FLD: 13.9 % — SIGNIFICANT CHANGE UP (ref 10.3–14.5)
SODIUM SERPL-SCNC: 138 MMOL/L — SIGNIFICANT CHANGE UP (ref 135–145)
WBC # BLD: 7.86 K/UL — SIGNIFICANT CHANGE UP (ref 3.8–10.5)
WBC # FLD AUTO: 7.86 K/UL — SIGNIFICANT CHANGE UP (ref 3.8–10.5)

## 2018-08-20 PROCEDURE — 99233 SBSQ HOSP IP/OBS HIGH 50: CPT

## 2018-08-20 PROCEDURE — 99233 SBSQ HOSP IP/OBS HIGH 50: CPT | Mod: GC

## 2018-08-20 PROCEDURE — 93306 TTE W/DOPPLER COMPLETE: CPT | Mod: 26

## 2018-08-20 RX ORDER — FAMOTIDINE 10 MG/ML
20 INJECTION INTRAVENOUS
Qty: 0 | Refills: 0 | Status: DISCONTINUED | OUTPATIENT
Start: 2018-08-20 | End: 2018-08-21

## 2018-08-20 RX ORDER — SIMETHICONE 80 MG/1
80 TABLET, CHEWABLE ORAL EVERY 8 HOURS
Qty: 0 | Refills: 0 | Status: DISCONTINUED | OUTPATIENT
Start: 2018-08-20 | End: 2018-08-21

## 2018-08-20 RX ADMIN — AMLODIPINE BESYLATE 10 MILLIGRAM(S): 2.5 TABLET ORAL at 05:33

## 2018-08-20 RX ADMIN — ATORVASTATIN CALCIUM 10 MILLIGRAM(S): 80 TABLET, FILM COATED ORAL at 21:41

## 2018-08-20 RX ADMIN — Medication 25 MILLIGRAM(S): at 05:32

## 2018-08-20 RX ADMIN — PIPERACILLIN AND TAZOBACTAM 25 GRAM(S): 4; .5 INJECTION, POWDER, LYOPHILIZED, FOR SOLUTION INTRAVENOUS at 05:32

## 2018-08-20 RX ADMIN — PIPERACILLIN AND TAZOBACTAM 25 GRAM(S): 4; .5 INJECTION, POWDER, LYOPHILIZED, FOR SOLUTION INTRAVENOUS at 21:41

## 2018-08-20 RX ADMIN — Medication 25 MILLIGRAM(S): at 21:41

## 2018-08-20 RX ADMIN — Medication 2: at 12:16

## 2018-08-20 RX ADMIN — PIPERACILLIN AND TAZOBACTAM 25 GRAM(S): 4; .5 INJECTION, POWDER, LYOPHILIZED, FOR SOLUTION INTRAVENOUS at 13:00

## 2018-08-20 RX ADMIN — Medication 25 MILLIGRAM(S): at 17:15

## 2018-08-20 RX ADMIN — ENOXAPARIN SODIUM 90 MILLIGRAM(S): 100 INJECTION SUBCUTANEOUS at 17:14

## 2018-08-20 RX ADMIN — FAMOTIDINE 20 MILLIGRAM(S): 10 INJECTION INTRAVENOUS at 17:17

## 2018-08-20 RX ADMIN — Medication 1: at 16:36

## 2018-08-20 RX ADMIN — ENOXAPARIN SODIUM 90 MILLIGRAM(S): 100 INJECTION SUBCUTANEOUS at 05:32

## 2018-08-20 NOTE — PROGRESS NOTE ADULT - ASSESSMENT
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  Plan for eventual surgical resection with pancreaticoduodenectomy.     PLAN  - Surgical Oncologist Dr. George to see the patient in the hospital on Tuesday 8/21. Recommend continue T lovenox for afib. Hold elaquis, Plan for whipple this week. Will follow up with date for surgery.   - Follow up TTE for medical and cardiac risk optimization and stratification for planned surgery.  - Trend bilirubin, WBC  - Diet: Reg  - Pain control with PO/IV medications.    - Continue home medications  - OOB, ambulate as tolerated  - continue chemical VTE ppx    - Plan discussed with Dr. George     [address] team surgery

## 2018-08-20 NOTE — PROGRESS NOTE ADULT - PROBLEM SELECTOR PLAN 1
-obstructive jaundice due to CBD mass seen on MRCP, ERCP  -s/p ERCP with Unsuccessful Biliary cannulation but S/p pancreatic duct stent placement by GI on 8/15  -s/p IR guided internal/external biliary drainage catheter on 8/16   -LFT downtrending slowly  -monitor PCT output, 3.0L/24 hours  -tolerating regular diet

## 2018-08-20 NOTE — PROGRESS NOTE ADULT - SUBJECTIVE AND OBJECTIVE BOX
********Cardiology Progress Note***************    CC: "I feel well" "much better since stent."      INTERVAL HISTORY:  Comfortable. No awareness of AF.  No chest, throat, jaw. arm or upper back pain.  No dyspnea, orthopnea, PND.  No edema. No palpitations, dizziness or syncope.     Reports 2 prior ablations and one cardioversion and recent reduction n BB dose b/o significant bradycardia.    HPI:  The patient is a 78 yo man with PMH of HTN, prediabetes, HLD, afib on eliquis with ILR, GERD initially presented to [place] ED on 8/9 with 2 days of dark urine. The patient was fine without symptoms on 8/8. No fevers, chills, SOB, CP, abdominal pain, hematuria, dysuria at time of admission to [place]. On admission patient noted to have scleral jaundice, transaminitis, elevated direct bilirubin and was admitted for obstructive jaundice. Initial CT stone hunt showed severe extrahepatic biliary duct obstruction with 2 cm dilation of CBD with marked intrahepatic biliary duct dilation. RUQ US showed no evidence of gallstones. The patient underwent an MRCP per GI after cardiac clearence for LR. 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 During admission patient spiked T max 100.5 and was started on cefepime for suspected cholangitis. The patient developed episode of Afib with RVR to 120's was started on low dose metoprolol 12.5 mg po. Oncologic w/u revelaed an elevated CEA level 4.2, elevated CA 19-9 to 76.3 and normal CA-125. The patient was subsequently transferred to [facility] for advanced endoscopy intervation requiring ERCP with possible stent and biopsy. On presentation at [place] the patient was actively rigoring with jaundice with continued dark urine and pale stools. No complaints of fever or abdominal pain.               Allergies    No Known Allergies    Intolerances    	    MEDICATIONS:  amLODIPine   Tablet 10 milliGRAM(s) Oral daily  enoxaparin Injectable 90 milliGRAM(s) SubCutaneous every 12 hours  metoprolol tartrate 25 milliGRAM(s) Oral two times a day    piperacillin/tazobactam IVPB. 3.375 Gram(s) IV Intermittent every 8 hours      diphenhydrAMINE   Capsule 25 milliGRAM(s) Oral every 6 hours PRN  ibuprofen  Tablet 600 milliGRAM(s) Oral every 8 hours PRN  morphine  - Injectable 2 milliGRAM(s) IV Push every 6 hours PRN    famotidine    Tablet 20 milliGRAM(s) Oral two times a day  simethicone 80 milliGRAM(s) Chew every 8 hours PRN    atorvastatin 10 milliGRAM(s) Oral at bedtime  dextrose 40% Gel 15 Gram(s) Oral once PRN  dextrose 50% Injectable 12.5 Gram(s) IV Push once  dextrose 50% Injectable 25 Gram(s) IV Push once  dextrose 50% Injectable 25 Gram(s) IV Push once  glucagon  Injectable 1 milliGRAM(s) IntraMuscular once PRN  insulin lispro (HumaLOG) corrective regimen sliding scale   SubCutaneous three times a day before meals    dextrose 5%. 1000 milliLiter(s) IV Continuous <Continuous>      PAST MEDICAL & SURGICAL HISTORY:  Male stress incontinence  Kidney stone: &quot;passed on own&quot;  Varicose vein: (L) 5 years ago  Bilateral cataracts  Appendicitis  Benign colon polyp  Prostate cancer: 2010  Afib: 2006 s/p ablation 2006 , afib resolved  Hyperlipidemia: X 4 years  GERD (Gastroesophageal Reflux Disease): X 10 years  DM (Diabetes Mellitus): X 3 years  Renal Calculi: 2008  MVP (Mitral Valve Prolapse): X 8 years  HTN - Hypertension: X 10 years, controlled  Status post left knee replacement  S/P ablation operation for arrhythmia  Male stress incontinence: s/p artifical urinary sphincter 5/2012  Varicose vein-s/p vein stripping 5 years ago  S/P arthroscopy: right shoulder 12/11  S/P prostatectomy: radical robotic 6/10  Cosmetic Surgery: chin implant 2004  S/P Colonoscopy with Polypectomy: 2009  S/P Appendectomy: 1950      FAMILY HISTORY:  No pertinent family history in first degree relatives      SOCIAL HISTORY:  unchanged    REVIEW OF SYSTEMS:    CONSTITUTIONAL: No fever or chills.  appetite is normal  EYES: no visual disturbances  ENMT:  No epistaxis  RESPIRATORY: No cough, wheezing  or hemoptysis  CARDIOVASCULAR: see HPI  GASTROINTESTINAL: No abdominal pain. No nausea, vomiting, or hematemesis; No diarrhea or constipation. No melena or hematochezia.  GENITOURINARY: No dysuria, frequency, hematuria  NEUROLOGICAL: No headache.  No amaurosis.  No new focal motor or sensory disturbance  SKIN: No pruritis or rash   MUSCULOSKELETAL: No joint pain or swelling; No muscle, back, or extremity pain  PSYCHIATRIC: No depression, anxiety or difficulty sleeping  HEME/LYMPH: No easy bruising, or bleeding gums  	    [ ] All others negative	  [ ] Unable to obtain    PHYSICAL EXAM:  T(C): 36.8 (08-20-18 @ 20:03), Max: 36.9 (08-20-18 @ 04:56)  HR: 80 (08-20-18 @ 20:03) (72 - 111)  BP: 115/79 (08-20-18 @ 20:03) (115/79 - 152/82)  RR: 18 (08-20-18 @ 20:03) (18 - 18)  SpO2: 99% (08-20-18 @ 20:03) (96% - 99%)  Wt(kg): --  I&O's Summary    19 Aug 2018 07:01  -  20 Aug 2018 07:00  --------------------------------------------------------  IN: 840 mL / OUT: 3050 mL / NET: -2210 mL    20 Aug 2018 07:01  -  20 Aug 2018 23:40  --------------------------------------------------------  IN: 720 mL / OUT: 1950 mL / NET: -1230 mL        Appearance: NAD. No respiratory distress	  HEENT: Normal oral mucosa, PERRL, EOMI	  Lymphatic: No lymphadenopathy  Cardiovascular: No JVD.  Carotids 2+ w/o bruits.  - PMI not palpable. Normal S1 S2.  No murmurs, No edema  Respiratory: Lungs clear to auscultation	  Psychiatry: A & O x 3, Mood & affect appropriate  Gastrointestinal: Soft, Non-tender, + BS	  Skin: No rashes, No ecchymoses, No cyanosis. Doesnot appear icteric.	  Neurologic: Non-focal  Extremities: Normal range of motion, No clubbing, cyanosis or edema  Vascular: Peripheral pulses palpable 2+ bilaterally      LABS:	 	    CBC Full  -  ( 20 Aug 2018 06:38 )  WBC Count : 7.86 K/uL  Hemoglobin : 14.9 g/dL  Hematocrit : 45.0 %  Platelet Count - Automated : 314 K/uL  Mean Cell Volume : 95.9 fl  Mean Cell Hemoglobin : 31.8 pg  Mean Cell Hemoglobin Concentration : 33.1 gm/dL  Auto Neutrophil # : x  Auto Lymphocyte # : x  Auto Monocyte # : x  Auto Eosinophil # : x  Auto Basophil # : x  Auto Neutrophil % : x  Auto Lymphocyte % : x  Auto Monocyte % : x  Auto Eosinophil % : x  Auto Basophil % : x    08-20    138  |  104  |  27<H>  ----------------------------<  183<H>  4.1   |  21<L>  |  0.94  08-19    140  |  101  |  16  ----------------------------<  162<H>  3.0<L>   |  23  |  0.81    Ca    9.4      20 Aug 2018 06:04  Ca    8.5      19 Aug 2018 06:29  Phos  3.0     08-19  Mg     1.7     08-19    TPro  6.8  /  Alb  3.3  /  TBili  5.9<H>  /  DBili  x   /  AST  51<H>  /  ALT  88<H>  /  AlkPhos  215<H>  08-20  TPro  6.3  /  Alb  3.1<L>  /  TBili  5.9<H>  /  DBili  x   /  AST  44<H>  /  ALT  82<H>  /  AlkPhos  207<H>  08-19    TELEMETRY  AFL w/ VR :

## 2018-08-20 NOTE — PROGRESS NOTE ADULT - PROBLEM SELECTOR PLAN 3
CBD dilation with CBD mass c/b fever being treated for cholangitis  -biliary culture growing klebsiella oxytoca - sensitivity pending  -c/w zosyn 3.375 gram q8  -afebrile, no leukocytosis, HD stable  -appreciate GI recs

## 2018-08-20 NOTE — CHART NOTE - NSCHARTNOTEFT_GEN_A_CORE
Patient seen and examined by Dr. George.     Because patient's EF is 35%, he is in less than optimal condition for emergent surgery.     We are recommending that he follow up with Dr. George in outpatient office, where details of the surgery can be discussed as well as steps to further optimize the patient's health status.     In the meantime, we recommend the patient be started on Megace (400mg qd), zofran (4mg q6hr), and protonix (40mg qd).      Ariana Moe MD  General Surgery, PGY1

## 2018-08-20 NOTE — PROGRESS NOTE ADULT - SUBJECTIVE AND OBJECTIVE BOX
Patient is a 77y old  Male who presents with a chief complaint of ERCP (14 Aug 2018 23:50)      SUBJECTIVE / OVERNIGHT EVENTS: No overnight events. Tolerating regular diet, no n/v/f/c/abd pain. No cp, sob, palpitations, lightheadedness.     MEDICATIONS  (STANDING):  amLODIPine   Tablet 10 milliGRAM(s) Oral daily  atorvastatin 10 milliGRAM(s) Oral at bedtime  dextrose 5%. 1000 milliLiter(s) (50 mL/Hr) IV Continuous <Continuous>  dextrose 50% Injectable 12.5 Gram(s) IV Push once  dextrose 50% Injectable 25 Gram(s) IV Push once  dextrose 50% Injectable 25 Gram(s) IV Push once  enoxaparin Injectable 90 milliGRAM(s) SubCutaneous every 12 hours  insulin lispro (HumaLOG) corrective regimen sliding scale   SubCutaneous three times a day before meals  metoprolol tartrate 25 milliGRAM(s) Oral two times a day  piperacillin/tazobactam IVPB. 3.375 Gram(s) IV Intermittent every 8 hours    MEDICATIONS  (PRN):  dextrose 40% Gel 15 Gram(s) Oral once PRN Blood Glucose LESS THAN 70 milliGRAM(s)/deciliter  diphenhydrAMINE   Capsule 25 milliGRAM(s) Oral every 6 hours PRN Rash and/or Itching  glucagon  Injectable 1 milliGRAM(s) IntraMuscular once PRN Glucose LESS THAN 70 milligrams/deciliter  ibuprofen  Tablet 600 milliGRAM(s) Oral every 8 hours PRN fever > 100.4  morphine  - Injectable 2 milliGRAM(s) IV Push every 6 hours PRN Severe Pain (7 - 10)  simethicone 80 milliGRAM(s) Chew every 8 hours PRN Indigestion      Vital Signs Last 24 Hrs  T(C): 36.7 (20 Aug 2018 08:37), Max: 36.9 (20 Aug 2018 04:56)  T(F): 98.1 (20 Aug 2018 08:37), Max: 98.4 (20 Aug 2018 04:56)  HR: 107 (20 Aug 2018 08:37) (66 - 108)  BP: 134/96 (20 Aug 2018 08:37) (130/81 - 158/76)  BP(mean): 109 (20 Aug 2018 08:37) (104 - 109)  RR: 18 (20 Aug 2018 08:37) (18 - 18)  SpO2: 97% (20 Aug 2018 08:37) (97% - 98%)  CAPILLARY BLOOD GLUCOSE      POCT Blood Glucose.: 142 mg/dL (20 Aug 2018 07:12)  POCT Blood Glucose.: 153 mg/dL (19 Aug 2018 21:00)  POCT Blood Glucose.: 140 mg/dL (19 Aug 2018 16:23)  POCT Blood Glucose.: 173 mg/dL (19 Aug 2018 12:11)    I&O's Summary    19 Aug 2018 07:01  -  20 Aug 2018 07:00  --------------------------------------------------------  IN: 840 mL / OUT: 3050 mL / NET: -2210 mL    20 Aug 2018 07:01  -  20 Aug 2018 11:34  --------------------------------------------------------  IN: 0 mL / OUT: 550 mL / NET: -550 mL        PHYSICAL EXAM:  GENERAL: NAD, well-developed  HEAD:  Atraumatic, Normocephalic  EYES: scleral icterus  CHEST/LUNG: Clear to auscultation bilaterally; No wheeze  HEART: Irregular rate and rhythm  ABDOMEN: Soft, Nontender, Nondistended; Bowel sounds present. PCT draining yellow bile, site intact  EXTREMITIES:  2+ Peripheral Pulses, No clubbing, cyanosis, or edema  PSYCH: AAOx3  NEUROLOGY: non-focal  SKIN: jaundice      LABS:                        14.9   7.86  )-----------( 314      ( 20 Aug 2018 06:38 )             45.0     08-20    138  |  104  |  27<H>  ----------------------------<  183<H>  4.1   |  21<L>  |  0.94    Ca    9.4      20 Aug 2018 06:04  Phos  3.0     08-19  Mg     1.7     08-19    TPro  6.8  /  Alb  3.3  /  TBili  5.9<H>  /  DBili  x   /  AST  51<H>  /  ALT  88<H>  /  AlkPhos  215<H>  08-20        RADIOLOGY & ADDITIONAL TESTS:    tele reviewed: Afib , pvcs, bigeminy, trigeminy     Imaging Personally Reviewed:    Consultant(s) Notes Reviewed:  surgery    Care Discussed with Consultants/Other Providers:

## 2018-08-20 NOTE — PROGRESS NOTE ADULT - ASSESSMENT
78 yo man with PMH of HTN, pre-diabetes, HLD, afib s/p ablation 2004/2005 and DCCV 2017 on Eliquis with ILR, GERD.  Initially presented to [place] ED on 8/9 with 2 days of dark urine with CBD mass for ERCP likely 2/2 cholangiocarcinoma vs ampullary neoplasm without met disease.   AFib/flutter with RVR, suspicion of tachy-georges syndrome, CHADsVASC 4  Asymptomatic, but new LVD, perhaps related to myopathy of tachycardia(no awareness of AF or element of stress CM.      #AFib/flutter with RVR currently reasonable rate control  -- metoprolol tart 25 mg BID  -- monitor on tele  -- if bradycardia, would notify EP for possible PPM  -- resume A/C with Eliquis when feasible from a surgical standpoint.  Patient pending OR possibly on MOnday. Please order for TTE     #HTN  -- continue metoprolol    #HFrEF  --would d/c amlodipine, to provide BP "room" and begin ARB    Patient interviewed and examined.  Chart reviewed and note edited where appropriate.  Case discussed with fellow.  Agree w/ Assessment and Plan as outlined.    Ferny Sanford MD [facility]  Spectra:  [phone]  Office: [phone]

## 2018-08-20 NOTE — PROGRESS NOTE ADULT - ASSESSMENT
76 yo man with PMH of HTN, prediabetes, HLD, afib on eliquis, MVP,GERD initially presented to [place] ED on 8/9 with 2 days of dark urine found to have obstructive jaundice with CT showing severe extrahepatic biliary duct obstruction with 2 cm dilation of CBD with marked intrahepatic biliary duct dilation with subsequent MRCP showing 1.8 cm soft tissue mass ampullary vs cholangiocarcinoma in the distal CBD, then transferred to [facility] for ERCP/EUS on 8/15/18 which showed [id number] hyperechoic soft tissue lesion in CBD with dilated proximal CBD.  Unsuccessful Biliary cannulation on 8/15. Hospital course c/b fever now on cefepime for suspected cholangitis and Afib with RVR now improved. Awaiting Whipple this week.

## 2018-08-20 NOTE — PROGRESS NOTE ADULT - PROBLEM SELECTOR PLAN 2
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   -CT  chest with unchanged 2mm lung nodule since 2009, no evidence of mets  -surgery consult appreciated, plan for Whipple this week, f/u date with surgery

## 2018-08-20 NOTE — PROGRESS NOTE ADULT - PROBLEM SELECTOR PLAN 4
Afib s/p ablation 2004/2005 and DCCV 2017 on Eliquis with ILR, GERD ( EP Dr Sellers in [place]), [id number] =2  -c/w metoprolol 25mg bid, f/u with cards regarding increasing dose  -monitor on tele  -eliquis on hold for now, a/c with lovenox 90mg q12 until surgery planned  -TTE pending  -cardio following for surgical pre-optimization

## 2018-08-20 NOTE — PROGRESS NOTE ADULT - PROBLEM SELECTOR PLAN 5
BP better controlled  - c/w amlodipine 10mg daily  -c/w metoprolol 25mg bid  -also on olmesartan 40mg at home, if BP uncontrolled will start losartan equivalent  -monitor BP

## 2018-08-20 NOTE — PROGRESS NOTE ADULT - SUBJECTIVE AND OBJECTIVE BOX
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 Clinically doing well, no abdominal pain. Bilirubin 5.9 today.  Awaiting TTE for medical risk optimization for eventual surgery.  Afib on elaquis (currently on hold per medicine team for possibility of surgery), was started on therapeutic lovenox. Patient doing well, no abdominal pain, jaundice improved. Discusses plans for tentative surgery this week.      ---------------------------------------------------------------------------------------------   Vital Signs Last 24 Hrs  T(C): 36.7 (20 Aug 2018 08:37), Max: 36.9 (20 Aug 2018 04:56)  T(F): 98.1 (20 Aug 2018 08:37), Max: 98.4 (20 Aug 2018 04:56)  HR: 107 (20 Aug 2018 08:37) (66 - 108)  BP: 134/96 (20 Aug 2018 08:37) (130/81 - 162/94)  BP(mean): 109 (20 Aug 2018 08:37) (104 - 109)  RR: 18 (20 Aug 2018 08:37) (18 - 18)  SpO2: 97% (20 Aug 2018 08:37) (97% - 98%)    I&O's Detail    19 Aug 2018 07:01  -  20 Aug 2018 07:00  --------------------------------------------------------  IN:    Oral Fluid: 840 mL  Total IN: 840 mL    OUT:    Drain: 3050 mL  Total OUT: 3050 mL    Total NET: -2210 mL      20 Aug 2018 07:01  -  20 Aug 2018 09:12  --------------------------------------------------------  IN:  Total IN: 0 mL    OUT:    Drain: 350 mL  Total OUT: 350 mL    Total NET: -350 mL      ---------------------------------------------------------------------------------------------   PHYSICAL EXAM:   General: NAD, Lying in bed comfortably, NAD  Neuro: alert, oriented x3  HEENT: NC/AT, EOMI, scleral icterus  GI/Abd: Soft, NT/ND, no rebound/guarding, no masses palpated. PTC drain draining bilious fluid.    ---------------------------------------------------------------------------------------------   MEDICATIONS  (STANDING):  amLODIPine   Tablet 10 milliGRAM(s) Oral daily  atorvastatin 10 milliGRAM(s) Oral at bedtime  dextrose 5%. 1000 milliLiter(s) (50 mL/Hr) IV Continuous <Continuous>  dextrose 50% Injectable 12.5 Gram(s) IV Push once  dextrose 50% Injectable 25 Gram(s) IV Push once  dextrose 50% Injectable 25 Gram(s) IV Push once  enoxaparin Injectable 90 milliGRAM(s) SubCutaneous every 12 hours  insulin lispro (HumaLOG) corrective regimen sliding scale   SubCutaneous three times a day before meals  metoprolol tartrate 25 milliGRAM(s) Oral two times a day  piperacillin/tazobactam IVPB. 3.375 Gram(s) IV Intermittent every 8 hours    MEDICATIONS  (PRN):  dextrose 40% Gel 15 Gram(s) Oral once PRN Blood Glucose LESS THAN 70 milliGRAM(s)/deciliter  diphenhydrAMINE   Capsule 25 milliGRAM(s) Oral every 6 hours PRN Rash and/or Itching  glucagon  Injectable 1 milliGRAM(s) IntraMuscular once PRN Glucose LESS THAN 70 milligrams/deciliter  ibuprofen  Tablet 600 milliGRAM(s) Oral every 8 hours PRN fever > 100.4  morphine  - Injectable 2 milliGRAM(s) IV Push every 6 hours PRN Severe Pain (7 - 10)  LABS                          14.9   7.86  )-----------( 314      ( 20 Aug 2018 06:38 )             45.0   08-20    138  |  104  |  27<H>  ----------------------------<  183<H>  4.1   |  21<L>  |  0.94    Ca    9.4      20 Aug 2018 06:04  Phos  3.0     08-19  Mg     1.7     08-19    TPro  6.8  /  Alb  3.3  /  TBili  5.9<H>  /  DBili  x   /  AST  51<H>  /  ALT  88<H>  /  AlkPhos  215<H>  08-20

## 2018-08-21 ENCOUNTER — TRANSCRIPTION ENCOUNTER (OUTPATIENT)
Age: 78
End: 2018-08-21

## 2018-08-21 VITALS
RESPIRATION RATE: 18 BRPM | DIASTOLIC BLOOD PRESSURE: 89 MMHG | OXYGEN SATURATION: 98 % | HEART RATE: 92 BPM | TEMPERATURE: 99 F | SYSTOLIC BLOOD PRESSURE: 132 MMHG

## 2018-08-21 DIAGNOSIS — I50.21 ACUTE SYSTOLIC (CONGESTIVE) HEART FAILURE: ICD-10-CM

## 2018-08-21 LAB
ALBUMIN SERPL ELPH-MCNC: 3.4 G/DL — SIGNIFICANT CHANGE UP (ref 3.3–5)
ALP SERPL-CCNC: 204 U/L — HIGH (ref 40–120)
ALT FLD-CCNC: 97 U/L — HIGH (ref 10–45)
ANION GAP SERPL CALC-SCNC: 14 MMOL/L — SIGNIFICANT CHANGE UP (ref 5–17)
AST SERPL-CCNC: 68 U/L — HIGH (ref 10–40)
BILIRUB SERPL-MCNC: 5.5 MG/DL — HIGH (ref 0.2–1.2)
BUN SERPL-MCNC: 31 MG/DL — HIGH (ref 7–23)
CALCIUM SERPL-MCNC: 9.5 MG/DL — SIGNIFICANT CHANGE UP (ref 8.4–10.5)
CHLORIDE SERPL-SCNC: 100 MMOL/L — SIGNIFICANT CHANGE UP (ref 96–108)
CO2 SERPL-SCNC: 21 MMOL/L — LOW (ref 22–31)
CREAT SERPL-MCNC: 1.18 MG/DL — SIGNIFICANT CHANGE UP (ref 0.5–1.3)
CULTURE RESULTS: SIGNIFICANT CHANGE UP
GLUCOSE BLDC GLUCOMTR-MCNC: 135 MG/DL — HIGH (ref 70–99)
GLUCOSE BLDC GLUCOMTR-MCNC: 198 MG/DL — HIGH (ref 70–99)
GLUCOSE SERPL-MCNC: 163 MG/DL — HIGH (ref 70–99)
ORGANISM # SPEC MICROSCOPIC CNT: SIGNIFICANT CHANGE UP
ORGANISM # SPEC MICROSCOPIC CNT: SIGNIFICANT CHANGE UP
POTASSIUM SERPL-MCNC: 3.9 MMOL/L — SIGNIFICANT CHANGE UP (ref 3.5–5.3)
POTASSIUM SERPL-SCNC: 3.9 MMOL/L — SIGNIFICANT CHANGE UP (ref 3.5–5.3)
PROT SERPL-MCNC: 6.9 G/DL — SIGNIFICANT CHANGE UP (ref 6–8.3)
SODIUM SERPL-SCNC: 135 MMOL/L — SIGNIFICANT CHANGE UP (ref 135–145)
SPECIMEN SOURCE: SIGNIFICANT CHANGE UP

## 2018-08-21 PROCEDURE — 87086 URINE CULTURE/COLONY COUNT: CPT

## 2018-08-21 PROCEDURE — 87205 SMEAR GRAM STAIN: CPT

## 2018-08-21 PROCEDURE — 85027 COMPLETE CBC AUTOMATED: CPT

## 2018-08-21 PROCEDURE — 93005 ELECTROCARDIOGRAM TRACING: CPT

## 2018-08-21 PROCEDURE — 88305 TISSUE EXAM BY PATHOLOGIST: CPT

## 2018-08-21 PROCEDURE — 88312 SPECIAL STAINS GROUP 1: CPT

## 2018-08-21 PROCEDURE — C2617: CPT

## 2018-08-21 PROCEDURE — 87070 CULTURE OTHR SPECIMN AEROBIC: CPT

## 2018-08-21 PROCEDURE — 80053 COMPREHEN METABOLIC PANEL: CPT

## 2018-08-21 PROCEDURE — 84100 ASSAY OF PHOSPHORUS: CPT

## 2018-08-21 PROCEDURE — 80076 HEPATIC FUNCTION PANEL: CPT

## 2018-08-21 PROCEDURE — 83735 ASSAY OF MAGNESIUM: CPT

## 2018-08-21 PROCEDURE — 80048 BASIC METABOLIC PNL TOTAL CA: CPT

## 2018-08-21 PROCEDURE — C1729: CPT

## 2018-08-21 PROCEDURE — 82962 GLUCOSE BLOOD TEST: CPT

## 2018-08-21 PROCEDURE — C1887: CPT

## 2018-08-21 PROCEDURE — 87186 SC STD MICRODIL/AGAR DIL: CPT

## 2018-08-21 PROCEDURE — C8929: CPT

## 2018-08-21 PROCEDURE — 47534 PLMT BILIARY DRAINAGE CATH: CPT

## 2018-08-21 PROCEDURE — 71260 CT THORAX DX C+: CPT

## 2018-08-21 PROCEDURE — 99233 SBSQ HOSP IP/OBS HIGH 50: CPT

## 2018-08-21 PROCEDURE — C1769: CPT

## 2018-08-21 PROCEDURE — 87075 CULTR BACTERIA EXCEPT BLOOD: CPT

## 2018-08-21 PROCEDURE — C1894: CPT

## 2018-08-21 RX ORDER — MEGESTROL ACETATE 40 MG/ML
1 SUSPENSION ORAL
Qty: 30 | Refills: 0 | OUTPATIENT
Start: 2018-08-21

## 2018-08-21 RX ORDER — ONDANSETRON 8 MG/1
1 TABLET, FILM COATED ORAL
Qty: 120 | Refills: 0 | OUTPATIENT
Start: 2018-08-21 | End: 2018-09-19

## 2018-08-21 RX ORDER — SIMETHICONE 80 MG/1
1 TABLET, CHEWABLE ORAL
Qty: 0 | Refills: 0 | COMMUNITY
Start: 2018-08-21

## 2018-08-21 RX ORDER — OLMESARTAN MEDOXOMIL 5 MG/1
1 TABLET, FILM COATED ORAL
Qty: 0 | Refills: 0 | COMMUNITY

## 2018-08-21 RX ORDER — MOXIFLOXACIN HYDROCHLORIDE TABLETS, 400 MG 400 MG/1
1 TABLET, FILM COATED ORAL
Qty: 20 | Refills: 0 | OUTPATIENT
Start: 2018-08-21 | End: 2018-08-30

## 2018-08-21 RX ORDER — LISINOPRIL 2.5 MG/1
1 TABLET ORAL
Qty: 30 | Refills: 0 | OUTPATIENT
Start: 2018-08-21 | End: 2018-09-19

## 2018-08-21 RX ORDER — METOPROLOL TARTRATE 50 MG
1 TABLET ORAL
Qty: 30 | Refills: 0 | OUTPATIENT
Start: 2018-08-21 | End: 2018-09-19

## 2018-08-21 RX ADMIN — Medication 1: at 11:49

## 2018-08-21 RX ADMIN — PIPERACILLIN AND TAZOBACTAM 25 GRAM(S): 4; .5 INJECTION, POWDER, LYOPHILIZED, FOR SOLUTION INTRAVENOUS at 05:04

## 2018-08-21 RX ADMIN — ENOXAPARIN SODIUM 90 MILLIGRAM(S): 100 INJECTION SUBCUTANEOUS at 05:05

## 2018-08-21 RX ADMIN — AMLODIPINE BESYLATE 10 MILLIGRAM(S): 2.5 TABLET ORAL at 05:04

## 2018-08-21 RX ADMIN — Medication 25 MILLIGRAM(S): at 05:04

## 2018-08-21 RX ADMIN — FAMOTIDINE 20 MILLIGRAM(S): 10 INJECTION INTRAVENOUS at 05:04

## 2018-08-21 NOTE — PROGRESS NOTE ADULT - PROBLEM SELECTOR PROBLEM 5
Essential hypertension
T2DM (type 2 diabetes mellitus)
Atrial fibrillation with RVR
Common bile duct mass
Essential hypertension
Essential hypertension

## 2018-08-21 NOTE — DISCHARGE NOTE ADULT - OTHER SIGNIFICANT FINDINGS
Patient name: JERRY COPPOLA  YOB: 1940   Age: 77 (M)   MR#: [id number]  Study Date: 8/20/2018  Location: [address] [place], [place] [id number]: F[facility]  Study quality: Technically difficult  Referring Physician: Rebecca Babcock MD  Blood Pressure: 130/81 mmHg  Height: 183 cm  Weight: 91 kg  BSA: 2.1 m2  ------------------------------------------------------------------------  PROCEDURE: Transthoracic echocardiogram with 2-D, M-Mode  and complete spectral and color flow Doppler. Verbal  consent was obtained for injection of  Ultrasonic Enhancing  Agent following a discussion of risks and benefits.  Following intravenous injection of Ultrasonic Enhancing  Agent , harmonic imaging was performed.  INDICATION: Unspecified atrial fibrillation (I48.91)  ------------------------------------------------------------------------  Dimensions:    Normal Values:  LA:     4.0    2.0 - 4.0 cm  Ao:     3.9    2.0 - 3.8 cm  SEPTUM: 1.2    0.6 - 1.2 cm  PWT:    1.3    0.6 - 1.1 cm  LVIDd:  5.7    3.0 - 5.6 cm  LVIDs:         1.8 - 4.0 cm  Derived variables:  LVMI: 143 g/m2  RWT: 0.45  EF (Visual Estimate): 35 %  ------------------------------------------------------------------------  Observations:  Mitral Valve: Mitral annular calcification, otherwise  normal mitral valve. Mild mitral regurgitation.  Aortic Valve/Aorta: Calcified trileaflet aortic valve with  normal opening. Minimal aortic regurgitation.  Aortic Root: 3.9 cm.  Left Atrium: Normal left atrium.  LA volume index = 27  cc/m2.  Left Ventricle: Endocardial visualization enhanced with  intravenous injection of Ultrasonic Enhancing Agent  (Definity).Moderate- Severe left ventricular systolic  dysfunction. Regional wall motion variation is noted on the  setting of atrial fibrillation and ectopy. Normal left  ventricular internal dimensions and wall thicknesses.  Right Heart: Normal right atrium. Normal right ventricular  size with decreased right ventricular systolic function.  Normal tricuspid valve. Minimal tricuspid regurgitation.  Normal pulmonic valve. Mild pulmonic regurgitation.  Pericardium/Pleura: Normal pericardium with no pericardial  effusion.  Hemodynamic: Estimated right atrial pressure is 8 mm Hg.  ------------------------------------------------------------------------  Conclusions:  1. Calcified trileaflet aortic valve with normal opening.  2. Endocardial visualization enhanced with intravenous  injection of Ultrasonic Enhancing Agent  (Definity).Moderate- Severe left ventricular systolic  dysfunction. Regional wall motion variation is noted on the  setting of atrial fibrillation and ectopy.  3. Normal right ventricular size with decreased right  ventricular systolic function.  4. Normal tricuspid valve. Minimal tricuspid regurgitation.  *** No previous Echo exam

## 2018-08-21 NOTE — PROGRESS NOTE ADULT - PROBLEM SELECTOR PROBLEM 2
Common bile duct mass
Chronic atrial fibrillation
Common bile duct mass
T2DM (type 2 diabetes mellitus)
Common bile duct mass

## 2018-08-21 NOTE — PROGRESS NOTE ADULT - PROBLEM SELECTOR PLAN 9
Dispo: outpatient surgery, GI, IR, cards, and surg onc with Dr. George for [place]  spent 60 minutes on discharge process time

## 2018-08-21 NOTE — PROGRESS NOTE ADULT - PROBLEM SELECTOR PLAN 8
A1C 6.2, FS stable  -c/w BENJI  -monitor FS
dvt ppx: on full dose lovenox  Dispo: for whipple early this week, f/u surgery
dvt ppx: on full dose lovenox  Dispo: for whipple early this week, f/u surgery regarding date
dvt ppx: on full dose lovenox  Dispo: for whipple early this week

## 2018-08-21 NOTE — DISCHARGE NOTE ADULT - PATIENT PORTAL LINK FT
You can access the [facility] Patient Portal, offered by [facility], by registering with the following website: [email or web address]

## 2018-08-21 NOTE — PROGRESS NOTE ADULT - PROBLEM SELECTOR PROBLEM 8
Need for prophylactic measure
Need for prophylactic measure
T2DM (type 2 diabetes mellitus)
Need for prophylactic measure

## 2018-08-21 NOTE — PROGRESS NOTE ADULT - PROVIDER SPECIALTY LIST ADULT
Cardiology
Gastroenterology
Hospitalist
Intervent Radiology
Surgery
Surgery
Hospitalist
Hospitalist

## 2018-08-21 NOTE — DISCHARGE NOTE ADULT - PLAN OF CARE
Continue current medications  Follow up with Dr. Barry for drain management Remain without pain or obstruction Follow up with Dr. George for surgical management Atrial fibrillation is the most common heart rhythm problem & has the risk of stroke & heart attack  It helps if you control your blood pressure, not drink more than 1-2 alcohol drinks per day, cut down on caffeine, getting treatment for over active thyroid gland, & getting exercise  Call your doctor if you feel your heart racing or beating unusually, chest tightness or pain, lightheaded, faint, shortness of breath especially with exercise  It is important to take your heart medication as prescribed  Continue Eliquis. Continue current medications  Follow up with Dr. Barry for drain management and home care continue 10 more days of antibiotics for bile infection  call your doctor or 911 for any concerning symptom including but not limited to or 911 if any fever, chills, nausea, vomiting, passing out, lightheadnedness, chest pain, shortness of breath etc Follow up with Dr. George for surgical management, make your appointment ASAP   you can take medication as prescribed to increased your appetite as recommended by your surgeon Atrial fibrillation is the most common heart rhythm problem & has the risk of stroke & heart attack  It helps if you control your blood pressure, not drink more than 1-2 alcohol drinks per day, cut down on caffeine, getting treatment for over active thyroid gland, & getting exercise  Call your doctor if you feel your heart racing or beating unusually, chest tightness or pain, lightheaded, faint, shortness of breath especially with exercise  It is important to take your heart medication as prescribed  Continue Eliquis   followup with your cardiologist we changed your blood pressure medications  take medications as prescribed  followup with yout cardiologist or primary care within 3 days to get your blood pressure checked. your ejection fraction was found to be 35%  continue medications as prescribed (metoprolol and lisinopril)  followup with your cardiologist within 5-7 days

## 2018-08-21 NOTE — DISCHARGE NOTE ADULT - HOSPITAL COURSE
76 yo man with PMH of HTN, prediabetes, HLD, afib on eliquis, MVP,GERD initially presented to [place] ED on 8/9 with 2 days of dark urine found to have obstructive jaundice with CT showing severe extrahepatic biliary duct obstruction with 2 cm dilation of CBD with marked intrahepatic biliary duct dilation with subsequent MRCP showing 1.8 cm soft tissue mass ampullary vs cholangiocarcinoma in the distal CBD, then transferred to [facility] for ERCP/EUS on 8/15/18 which showed [id number] hyperechoic soft tissue lesion in CBD with dilated proximal CBD.  Unsuccessful Biliary cannulation on 8/15. Hospital course c/b fever now on cefepime for suspected cholangitis and Afib with RVR now improved. Whipple surgery on hold.  Surgery does not  feel pt is optimized with EF of 35%.       Obstructive jaundice.  Plan: -obstructive jaundice due to CBD mass seen on MRCP, ERCP  -s/p ERCP with Unsuccessful Biliary cannulation but S/p pancreatic duct stent placement by GI on 8/15  -s/p IR guided internal/external biliary drainage catheter on 8/16   -LFT downtrending slowly  -monitor PCT output, 3.0L/24 hours  -tolerating regular diet.      Common bile duct mass.  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   -CT  chest with unchanged 2mm lung nodule since 2009, no evidence of mets     Acute cholangitis.  Plan: CBD dilation with CBD mass c/b fever being treated for cholangitis  -biliary culture growing klebsiella oxytoca    Atrial fibrillation with RVR.  Plan: Afib s/p ablation 2004/2005 and DCCV 2017 on Eliquis with ILR, GERD ( EP Dr Sellers in [place]), [id number] =2   Essential hypertension.  Plan: BP better controlled  - c/w amlodipine 10mg daily  -c/w Toprol 50mg daily    No surgery at this time.  D/C to home on cipro 500mg BID for additional 10 days.  Follow up with PCP, EP, cardiology, sx as out pt. 78 yo man with PMH of HTN, prediabetes, HLD, afib on eliquis, MVP,GERD initially presented to [place] ED on 8/9 with 2 days of dark urine found to have obstructive jaundice with CT showing severe extrahepatic biliary duct obstruction with 2 cm dilation of CBD with marked intrahepatic biliary duct dilation with subsequent MRCP showing 1.8 cm soft tissue mass ampullary vs cholangiocarcinoma in the distal CBD, then transferred to [facility] for ERCP/EUS on 8/15/18 which showed [id number] hyperechoic soft tissue lesion in CBD with dilated proximal CBD.  Unsuccessful Biliary cannulation on 8/15. Hospital course c/b fever now on cefepime for suspected cholangitis and Afib with RVR now improved, also found to have compensated systolic HF EF 35%. Whipple delayed to outpatient due to scheduling issues and patient preference        Problem/Plan - 1:  ·  Problem: Obstructive jaundice.  Plan: -obstructive jaundice due to CBD mass seen on MRCP, ERCP  -s/p ERCP with Unsuccessful Biliary cannulation but S/p pancreatic duct stent placement by GI on 8/15  -s/p IR guided internal/external biliary drainage catheter on 8/16   -LFT downtrending slowly  -monitor PCT output, home care set up  -tolerating regular diet.      Problem/Plan - 2:  ·  Problem: Common bile duct mass.  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   -CT  chest with unchanged 2mm lung nodule since 2009, no evidence of mets  -discussed with surgery, Dr George, chief resident, - Whipple being delayed due to scheduling issues and patient preference of surgeon, informed surgery that cardiology thinks patient is medically optimized  -surgery rec Megace 400mg daily, zofran 4mg q6 prn and PPI daily  -outpatient f/u with Dr George.      Problem/Plan - 3:  ·  Problem: Acute cholangitis.  Plan: Improving CBD dilation with CBD mass c/b fever being treated for cholangitis  -biliary culture growing klebsiella oxytoca - sensitive to cipro, discussed with GI continue at least 14 day course, will give cipro 500mg bid x for another 10 more days   -afebrile, no leukocytosis, HD stable  -appreciate GI recs.      Problem/Plan - 4:  ·  Problem: Acute systolic heart failure.  Plan: TTE shows EF 35%, moderate LV systolic dysfunction, euvolemic  -discussed with matthew, Dr Sanford, patient has compensated HF and optimized for whipple at this time, moderate risk for intra-abdominal surgery, relayed that to surgery who said they would be doing surgery as outpatient due to scheduling issues/patient preference  --switch metoprolol 25mg bid to Toprol 50mg ER given newly diagnosed systolic heart failure  -start lisinopril 5mg daily  -outpatient cards followup,  Dr Guevara.      Problem/Plan - 5:  ·  Problem: Atrial fibrillation with RVR.  Plan: Afib s/p ablation 2004/2005 and DCCV 2017 on Eliquis with ILR, GERD ( EP Dr Sellers in [place]), [id number] =2  -switch metoprolol 25mg bid to Toprol 50mg ER given newly diagnosed systolic heart failure  -restart eliquis 5mg bid at home.      Problem/Plan - 6:  Problem: Essential hypertension. Plan: BP controlled  -d/c amlodipine to allow BP room for lisinopril 5mg daily  -switch to toprol 50mg daily  -outpatient BP followup with cardio.     Problem/Plan - 7:  ·  Problem: Hypokalemia.  Plan: resolved s/p repletion  -monitor bmp, replete as needed.      Problem/Plan - 8:  ·  Problem: T2DM (type 2 diabetes mellitus).  Plan: A1C 6.2, FS stable  -c/w BENJI  -monitor FS.      Problem/Plan - 9:  ·  Problem: Need for prophylactic measure.  Plan: Dispo: outpatient surgery, GI, IR, cards, and surg onc with Dr. George for [place]  spent 60 minutes on discharge process time.     Attending Attestation:   Plan discussed with LEISA Arevalo and wife bedside, all consultants.

## 2018-08-21 NOTE — PROGRESS NOTE ADULT - PROBLEM SELECTOR PLAN 3
Improving CBD dilation with CBD mass c/b fever being treated for cholangitis  -biliary culture growing klebsiella oxytoca - sensitive to cipro, discussed with GI continue at least 14 day course, will give cipro 500mg bid x for another 10 more days   -afebrile, no leukocytosis, HD stable  -appreciate GI recs

## 2018-08-21 NOTE — PROGRESS NOTE ADULT - SUBJECTIVE AND OBJECTIVE BOX
Cardiology Progress Note    Interval events: No acute events overnight. Patient resting comfortably. No CP or SOB.    Tele: Aflutter     Medications:  amLODIPine   Tablet 10 milliGRAM(s) Oral daily  atorvastatin 10 milliGRAM(s) Oral at bedtime  dextrose 40% Gel 15 Gram(s) Oral once PRN  dextrose 5%. 1000 milliLiter(s) IV Continuous <Continuous>  dextrose 50% Injectable 12.5 Gram(s) IV Push once  dextrose 50% Injectable 25 Gram(s) IV Push once  dextrose 50% Injectable 25 Gram(s) IV Push once  diphenhydrAMINE   Capsule 25 milliGRAM(s) Oral every 6 hours PRN  enoxaparin Injectable 90 milliGRAM(s) SubCutaneous every 12 hours  famotidine    Tablet 20 milliGRAM(s) Oral two times a day  glucagon  Injectable 1 milliGRAM(s) IntraMuscular once PRN  ibuprofen  Tablet 600 milliGRAM(s) Oral every 8 hours PRN  insulin lispro (HumaLOG) corrective regimen sliding scale   SubCutaneous three times a day before meals  metoprolol tartrate 25 milliGRAM(s) Oral two times a day  morphine  - Injectable 2 milliGRAM(s) IV Push every 6 hours PRN  piperacillin/tazobactam IVPB. 3.375 Gram(s) IV Intermittent every 8 hours  simethicone 80 milliGRAM(s) Chew every 8 hours PRN      Review of Systems:  Constitutional: [ ] Fever [ ] Chills [ ] Fatigue [ ] Weight change   HEENT: [ ] Blurred vision [ ] Eye Pain [ ] Headache [ ] Runny nose [ ] Sore Throat   Respiratory: [ ] Cough [ ] Wheezing [ ] Shortness of breath  Cardiovascular: [ ] Chest Pain [ ] Palpitations [ ] CHACON [ ] PND [ ] Orthopnea  Gastrointestinal: [ ] Abdominal Pain [ ] Diarrhea [ ] Constipation [ ] Hemorrhoids [ ] Nausea [ ] Vomiting  Genitourinary: [ ] Nocturia [ ] Dysuria [ ] Incontinence  Extremities: [ ] Swelling [ ] Joint Pain  Neurologic: [ ] Focal deficit [ ] Paresthesias [ ] Syncope  Lymphatic: [ ] Swelling [ ] Lymphadenopathy   Skin: [ ] Rash [ ] Ecchymoses [ ] Wounds [ ] Lesions  Psychiatry: [ ] Depression [ ] Suicidal/Homicidal Ideation [ ] Anxiety [ ] Sleep Disturbances  [x] 10 point review of systems is otherwise negative except as mentioned above            [ ]Unable to obtain    Vitals:  T(C): 37.2 (08-21-18 @ 05:03), Max: 37.2 (08-21-18 @ 05:03)  HR: 111 (08-21-18 @ 05:03) (72 - 114)  BP: 135/97 (08-21-18 @ 05:03) (115/79 - 152/82)  BP(mean): 102 (08-20-18 @ 11:47) (102 - 109)  RR: 18 (08-21-18 @ 05:03) (18 - 18)  SpO2: 98% (08-21-18 @ 05:03) (95% - 99%)  Wt(kg): --  Daily     Daily   I&O's Summary    19 Aug 2018 07:01  -  20 Aug 2018 07:00  --------------------------------------------------------  IN: 840 mL / OUT: 3050 mL / NET: -2210 mL    20 Aug 2018 07:01  -  21 Aug 2018 06:05  --------------------------------------------------------  IN: 725 mL / OUT: 2425 mL / NET: -1700 mL        Physical Exam:  Appearance: NAD. No respiratory distress	  HEENT: Normal oral mucosa, PERRL, EOMI	  Lymphatic: No lymphadenopathy  Cardiovascular: No JVD.  Carotids 2+ w/o bruits.  - PMI not palpable. Normal S1 S2.  No murmurs, No edema  Respiratory: Lungs clear to auscultation	  Psychiatry: A & O x 3, Mood & affect appropriate  Gastrointestinal: Soft, Non-tender, + BS	  Skin: No rashes, No ecchymoses, No cyanosis. Doesnot appear icteric.	  Neurologic: Non-focal  Extremities: Normal range of motion, No clubbing, cyanosis or edema  Vascular: Peripheral pulses palpable 2+ bilaterally    Labs:                        14.9   7.86  )-----------( 314      ( 20 Aug 2018 06:38 )             45.0     08-20    138  |  104  |  27<H>  ----------------------------<  183<H>  4.1   |  21<L>  |  0.94    Ca    9.4      20 Aug 2018 06:04  Phos  3.0     08-19  Mg     1.7     08-19    TPro  6.8  /  Alb  3.3  /  TBili  5.9<H>  /  DBili  x   /  AST  51<H>  /  ALT  88<H>  /  AlkPhos  215<H>  08-20    New results/imaging:  TTE 8/20/2018  Conclusions:  1. Calcified trileaflet aortic valve with normal opening.  2. Endocardial visualization enhanced with intravenous  injection of Ultrasonic Enhancing Agent  (Definity).Moderate- Severe left ventricular systolic  dysfunction. Regional wall motion variation is noted on the  setting of atrial fibrillation and ectopy.  3. Normal right ventricular size with decreased right  ventricular systolic function.  4. Normal tricuspid valve. Minimal tricuspid regurgitation.

## 2018-08-21 NOTE — PROGRESS NOTE ADULT - ASSESSMENT
78 yo man with PMH of HTN, prediabetes, HLD, afib on eliquis, MVP,GERD initially presented to [place] ED on 8/9 with 2 days of dark urine found to have obstructive jaundice with CT showing severe extrahepatic biliary duct obstruction with 2 cm dilation of CBD with marked intrahepatic biliary duct dilation with subsequent MRCP showing 1.8 cm soft tissue mass ampullary vs cholangiocarcinoma in the distal CBD, then transferred to [facility] for ERCP/EUS on 8/15/18 which showed [id number] hyperechoic soft tissue lesion in CBD with dilated proximal CBD.  Unsuccessful Biliary cannulation on 8/15. Hospital course c/b fever now on cefepime for suspected cholangitis and Afib with RVR now improved, also found to have compensated systolic HF EF 35%. Whipple delayed to outpatient due to scheduling issues and patient preference

## 2018-08-21 NOTE — DISCHARGE NOTE ADULT - MEDICATION SUMMARY - MEDICATIONS TO TAKE
I will START or STAY ON the medications listed below when I get home from the hospital:    lisinopril 5 mg oral tablet  -- 1 tab(s) by mouth once a day   -- Do not take this drug if you are pregnant.  It is very important that you take or use this exactly as directed.  Do not skip doses or discontinue unless directed by your doctor.  Some non-prescription drugs may aggravate your condition.  Read all labels carefully.  If a warning appears, check with your doctor before taking.    -- Indication: For Essential hypertension    Eliquis 5 mg oral tablet  -- 1 tab(s) by mouth 2 times a day  -- Indication: For Afib    diphenhydrAMINE 25 mg oral capsule  -- 1 cap(s) by mouth every 6 hours, As needed, Rash and/or Itching  -- Indication: For rash    atorvastatin 10 mg oral tablet  -- 1 tab(s) by mouth once a day  -- Indication: For HLD    Toprol-XL 50 mg oral tablet, extended release  -- 1 tab(s) by mouth once a day   -- It is very important that you take or use this exactly as directed.  Do not skip doses or discontinue unless directed by your doctor.  May cause drowsiness.  Alcohol may intensify this effect.  Use care when operating dangerous machinery.  Some non-prescription drugs may aggravate your condition.  Read all labels carefully.  If a warning appears, check with your doctor before taking.  Swallow whole.  Do not crush.  Take with food or milk.  This drug may impair the ability to drive or operate machinery.  Use care until you become familiar with its effects.    -- Indication: For Essential hypertension    polyethylene glycol 3350 oral powder for reconstitution  -- 17 gram(s) by mouth once a day, As Needed  -- Indication: For Constipation    simethicone 80 mg oral tablet, chewable  -- 1 tab(s) by mouth every 8 hours, As needed, Indigestion  -- Indication: For dyspepsia    pantoprazole 40 mg oral delayed release tablet  -- 1 tab(s) by mouth once a day  -- Indication: For dyspepsia    Cipro 500 mg oral tablet  -- 1 tab(s) by mouth every 12 hours   -- Avoid prolonged or excessive exposure to direct and/or artificial sunlight while taking this medication.  Check with your doctor before becoming pregnant.  Do not take dairy products, antacids, or iron preparations within one hour of this medication.  Finish all this medication unless otherwise directed by prescriber.  Medication should be taken with plenty of water.    -- Indication: For Obstructive jaundice I will START or STAY ON the medications listed below when I get home from the hospital:    lisinopril 5 mg oral tablet  -- 1 tab(s) by mouth once a day   -- Do not take this drug if you are pregnant.  It is very important that you take or use this exactly as directed.  Do not skip doses or discontinue unless directed by your doctor.  Some non-prescription drugs may aggravate your condition.  Read all labels carefully.  If a warning appears, check with your doctor before taking.    -- Indication: For Essential hypertension    Eliquis 5 mg oral tablet  -- 1 tab(s) by mouth 2 times a day  -- Indication: For Afib    diphenhydrAMINE 25 mg oral capsule  -- 1 cap(s) by mouth every 6 hours, As needed, Rash and/or Itching  -- Indication: For rash    Zofran ODT 4 mg oral tablet, disintegrating  -- 1 tab(s) by mouth every 6 hours, As Needed for nausea or vomiting   -- Indication: For Nausea or vomiting    atorvastatin 10 mg oral tablet  -- 1 tab(s) by mouth once a day  -- Indication: For HLD    megestrol 40 mg oral tablet  -- 1 tab(s) by mouth once a day   -- Do not take this drug if you are pregnant.  It is very important that you take or use this exactly as directed.  Do not skip doses or discontinue unless directed by your doctor.    -- Indication: For Apetite stimulant    Toprol-XL 50 mg oral tablet, extended release  -- 1 tab(s) by mouth once a day   -- It is very important that you take or use this exactly as directed.  Do not skip doses or discontinue unless directed by your doctor.  May cause drowsiness.  Alcohol may intensify this effect.  Use care when operating dangerous machinery.  Some non-prescription drugs may aggravate your condition.  Read all labels carefully.  If a warning appears, check with your doctor before taking.  Swallow whole.  Do not crush.  Take with food or milk.  This drug may impair the ability to drive or operate machinery.  Use care until you become familiar with its effects.    -- Indication: For Essential hypertension    polyethylene glycol 3350 oral powder for reconstitution  -- 17 gram(s) by mouth once a day, As Needed  -- Indication: For Constipation    simethicone 80 mg oral tablet, chewable  -- 1 tab(s) by mouth every 8 hours, As needed, Indigestion  -- Indication: For dyspepsia    pantoprazole 40 mg oral delayed release tablet  -- 1 tab(s) by mouth once a day  -- Indication: For dyspepsia    Cipro 500 mg oral tablet  -- 1 tab(s) by mouth every 12 hours   -- Avoid prolonged or excessive exposure to direct and/or artificial sunlight while taking this medication.  Check with your doctor before becoming pregnant.  Do not take dairy products, antacids, or iron preparations within one hour of this medication.  Finish all this medication unless otherwise directed by prescriber.  Medication should be taken with plenty of water.    -- Indication: For Obstructive jaundice

## 2018-08-21 NOTE — DISCHARGE NOTE ADULT - CARE PROVIDER_API CALL
Mukesh Barry), Diagnostic Radiology; [facility]  [address] [place] [address]  Phone: [phone]  Fax: [phone] Mukesh Barry), Diagnostic Radiology; [facility]  [address]  [address] [place] [address]  Phone: [phone]  Fax: [phone]    Jeff George (MD), Surgery  [address] [place] [address]  Phone: [phone]  Fax: [phone]    Aaron Guevara (MD), Cardiovascular Disease; Internal Medicine  [address]  [address]  [place] [address]  Phone: [phone]  Fax: [phone]    Demarco Sellers), Cardiac Electrophysiology; Cardiovascular Disease  [address]  [address]  [place] [address]  Phone: [phone]  Fax: [phone]

## 2018-08-21 NOTE — DISCHARGE NOTE ADULT - CARE PLAN
Principal Discharge DX:	Obstructive jaundice  Secondary Diagnosis:	Acute cholangitis  Secondary Diagnosis:	Common bile duct mass Principal Discharge DX:	Obstructive jaundice  Goal:	Remain without pain or obstruction  Assessment and plan of treatment:	Continue current medications  Follow up with Dr. Barry for drain management  Secondary Diagnosis:	Acute cholangitis  Secondary Diagnosis:	Common bile duct mass Principal Discharge DX:	Obstructive jaundice  Goal:	Remain without pain or obstruction  Assessment and plan of treatment:	Continue current medications  Follow up with Dr. Barry for drain management  Secondary Diagnosis:	Acute cholangitis  Assessment and plan of treatment:	Follow up with Dr. George for surgical management  Secondary Diagnosis:	Common bile duct mass  Assessment and plan of treatment:	Follow up with Dr. George for surgical management  Secondary Diagnosis:	Afib  Assessment and plan of treatment:	Atrial fibrillation is the most common heart rhythm problem & has the risk of stroke & heart attack  It helps if you control your blood pressure, not drink more than 1-2 alcohol drinks per day, cut down on caffeine, getting treatment for over active thyroid gland, & getting exercise  Call your doctor if you feel your heart racing or beating unusually, chest tightness or pain, lightheaded, faint, shortness of breath especially with exercise  It is important to take your heart medication as prescribed  Continue Eliquis. Principal Discharge DX:	Obstructive jaundice  Goal:	Remain without pain or obstruction  Assessment and plan of treatment:	Continue current medications  Follow up with Dr. Barry for drain management and home care  Secondary Diagnosis:	Acute cholangitis  Assessment and plan of treatment:	continue 10 more days of antibiotics for bile infection  call your doctor or 911 for any concerning symptom including but not limited to or 911 if any fever, chills, nausea, vomiting, passing out, lightheadnedness, chest pain, shortness of breath etc  Secondary Diagnosis:	Common bile duct mass  Assessment and plan of treatment:	Follow up with Dr. George for surgical management, make your appointment ASAP   you can take medication as prescribed to increased your appetite as recommended by your surgeon  Secondary Diagnosis:	Afib  Assessment and plan of treatment:	Atrial fibrillation is the most common heart rhythm problem & has the risk of stroke & heart attack  It helps if you control your blood pressure, not drink more than 1-2 alcohol drinks per day, cut down on caffeine, getting treatment for over active thyroid gland, & getting exercise  Call your doctor if you feel your heart racing or beating unusually, chest tightness or pain, lightheaded, faint, shortness of breath especially with exercise  It is important to take your heart medication as prescribed  Continue Eliquis   followup with your cardiologist  Secondary Diagnosis:	Hypertension  Assessment and plan of treatment:	we changed your blood pressure medications  take medications as prescribed  followup with yout cardiologist or primary care within 3 days to get your blood pressure checked.  Secondary Diagnosis:	Acute systolic heart failure  Assessment and plan of treatment:	your ejection fraction was found to be 35%  continue medications as prescribed (metoprolol and lisinopril)  followup with your cardiologist within 5-7 days

## 2018-08-21 NOTE — PROGRESS NOTE ADULT - PROBLEM SELECTOR PLAN 2
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   -CT  chest with unchanged 2mm lung nodule since 2009, no evidence of mets  -discussed with surgery, Dr George, chief resident, - Whipple being delayed due to scheduling issues and patient preference of surgeon, informed surgery that cardiology thinks patient is medically optimized  -surgery rec Megace 400mg daily, zofran 4mg q6 prn and PPI daily  -outpatient f/u with Dr George

## 2018-08-21 NOTE — DISCHARGE NOTE ADULT - ADDITIONAL INSTRUCTIONS
Make appointments to follow up with your out patient physicians.  Bring all discharge paperwork including discharge medication list to your follow up appointments. Make appointments to follow up with your out patient physicians.  Bring all discharge paperwork including discharge medication list to your follow up appointments.  Follow up with cardiology, surgery and EP as out patient in 1-2 weeks.

## 2018-08-21 NOTE — PROGRESS NOTE ADULT - PROBLEM SELECTOR PROBLEM 4
Atrial fibrillation with RVR
Chronic atrial fibrillation
Acute systolic heart failure
Atrial fibrillation with RVR
Atrial fibrillation with RVR
Essential hypertension
Essential hypertension

## 2018-08-21 NOTE — DISCHARGE NOTE ADULT - HOME CARE AGENCY
[facility] - [phone] - Registered Nurse will contact you to arrange initial visit.

## 2018-08-21 NOTE — DISCHARGE NOTE ADULT - MEDICATION SUMMARY - MEDICATIONS TO STOP TAKING
I will STOP taking the medications listed below when I get home from the hospital:    losartan 100 mg oral tablet  -- 1 tab(s) by mouth once a day    metoprolol  -- 12.5 milligram(s) by mouth 2 times a day    amLODIPine 10 mg oral tablet  -- 1 tab(s) by mouth once a day    morphine  -- 2 milligram(s) intravenous every 6 hours, As Needed moderate pain    ibuprofen 600 mg oral tablet  -- 1 tab(s) by mouth every 8 hours, As needed, fever > 100.4    cefepime  -- 2 gram(s) intravenous every 8 hours    olmesartan 40 mg oral tablet  -- 1 tab(s) by mouth once a day

## 2018-08-21 NOTE — DISCHARGE NOTE ADULT - PROVIDER TOKENS
JESSY:'[id number]' TOKEN:'[id number]:'[id number]:'[id number]:'[id number]'

## 2018-08-21 NOTE — PROGRESS NOTE ADULT - ASSESSMENT
78 yo man with PMH of HTN, pre-diabetes, HLD, afib s/p ablation 2004/2005 and DCCV 2017 on Eliquis with ILR, GERD.  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   AFib/flutter with RVR, suspicion of tachy-georges syndrome, CHADsVASC 4  Asymptomatic, but new LVD, perhaps related to myopathy of tachycardia(no awareness of AF or element of stress CM.    #AFib/flutter with RVR currently reasonable rate control  -- metoprolol tart 25 mg BID  -- monitor on tele  -- if bradycardia, would notify EP for possible PPM  -- resume A/C with Eliquis when feasible from a surgical standpoint.  Patient pending OR possibly on Wednesday.    #HTN  -- continue metoprolol    #HFrEF  --would d/c amlodipine, to provide BP "room"  --start lisinopril 5 mg    #Pre-operative medical optimization  -- no further cardiac w/u prior to procedure  -- patient is moderate risk for intra-abdominal surgery    Bryan Harrell MD  [id number]

## 2018-08-21 NOTE — PROGRESS NOTE ADULT - PROBLEM SELECTOR PLAN 5
Afib s/p ablation 2004/2005 and DCCV 2017 on Eliquis with ILR, GERD ( EP Dr Sellers in [place]), [id number] =[id number]  -switch metoprolol 25mg bid to Toprol 50mg ER given newly diagnosed systolic heart failure  -restart eliquis 5mg bid at home

## 2018-08-21 NOTE — PROGRESS NOTE ADULT - PROBLEM SELECTOR PLAN 6
BP controlled  -d/c amlodipine to allow BP room for lisinopril 5mg daily  -switch to toprol 50mg daily  -outpatient BP followup with cardio

## 2018-08-21 NOTE — PROGRESS NOTE ADULT - SUBJECTIVE AND OBJECTIVE BOX
Patient is a 77y old  Male who presents with a chief complaint of ERCP (21 Aug 2018 11:06)      SUBJECTIVE / OVERNIGHT EVENTS: No overnight events. Feels well, denies f/c/n/v. No cp, sob, palpitations.     MEDICATIONS  (STANDING):  atorvastatin 10 milliGRAM(s) Oral at bedtime  dextrose 5%. 1000 milliLiter(s) (50 mL/Hr) IV Continuous <Continuous>  dextrose 50% Injectable 12.5 Gram(s) IV Push once  dextrose 50% Injectable 25 Gram(s) IV Push once  dextrose 50% Injectable 25 Gram(s) IV Push once  enoxaparin Injectable 90 milliGRAM(s) SubCutaneous every 12 hours  famotidine    Tablet 20 milliGRAM(s) Oral two times a day  insulin lispro (HumaLOG) corrective regimen sliding scale   SubCutaneous three times a day before meals  metoprolol tartrate 25 milliGRAM(s) Oral two times a day  piperacillin/tazobactam IVPB. 3.375 Gram(s) IV Intermittent every 8 hours    MEDICATIONS  (PRN):  dextrose 40% Gel 15 Gram(s) Oral once PRN Blood Glucose LESS THAN 70 milliGRAM(s)/deciliter  diphenhydrAMINE   Capsule 25 milliGRAM(s) Oral every 6 hours PRN Rash and/or Itching  glucagon  Injectable 1 milliGRAM(s) IntraMuscular once PRN Glucose LESS THAN 70 milligrams/deciliter  ibuprofen  Tablet 600 milliGRAM(s) Oral every 8 hours PRN fever > 100.4  morphine  - Injectable 2 milliGRAM(s) IV Push every 6 hours PRN Severe Pain (7 - 10)  simethicone 80 milliGRAM(s) Chew every 8 hours PRN Indigestion      Vital Signs Last 24 Hrs  T(C): 37.1 (21 Aug 2018 12:11), Max: 37.2 (21 Aug 2018 05:03)  T(F): 98.8 (21 Aug 2018 12:11), Max: 99 (21 Aug 2018 05:03)  HR: 97 (21 Aug 2018 12:11) (72 - 114)  BP: 116/80 (21 Aug 2018 12:11) (106/71 - 152/82)  BP(mean): 92 (21 Aug 2018 12:11) (83 - 92)  RR: 18 (21 Aug 2018 12:11) (18 - 18)  SpO2: 97% (21 Aug 2018 12:11) (95% - 99%)  CAPILLARY BLOOD GLUCOSE      POCT Blood Glucose.: 198 mg/dL (21 Aug 2018 11:44)  POCT Blood Glucose.: 135 mg/dL (21 Aug 2018 07:28)  POCT Blood Glucose.: 154 mg/dL (20 Aug 2018 21:02)  POCT Blood Glucose.: 161 mg/dL (20 Aug 2018 16:32)    I&O's Summary    20 Aug 2018 07:01  -  21 Aug 2018 07:00  --------------------------------------------------------  IN: 725 mL / OUT: 2425 mL / NET: -1700 mL    21 Aug 2018 07:01  -  21 Aug 2018 12:45  --------------------------------------------------------  IN: 360 mL / OUT: 300 mL / NET: 60 mL        PHYSICAL EXAM:  GENERAL: NAD, well-developed  HEAD:  Atraumatic, Normocephalic  EYES: scleral icterus  CHEST/LUNG: Clear to auscultation bilaterally; No wheeze  HEART: Irregular rate and rhythm  ABDOMEN: Soft, Nontender, Nondistended; Bowel sounds present. PCT draining yellow bile, site intact  EXTREMITIES:  2+ Peripheral Pulses, No clubbing, cyanosis, or edema  PSYCH: AAOx3  NEUROLOGY: non-focal  SKIN: jaundice    LABS:                        14.9   7.86  )-----------( 314      ( 20 Aug 2018 06:38 )             45.0     08-21    135  |  100  |  31<H>  ----------------------------<  163<H>  3.9   |  21<L>  |  1.18    Ca    9.5      21 Aug 2018 06:17    TPro  6.9  /  Alb  3.4  /  TBili  5.5<H>  /  DBili  x   /  AST  68<H>  /  ALT  97<H>  /  AlkPhos  204<H>  08-21                  RADIOLOGY & ADDITIONAL TESTS:    tele reviewed: Aflutter 90-120s, pvcs    Imaging Personally Reviewed: TTE: Dimensions:    Normal Values:  LA:     4.0    2.0 - 4.0 cm  Ao:     3.9    2.0 - 3.8 cm  SEPTUM: 1.2    0.6 - 1.2 cm  PWT:    1.3    0.6 - 1.1 cm  LVIDd:  5.7    3.0 - 5.6 cm  LVIDs:         1.8 - 4.0 cm  Derived variables:  LVMI: 143 g/m2  RWT: 0.45  EF (Visual Estimate): 35 %  ------------------------------------------------------------------------  Observations:  Mitral Valve: Mitral annular calcification, otherwise  normal mitral valve. Mild mitral regurgitation.  Aortic Valve/Aorta: Calcified trileaflet aortic valve with  normal opening. Minimal aortic regurgitation.  Aortic Root: 3.9 cm.  Left Atrium: Normal left atrium.  LA volume index = 27  cc/m2.  Left Ventricle: Endocardial visualization enhanced with  intravenous injection of Ultrasonic Enhancing Agent  (Definity).Moderate- Severe left ventricular systolic  dysfunction. Regional wall motion variation is noted on the  setting of atrial fibrillation and ectopy. Normal left  ventricular internal dimensions and wall thicknesses.  Right Heart: Normal right atrium. Normal right ventricular  size with decreased right ventricular systolic function.  Normal tricuspid valve. Minimal tricuspid regurgitation.  Normal pulmonic valve. Mild pulmonic regurgitation.  Pericardium/Pleura: Normal pericardium with no pericardial  effusion.  Hemodynamic: Estimated right atrial pressure is 8 mm Hg.  ------------------------------------------------------------------------  Conclusions:  1. Calcified trileaflet aortic valve with normal opening.  2. Endocardial visualization enhanced with intravenous  injection of Ultrasonic Enhancing Agent  (Definity).Moderate- Severe left ventricular systolic  dysfunction. Regional wall motion variation is noted on the  setting of atrial fibrillation and ectopy.  3. Normal right ventricular size with decreased right  ventricular systolic function.  4. Normal tricuspid valve. Minimal tricuspid regurgitation.  *** No previous Echo exam.      Consultant(s) Notes Reviewed:  card, surgery    Care Discussed with Consultants/Other Providers: cards, surgery, GI

## 2018-08-21 NOTE — PROGRESS NOTE ADULT - PROBLEM SELECTOR PROBLEM 3
Acute cholangitis
Essential hypertension
Acute cholangitis
Chronic atrial fibrillation
T2DM (type 2 diabetes mellitus)

## 2018-08-21 NOTE — PROGRESS NOTE ADULT - PROBLEM SELECTOR PROBLEM 1
Obstructive jaundice

## 2018-08-21 NOTE — PROGRESS NOTE ADULT - PROBLEM SELECTOR PLAN 4
TTE shows EF 35%, moderate LV systolic dysfunction, euvolemic  -discussed with matthew, Dr Sanford, patient has compensated HF and optimized for whipple at this time, moderate risk for intra-abdominal surgery, relayed that to surgery who said they would be doing surgery as outpatient due to scheduling issues/patient preference  --switch metoprolol 25mg bid to Toprol 50mg ER given newly diagnosed systolic heart failure  -start lisinopril 5mg daily  -outpatient matthew followup,  Dr Guevara.

## 2018-08-24 ENCOUNTER — MEDICATION RENEWAL (OUTPATIENT)
Age: 78
End: 2018-08-24

## 2018-08-28 ENCOUNTER — INPATIENT (INPATIENT)
Facility: HOSPITAL | Age: 78
LOS: 8 days | Discharge: HOME HEALTH | DRG: 871 | End: 2018-09-06
Attending: SURGERY | Admitting: SURGERY
Payer: MEDICARE

## 2018-08-28 ENCOUNTER — APPOINTMENT (OUTPATIENT)
Dept: SURGERY | Facility: CLINIC | Age: 78
End: 2018-08-28
Payer: MEDICARE

## 2018-08-28 VITALS
DIASTOLIC BLOOD PRESSURE: 64 MMHG | OXYGEN SATURATION: 97 % | BODY MASS INDEX: 25.06 KG/M2 | SYSTOLIC BLOOD PRESSURE: 87 MMHG | WEIGHT: 185 LBS | HEIGHT: 72 IN | HEART RATE: 43 BPM

## 2018-08-28 VITALS
HEIGHT: 74 IN | HEART RATE: 58 BPM | WEIGHT: 184.97 LBS | DIASTOLIC BLOOD PRESSURE: 59 MMHG | OXYGEN SATURATION: 99 % | SYSTOLIC BLOOD PRESSURE: 87 MMHG | RESPIRATION RATE: 18 BRPM

## 2018-08-28 VITALS — TEMPERATURE: 92 F

## 2018-08-28 DIAGNOSIS — Z98.890 OTHER SPECIFIED POSTPROCEDURAL STATES: Chronic | ICD-10-CM

## 2018-08-28 DIAGNOSIS — E87.2 ACIDOSIS: ICD-10-CM

## 2018-08-28 DIAGNOSIS — N17.9 ACUTE KIDNEY FAILURE, UNSPECIFIED: ICD-10-CM

## 2018-08-28 DIAGNOSIS — E87.1 HYPO-OSMOLALITY AND HYPONATREMIA: ICD-10-CM

## 2018-08-28 DIAGNOSIS — Z96.652 PRESENCE OF LEFT ARTIFICIAL KNEE JOINT: Chronic | ICD-10-CM

## 2018-08-28 DIAGNOSIS — A41.9 SEPSIS, UNSPECIFIED ORGANISM: ICD-10-CM

## 2018-08-28 LAB
ALBUMIN SERPL ELPH-MCNC: 3.4 G/DL — SIGNIFICANT CHANGE UP (ref 3.3–5)
ALBUMIN SERPL ELPH-MCNC: 3.4 G/DL — SIGNIFICANT CHANGE UP (ref 3.3–5)
ALBUMIN SERPL ELPH-MCNC: 3.7 G/DL — SIGNIFICANT CHANGE UP (ref 3.3–5)
ALP SERPL-CCNC: 140 U/L — HIGH (ref 40–120)
ALP SERPL-CCNC: 145 U/L — HIGH (ref 40–120)
ALP SERPL-CCNC: 169 U/L — HIGH (ref 40–120)
ALT FLD-CCNC: 65 U/L — HIGH (ref 10–45)
ALT FLD-CCNC: 70 U/L — HIGH (ref 10–45)
ALT FLD-CCNC: 80 U/L — HIGH (ref 10–45)
ANION GAP SERPL CALC-SCNC: 21 MMOL/L — HIGH (ref 5–17)
ANION GAP SERPL CALC-SCNC: 25 MMOL/L — HIGH (ref 5–17)
ANION GAP SERPL CALC-SCNC: 26 MMOL/L — HIGH (ref 5–17)
APPEARANCE UR: ABNORMAL
APTT BLD: 37.3 SEC — SIGNIFICANT CHANGE UP (ref 27.5–37.4)
AST SERPL-CCNC: 35 U/L — SIGNIFICANT CHANGE UP (ref 10–40)
AST SERPL-CCNC: 35 U/L — SIGNIFICANT CHANGE UP (ref 10–40)
AST SERPL-CCNC: 41 U/L — HIGH (ref 10–40)
BACTERIA # UR AUTO: ABNORMAL /HPF
BASOPHILS # BLD AUTO: 0 K/UL — SIGNIFICANT CHANGE UP (ref 0–0.2)
BASOPHILS NFR BLD AUTO: 0.1 % — SIGNIFICANT CHANGE UP (ref 0–2)
BILIRUB DIRECT SERPL-MCNC: 1.4 MG/DL — HIGH (ref 0–0.2)
BILIRUB DIRECT SERPL-MCNC: 1.5 MG/DL — HIGH (ref 0–0.2)
BILIRUB INDIRECT FLD-MCNC: 1.1 MG/DL — HIGH (ref 0.2–1)
BILIRUB INDIRECT FLD-MCNC: 1.1 MG/DL — HIGH (ref 0.2–1)
BILIRUB SERPL-MCNC: 2.5 MG/DL — HIGH (ref 0.2–1.2)
BILIRUB SERPL-MCNC: 2.6 MG/DL — HIGH (ref 0.2–1.2)
BILIRUB SERPL-MCNC: 2.8 MG/DL — HIGH (ref 0.2–1.2)
BILIRUB UR-MCNC: NEGATIVE — SIGNIFICANT CHANGE UP
BLD GP AB SCN SERPL QL: NEGATIVE — SIGNIFICANT CHANGE UP
BUN SERPL-MCNC: 136 MG/DL — HIGH (ref 7–23)
BUN SERPL-MCNC: 137 MG/DL — HIGH (ref 7–23)
BUN SERPL-MCNC: 141 MG/DL — HIGH (ref 7–23)
CALCIUM SERPL-MCNC: 11.5 MG/DL — HIGH (ref 8.4–10.5)
CALCIUM SERPL-MCNC: 9 MG/DL — SIGNIFICANT CHANGE UP (ref 8.4–10.5)
CALCIUM SERPL-MCNC: 9.1 MG/DL — SIGNIFICANT CHANGE UP (ref 8.4–10.5)
CHLORIDE SERPL-SCNC: 90 MMOL/L — LOW (ref 96–108)
CHLORIDE SERPL-SCNC: 93 MMOL/L — LOW (ref 96–108)
CHLORIDE SERPL-SCNC: 93 MMOL/L — LOW (ref 96–108)
CO2 SERPL-SCNC: 10 MMOL/L — CRITICAL LOW (ref 22–31)
CO2 SERPL-SCNC: 13 MMOL/L — LOW (ref 22–31)
CO2 SERPL-SCNC: 16 MMOL/L — LOW (ref 22–31)
COLOR SPEC: YELLOW — SIGNIFICANT CHANGE UP
CREAT ?TM UR-MCNC: 167 MG/DL — SIGNIFICANT CHANGE UP
CREAT SERPL-MCNC: 8.35 MG/DL — HIGH (ref 0.5–1.3)
CREAT SERPL-MCNC: 8.66 MG/DL — HIGH (ref 0.5–1.3)
CREAT SERPL-MCNC: 8.79 MG/DL — HIGH (ref 0.5–1.3)
DIFF PNL FLD: ABNORMAL
EOSINOPHIL # BLD AUTO: 0 K/UL — SIGNIFICANT CHANGE UP (ref 0–0.5)
EOSINOPHIL NFR BLD AUTO: 0.4 % — SIGNIFICANT CHANGE UP (ref 0–6)
EPI CELLS # UR: SIGNIFICANT CHANGE UP /HPF
GAS PNL BLDV: SIGNIFICANT CHANGE UP
GLUCOSE SERPL-MCNC: 162 MG/DL — HIGH (ref 70–99)
GLUCOSE SERPL-MCNC: 191 MG/DL — HIGH (ref 70–99)
GLUCOSE SERPL-MCNC: 358 MG/DL — HIGH (ref 70–99)
GLUCOSE UR QL: NEGATIVE — SIGNIFICANT CHANGE UP
GRAM STN FLD: SIGNIFICANT CHANGE UP
HCT VFR BLD CALC: 45.2 % — SIGNIFICANT CHANGE UP (ref 39–50)
HGB BLD-MCNC: 15.3 G/DL — SIGNIFICANT CHANGE UP (ref 13–17)
HYALINE CASTS # UR AUTO: ABNORMAL
INR BLD: 2 RATIO — HIGH (ref 0.88–1.16)
KETONES UR-MCNC: NEGATIVE — SIGNIFICANT CHANGE UP
LEUKOCYTE ESTERASE UR-ACNC: NEGATIVE — SIGNIFICANT CHANGE UP
LIDOCAIN IGE QN: 262 U/L — HIGH (ref 7–60)
LYMPHOCYTES # BLD AUTO: 1.4 K/UL — SIGNIFICANT CHANGE UP (ref 1–3.3)
LYMPHOCYTES # BLD AUTO: 10.2 % — LOW (ref 13–44)
MAGNESIUM SERPL-MCNC: 2.5 MG/DL — SIGNIFICANT CHANGE UP (ref 1.6–2.6)
MAGNESIUM SERPL-MCNC: 2.5 MG/DL — SIGNIFICANT CHANGE UP (ref 1.6–2.6)
MCHC RBC-ENTMCNC: 31.2 PG — SIGNIFICANT CHANGE UP (ref 27–34)
MCHC RBC-ENTMCNC: 33.9 GM/DL — SIGNIFICANT CHANGE UP (ref 32–36)
MCV RBC AUTO: 91.9 FL — SIGNIFICANT CHANGE UP (ref 80–100)
MONOCYTES # BLD AUTO: 0.6 K/UL — SIGNIFICANT CHANGE UP (ref 0–0.9)
MONOCYTES NFR BLD AUTO: 4.2 % — SIGNIFICANT CHANGE UP (ref 2–14)
NEUTROPHILS # BLD AUTO: 11.7 K/UL — HIGH (ref 1.8–7.4)
NEUTROPHILS NFR BLD AUTO: 85.2 % — HIGH (ref 43–77)
NITRITE UR-MCNC: NEGATIVE — SIGNIFICANT CHANGE UP
NT-PROBNP SERPL-SCNC: 1574 PG/ML — HIGH (ref 0–300)
OSMOLALITY UR: 358 MOS/KG — SIGNIFICANT CHANGE UP (ref 300–900)
PH UR: 5.5 — SIGNIFICANT CHANGE UP (ref 5–8)
PHOSPHATE SERPL-MCNC: 12.1 MG/DL — HIGH (ref 2.5–4.5)
PHOSPHATE SERPL-MCNC: 12.4 MG/DL — HIGH (ref 2.5–4.5)
PLATELET # BLD AUTO: 380 K/UL — SIGNIFICANT CHANGE UP (ref 150–400)
POTASSIUM SERPL-MCNC: 5.1 MMOL/L — SIGNIFICANT CHANGE UP (ref 3.5–5.3)
POTASSIUM SERPL-MCNC: 5.4 MMOL/L — HIGH (ref 3.5–5.3)
POTASSIUM SERPL-MCNC: 5.7 MMOL/L — HIGH (ref 3.5–5.3)
POTASSIUM SERPL-SCNC: 5.1 MMOL/L — SIGNIFICANT CHANGE UP (ref 3.5–5.3)
POTASSIUM SERPL-SCNC: 5.4 MMOL/L — HIGH (ref 3.5–5.3)
POTASSIUM SERPL-SCNC: 5.7 MMOL/L — HIGH (ref 3.5–5.3)
POTASSIUM UR-SCNC: 64 MMOL/L — SIGNIFICANT CHANGE UP
PROT SERPL-MCNC: 6.5 G/DL — SIGNIFICANT CHANGE UP (ref 6–8.3)
PROT SERPL-MCNC: 6.6 G/DL — SIGNIFICANT CHANGE UP (ref 6–8.3)
PROT SERPL-MCNC: 7.4 G/DL — SIGNIFICANT CHANGE UP (ref 6–8.3)
PROT UR-MCNC: 30 MG/DL
PROTHROM AB SERPL-ACNC: 22.1 SEC — HIGH (ref 9.8–12.7)
RBC # BLD: 4.91 M/UL — SIGNIFICANT CHANGE UP (ref 4.2–5.8)
RBC # FLD: 12.6 % — SIGNIFICANT CHANGE UP (ref 10.3–14.5)
RBC CASTS # UR COMP ASSIST: >50 /HPF (ref 0–2)
RH IG SCN BLD-IMP: POSITIVE — SIGNIFICANT CHANGE UP
SODIUM SERPL-SCNC: 128 MMOL/L — LOW (ref 135–145)
SODIUM SERPL-SCNC: 129 MMOL/L — LOW (ref 135–145)
SODIUM SERPL-SCNC: 130 MMOL/L — LOW (ref 135–145)
SODIUM UR-SCNC: <20 MMOL/L — SIGNIFICANT CHANGE UP
SP GR SPEC: 1.01 — SIGNIFICANT CHANGE UP (ref 1.01–1.02)
SPECIMEN SOURCE: SIGNIFICANT CHANGE UP
UROBILINOGEN FLD QL: NEGATIVE — SIGNIFICANT CHANGE UP
WBC # BLD: 13.7 K/UL — HIGH (ref 3.8–10.5)
WBC # FLD AUTO: 13.7 K/UL — HIGH (ref 3.8–10.5)
WBC UR QL: SIGNIFICANT CHANGE UP /HPF (ref 0–5)

## 2018-08-28 PROCEDURE — 99284 EMERGENCY DEPT VISIT MOD MDM: CPT | Mod: GC,25

## 2018-08-28 PROCEDURE — 93010 ELECTROCARDIOGRAM REPORT: CPT | Mod: GC

## 2018-08-28 PROCEDURE — 74176 CT ABD & PELVIS W/O CONTRAST: CPT | Mod: 26

## 2018-08-28 PROCEDURE — XXXXX: CPT

## 2018-08-28 PROCEDURE — 99291 CRITICAL CARE FIRST HOUR: CPT

## 2018-08-28 PROCEDURE — 71045 X-RAY EXAM CHEST 1 VIEW: CPT | Mod: 26

## 2018-08-28 PROCEDURE — 99223 1ST HOSP IP/OBS HIGH 75: CPT | Mod: GC

## 2018-08-28 RX ORDER — CHLORHEXIDINE GLUCONATE 213 G/1000ML
1 SOLUTION TOPICAL
Qty: 0 | Refills: 0 | Status: DISCONTINUED | OUTPATIENT
Start: 2018-08-28 | End: 2018-09-01

## 2018-08-28 RX ORDER — VANCOMYCIN HCL 1 G
1000 VIAL (EA) INTRAVENOUS ONCE
Qty: 0 | Refills: 0 | Status: COMPLETED | OUTPATIENT
Start: 2018-08-28 | End: 2018-08-28

## 2018-08-28 RX ORDER — MEROPENEM 1 G/30ML
500 INJECTION INTRAVENOUS EVERY 24 HOURS
Qty: 0 | Refills: 0 | Status: DISCONTINUED | OUTPATIENT
Start: 2018-08-28 | End: 2018-08-30

## 2018-08-28 RX ORDER — ONDANSETRON 8 MG/1
4 TABLET, FILM COATED ORAL ONCE
Qty: 0 | Refills: 0 | Status: COMPLETED | OUTPATIENT
Start: 2018-08-28 | End: 2018-08-28

## 2018-08-28 RX ORDER — INSULIN LISPRO 100/ML
VIAL (ML) SUBCUTANEOUS EVERY 4 HOURS
Qty: 0 | Refills: 0 | Status: DISCONTINUED | OUTPATIENT
Start: 2018-08-28 | End: 2018-08-29

## 2018-08-28 RX ORDER — DEXTROSE 50 % IN WATER 50 %
50 SYRINGE (ML) INTRAVENOUS ONCE
Qty: 0 | Refills: 0 | Status: COMPLETED | OUTPATIENT
Start: 2018-08-28 | End: 2018-08-28

## 2018-08-28 RX ORDER — SODIUM CHLORIDE 9 MG/ML
1000 INJECTION INTRAMUSCULAR; INTRAVENOUS; SUBCUTANEOUS ONCE
Qty: 0 | Refills: 0 | Status: COMPLETED | OUTPATIENT
Start: 2018-08-28 | End: 2018-08-28

## 2018-08-28 RX ORDER — SODIUM CHLORIDE 9 MG/ML
1000 INJECTION, SOLUTION INTRAVENOUS
Qty: 0 | Refills: 0 | Status: DISCONTINUED | OUTPATIENT
Start: 2018-08-28 | End: 2018-08-29

## 2018-08-28 RX ORDER — PANTOPRAZOLE SODIUM 20 MG/1
40 TABLET, DELAYED RELEASE ORAL EVERY 24 HOURS
Qty: 0 | Refills: 0 | Status: DISCONTINUED | OUTPATIENT
Start: 2018-08-28 | End: 2018-08-29

## 2018-08-28 RX ORDER — SODIUM BICARBONATE 1 MEQ/ML
50 SYRINGE (ML) INTRAVENOUS ONCE
Qty: 0 | Refills: 0 | Status: COMPLETED | OUTPATIENT
Start: 2018-08-28 | End: 2018-08-28

## 2018-08-28 RX ORDER — INSULIN HUMAN 100 [IU]/ML
10 INJECTION, SOLUTION SUBCUTANEOUS ONCE
Qty: 0 | Refills: 0 | Status: COMPLETED | OUTPATIENT
Start: 2018-08-28 | End: 2018-08-28

## 2018-08-28 RX ORDER — SODIUM CHLORIDE 9 MG/ML
1000 INJECTION INTRAMUSCULAR; INTRAVENOUS; SUBCUTANEOUS
Qty: 0 | Refills: 0 | Status: DISCONTINUED | OUTPATIENT
Start: 2018-08-28 | End: 2018-08-28

## 2018-08-28 RX ORDER — ACETAMINOPHEN 500 MG
975 TABLET ORAL ONCE
Qty: 0 | Refills: 0 | Status: COMPLETED | OUTPATIENT
Start: 2018-08-28 | End: 2018-08-28

## 2018-08-28 RX ORDER — PIPERACILLIN AND TAZOBACTAM 4; .5 G/20ML; G/20ML
3.38 INJECTION, POWDER, LYOPHILIZED, FOR SOLUTION INTRAVENOUS ONCE
Qty: 0 | Refills: 0 | Status: COMPLETED | OUTPATIENT
Start: 2018-08-28 | End: 2018-08-28

## 2018-08-28 RX ORDER — FENTANYL CITRATE 50 UG/ML
25 INJECTION INTRAVENOUS ONCE
Qty: 0 | Refills: 0 | Status: DISCONTINUED | OUTPATIENT
Start: 2018-08-28 | End: 2018-08-28

## 2018-08-28 RX ORDER — PANTOPRAZOLE SODIUM 20 MG/1
40 TABLET, DELAYED RELEASE ORAL DAILY
Qty: 0 | Refills: 0 | Status: DISCONTINUED | OUTPATIENT
Start: 2018-08-28 | End: 2018-08-28

## 2018-08-28 RX ORDER — CALCIUM GLUCONATE 100 MG/ML
2 VIAL (ML) INTRAVENOUS ONCE
Qty: 0 | Refills: 0 | Status: COMPLETED | OUTPATIENT
Start: 2018-08-28 | End: 2018-08-28

## 2018-08-28 RX ORDER — SODIUM CHLORIDE 9 MG/ML
1000 INJECTION INTRAMUSCULAR; INTRAVENOUS; SUBCUTANEOUS
Qty: 0 | Refills: 0 | Status: DISCONTINUED | OUTPATIENT
Start: 2018-08-28 | End: 2018-08-30

## 2018-08-28 RX ORDER — SODIUM POLYSTYRENE SULFONATE 4.1 MEQ/G
15 POWDER, FOR SUSPENSION ORAL ONCE
Qty: 0 | Refills: 0 | Status: COMPLETED | OUTPATIENT
Start: 2018-08-28 | End: 2018-08-29

## 2018-08-28 RX ADMIN — MEROPENEM 100 MILLIGRAM(S): 1 INJECTION INTRAVENOUS at 20:11

## 2018-08-28 RX ADMIN — Medication 50 MILLIEQUIVALENT(S): at 16:42

## 2018-08-28 RX ADMIN — SODIUM CHLORIDE 1000 MILLILITER(S): 9 INJECTION INTRAMUSCULAR; INTRAVENOUS; SUBCUTANEOUS at 14:55

## 2018-08-28 RX ADMIN — ONDANSETRON 4 MILLIGRAM(S): 8 TABLET, FILM COATED ORAL at 14:53

## 2018-08-28 RX ADMIN — Medication 50 MILLIEQUIVALENT(S): at 17:30

## 2018-08-28 RX ADMIN — Medication 250 MILLIGRAM(S): at 14:54

## 2018-08-28 RX ADMIN — SODIUM CHLORIDE 100 MILLILITER(S): 9 INJECTION, SOLUTION INTRAVENOUS at 17:29

## 2018-08-28 RX ADMIN — Medication 2: at 23:00

## 2018-08-28 RX ADMIN — Medication 50 MILLILITER(S): at 16:47

## 2018-08-28 RX ADMIN — Medication 200 GRAM(S): at 17:29

## 2018-08-28 RX ADMIN — PIPERACILLIN AND TAZOBACTAM 200 GRAM(S): 4; .5 INJECTION, POWDER, LYOPHILIZED, FOR SOLUTION INTRAVENOUS at 14:54

## 2018-08-28 RX ADMIN — Medication 4: at 18:41

## 2018-08-28 RX ADMIN — INSULIN HUMAN 10 UNIT(S): 100 INJECTION, SOLUTION SUBCUTANEOUS at 16:47

## 2018-08-28 NOTE — CONSULT NOTE ADULT - PROBLEM SELECTOR RECOMMENDATION 9
Pt with normal baseline renal function.   SCr ~ 0.94 on 08/20. Now with elevated BUN//Cr (136/8.6)  Pt with increasing output from PTC drain, decreased PO intake. MANNY likely pre renal as supported by urine lytes (Low Sravanthi+) vs AIN from recent Cipro use ?  On IV fluid resuscitation and s/p villa; UOP picking up,  non oliguric.  No uremic symptoms, K+ wnl  No urgent indication for HD.  Consent obtained/placed in charts in event HD becomes imminent.  Obtain Urinalysis, urine for eosinophils.  renal US with dopplers. Pt with normal baseline renal function.   SCr ~ 0.94 on 08/20. Now with elevated BUN//Cr (136/8.6)  Pt with increasing output from PTC drain, decreased PO intake. MANNY likely pre renal as supported by urine lytes (Low Sravanthi+) vs AIN from recent Cipro use??  On IV fluid resuscitation and s/p villa; UOP picking up,  non oliguric.  No uremic symptoms, K+ wnl  No urgent indication for HD.  Consent obtained/placed in charts in event HD becomes imminent.  Obtain Urinalysis, urine for eosinophils.  renal US with dopplers.

## 2018-08-28 NOTE — CONSULT NOTE ADULT - SUBJECTIVE AND OBJECTIVE BOX
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  Patient was recently admitted to [facility] on 8/14 after hospital stay at [facility] (8/9-8/14) where he was found to be jaundiced with a CT showing severe extrahepatic biliary duct obstruction with 2 cm dilation of CBD with marked intrahepatic biliary duct dilation. RUQ US showed no evidence of gallstones. 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 During stay at [facility] patient became cholangitic spiking fevers was started on empiric cefepime and  transferred to [facility] for ERCP with possible stent and biopsy. ERCP was attempted which revealed a distal CBD stricture that was unable to be traversed for stent placement or biopsy.  Patient underwent PTC placement by IR into segment 6 of the liver. Patient clinically improved after PTC placement (Bili downtrended from 16 to 5.5).  During admission Dr. George (B surgery) was consulted for possible pancreaticoduodenectomy during this hospital stay but patient was found to be high risk for surgery (EF 35%, Severe left ventricular systolic dysfunction and decreased right ventricular systolic function).  Patient was discharged on 8/21 and instructed to follow up with Dr. George as an outpatient to plan surgery.    Today patient presented to Dr. George's office with an overall feeling of malaise, nausea, anorexia. States he has been unable to eat or drink adequately and has not been urinating.  Biliary drainage from his PTC has been approximately 1.2 L per day and states that the fluid now appears cloudy.  Has not seen IR doctors since discharge.  Denied any fevers or chills, vomiting, changes in bowel habits, burning with urination, chest pain. Does endorse some shortness of breath.      In the ER the patient was hypotensive to 87/59 with a HR of 62 (on lopressor).  He is normally SBP of 130s-140s. His temperature was 36.1, saturating 100% on RA and he was actively rigoring,  On exam he was not jaundiced, his breathing with very mildly labored.  His abdomen was soft, non tender non distended without rebound or guarding.  His PTC contained copious cloudy bilious fluid.  He did not have any peripheral edema on exam. Labs revealed a leukocytosis of 13.7, Hct 45, significant metabolic acidosis on VBG with pH of 7.11 and bicarb 13, lactate 2.1. Tbili 2.3. Patient was also found to have acute elevation of Cr (8.35) compared to the lab done on 08/21 (1.18). Patient underwent CT scan Status post percutaneous transhepatic biliary stent placement unchanged in position with distal tip in the small bowel and no biliary ductal dilatation.    Renal called for evaluation of MANNY.       PAST HISTORY  --------------------------------------------------------------------------------  PAST MEDICAL & SURGICAL HISTORY:  Male stress incontinence  Kidney stone: &quot;passed on own&quot;  Varicose vein: (L) 5 years ago  Bilateral cataracts  Appendicitis  Benign colon polyp  Prostate cancer: 2010  Afib: 2006 s/p ablation 2006 , afib resolved  Hyperlipidemia: X 4 years  GERD (Gastroesophageal Reflux Disease): X 10 years  DM (Diabetes Mellitus): X 3 years  Renal Calculi: 2008  MVP (Mitral Valve Prolapse): X 8 years  HTN - Hypertension: X 10 years, controlled  Status post left knee replacement  S/P ablation operation for arrhythmia  Male stress incontinence: s/p artifical urinary sphincter 5/2012  Varicose vein-s/p vein stripping 5 years ago  S/P arthroscopy: right shoulder 12/11  S/P prostatectomy: radical robotic 6/10  Cosmetic Surgery: chin implant 2004  S/P Colonoscopy with Polypectomy: 2009  S/P Appendectomy: 1950    FAMILY HISTORY:  No pertinent family history in first degree relatives    PAST SOCIAL HISTORY:    ALLERGIES & MEDICATIONS  --------------------------------------------------------------------------------  Allergies    No Known Allergies    Intolerances      Standing Inpatient Medications  chlorhexidine 4% Liquid 1 Application(s) Topical <User Schedule>  dextrose 5% 1000 milliLiter(s) IV Continuous <Continuous>  insulin lispro (HumaLOG) corrective regimen sliding scale   SubCutaneous every 4 hours  meropenem  IVPB 500 milliGRAM(s) IV Intermittent every 24 hours  pantoprazole  Injectable 40 milliGRAM(s) IV Push every 24 hours  sodium chloride 0.9%. 1000 milliLiter(s) IV Continuous <Continuous>    PRN Inpatient Medications      REVIEW OF SYSTEMS  --------------------------------------------------------------------------------  Gen: No weight changes, fatigue, fevers/chills, weakness  Skin: No rashes  Head/Eyes/Ears/Mouth: No headache; Normal hearing; Normal vision w/o blurriness; No sinus pain/discomfort, sore throat  Respiratory: No dyspnea, cough, wheezing, hemoptysis  CV: No chest pain, PND, orthopnea  GI: No abdominal pain, diarrhea, constipation, nausea, vomiting, melena, hematochezia  : No increased frequency, dysuria, hematuria, nocturia  MSK: No joint pain/swelling; no back pain; no edema  Neuro: No dizziness/lightheadedness, weakness, seizures, numbness, tingling  Heme: No easy bruising or bleeding  Endo: No heat/cold intolerance  Psych: No significant nervousness, anxiety, stress, depression    All other systems were reviewed and are negative, except as noted.    VITALS/PHYSICAL EXAM  --------------------------------------------------------------------------------  T(C): 36.3 (08-28-18 @ 15:57), Max: 36.3 (08-28-18 @ 15:57)  HR: 51 (08-28-18 @ 18:00) (50 - 95)  BP: 115/53 (08-28-18 @ 18:00) (87/59 - 119/54)  RR: 23 (08-28-18 @ 18:00) (18 - 30)  SpO2: 94% (08-28-18 @ 18:00) (93% - 100%)  Wt(kg): --  Height (cm): 182.88 (08-28-18 @ 15:57)  Weight (kg): 84.5 (08-28-18 @ 15:57)  BMI (kg/m2): 25.3 (08-28-18 @ 15:57)  BSA (m2): 2.07 (08-28-18 @ 15:57)      08-28-18 @ 07:01  -  08-28-18 @ 18:30  --------------------------------------------------------  IN: 300 mL / OUT: 100 mL / NET: 200 mL      Physical Exam:  	Gen: NAD, well-appearing  	HEENT: PERRL, supple neck, clear oropharynx  	Pulm: CTA B/L  	CV: RRR, S1S2; no rub  	Back: No spinal or CVA tenderness; no sacral edema  	Abd: +BS, soft, nontender/nondistended  	: No suprapubic tenderness  	UE: Warm, FROM, no clubbing, intact strength; no edema; no asterixis  	LE: Warm, FROM, no clubbing, intact strength; no edema  	Neuro: No focal deficits, intact gait  	Psych: Normal affect and mood  	Skin: Warm, without rashes  	Vascular access:    LABS/STUDIES  --------------------------------------------------------------------------------              15.3   13.7  >-----------<  380      [08-28-18 @ 14:10]              45.2     128  |  90  |  x   ----------------------------<  358      [08-28-18 @ 17:51]  5.1   |  13  |  8.66        Ca     11.5     [08-28-18 @ 17:51]      Mg     2.5     [08-28-18 @ 17:51]      Phos  12.1     [08-28-18 @ 17:51]    TPro  6.6  /  Alb  3.4  /  TBili  2.6  /  DBili  1.5  /  AST  35  /  ALT  70  /  AlkPhos  145  [08-28-18 @ 17:51]    PT/INR: PT 22.1 , INR 2.00       [08-28-18 @ 14:10]  PTT: 37.3       [08-28-18 @ 14:10]      Creatinine Trend:  SCr 8.66 [08-28 @ 17:51]  SCr 8.35 [08-28 @ 14:10]  SCr 1.18 [08-21 @ 06:17]  SCr 0.94 [08-20 @ 06:04]  SCr 0.81 [08-19 @ 06:29]    Urinalysis - [08-14-18 @ 17:13]      Color Yellow / Appearance Clear / SG 1.015 / pH 6.0      Gluc Negative / Ketone Moderate  / Bili Large / Urobili 8       Blood Trace / Protein 100 / Leuk Est Trace / Nitrite Positive      RBC  / WBC 0-2 / Hyaline  / Gran  / Sq Epi  / Non Sq Epi Occasional / Bacteria       HbA1c 6.2      [08-02-18 @ 05:55]  TSH 1.23      [08-02-18 @ 05:55] [facility] DIVISION OF KIDNEY DISEASES AND HYPERTENSION -- INITIAL CONSULT NOTE  --------------------------------------------------------------------------------  HPI:    78 yo man with PMH of HTN, prediabetes, HLD, afib on eliquis (last took this AM), MVP,GERD who was recently hospitalized for 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  Patient was recently admitted to [facility] on 8/14 after hospital stay at [facility] (8/9-8/14) where he was found to be jaundiced with a CT showing severe extrahepatic biliary duct obstruction with 2 cm dilation of CBD with marked intrahepatic biliary duct dilation. RUQ US showed no evidence of gallstones. 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 During stay at [facility] patient became cholangitic spiking fevers was started on empiric cefepime and  transferred to [facility] for ERCP with possible stent and biopsy. ERCP was attempted which revealed a distal CBD stricture that was unable to be traversed for stent placement or biopsy.  Patient underwent PTC placement by IR into segment 6 of the liver. Patient clinically improved after PTC placement (Bili downtrended from 16 to 5.5).  During admission Dr. George (B surgery) was consulted for possible pancreaticoduodenectomy during this hospital stay but patient was found to be high risk for surgery (EF 35%, Severe left ventricular systolic dysfunction and decreased right ventricular systolic function).  Patient was discharged on 8/21 and instructed to follow up with Dr. George as an outpatient to plan surgery.    Today patient presented to Dr. George's office with an overall feeling of malaise, nausea, anorexia. States he has been unable to eat or drink adequately and has not been urinating.  Biliary drainage from his PTC has been approximately 1.2 L per day and states that the fluid now appears cloudy.  Has not seen IR doctors since discharge.  Denied any fevers or chills, vomiting, changes in bowel habits, burning with urination, chest pain. Does endorse some shortness of breath.      In the ER the patient was hypotensive to 87/59 with a HR of 62 (on lopressor).  He is normally SBP of 130s-140s. His temperature was 36.1, saturating 100% on RA and he was actively rigoring,  On exam he was not jaundiced, his breathing with very mildly labored.  His abdomen was soft, non tender non distended without rebound or guarding.  His PTC contained copious cloudy bilious fluid.  He did not have any peripheral edema on exam. Labs revealed a leukocytosis of 13.7, Hct 45, significant metabolic acidosis on VBG with pH of 7.11 and bicarb 13, lactate 2.1. Tbili 2.3. Patient was also found to have acute elevation of Cr (8.35) compared to the lab done on 08/21 (1.18). Patient underwent CT scan Status post percutaneous transhepatic biliary stent placement unchanged in position with distal tip in the small bowel and no biliary ductal dilatation.    Renal called for evaluation of MANNY. Pt with normal Scr during recent admisson. Pt states he was doing well until 3-4 days ago when he started feeling extremely tired. Also reports nausea but no vomiting.  Endorses decreased po intake, nausea but no vomiting. Denies fever, chills, runny nose, cough, abdominal pain, diarrhea, melena, hematochezia or rash. Also reports decreased urination. Denies straining to urinate, hematuria or foamy urine. Denies NSAID, OTC meds or herbal supplements use. He was continuing to take the Cipro that was prescribed to him.      PAST HISTORY  --------------------------------------------------------------------------------  PAST MEDICAL & SURGICAL HISTORY:  Male stress incontinence  Kidney stone: &quot;passed on own&quot;  Varicose vein: (L) 5 years ago  Bilateral cataracts  Appendicitis  Benign colon polyp  Prostate cancer: 2010  Afib: 2006 s/p ablation 2006 , afib resolved  Hyperlipidemia: X 4 years  GERD (Gastroesophageal Reflux Disease): X 10 years  DM (Diabetes Mellitus): X 3 years  Renal Calculi: 2008  MVP (Mitral Valve Prolapse): X 8 years  HTN - Hypertension: X 10 years, controlled  Status post left knee replacement  S/P ablation operation for arrhythmia  Male stress incontinence: s/p artifical urinary sphincter 5/2012  Varicose vein-s/p vein stripping 5 years ago  S/P arthroscopy: right shoulder 12/11  S/P prostatectomy: radical robotic 6/10  Cosmetic Surgery: chin implant 2004  S/P Colonoscopy with Polypectomy: 2009  S/P Appendectomy: 1950    FAMILY HISTORY:  No pertinent family history in first degree relatives    PAST SOCIAL HISTORY:    ALLERGIES & MEDICATIONS  --------------------------------------------------------------------------------  Allergies    No Known Allergies    Intolerances      Standing Inpatient Medications  chlorhexidine 4% Liquid 1 Application(s) Topical <User Schedule>  dextrose 5% 1000 milliLiter(s) IV Continuous <Continuous>  insulin lispro (HumaLOG) corrective regimen sliding scale   SubCutaneous every 4 hours  meropenem  IVPB 500 milliGRAM(s) IV Intermittent every 24 hours  pantoprazole  Injectable 40 milliGRAM(s) IV Push every 24 hours  sodium chloride 0.9%. 1000 milliLiter(s) IV Continuous <Continuous>    PRN Inpatient Medications      REVIEW OF SYSTEMS  --------------------------------------------------------------------------------  Gen: No weight changes, fatigue, fevers/chills, weakness  Skin: No rashes  Head/Eyes/Ears/Mouth: No headache; Normal hearing; Normal vision w/o blurriness; No sinus pain/discomfort, sore throat  Respiratory: No dyspnea, cough, wheezing, hemoptysis  CV: No chest pain, PND, orthopnea  GI: No abdominal pain, diarrhea, constipation, nausea, vomiting, melena, hematochezia  : No increased frequency, dysuria, hematuria, nocturia  MSK: No joint pain/swelling; no back pain; no edema  Neuro: No dizziness/lightheadedness, weakness, seizures, numbness, tingling  Heme: No easy bruising or bleeding  Endo: No heat/cold intolerance  Psych: No significant nervousness, anxiety, stress, depression    All other systems were reviewed and are negative, except as noted.    VITALS/PHYSICAL EXAM  --------------------------------------------------------------------------------  T(C): 36.3 (08-28-18 @ 15:57), Max: 36.3 (08-28-18 @ 15:57)  HR: 51 (08-28-18 @ 18:00) (50 - 95)  BP: 115/53 (08-28-18 @ 18:00) (87/59 - 119/54)  RR: 23 (08-28-18 @ 18:00) (18 - 30)  SpO2: 94% (08-28-18 @ 18:00) (93% - 100%)  Wt(kg): --  Height (cm): 182.88 (08-28-18 @ 15:57)  Weight (kg): 84.5 (08-28-18 @ 15:57)  BMI (kg/m2): 25.3 (08-28-18 @ 15:57)  BSA (m2): 2.07 (08-28-18 @ 15:57)      08-28-18 @ 07:01  -  08-28-18 @ 18:30  --------------------------------------------------------  IN: 300 mL / OUT: 100 mL / NET: 200 mL      Physical Exam:  	Gen: NAD, well-appearing  	HEENT: PERRL, supple neck, clear oropharynx  	Pulm: CTA B/L  	CV: RRR, S1S2; no rub  	Back: No spinal or CVA tenderness; no sacral edema  	Abd: +BS, soft, nontender/nondistended  	: No suprapubic tenderness  	UE: Warm, FROM, no clubbing, intact strength; no edema; no asterixis  	LE: Warm, FROM, no clubbing, intact strength; no edema  	Neuro: No focal deficits, intact gait  	Psych: Normal affect and mood  	Skin: Warm, without rashes  	Vascular access:    LABS/STUDIES  --------------------------------------------------------------------------------              15.3   13.7  >-----------<  380      [08-28-18 @ 14:10]              45.2     128  |  90  |  x   ----------------------------<  358      [08-28-18 @ 17:51]  5.1   |  13  |  8.66        Ca     11.5     [08-28-18 @ 17:51]      Mg     2.5     [08-28-18 @ 17:51]      Phos  12.1     [08-28-18 @ 17:51]    TPro  6.6  /  Alb  3.4  /  TBili  2.6  /  DBili  1.5  /  AST  35  /  ALT  70  /  AlkPhos  145  [08-28-18 @ 17:51]    PT/INR: PT 22.1 , INR 2.00       [08-28-18 @ 14:10]  PTT: 37.3       [08-28-18 @ 14:10]      Creatinine Trend:  SCr 8.66 [08-28 @ 17:51]  SCr 8.35 [08-28 @ 14:10]  SCr 1.18 [08-21 @ 06:17]  SCr 0.94 [08-20 @ 06:04]  SCr 0.81 [08-19 @ 06:29]    Urinalysis - [08-14-18 @ 17:13]      Color Yellow / Appearance Clear / SG 1.015 / pH 6.0      Gluc Negative / Ketone Moderate  / Bili Large / Urobili 8       Blood Trace / Protein 100 / Leuk Est Trace / Nitrite Positive      RBC  / WBC 0-2 / Hyaline  / Gran  / Sq Epi  / Non Sq Epi Occasional / Bacteria       HbA1c 6.2      [08-02-18 @ 05:55]  TSH 1.23      [08-02-18 @ 05:55] [facility] DIVISION OF KIDNEY DISEASES AND HYPERTENSION -- INITIAL CONSULT NOTE  --------------------------------------------------------------------------------  HPI:    78 yo man with PMH of HTN, prediabetes, HLD, afib on eliquis (last took this AM), MVP,GERD who was recently hospitalized for 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  Patient was recently admitted to [facility] on 8/14 after hospital stay at [facility] (8/9-8/14) where he was found to be jaundiced with a CT showing severe extrahepatic biliary duct obstruction with 2 cm dilation of CBD with marked intrahepatic biliary duct dilation. RUQ US showed no evidence of gallstones. 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 During stay at [facility] patient became cholangitic spiking fevers was started on empiric cefepime and  transferred to [facility] for ERCP with possible stent and biopsy. ERCP was attempted which revealed a distal CBD stricture that was unable to be traversed for stent placement or biopsy.  Patient underwent PTC placement by IR into segment 6 of the liver. Patient clinically improved after PTC placement (Bili downtrended from 16 to 5.5).  During admission Dr. George (B surgery) was consulted for possible pancreaticoduodenectomy during this hospital stay but patient was found to be high risk for surgery (EF 35%, Severe left ventricular systolic dysfunction and decreased right ventricular systolic function).  Patient was discharged on 8/21 and instructed to follow up with Dr. George as an outpatient to plan surgery.    Today patient presented to Dr. George's office with an overall feeling of malaise, nausea, anorexia. States he has been unable to eat or drink adequately and has not been urinating.  Biliary drainage from his PTC has been approximately 1.2 L per day and states that the fluid now appears cloudy.  Has not seen IR doctors since discharge.  Denied any fevers or chills, vomiting, changes in bowel habits, burning with urination, chest pain. Does endorse some shortness of breath.      In the ER the patient was hypotensive to 87/59 with a HR of 62 (on lopressor).  He is normally SBP of 130s-140s. His temperature was 36.1, saturating 100% on RA and he was actively rigoring,  On exam he was not jaundiced, his breathing with very mildly labored.  His abdomen was soft, non tender non distended without rebound or guarding.  His PTC contained copious cloudy bilious fluid.  He did not have any peripheral edema on exam. Labs revealed a leukocytosis of 13.7, Hct 45, significant metabolic acidosis on VBG with pH of 7.11 and bicarb 13, lactate 2.1. Tbili 2.3. Patient was also found to have acute elevation of Cr (8.35) compared to the lab done on 08/21 (1.18). Patient underwent CT scan Status post percutaneous transhepatic biliary stent placement unchanged in position with distal tip in the small bowel and no biliary ductal dilatation.    Renal called for evaluation of MANNY. Pt with normal Scr during recent admisson. Pt states he was doing well until 3-4 days ago when he started feeling extremely tired. Also reports nausea but no vomiting.  Endorses decreased po intake, nausea but no vomiting. Denies fever, chills, runny nose, cough, abdominal pain, diarrhea, melena, hematochezia or rash. Also reports decreased urination. Denies straining to urinate, hematuria or foamy urine. Denies NSAID, OTC meds or herbal supplements use. He was continuing to take the Cipro that was prescribed to him. Also reports increased output from the PTC drain.      PAST HISTORY  --------------------------------------------------------------------------------  PAST MEDICAL & SURGICAL HISTORY:  Male stress incontinence  Kidney stone: &quot;passed on own&quot;  Varicose vein: (L) 5 years ago  Bilateral cataracts  Appendicitis  Benign colon polyp  Prostate cancer: 2010  Afib: 2006 s/p ablation 2006 , afib resolved  Hyperlipidemia: X 4 years  GERD (Gastroesophageal Reflux Disease): X 10 years  DM (Diabetes Mellitus): X 3 years  Renal Calculi: 2008  MVP (Mitral Valve Prolapse): X 8 years  HTN - Hypertension: X 10 years, controlled  Status post left knee replacement  S/P ablation operation for arrhythmia  Male stress incontinence: s/p artifical urinary sphincter 5/2012  Varicose vein-s/p vein stripping 5 years ago  S/P arthroscopy: right shoulder 12/11  S/P prostatectomy: radical robotic 6/10  Cosmetic Surgery: chin implant 2004  S/P Colonoscopy with Polypectomy: 2009  S/P Appendectomy: 1950    FAMILY HISTORY:  No pertinent family history in first degree relatives    PAST SOCIAL HISTORY:    ALLERGIES & MEDICATIONS  --------------------------------------------------------------------------------  Allergies    No Known Allergies    Intolerances      Standing Inpatient Medications  chlorhexidine 4% Liquid 1 Application(s) Topical <User Schedule>  dextrose 5% 1000 milliLiter(s) IV Continuous <Continuous>  insulin lispro (HumaLOG) corrective regimen sliding scale   SubCutaneous every 4 hours  meropenem  IVPB 500 milliGRAM(s) IV Intermittent every 24 hours  pantoprazole  Injectable 40 milliGRAM(s) IV Push every 24 hours  sodium chloride 0.9%. 1000 milliLiter(s) IV Continuous <Continuous>    PRN Inpatient Medications      REVIEW OF SYSTEMS  --------------------------------------------------------------------------------  Gen: fatigue,  weakness+  Skin: No rashes  Head/Eyes/Ears/Mouth: No headache; Normal hearing; Normal vision w/o blurriness; No sinus pain/discomfort, sore throat  Respiratory: No dyspnea, cough, wheezing, hemoptysis  CV: No chest pain, PND, orthopnea  GI: No abdominal pain, diarrhea, constipation,  vomiting, melena, hematochezia. nausea+  : decreased urination + No dysuria, hematuria, nocturia  MSK: No joint pain/swelling; no back pain; no edema  Neuro: No dizziness/lightheadedness, weakness, seizures, numbness, tingling  Heme: No easy bruising or bleeding  Endo: No heat/cold intolerance  Psych: No significant nervousness, anxiety, stress, depression    All other systems were reviewed and are negative, except as noted.    VITALS/PHYSICAL EXAM  --------------------------------------------------------------------------------  T(C): 36.3 (08-28-18 @ 15:57), Max: 36.3 (08-28-18 @ 15:57)  HR: 51 (08-28-18 @ 18:00) (50 - 95)  BP: 115/53 (08-28-18 @ 18:00) (87/59 - 119/54)  RR: 23 (08-28-18 @ 18:00) (18 - 30)  SpO2: 94% (08-28-18 @ 18:00) (93% - 100%)  Wt(kg): --  Height (cm): 182.88 (08-28-18 @ 15:57)  Weight (kg): 84.5 (08-28-18 @ 15:57)  BMI (kg/m2): 25.3 (08-28-18 @ 15:57)  BSA (m2): 2.07 (08-28-18 @ 15:57)      08-28-18 @ 07:01  -  08-28-18 @ 18:30  --------------------------------------------------------  IN: 300 mL / OUT: 100 mL / NET: 200 mL      Physical Exam:  	Gen: NAD  	HEENT:supple neck  	Pulm: CTA B/L  	CV: RRR, S1S2; no rub  	Back: No spinal or CVA tenderness; no sacral edema  	Abd: +BS, soft, nontender/nondistended  	: No suprapubic tenderness. villa draining dark urine+  	UE: Warm,  no edema; no asterixis  	LE: Warm,; no edema  	Neuro:awake, AAOx3  	Psych: Normal affect and mood  	Skin: Warm, no rash    LABS/STUDIES  --------------------------------------------------------------------------------              15.3   13.7  >-----------<  380      [08-28-18 @ 14:10]              45.2     128  |  90  |  x   ----------------------------<  358      [08-28-18 @ 17:51]  5.1   |  13  |  8.66        Ca     11.5     [08-28-18 @ 17:51]      Mg     2.5     [08-28-18 @ 17:51]      Phos  12.1     [08-28-18 @ 17:51]    TPro  6.6  /  Alb  3.4  /  TBili  2.6  /  DBili  1.5  /  AST  35  /  ALT  70  /  AlkPhos  145  [08-28-18 @ 17:51]    PT/INR: PT 22.1 , INR 2.00       [08-28-18 @ 14:10]  PTT: 37.3       [08-28-18 @ 14:10]      Creatinine Trend:  SCr 8.66 [08-28 @ 17:51]  SCr 8.35 [08-28 @ 14:10]  SCr 1.18 [08-21 @ 06:17]  SCr 0.94 [08-20 @ 06:04]  SCr 0.81 [08-19 @ 06:29]    Urinalysis - [08-14-18 @ 17:13]      Color Yellow / Appearance Clear / SG 1.015 / pH 6.0      Gluc Negative / Ketone Moderate  / Bili Large / Urobili 8       Blood Trace / Protein 100 / Leuk Est Trace / Nitrite Positive      RBC  / WBC 0-2 / Hyaline  / Gran  / Sq Epi  / Non Sq Epi Occasional / Bacteria       HbA1c 6.2      [08-02-18 @ 05:55]  TSH 1.23      [08-02-18 @ 05:55] [facility] DIVISION OF KIDNEY DISEASES AND HYPERTENSION -- INITIAL CONSULT NOTE  --------------------------------------------------------------------------------  HPI:    78 yo man with PMH of HTN, prediabetes, HLD, afib on eliquis (last took this AM), MVP,GERD who was recently hospitalized for 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  Patient was recently admitted to [facility] on 8/14 after hospital stay at [facility] (8/9-8/14) where he was found to be jaundiced with a CT showing severe extrahepatic biliary duct obstruction with 2 cm dilation of CBD with marked intrahepatic biliary duct dilation. RUQ US showed no evidence of gallstones. 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 During stay at [facility] patient became cholangitic spiking fevers was started on empiric cefepime and  transferred to [facility] for ERCP with possible stent and biopsy. ERCP was attempted which revealed a distal CBD stricture that was unable to be traversed for stent placement or biopsy.  Patient underwent PTC placement by IR into segment 6 of the liver. Patient clinically improved after PTC placement (Bili downtrended from 16 to 5.5).  During admission Dr. George ([facility] surgery) was consulted for possible pancreaticoduodenectomy during this hospital stay but patient was found to be high risk for surgery (EF 35%, Severe left ventricular systolic dysfunction and decreased right ventricular systolic function).  Patient was discharged on 8/21 and instructed to follow up with Dr. George as an outpatient to plan surgery.    Today patient presented to Dr. George's office with an overall feeling of malaise, nausea, anorexia. States he has been unable to eat or drink adequately and has not been urinating.  Biliary drainage from his PTC has been approximately 1.2 L per day and states that the fluid now appears cloudy.  In addition to this his blood pressure was low in the eighties down from a baseline of about 150 systolic.  He was also continuing to take his lisinopril.  Has not seen IR doctors since discharge.  Denied any fevers or chills, vomiting, changes in bowel habits, burning with urination, chest pain. Does endorse some shortness of breath.      In the ER the patient was hypotensive to 87/59 with a HR of 62 (on lopressor).  He is normally SBP of 130s-140s. His temperature was 36.1, saturating 100% on RA and he was actively rigoring,  On exam he was not jaundiced, his breathing with very mildly labored.  His abdomen was soft, non tender non distended without rebound or guarding.  His PTC contained copious cloudy bilious fluid.  He did not have any peripheral edema on exam. Labs revealed a leukocytosis of 13.7, Hct 45, significant metabolic acidosis on VBG with pH of 7.11 and bicarb 13, lactate 2.1. Tbili 2.3. Patient was also found to have acute elevation of Cr (8.35) compared to the lab done on 08/21 (1.18). Patient underwent CT scan Status post percutaneous transhepatic biliary stent placement unchanged in position with distal tip in the small bowel and no biliary ductal dilatation.    Renal called for evaluation of MANNY. Pt with normal Scr during recent admisson. Pt states he was doing well until 3-4 days ago when he started feeling extremely tired. Also reports nausea but no vomiting.  Endorses decreased po intake, nausea but no vomiting. Denies fever, chills, runny nose, cough, abdominal pain, diarrhea, melena, hematochezia or rash. Also reports decreased urination. Denies straining to urinate, hematuria or foamy urine. Denies NSAID, OTC meds or herbal supplements use. He was continuing to take the Cipro that was prescribed to him. Also reports increased output from the PTC drain.      PAST HISTORY  --------------------------------------------------------------------------------  PAST MEDICAL & SURGICAL HISTORY:  Male stress incontinence  Kidney stone: &quot;passed on own&quot;  Varicose vein: (L) 5 years ago  Bilateral cataracts  Appendicitis  Benign colon polyp  Prostate cancer: 2010  Afib: 2006 s/p ablation 2006 , afib resolved  Hyperlipidemia: X 4 years  GERD (Gastroesophageal Reflux Disease): X 10 years  DM (Diabetes Mellitus): X 3 years  Renal Calculi: 2008  MVP (Mitral Valve Prolapse): X 8 years  HTN - Hypertension: X 10 years, controlled  Status post left knee replacement  S/P ablation operation for arrhythmia  Male stress incontinence: s/p artifical urinary sphincter 5/2012  Varicose vein-s/p vein stripping 5 years ago  S/P arthroscopy: right shoulder 12/11  S/P prostatectomy: radical robotic 6/10  Cosmetic Surgery: chin implant 2004  S/P Colonoscopy with Polypectomy: 2009  S/P Appendectomy: 1950    FAMILY HISTORY:  No pertinent family history in first degree relatives    PAST SOCIAL HISTORY: lives with wife, originally from [place].      ALLERGIES & MEDICATIONS  --------------------------------------------------------------------------------  Allergies    No Known Allergies    Intolerances      Standing Inpatient Medications  chlorhexidine 4% Liquid 1 Application(s) Topical <User Schedule>  dextrose 5% 1000 milliLiter(s) IV Continuous <Continuous>  insulin lispro (HumaLOG) corrective regimen sliding scale   SubCutaneous every 4 hours  meropenem  IVPB 500 milliGRAM(s) IV Intermittent every 24 hours  pantoprazole  Injectable 40 milliGRAM(s) IV Push every 24 hours  sodium chloride 0.9%. 1000 milliLiter(s) IV Continuous <Continuous>    PRN Inpatient Medications      REVIEW OF SYSTEMS  --------------------------------------------------------------------------------  Gen: fatigue,  weakness+  Skin: No rashes  Head/Eyes/Ears/Mouth: No headache;  Respiratory: No dyspnea, cough, wheezing, hemoptysis  CV: No chest pain, PND, orthopnea  GI: No abdominal pain, diarrhea, constipation,  vomiting, melena, hematochezia. nausea+  : decreased urination + No dysuria, hematuria, nocturia  MSK: No joint pain/swelling; no back pain; no edema  Neuro: No dizziness/lightheadedness, weakness, seizures, numbness, tingling  Heme: No easy bruising or bleeding  Endo: No heat/cold intolerance  Psych: No significant nervousness, anxiety, stress, depression    All other systems were reviewed and are negative, except as noted.    VITALS/PHYSICAL EXAM  --------------------------------------------------------------------------------  T(C): 36.3 (08-28-18 @ 15:57), Max: 36.3 (08-28-18 @ 15:57)  HR: 51 (08-28-18 @ 18:00) (50 - 95)  BP: 115/53 (08-28-18 @ 18:00) (87/59 - 119/54)  RR: 23 (08-28-18 @ 18:00) (18 - 30)  SpO2: 94% (08-28-18 @ 18:00) (93% - 100%)  Wt(kg): --  Height (cm): 182.88 (08-28-18 @ 15:57)  Weight (kg): 84.5 (08-28-18 @ 15:57)  BMI (kg/m2): 25.3 (08-28-18 @ 15:57)  BSA (m2): 2.07 (08-28-18 @ 15:57)      08-28-18 @ 07:01  -  08-28-18 @ 18:30  --------------------------------------------------------  IN: 300 mL / OUT: 100 mL / NET: 200 mL      Physical Exam:  	Gen: NAD  	HEENT:supple neck  	Pulm: CTA B/L  	CV: RRR, S1S2; no rub  	Back: No spinal or CVA tenderness; no sacral edema  	Abd: +BS, soft, nontender/nondistended  	: No suprapubic tenderness. villa draining dark urine+  	UE: Warm,  no edema; no asterixis  	LE: Warm,; no edema  	Neuro:awake, AAOx3  	Psych: Normal affect and mood  	Skin: Warm, no rash    LABS/STUDIES  --------------------------------------------------------------------------------              15.3   13.7  >-----------<  380      [08-28-18 @ 14:10]              45.2     128  |  90  |  x   ----------------------------<  358      [08-28-18 @ 17:51]  5.1   |  13  |  8.66        Ca     11.5     [08-28-18 @ 17:51]      Mg     2.5     [08-28-18 @ 17:51]      Phos  12.1     [08-28-18 @ 17:51]      08-29    133<L>  |  92<L>  |  137<H>  ----------------------------<  191<H>  4.4   |  18<L>  |  8.61<H>    Ca    8.4      29 Aug 2018 11:58  Phos  9.8     08-29  Mg     2.2     08-29    TPro  6.3  /  Alb  3.2<L>  /  TBili  2.2<H>  /  DBili  1.4<H>  /  AST  31  /  ALT  60<H>  /  AlkPhos  125<H>  08-29    TPro  6.6  /  Alb  3.4  /  TBili  2.6  /  DBili  1.5  /  AST  35  /  ALT  70  /  AlkPhos  145  [08-28-18 @ 17:51]    PT/INR: PT 22.1 , INR 2.00       [08-28-18 @ 14:10]  PTT: 37.3       [08-28-18 @ 14:10]      Creatinine Trend:  SCr 8.66 [08-28 @ 17:51]  SCr 8.35 [08-28 @ 14:10]  SCr 1.18 [08-21 @ 06:17]  SCr 0.94 [08-20 @ 06:04]  SCr 0.81 [08-19 @ 06:29]    Urinalysis - [08-14-18 @ 17:13]      Color Yellow / Appearance Clear / SG 1.015 / pH 6.0      Gluc Negative / Ketone Moderate  / Bili Large / Urobili 8       Blood Trace / Protein 100 / Leuk Est Trace / Nitrite Positive      RBC  / WBC 0-2 / Hyaline  / Gran  / Sq Epi  / Non Sq Epi Occasional / Bacteria       HbA1c 6.2      [08-02-18 @ 05:55]  TSH 1.23      [08-02-18 @ 05:55]

## 2018-08-28 NOTE — CONSULT NOTE ADULT - ASSESSMENT
76 yo man with HTN, pre-diabetes, HLD, afib ablation 2004/2005 and DCCV 2017 on Eliquis with ILR, GERD.  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   AFib/flutter with RVR, suspicion of tachy-georges syndrome, CHADsVASC 4  Asymptomatic, but new LVD, perhaps related to myopathy of tachycardia (no awareness of AF) or element of stress CM.    #AFib/flutter with RVR currently reasonable rate control  -- metoprolol tart 25 mg resulted in excessive bradycardia and discontinued  -- monitor on tele  -- may be necessary to stop Eliquis in anticipation of surgery    #HTN      #HFrEF  --repeat cardiac echo anticipating improvement, though may not have had enough time for recovery.    #Pre-operative medical optimization  -- no further cardiac w/u prior to procedure except echo

## 2018-08-28 NOTE — CONSULT NOTE ADULT - SUBJECTIVE AND OBJECTIVE BOX
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  Patient was recently admitted to [facility] on 8/14 after hospital stay at [facility] (8/9-8/14) where he was found to be jaundiced with a CT showing severe extrahepatic biliary duct obstruction with 2 cm dilation of CBD with marked intrahepatic biliary duct dilation. RUQ US showed no evidence of gallstones. 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 During stay at [facility] patient became cholangitic spiking fevers was started on empiric cefepime and  transferred to [facility] for ERCP with possible stent and biopsy. ERCP was attempted which revealed a distal CBD stricture that was unable to be traversed for stent placement or biopsy.  Patient underwent PTC placement by IR into segment 6 of the liver. Patient clinically improved after PTC placement (Bili downtrended from 16 to 5.5).  During admission Dr. George (B surgery) was consulted for possible pancreaticoduodenectomy during this hospital stay but patient was found to be high risk for surgery (EF 35%, Severe left ventricular systolic dysfunction and decreased right ventricular systolic function).  Patient was discharged on 8/21 and instructed to follow up with Dr. George as an outpatient to plan surgery.    Today patient presented to Dr. George's office with an overall feeling of malaise, nausea, anorexia. States he has been unable to eat or drink adequately and has not been urinating.  Biliary drainage from his PTC has been approximately 1.2 L per day and states that the fluid now appears cloudy.  Has not seen IR doctors since discharge.  Denied any fevers or chills, vomiting, changes in bowel habits, burning with urination, chest pain. Does endorse some shortness of breath.      In the ER the patient was hypotensive to 87/59 with a HR of 62 (on lopressor).  He is normally SBP of 130s-140s. His temperature was 36.1, saturating 100% on RA and he was actively rigoring,  On exam he was not jaundiced, his breathing with very mildly labored.  His abdomen was soft, non tender non distended without rebound or guarding.  His PTC contained copious cloudy bilious fluid.  He did not have any peripheral edema on exam. Labs revealed a leukocytosis of 13.7, Hct 45, significant metabolic acidosis on VBG with pH of 7.11 and bicarb 13, lactate 2.1. Tbili 2.3.     PMH/PSH  Male stress incontinence  Kidney stone  Varicose vein  Bilateral cataracts  Appendicitis  Benign colon polyp  Prostate cancer  Afib  Hyperlipidemia  GERD (Gastroesophageal Reflux Disease)  DM (Diabetes Mellitus)  Renal Calculi  MVP (Mitral Valve Prolapse)  HTN - Hypertension    Status post left knee replacement  S/P ablation operation for arrhythmia  Male stress incontinence  Varicose vein-s/p vein stripping 5 years ago  S/P arthroscopy  S/P prostatectomy  Cosmetic Surgery  S/P Colonoscopy with Polypectomy  S/P Appendectomy      MEDICATIONS  acetaminophen   Tablet 975 milliGRAM(s) Oral once  fentaNYL    Injectable 25 MICROGram(s) IV Push Once  ondansetron Injectable 4 milliGRAM(s) IV Push once  piperacillin/tazobactam IVPB. 3.375 Gram(s) IV Intermittent once  sodium chloride 0.9% Bolus 1000 milliLiter(s) IV Bolus once  vancomycin  IVPB 1000 milliGRAM(s) IV Intermittent once      Allergies    No Known Allergies    Intolerances        Social    Physical Exam  T(C): 36.1 (08-28-18 @ 13:35), Max: 36.1 (08-28-18 @ 13:35)  HR: 50 (08-28-18 @ 13:35) (50 - 58)  BP: 95/67 (08-28-18 @ 13:35) (87/59 - 95/67)  RR: 20 (08-28-18 @ 13:35) (18 - 20)  SpO2: 98% (08-28-18 @ 13:35) (98% - 99%)  Wt(kg): --  Tmax: T(C): , Max: 36.1 (08-28-18 @ 13:35)  Wt(kg): --      Gen: NAD, sitting in bed  Neuro: AAOx3  HEENT: normocephalic, atraumatic, improved scleral icterus  CV: S1, S2, RRR  Pulm: CTA B/L, labored breathing  Abd: Soft, ND, NT, no rebound, no guarding, no palpable organomegaly/masses. PTC drain with cloudy bilious fluid   Ext: warm, no edema    CBC (08-28 @ 14:10)                              15.3                           13.7<H>  )----------------(  380        85.2<H>% Neutrophils, 10.2<L>% Lymphocytes, ANC: 11.7<H>                              45.2      BMP (08-28 @ 14:10)             129<L>  |  93<L>   |  137<H>		Ca++ --      Ca 9.0                ---------------------------------( 191<H>		Mg --                 5.7<H>  |  10<LL>  |  8.35<H>			Ph --        LFTs (08-28 @ 14:10)      TPro 7.4 / Alb 3.7 / TBili 2.8<H> / DBili -- / AST 41<H> / ALT 80<H> / AlkPhos 169<H>    Coags (08-28 @ 14:10)  aPTT 37.3 / INR 2.00<H> / PT 22.1<H>        VBG (08-28 @ 14:20)     7.11<LL> / 42 / 24<L> / 13<L> / -17.0<L> / 25<L>%     Lactate: 2.1<H> (28 Aug 2018 14:21)    Allergies:   PAST MEDICAL & SURGICAL HISTORY:  Male stress incontinence  Kidney stone: &quot;passed on own&quot;  Varicose vein: (L) 5 years ago  Bilateral cataracts  Appendicitis  Benign colon polyp  Prostate cancer: 2010  Afib: 2006 s/p ablation 2006 , afib resolved  Hyperlipidemia: X 4 years  GERD (Gastroesophageal Reflux Disease): X 10 years  DM (Diabetes Mellitus): X 3 years  Renal Calculi: 2008  MVP (Mitral Valve Prolapse): X 8 years  HTN - Hypertension: X 10 years, controlled  Status post left knee replacement  S/P ablation operation for arrhythmia  Male stress incontinence: s/p artifical urinary sphincter 5/2012  Varicose vein-s/p vein stripping 5 years ago  S/P arthroscopy: right shoulder 12/11  S/P prostatectomy: radical robotic 6/10  Cosmetic Surgery: chin implant 2004  S/P Colonoscopy with Polypectomy: 2009  S/P Appendectomy: 1950    FAMILY HISTORY:  No pertinent family history in first degree relatives      SOCIAL HISTORY:  ***    ADVANCE DIRECTIVES: Presumed Full Code  ***    REVIEW OF SYSTEMS:  ***  General: Non-Contributory  Skin/Breast: Non-Contributory  Ophthalmologic: Non-Contributory  ENMT: Non-Contributory  Respiratory and Thorax: Non-Contributory  Cardiovascular: Non-Contributory  Gastrointestinal: Non-Contributory  Genitourinary: Non-Contributory  Musculoskeletal: Non-Contributory  Neurological: Non-Contributory  Psychiatric: Non-Contributory  Hematology/Lymphatics: Non-Contributory  Endocrine: Non-Contributory  Allergic/Immunologic: Non-Contributory    HOME MEDICATIONS:  ***    CURRENT MEDICATIONS:   --------------------------------------------------------------------------------------  Neurologic Medications    Respiratory Medications    Cardiovascular Medications    Gastrointestinal Medications  dextrose 5% 1000 milliLiter(s) IV Continuous <Continuous>  pantoprazole  Injectable 40 milliGRAM(s) IV Push every 24 hours  sodium chloride 0.9%. 1000 milliLiter(s) IV Continuous <Continuous>    Genitourinary Medications    Hematologic/Oncologic Medications    Antimicrobial/Immunologic Medications  meropenem  IVPB 500 milliGRAM(s) IV Intermittent every 24 hours    Endocrine/Metabolic Medications  insulin lispro (HumaLOG) corrective regimen sliding scale   SubCutaneous every 4 hours    Topical/Other Medications  chlorhexidine 4% Liquid 1 Application(s) Topical <User Schedule>    --------------------------------------------------------------------------------------    VITAL SIGNS, INS/OUTS (last 24 hours):  --------------------------------------------------------------------------------------  ((Insert SICU Vitals / Is+Os here)) ***  --------------------------------------------------------------------------------------    EXAM  NEUROLOGY  RASS:   	GCS:    Exam: Normal, NAD, alert, oriented x 3, no focal deficits. ***    HEENT  Exam: Normocephalic, atraumatic.  EOMI ***    RESPIRATORY  Exam: Lungs clear to auscultation, Normal expansion/effort.  ***  Mechanical Ventilation:     CARDIOVASCULAR  Exam: S1, S2.  Regular rate and rhythm.  Peripheral edema  ***    GI/NUTRITION  Exam: Abdomen soft, Non-tender, Non-distended.  ***  Wound:   ***  Current Diet:  NPO ***    VASCULAR  Exam: Extremities warm, pink, well-perfused.  ***    MUSCULOSKELETAL  Exam: All extremities moving spontaneously without limitations.  ***    SKIN:  Exam: Good skin turgor, no skin breakdown.  ***    METABOLIC/FLUIDS/ELECTROLYTES  dextrose 5% 1000 milliLiter(s) IV Continuous <Continuous>  sodium chloride 0.9%. 1000 milliLiter(s) IV Continuous <Continuous>      HEMATOLOGIC  [x] DVT Prophylaxis:   Transfusions:	[] PRBC	[] Platelets		[] FFP	[] Cryoprecipitate    INFECTIOUS DISEASE  Antimicrobials/Immunologic Medications:  meropenem  IVPB 500 milliGRAM(s) IV Intermittent every 24 hours    Day #  	of    ***    Tubes/Lines/Drains  ***  [x] Peripheral IV  [] Central Venous Line     	[] R	[] L	[] IJ	[] Fem	[] SC	Date Placed:   [] Arterial Line		[] R	[] L	[] Fem	[] Rad	[] Ax	Date Placed:   [] PICC:         	[] Midline		[] Mediport  [] Urinary Catheter		Date Placed:     LABS  --------------------------------------------------------------------------------------  ((Insert SICU Labs here))***  --------------------------------------------------------------------------------------    OTHER LABS    IMAGING RESULTS  Echo:   CT:   Xray:     ASSESSMENT:  77y Male ***    PLAN:  ***  Neurologic:   Respiratory:   Cardiovascular:   Gastrointestinal/Nutrition:   Renal/Genitourinary:   Hematologic:   Infectious Disease:   Tubes/Lines/Drains:   Endocrine:   Disposition:     --------------------------------------------------------------------------------------    Critical Care Diagnoses: SICU Consultation Note  =====================================================  HPI  78 yo man with PMH of HTN, prediabetes, HLD, afib on eliquis (last took this AM), MVP,GERD who was recently hospitalized for 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  Patient was recently admitted to [facility] on 8/14 after hospital stay at [facility] (8/9-8/14) where he was found to be jaundiced with a CT showing severe extrahepatic biliary duct obstruction with 2 cm dilation of CBD with marked intrahepatic biliary duct dilation. RUQ US showed no evidence of gallstones. 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 During stay at [facility] patient became cholangitic spiking fevers was started on empiric cefepime and  transferred to [facility] for ERCP with possible stent and biopsy. ERCP was attempted which revealed a distal CBD stricture that was unable to be traversed for stent placement or biopsy.  Patient underwent PTC placement by IR into segment 6 of the liver. Patient clinically improved after PTC placement (Bili downtrended from 16 to 5.5).  During admission Dr. George (B surgery) was consulted for possible pancreaticoduodenectomy during this hospital stay but patient was found to be high risk for surgery (EF 35%, Severe left ventricular systolic dysfunction and decreased right ventricular systolic function).  Patient was discharged on 8/21 and instructed to follow up with Dr. George as an outpatient to plan surgery.    Today patient presented to Dr. George's office with an overall feeling of malaise, nausea, anorexia. States he has been unable to eat or drink adequately and has not been urinating.  Biliary drainage from his PTC has been approximately 1.2 L per day and states that the fluid now appears cloudy.  Has not seen IR doctors since discharge.  Denied any fevers or chills, vomiting, changes in bowel habits, burning with urination, chest pain. Does endorse some shortness of breath.      In the ER the patient was hypotensive to 87/59 with a HR of 62 (on lopressor).  He is normally SBP of 130s-140s. His temperature was 36.1, saturating 100% on RA and he was actively rigoring,  On exam he was not jaundiced, his breathing with very mildly labored.  His abdomen was soft, non tender non distended without rebound or guarding.  His PTC contained copious cloudy bilious fluid.  He did not have any peripheral edema on exam. Labs revealed a leukocytosis of 13.7, Hct 45, significant metabolic acidosis on VBG with pH of 7.11 and bicarb 13, lactate 2.1. Tbili 2.3. Patient was also found to have acute elevation of Cr (8.35) compared to the lab done on 08/21 (1.18). When seen in the ER, patient is feeling better with rigor mostly resolved. Patient denies having any abdominal pain. Patient's BP is at 112/51 with HR at 58. Patient underwent CT scan Status post percutaneous transhepatic biliary stent placement unchanged in position with distal tip in the small bowel and no biliary ductal dilatation.    PAST MEDICAL & SURGICAL HISTORY:  Male stress incontinence  Kidney stone: &quot;passed on own&quot;  Varicose vein: (L) 5 years ago  Bilateral cataracts  Appendicitis  Benign colon polyp  Prostate cancer: 2010  Afib: 2006 s/p ablation 2006 , afib resolved  Hyperlipidemia: X 4 years  GERD (Gastroesophageal Reflux Disease): X 10 years  DM (Diabetes Mellitus): X 3 years  Renal Calculi: 2008  MVP (Mitral Valve Prolapse): X 8 years  HTN - Hypertension: X 10 years, controlled  Status post left knee replacement  S/P ablation operation for arrhythmia  Male stress incontinence: s/p artifical urinary sphincter 5/2012  Varicose vein-s/p vein stripping 5 years ago  S/P arthroscopy: right shoulder 12/11  S/P prostatectomy: radical robotic 6/10  Cosmetic Surgery: chin implant 2004  S/P Colonoscopy with Polypectomy: 2009  S/P Appendectomy: 1950    FAMILY HISTORY: No pertinent family history in first degree relatives    SOCIAL HISTORY: no pertinent social history     ADVANCE DIRECTIVES: Presumed Full Code    REVIEW OF SYSTEMS:   General: Non-Contributory  Skin/Breast: Non-Contributory  Ophthalmologic: Non-Contributory  ENMT: Non-Contributory  Respiratory and Thorax: Non-Contributory  Cardiovascular: Non-Contributory  Gastrointestinal: Non-Contributory  Genitourinary: Non-Contributory  Musculoskeletal: Non-Contributory  Neurological: Non-Contributory  Psychiatric: Non-Contributory  Hematology/Lymphatics: Non-Contributory  Endocrine: Non-Contributory  Allergic/Immunologic: Non-Contributory    CURRENT MEDICATIONS:   --------------------------------------------------------------------------------------  Neurologic Medications    Respiratory Medications    Cardiovascular Medications    Gastrointestinal Medications  dextrose 5% 1000 milliLiter(s) IV Continuous <Continuous>  pantoprazole  Injectable 40 milliGRAM(s) IV Push every 24 hours  sodium chloride 0.9%. 1000 milliLiter(s) IV Continuous <Continuous>    Genitourinary Medications    Hematologic/Oncologic Medications    Antimicrobial/Immunologic Medications  meropenem  IVPB 500 milliGRAM(s) IV Intermittent every 24 hours    Endocrine/Metabolic Medications  insulin lispro (HumaLOG) corrective regimen sliding scale   SubCutaneous every 4 hours    Topical/Other Medications  chlorhexidine 4% Liquid 1 Application(s) Topical <User Schedule>  --------------------------------------------------------------------------------------  VITAL SIGNS, INS/OUTS (last 24 hours):  T(C): 36.3 (08-28-18 @ 15:57), Max: 36.3 (08-28-18 @ 15:57)  HR: 95 (08-28-18 @ 17:00) (50 - 95)  BP: 105/53 (08-28-18 @ 17:00) (87/59 - 119/54)  BP(mean): 68 (08-28-18 @ 17:00) (68 - 78)  ABP: --  ABP(mean): --  RR: 30 (08-28-18 @ 17:00) (18 - 30)  SpO2: 97% (08-28-18 @ 17:00) (93% - 100%)  Wt(kg): --  CVP(mm Hg): --  CI: --  CAPILLARY BLOOD GLUCOSE       N/A      08-28 @ 07:01  -  08-28 @ 18:15  --------------------------------------------------------  IN:    dextrose 5%: 200 mL    IV PiggyBack: 100 mL  Total IN: 300 mL    OUT:    Indwelling Catheter - Urethral: 100 mL  Total OUT: 100 mL    Total NET: 200 mL        --------------------------------------------------------------------------------------    EXAM  NEUROLOGY  RASS: 0     Exam: Normal, NAD, alert, oriented x 3, no focal deficits.     HEENT  Exam: Normocephalic, atraumatic. EOMI. Mild sclera icterus    RESPIRATORY  Exam: Lungs clear to auscultation, Normal expansion/effort.      CARDIOVASCULAR  Exam: S1, S2.  Regular rate and rhythm.  No peripheral edema     GI/NUTRITION  Exam: abdomen soft, nontender to palpation, PTC drain with cloudy bilious fluid     VASCULAR  Exam: Extremities warm, pink, well-perfused.     MUSCULOSKELETAL  Exam: All extremities moving spontaneously without limitations.     SKIN:  Exam: Good skin turgor, no skin breakdown.     METABOLIC/FLUIDS/ELECTROLYTES  dextrose 5% 1000 milliLiter(s) IV Continuous <Continuous>  sodium chloride 0.9%. 1000 milliLiter(s) IV Continuous <Continuous>      HEMATOLOGIC  [x] DVT Prophylaxis: none, will wait for INR to normalize     INFECTIOUS DISEASE  Antimicrobials/Immunologic Medications:  meropenem  IVPB 500 milliGRAM(s) IV Intermittent every 24 hours      Tubes/Lines/Drains  ***  [x] Peripheral IV  [] Central Venous Line     	[] R	[] L	[] IJ	[] Fem	[] SC	Date Placed:   [] Arterial Line		[] R	[] L	[] Fem	[] Rad	[] Ax	Date Placed:   [] PICC:         	[] Midline		[] Mediport  [x] Urinary Catheter		Date Placed:     LABS  --------------------------------------------------------------------------------------  CBC (08-28 @ 14:10)                          15.3                     13.7<H>  )--------------(  380        85.2<H>% Neuts, 10.2<L>% Lymphs, ANC: 11.7<H>                          45.2      BMP (08-28 @ 14:10)       129<L>  |  93<L>   |  137<H>			Ca++ --      Ca 9.0          ---------------------------------( 191<H>		Mg --           5.7<H>  |  10<LL>  |  8.35<H>			Ph --        LFTs (08-28 @ 14:10)      TPro 7.4 / Alb 3.7 / TBili 2.8<H> / DBili -- / AST 41<H> / ALT 80<H> / AlkPhos 169<H>    Coags (08-28 @ 14:10)  aPTT 37.3 / INR 2.00<H> / PT 22.1<H>        VBG (08-28 @ 17:47)     7.17<LL> / 45 / 20<L> / 16<L> / -12.5<L> / 22<L>%      Lactate: 2.0  VBG (08-28 @ 14:20)     7.11<LL> / 42 / 24<L> / 13<L> / -17.0<L> / 25<L>%      Lactate: 2.1<H>        --------------------------------------------------------------------------------------    OTHER LABS    IMAGING RESULTS  Echo:   CT:   Xray:     ASSESSMENT:  77y Male ***    PLAN:  ***  Neurologic:   Respiratory:   Cardiovascular:   Gastrointestinal/Nutrition:   Renal/Genitourinary:   Hematologic:   Infectious Disease:   Tubes/Lines/Drains:   Endocrine:   Disposition:     --------------------------------------------------------------------------------------    Critical Care Diagnoses: SICU Consultation Note  =====================================================  HPI  76 yo man with PMH of HTN, prediabetes, HLD, afib on eliquis (last took this AM), MVP,GERD who was recently hospitalized for 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  Patient was recently admitted to [facility] on 8/14 after hospital stay at [facility] (8/9-8/14) where he was found to be jaundiced with a CT showing severe extrahepatic biliary duct obstruction with 2 cm dilation of CBD with marked intrahepatic biliary duct dilation. RUQ US showed no evidence of gallstones. 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 During stay at [facility] patient became cholangitic spiking fevers was started on empiric cefepime and  transferred to [facility] for ERCP with possible stent and biopsy. ERCP was attempted which revealed a distal CBD stricture that was unable to be traversed for stent placement or biopsy.  Patient underwent PTC placement by IR into segment 6 of the liver. Patient clinically improved after PTC placement (Bili downtrended from 16 to 5.5).  During admission Dr. George (B surgery) was consulted for possible pancreaticoduodenectomy during this hospital stay but patient was found to be high risk for surgery (EF 35%, Severe left ventricular systolic dysfunction and decreased right ventricular systolic function).  Patient was discharged on 8/21 and instructed to follow up with Dr. George as an outpatient to plan surgery.    Today patient presented to Dr. George's office with an overall feeling of malaise, nausea, anorexia. States he has been unable to eat or drink adequately and has not been urinating.  Biliary drainage from his PTC has been approximately 1.2 L per day and states that the fluid now appears cloudy.  Has not seen IR doctors since discharge.  Denied any fevers or chills, vomiting, changes in bowel habits, burning with urination, chest pain. Does endorse some shortness of breath.      In the ER the patient was hypotensive to 87/59 with a HR of 62 (on lopressor).  He is normally SBP of 130s-140s. His temperature was 36.1, saturating 100% on RA and he was actively rigoring,  On exam he was not jaundiced, his breathing with very mildly labored.  His abdomen was soft, non tender non distended without rebound or guarding.  His PTC contained copious cloudy bilious fluid.  He did not have any peripheral edema on exam. Labs revealed a leukocytosis of 13.7, Hct 45, significant metabolic acidosis on VBG with pH of 7.11 and bicarb 13, lactate 2.1. Tbili 2.3. Patient was also found to have acute elevation of Cr (8.35) compared to the lab done on 08/21 (1.18). When seen in the ER, patient is feeling better with rigor mostly resolved. Patient denies having any abdominal pain. Patient's BP is at 112/51 with HR at 58. Patient underwent CT scan Status post percutaneous transhepatic biliary stent placement unchanged in position with distal tip in the small bowel and no biliary ductal dilatation.    PAST MEDICAL & SURGICAL HISTORY:  Male stress incontinence  Kidney stone: &quot;passed on own&quot;  Varicose vein: (L) 5 years ago  Bilateral cataracts  Appendicitis  Benign colon polyp  Prostate cancer: 2010  Afib: 2006 s/p ablation 2006 , afib resolved  Hyperlipidemia: X 4 years  GERD (Gastroesophageal Reflux Disease): X 10 years  DM (Diabetes Mellitus): X 3 years  Renal Calculi: 2008  MVP (Mitral Valve Prolapse): X 8 years  HTN - Hypertension: X 10 years, controlled  Status post left knee replacement  S/P ablation operation for arrhythmia  Male stress incontinence: s/p artifical urinary sphincter 5/2012  Varicose vein-s/p vein stripping 5 years ago  S/P arthroscopy: right shoulder 12/11  S/P prostatectomy: radical robotic 6/10  Cosmetic Surgery: chin implant 2004  S/P Colonoscopy with Polypectomy: 2009  S/P Appendectomy: 1950    FAMILY HISTORY: No pertinent family history in first degree relatives    SOCIAL HISTORY: no pertinent social history     ADVANCE DIRECTIVES: Presumed Full Code    REVIEW OF SYSTEMS:   General: Non-Contributory  Skin/Breast: Non-Contributory  Ophthalmologic: Non-Contributory  ENMT: Non-Contributory  Respiratory and Thorax: Non-Contributory  Cardiovascular: Non-Contributory  Gastrointestinal: Non-Contributory  Genitourinary: Non-Contributory  Musculoskeletal: Non-Contributory  Neurological: Non-Contributory  Psychiatric: Non-Contributory  Hematology/Lymphatics: Non-Contributory  Endocrine: Non-Contributory  Allergic/Immunologic: Non-Contributory    CURRENT MEDICATIONS:   --------------------------------------------------------------------------------------  Neurologic Medications    Respiratory Medications    Cardiovascular Medications    Gastrointestinal Medications  dextrose 5% 1000 milliLiter(s) IV Continuous <Continuous>  pantoprazole  Injectable 40 milliGRAM(s) IV Push every 24 hours  sodium chloride 0.9%. 1000 milliLiter(s) IV Continuous <Continuous>    Genitourinary Medications    Hematologic/Oncologic Medications    Antimicrobial/Immunologic Medications  meropenem  IVPB 500 milliGRAM(s) IV Intermittent every 24 hours    Endocrine/Metabolic Medications  insulin lispro (HumaLOG) corrective regimen sliding scale   SubCutaneous every 4 hours    Topical/Other Medications  chlorhexidine 4% Liquid 1 Application(s) Topical <User Schedule>  --------------------------------------------------------------------------------------  VITAL SIGNS, INS/OUTS (last 24 hours):  T(C): 36.3 (08-28-18 @ 15:57), Max: 36.3 (08-28-18 @ 15:57)  HR: 95 (08-28-18 @ 17:00) (50 - 95)  BP: 105/53 (08-28-18 @ 17:00) (87/59 - 119/54)  BP(mean): 68 (08-28-18 @ 17:00) (68 - 78)  ABP: --  ABP(mean): --  RR: 30 (08-28-18 @ 17:00) (18 - 30)  SpO2: 97% (08-28-18 @ 17:00) (93% - 100%)  Wt(kg): --  CVP(mm Hg): --  CI: --  CAPILLARY BLOOD GLUCOSE       N/A      08-28 @ 07:01  -  08-28 @ 18:15  --------------------------------------------------------  IN:    dextrose 5%: 200 mL    IV PiggyBack: 100 mL  Total IN: 300 mL    OUT:    Indwelling Catheter - Urethral: 100 mL  Total OUT: 100 mL    Total NET: 200 mL        --------------------------------------------------------------------------------------    EXAM  NEUROLOGY  RASS: 0     Exam: Normal, NAD, alert, oriented x 3, no focal deficits.     HEENT  Exam: Normocephalic, atraumatic. EOMI. Mild sclera icterus    RESPIRATORY  Exam: Lungs clear to auscultation, Normal expansion/effort.      CARDIOVASCULAR  Exam: S1, S2.  Regular rate and rhythm.  No peripheral edema     GI/NUTRITION  Exam: abdomen soft, nontender to palpation, PTC drain with cloudy bilious fluid     VASCULAR  Exam: Extremities warm, pink, well-perfused.     MUSCULOSKELETAL  Exam: All extremities moving spontaneously without limitations.     SKIN:  Exam: Good skin turgor, no skin breakdown.     METABOLIC/FLUIDS/ELECTROLYTES  dextrose 5% 1000 milliLiter(s) IV Continuous <Continuous>  sodium chloride 0.9%. 1000 milliLiter(s) IV Continuous <Continuous>      HEMATOLOGIC  [x] DVT Prophylaxis: none, will wait for INR to normalize     INFECTIOUS DISEASE  Antimicrobials/Immunologic Medications:  meropenem  IVPB 500 milliGRAM(s) IV Intermittent every 24 hours      Tubes/Lines/Drains  ***  [x] Peripheral IV  [] Central Venous Line     	[] R	[] L	[] IJ	[] Fem	[] SC	Date Placed:   [] Arterial Line		[] R	[] L	[] Fem	[] Rad	[] Ax	Date Placed:   [] PICC:         	[] Midline		[] Mediport  [x] Urinary Catheter		Date Placed:     LABS  --------------------------------------------------------------------------------------  CBC (08-28 @ 14:10)                          15.3                     13.7<H>  )--------------(  380        85.2<H>% Neuts, 10.2<L>% Lymphs, ANC: 11.7<H>                          45.2      BMP (08-28 @ 14:10)       129<L>  |  93<L>   |  137<H>			Ca++ --      Ca 9.0          ---------------------------------( 191<H>		Mg --           5.7<H>  |  10<LL>  |  8.35<H>			Ph --        LFTs (08-28 @ 14:10)      TPro 7.4 / Alb 3.7 / TBili 2.8<H> / DBili -- / AST 41<H> / ALT 80<H> / AlkPhos 169<H>    Coags (08-28 @ 14:10)  aPTT 37.3 / INR 2.00<H> / PT 22.1<H>        VBG (08-28 @ 17:47)     7.17<LL> / 45 / 20<L> / 16<L> / -12.5<L> / 22<L>%      Lactate: 2.0  VBG (08-28 @ 14:20)     7.11<LL> / 42 / 24<L> / 13<L> / -17.0<L> / 25<L>%      Lactate: 2.1<H>    --------------------------------------------------------------------------------------  EXAM:  CT ABDOMEN AND PELVIS                        PROCEDURE DATE:  08/28/2018      FINDINGS:  LOWER CHEST: Within normal limits.  Evaluation of the solid organs is limited without the administration of   intravenous contrast.  LIVER: Within normal limits.  BILE DUCTS: Percutaneous transhepatic biliary stent is visualized   entering through the right lateral subcutaneous tissue into the abdominal   cavity with its distal tip visualized in the proximal small bowel, and is   unchanged in position since the CT chest 8/17/2018. Previously seen soft   tissue is not well evaluated on this study.  GALLBLADDER: Contracted.  SPLEEN: Within normal limits.  PANCREAS: Pancreatic course calcifications.  ADRENALS: Within normal limits.  KIDNEYS/URETERS:  Left lower pole renal cyst with associated   hyperattenuation, previously shown to havea hemorrhagic component on MRI   abdomen 8/13/2018. No hydronephrosis.  BLADDER: Within normal limits.  REPRODUCTIVE ORGANS:The prostate is not visualized. Penile implant is   partially visualized.   BOWEL: No bowel obstruction. Colonic diverticulosis without   diverticulitis. Appendix is not definitely visualized.  PERITONEUM: No ascites. Coarse calcifications adjacent to the bladder are   stable.  VESSELS:  Calcific atherosclerosis of the aorta and its branches.  RETROPERITONEUM: No lymphadenopathy.    ABDOMINAL WALL: Within normal limits.  BONES: Degenerative changes of the spine. Sclerotic lesion in the right   ninth rib is stable since 2/26/2009.    IMPRESSION:   Status post percutaneous transhepatic biliary stent placement unchanged   in position with distal tip in the small bowel.  No biliary ductal dilatation.

## 2018-08-28 NOTE — ED PROVIDER NOTE - CARE PLAN
Principal Discharge DX:	Sepsis  Secondary Diagnosis:	Abdominal pain Principal Discharge DX:	Sepsis  Secondary Diagnosis:	MANNY (acute kidney injury)  Secondary Diagnosis:	Metabolic acidosis

## 2018-08-28 NOTE — CONSULT NOTE ADULT - ATTENDING COMMENTS
Dr. Gardner (Attending Physician)  Pt. 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  N - Awake alert and oriented. No indication for HD from uremia based on mental status.  P - No acute resp symptoms.  C - Bradycardic to the 40s, holding beta-blocker, likely secondary to BRASH syndrome  GI - NPO will start home PPI   - Acute Renal Failure with severe uremia and hyperkalemia metabolic acidosis likely pre-renal from high output PTC, and from ACEI, possible sepsis causing ATN, will start bicarb infusion, bolused fluids in ED.  calcium, albuterol, d50/insulin, for treatment of hyperkalemia, renal u/s place villa, urine electrolytes  H - INR elevated likely secondary to acute renal failure causing build-up of eliquis, possibly secondary to malabsorption  ID - broad-spectrum abx for possible cholangitis although bilirbuin normal, CT without biliary dilation or collection. will add on procalcitonin  E - PreDM will monitor with starting bicarb gtt    This patient was critically ill with one or more vital organ systems at a high probability of imminent or life threatening deterioration. Complex decision making was required to assess and treat single or multiple vital organ system failure and/or prevent further life threatening deterioration of the patient’s condition.  All recent labwork and imaging was reviewed.  Total Critical Care Time 60 min
I saw and evaluated the patient this morning and a Independently verified the history. This is most likely a pre-renal MANNY which has an ATN component.  Agree with fluid resuscitation and with bicarbonate.  Would check LDH and haptoglobin given very high phosphorus concerned for cell lysis somewhere.  Right now there is no need for renal replacement therapy but will monitor daily.

## 2018-08-28 NOTE — H&P ADULT - HISTORY OF PRESENT ILLNESS
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  Patient was recently admitted to [facility] on 8/14 after hospital stay at [facility] (8/9-8/14) where he was found to be jaundiced with a CT showing severe extrahepatic biliary duct obstruction with 2 cm dilation of CBD with marked intrahepatic biliary duct dilation. RUQ US showed no evidence of gallstones. 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 During stay at [facility] patient became cholangitic spiking fevers was started on empiric cefepime and  transferred to [facility] for ERCP with possible stent and biopsy. ERCP was attempted which revealed a distal CBD stricture that was unable to be traversed for stent placement or biopsy.  Patient underwent PTC placement by IR into segment 6 of the liver. Patient clinically improved after PTC placement (Bili downtrended from 16 to 5.5).  During admission Dr. George (B surgery) was consulted for possible pancreaticoduodenectomy during this hospital stay but patient was found to be high risk for surgery (EF 35%, Severe left ventricular systolic dysfunction and decreased right ventricular systolic function).  Patient was discharged on 8/21 and instructed to follow up with Dr. George as an outpatient to plan surgery.    Today patient presented to Dr. George's office with an overall feeling of malaise, nausea, anorexia. States he has been unable to eat or drink adequately and has not been urinating.  Biliary drainage from his PTC has been approximately 1.2 L per day and states that the fluid now appears cloudy.  Has not seen IR doctors since discharge.  Denied any fevers or chills, vomiting, changes in bowel habits, burning with urination, chest pain. Does endorse some shortness of breath.      In the ER the patient was hypotensive to 87/59 with a HR of 62 (on lopressor).  He is normally SBP of 130s-140s. His temperature was 36.1, saturating 100% on RA and he was actively rigoring,  On exam he was not jaundiced, his breathing with very mildly labored.  His abdomen was soft, non tender non distended without rebound or guarding.  His PTC contained copious cloudy bilious fluid.  He did not have any peripheral edema on exam.           PMH/PSH  Male stress incontinence  Kidney stone  Varicose vein  Bilateral cataracts  Appendicitis  Benign colon polyp  Prostate cancer  Afib  Hyperlipidemia  GERD (Gastroesophageal Reflux Disease)  DM (Diabetes Mellitus)  Renal Calculi  MVP (Mitral Valve Prolapse)  HTN - Hypertension    Status post left knee replacement  S/P ablation operation for arrhythmia  Male stress incontinence  Varicose vein-s/p vein stripping 5 years ago  S/P arthroscopy  S/P prostatectomy  Cosmetic Surgery  S/P Colonoscopy with Polypectomy  S/P Appendectomy      MEDICATIONS  acetaminophen   Tablet 975 milliGRAM(s) Oral once  fentaNYL    Injectable 25 MICROGram(s) IV Push Once  ondansetron Injectable 4 milliGRAM(s) IV Push once  piperacillin/tazobactam IVPB. 3.375 Gram(s) IV Intermittent once  sodium chloride 0.9% Bolus 1000 milliLiter(s) IV Bolus once  vancomycin  IVPB 1000 milliGRAM(s) IV Intermittent once      Allergies    No Known Allergies    Intolerances        Social    Physical Exam  T(C): 36.1 (08-28-18 @ 13:35), Max: 36.1 (08-28-18 @ 13:35)  HR: 50 (08-28-18 @ 13:35) (50 - 58)  BP: 95/67 (08-28-18 @ 13:35) (87/59 - 95/67)  RR: 20 (08-28-18 @ 13:35) (18 - 20)  SpO2: 98% (08-28-18 @ 13:35) (98% - 99%)  Wt(kg): --  Tmax: T(C): , Max: 36.1 (08-28-18 @ 13:35)  Wt(kg): --      Gen: NAD, sitting in bed  Neuro: AAOx3  HEENT: normocephalic, atraumatic, improved scleral icterus  CV: S1, S2, RRR  Pulm: CTA B/L, labored breathing  Abd: Soft, ND, NT, no rebound, no guarding, no palpable organomegaly/masses. PTC drain with cloudy bilious fluid   Ext: warm, no edema    LABS             CBC (08-28 @ 14:10)                              15.3                           13.7<H>  )----------------(  380        85.2<H>% Neutrophils, 10.2<L>% Lymphocytes, ANC: 11.7<H>                              45.2          Coags (08-28 @ 14:10)  aPTT 37.3 / INR 2.00<H> / PT 22.1<H>        VBG (08-28 @ 14:20)     7.11<LL> / 42 / 24<L> / 13<L> / -17.0<L> / 25<L>%     Lactate: 2.1<H>                      IMAGING GENERAL SURGERY ADMISSION NOTE  Attending: Ariel	  Service: Blue  Contact: [phone]    HPI  76 yo man with PMH of HTN, prediabetes, HLD, afib on eliquis (last took this AM), MVP,GERD who was recently hospitalized for 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  Patient was recently admitted to [facility] on 8/14 after hospital stay at [facility] (8/9-8/14) where he was found to be jaundiced with a CT showing severe extrahepatic biliary duct obstruction with 2 cm dilation of CBD with marked intrahepatic biliary duct dilation. RUQ US showed no evidence of gallstones. 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 During stay at [facility] patient became cholangitic spiking fevers was started on empiric cefepime and  transferred to [facility] for ERCP with possible stent and biopsy. ERCP was attempted which revealed a distal CBD stricture that was unable to be traversed for stent placement or biopsy.  Patient underwent PTC placement by IR into segment 6 of the liver. Patient clinically improved after PTC placement (Bili downtrended from 16 to 5.5).  During admission Dr. George (B surgery) was consulted for possible pancreaticoduodenectomy during this hospital stay but patient was found to be high risk for surgery (EF 35%, Severe left ventricular systolic dysfunction and decreased right ventricular systolic function).  Patient was discharged on 8/21 and instructed to follow up with Dr. George as an outpatient to plan surgery.    Today patient presented to Dr. George's office with an overall feeling of malaise, nausea, anorexia. States he has been unable to eat or drink adequately and has not been urinating.  Biliary drainage from his PTC has been approximately 1.2 L per day and states that the fluid now appears cloudy.  Has not seen IR doctors since discharge.  Denied any fevers or chills, vomiting, changes in bowel habits, burning with urination, chest pain. Does endorse some shortness of breath.      In the ER the patient was hypotensive to 87/59 with a HR of 62 (on lopressor).  He is normally SBP of 130s-140s. His temperature was 36.1, saturating 100% on RA and he was actively rigoring,  On exam he was not jaundiced, his breathing with very mildly labored.  His abdomen was soft, non tender non distended without rebound or guarding.  His PTC contained copious cloudy bilious fluid.  He did not have any peripheral edema on exam. Labs revealed a leukocytosis of 13.7, Hct 45, significant metabolic acidosis on VBG with pH of 7.11 and bicarb 13, lactate 2.1. Tbili 2.3.     PMH/PSH  Male stress incontinence  Kidney stone  Varicose vein  Bilateral cataracts  Appendicitis  Benign colon polyp  Prostate cancer  Afib  Hyperlipidemia  GERD (Gastroesophageal Reflux Disease)  DM (Diabetes Mellitus)  Renal Calculi  MVP (Mitral Valve Prolapse)  HTN - Hypertension    Status post left knee replacement  S/P ablation operation for arrhythmia  Male stress incontinence  Varicose vein-s/p vein stripping 5 years ago  S/P arthroscopy  S/P prostatectomy  Cosmetic Surgery  S/P Colonoscopy with Polypectomy  S/P Appendectomy      MEDICATIONS  acetaminophen   Tablet 975 milliGRAM(s) Oral once  fentaNYL    Injectable 25 MICROGram(s) IV Push Once  ondansetron Injectable 4 milliGRAM(s) IV Push once  piperacillin/tazobactam IVPB. 3.375 Gram(s) IV Intermittent once  sodium chloride 0.9% Bolus 1000 milliLiter(s) IV Bolus once  vancomycin  IVPB 1000 milliGRAM(s) IV Intermittent once      Allergies    No Known Allergies    Intolerances        Social    Physical Exam  T(C): 36.1 (08-28-18 @ 13:35), Max: 36.1 (08-28-18 @ 13:35)  HR: 50 (08-28-18 @ 13:35) (50 - 58)  BP: 95/67 (08-28-18 @ 13:35) (87/59 - 95/67)  RR: 20 (08-28-18 @ 13:35) (18 - 20)  SpO2: 98% (08-28-18 @ 13:35) (98% - 99%)  Wt(kg): --  Tmax: T(C): , Max: 36.1 (08-28-18 @ 13:35)  Wt(kg): --      Gen: NAD, sitting in bed  Neuro: AAOx3  HEENT: normocephalic, atraumatic, improved scleral icterus  CV: S1, S2, RRR  Pulm: CTA B/L, labored breathing  Abd: Soft, ND, NT, no rebound, no guarding, no palpable organomegaly/masses. PTC drain with cloudy bilious fluid   Ext: warm, no edema    CBC (08-28 @ 14:10)                              15.3                           13.7<H>  )----------------(  380        85.2<H>% Neutrophils, 10.2<L>% Lymphocytes, ANC: 11.7<H>                              45.2      BMP (08-28 @ 14:10)             129<L>  |  93<L>   |  137<H>		Ca++ --      Ca 9.0                ---------------------------------( 191<H>		Mg --                 5.7<H>  |  10<LL>  |  8.35<H>			Ph --        LFTs (08-28 @ 14:10)      TPro 7.4 / Alb 3.7 / TBili 2.8<H> / DBili -- / AST 41<H> / ALT 80<H> / AlkPhos 169<H>    Coags (08-28 @ 14:10)  aPTT 37.3 / INR 2.00<H> / PT 22.1<H>        VBG (08-28 @ 14:20)     7.11<LL> / 42 / 24<L> / 13<L> / -17.0<L> / 25<L>%     Lactate: 2.1<H>

## 2018-08-28 NOTE — PROCEDURE NOTE - ADDITIONAL PROCEDURE DETAILS
called for villa placement in patient with AUS. villa needed for I&Os in setting of MANNY.   AUS deactivated using instruction cards provided by patients wife.   14f silicone placed using sterile technique. pt tolerated well.   clear urine drained.   keep per primary team  reactivate after villa removal.

## 2018-08-28 NOTE — CONSULT NOTE ADULT - ASSESSMENT
78 yo male with recent diagnosis of obstructive ampullary mass s/p PTC placement by IR into segment 6 of the liver in early August 2018 presented from office to ER with hypotension and hypothermia concerning for cholangitis. Patient was also found to have an acute MANNY with Cr increased from 1.18 to 8.35 and acidotic on VBG. Patient underwent CT which didn't demonstrate billary ductal dilatation.     PLAN:   Neurologic: patient denies pain, AOx3 following commands, not requiring sedating or pain medication     Respiratory: unlabored breathing on room air with O2 sat > 95%    Cardiovascular: normotensive with MAP > 65, bradycardic to 50s with occasional asymptomatic PVCs      Gastrointestinal/Nutrition: NPO     Renal/Genitourinary:   Hematologic:   Infectious Disease:   Tubes/Lines/Drains:   Endocrine:   Disposition: 76 yo male with recent diagnosis of obstructive ampullary mass s/p PTC placement by IR into segment 6 of the liver in early August 2018 presented from office to ER with hypotension and hypothermia concerning for cholangitis. Patient was also found to have an acute MANNY with Cr increased from 1.18 to 8.35 and acidotic on VBG. Patient underwent CT which didn't demonstrate billary ductal dilatation.     PLAN:   Neurologic: patient denies pain, AOx3 following commands, not requiring sedating or pain medication     Respiratory: unlabored breathing on room air with O2 sat > 95%    Cardiovascular: normotensive with MAP > 65, bradycardic to 50s with occasional asymptomatic PVCs      Gastrointestinal/Nutrition:   NPO   monitor PTC output quality and quantity   replace 1:1 of the output with NS q6h    Renal/Genitourinary: FeNa 0.8% indicating pre-renal, patient may have MANNY secondary to high PTC output.   q4h labs   Continue sodium bicarb drip.   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   Nephrology consulted, patient consented for dialysis if necessary    Hematologic:   INR 2.0, most recent Eliquis taken this morning, will hold off on DVT ppx and repeat coags in the am     Infectious Disease: on meropenem for possible sepsis secondary to cholangitis    Tubes/Lines/Drains: Peripheral IVs and Ness     Endocrine: SSI q4h for glycemic control     Disposition: SICU 78 yo male with recent diagnosis of obstructive ampullary mass s/p PTC placement by IR into segment 6 of the liver in early August 2018 presented from office to ER with hypotension and hypothermia concerning for cholangitis. Patient was also found to have an acute MANNY with Cr increased from 1.18 to 8.35 and acidotic on VBG. Patient underwent CT which didn't demonstrate billary ductal dilatation.     PLAN:   Neurologic: patient denies pain, AOx3 following commands, not requiring sedating or pain medication     Respiratory: unlabored breathing on room air with O2 sat > 95%    Cardiovascular: normotensive with MAP > 65, bradycardic to 50s with occasional asymptomatic PVCs  Transthoracic echocardiogram in the am       Gastrointestinal/Nutrition:   NPO   monitor PTC output quality and quantity   replace 1:1 of the output with NS q6h    Renal/Genitourinary: FeNa 0.8% indicating pre-renal, patient may have MANNY secondary to high PTC output.   q4h labs   Continue sodium bicarb drip.   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   Nephrology consulted, patient consented for dialysis if necessary    Hematologic:   INR 2.0, most recent Eliquis taken this morning, will hold off on DVT ppx and repeat coags in the am     Infectious Disease: on meropenem for possible sepsis secondary to cholangitis    Tubes/Lines/Drains: Peripheral IVs and Ness     Endocrine: SSI q4h for glycemic control     Disposition: SICU

## 2018-08-28 NOTE — ED PROVIDER NOTE - PSH
Cosmetic Surgery  chin implant 2004  Male stress incontinence  s/p artifical urinary sphincter 5/2012  S/P ablation operation for arrhythmia    S/P Appendectomy  1950  S/P arthroscopy  right shoulder 12/11  S/P Colonoscopy with Polypectomy  2009  S/P prostatectomy  radical robotic 6/10  Status post left knee replacement    Varicose vein-s/p vein stripping 5 years ago

## 2018-08-28 NOTE — ED PROVIDER NOTE - OBJECTIVE STATEMENT
78 yo M PMH of HTN, prediabetes, HLD, afib on eliquis, MVP, GERD, CHF (ef 35%) initially presented to [facility] ED on 8/9 with 2 days of dark urine found to have obstructive jaundice with CT showing severe extrahepatic biliary duct obstruction with 2 cm dilation of CBD with marked intrahepatic biliary duct dilation with subsequent MRCP showing 1.8 cm soft tissue mass ampullary vs cholangiocarcinoma in the distal CBD, then transferred to [facility] for ERCP/EUS on 8/15/18 which showed [id number] hyperechoic soft tissue lesion in CBD with dilated proximal CBD.  Unsuccessful Biliary cannulation on 8/15. was on abx for cholangitis. scheduled for whipple 76 yo M PMH of HTN, prediabetes, HLD, afib on eliquis, MVP, GERD, CHF (ef 35%), scheduled for surgery for cholangiocarcinoma & has PTC drain in place sent in from surg Dr George office. temp 92 at clinic, hypotensive & bradycardic. c/o fatigue, lighteadedness. c/o abdominal discomfort. ROS negative for: fever, chest pain, SOB, vomiting, diarrhea,  dysuria  received 1L ns from EMS

## 2018-08-28 NOTE — H&P ADULT - ASSESSMENT
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 s/p PTC drainage on 8/16 after unsuccessful ERCP awaiting medical optimization for pancreaticoduodenectomy now with septic shock, likely ascending cholangitis     - Admit to surgery (Ariel)  - SICU consult as patient in septic shock, hypotensive requiring fluid resuscitation in setting of cardiomyopathy with reduced EF and severe LV dysfunction on recent echo  - Follow up cultures of blood, urine, bile from PTC  - Broad spectrum antibiotics (vancomycin and zosyn), repeat lactate  - IV fluid resuscitation, repeat VBG  -  CTAP with IV contrast to evaluate biliary tree for obstruction and evaluate for any abdominal collection or pancreatitis in setting of recent ERCP  - If persistently dilated ducts may require IR adjustment of PTC or placement of additional PTC  - Cardiology consult (house) called, repeat echocardiogram, hold metoprolol for now as patient hypotensive    Plan discussed with Dr. George, seen an examined with SICU team     [address] Team Surgery [phone]

## 2018-08-28 NOTE — ED PROVIDER NOTE - MEDICAL DECISION MAKING DETAILS
drain site clean w/ cloud fluid in bag, hypotesnive, hypothermic in clinic. will assess for intrabomianal abscess. cannot give 30cc/kg bolus for sepsis since pt has chf & ef 35%

## 2018-08-28 NOTE — ED ADULT NURSE NOTE - NSIMPLEMENTINTERV_GEN_ALL_ED
Implemented All Fall with Harm Risk Interventions:  [place] to call system. Call bell, personal items and telephone within reach. Instruct patient to call for assistance. Room bathroom lighting operational. Non-slip footwear when patient is off stretcher. Physically safe environment: no spills, clutter or unnecessary equipment. Stretcher in lowest position, wheels locked, appropriate side rails in place. Provide visual cue, wrist band, yellow gown, etc. Monitor gait and stability. Monitor for mental status changes and reorient to person, place, and time. Review medications for side effects contributing to fall risk. Reinforce activity limits and safety measures with patient and family. Provide visual clues: red socks.

## 2018-08-28 NOTE — CONSULT NOTE ADULT - SUBJECTIVE AND OBJECTIVE BOX
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  Patient was recently admitted to [facility] on 8/14 after hospital stay at [facility] (8/9-8/14) where he was found to be jaundiced with a CT showing severe extrahepatic biliary duct obstruction with 2 cm dilation of CBD with marked intrahepatic biliary duct dilation. RUQ US showed no evidence of gallstones. 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 During stay at [facility] patient became cholangitic spiking fevers was started on empiric cefepime and  transferred to [facility] for ERCP with possible stent and biopsy. ERCP was attempted which revealed a distal CBD stricture that was unable to be traversed for stent placement or biopsy.  Patient underwent PTC placement by IR into segment 6 of the liver. Patient clinically improved after PTC placement (Bili downtrended from 16 to 5.5).  During admission Dr. George (B surgery) was consulted for possible pancreaticoduodenectomy during this hospital stay but patient was found to be high risk for surgery (EF 35%, Severe left ventricular systolic dysfunction and decreased right ventricular systolic function).  Patient was discharged on 8/21 and instructed to follow up with Dr. George as an outpatient to plan surgery.    Today patient presented to Dr. George's office with an overall feeling of malaise, nausea, anorexia. States he has been unable to eat or drink adequately and has not been urinating.  Biliary drainage from his PTC has been approximately 1.2 L per day and states that the fluid now appears cloudy.  Has not seen IR doctors since discharge.  Denied any fevers or chills, vomiting, changes in bowel habits, burning with urination, chest pain. Does endorse some shortness of breath.      In the ER the patient was hypotensive to 87/59 with a HR of 62 (on lopressor).  He is normally SBP of 130s-140s. His temperature was 36.1, saturating 100% on RA and he was actively rigoring,  On exam he was not jaundiced, his breathing with very mildly labored.  His abdomen was soft, non tender non distended without rebound or guarding.  His PTC contained copious cloudy bilious fluid.  He did not have any peripheral edema on exam. Labs revealed a leukocytosis of 13.7, Hct 45, significant metabolic acidosis on VBG with pH of 7.11 and bicarb 13, lactate 2.1. Tbili 2.3.     PMH/PSH  Male stress incontinence  Kidney stone  Varicose vein  Bilateral cataracts  Appendicitis  Benign colon polyp  Prostate cancer  Afib  Hyperlipidemia  GERD (Gastroesophageal Reflux Disease)  DM (Diabetes Mellitus)  Renal Calculi  MVP (Mitral Valve Prolapse)  HTN - Hypertension    Status post left knee replacement  S/P ablation operation for arrhythmia  Male stress incontinence  Varicose vein-s/p vein stripping 5 years ago  S/P arthroscopy  S/P prostatectomy  Cosmetic Surgery  S/P Colonoscopy with Polypectomy  S/P Appendectomy      MEDICATIONS  acetaminophen   Tablet 975 milliGRAM(s) Oral once  fentaNYL    Injectable 25 MICROGram(s) IV Push Once  ondansetron Injectable 4 milliGRAM(s) IV Push once  piperacillin/tazobactam IVPB. 3.375 Gram(s) IV Intermittent once  sodium chloride 0.9% Bolus 1000 milliLiter(s) IV Bolus once  vancomycin  IVPB 1000 milliGRAM(s) IV Intermittent once      Allergies    No Known Allergies    Intolerances    REVIEW OF SYSTEMS:  General: fatigue & malaise, no fever or chills.  Skin: no rashes.  Ophthalmologic: no blurred vision, no loss of vision. 	  ENMT: no sore throat, rhinorrhea, sinus congestion.  Respiratory: no SOB, cough or wheeze.  Cardiovascular: no chest pain, dyspnea, palpitations, orthopnea or paroxysmal nocturnal dyspnea. No claudication.  Gastrointestinal:  no N/V/D, no melena/hematemesis/hematochezia.  Genitourinary: no dysuria/hesitancy or hematuria.  Musculoskeletal: no myalgias or arthralgias.  Neurological: no changes in vision or hearing, no lightheadedness/dizziness, no syncope/near syncope	  Psychiatric: appropriate.   Hematology/Lymphatics: no unusual bleeding, bruising and no lymphadenopathy.  Endocrine: no unusual thirst.   All others negative except as stated above and in HPI.     Physical Exam  T(C): 36.1 (08-28-18 @ 13:35), Max: 36.1 (08-28-18 @ 13:35)  HR: 50 (08-28-18 @ 13:35) (50 - 58)  BP: 95/67 (08-28-18 @ 13:35) (87/59 - 95/67)  RR: 20 (08-28-18 @ 13:35) (18 - 20)  SpO2: 98% (08-28-18 @ 13:35) (98% - 99%)  Wt(kg): --  Tmax: T(C): , Max: 36.1 (08-28-18 @ 13:35)  Wt(kg): --    PHYSICAL EXAM:  Appearance: No acute distress; well appearing, lying flat, breathing comfortably on RA  Eyes: PERRL, EOMI, pink conjunctiva  HENT: Normal oral mucosa  Respiratory: normal percussion without RR or W  Cardiovascular: apex non-displaced, irregular rhythm, normal rate, S1 normal, S2 physiologic split, no murmurs, rubs, or gallops; no edema; no JVD  Gastrointestinal: soft, non-tender, non-distended  Musculoskeletal: No clubbing; no joint deformity  Vascular: pulses symmetric and equal to popliteals and PT's  Lymphatic: No lymphadenopathy  Skin: No rashes, stable ecchymoses, no cyanosis  Neurologic: Non-focal  Psychiatry: AAOx3, mood & affect appropriate.     CBC (08-28 @ 14:10)                              15.3                           13.7<H>  )----------------(  380        85.2<H>% Neutrophils, 10.2<L>% Lymphocytes, ANC: 11.7<H>                              45.2      BMP (08-28 @ 14:10)             129<L>  |  93<L>   |  137<H>		Ca++ --      Ca 9.0                ---------------------------------( 191<H>		Mg --                 5.7<H>  |  10<LL>  |  8.35<H>			Ph --        LFTs (08-28 @ 14:10)      TPro 7.4 / Alb 3.7 / TBili 2.8<H> / DBili -- / AST 41<H> / ALT 80<H> / AlkPhos 169<H>    Coags (08-28 @ 14:10)  aPTT 37.3 / INR 2.00<H> / PT 22.1<H>        VBG (08-28 @ 14:20)     7.11<LL> / 42 / 24<L> / 13<L> / -17.0<L> / 25<L>%     Lactate: 2.1<H> (28 Aug 2018 14:21)          Cardiovascular Diagnostic Testing:  ECG: < from: 12 Lead ECG (08.15.18 @ 09:02) >  MINIMAL VOLTAGE CRITERIA FOR LVH, MAY BENORMAL VARIANT  NONSPECIFIC ST AND T WAVE ABNORMALITY    < end of copied text >    Echo: < from: TTE with Doppler (w/Cont) (08.20.18 @ 10:49) >  EF (Visual Estimate): 35 %  ------------------------------------------------------------------------  Observations:  Mitral Valve: Mitral annular calcification, otherwise  normal mitral valve. Mild mitral regurgitation.  Aortic Valve/Aorta: Calcified trileaflet aortic valve with  normal opening. Minimal aortic regurgitation.  Aortic Root: 3.9 cm.  Left Atrium: Normal left atrium.  LA volume index = 27  cc/m2.  Left Ventricle: Endocardial visualization enhanced with  intravenous injection of Ultrasonic Enhancing Agent  (Definity).Moderate- Severe left ventricular systolic  dysfunction. Regional wall motion variation is noted on the  setting of atrial fibrillation and ectopy. Normal left  ventricular internal dimensions and wall thicknesses.  Right Heart: Normal right atrium. Normal right ventricular  size with decreased right ventricular systolic function.  Normal tricuspid valve. Minimal tricuspidregurgitation.  Normal pulmonic valve. Mild pulmonic regurgitation.  Pericardium/Pleura: Normal pericardium with no pericardial  effusion.  Hemodynamic: Estimated right atrial pressure is 8 mm Hg.  ------------------------------------------------------------------------  Conclusions:  1. Calcified trileaflet aortic valve with normal opening.  2. Endocardial visualization enhanced with intravenous  injection of Ultrasonic Enhancing Agent  (Definity).Moderate- Severe left ventricular systolic  dysfunction. Regional wall motion variation is noted on the  setting of atrial fibrillation and ectopy.  3. Normal right ventricular size with decreased right  ventricular systolic function.  4. Normal tricuspid valve. Minimal tricuspid regurgitation.    < end of copied text >      Imaging:

## 2018-08-28 NOTE — CONSULT NOTE ADULT - PROBLEM SELECTOR RECOMMENDATION 3
corrected sodium 132. corrected sodium 132.  in setting of decreased effective free water clearance   Continue D5W+ 150 meq bicarb.  Avoid hypotonic solutions.    Case discussed with Dr. Holt. corrected sodium 132.  in setting of decreased effective free water clearance   monitor serum sodium      Case discussed with Dr. Holt.

## 2018-08-28 NOTE — CONSULT NOTE ADULT - ASSESSMENT
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

## 2018-08-28 NOTE — ED ADULT NURSE NOTE - OBJECTIVE STATEMENT
Patient   is  alert   and  oriented  x3.   Color is  good  and  skin warm to touch.  He  had  1  episode  of  near  syncope  ,  hypotension   and  low  Heart  rate.   He  appears  very  weak.

## 2018-08-29 LAB
ALBUMIN SERPL ELPH-MCNC: 2.7 G/DL — LOW (ref 3.3–5)
ALBUMIN SERPL ELPH-MCNC: 2.8 G/DL — LOW (ref 3.3–5)
ALBUMIN SERPL ELPH-MCNC: 3.2 G/DL — LOW (ref 3.3–5)
ALBUMIN SERPL ELPH-MCNC: 3.4 G/DL — SIGNIFICANT CHANGE UP (ref 3.3–5)
ALP SERPL-CCNC: 113 U/L — SIGNIFICANT CHANGE UP (ref 40–120)
ALP SERPL-CCNC: 118 U/L — SIGNIFICANT CHANGE UP (ref 40–120)
ALP SERPL-CCNC: 125 U/L — HIGH (ref 40–120)
ALP SERPL-CCNC: 133 U/L — HIGH (ref 40–120)
ALT FLD-CCNC: 53 U/L — HIGH (ref 10–45)
ALT FLD-CCNC: 56 U/L — HIGH (ref 10–45)
ALT FLD-CCNC: 60 U/L — HIGH (ref 10–45)
ALT FLD-CCNC: 64 U/L — HIGH (ref 10–45)
ANA TITR SER: NEGATIVE — SIGNIFICANT CHANGE UP
ANION GAP SERPL CALC-SCNC: 22 MMOL/L — HIGH (ref 5–17)
ANION GAP SERPL CALC-SCNC: 22 MMOL/L — HIGH (ref 5–17)
ANION GAP SERPL CALC-SCNC: 23 MMOL/L — HIGH (ref 5–17)
ANION GAP SERPL CALC-SCNC: 23 MMOL/L — HIGH (ref 5–17)
ANION GAP SERPL CALC-SCNC: 25 MMOL/L — HIGH (ref 5–17)
APTT BLD: 34.6 SEC — SIGNIFICANT CHANGE UP (ref 27.5–37.4)
AST SERPL-CCNC: 29 U/L — SIGNIFICANT CHANGE UP (ref 10–40)
AST SERPL-CCNC: 29 U/L — SIGNIFICANT CHANGE UP (ref 10–40)
AST SERPL-CCNC: 31 U/L — SIGNIFICANT CHANGE UP (ref 10–40)
AST SERPL-CCNC: 34 U/L — SIGNIFICANT CHANGE UP (ref 10–40)
AUTO DIFF PNL BLD: NEGATIVE — SIGNIFICANT CHANGE UP
BILIRUB DIRECT SERPL-MCNC: 1.2 MG/DL — HIGH (ref 0–0.2)
BILIRUB DIRECT SERPL-MCNC: 1.2 MG/DL — HIGH (ref 0–0.2)
BILIRUB DIRECT SERPL-MCNC: 1.4 MG/DL — HIGH (ref 0–0.2)
BILIRUB DIRECT SERPL-MCNC: 1.4 MG/DL — HIGH (ref 0–0.2)
BILIRUB INDIRECT FLD-MCNC: 0.8 MG/DL — SIGNIFICANT CHANGE UP (ref 0.2–1)
BILIRUB INDIRECT FLD-MCNC: 1 MG/DL — SIGNIFICANT CHANGE UP (ref 0.2–1)
BILIRUB SERPL-MCNC: 2.2 MG/DL — HIGH (ref 0.2–1.2)
BILIRUB SERPL-MCNC: 2.4 MG/DL — HIGH (ref 0.2–1.2)
BUN SERPL-MCNC: 135 MG/DL — HIGH (ref 7–23)
BUN SERPL-MCNC: 137 MG/DL — HIGH (ref 7–23)
BUN SERPL-MCNC: 138 MG/DL — HIGH (ref 7–23)
BUN SERPL-MCNC: 140 MG/DL — HIGH (ref 7–23)
C-ANCA SER-ACNC: NEGATIVE — SIGNIFICANT CHANGE UP
CALCIUM SERPL-MCNC: 7.9 MG/DL — LOW (ref 8.4–10.5)
CALCIUM SERPL-MCNC: 8.1 MG/DL — LOW (ref 8.4–10.5)
CALCIUM SERPL-MCNC: 8.3 MG/DL — LOW (ref 8.4–10.5)
CALCIUM SERPL-MCNC: 8.4 MG/DL — SIGNIFICANT CHANGE UP (ref 8.4–10.5)
CALCIUM SERPL-MCNC: 8.6 MG/DL — SIGNIFICANT CHANGE UP (ref 8.4–10.5)
CHLORIDE SERPL-SCNC: 92 MMOL/L — LOW (ref 96–108)
CHLORIDE SERPL-SCNC: 94 MMOL/L — LOW (ref 96–108)
CHLORIDE SERPL-SCNC: 95 MMOL/L — LOW (ref 96–108)
CO2 SERPL-SCNC: 14 MMOL/L — LOW (ref 22–31)
CO2 SERPL-SCNC: 17 MMOL/L — LOW (ref 22–31)
CO2 SERPL-SCNC: 18 MMOL/L — LOW (ref 22–31)
CREAT SERPL-MCNC: 8.51 MG/DL — HIGH (ref 0.5–1.3)
CREAT SERPL-MCNC: 8.61 MG/DL — HIGH (ref 0.5–1.3)
CREAT SERPL-MCNC: 8.66 MG/DL — HIGH (ref 0.5–1.3)
CREAT SERPL-MCNC: 8.85 MG/DL — HIGH (ref 0.5–1.3)
CREAT SERPL-MCNC: 8.88 MG/DL — HIGH (ref 0.5–1.3)
EOSINOPHIL NFR URNS MANUAL: NEGATIVE — SIGNIFICANT CHANGE UP
GAS PNL BLDV: SIGNIFICANT CHANGE UP
GLUCOSE SERPL-MCNC: 103 MG/DL — HIGH (ref 70–99)
GLUCOSE SERPL-MCNC: 120 MG/DL — HIGH (ref 70–99)
GLUCOSE SERPL-MCNC: 138 MG/DL — HIGH (ref 70–99)
GLUCOSE SERPL-MCNC: 166 MG/DL — HIGH (ref 70–99)
GLUCOSE SERPL-MCNC: 191 MG/DL — HIGH (ref 70–99)
GRAM STN FLD: SIGNIFICANT CHANGE UP
HBA1C BLD-MCNC: 6.9 % — HIGH (ref 4–5.6)
HCT VFR BLD CALC: 39.4 % — SIGNIFICANT CHANGE UP (ref 39–50)
HGB BLD-MCNC: 13.3 G/DL — SIGNIFICANT CHANGE UP (ref 13–17)
INR BLD: 2.03 RATIO — HIGH (ref 0.88–1.16)
MAGNESIUM SERPL-MCNC: 2 MG/DL — SIGNIFICANT CHANGE UP (ref 1.6–2.6)
MAGNESIUM SERPL-MCNC: 2.2 MG/DL — SIGNIFICANT CHANGE UP (ref 1.6–2.6)
MAGNESIUM SERPL-MCNC: 2.2 MG/DL — SIGNIFICANT CHANGE UP (ref 1.6–2.6)
MAGNESIUM SERPL-MCNC: 2.4 MG/DL — SIGNIFICANT CHANGE UP (ref 1.6–2.6)
MAGNESIUM SERPL-MCNC: 2.5 MG/DL — SIGNIFICANT CHANGE UP (ref 1.6–2.6)
MCHC RBC-ENTMCNC: 30.4 PG — SIGNIFICANT CHANGE UP (ref 27–34)
MCHC RBC-ENTMCNC: 33.6 GM/DL — SIGNIFICANT CHANGE UP (ref 32–36)
MCV RBC AUTO: 90.4 FL — SIGNIFICANT CHANGE UP (ref 80–100)
P-ANCA SER-ACNC: NEGATIVE — SIGNIFICANT CHANGE UP
PHOSPHATE SERPL-MCNC: 11.1 MG/DL — HIGH (ref 2.5–4.5)
PHOSPHATE SERPL-MCNC: 11.9 MG/DL — HIGH (ref 2.5–4.5)
PHOSPHATE SERPL-MCNC: 9.3 MG/DL — HIGH (ref 2.5–4.5)
PHOSPHATE SERPL-MCNC: 9.8 MG/DL — HIGH (ref 2.5–4.5)
PHOSPHATE SERPL-MCNC: 9.8 MG/DL — HIGH (ref 2.5–4.5)
PLATELET # BLD AUTO: 303 K/UL — SIGNIFICANT CHANGE UP (ref 150–400)
POTASSIUM SERPL-MCNC: 4.1 MMOL/L — SIGNIFICANT CHANGE UP (ref 3.5–5.3)
POTASSIUM SERPL-MCNC: 4.1 MMOL/L — SIGNIFICANT CHANGE UP (ref 3.5–5.3)
POTASSIUM SERPL-MCNC: 4.4 MMOL/L — SIGNIFICANT CHANGE UP (ref 3.5–5.3)
POTASSIUM SERPL-MCNC: 4.8 MMOL/L — SIGNIFICANT CHANGE UP (ref 3.5–5.3)
POTASSIUM SERPL-MCNC: 5.2 MMOL/L — SIGNIFICANT CHANGE UP (ref 3.5–5.3)
POTASSIUM SERPL-SCNC: 4.1 MMOL/L — SIGNIFICANT CHANGE UP (ref 3.5–5.3)
POTASSIUM SERPL-SCNC: 4.1 MMOL/L — SIGNIFICANT CHANGE UP (ref 3.5–5.3)
POTASSIUM SERPL-SCNC: 4.4 MMOL/L — SIGNIFICANT CHANGE UP (ref 3.5–5.3)
POTASSIUM SERPL-SCNC: 4.8 MMOL/L — SIGNIFICANT CHANGE UP (ref 3.5–5.3)
POTASSIUM SERPL-SCNC: 5.2 MMOL/L — SIGNIFICANT CHANGE UP (ref 3.5–5.3)
PROT SERPL-MCNC: 5.6 G/DL — LOW (ref 6–8.3)
PROT SERPL-MCNC: 5.9 G/DL — LOW (ref 6–8.3)
PROT SERPL-MCNC: 6.3 G/DL — SIGNIFICANT CHANGE UP (ref 6–8.3)
PROT SERPL-MCNC: 6.5 G/DL — SIGNIFICANT CHANGE UP (ref 6–8.3)
PROTHROM AB SERPL-ACNC: 22.4 SEC — HIGH (ref 9.8–12.7)
RBC # BLD: 4.36 M/UL — SIGNIFICANT CHANGE UP (ref 4.2–5.8)
RBC # FLD: 12.6 % — SIGNIFICANT CHANGE UP (ref 10.3–14.5)
SODIUM SERPL-SCNC: 133 MMOL/L — LOW (ref 135–145)
SODIUM SERPL-SCNC: 133 MMOL/L — LOW (ref 135–145)
SODIUM SERPL-SCNC: 134 MMOL/L — LOW (ref 135–145)
SODIUM SERPL-SCNC: 134 MMOL/L — LOW (ref 135–145)
SODIUM SERPL-SCNC: 135 MMOL/L — SIGNIFICANT CHANGE UP (ref 135–145)
UUN UR-MCNC: 375 MG/DL — SIGNIFICANT CHANGE UP
VANCOMYCIN FLD-MCNC: 10.3 UG/ML — SIGNIFICANT CHANGE UP
WBC # BLD: 11.6 K/UL — HIGH (ref 3.8–10.5)
WBC # FLD AUTO: 11.6 K/UL — HIGH (ref 3.8–10.5)

## 2018-08-29 PROCEDURE — 93321 DOPPLER ECHO F-UP/LMTD STD: CPT | Mod: 26

## 2018-08-29 PROCEDURE — 99291 CRITICAL CARE FIRST HOUR: CPT

## 2018-08-29 PROCEDURE — 71045 X-RAY EXAM CHEST 1 VIEW: CPT | Mod: 26

## 2018-08-29 PROCEDURE — 99222 1ST HOSP IP/OBS MODERATE 55: CPT | Mod: GC

## 2018-08-29 PROCEDURE — 93308 TTE F-UP OR LMTD: CPT | Mod: 26

## 2018-08-29 PROCEDURE — 99233 SBSQ HOSP IP/OBS HIGH 50: CPT | Mod: GC

## 2018-08-29 RX ORDER — INSULIN LISPRO 100/ML
VIAL (ML) SUBCUTANEOUS AT BEDTIME
Qty: 0 | Refills: 0 | Status: DISCONTINUED | OUTPATIENT
Start: 2018-08-29 | End: 2018-09-06

## 2018-08-29 RX ORDER — SODIUM CHLORIDE 9 MG/ML
1000 INJECTION, SOLUTION INTRAVENOUS
Qty: 0 | Refills: 0 | Status: DISCONTINUED | OUTPATIENT
Start: 2018-08-29 | End: 2018-08-31

## 2018-08-29 RX ORDER — PHYTONADIONE (VIT K1) 5 MG
5 TABLET ORAL ONCE
Qty: 0 | Refills: 0 | Status: COMPLETED | OUTPATIENT
Start: 2018-08-29 | End: 2018-08-29

## 2018-08-29 RX ORDER — SODIUM CHLORIDE 9 MG/ML
500 INJECTION INTRAMUSCULAR; INTRAVENOUS; SUBCUTANEOUS ONCE
Qty: 0 | Refills: 0 | Status: COMPLETED | OUTPATIENT
Start: 2018-08-29 | End: 2018-08-29

## 2018-08-29 RX ORDER — METOPROLOL TARTRATE 50 MG
12.5 TABLET ORAL ONCE
Qty: 0 | Refills: 0 | Status: COMPLETED | OUTPATIENT
Start: 2018-08-29 | End: 2018-08-29

## 2018-08-29 RX ORDER — FUROSEMIDE 40 MG
40 TABLET ORAL ONCE
Qty: 0 | Refills: 0 | Status: COMPLETED | OUTPATIENT
Start: 2018-08-29 | End: 2018-08-29

## 2018-08-29 RX ORDER — VANCOMYCIN HCL 1 G
1000 VIAL (EA) INTRAVENOUS ONCE
Qty: 0 | Refills: 0 | Status: COMPLETED | OUTPATIENT
Start: 2018-08-29 | End: 2018-08-29

## 2018-08-29 RX ORDER — FUROSEMIDE 40 MG
20 TABLET ORAL ONCE
Qty: 0 | Refills: 0 | Status: COMPLETED | OUTPATIENT
Start: 2018-08-29 | End: 2018-08-29

## 2018-08-29 RX ORDER — CALCIUM ACETATE 667 MG
1334 TABLET ORAL
Qty: 0 | Refills: 0 | Status: DISCONTINUED | OUTPATIENT
Start: 2018-08-29 | End: 2018-09-04

## 2018-08-29 RX ORDER — PHYTONADIONE (VIT K1) 5 MG
1 TABLET ORAL ONCE
Qty: 0 | Refills: 0 | Status: DISCONTINUED | OUTPATIENT
Start: 2018-08-29 | End: 2018-08-29

## 2018-08-29 RX ORDER — INSULIN LISPRO 100/ML
VIAL (ML) SUBCUTANEOUS
Qty: 0 | Refills: 0 | Status: DISCONTINUED | OUTPATIENT
Start: 2018-08-29 | End: 2018-09-06

## 2018-08-29 RX ORDER — PANTOPRAZOLE SODIUM 20 MG/1
40 TABLET, DELAYED RELEASE ORAL
Qty: 0 | Refills: 0 | Status: DISCONTINUED | OUTPATIENT
Start: 2018-08-29 | End: 2018-09-06

## 2018-08-29 RX ORDER — CALCIUM GLUCONATE 100 MG/ML
2 VIAL (ML) INTRAVENOUS ONCE
Qty: 0 | Refills: 0 | Status: COMPLETED | OUTPATIENT
Start: 2018-08-29 | End: 2018-08-29

## 2018-08-29 RX ORDER — METOPROLOL TARTRATE 50 MG
12.5 TABLET ORAL EVERY 12 HOURS
Qty: 0 | Refills: 0 | Status: DISCONTINUED | OUTPATIENT
Start: 2018-08-29 | End: 2018-08-30

## 2018-08-29 RX ADMIN — Medication 12.5 MILLIGRAM(S): at 12:13

## 2018-08-29 RX ADMIN — Medication 250 MILLIGRAM(S): at 06:52

## 2018-08-29 RX ADMIN — CHLORHEXIDINE GLUCONATE 1 APPLICATION(S): 213 SOLUTION TOPICAL at 05:28

## 2018-08-29 RX ADMIN — PANTOPRAZOLE SODIUM 40 MILLIGRAM(S): 20 TABLET, DELAYED RELEASE ORAL at 15:00

## 2018-08-29 RX ADMIN — SODIUM CHLORIDE 100 MILLILITER(S): 9 INJECTION, SOLUTION INTRAVENOUS at 12:19

## 2018-08-29 RX ADMIN — Medication 40 MILLIGRAM(S): at 20:22

## 2018-08-29 RX ADMIN — SODIUM POLYSTYRENE SULFONATE 15 GRAM(S): 4.1 POWDER, FOR SUSPENSION ORAL at 00:11

## 2018-08-29 RX ADMIN — Medication 1334 MILLIGRAM(S): at 10:53

## 2018-08-29 RX ADMIN — Medication 4: at 10:53

## 2018-08-29 RX ADMIN — Medication 20 MILLIGRAM(S): at 12:13

## 2018-08-29 RX ADMIN — SODIUM CHLORIDE 100 MILLILITER(S): 9 INJECTION, SOLUTION INTRAVENOUS at 06:53

## 2018-08-29 RX ADMIN — PANTOPRAZOLE SODIUM 40 MILLIGRAM(S): 20 TABLET, DELAYED RELEASE ORAL at 05:28

## 2018-08-29 RX ADMIN — SODIUM CHLORIDE 3000 MILLILITER(S): 9 INJECTION INTRAMUSCULAR; INTRAVENOUS; SUBCUTANEOUS at 02:00

## 2018-08-29 RX ADMIN — MEROPENEM 100 MILLIGRAM(S): 1 INJECTION INTRAVENOUS at 20:23

## 2018-08-29 RX ADMIN — SODIUM CHLORIDE 1 MILLILITER(S): 9 INJECTION INTRAMUSCULAR; INTRAVENOUS; SUBCUTANEOUS at 16:39

## 2018-08-29 RX ADMIN — PANTOPRAZOLE SODIUM 40 MILLIGRAM(S): 20 TABLET, DELAYED RELEASE ORAL at 12:13

## 2018-08-29 RX ADMIN — Medication 2: at 06:52

## 2018-08-29 RX ADMIN — SODIUM CHLORIDE 1500 MILLILITER(S): 9 INJECTION INTRAMUSCULAR; INTRAVENOUS; SUBCUTANEOUS at 20:23

## 2018-08-29 RX ADMIN — Medication 200 GRAM(S): at 08:05

## 2018-08-29 RX ADMIN — Medication 12.5 MILLIGRAM(S): at 17:05

## 2018-08-29 RX ADMIN — SODIUM CHLORIDE 1500 MILLILITER(S): 9 INJECTION INTRAMUSCULAR; INTRAVENOUS; SUBCUTANEOUS at 10:56

## 2018-08-29 RX ADMIN — Medication 1334 MILLIGRAM(S): at 16:39

## 2018-08-29 RX ADMIN — Medication 5 MILLIGRAM(S): at 12:13

## 2018-08-29 NOTE — PROGRESS NOTE ADULT - ASSESSMENT
76 yo man with HTN, pre-diabetes, HLD, afib ablation 2004/2005 and DCCV 2017 on Eliquis with ILR, GERD. Initially presented to [facility] ED on 8/9 with 2 days of dark urine with CBD mass for ERCP likely 2/2 cholangiocarcinoma vs ampullary neoplasm without met disease.   AFib/flutter with RVR, suspicion of tachy-georges syndrome, CHADsVASC 4  Asymptomatic, but new LVD, perhaps related to myopathy of tachycardia (no awareness of AF) or element of stress CM.    #AFib/flutter with RVR currently - rate controlled  -- metoprolol tart 25 mg resulted in excessive bradycardia and discontinued  -- monitor on tele  -- If not plans for surgery would consider restarting AC    #HTN  - Well controlled currently     #HFrEF  -- Euvolemic at this time.   --repeat cardiac echo anticipating improvement, though may not have had enough time for recovery.    #Pre-operative medical optimization  -- no further cardiac w/u prior to procedure except echo      Lydia Olivera MD  Cardiology Fellow - PGY-4  JOJO: [id number]  NS: [phone]  [id number] 78 yo man with HTN, pre-diabetes, HLD, afib ablation 2004/2005 and DCCV 2017 on Eliquis with ILR, GERD. Initially presented to [facility] ED on 8/9 with 2 days of dark urine with CBD mass for ERCP likely 2/2 cholangiocarcinoma vs ampullary neoplasm without met disease.   AFib/flutter with RVR, suspicion of tachy-georges syndrome, CHADsVASC 4  Asymptomatic, but new LVD, perhaps related to myopathy of tachycardia (no awareness of AF) or element of stress CM.    #AFib/flutter with RVR currently - rate controlled  -- Monitor for bradycardia while on metoprolol, as pt has had bradycardia in the past from it.   -- If not plans for surgery would consider restarting AC    #HTN  - Well controlled currently     #HFrEF  -- Pts MANNY seems to be a function of pre-renal azotemia.   -- would hold off on diuretics at this time.   -- agree with fluids for now.   -- repeat cardiac echo anticipating improvement, though may not have had enough time for recovery.    #Pre-operative medical optimization  -- no further cardiac w/u prior to procedure except echo      Lydia Olivera MD  Cardiology Fellow - PGY-4  LIKIMO: [id number]  NS: [phone]  [id number]

## 2018-08-29 NOTE — PROGRESS NOTE ADULT - ASSESSMENT
A: 77 M diagnosed with distal cholangiocarcinoma, recent diagnosis of obstructive ampullary mass s/p PTC placement by IR into segment 6 of the liver in early August 2018 c/b high PTC output resulting in pre-renal MANNY, acidosis, hyperkalemia. P/w A: 77 M with an extensive cardiac h/x- on Eliquis for Afib, diagnosed with distal cholangiocarcinoma s/p PTC placement by IR into segment 6 of the liver on 8/16 after unsuccessful ERCP c/b high PTC output resulting in pre-renal MANNY, acidosis, hyperkalemia. P/w septic shock in setting of cardiomyopathy, awaiting medical optimization for pancreaticoduodenectomy.     P:   - Per cardio: d/c metoprolol as it may have resulted in excessive bradycardia, continue monitoring on tele. Repeat ECHO when clinical status is improved. Hold off DVT ppx and repeat INR, start VTE ppx when INR <2  - Per Nephro: dx of MANNY likely pre-renal, continue IVF resuscitation with D5W and 150 meq bicarb, villa in place, consented for HD in the event that pt requires it, f/u renal US with doppler  - Per ID: GNR in bile fluid, on broad spec meropenem for poss sepsis, f/u ctx and sensitivity from bile fluid. Check procalcitonin.   - Per SICU: Cont NPO, replace 1:1 of PTC output with NS q6h, protonix for GERD.    Hemalatha Tracy, PGY-1  [place] [facility] [phone]

## 2018-08-29 NOTE — PROGRESS NOTE ADULT - SUBJECTIVE AND OBJECTIVE BOX
Patient seen and examined at bedside.    Overnight Events: Pt states that he feels much improved today. No SOB. No Abd pain.     Review of Symptoms:  CONSTITUTIONAL: No weakness, fevers or chills  EYES/ENT: No visual changes;  No dysphagia  NECK: No pain or stiffness  RESPIRATORY: No cough, wheezing, hemoptysis; No shortness of breath  CARDIOVASCULAR: No chest pain or palpitations; No lower extremity edema  GASTROINTESTINAL: No abdominal or epigastric pain. No nausea, vomiting, or hematemesis; No diarrhea or constipation. No melena or hematochezia.  BACK: No back pain  GENITOURINARY: No dysuria, frequency or hematuria  NEUROLOGICAL: No numbness or weakness  SKIN: No itching, burning, rashes, or lesions   All other review of systems is negative unless indicated above.          Current Meds:  chlorhexidine 4% Liquid 1 Application(s) Topical <User Schedule>  dextrose 5% 1000 milliLiter(s) IV Continuous <Continuous>  insulin lispro (HumaLOG) corrective regimen sliding scale   SubCutaneous every 4 hours  meropenem  IVPB 500 milliGRAM(s) IV Intermittent every 24 hours  pantoprazole  Injectable 40 milliGRAM(s) IV Push every 24 hours  sodium chloride 0.9%. 1000 milliLiter(s) IV Continuous <Continuous>      PAST MEDICAL & SURGICAL HISTORY:  Male stress incontinence  Kidney stone: &quot;passed on own&quot;  Varicose vein: (L) 5 years ago  Bilateral cataracts  Appendicitis  Benign colon polyp  Prostate cancer: 2010  Afib: 2006 s/p ablation 2006 , afib resolved  Hyperlipidemia: X 4 years  GERD (Gastroesophageal Reflux Disease): X 10 years  DM (Diabetes Mellitus): X 3 years  Renal Calculi: 2008  MVP (Mitral Valve Prolapse): X 8 years  HTN - Hypertension: X 10 years, controlled  Status post left knee replacement  S/P ablation operation for arrhythmia  Male stress incontinence: s/p artifical urinary sphincter 5/2012  Varicose vein-s/p vein stripping 5 years ago  S/P arthroscopy: right shoulder 12/11  S/P prostatectomy: radical robotic 6/10  Cosmetic Surgery: chin implant 2004  S/P Colonoscopy with Polypectomy: 2009  S/P Appendectomy: 1950      Vitals:  T(F): 97.8 (08-29), Max: 97.8 (08-28)  HR: 90 (08-29) (46 - 98)  BP: 114/64 (08-29) (87/59 - 124/57)  RR: 14 (08-29)  SpO2: 98% (08-29)  I&O's Summary    28 Aug 2018 07:01  -  29 Aug 2018 07:00  --------------------------------------------------------  IN: 2275 mL / OUT: 805 mL / NET: 1470 mL    29 Aug 2018 07:01  -  29 Aug 2018 09:15  --------------------------------------------------------  IN: 550 mL / OUT: 35 mL / NET: 515 mL        Physical Exam:  Appearance: No acute distress; well appearing  Eyes: PERRL, EOMI, pink conjunctiva  HENT: Normal oral mucosa  Cardiovascular: RRR, S1, S2, no murmurs, rubs, or gallops; no edema; no JVD  Respiratory: Clear to auscultation bilaterally  Gastrointestinal: soft, non-tender, non-distended with normal bowel sounds  Musculoskeletal: No clubbing; no joint deformity   Neurologic: Non-focal  Lymphatic: No lymphadenopathy  Psychiatry: AAOx3, mood & affect appropriate  Skin: No rashes, ecchymoses, or cyanosis                          13.3   11.6  )-----------( 303      ( 29 Aug 2018 03:12 )             39.4     08-29    134<L>  |  95<L>  |  140<H>  ----------------------------<  166<H>  5.2   |  17<L>  |  8.88<H>    Ca    8.6      29 Aug 2018 06:42  Phos  11.1     08-29  Mg     2.4     08-29    TPro  6.5  /  Alb  3.4  /  TBili  2.4<H>  /  DBili  1.4<H>  /  AST  34  /  ALT  64<H>  /  AlkPhos  133<H>  08-29    PT/INR - ( 29 Aug 2018 03:12 )   PT: 22.4 sec;   INR: 2.03 ratio         PTT - ( 29 Aug 2018 03:12 )  PTT:34.6 sec      Serum Pro-Brain Natriuretic Peptide: 1574 pg/mL (08-28 @ 14:10)          New ECG(s): Personally reviewed    Echo:  < from: TTE with Doppler (w/Cont) (08.20.18 @ 10:49) >  Conclusions:  1. Calcified trileaflet aortic valve with normal opening.  2. Endocardial visualization enhanced with intravenous  injection of Ultrasonic Enhancing Agent  (Definity).Moderate- Severe left ventricular systolic  dysfunction. Regional wall motion variation is noted on the  setting of atrial fibrillation and ectopy.  3. Normal right ventricular size with decreased right  ventricular systolic function.  4. Normal tricuspid valve. Minimal tricuspid regurgitation.    < end of copied text >    Stress Testing:     Cath:    Imaging:    Interpretation of Telemetry: a-fib

## 2018-08-29 NOTE — PROGRESS NOTE ADULT - SUBJECTIVE AND OBJECTIVE BOX
24h: Pt hyperkalemic and received D50 insulin and bicarb, started on D5 + 150 mEq bicarb @ 100 ml/hr. He was started on 1:1 NS repletions of PTC. Pt hyperkalemic again overnight and given 15 mg Kayexalate. Nephrology consult and consented pt for HD in the event that he requires it. Urology placed Ness 2/2 prosthetic urethral sphincter.  Pt started on empiric antibiotics. Bile grew out gram negative rods.     s/p PTC placement by IR into segment 6 of the liver in early August 2018 presented from office to ER with hypotension and hypothermia concerning for cholangitis. Patient was also found to have an acute MANNY with Cr increased from 1.18 to 8.35 and acidotic on VBG. Patient underwent CT which didn't demonstrate billary ductal dilatation. 24 HOUR EVENTS:   Pt hyperkalemic and received D50 insulin and bicarb, started on D5 + 150 mEq bicarb @ 100 ml/hr. He was started on 1:1 NS repletions of PTC. Pt hyperkalemic again overnight and given 15 mg Kayexalate. Nephrology consult and consented pt for HD in the event that he requires it. Urology placed Ness 2/2 prosthetic urethral sphincter.  Pt started on empiric antibiotics. Bile grew out gram negative rods. Overnight he got 500ml NS bolus for oliguria with improvement.        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 Patient was also found to have an acute MANNY with Cr increased from 1.18 to 8.35 and acidotic on VBG. Patient underwent CT which didn't demonstrate billary ductal dilatation. Likely all secondary to high PTC output (1L per day) causing hypovolemia and prerenal MANNY.       SUBJECTIVE/ROS:  [ x] A ten-point review of systems was otherwise negative except as noted.  [ ] Due to altered mental status/intubation, subjective information were not able to be obtained from the patient. History was obtained, to the extent possible, from review of the chart and collateral sources of information.      NEURO  RASS: 0      Exam: alert and oriented x 4  Meds: x  [x] Adequacy of sedation and pain control has been assessed and adjusted      RESPIRATORY  RR: 17 (08-29-18 @ 04:00) (17 - 30)  SpO2: 98% (08-29-18 @ 04:00) (93% - 100%)  Exam: unlabored, clear to auscultation bilaterally  [n/a ] Extubation Readiness Assessed  Meds: x      CARDIOVASCULAR  HR: 87 (08-29-18 @ 04:00) (46 - 95)  BP: 116/67 (08-29-18 @ 04:00) (87/59 - 124/57)  BP(mean): 80 (08-29-18 @ 04:00) (68 - 82)  VBG - ( 29 Aug 2018 02:38 )  pH: 7.27  /  pCO2: 41    /  pO2: 22    / HCO3: 18    / Base Excess: -8.0  /  SaO2: 28     Lactate: 2.1      Exam: irregular   Cardiac Rhythm: a.fib with SVR  Perfusion     [x ]Adequate   [ ]Inadequate  Mentation   [ x]Normal       [ ]Reduced  Extremities  [ x]Warm         [ ]Cool  Volume Status [ ]Hypervolemic [ x]Euvolemic [ ]Hypovolemic  Meds: x      GI/NUTRITION  Exam: soft, nontender, nondistended  Diet: NPO  Meds: pantoprazole  Injectable 40 milliGRAM(s) IV Push every 24 hours      GENITOURINARY  I&O's Detail    08-28 @ 07:01  -  08-29 @ 05:17  --------------------------------------------------------  IN:    dextrose 5%: 1100 mL    IV PiggyBack: 100 mL    Sodium Chloride 0.9% IV Bolus: 500 mL    sodium chloride 0.9%.: 225 mL    Solution: 50 mL  Total IN: 1975 mL    OUT:    Drain: 225 mL    Indwelling Catheter - Urethral: 485 mL  Total OUT: 710 mL    Total NET: 1265 mL        Weight (kg): 84.5 (08-28 @ 15:57)  08-29    133<L>  |  94<L>  |  138<H>  ----------------------------<  138<H>  4.8   |  14<L>  |  8.85<H>    Ca    8.3<L>      29 Aug 2018 03:12  Phos  11.9     08-29  Mg     2.5     08-29    TPro  5.9<L>  /  Alb  2.8<L>  /  TBili  2.2<H>  /  DBili  1.1<H>  /  AST  34  /  ALT  62<H>  /  AlkPhos  121<H>  08-29    [x ] Ness catheter, indication: I&Os  Meds: dextrose 5% 1000 milliLiter(s) IV Continuous <Continuous>  sodium chloride 0.9%. 1000 milliLiter(s) IV Continuous <Continuous>        HEMATOLOGIC  Meds: s  [-] VTE Prophylaxis                        13.3   11.6  )-----------( 303      ( 29 Aug 2018 03:12 )             39.4     PT/INR - ( 29 Aug 2018 03:12 )   PT: 22.4 sec;   INR: 2.03 ratio         PTT - ( 29 Aug 2018 03:12 )  PTT:34.6 sec  Transfusion     [ ] PRBC   [ ] Platelets   [ ] FFP   [ ] Cryoprecipitate      INFECTIOUS DISEASES  T(C): 36.4 (08-29-18 @ 03:00), Max: 36.6 (08-28-18 @ 19:00)  WBC Count: 11.6 K/uL (08-29 @ 03:12)  WBC Count: 13.7 K/uL (08-28 @ 14:10)    Recent Cultures:  Specimen Source: .Body Fluid Bile Fluid, 08-28 @ 18:03; Results --; Gram Stain:   No polymorphonuclear cells seen  Gram Negative Rods seen by cytocentrifuge; Organism: --    Meds: meropenem  IVPB 500 milliGRAM(s) IV Intermittent every 24 hours        ENDOCRINE  Capillary Blood Glucose  POCT Blood Glucose.: 221 mg/dL (28 Aug 2018 18:29)    Meds: insulin lispro (HumaLOG) corrective regimen sliding scale   SubCutaneous every 4 hours        ACCESS DEVICES:  [x ] Peripheral IV  [ ] Central Venous Line	[ ] R	[ ] L	[ ] IJ	[ ] Fem	[ ] SC	Placed:   [ ] Arterial Line		[ ] R	[ ] L	[ ] Fem	[ ] Rad	[ ] Ax	Placed:   [ ] PICC:					[ ] Mediport  [ x] Urinary Catheter, Date Placed: 8/28  [ x] Necessity of urinary, arterial, and venous catheters discussed    OTHER MEDICATIONS:  chlorhexidine 4% Liquid 1 Application(s) Topical <User Schedule>      CODE STATUS: full code    IMAGING: x

## 2018-08-29 NOTE — PROGRESS NOTE ADULT - SUBJECTIVE AND OBJECTIVE BOX
General Surgery Progress Note    SUBJECTIVE:  The patient was seen and examined. O/n, pt was hyperkalemia, given D50, insulin, and bicarb. Started on D5 and 150 mEq bicarb at 100 ml/hr. Continued to be hyperkalemic and given 15mg Kayexalate. Started on 1:1 NS repletions of PTC. Was given an additional 500ml NS bolus for oliguria.  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 Nephrology consulted and consented pt for HD if he were to require it during this hospital stay.     OBJECTIVE:     ** VITAL SIGNS / I&O's **    Vital Signs Last 24 Hrs  T(C): 36.6 (29 Aug 2018 07:00), Max: 36.6 (28 Aug 2018 19:00)  T(F): 97.8 (29 Aug 2018 07:00), Max: 97.8 (28 Aug 2018 19:00)  HR: 90 (29 Aug 2018 09:00) (46 - 98)  BP: 114/64 (29 Aug 2018 09:00) (87/59 - 124/57)  BP(mean): 82 (29 Aug 2018 09:00) (65 - 82)  RR: 14 (29 Aug 2018 09:00) (14 - 30)  SpO2: 98% (29 Aug 2018 09:00) (93% - 100%)      28 Aug 2018 07:01  -  29 Aug 2018 07:00  --------------------------------------------------------  IN:    dextrose 5%: 1400 mL    IV PiggyBack: 100 mL    Sodium Chloride 0.9% IV Bolus: 500 mL    sodium chloride 0.9%.: 225 mL    Solution: 50 mL  Total IN: 2275 mL    OUT:    Drain: 225 mL    Indwelling Catheter - Urethral: 580 mL  Total OUT: 805 mL    Total NET: 1470 mL      29 Aug 2018 07:01  -  29 Aug 2018 09:20  --------------------------------------------------------  IN:    dextrose 5%: 200 mL    IV PiggyBack: 350 mL  Total IN: 550 mL    OUT:    Indwelling Catheter - Urethral: 35 mL  Total OUT: 35 mL    Total NET: 515 mL          ** PHYSICAL EXAM **    -- CONSTITUTIONAL: Alert, NAD  -- PULMONARY: non-labored respirations  -- ABDOMEN: soft, non-distended, non-tender, PTC drain- sang>serous  -- : villa in place  -- NEURO: A&Ox3    ** LABS **                          13.3   11.6  )-----------( 303      ( 29 Aug 2018 03:12 )             39.4     29 Aug 2018 06:42    134    |  95     |  140    ----------------------------<  166    5.2     |  17     |  8.88     Ca    8.6        29 Aug 2018 06:42  Phos  11.1      29 Aug 2018 06:42  Mg     2.4       29 Aug 2018 06:42    TPro  6.5    /  Alb  3.4    /  TBili  2.4    /  DBili  1.4    /  AST  34     /  ALT  64     /  AlkPhos  133    29 Aug 2018 06:42    PT/INR - ( 29 Aug 2018 03:12 )   PT: 22.4 sec;   INR: 2.03 ratio         PTT - ( 29 Aug 2018 03:12 )  PTT:34.6 sec  CAPILLARY BLOOD GLUCOSE      POCT Blood Glucose.: 221 mg/dL (28 Aug 2018 18:29)        LIVER FUNCTIONS - ( 29 Aug 2018 06:42 )  Alb: 3.4 g/dL / Pro: 6.5 g/dL / ALK PHOS: 133 U/L / ALT: 64 U/L / AST: 34 U/L / GGT: x             Culture - Fungal, Body Fluid (collected 28 Aug 2018 18:05)  Source: .Body Fluid bile fluid  Preliminary Report (29 Aug 2018 07:13):    Testing in progress    Culture - Body Fluid with Gram Stain (collected 28 Aug 2018 18:03)  Source: .Body Fluid Bile Fluid  Gram Stain (28 Aug 2018 22:13):    No polymorphonuclear cells seen    Gram Negative Rods seen by cytocentrifuge      Urinalysis Basic - ( 28 Aug 2018 21:55 )    Color: Yellow / Appearance: SL Turbid / S.015 / pH: x  Gluc: x / Ketone: Negative  / Bili: Negative / Urobili: Negative   Blood: x / Protein: 30 mg/dL / Nitrite: Negative   Leuk Esterase: Negative / RBC: >50 /HPF / WBC 2-5 /HPF   Sq Epi: x / Non Sq Epi: Occasional /HPF / Bacteria: Few /HPF        MEDICATIONS  (STANDING):  chlorhexidine 4% Liquid 1 Application(s) Topical <User Schedule>  dextrose 5% 1000 milliLiter(s) (100 mL/Hr) IV Continuous <Continuous>  insulin lispro (HumaLOG) corrective regimen sliding scale   SubCutaneous every 4 hours  meropenem  IVPB 500 milliGRAM(s) IV Intermittent every 24 hours  pantoprazole  Injectable 40 milliGRAM(s) IV Push every 24 hours  sodium chloride 0.9%. 1000 milliLiter(s) (1 mL/Hr) IV Continuous <Continuous>    MEDICATIONS  (PRN):

## 2018-08-29 NOTE — PROGRESS NOTE ADULT - SUBJECTIVE AND OBJECTIVE BOX
[facility] DIVISION OF KIDNEY DISEASES AND HYPERTENSION -- FOLLOW UP NOTE  --------------------------------------------------------------------------------  Beth Silver  [id number]  --------------------------------------------------------------------------------  Chief Complaint: MANNY    24 hour events/subjective: Seen and examined at the bedside. Feels better. Denies nausea, vomiting, SOB, chest pain, dysuria, hematuria, nocturia. Good urine output.        PAST HISTORY  --------------------------------------------------------------------------------  No significant changes to PMH, PSH, FHx, SHx, unless otherwise noted    ALLERGIES & MEDICATIONS  --------------------------------------------------------------------------------  Allergies    No Known Allergies    Intolerances      Standing Inpatient Medications  calcium acetate 1334 milliGRAM(s) Oral three times a day with meals  chlorhexidine 4% Liquid 1 Application(s) Topical <User Schedule>  insulin lispro (HumaLOG) corrective regimen sliding scale   SubCutaneous three times a day before meals  insulin lispro (HumaLOG) corrective regimen sliding scale   SubCutaneous at bedtime  meropenem  IVPB 500 milliGRAM(s) IV Intermittent every 24 hours  metoprolol tartrate 12.5 milliGRAM(s) Oral every 12 hours  pantoprazole    Tablet 40 milliGRAM(s) Oral before breakfast  sodium chloride 0.45% 1000 milliLiter(s) IV Continuous <Continuous>  sodium chloride 0.9%. 1000 milliLiter(s) IV Continuous <Continuous>    PRN Inpatient Medications      REVIEW OF SYSTEMS  --------------------------------------------------------------------------------  Review Of Systems:  Constitutional: No Fever,  Chills,  Fatigue, Weight change   HEENT: No Blurred vision, Eye Pain, Headache, Runny nose, Sore Throat   Respiratory:No Cough, Wheezing, Shortness of breath  Cardiovascular: No Chest Pain, Palpitations, CHACON, PND, Orthopnea  Gastrointestinal:No Nausea, Vomiting, Abdominal Pain,  Diarrhea, Constipation, Hemorrhoids  Genitourinary:No  Nocturia, Hematuria,  Dysuria, Incontinence, Foam in urine  Extremities:  No Swelling , Joint Pain  Neurologic:  No Focal deficit, Paresthesias, Syncope  Lymphatic:   No Lymphadenopathy   Skin: No Rash,  Ecchymoses , Wounds, Lesions  Psychiatry: Denies Depression,  Suicidal/Homicidal Ideation, Anxiety, Sleep Disturbances    All other systems were reviewed and are negative, except as noted.    VITALS/PHYSICAL EXAM  --------------------------------------------------------------------------------  T(C): 36.3 (18 @ 11:00), Max: 36.6 (18 @ 19:00)  HR: 101 (18 @ 13:00) (46 - 106)  BP: 120/58 (18 @ 13:00) (87/58 - 124/57)  RR: 14 (18 @ 13:00) (14 - 30)  SpO2: 98% (18 @ 13:00) (93% - 100%)  Wt(kg): --    Height (cm): 182.88 (18 @ 15:57)  Weight (kg): 84.5 (18 @ 15:57)  BMI (kg/m2): 25.3 (18 @ 15:57)  BSA (m2): 2.07 (18 @ 15:57)  Daily Height in cm: 182.88 (28 Aug 2018 15:57)    Daily Weight in k.1 (29 Aug 2018 06:55)  I&O's Summary    28 Aug 2018 07:  -  29 Aug 2018 07:00  --------------------------------------------------------  IN: 2275 mL / OUT: 805 mL / NET: 1470 mL    29 Aug 2018 07:  -  29 Aug 2018 13:16  --------------------------------------------------------  IN: 1650 mL / OUT: 455 mL / NET: 1195 mL          18 @ 07:  -  18 @ 07:00  --------------------------------------------------------  IN: 2275 mL / OUT: 805 mL / NET: 1470 mL    18 @ 07:  -  18 @ 13:16  --------------------------------------------------------  IN: 1650 mL / OUT: 455 mL / NET: 1195 mL        Physical Exam:  Gen: NAD  HEENT: anicteric  Pulm: CTA B/L   CVS: RRR,  Normal S1 S2  Abd: soft, nontender, nondistended  Neuro: AAO x3, no asterixis   Back: No dependent edema  : Ness draining clear urine  LE: Warm, no edema  Skin: Warm, without rashes  Vascular access: none    LABS/STUDIES  --------------------------------------------------------------------------------              13.3   11.6  >-----------<  303      [18 @ 03:12]              39.4     133  |  92  |  137  ----------------------------<  191      [18 11:58]  4.4   |  18  |  8.61        Ca     8.4     [18 11:58]      Mg     2.2     [18 11:58]      Phos  9.8     [18 11:58]    TPro  6.3  /  Alb  3.2  /  TBili  2.2  /  DBili  1.4  /  AST  31  /  ALT  60  /  AlkPhos  125  [18 11:58]    PT/INR: PT 22.4 , INR 2.03       [18 03:12]  PTT: 34.6       [18 03:12]      Creatinine Trend:  SCr 8.61 [ 11:58]  SCr 8.88 [ 06:42]  SCr 8.85 [ 03:12]  SCr 8.79 [ 21:55]  SCr 8.66 [ 17:51]    Urinalysis - [18 21:55]      Color Yellow / Appearance SL Turbid / SG 1.015 / pH 5.5      Gluc Negative / Ketone Negative  / Bili Negative / Urobili Negative       Blood Large / Protein 30 / Leuk Est Negative / Nitrite Negative      RBC >50 / WBC 2-5 / Hyaline 2-5 / Gran  / Sq Epi  / Non Sq Epi Occasional / Bacteria Few    Urine Creatinine 167      [18 @ 18:25]  Urine Sodium <20      [18 @ 18:25]  Urine Urea Nitrogen 375      [18 @ 21:32]  Urine Potassium 64      [18 @ 18:25]  Urine Osmolality 358      [18 18:25]    HbA1c 6.9      [18 07:46]  TSH 1.23      [18 @ 05:55]      LUZ ELENA: titer Negative, pattern --      [18 19:46]      Radiology  --------------------------------------------------------------------------------    --------------------------------------------------------------------------------  Beth Silver  [id number]

## 2018-08-29 NOTE — PROGRESS NOTE ADULT - ASSESSMENT
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 in early August 2018; no biliary ductal dilation on CT  - NPO   - Monitor PTC output quality and quantity   - Replace 1:1 of the PTC output with NS q6h  - Protonix for GERD    Renal/Genitourinary: prerenal oliguric MANNY stage 2, hyperkalemia s/p Kayexalate; prostate CA s/p prostatectomy & urethral valve replacement  - q4h labs   - Continue D5 + 150 mEq sodium bicarb drip @ 100 ml/hr  - Continue Ness placed by urology for history of urethral valve replacement   - Nephrology consulted, patient consented for dialysis if necessary    Hematologic: a.fib on Eliquis (last taken 8/28 AM)  - Will hold off on DVT ppx and repeat INR  - Start VTE prophylaxis when INR < 2.00    Infectious Disease: concern for sepsis; GNR in bile fluid  - On broad spectrum meropenem for possible sepsis  - Check procalcitonin  - Follow up culture & sensitivity from bile fluid    Endocrine: DMII  - ISS q4h for glycemic control     Disposition: SICU. Full code.    -Kristen Jean PA-C  [id number] 78 yo male with recent diagnosis of obstructive ampullary mass s/p PTC placement by IR into segment 6 of the liver in early August 2018 c/b high PTC output resulting in pre-renal MANNY, acidosis, hyperkalemia.    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 Strict I&OS q1 hr  - Continue D5 + 150 mEq sodium bicarb drip @ 100 ml/hr  - Continue Ness placed by urology for history of urethral valve replacement.  - Nephrology consulted, patient consented for dialysis if necessary. Will need HD cathter placement first.  - Pending renal ultrasound.    Hematologic: a.fib on Eliquis (last taken 8/28 AM)  - Will hold off on DVT ppx and repeat INR  - Start VTE prophylaxis when INR < 2.00    Infectious Disease: concern for sepsis; GNR in bile fluid  - On broad spectrum meropenem for possible sepsis  - Check procalcitonin  - Follow up culture & sensitivity from bile fluid    Endocrine: DMII  - ISS q4h for glycemic control     Disposition: SICU. Full code.    -Kristen Jean PA-C  [id number]

## 2018-08-29 NOTE — PROGRESS NOTE ADULT - PROBLEM SELECTOR PLAN 1
Pt with normal baseline renal function.   SCr ~ 0.94 on 08/20. Now with elevated BUN//Cr (136/8.6)  Pt with increasing output from PTC drain, decreased PO intake. MANNY likely pre renal as supported by urine lytes (Low Sravanthi+) vs AIN from recent Cipro use?? vs ATN from recent Lisinopril use  On IV fluid resuscitation and s/p villa; UOP picking up,  non oliguric.  No uremic symptoms, K+ wnl  No urgent indication for HD.  Consent obtained/placed in charts in event HD becomes imminent.  Obtain Urine for eosinophils.

## 2018-08-30 DIAGNOSIS — E83.39 OTHER DISORDERS OF PHOSPHORUS METABOLISM: ICD-10-CM

## 2018-08-30 LAB
ALBUMIN SERPL ELPH-MCNC: 2.8 G/DL — LOW (ref 3.3–5)
ALBUMIN SERPL ELPH-MCNC: 2.9 G/DL — LOW (ref 3.3–5)
ALBUMIN SERPL ELPH-MCNC: 2.9 G/DL — LOW (ref 3.3–5)
ALBUMIN SERPL ELPH-MCNC: 3.3 G/DL — SIGNIFICANT CHANGE UP (ref 3.3–5)
ALP SERPL-CCNC: 109 U/L — SIGNIFICANT CHANGE UP (ref 40–120)
ALP SERPL-CCNC: 110 U/L — SIGNIFICANT CHANGE UP (ref 40–120)
ALP SERPL-CCNC: 113 U/L — SIGNIFICANT CHANGE UP (ref 40–120)
ALP SERPL-CCNC: 123 U/L — HIGH (ref 40–120)
ALT FLD-CCNC: 45 U/L — SIGNIFICANT CHANGE UP (ref 10–45)
ALT FLD-CCNC: 46 U/L — HIGH (ref 10–45)
ALT FLD-CCNC: 50 U/L — HIGH (ref 10–45)
ALT FLD-CCNC: 52 U/L — HIGH (ref 10–45)
ANION GAP SERPL CALC-SCNC: 18 MMOL/L — HIGH (ref 5–17)
ANION GAP SERPL CALC-SCNC: 20 MMOL/L — HIGH (ref 5–17)
ANION GAP SERPL CALC-SCNC: 22 MMOL/L — HIGH (ref 5–17)
ANION GAP SERPL CALC-SCNC: 23 MMOL/L — HIGH (ref 5–17)
APTT BLD: 52.3 SEC — HIGH (ref 27.5–37.4)
AST SERPL-CCNC: 27 U/L — SIGNIFICANT CHANGE UP (ref 10–40)
AST SERPL-CCNC: 28 U/L — SIGNIFICANT CHANGE UP (ref 10–40)
AST SERPL-CCNC: 28 U/L — SIGNIFICANT CHANGE UP (ref 10–40)
AST SERPL-CCNC: 31 U/L — SIGNIFICANT CHANGE UP (ref 10–40)
BILIRUB DIRECT SERPL-MCNC: 1.2 MG/DL — HIGH (ref 0–0.2)
BILIRUB DIRECT SERPL-MCNC: 1.3 MG/DL — HIGH (ref 0–0.2)
BILIRUB INDIRECT FLD-MCNC: 0.9 MG/DL — SIGNIFICANT CHANGE UP (ref 0.2–1)
BILIRUB INDIRECT FLD-MCNC: 0.9 MG/DL — SIGNIFICANT CHANGE UP (ref 0.2–1)
BILIRUB INDIRECT FLD-MCNC: 1 MG/DL — SIGNIFICANT CHANGE UP (ref 0.2–1)
BILIRUB INDIRECT FLD-MCNC: 1.1 MG/DL — HIGH (ref 0.2–1)
BILIRUB SERPL-MCNC: 2.1 MG/DL — HIGH (ref 0.2–1.2)
BILIRUB SERPL-MCNC: 2.1 MG/DL — HIGH (ref 0.2–1.2)
BILIRUB SERPL-MCNC: 2.2 MG/DL — HIGH (ref 0.2–1.2)
BILIRUB SERPL-MCNC: 2.4 MG/DL — HIGH (ref 0.2–1.2)
BUN SERPL-MCNC: 123 MG/DL — HIGH (ref 7–23)
BUN SERPL-MCNC: 125 MG/DL — HIGH (ref 7–23)
BUN SERPL-MCNC: 131 MG/DL — HIGH (ref 7–23)
BUN SERPL-MCNC: 131 MG/DL — HIGH (ref 7–23)
BUN SERPL-MCNC: 133 MG/DL — HIGH (ref 7–23)
CALCIUM SERPL-MCNC: 7.9 MG/DL — LOW (ref 8.4–10.5)
CALCIUM SERPL-MCNC: 8.1 MG/DL — LOW (ref 8.4–10.5)
CALCIUM SERPL-MCNC: 8.3 MG/DL — LOW (ref 8.4–10.5)
CALCIUM SERPL-MCNC: 8.4 MG/DL — SIGNIFICANT CHANGE UP (ref 8.4–10.5)
CHLORIDE SERPL-SCNC: 90 MMOL/L — LOW (ref 96–108)
CHLORIDE SERPL-SCNC: 94 MMOL/L — LOW (ref 96–108)
CHLORIDE SERPL-SCNC: 95 MMOL/L — LOW (ref 96–108)
CHLORIDE SERPL-SCNC: 97 MMOL/L — SIGNIFICANT CHANGE UP (ref 96–108)
CO2 SERPL-SCNC: 18 MMOL/L — LOW (ref 22–31)
CO2 SERPL-SCNC: 20 MMOL/L — LOW (ref 22–31)
CO2 SERPL-SCNC: 20 MMOL/L — LOW (ref 22–31)
CO2 SERPL-SCNC: 21 MMOL/L — LOW (ref 22–31)
CREAT SERPL-MCNC: 7.74 MG/DL — HIGH (ref 0.5–1.3)
CREAT SERPL-MCNC: 7.81 MG/DL — HIGH (ref 0.5–1.3)
CREAT SERPL-MCNC: 8.41 MG/DL — HIGH (ref 0.5–1.3)
CREAT SERPL-MCNC: 8.56 MG/DL — HIGH (ref 0.5–1.3)
GAS PNL BLDV: SIGNIFICANT CHANGE UP
GBM IGG SER-ACNC: <0.2 U — SIGNIFICANT CHANGE UP
GLUCOSE SERPL-MCNC: 121 MG/DL — HIGH (ref 70–99)
GLUCOSE SERPL-MCNC: 176 MG/DL — HIGH (ref 70–99)
GLUCOSE SERPL-MCNC: 182 MG/DL — HIGH (ref 70–99)
GLUCOSE SERPL-MCNC: 195 MG/DL — HIGH (ref 70–99)
HAPTOGLOB SERPL-MCNC: 209 MG/DL — HIGH (ref 34–200)
HCT VFR BLD CALC: 37.5 % — LOW (ref 39–50)
HGB BLD-MCNC: 12.9 G/DL — LOW (ref 13–17)
INR BLD: 1.64 RATIO — HIGH (ref 0.88–1.16)
MAGNESIUM SERPL-MCNC: 2 MG/DL — SIGNIFICANT CHANGE UP (ref 1.6–2.6)
MCHC RBC-ENTMCNC: 31.1 PG — SIGNIFICANT CHANGE UP (ref 27–34)
MCHC RBC-ENTMCNC: 34.5 GM/DL — SIGNIFICANT CHANGE UP (ref 32–36)
MCV RBC AUTO: 90.1 FL — SIGNIFICANT CHANGE UP (ref 80–100)
PHOSPHATE SERPL-MCNC: 8.3 MG/DL — HIGH (ref 2.5–4.5)
PHOSPHATE SERPL-MCNC: 8.8 MG/DL — HIGH (ref 2.5–4.5)
PHOSPHATE SERPL-MCNC: 9.2 MG/DL — HIGH (ref 2.5–4.5)
PHOSPHATE SERPL-MCNC: 9.4 MG/DL — HIGH (ref 2.5–4.5)
PLATELET # BLD AUTO: 284 K/UL — SIGNIFICANT CHANGE UP (ref 150–400)
POTASSIUM SERPL-MCNC: 3.7 MMOL/L — SIGNIFICANT CHANGE UP (ref 3.5–5.3)
POTASSIUM SERPL-MCNC: 3.8 MMOL/L — SIGNIFICANT CHANGE UP (ref 3.5–5.3)
POTASSIUM SERPL-MCNC: 3.9 MMOL/L — SIGNIFICANT CHANGE UP (ref 3.5–5.3)
POTASSIUM SERPL-MCNC: 4.1 MMOL/L — SIGNIFICANT CHANGE UP (ref 3.5–5.3)
POTASSIUM SERPL-SCNC: 3.7 MMOL/L — SIGNIFICANT CHANGE UP (ref 3.5–5.3)
POTASSIUM SERPL-SCNC: 3.8 MMOL/L — SIGNIFICANT CHANGE UP (ref 3.5–5.3)
POTASSIUM SERPL-SCNC: 3.9 MMOL/L — SIGNIFICANT CHANGE UP (ref 3.5–5.3)
POTASSIUM SERPL-SCNC: 4.1 MMOL/L — SIGNIFICANT CHANGE UP (ref 3.5–5.3)
PROT SERPL-MCNC: 5.7 G/DL — LOW (ref 6–8.3)
PROT SERPL-MCNC: 5.8 G/DL — LOW (ref 6–8.3)
PROT SERPL-MCNC: 5.8 G/DL — LOW (ref 6–8.3)
PROT SERPL-MCNC: 6.2 G/DL — SIGNIFICANT CHANGE UP (ref 6–8.3)
PROTHROM AB SERPL-ACNC: 17.9 SEC — HIGH (ref 9.8–12.7)
RBC # BLD: 4.16 M/UL — LOW (ref 4.2–5.8)
RBC # FLD: 12.1 % — SIGNIFICANT CHANGE UP (ref 10.3–14.5)
SODIUM SERPL-SCNC: 133 MMOL/L — LOW (ref 135–145)
SODIUM SERPL-SCNC: 134 MMOL/L — LOW (ref 135–145)
SODIUM SERPL-SCNC: 135 MMOL/L — SIGNIFICANT CHANGE UP (ref 135–145)
SODIUM SERPL-SCNC: 136 MMOL/L — SIGNIFICANT CHANGE UP (ref 135–145)
VANCOMYCIN FLD-MCNC: 15.4 UG/ML — SIGNIFICANT CHANGE UP
WBC # BLD: 9.3 K/UL — SIGNIFICANT CHANGE UP (ref 3.8–10.5)
WBC # FLD AUTO: 9.3 K/UL — SIGNIFICANT CHANGE UP (ref 3.8–10.5)

## 2018-08-30 PROCEDURE — 99233 SBSQ HOSP IP/OBS HIGH 50: CPT | Mod: GC

## 2018-08-30 PROCEDURE — 76775 US EXAM ABDO BACK WALL LIM: CPT | Mod: 26

## 2018-08-30 PROCEDURE — 71045 X-RAY EXAM CHEST 1 VIEW: CPT | Mod: 26

## 2018-08-30 PROCEDURE — 99291 CRITICAL CARE FIRST HOUR: CPT

## 2018-08-30 RX ORDER — ERYTHROMYCIN ETHYLSUCCINATE 400 MG
400 TABLET ORAL THREE TIMES A DAY
Qty: 0 | Refills: 0 | Status: DISCONTINUED | OUTPATIENT
Start: 2018-08-30 | End: 2018-09-06

## 2018-08-30 RX ORDER — MEGESTROL ACETATE 40 MG/ML
800 SUSPENSION ORAL DAILY
Qty: 0 | Refills: 0 | Status: DISCONTINUED | OUTPATIENT
Start: 2018-08-30 | End: 2018-09-06

## 2018-08-30 RX ORDER — ONDANSETRON 8 MG/1
4 TABLET, FILM COATED ORAL ONCE
Qty: 0 | Refills: 0 | Status: COMPLETED | OUTPATIENT
Start: 2018-08-30 | End: 2018-08-30

## 2018-08-30 RX ORDER — ONDANSETRON 8 MG/1
4 TABLET, FILM COATED ORAL ONCE
Qty: 0 | Refills: 0 | Status: DISCONTINUED | OUTPATIENT
Start: 2018-08-30 | End: 2018-08-30

## 2018-08-30 RX ORDER — ONDANSETRON 8 MG/1
4 TABLET, FILM COATED ORAL ONCE
Qty: 0 | Refills: 0 | Status: DISCONTINUED | OUTPATIENT
Start: 2018-08-30 | End: 2018-09-06

## 2018-08-30 RX ORDER — METOPROLOL TARTRATE 50 MG
12.5 TABLET ORAL ONCE
Qty: 0 | Refills: 0 | Status: COMPLETED | OUTPATIENT
Start: 2018-08-30 | End: 2018-08-30

## 2018-08-30 RX ORDER — METOPROLOL TARTRATE 50 MG
25 TABLET ORAL EVERY 12 HOURS
Qty: 0 | Refills: 0 | Status: DISCONTINUED | OUTPATIENT
Start: 2018-08-30 | End: 2018-08-31

## 2018-08-30 RX ORDER — MEGESTROL ACETATE 40 MG/ML
40 SUSPENSION ORAL DAILY
Qty: 0 | Refills: 0 | Status: DISCONTINUED | OUTPATIENT
Start: 2018-08-30 | End: 2018-08-30

## 2018-08-30 RX ADMIN — Medication 12.5 MILLIGRAM(S): at 06:43

## 2018-08-30 RX ADMIN — Medication 4: at 13:40

## 2018-08-30 RX ADMIN — Medication 2: at 08:24

## 2018-08-30 RX ADMIN — Medication 1334 MILLIGRAM(S): at 08:24

## 2018-08-30 RX ADMIN — SODIUM CHLORIDE 100 MILLILITER(S): 9 INJECTION, SOLUTION INTRAVENOUS at 08:27

## 2018-08-30 RX ADMIN — Medication 1334 MILLIGRAM(S): at 17:18

## 2018-08-30 RX ADMIN — Medication 2: at 17:41

## 2018-08-30 RX ADMIN — Medication 12.5 MILLIGRAM(S): at 11:54

## 2018-08-30 RX ADMIN — ONDANSETRON 4 MILLIGRAM(S): 8 TABLET, FILM COATED ORAL at 13:30

## 2018-08-30 RX ADMIN — Medication 400 MILLIGRAM(S): at 21:29

## 2018-08-30 RX ADMIN — PANTOPRAZOLE SODIUM 40 MILLIGRAM(S): 20 TABLET, DELAYED RELEASE ORAL at 06:43

## 2018-08-30 RX ADMIN — CHLORHEXIDINE GLUCONATE 1 APPLICATION(S): 213 SOLUTION TOPICAL at 06:43

## 2018-08-30 RX ADMIN — ONDANSETRON 4 MILLIGRAM(S): 8 TABLET, FILM COATED ORAL at 10:36

## 2018-08-30 RX ADMIN — Medication 1334 MILLIGRAM(S): at 13:25

## 2018-08-30 RX ADMIN — Medication 25 MILLIGRAM(S): at 17:18

## 2018-08-30 RX ADMIN — SODIUM CHLORIDE 1 MILLILITER(S): 9 INJECTION INTRAMUSCULAR; INTRAVENOUS; SUBCUTANEOUS at 10:27

## 2018-08-30 NOTE — PROGRESS NOTE ADULT - SUBJECTIVE AND OBJECTIVE BOX
24 HOUR EVENTS:  [place] for BRASH syndrome given dehydration and history of beta blocker with degree of hyperkalemia and bradycardia, now improving . Dosed 10 vta K for coagulopathy. 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 Patient was also found to have an acute MANNY with Cr increased from 1.18 to 8.35 and acidotic on VBG. Patient underwent CT which didn't demonstrate billary ductal dilatation. Likely all secondary to high PTC output (1L per day) causing hypovolemia and prerenal MANNY.    SUBJECTIVE/ROS:  [X ] A ten-point review of systems was otherwise negative except as noted.  [ ] Due to altered mental status/intubation, subjective information were not able to be obtained from the patient. History was obtained, to the extent possible, from review of the chart and collateral sources of information.    NEURO  RASS: 0      Exam: alert and oriented x 4  Meds: x  [x] Adequacy of sedation and pain control has been assessed and adjusted    RESPIRATORY  RR: 16 (08-30-18 @ 02:00) (14 - 22)  SpO2: 98% (08-30-18 @ 02:00) (96% - 99%)  Wt(kg): --  Exam: unlabored, clear to auscultation bilaterally  Mechanical Ventilation:     [N/A] Extubation Readiness Assessed  Meds:     CARDIOVASCULAR  HR: 105 (08-30-18 @ 02:00) (84 - 107)  BP: 128/77 (08-30-18 @ 02:00) (87/58 - 128/77)  BP(mean): 97 (08-30-18 @ 02:00) (65 - 97)  ABP: --  ABP(mean): --  Wt(kg): --  CVP(cm H2O): --  VBG - ( 29 Aug 2018 23:17 )  pH: 7.42  /  pCO2: 30    /  pO2: 41    / HCO3: 19    / Base Excess: -3.8  /  SaO2: 75     Lactate: 1.1                Exam: regular rate and rhythm  Cardiac Rhythm: sinus  Perfusion     [x]Adequate   [ ]Inadequate  Mentation   [x]Normal       [ ]Reduced  Extremities  [x]Warm         [ ]Cool  Volume Status [ ]Hypervolemic [x]Euvolemic [ ]Hypovolemic  Meds: metoprolol tartrate 12.5 milliGRAM(s) Oral every 12 hours      GI/NUTRITION  Exam: soft, nontender, nondistended, PTC w/ dark bilious ouput  Diet: Regular   Meds: pantoprazole    Tablet 40 milliGRAM(s) Oral before breakfast      GENITOURINARY  I&O's Detail    08-28 @ 07:01  -  08-29 @ 07:00  --------------------------------------------------------  IN:    dextrose 5%: 1400 mL    IV PiggyBack: 100 mL    Sodium Chloride 0.9% IV Bolus: 500 mL    sodium chloride 0.9%.: 225 mL    Solution: 50 mL  Total IN: 2275 mL    OUT:    Drain: 225 mL    Indwelling Catheter - Urethral: 580 mL  Total OUT: 805 mL    Total NET: 1470 mL      08-29 @ 07:01 - 08-30 @ 02:32  --------------------------------------------------------  IN:    dextrose 5%: 400 mL    IV PiggyBack: 850 mLNEURO  RASS: 0      Exam: alert and oriented x 4  Meds: x  [x] Adequacy of sedation and pain control has been assessed and adjusted    sodium chloride 0.45%: 1500 mL    sodium chloride 0.9%.: 1025 mL    Solution: 50 mL  Total IN: 3825 mL    OUT:    Drain: 525 mL    Indwelling Catheter - Urethral: 1755 mL  Total OUT: 2280 mL    Total NET: 1545 mL  V        08-29    134<L>  |  95<L>  |  131<H>  ----------------------------<  120<H>  4.1   |  17<L>  |  8.51<H>    Ca    7.9<L>      29 Aug 2018 23:19  Phos  9.3     08-29  Mg     2.0     08-29    TPro  5.6<L>  /  Alb  2.7<L>  /  TBili  2.2<H>  /  DBili  1.2<H>  /  AST  29  /  ALT  53<H>  /  AlkPhos  113  08-29    [X ] Ness catheter, indication: N/A  Meds: calcium acetate 1334 milliGRAM(s) Oral three times a day with meals  sodium chloride 0.45% 1000 milliLiter(s) IV Continuous <Continuous>  sodium chloride 0.9%. 1000 milliLiter(s) IV Continuous <Continuous>      HEMATOLOGIC  Meds:   [x] VTE Prophylaxis                        13.3   11.6  )-----------( 303      ( 29 Aug 2018 03:12 )             39.4     PT/INR - ( 29 Aug 2018 03:12 )   PT: 22.4 sec;   INR: 2.03 ratio         PTT - ( 29 Aug 2018 03:12 )  PTT:34.6 sec  Transfusion     [ X] PRBC   [ X] Platelets   [X ] FFP   [X ] Cryoprecipitate    INFECTIOUS DISEASES  WBC Count: 11.6 K/uL (08-29 @ 03:12)    RECENT CULTURES:  Specimen Source: .Body Fluid bile fluid  Date/Time: 08-28 @ 18:05  Culture Results:   Testing in progress  Gram Stain: --  Organism: --  Specimen Source: .Body Fluid Bile Fluid  Date/Time: 08-28 @ 18:03  Culture Results:   Moderate Candida albicans "Susceptibilities not performed"  Numerous Gram Negative Rods  Gram Stain:   Upon re-evaluation of gram stain:  No polymorphonuclear cells seen  Gram Negative Rods seen  Yeast like cells seen by cytocentrifuge  Organism: --  Specimen Source: .Blood Blood-Peripheral  Date/Time: 08-28 @ 17:28  Culture Results:   No growth to date.  Gram Stain: --  Organism: --  Specimen Source: .Blood Blood-Venous  Date/Time: 08-28 @ 17:25  Culture Results:   No growth to date.  Gram Stain: --  Organism: --    Meds: meropenem  IVPB 500 milliGRAM(s) IV Intermittent every 24 hours      ENDOCRINE  CAPILLARY BLOOD GLUCOSE      POCT Blood Glucose.: 100 mg/dL (29 Aug 2018 16:43)  POCT Blood Glucose.: 205 mg/dL (29 Aug 2018 10:15)    Meds: insulin lispro (HumaLOG) corrective regimen sliding scale   SubCutaneous three times a day before meals  insulin lispro (HumaLOG) corrective regimen sliding scale   SubCutaneous at bedtime      ACCESS DEVICES:  [ ] Peripheral IV  [ ] Central Venous Line	[ ] R	[ ] L	[ ] IJ	[ ] Fem	[ ] SC	Placed:   [ ] Arterial Line		[ ] R	[ ] L	[ ] Fem	[ ] Rad	[ ] Ax	Placed:   [ ] PICC:					[ ] Mediport  [ ] Urinary Catheter, Date Placed:   [x] Necessity of urinary, arterial, and venous catheters discussed    OTHER MEDICATIONS:  chlorhexidine 4% Liquid 1 Application(s) Topical <User Schedule>      CODE STATUS: FULL      IMAGING:   < from: Limited Transthoracic Echo (08.29.18 @ 14:41) >  OBSERVATIONS:  Left Ventricle: Normal left ventricular systolic function.  EF 60%. Normal left ventricular internal dimensions and  wall thicknesses.  Right Heart: Normal right ventricular size and function.  ------------------------------------------------------------------------  CONCLUSIONS:  Limited study for biventricular function.  1. Normal left ventricular internal dimensions and wall  thicknesses.  2. Normal left ventricular systolic function.  EF 60%.  3. Normal right ventricular size and function.  *** Compared with echocardiogram of 8/20/2018, the  endocardium is better visualized on today's study.    < end of copied text > 24 HOUR EVENTS:  [facility] for BRASH syndrome given dehydration and history of beta blocker with degree of hyperkalemia and bradycardia, now improving . Dosed 10 vta K for coagulopathy. Worsening BUN/Cre earlier in day, dosed 20 IV lasix with 500ml NS and had some response so redosed lasix 40 with 500 ml bolus. TTE demonstrated EF of 60 %, with normal LV.         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 Patient was also found to have an acute MANNY with Cr increased from 1.18 to 8.35 and acidotic on VBG. Patient underwent CT which didn't demonstrate billary ductal dilatation. Likely all secondary to high PTC output (1L per day) causing hypovolemia and prerenal MANNY.    SUBJECTIVE/ROS:  [X ] A ten-point review of systems was otherwise negative except as noted.  [ ] Due to altered mental status/intubation, subjective information were not able to be obtained from the patient. History was obtained, to the extent possible, from review of the chart and collateral sources of information.    NEURO  RASS: 0      Exam: alert and oriented x 4  Meds: x  [x] Adequacy of sedation and pain control has been assessed and adjusted    RESPIRATORY  RR: 16 (08-30-18 @ 02:00) (14 - 22)  SpO2: 98% (08-30-18 @ 02:00) (96% - 99%)  Wt(kg): --  Exam: unlabored, clear to auscultation bilaterally  Mechanical Ventilation:     [N/A] Extubation Readiness Assessed  Meds:     CARDIOVASCULAR  HR: 105 (08-30-18 @ 02:00) (84 - 107)  BP: 128/77 (08-30-18 @ 02:00) (87/58 - 128/77)  BP(mean): 97 (08-30-18 @ 02:00) (65 - 97)  ABP: --  ABP(mean): --  Wt(kg): --  CVP(cm H2O): --  VBG - ( 29 Aug 2018 23:17 )  pH: 7.42  /  pCO2: 30    /  pO2: 41    / HCO3: 19    / Base Excess: -3.8  /  SaO2: 75     Lactate: 1.1                Exam: regular rate and rhythm  Cardiac Rhythm: sinus  Perfusion     [x]Adequate   [ ]Inadequate  Mentation   [x]Normal       [ ]Reduced  Extremities  [x]Warm         [ ]Cool  Volume Status [ ]Hypervolemic [x]Euvolemic [ ]Hypovolemic  Meds: metoprolol tartrate 12.5 milliGRAM(s) Oral every 12 hours      GI/NUTRITION  Exam: soft, nontender, nondistended, PTC w/ dark bilious ouput  Diet: Regular   Meds: pantoprazole    Tablet 40 milliGRAM(s) Oral before breakfast      GENITOURINARY  I&O's Detail    08-28 @ 07:01  -  08-29 @ 07:00  --------------------------------------------------------  IN:    dextrose 5%: 1400 mL    IV PiggyBack: 100 mL    Sodium Chloride 0.9% IV Bolus: 500 mL    sodium chloride 0.9%.: 225 mL    Solution: 50 mL  Total IN: 2275 mL    OUT:    Drain: 225 mL    Indwelling Catheter - Urethral: 580 mL  Total OUT: 805 mL    Total NET: 1470 mL      08-29 @ 07:01  -  08-30 @ 02:32  --------------------------------------------------------  IN:    dextrose 5%: 400 mL    IV PiggyBack: 850 mLNEURO  RASS: 0      Exam: alert and oriented x 4  Meds: x  [x] Adequacy of sedation and pain control has been assessed and adjusted    sodium chloride 0.45%: 1500 mL    sodium chloride 0.9%.: 1025 mL    Solution: 50 mL  Total IN: 3825 mL    OUT:    Drain: 525 mL    Indwelling Catheter - Urethral: 1755 mL  Total OUT: 2280 mL    Total NET: 1545 mL  V        08-29    134<L>  |  95<L>  |  131<H>  ----------------------------<  120<H>  4.1   |  17<L>  |  8.51<H>    Ca    7.9<L>      29 Aug 2018 23:19  Phos  9.3     08-29  Mg     2.0     08-29    TPro  5.6<L>  /  Alb  2.7<L>  /  TBili  2.2<H>  /  DBili  1.2<H>  /  AST  29  /  ALT  53<H>  /  AlkPhos  113  08-29    [X ] Ness catheter, indication: N/A  Meds: calcium acetate 1334 milliGRAM(s) Oral three times a day with meals  sodium chloride 0.45% 1000 milliLiter(s) IV Continuous <Continuous>  sodium chloride 0.9%. 1000 milliLiter(s) IV Continuous <Continuous>      HEMATOLOGIC  Meds:   [x] VTE Prophylaxis                        13.3   11.6  )-----------( 303      ( 29 Aug 2018 03:12 )             39.4     PT/INR - ( 29 Aug 2018 03:12 )   PT: 22.4 sec;   INR: 2.03 ratio         PTT - ( 29 Aug 2018 03:12 )  PTT:34.6 sec  Transfusion     [ X] PRBC   [ X] Platelets   [X ] FFP   [X ] Cryoprecipitate    INFECTIOUS DISEASES  WBC Count: 11.6 K/uL (08-29 @ 03:12)    RECENT CULTURES:  Specimen Source: .Body Fluid bile fluid  Date/Time: 08-28 @ 18:05  Culture Results:   Testing in progress  Gram Stain: --  Organism: --  Specimen Source: .Body Fluid Bile Fluid  Date/Time: 08-28 @ 18:03  Culture Results:   Moderate Candida albicans "Susceptibilities not performed"  Numerous Gram Negative Rods  Gram Stain:   Upon re-evaluation of gram stain:  No polymorphonuclear cells seen  Gram Negative Rods seen  Yeast like cells seen by cytocentrifuge  Organism: --  Specimen Source: .Blood Blood-Peripheral  Date/Time: 08-28 @ 17:28  Culture Results:   No growth to date.  Gram Stain: --  Organism: --  Specimen Source: .Blood Blood-Venous  Date/Time: 08-28 @ 17:25  Culture Results:   No growth to date.  Gram Stain: --  Organism: --    Meds: meropenem  IVPB 500 milliGRAM(s) IV Intermittent every 24 hours      ENDOCRINE  CAPILLARY BLOOD GLUCOSE      POCT Blood Glucose.: 100 mg/dL (29 Aug 2018 16:43)  POCT Blood Glucose.: 205 mg/dL (29 Aug 2018 10:15)    Meds: insulin lispro (HumaLOG) corrective regimen sliding scale   SubCutaneous three times a day before meals  insulin lispro (HumaLOG) corrective regimen sliding scale   SubCutaneous at bedtime      ACCESS DEVICES:  [X ] Peripheral IV  [ ] Central Venous Line	[ ] R	[ ] L	[ ] IJ	[ ] Fem	[ ] SC	Placed:   [ ] Arterial Line		[ ] R	[ ] L	[ ] Fem	[ ] Rad	[ ] Ax	Placed:   [ ] PICC:					[ ] Mediport  [X ] Urinary Catheter, Date Placed:   [x] Necessity of urinary, arterial, and venous catheters discussed    OTHER MEDICATIONS:  chlorhexidine 4% Liquid 1 Application(s) Topical <User Schedule>      CODE STATUS: FULL      IMAGING:   < from: Limited Transthoracic Echo (08.29.18 @ 14:41) >  OBSERVATIONS:  Left Ventricle: Normal left ventricular systolic function.  EF 60%. Normal left ventricular internal dimensions and  wall thicknesses.  Right Heart: Normal right ventricular size and function.  ------------------------------------------------------------------------  CONCLUSIONS:  Limited study for biventricular function.  1. Normal left ventricular internal dimensions and wall  thicknesses.  2. Normal left ventricular systolic function.  EF 60%.  3. Normal right ventricular size and function.  *** Compared with echocardiogram of 8/20/2018, the  endocardium is better visualized on today's study.    < end of copied text >

## 2018-08-30 NOTE — PHYSICAL THERAPY INITIAL EVALUATION ADULT - PLANNED THERAPY INTERVENTIONS, PT EVAL
transfer training/balance training/GOAL: Stair Negotiation Training: Patient will be able to negotiate up & down 1 flight of stairs with bilateral rails, step to gait pattern, in 2 weeks./bed mobility training/strengthening/gait training

## 2018-08-30 NOTE — PROGRESS NOTE ADULT - ASSESSMENT
77 M with an extensive cardiac h/x- on Eliquis for Afib, diagnosed with distal cholangiocarcinoma s/p PTC placement by IR into segment 6 of the liver on 8/16 after unsuccessful ERCP c/b high PTC output resulting in pre-renal MANNY, acidosis, hyperkalemia. P/w septic shock in setting of cardiomyopathy, awaiting medical optimization for pancreaticoduodenectomy.     Plan:   - Per cardio: d/c metoprolol as it may have resulted in excessive bradycardia, continue monitoring on tele. Repeat ECHO when clinical status is improved. Hold off DVT ppx and repeat INR, start VTE ppx when INR <2  - Per Nephro: dx of MANNY likely pre-renal, continue IVF resuscitation with D5W and 150 meq bicarb, villa in place, consented for HD in the event that pt requires it, f/u renal US with doppler  - Per ID: GNR in bile fluid, on broad spec meropenem for poss sepsis, f/u ctx and sensitivity from bile fluid. Check procalcitonin.   - Per SICU: Cont NPO, replace 1:1 of PTC output with NS q6h, protonix for GERD. 78yo M with an extensive cardiac h/x on Eliquis for Afib, diagnosed with distal cholangiocarcinoma s/p PTC placement by IR into segment 6 of the liver on 8/16 after unsuccessful ERCP c/b high PTC output resulting in pre-renal MANNY, acidosis, hyperkalemia. Presented with septic shock in setting of cardiomyopathy, awaiting medical optimization for pancreaticoduodenectomy. TTE demonstrated EF of 60% w/ normal LV.    Plan:   - Metoprolol 25mg q12 for aFib w/ RVR      - Monitor HR  - Consistent carb diet w/ evening snack for renal replacement patient  - Protonix for GERD  - Start VTE ppx when INR <2  - Per Nephro: dx of MANNY likely pre-renal, continue IVF resuscitation with D5W and 150 meq bicarb      - patient got lasix at 22 and redosed at 42      - C/w villa      - Consented for hemodialysis should it be needed  - C/w meropenem for possible sepsis      - gram neg. rods in bile fluid      - f/u bile fluid sensitivities   - Per SICU: Cont NPO, replace 1:1 of PTC output with NS q6h, protonix for GERD.

## 2018-08-30 NOTE — DIETITIAN INITIAL EVALUATION ADULT. - ENERGY NEEDS
Ht: 72"  Wt: 186  BMI: 25.3 kg/m2   IBW: 178 (+/-10%)     within IBW  Edema: none   Skin: no pressure injuries

## 2018-08-30 NOTE — PROGRESS NOTE ADULT - PROBLEM SELECTOR PLAN 3
Corrected Sodium 135.  Avoid hypotonic solutions. in the setting of hyperglycemia. Corrected Sodium 135.  Avoid hypotonic solutions.

## 2018-08-30 NOTE — PROGRESS NOTE ADULT - PROBLEM SELECTOR PLAN 2
HAGMA in setting of uremia , lactate normal.  Continue HCo3 gtt. HAGMA in setting of uremia and MANNY , lactate normal.  Continue HCo3 gtt.  monitor serum CO2 level;

## 2018-08-30 NOTE — PROGRESS NOTE ADULT - PROBLEM SELECTOR PLAN 1
Pt with normal baseline renal function.   SCr ~ 0.94 on 08/20. Now with elevated BUN//Cr (133/8.5)  Pt with increasing output from PTC drain, decreased PO intake. MANNY likely pre renal as supported by urine lytes (Low Sravanthi+) vs AIN from recent Cipro use?? vs ATN from recent Lisinopril use  On IV fluid resuscitation and s/p villa; UOP picking up,  non oliguric.  No uremic symptoms, K+ wnl  No urgent indication for HD.  Consent obtained/placed in charts in event HD becomes imminent. Likely hemodynamically mediated MANNY in the setting of septic shock. Pt with normal baseline renal function.   SCr ~ 0.94 on 08/20. Now with elevated BUN//Cr (133/8.5)  Pt with increasing output from PTC drain, decreased PO intake. MANNY likely pre renal as supported by urine lytes (Low Sravanthi+) vs AIN from recent Cipro use?? vs ATN from recent Lisinopril use  On IV fluid resuscitation and s/p villa; UOP picking up,  non oliguric.  No uremic symptoms, K+ wnl  No urgent indication for HD at this time.  Consent obtained/placed in charts in event HD becomes imminent.  CTA/P shows no hydronephrosis but shows ?hemorrghagic renal cyst, recommend urology input.  Monitor BMP. Strict I/Os. Avoid nephrotoxins. Renally dose all medications.

## 2018-08-30 NOTE — PROGRESS NOTE ADULT - SUBJECTIVE AND OBJECTIVE BOX
Patient admitted with severe dehydration caused by massive drainage form his PTC catheter. He was first thought to be septic but this proved not to be the case. His electrolytes have been out due to continued electrolyte and bicarbonate loss from the biliary drain. Will cap the biliary drain. I would not so vigorously diurese. As per the nutrition note, Mr. Mata is severely nutritionally depleted. Will consider naso-jejunal feeding tube.   We can stop the antibiotics.   We will plan to transfer to the floor. I will be leaving town tomorrow and will be covered by Dr. Morales.   The patient, once stabilized will be discharged home and will require a follow-up return appointment in my clinic.

## 2018-08-30 NOTE — PHYSICAL THERAPY INITIAL EVALUATION ADULT - PERTINENT HX OF CURRENT PROBLEM, REHAB EVAL
Pt is a 77 y.o. male presented after recent hospitalization for biliary obstruction likely due to cholangiocarcinoma with c/c of malaise, anorexia, rigors, and hypotension. (-) CXR. (-) CT Abdomen and Pelvis. (-) Limited Trans Echo.

## 2018-08-30 NOTE — PROGRESS NOTE ADULT - PROBLEM SELECTOR PLAN 4
In the setting of MANNY  Will hold off Binders for now  Likely to improve with renal function In the setting of MANNY  Will consider starting binders if phos does not improve with improvement in renal function.

## 2018-08-30 NOTE — PROGRESS NOTE ADULT - SUBJECTIVE AND OBJECTIVE BOX
Blue Team Surgery Progress Note     Subjective/24hour Events: No acute events overnight. Pain controlled. Tolerating diet. No N/V. No CP or SOB.    Vital Signs:  Vital Signs Last 24 Hrs  T(C): 36.8 (30 Aug 2018 11:00), Max: 36.8 (30 Aug 2018 11:00)  T(F): 98.3 (30 Aug 2018 11:00), Max: 98.3 (30 Aug 2018 11:00)  HR: 97 (30 Aug 2018 11:00) (97 - 111)  BP: 129/60 (30 Aug 2018 11:00) (95/62 - 132/72)  BP(mean): 86 (30 Aug 2018 11:00) (74 - 97)  RR: 0 (30 Aug 2018 11:00) (0 - 20)  SpO2: 99% (30 Aug 2018 11:00) (96% - 99%)    CAPILLARY BLOOD GLUCOSE      POCT Blood Glucose.: 185 mg/dL (30 Aug 2018 08:20)  POCT Blood Glucose.: 100 mg/dL (29 Aug 2018 16:43)      I&O's Detail    29 Aug 2018 07:01  -  30 Aug 2018 07:00  --------------------------------------------------------  IN:    dextrose 5%: 400 mL    IV PiggyBack: 850 mL    sodium chloride 0.45%: 2000 mL    sodium chloride 0.9%.: 1150 mL    Solution: 50 mL  Total IN: 4450 mL    OUT:    Drain: 650 mL    Indwelling Catheter - Urethral: 2505 mL  Total OUT: 3155 mL    Total NET: 1295 mL      30 Aug 2018 07:01  -  30 Aug 2018 12:05  --------------------------------------------------------  IN:    Enteral Tube Flush: 10 mL    sodium chloride 0.45%: 500 mL    sodium chloride 0.9%.: 500 mL  Total IN: 1010 mL    OUT:    Drain: 500 mL    Indwelling Catheter - Urethral: 345 mL  Total OUT: 845 mL    Total NET: 165 mL            MEDICATIONS  (STANDING):  calcium acetate 1334 milliGRAM(s) Oral three times a day with meals  chlorhexidine 4% Liquid 1 Application(s) Topical <User Schedule>  insulin lispro (HumaLOG) corrective regimen sliding scale   SubCutaneous three times a day before meals  insulin lispro (HumaLOG) corrective regimen sliding scale   SubCutaneous at bedtime  meropenem  IVPB 500 milliGRAM(s) IV Intermittent every 24 hours  metoprolol tartrate 25 milliGRAM(s) Oral every 12 hours  pantoprazole    Tablet 40 milliGRAM(s) Oral before breakfast  sodium chloride 0.45% 1000 milliLiter(s) (100 mL/Hr) IV Continuous <Continuous>  sodium chloride 0.9%. 1000 milliLiter(s) (1 mL/Hr) IV Continuous <Continuous>    MEDICATIONS  (PRN):        Physical Exam:  Gen: NAD  LS: nml respiratory effort  Card: pulse regularly present  GI: abd soft, nontender, PTC drain w/ sanguinous output  : villa in place  Ext: warm      Labs:    08-30    133<L>  |  90<L>  |  131<H>  ----------------------------<  195<H>  3.9   |  20<L>  |  8.41<H>    Ca    8.4      30 Aug 2018 09:20  Phos  9.2     08-30  Mg     2.0     08-30    TPro  6.2  /  Alb  3.3  /  TBili  2.4<H>  /  DBili  1.3<H>  /  AST  28  /  ALT  52<H>  /  AlkPhos  123<H>  08-30    LIVER FUNCTIONS - ( 30 Aug 2018 09:20 )  Alb: 3.3 g/dL / Pro: 6.2 g/dL / ALK PHOS: 123 U/L / ALT: 52 U/L / AST: 28 U/L / GGT: x                                 12.9   9.3   )-----------( 284      ( 30 Aug 2018 03:19 )             37.5     PT/INR - ( 30 Aug 2018 03:19 )   PT: 17.9 sec;   INR: 1.64 ratio         PTT - ( 30 Aug 2018 03:19 )  PTT:52.3 sec          Imaging: Blue Team Surgery Progress Note     Subjective/24hour Events: No acute events overnight. Pain controlled. Tolerating diet. No N/V. No CP or SOB.  The patient was seen and examined. O/n, pt was hyperkalemia, given D50, insulin, and bicarb. Started on D5 and 150 mEq bicarb at 100 ml/hr. Continued to be hyperkalemic and given 15mg Kayexalate. Started on 1:1 NS repletions of PTC. Was given an additional 500ml NS bolus for oliguria.  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 Nephrology consulted and consented pt for HD if he were to require it during this hospital stay.     Vital Signs:  Vital Signs Last 24 Hrs  T(C): 36.8 (30 Aug 2018 11:00), Max: 36.8 (30 Aug 2018 11:00)  T(F): 98.3 (30 Aug 2018 11:00), Max: 98.3 (30 Aug 2018 11:00)  HR: 97 (30 Aug 2018 11:00) (97 - 111)  BP: 129/60 (30 Aug 2018 11:00) (95/62 - 132/72)  BP(mean): 86 (30 Aug 2018 11:00) (74 - 97)  RR: 0 (30 Aug 2018 11:00) (0 - 20)  SpO2: 99% (30 Aug 2018 11:00) (96% - 99%)    CAPILLARY BLOOD GLUCOSE      POCT Blood Glucose.: 185 mg/dL (30 Aug 2018 08:20)  POCT Blood Glucose.: 100 mg/dL (29 Aug 2018 16:43)      I&O's Detail    29 Aug 2018 07:01  -  30 Aug 2018 07:00  --------------------------------------------------------  IN:    dextrose 5%: 400 mL    IV PiggyBack: 850 mL    sodium chloride 0.45%: 2000 mL    sodium chloride 0.9%.: 1150 mL    Solution: 50 mL  Total IN: 4450 mL    OUT:    Drain: 650 mL    Indwelling Catheter - Urethral: 2505 mL  Total OUT: 3155 mL    Total NET: 1295 mL      30 Aug 2018 07:01  -  30 Aug 2018 12:05  --------------------------------------------------------  IN:    Enteral Tube Flush: 10 mL    sodium chloride 0.45%: 500 mL    sodium chloride 0.9%.: 500 mL  Total IN: 1010 mL    OUT:    Drain: 500 mL    Indwelling Catheter - Urethral: 345 mL  Total OUT: 845 mL    Total NET: 165 mL            MEDICATIONS  (STANDING):  calcium acetate 1334 milliGRAM(s) Oral three times a day with meals  chlorhexidine 4% Liquid 1 Application(s) Topical <User Schedule>  insulin lispro (HumaLOG) corrective regimen sliding scale   SubCutaneous three times a day before meals  insulin lispro (HumaLOG) corrective regimen sliding scale   SubCutaneous at bedtime  meropenem  IVPB 500 milliGRAM(s) IV Intermittent every 24 hours  metoprolol tartrate 25 milliGRAM(s) Oral every 12 hours  pantoprazole    Tablet 40 milliGRAM(s) Oral before breakfast  sodium chloride 0.45% 1000 milliLiter(s) (100 mL/Hr) IV Continuous <Continuous>  sodium chloride 0.9%. 1000 milliLiter(s) (1 mL/Hr) IV Continuous <Continuous>    MEDICATIONS  (PRN):        Physical Exam:  Gen: NAD  LS: nml respiratory effort  Card: pulse regularly present  GI: abd soft, nontender, PTC drain w/ sanguinous output  : villa in place  Ext: warm      Labs:    08-30    133<L>  |  90<L>  |  131<H>  ----------------------------<  195<H>  3.9   |  20<L>  |  8.41<H>    Ca    8.4      30 Aug 2018 09:20  Phos  9.2     08-30  Mg     2.0     08-30    TPro  6.2  /  Alb  3.3  /  TBili  2.4<H>  /  DBili  1.3<H>  /  AST  28  /  ALT  52<H>  /  AlkPhos  123<H>  08-30    LIVER FUNCTIONS - ( 30 Aug 2018 09:20 )  Alb: 3.3 g/dL / Pro: 6.2 g/dL / ALK PHOS: 123 U/L / ALT: 52 U/L / AST: 28 U/L / GGT: x                                 12.9   9.3   )-----------( 284      ( 30 Aug 2018 03:19 )             37.5     PT/INR - ( 30 Aug 2018 03:19 )   PT: 17.9 sec;   INR: 1.64 ratio         PTT - ( 30 Aug 2018 03:19 )  PTT:52.3 sec          Imaging: Blue Team Surgery Progress Note     Subjective/24hour Events: No acute events overnight. Patient notes feeling improved since admission. Pain controlled. Tolerating diet. No N/V. No CP or SOB.       Vital Signs:  Vital Signs Last 24 Hrs  T(C): 36.8 (30 Aug 2018 11:00), Max: 36.8 (30 Aug 2018 11:00)  T(F): 98.3 (30 Aug 2018 11:00), Max: 98.3 (30 Aug 2018 11:00)  HR: 97 (30 Aug 2018 11:00) (97 - 111)  BP: 129/60 (30 Aug 2018 11:00) (95/62 - 132/72)  BP(mean): 86 (30 Aug 2018 11:00) (74 - 97)  RR: 0 (30 Aug 2018 11:00) (0 - 20)  SpO2: 99% (30 Aug 2018 11:00) (96% - 99%)    CAPILLARY BLOOD GLUCOSE      POCT Blood Glucose.: 185 mg/dL (30 Aug 2018 08:20)  POCT Blood Glucose.: 100 mg/dL (29 Aug 2018 16:43)      I&O's Detail    29 Aug 2018 07:01  -  30 Aug 2018 07:00  --------------------------------------------------------  IN:    dextrose 5%: 400 mL    IV PiggyBack: 850 mL    sodium chloride 0.45%: 2000 mL    sodium chloride 0.9%.: 1150 mL    Solution: 50 mL  Total IN: 4450 mL    OUT:    Drain: 650 mL    Indwelling Catheter - Urethral: 2505 mL  Total OUT: 3155 mL    Total NET: 1295 mL      30 Aug 2018 07:01  -  30 Aug 2018 12:05  --------------------------------------------------------  IN:    Enteral Tube Flush: 10 mL    sodium chloride 0.45%: 500 mL    sodium chloride 0.9%.: 500 mL  Total IN: 1010 mL    OUT:    Drain: 500 mL    Indwelling Catheter - Urethral: 345 mL  Total OUT: 845 mL    Total NET: 165 mL            MEDICATIONS  (STANDING):  calcium acetate 1334 milliGRAM(s) Oral three times a day with meals  chlorhexidine 4% Liquid 1 Application(s) Topical <User Schedule>  insulin lispro (HumaLOG) corrective regimen sliding scale   SubCutaneous three times a day before meals  insulin lispro (HumaLOG) corrective regimen sliding scale   SubCutaneous at bedtime  meropenem  IVPB 500 milliGRAM(s) IV Intermittent every 24 hours  metoprolol tartrate 25 milliGRAM(s) Oral every 12 hours  pantoprazole    Tablet 40 milliGRAM(s) Oral before breakfast  sodium chloride 0.45% 1000 milliLiter(s) (100 mL/Hr) IV Continuous <Continuous>  sodium chloride 0.9%. 1000 milliLiter(s) (1 mL/Hr) IV Continuous <Continuous>    MEDICATIONS  (PRN):        Physical Exam:  Gen: NAD  LS: nml respiratory effort  Card: pulse regularly present  GI: abd soft, nontender, PTC drain w/ sanguinous output  : villa in place  Ext: warm      Labs:    08-30    133<L>  |  90<L>  |  131<H>  ----------------------------<  195<H>  3.9   |  20<L>  |  8.41<H>    Ca    8.4      30 Aug 2018 09:20  Phos  9.2     08-30  Mg     2.0     08-30    TPro  6.2  /  Alb  3.3  /  TBili  2.4<H>  /  DBili  1.3<H>  /  AST  28  /  ALT  52<H>  /  AlkPhos  123<H>  08-30    LIVER FUNCTIONS - ( 30 Aug 2018 09:20 )  Alb: 3.3 g/dL / Pro: 6.2 g/dL / ALK PHOS: 123 U/L / ALT: 52 U/L / AST: 28 U/L / GGT: x                                 12.9   9.3   )-----------( 284      ( 30 Aug 2018 03:19 )             37.5     PT/INR - ( 30 Aug 2018 03:19 )   PT: 17.9 sec;   INR: 1.64 ratio         PTT - ( 30 Aug 2018 03:19 )  PTT:52.3 sec          Imaging:  EXAM:  XR CHEST PORTABLE ROUTINE 1V                          PROCEDURE DATE:  08/30/2018      INTERPRETATION:  CLINICAL INFORMATION: Shortness of breath assess for   pulmonary vascular congestion.    A single portable view of the chest was obtained.     Comparison: 8/29/2018.     The mediastinal cardiac silhouette is unremarkable. Loop recorder.    The lungs are clear.     No acute osseous finding.     IMPRESSION:    Clear lungs.

## 2018-08-30 NOTE — DIETITIAN INITIAL EVALUATION ADULT. - OTHER INFO
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    GI issues:  +nausea,  Last BM: none since adm     Food allergies: NKFA    Vit/supplement PTA: Ensure once a day, stopped taking other vitamin 3 weeks ago (vit D, fish oil, ginko bilboa, tumeric)

## 2018-08-30 NOTE — PHYSICAL THERAPY INITIAL EVALUATION ADULT - GENERAL OBSERVATIONS, REHAB EVAL
Pt received sitting in recliner with +cardiac monitor, +BP cuff, +pulse ox, +2 villa, +IV, +B venodynes & +ICU monitoring. NAD noted. Pt's wife present at bedside.

## 2018-08-30 NOTE — PROGRESS NOTE ADULT - SUBJECTIVE AND OBJECTIVE BOX
[facility] DIVISION OF KIDNEY DISEASES AND HYPERTENSION -- FOLLOW UP NOTE  --------------------------------------------------------------------------------  Beth Sliver  [id number]  --------------------------------------------------------------------------------  Chief Complaint: MANNY    24 hour events/subjective: Seen and examined at the bedside. Wife also at the bedside. Feels better. No nausea, vomiting. Urine output improving        PAST HISTORY  --------------------------------------------------------------------------------  No significant changes to PMH, PSH, FHx, SHx, unless otherwise noted    ALLERGIES & MEDICATIONS  --------------------------------------------------------------------------------  Allergies    No Known Allergies    Intolerances      Standing Inpatient Medications  calcium acetate 1334 milliGRAM(s) Oral three times a day with meals  chlorhexidine 4% Liquid 1 Application(s) Topical <User Schedule>  insulin lispro (HumaLOG) corrective regimen sliding scale   SubCutaneous three times a day before meals  insulin lispro (HumaLOG) corrective regimen sliding scale   SubCutaneous at bedtime  meropenem  IVPB 500 milliGRAM(s) IV Intermittent every 24 hours  metoprolol tartrate 12.5 milliGRAM(s) Oral every 12 hours  pantoprazole    Tablet 40 milliGRAM(s) Oral before breakfast  sodium chloride 0.45% 1000 milliLiter(s) IV Continuous <Continuous>  sodium chloride 0.9%. 1000 milliLiter(s) IV Continuous <Continuous>    PRN Inpatient Medications      REVIEW OF SYSTEMS  --------------------------------------------------------------------------------  Review Of Systems:  Constitutional:No Fever,  Chills,  Fatigue, Weight change   HEENT: No Blurred vision, Eye Pain, Headache, Runny nose, Sore Throat   Respiratory:No Cough, Wheezing, Shortness of breath  Cardiovascular: No Chest Pain, Palpitations, CHACON, PND, Orthopnea  Gastrointestinal:No Nausea, Vomiting, Abdominal Pain,  Diarrhea, Constipation, Hemorrhoids  Genitourinary:No  Nocturia, Hematuria,  Dysuria, Incontinence, Foam in urine  Extremities:  No Swelling , Joint Pain  Neurologic:  No Focal deficit, Paresthesias, Syncope  Lymphatic:   No Lymphadenopathy   Skin: No Rash,  Ecchymoses , Wounds, Lesions  Psychiatry: Denies Depression,  Suicidal/Homicidal Ideation, Anxiety, Sleep Disturbances    All other systems were reviewed and are negative, except as noted.    VITALS/PHYSICAL EXAM  --------------------------------------------------------------------------------  T(C): 36.4 (18 @ 03:00), Max: 36.7 (18 @ 15:00)  HR: 111 (18 @ 07:00) (90 - 111)  BP: 109/70 (18 @ 06:00) (87/58 - 132/72)  RR: 14 (18 @ 07:00) (14 - 20)  SpO2: 99% (18 @ 07:00) (96% - 99%)  Wt(kg): --    Height (cm): 182.88 (18 @ 15:57)  Weight (kg): 84.5 (18 @ 15:57)  BMI (kg/m2): 25.3 (18 @ 15:57)  BSA (m2): 2.07 (18 @ 15:57)  Daily     Daily Weight in k.8 (30 Aug 2018 06:49)  I&O's Summary    29 Aug 2018 07:  -  30 Aug 2018 07:00  --------------------------------------------------------  IN: 4350 mL / OUT: 3155 mL / NET: 1195 mL          18 @ 07:01  -  18 @ 07:00  --------------------------------------------------------  IN: 4350 mL / OUT: 3155 mL / NET: 1195 mL        Physical Exam:  Gen: NAD  HEENT: anicteric  Pulm: CTA B/L   CVS: RRR,  Normal S1 S2  Abd: soft, nontender, nondistended  Neuro: AAO x3, no asterixis   Back: No dependent edema  : Ness draining clear urine  LE: Warm, no edema  Skin: Warm, without rashes  Vascular access: none    LABS/STUDIES  --------------------------------------------------------------------------------              12.9   9.3   >-----------<  284      [18 @ 03:19]              37.5     134  |  94  |  133  ----------------------------<  121      [18 03:19]  4.1   |  18  |  8.56        Ca     8.3     [18 03:19]      Mg     2.0     [18 03:19]      Phos  9.4     [18 03:19]    TPro  5.7  /  Alb  2.8  /  TBili  2.2  /  DBili  1.2  /  AST  31  /  ALT  50  /  AlkPhos  113  [18 03:19]    PT/INR: PT 17.9 , INR 1.64       [18 03:19]  PTT: 52.3       [18 03:19]      Creatinine Trend:  SCr 8.56 [:19]  SCr 8.51 [ 23:19]  SCr 8.66 [ 17:07]  SCr 8.61 [ 11:58]  SCr 8.88 [ 06:42]    Urinalysis - [18 @ 21:55]      Color Yellow / Appearance SL Turbid / SG 1.015 / pH 5.5      Gluc Negative / Ketone Negative  / Bili Negative / Urobili Negative       Blood Large / Protein 30 / Leuk Est Negative / Nitrite Negative      RBC >50 / WBC 2-5 / Hyaline 2-5 / Gran  / Sq Epi  / Non Sq Epi Occasional / Bacteria Few    Urine Creatinine 167      [18 18:25]  Urine Sodium <20      [18 18:25]  Urine Urea Nitrogen 375      [18 21:32]  Urine Potassium 64      [18 18:25]  Urine Osmolality 358      [18 18:25]    HbA1c 6.9      [18 @ 07:46]  TSH 1.23      [18 @ 05:55]      LUZ ELENA: titer Negative, pattern --      [18 19:46]  ANCA: cANCA Negative, pANCA Negative, atypical ANCA Negative      [18 19:46]      Radiology  --------------------------------------------------------------------------------    --------------------------------------------------------------------------------  Devender Silver  [id number]

## 2018-08-30 NOTE — PROGRESS NOTE ADULT - SUBJECTIVE AND OBJECTIVE BOX
Patient seen and examined at bedside.    Overnight Events: No acute events overnight.     Review of Symptoms:  CONSTITUTIONAL: No weakness, fevers or chills  EYES/ENT: No visual changes;  No dysphagia  NECK: No pain or stiffness  RESPIRATORY: No cough, wheezing, hemoptysis; No shortness of breath  CARDIOVASCULAR: No chest pain or palpitations; No lower extremity edema  GASTROINTESTINAL: No abdominal or epigastric pain. No nausea, vomiting, or hematemesis; No diarrhea or constipation. No melena or hematochezia.  BACK: No back pain  GENITOURINARY: No dysuria, frequency or hematuria  NEUROLOGICAL: No numbness or weakness  SKIN: No itching, burning, rashes, or lesions   All other review of systems is negative unless indicated above.          Current Meds:  calcium acetate 1334 milliGRAM(s) Oral three times a day with meals  chlorhexidine 4% Liquid 1 Application(s) Topical <User Schedule>  insulin lispro (HumaLOG) corrective regimen sliding scale   SubCutaneous three times a day before meals  insulin lispro (HumaLOG) corrective regimen sliding scale   SubCutaneous at bedtime  meropenem  IVPB 500 milliGRAM(s) IV Intermittent every 24 hours  metoprolol tartrate 12.5 milliGRAM(s) Oral every 12 hours  pantoprazole    Tablet 40 milliGRAM(s) Oral before breakfast  sodium chloride 0.45% 1000 milliLiter(s) IV Continuous <Continuous>  sodium chloride 0.9%. 1000 milliLiter(s) IV Continuous <Continuous>      PAST MEDICAL & SURGICAL HISTORY:  Male stress incontinence  Kidney stone: &quot;passed on own&quot;  Varicose vein: (L) 5 years ago  Bilateral cataracts  Appendicitis  Benign colon polyp  Prostate cancer: 2010  Afib: 2006 s/p ablation 2006 , afib resolved  Hyperlipidemia: X 4 years  GERD (Gastroesophageal Reflux Disease): X 10 years  DM (Diabetes Mellitus): X 3 years  Renal Calculi: 2008  MVP (Mitral Valve Prolapse): X 8 years  HTN - Hypertension: X 10 years, controlled  Status post left knee replacement  S/P ablation operation for arrhythmia  Male stress incontinence: s/p artifical urinary sphincter 5/2012  Varicose vein-s/p vein stripping 5 years ago  S/P arthroscopy: right shoulder 12/11  S/P prostatectomy: radical robotic 6/10  Cosmetic Surgery: chin implant 2004  S/P Colonoscopy with Polypectomy: 2009  S/P Appendectomy: 1950      Vitals:  T(F): 97.6 (08-30), Max: 98.1 (08-29)  HR: 111 (08-30) (90 - 111)  BP: 109/70 (08-30) (87/58 - 132/72)  RR: 14 (08-30)  SpO2: 99% (08-30)  I&O's Summary    29 Aug 2018 07:01  -  30 Aug 2018 07:00  --------------------------------------------------------  IN: 4350 mL / OUT: 3155 mL / NET: 1195 mL        Physical Exam:  Appearance: No acute distress; well appearing  Eyes: PERRL, EOMI, pink conjunctiva  HENT: Normal oral mucosa  Cardiovascular: RRR, S1, S2, no murmurs, rubs, or gallops; no edema; no JVD  Respiratory: Clear to auscultation bilaterally  Gastrointestinal: soft, non-tender, non-distended with normal bowel sounds  Musculoskeletal: No clubbing; no joint deformity   Neurologic: Non-focal  Lymphatic: No lymphadenopathy  Psychiatry: AAOx3, mood & affect appropriate  Skin: No rashes, ecchymoses, or cyanosis                          12.9   9.3   )-----------( 284      ( 30 Aug 2018 03:19 )             37.5     08-30    134<L>  |  94<L>  |  133<H>  ----------------------------<  121<H>  4.1   |  18<L>  |  8.56<H>    Ca    8.3<L>      30 Aug 2018 03:19  Phos  9.4     08-30  Mg     2.0     08-30    TPro  5.7<L>  /  Alb  2.8<L>  /  TBili  2.2<H>  /  DBili  1.2<H>  /  AST  31  /  ALT  50<H>  /  AlkPhos  113  08-30    PT/INR - ( 30 Aug 2018 03:19 )   PT: 17.9 sec;   INR: 1.64 ratio         PTT - ( 30 Aug 2018 03:19 )  PTT:52.3 sec      Serum Pro-Brain Natriuretic Peptide: 1574 pg/mL (08-28 @ 14:10)          New ECG(s): Personally reviewed    Echo:  < from: TTE with Doppler (w/Cont) (08.20.18 @ 10:49) >  Conclusions:  1. Calcified trileaflet aortic valve with normal opening.  2. Endocardial visualization enhanced with intravenous  injection of Ultrasonic Enhancing Agent  (Definity).Moderate- Severe left ventricular systolic  dysfunction. Regional wall motion variation is noted on the  setting of atrial fibrillation and ectopy.  3. Normal right ventricular size with decreased right  ventricular systolic function.  4. Normal tricuspid valve. Minimal tricuspid regurgitation.    < end of copied text >    < from: Limited Transthoracic Echo (08.29.18 @ 14:41) >  CONCLUSIONS:  Limited study for biventricular function.  1. Normal left ventricular internal dimensions and wall  thicknesses.  2. Normal left ventricular systolic function.  EF 60%.  3. Normal right ventricular size and function.  *** Compared with echocardiogram of 8/20/2018, the  endocardium is better visualized on today's study.    < end of copied text >      Stress Testing:     Cath:    Imaging:    Interpretation of Telemetry: a-fib with many PVCs. Patient seen and examined at bedside.    Overnight Events: No acute events overnight.     Review of Symptoms:  CONSTITUTIONAL: No weakness, fevers or chills  EYES/ENT: No visual changes;  No dysphagia  NECK: No pain or stiffness  RESPIRATORY: No cough, wheezing, hemoptysis; No shortness of breath  CARDIOVASCULAR: No chest pain or palpitations; No lower extremity edema  GASTROINTESTINAL: No abdominal or epigastric pain. No nausea, vomiting, or hematemesis; No diarrhea or constipation. No melena or hematochezia.  BACK: No back pain  GENITOURINARY: No dysuria, frequency or hematuria  NEUROLOGICAL: No numbness or weakness  SKIN: No itching, burning, rashes, or lesions   All other review of systems is negative unless indicated above.          Current Meds:  calcium acetate 1334 milliGRAM(s) Oral three times a day with meals  chlorhexidine 4% Liquid 1 Application(s) Topical <User Schedule>  insulin lispro (HumaLOG) corrective regimen sliding scale   SubCutaneous three times a day before meals  insulin lispro (HumaLOG) corrective regimen sliding scale   SubCutaneous at bedtime  meropenem  IVPB 500 milliGRAM(s) IV Intermittent every 24 hours  metoprolol tartrate 12.5 milliGRAM(s) Oral every 12 hours  pantoprazole    Tablet 40 milliGRAM(s) Oral before breakfast  sodium chloride 0.45% 1000 milliLiter(s) IV Continuous <Continuous>  sodium chloride 0.9%. 1000 milliLiter(s) IV Continuous <Continuous>      PAST MEDICAL & SURGICAL HISTORY:  Male stress incontinence  Kidney stone: &quot;passed on own&quot;  Varicose vein: (L) 5 years ago  Bilateral cataracts  Appendicitis  Benign colon polyp  Prostate cancer: 2010  Afib: 2006 s/p ablation 2006 , afib resolved  Hyperlipidemia: X 4 years  GERD (Gastroesophageal Reflux Disease): X 10 years  DM (Diabetes Mellitus): X 3 years  Renal Calculi: 2008  MVP (Mitral Valve Prolapse): X 8 years  HTN - Hypertension: X 10 years, controlled  Status post left knee replacement  S/P ablation operation for arrhythmia  Male stress incontinence: s/p artifical urinary sphincter 5/2012  Varicose vein-s/p vein stripping 5 years ago  S/P arthroscopy: right shoulder 12/11  S/P prostatectomy: radical robotic 6/10  Cosmetic Surgery: chin implant 2004  S/P Colonoscopy with Polypectomy: 2009  S/P Appendectomy: 1950      Vitals:  T(F): 97.6 (08-30), Max: 98.1 (08-29)  HR: 111 (08-30) (90 - 111)  BP: 109/70 (08-30) (87/58 - 132/72)  RR: 14 (08-30)  SpO2: 99% (08-30)  I&O's Summary    29 Aug 2018 07:01  -  30 Aug 2018 07:00  --------------------------------------------------------  IN: 4350 mL / OUT: 3155 mL / NET: 1195 mL        Physical Exam:  Appearance: No acute distress; well appearing  Eyes: PERRL, EOMI, pink conjunctiva  HENT: Normal oral mucosa  Cardiovascular: RRR, S1, S2, no murmurs, rubs, or gallops; no edema; no JVD  Respiratory: Clear to auscultation bilaterally  Gastrointestinal: soft, non-tender, non-distended with normal bowel sounds  Musculoskeletal: No clubbing; no joint deformity   Neurologic: Non-focal  Lymphatic: No lymphadenopathy  Psychiatry: AAOx3, mood & affect appropriate  Skin: No rashes, ecchymoses, or cyanosis                          12.9   9.3   )-----------( 284      ( 30 Aug 2018 03:19 )             37.5     08-30    134<L>  |  94<L>  |  133<H>  ----------------------------<  121<H>  4.1   |  18<L>  |  8.56<H>    Ca    8.3<L>      30 Aug 2018 03:19  Phos  9.4     08-30  Mg     2.0     08-30    TPro  5.7<L>  /  Alb  2.8<L>  /  TBili  2.2<H>  /  DBili  1.2<H>  /  AST  31  /  ALT  50<H>  /  AlkPhos  113  08-30    PT/INR - ( 30 Aug 2018 03:19 )   PT: 17.9 sec;   INR: 1.64 ratio         PTT - ( 30 Aug 2018 03:19 )  PTT:52.3 sec      Serum Pro-Brain Natriuretic Peptide: 1574 pg/mL (08-28 @ 14:10)          New ECG(s): Personally reviewed    Echo:  < from: TTE with Doppler (w/Cont) (08.20.18 @ 10:49) >  Conclusions:  1. Calcified trileaflet aortic valve with normal opening.  2. Endocardial visualization enhanced with intravenous  injection of Ultrasonic Enhancing Agent  (Definity).Moderate- Severe left ventricular systolic  dysfunction. Regional wall motion variation is noted on the  setting of atrial fibrillation and ectopy.  3. Normal right ventricular size with decreased right  ventricular systolic function.  4. Normal tricuspid valve. Minimal tricuspid regurgitation.    < end of copied text >    < from: Limited Transthoracic Echo (08.29.18 @ 14:41) >  CONCLUSIONS:  Limited study for biventricular function.  1. Normal left ventricular internal dimensions and wall  thicknesses.  2. Normal left ventricular systolic function.  EF 60%.  3. Normal right ventricular size and function.  *** Compared with echocardiogram of 8/20/2018, the  endocardium is better visualized on today's study.    < end of copied text >        Interpretation of Telemetry: a-fib with many PVCs.

## 2018-08-30 NOTE — DIETITIAN INITIAL EVALUATION ADULT. - ADHERENCE
No specific restrictions 2/2 poor appetite. Pt's wife was making shakes w/ Ensure, banana, spinach, avocado. Checks BG once a day, in 180's. A1c 6.9. No specific restrictions 2/2 poor appetite. Was eating higher K foods. Pt's wife was making shakes w/ Ensure, banana, spinach, avocado. Checks BG once a day, in 180's. A1c 6.9.

## 2018-08-30 NOTE — PHYSICAL THERAPY INITIAL EVALUATION ADULT - ADDITIONAL COMMENTS
Pt states he lives with his wife, 2 dogs and 2 cats in a private house with 3 steps to enter from the front with no HRs and 3 steps to enter from the back with +HR. Pt states he stays on the 1st floor. Pt states his wife available to assist when needed. Pt states he owns a cane, crutches and RW but does not use it for ambulation. Pt drives and wears reading glasses & hearing aides.

## 2018-08-31 LAB
ALBUMIN SERPL ELPH-MCNC: 2.7 G/DL — LOW (ref 3.3–5)
ALBUMIN SERPL ELPH-MCNC: 3 G/DL — LOW (ref 3.3–5)
ALP SERPL-CCNC: 100 U/L — SIGNIFICANT CHANGE UP (ref 40–120)
ALP SERPL-CCNC: 101 U/L — SIGNIFICANT CHANGE UP (ref 40–120)
ALT FLD-CCNC: 39 U/L — SIGNIFICANT CHANGE UP (ref 10–45)
ALT FLD-CCNC: 40 U/L — SIGNIFICANT CHANGE UP (ref 10–45)
ANION GAP SERPL CALC-SCNC: 15 MMOL/L — SIGNIFICANT CHANGE UP (ref 5–17)
ANION GAP SERPL CALC-SCNC: 19 MMOL/L — HIGH (ref 5–17)
APTT BLD: 31.9 SEC — SIGNIFICANT CHANGE UP (ref 27.5–37.4)
AST SERPL-CCNC: 26 U/L — SIGNIFICANT CHANGE UP (ref 10–40)
AST SERPL-CCNC: 29 U/L — SIGNIFICANT CHANGE UP (ref 10–40)
BILIRUB DIRECT SERPL-MCNC: 1.1 MG/DL — HIGH (ref 0–0.2)
BILIRUB DIRECT SERPL-MCNC: 1.2 MG/DL — HIGH (ref 0–0.2)
BILIRUB INDIRECT FLD-MCNC: 0.8 MG/DL — SIGNIFICANT CHANGE UP (ref 0.2–1)
BILIRUB INDIRECT FLD-MCNC: 0.9 MG/DL — SIGNIFICANT CHANGE UP (ref 0.2–1)
BILIRUB SERPL-MCNC: 2 MG/DL — HIGH (ref 0.2–1.2)
BILIRUB SERPL-MCNC: 2 MG/DL — HIGH (ref 0.2–1.2)
BUN SERPL-MCNC: 116 MG/DL — HIGH (ref 7–23)
BUN SERPL-MCNC: 120 MG/DL — HIGH (ref 7–23)
CALCIUM SERPL-MCNC: 7.9 MG/DL — LOW (ref 8.4–10.5)
CALCIUM SERPL-MCNC: 8.5 MG/DL — SIGNIFICANT CHANGE UP (ref 8.4–10.5)
CHLORIDE SERPL-SCNC: 95 MMOL/L — LOW (ref 96–108)
CHLORIDE SERPL-SCNC: 96 MMOL/L — SIGNIFICANT CHANGE UP (ref 96–108)
CO2 SERPL-SCNC: 21 MMOL/L — LOW (ref 22–31)
CO2 SERPL-SCNC: 25 MMOL/L — SIGNIFICANT CHANGE UP (ref 22–31)
CREAT SERPL-MCNC: 7.07 MG/DL — HIGH (ref 0.5–1.3)
CREAT SERPL-MCNC: 7.7 MG/DL — HIGH (ref 0.5–1.3)
GAS PNL BLDV: SIGNIFICANT CHANGE UP
GLUCOSE SERPL-MCNC: 144 MG/DL — HIGH (ref 70–99)
GLUCOSE SERPL-MCNC: 213 MG/DL — HIGH (ref 70–99)
HCT VFR BLD CALC: 35.5 % — LOW (ref 39–50)
HGB BLD-MCNC: 12.2 G/DL — LOW (ref 13–17)
INR BLD: 1.38 RATIO — HIGH (ref 0.88–1.16)
LDH SERPL L TO P-CCNC: 153 U/L — SIGNIFICANT CHANGE UP (ref 50–242)
MAGNESIUM SERPL-MCNC: 2 MG/DL — SIGNIFICANT CHANGE UP (ref 1.6–2.6)
MAGNESIUM SERPL-MCNC: 2 MG/DL — SIGNIFICANT CHANGE UP (ref 1.6–2.6)
MCHC RBC-ENTMCNC: 30.9 PG — SIGNIFICANT CHANGE UP (ref 27–34)
MCHC RBC-ENTMCNC: 34.4 GM/DL — SIGNIFICANT CHANGE UP (ref 32–36)
MCV RBC AUTO: 90 FL — SIGNIFICANT CHANGE UP (ref 80–100)
PHOSPHATE SERPL-MCNC: 6.7 MG/DL — HIGH (ref 2.5–4.5)
PHOSPHATE SERPL-MCNC: 7.9 MG/DL — HIGH (ref 2.5–4.5)
PLATELET # BLD AUTO: 258 K/UL — SIGNIFICANT CHANGE UP (ref 150–400)
POTASSIUM SERPL-MCNC: 3.7 MMOL/L — SIGNIFICANT CHANGE UP (ref 3.5–5.3)
POTASSIUM SERPL-MCNC: 3.8 MMOL/L — SIGNIFICANT CHANGE UP (ref 3.5–5.3)
POTASSIUM SERPL-SCNC: 3.7 MMOL/L — SIGNIFICANT CHANGE UP (ref 3.5–5.3)
POTASSIUM SERPL-SCNC: 3.8 MMOL/L — SIGNIFICANT CHANGE UP (ref 3.5–5.3)
PROCALCITONIN SERPL-MCNC: 0.23 NG/ML — HIGH (ref 0.02–0.1)
PROT SERPL-MCNC: 5.6 G/DL — LOW (ref 6–8.3)
PROT SERPL-MCNC: 5.7 G/DL — LOW (ref 6–8.3)
PROTHROM AB SERPL-ACNC: 15.1 SEC — HIGH (ref 9.8–12.7)
RBC # BLD: 3.95 M/UL — LOW (ref 4.2–5.8)
RBC # FLD: 12.1 % — SIGNIFICANT CHANGE UP (ref 10.3–14.5)
SODIUM SERPL-SCNC: 135 MMOL/L — SIGNIFICANT CHANGE UP (ref 135–145)
SODIUM SERPL-SCNC: 136 MMOL/L — SIGNIFICANT CHANGE UP (ref 135–145)
WBC # BLD: 6.9 K/UL — SIGNIFICANT CHANGE UP (ref 3.8–10.5)
WBC # FLD AUTO: 6.9 K/UL — SIGNIFICANT CHANGE UP (ref 3.8–10.5)

## 2018-08-31 PROCEDURE — 99233 SBSQ HOSP IP/OBS HIGH 50: CPT | Mod: GC

## 2018-08-31 PROCEDURE — 71045 X-RAY EXAM CHEST 1 VIEW: CPT | Mod: 26

## 2018-08-31 RX ORDER — METOPROLOL TARTRATE 50 MG
25 TABLET ORAL ONCE
Qty: 0 | Refills: 0 | Status: COMPLETED | OUTPATIENT
Start: 2018-08-31 | End: 2018-08-31

## 2018-08-31 RX ORDER — CALCIUM GLUCONATE 100 MG/ML
2 VIAL (ML) INTRAVENOUS ONCE
Qty: 0 | Refills: 0 | Status: COMPLETED | OUTPATIENT
Start: 2018-08-31 | End: 2018-08-31

## 2018-08-31 RX ORDER — SODIUM CHLORIDE 9 MG/ML
1000 INJECTION, SOLUTION INTRAVENOUS
Qty: 0 | Refills: 0 | Status: DISCONTINUED | OUTPATIENT
Start: 2018-08-31 | End: 2018-09-05

## 2018-08-31 RX ORDER — METOPROLOL TARTRATE 50 MG
50 TABLET ORAL
Qty: 0 | Refills: 0 | Status: DISCONTINUED | OUTPATIENT
Start: 2018-09-01 | End: 2018-09-01

## 2018-08-31 RX ORDER — HEPARIN SODIUM 5000 [USP'U]/ML
5000 INJECTION INTRAVENOUS; SUBCUTANEOUS EVERY 8 HOURS
Qty: 0 | Refills: 0 | Status: DISCONTINUED | OUTPATIENT
Start: 2018-08-31 | End: 2018-09-01

## 2018-08-31 RX ADMIN — HEPARIN SODIUM 5000 UNIT(S): 5000 INJECTION INTRAVENOUS; SUBCUTANEOUS at 15:13

## 2018-08-31 RX ADMIN — CHLORHEXIDINE GLUCONATE 1 APPLICATION(S): 213 SOLUTION TOPICAL at 05:13

## 2018-08-31 RX ADMIN — Medication 400 MILLIGRAM(S): at 21:15

## 2018-08-31 RX ADMIN — Medication 1334 MILLIGRAM(S): at 12:03

## 2018-08-31 RX ADMIN — HEPARIN SODIUM 5000 UNIT(S): 5000 INJECTION INTRAVENOUS; SUBCUTANEOUS at 21:15

## 2018-08-31 RX ADMIN — Medication 1334 MILLIGRAM(S): at 07:34

## 2018-08-31 RX ADMIN — PANTOPRAZOLE SODIUM 40 MILLIGRAM(S): 20 TABLET, DELAYED RELEASE ORAL at 07:35

## 2018-08-31 RX ADMIN — Medication 400 MILLIGRAM(S): at 05:37

## 2018-08-31 RX ADMIN — Medication 25 MILLIGRAM(S): at 18:01

## 2018-08-31 RX ADMIN — Medication 2: at 11:09

## 2018-08-31 RX ADMIN — Medication 200 GRAM(S): at 05:36

## 2018-08-31 RX ADMIN — Medication 25 MILLIGRAM(S): at 05:37

## 2018-08-31 RX ADMIN — MEGESTROL ACETATE 800 MILLIGRAM(S): 40 SUSPENSION ORAL at 11:07

## 2018-08-31 RX ADMIN — Medication 1334 MILLIGRAM(S): at 16:54

## 2018-08-31 RX ADMIN — Medication 2: at 16:54

## 2018-08-31 RX ADMIN — SODIUM CHLORIDE 50 MILLILITER(S): 9 INJECTION, SOLUTION INTRAVENOUS at 10:30

## 2018-08-31 RX ADMIN — Medication 25 MILLIGRAM(S): at 22:57

## 2018-08-31 RX ADMIN — Medication 400 MILLIGRAM(S): at 15:13

## 2018-08-31 NOTE — PROGRESS NOTE ADULT - SUBJECTIVE AND OBJECTIVE BOX
[facility] surgery Progress Note     Subjective: PTC clamped/ ABx stopped yesterday, - no issues since , no abd pain, no fever/chills, no n/v, tolerating diet     PE: NAD AAOx3  abd soft nontender PTC clamped      Vital Signs Last 24 Hrs  T(C): 36.8 (31 Aug 2018 08:00), Max: 37 (30 Aug 2018 19:00)  T(F): 98.2 (31 Aug 2018 08:00), Max: 98.6 (30 Aug 2018 19:00)  HR: 92 (31 Aug 2018 07:00) (87 - 109)  BP: 103/63 (31 Aug 2018 07:00) (95/51 - 159/76)  BP(mean): 78 (31 Aug 2018 07:00) (66 - 109)  RR: 18 (31 Aug 2018 07:00) (0 - 25)  SpO2: 100% (31 Aug 2018 07:00) (96% - 100%)    I&O's Detail    30 Aug 2018 07:01  -  31 Aug 2018 07:00  --------------------------------------------------------  IN:    Enteral Tube Flush: 10 mL    sodium chloride 0.45%: 2400 mL    sodium chloride 0.9%: 700 mL  Total IN: 3110 mL    OUT:    Drain: 700 mL    Indwelling Catheter - Urethral: 665 mL    Voided: 975 mL  Total OUT: 2340 mL    Total NET: 770 mL      31 Aug 2018 07:01  -  31 Aug 2018 08:24  --------------------------------------------------------  IN:    sodium chloride 0.45%: 100 mL  Total IN: 100 mL    OUT:    Voided: 175 mL  Total OUT: 175 mL    Total NET: -75 mL          Daily     Daily Weight in k.7 (31 Aug 2018 00:47)    MEDICATIONS  (STANDING):  calcium acetate 1334 milliGRAM(s) Oral three times a day with meals  chlorhexidine 4% Liquid 1 Application(s) Topical <User Schedule>  erythromycin    ethylsuccinate Suspension 40 mG/mL 400 milliGRAM(s) Oral three times a day  insulin lispro (HumaLOG) corrective regimen sliding scale   SubCutaneous three times a day before meals  insulin lispro (HumaLOG) corrective regimen sliding scale   SubCutaneous at bedtime  megestrol Suspension 800 milliGRAM(s) Oral daily  metoprolol tartrate 25 milliGRAM(s) Oral every 12 hours  ondansetron Injectable 4 milliGRAM(s) IV Push once  pantoprazole    Tablet 40 milliGRAM(s) Oral before breakfast  sodium chloride 0.45% 1000 milliLiter(s) (100 mL/Hr) IV Continuous <Continuous>    MEDICATIONS  (PRN):      LABS:                        12.2   6.9   )-----------( 258      ( 31 Aug 2018 04:14 )             35.5         136  |  96  |  120<H>  ----------------------------<  144<H>  3.8   |  21<L>  |  7.70<H>    Ca    7.9<L>      31 Aug 2018 04:14  Phos  7.9       Mg     2.0         TPro  5.6<L>  /  Alb  2.7<L>  /  TBili  2.0<H>  /  DBili  1.1<H>  /  AST  26  /  ALT  40  /  AlkPhos  100      PT/INR - ( 31 Aug 2018 04:14 )   PT: 15.1 sec;   INR: 1.38 ratio         PTT - ( 31 Aug 2018 04:14 )  PTT:31.9 sec      RADIOLOGY & ADDITIONAL STUDIES:

## 2018-08-31 NOTE — PROGRESS NOTE ADULT - PROBLEM SELECTOR PLAN 4
In the setting of MANNY  Will consider starting binders if phos does not improve with improvement in renal function.

## 2018-08-31 NOTE — PROGRESS NOTE ADULT - SUBJECTIVE AND OBJECTIVE BOX
[facility] DIVISION OF KIDNEY DISEASES AND HYPERTENSION -- FOLLOW UP NOTE  --------------------------------------------------------------------------------  Beth Silver  [id number]  --------------------------------------------------------------------------------  Chief Complaint: MANNY    24 hour events/subjective: Seen and examined at the bedside. Feels much better. No new complaints His PTC drain has been clamped and villa is removed. Continues to have good u/o.        PAST HISTORY  --------------------------------------------------------------------------------  No significant changes to PMH, PSH, FHx, SHx, unless otherwise noted    ALLERGIES & MEDICATIONS  --------------------------------------------------------------------------------  Allergies    No Known Allergies    Intolerances      Standing Inpatient Medications  calcium acetate 1334 milliGRAM(s) Oral three times a day with meals  chlorhexidine 4% Liquid 1 Application(s) Topical <User Schedule>  erythromycin    ethylsuccinate Suspension 40 mG/mL 400 milliGRAM(s) Oral three times a day  insulin lispro (HumaLOG) corrective regimen sliding scale   SubCutaneous three times a day before meals  insulin lispro (HumaLOG) corrective regimen sliding scale   SubCutaneous at bedtime  megestrol Suspension 800 milliGRAM(s) Oral daily  metoprolol tartrate 25 milliGRAM(s) Oral every 12 hours  ondansetron Injectable 4 milliGRAM(s) IV Push once  pantoprazole    Tablet 40 milliGRAM(s) Oral before breakfast  sodium chloride 0.45% 1000 milliLiter(s) IV Continuous <Continuous>    PRN Inpatient Medications      REVIEW OF SYSTEMS  --------------------------------------------------------------------------------  Review Of Systems:  Constitutional: No Fever,  Chills,  Fatigue, Weight change   HEENT: No Blurred vision, Eye Pain, Headache, Runny nose, Sore Throat   Respiratory:No Cough, Wheezing, Shortness of breath  Cardiovascular: No Chest Pain, Palpitations, CHACON, PND, Orthopnea  Gastrointestinal:No Nausea, Vomiting, Abdominal Pain,  Diarrhea, Constipation, Hemorrhoids  Genitourinary:No  Nocturia, Hematuria,  Dysuria, Incontinence, Foam in urine  Extremities:  No Swelling , Joint Pain  Neurologic:  No Focal deficit, Paresthesias, Syncope  Lymphatic:   No Lymphadenopathy   Skin: No Rash,  Ecchymoses , Wounds, Lesions  Psychiatry: Denies Depression,  Suicidal/Homicidal Ideation, Anxiety, Sleep Disturbances    All other systems were reviewed and are negative, except as noted.    VITALS/PHYSICAL EXAM  --------------------------------------------------------------------------------  T(C): 37 (18 @ 03:00), Max: 37 (18 @ 19:00)  HR: 88 (18 @ 06:00) (87 - 111)  BP: 111/65 (18 @ 06:00) (95/51 - 159/76)  RR: 15 (18 @ 06:00) (0 - 25)  SpO2: 98% (18 @ 06:00) (96% - 99%)  Wt(kg): --      Daily     Daily Weight in k.7 (31 Aug 2018 00:47)  I&O's Summary    29 Aug 2018 07:  -  30 Aug 2018 07:00  --------------------------------------------------------  IN: 4450 mL / OUT: 3155 mL / NET: 1295 mL    30 Aug 2018 07  -  31 Aug 2018 06:41  --------------------------------------------------------  IN: 3110 mL / OUT: 2340 mL / NET: 770 mL          18 @ 07:18 @ 07:00  --------------------------------------------------------  IN: 4450 mL / OUT: 3155 mL / NET: 1295 mL    18 @ 07:  -  18 @ 06:41  --------------------------------------------------------  IN: 3110 mL / OUT: 2340 mL / NET: 770 mL        Physical Exam:  Gen: NAD  HEENT: anicteric  Pulm: CTA B/L   CVS: RRR,  Normal S1 S2  Abd: soft, nontender, nondistended  Neuro: AAO x3, no asterixis   Back: No dependent edema  : No daisy  LE: Warm, no edema  Skin: Warm, without rashes  Vascular access: none    LABS/STUDIES  --------------------------------------------------------------------------------              12.2   6.9   >-----------<  258      [18 04:14]              35.5     136  |  96  |  120  ----------------------------<  144      [18 04:14]  3.8   |  21  |  7.70        Ca     7.9     [18 04:14]      Mg     2.0     [18 04:14]      Phos  7.9     [18 04:14]    TPro  5.6  /  Alb  2.7  /  TBili  2.0  /  DBili  1.1  /  AST  26  /  ALT  40  /  AlkPhos  100  [18 04:14]    PT/INR: PT 15.1 , INR 1.38       [18 04:14]  PTT: 31.9       [18 04:14]          [18 04:14]    Creatinine Trend:  SCr 7.70 [ 04:14]  SCr 7.74 [ 21:52]  SCr 7.81 [ 15:17]  SCr 8.41 [ 09:20]  SCr 8.56 [ 03:19]    Urinalysis - [18 21:55]      Color Yellow / Appearance SL Turbid / SG 1.015 / pH 5.5      Gluc Negative / Ketone Negative  / Bili Negative / Urobili Negative       Blood Large / Protein 30 / Leuk Est Negative / Nitrite Negative      RBC >50 / WBC 2-5 / Hyaline 2-5 / Gran  / Sq Epi  / Non Sq Epi Occasional / Bacteria Few    Urine Creatinine 167      [18 @ 18:25]  Urine Sodium <20      [18 @ 18:25]  Urine Urea Nitrogen 375      [08-28-18 @ 21:32]  Urine Potassium 64      [18 18:25]  Urine Osmolality 358      [18 18:25]    HbA1c 6.9      [18 @ 07:46]  TSH 1.23      [18 @ 05:55]      LUZ ELENA: titer Negative, pattern --      [18 19:46]  ANCA: cANCA Negative, pANCA Negative, atypical ANCA Negative      [18 19:46]  anti-GBM <0.2      [18 05:43]      Radiology  --------------------------------------------------------------------------------    --------------------------------------------------------------------------------  Beth Silver  [id number]

## 2018-08-31 NOTE — PROGRESS NOTE ADULT - SUBJECTIVE AND OBJECTIVE BOX
24 HOUR EVENTS:  PTC cultures sent, pending. D/c antibiotics given low likelihood for cholangitis, patient tolerated well. PTC clamped. Will continue to trend INR, no AC started but plan to restart eliquis when able. Urethral sphincter restarted and patient's villa d/c, voiding freely.     [place] for BRASH syndrome given dehydration and history of beta blocker with degree of hyperkalemia and bradycardia, now improving . Dosed 10 vta K for coagulopathy. Worsening BUN/Cre earlier in day, dosed 20 IV lasix with 500ml NS and had some response so redosed lasix 40 with 500 ml bolus. TTE demonstrated EF of 60 %, with normal LV.     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 Patient was also found to have an acute MANNY with Cr increased from 1.18 to 8.35 and acidotic on VBG. Patient underwent CT which didn't demonstrate billary ductal dilatation. Likely all secondary to high PTC output (1L per day) causing hypovolemia and prerenal MANNY.    SUBJECTIVE/ROS:  [ ] A ten-point review of systems was otherwise negative except as noted.  [ ] Due to altered mental status/intubation, subjective information were not able to be obtained from the patient. History was obtained, to the extent possible, from review of the chart and collateral sources of information.    NEURO  RASS:     GCS:     CAM ICU:  Exam: AOx3  Meds: ondansetron Injectable 4 milliGRAM(s) IV Push once    [x] Adequacy of sedation and pain control has been assessed and adjusted    RESPIRATORY  RR: 15 (08-31-18 @ 04:00) (0 - 25)  SpO2: 98% (08-31-18 @ 04:00) (96% - 99%)  Wt(kg): --  Exam: unlabored, clear to auscultation bilaterally  Mechanical Ventilation:     [ ] Extubation Readiness Assessed  Meds:     CARDIOVASCULAR  HR: 92 (08-31-18 @ 04:00) (88 - 111)  BP: 113/63 (08-31-18 @ 04:00) (95/51 - 159/76)  BP(mean): 81 (08-31-18 @ 04:00) (66 - 109)  ABP: --  ABP(mean): --  Wt(kg): --  CVP(cm H2O): --  VBG - ( 31 Aug 2018 04:10 )  pH: 7.42  /  pCO2: 41    /  pO2: 29    / HCO3: 26    / Base Excess: 1.6   /  SaO2: 47     Lactate: 1.3      Exam:   Cardiac Rhythm: HDS  Perfusion     [ ]Adequate   [ ]Inadequate  Mentation   [ ]Normal       [ ]Reduced  Extremities  [ ]Warm         [ ]Cool  Volume Status [ ]Hypervolemic [ ]Euvolemic [ ]Hypovolemic  Meds: metoprolol tartrate 25 milliGRAM(s) Oral every 12 hours    GI/NUTRITION  Exam:  Diet:  Meds: pantoprazole    Tablet 40 milliGRAM(s) Oral before breakfast    GENITOURINARY  I&O's Detail    08-29 @ 07:01  -  08-30 @ 07:00  --------------------------------------------------------  IN:    dextrose 5%: 400 mL    IV PiggyBack: 850 mL    sodium chloride 0.45%: 2000 mL    sodium chloride 0.9%: 1150 mL    Solution: 50 mL  Total IN: 4450 mL    OUT:    Drain: 650 mL    Indwelling Catheter - Urethral: 2505 mL  Total OUT: 3155 mL    Total NET: 1295 mL      08-30 @ 07:01 - 08-31 @ 05:10  --------------------------------------------------------  IN:    Enteral Tube Flush: 10 mL    sodium chloride 0.45%: 2200 mL    sodium chloride 0.9%: 700 mL  Total IN: 2910 mL    OUT:    Drain: 700 mL    Indwelling Catheter - Urethral: 665 mL    Voided: 775 mL  Total OUT: 2140 mL    Total NET: 770 mL    08-31    136  |  96  |  120<H>  ----------------------------<  144<H>  3.8   |  21<L>  |  7.70<H>    Ca    7.9<L>      31 Aug 2018 04:14  Phos  7.9     08-31  Mg     2.0     08-31    TPro  5.6<L>  /  Alb  2.7<L>  /  TBili  2.0<H>  /  DBili  1.1<H>  /  AST  26  /  ALT  40  /  AlkPhos  100  08-31    [ ] Villa catheter, indication: N/A  Meds: calcium acetate 1334 milliGRAM(s) Oral three times a day with meals  sodium chloride 0.45% 1000 milliLiter(s) IV Continuous <Continuous>    HEMATOLOGIC  Meds:   [x] VTE Prophylaxis                        12.2   6.9   )-----------( 258      ( 31 Aug 2018 04:14 )             35.5     PT/INR - ( 31 Aug 2018 04:14 )   PT: 15.1 sec;   INR: 1.38 ratio         PTT - ( 31 Aug 2018 04:14 )  PTT:31.9 sec  Transfusion     [ ] PRBC   [ ] Platelets   [ ] FFP   [ ] Cryoprecipitate    INFECTIOUS DISEASES  T(C): 37 (08-31-18 @ 03:00), Max: 37 (08-30-18 @ 19:00)  Wt(kg): --  WBC Count: 6.9 K/uL (08-31 @ 04:14)    Recent Cultures:  Specimen Source: .Body Fluid bile fluid, 08-28 @ 18:05; Results   Testing in progress; Gram Stain: --; Organism: --  Specimen Source: .Body Fluid Bile Fluid, 08-28 @ 18:03; Results   Numerous Mixed gram negative rods "Susceptibilities not performed"  Moderate Candida albicans "Susceptibilities not performed"  Few Lactobacillus species "Susceptibilities not performed"; Gram Stain:   Upon re-evaluation of gram stain:  No polymorphonuclear cells seen  Gram Negative Rods seen  Yeast like cells seen by cytocentrifuge; Organism: --  Specimen Source: .Blood Blood-Peripheral, 08-28 @ 17:28; Results   No growth to date.; Gram Stain: --; Organism: --  Specimen Source: .Blood Blood-Venous, 08-28 @ 17:25; Results   No growth to date.; Gram Stain: --; Organism: --    Meds: erythromycin    ethylsuccinate Suspension 40 mG/mL 400 milliGRAM(s) Oral three times a day    ENDOCRINE  Capillary Blood Glucose    Meds: insulin lispro (HumaLOG) corrective regimen sliding scale   SubCutaneous three times a day before meals  insulin lispro (HumaLOG) corrective regimen sliding scale   SubCutaneous at bedtime  megestrol Suspension 800 milliGRAM(s) Oral daily    ACCESS DEVICES:  [ ] Peripheral IV  [ ] Central Venous Line	[ ] R	[ ] L	[ ] IJ	[ ] Fem	[ ] SC	Placed:   [ ] Arterial Line		[ ] R	[ ] L	[ ] Fem	[ ] Rad	[ ] Ax	Placed:   [ ] PICC:					[ ] Mediport  [ ] Urinary Catheter, Date Placed:   [ ] Necessity of urinary, arterial, and venous catheters discussed    OTHER MEDICATIONS:  chlorhexidine 4% Liquid 1 Application(s) Topical <User Schedule> 24 HOUR EVENTS:  PTC cultures sent, pending. D/c antibiotics given low likelihood for cholangitis, patient tolerated well. PTC clamped. Will continue to trend INR, no AC started but plan to restart eliquis when able. Urethral sphincter restarted and patient's villa d/c, voiding freely.     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 Patient was also found to have an acute MANNY with Cr increased from 1.18 to 8.35 and acidotic on VBG. Patient underwent CT which didn't demonstrate billary ductal dilatation. Likely all secondary to high PTC output (1L per day) causing hypovolemia and prerenal MANNY.    SUBJECTIVE/ROS:  [ ] A ten-point review of systems was otherwise negative except as noted.  [ ] Due to altered mental status/intubation, subjective information were not able to be obtained from the patient. History was obtained, to the extent possible, from review of the chart and collateral sources of information.    NEURO  RASS:     GCS:     CAM ICU:  Exam: AOx3  Meds: ondansetron Injectable 4 milliGRAM(s) IV Push once    [x] Adequacy of sedation and pain control has been assessed and adjusted    RESPIRATORY  RR: 15 (08-31-18 @ 04:00) (0 - 25)  SpO2: 98% (08-31-18 @ 04:00) (96% - 99%)  Wt(kg): --  Exam: unlabored, clear to auscultation bilaterally  Mechanical Ventilation:     [ ] Extubation Readiness Assessed  Meds:     CARDIOVASCULAR  HR: 92 (08-31-18 @ 04:00) (88 - 111)  BP: 113/63 (08-31-18 @ 04:00) (95/51 - 159/76)  BP(mean): 81 (08-31-18 @ 04:00) (66 - 109)  ABP: --  ABP(mean): --  Wt(kg): --  CVP(cm H2O): --  VBG - ( 31 Aug 2018 04:10 )  pH: 7.42  /  pCO2: 41    /  pO2: 29    / HCO3: 26    / Base Excess: 1.6   /  SaO2: 47     Lactate: 1.3      Exam:   Cardiac Rhythm: HDS  Perfusion     [ ]Adequate   [ ]Inadequate  Mentation   [ ]Normal       [ ]Reduced  Extremities  [ ]Warm         [ ]Cool  Volume Status [ ]Hypervolemic [ ]Euvolemic [ ]Hypovolemic  Meds: metoprolol tartrate 25 milliGRAM(s) Oral every 12 hours    GI/NUTRITION  Exam:  Diet:  Meds: pantoprazole    Tablet 40 milliGRAM(s) Oral before breakfast    GENITOURINARY  I&O's Detail    08-29 @ 07:01 - 08-30 @ 07:00  --------------------------------------------------------  IN:    dextrose 5%: 400 mL    IV PiggyBack: 850 mL    sodium chloride 0.45%: 2000 mL    sodium chloride 0.9%: 1150 mL    Solution: 50 mL  Total IN: 4450 mL    OUT:    Drain: 650 mL    Indwelling Catheter - Urethral: 2505 mL  Total OUT: 3155 mL    Total NET: 1295 mL      08-30 @ 07:01 - 08-31 @ 05:10  --------------------------------------------------------  IN:    Enteral Tube Flush: 10 mL    sodium chloride 0.45%: 2200 mL    sodium chloride 0.9%: 700 mL  Total IN: 2910 mL    OUT:    Drain: 700 mL    Indwelling Catheter - Urethral: 665 mL    Voided: 775 mL  Total OUT: 2140 mL    Total NET: 770 mL    08-31    136  |  96  |  120<H>  ----------------------------<  144<H>  3.8   |  21<L>  |  7.70<H>    Ca    7.9<L>      31 Aug 2018 04:14  Phos  7.9     08-31  Mg     2.0     08-31    TPro  5.6<L>  /  Alb  2.7<L>  /  TBili  2.0<H>  /  DBili  1.1<H>  /  AST  26  /  ALT  40  /  AlkPhos  100  08-31    [ ] Villa catheter, indication: N/A  Meds: calcium acetate 1334 milliGRAM(s) Oral three times a day with meals  sodium chloride 0.45% 1000 milliLiter(s) IV Continuous <Continuous>    HEMATOLOGIC  Meds:   [x] VTE Prophylaxis                        12.2   6.9   )-----------( 258      ( 31 Aug 2018 04:14 )             35.5     PT/INR - ( 31 Aug 2018 04:14 )   PT: 15.1 sec;   INR: 1.38 ratio         PTT - ( 31 Aug 2018 04:14 )  PTT:31.9 sec  Transfusion     [ ] PRBC   [ ] Platelets   [ ] FFP   [ ] Cryoprecipitate    INFECTIOUS DISEASES  T(C): 37 (08-31-18 @ 03:00), Max: 37 (08-30-18 @ 19:00)  Wt(kg): --  WBC Count: 6.9 K/uL (08-31 @ 04:14)    Recent Cultures:  Specimen Source: .Body Fluid bile fluid, 08-28 @ 18:05; Results   Testing in progress; Gram Stain: --; Organism: --  Specimen Source: .Body Fluid Bile Fluid, 08-28 @ 18:03; Results   Numerous Mixed gram negative rods "Susceptibilities not performed"  Moderate Candida albicans "Susceptibilities not performed"  Few Lactobacillus species "Susceptibilities not performed"; Gram Stain:   Upon re-evaluation of gram stain:  No polymorphonuclear cells seen  Gram Negative Rods seen  Yeast like cells seen by cytocentrifuge; Organism: --  Specimen Source: .Blood Blood-Peripheral, 08-28 @ 17:28; Results   No growth to date.; Gram Stain: --; Organism: --  Specimen Source: .Blood Blood-Venous, 08-28 @ 17:25; Results   No growth to date.; Gram Stain: --; Organism: --    Meds: erythromycin    ethylsuccinate Suspension 40 mG/mL 400 milliGRAM(s) Oral three times a day    ENDOCRINE  Capillary Blood Glucose    Meds: insulin lispro (HumaLOG) corrective regimen sliding scale   SubCutaneous three times a day before meals  insulin lispro (HumaLOG) corrective regimen sliding scale   SubCutaneous at bedtime  megestrol Suspension 800 milliGRAM(s) Oral daily    ACCESS DEVICES:  [ ] Peripheral IV  [ ] Central Venous Line	[ ] R	[ ] L	[ ] IJ	[ ] Fem	[ ] SC	Placed:   [ ] Arterial Line		[ ] R	[ ] L	[ ] Fem	[ ] Rad	[ ] Ax	Placed:   [ ] PICC:					[ ] Mediport  [ ] Urinary Catheter, Date Placed:   [ ] Necessity of urinary, arterial, and venous catheters discussed    OTHER MEDICATIONS:  chlorhexidine 4% Liquid 1 Application(s) Topical <User Schedule>

## 2018-08-31 NOTE — PROGRESS NOTE ADULT - PROBLEM SELECTOR PLAN 1
Likely hemodynamically mediated MANNY in the setting of septic shock. Pt with normal baseline renal function.   SCr ~ 0.94 on 08/20. Now with elevated BUN//Cr (133/8.5)>120/7.7  MANNY likely pre renal (Large drain from PTC drain)as supported by urine lytes (Low Sravanthi+) vs AIN from recent Cipro use?? vs ATN from recent Lisinopril use  On IV fluid resuscitation,  Better u/o, Scr Stable  No uremic symptoms, K+ wnl  No urgent indication for HD at this time.  Consent obtained/placed in charts in event HD becomes imminent.  CTA/P shows no hydronephrosis but shows ?hemorrghagic renal cyst, recommend urology input.  Monitor BMP. Strict I/Os. Avoid nephrotoxins. Renally dose all medications.

## 2018-08-31 NOTE — PROGRESS NOTE ADULT - ASSESSMENT
76 yo male with recent diagnosis of obstructive ampullary mass s/p PTC placement by IR into segment 6 of the liver in early August 2018 c/b high PTC output resulting in pre-renal MANNY, acidosis, hyperkalemia.    PLAN:   Neurologic: no acute issues  - Will order pain medications on an as needed basis  - Avoid NSAIDs in setting of MANNY    Respiratory: no acute issues  - Incentive spirometry, OOB to chair to prevent atelectasis    Cardiovascular: a.fib with slow ventricular response (bradycardic to 40- 50s with occasional asymptomatic PVCs); cardiomyopathy  - Pending transthoracic echocardiogram  - Metoprolol increased to 25 BID  - Midodrine     Gastrointestinal/Nutrition:  distal obstructing cholangiocarcinoma s/p PTC placement into segment 6 of the liver in early August 2018; no biliary ductal dilation on CT  - Consistent carb diet  - Megace initiated  - Monitor PTC output quality and quantity   - Replace 1:1 of the PTC output with NS q6h  - Protonix for GERD    Renal/Genitourinary: prerenal oliguric MANNY stage 2, hyperkalemia s/p Kayexalate; 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 Strict I&OS q1 hr  - 1/2 NS @ 75  - No acute dialysis needs at this time    Hematologic: a.fib on Eliquis (last taken 8/28 AM)  - Continue to hold eliquis  - Anticipate holding AC until able to restart eliquis, then restart  - INR >2     Infectious Disease: concern for sepsis; GNR in bile fluid  - Antibiotics d/c, decreased concern for sepsis 2/2 cholangitis    Endocrine: DMII  - ISS q4h for glycemic control     Disposition: SICU. Full code.    Toshia Musa PGY-2  [id number]

## 2018-08-31 NOTE — PROGRESS NOTE ADULT - PROBLEM SELECTOR PLAN 2
HAGMA in setting of uremia and MANYN , lactate normal.  We can switch IV Bicarb to Na Bicarb 650 mg PO TID  monitor serum CO2 level;

## 2018-08-31 NOTE — CHART NOTE - NSCHARTNOTEFT_GEN_A_CORE
1st Malnutrition f/u note.    Initial RD assessment completed 8/30. Spoke w/ pt and wife, only ate rice krispies and soup yesterday, did try Nepro supplement which he drank. reports feeling a little better today, awaiting breakfast tray. Encouraged po and supplement intake. Continue current diet rx. f/u on po intake. 1st Malnutrition f/u note.    Initial RD assessment completed 8/30. Noted megace rx.  Spoke w/ pt and wife, only ate rice krispies and soup yesterday, did try Nepro supplement which he drank. reports feeling a little better today, awaiting breakfast tray. Encouraged po and supplement intake. Continue renal, Consistent Carbohydrate diet, will f/u on po intake. 1st Severe Malnutrition f/u note.    Initial RD assessment completed 8/30. Noted megace rx.  Spoke w/ pt and wife, only ate rice krispies and soup yesterday, did try Nepro supplement which he drank. reports feeling a little better today, awaiting breakfast tray. Encouraged po and supplement intake. Continue renal, Consistent Carbohydrate diet, will f/u on po intake.

## 2018-08-31 NOTE — PROGRESS NOTE ADULT - ASSESSMENT
76yo M with an extensive cardiac h/x on Eliquis for Afib, diagnosed with distal cholangiocarcinoma s/p PTC placement by IR into segment 6 of the liver on 8/16 after unsuccessful ERCP c/b high PTC output resulting in pre-renal MANNY, acidosis, hyperkalemia. Presented with septic shock in setting of cardiomyopathy, awaiting medical optimization for pancreaticoduodenectomy. TTE demonstrated EF of 60% w/ normal LV.    Plan:   -trend Cr, slowing improving  -keep PTC clamped   -monitor off ABx   -OOB ambulate     BLue [id number]

## 2018-08-31 NOTE — PROGRESS NOTE ADULT - ASSESSMENT
78 yo man with HTN, pre-diabetes, HLD, afib ablation 2004/2005 and DCCV 2017 on Eliquis with ILR, GERD. Initially presented to [facility] ED on 8/9 with 2 days of dark urine with CBD mass for ERCP likely 2/2 cholangiocarcinoma vs ampullary neoplasm without met disease.   AFib/flutter with RVR, suspicion of tachy-georges syndrome, CHADsVASC 4  Asymptomatic, but new LVD, perhaps related to myopathy of tachycardia (no awareness of AF) or element of stress CM.    #AFib/flutter with RVR currently - rate controlled  --  c/w metoprolol, and increase to 50 BID  -- Monitor on tele if downgraded from SICU  -- if no plan for surgery would start pt on AC with heparin gtt.     #HFrEF  -- EF recovered on latest TTE, likely due to better rate control now.   -- Pts MANNY seems to be a function of pre-renal azotemia.   -- would hold off on diuretics at this time.   -- agree with fluids for now.       #HTN  -- Well controlled currently     #Pre-operative medical optimization  -- no further cardiac w/u prior to procedure except echo      Lydia Olivera MD  Cardiology Fellow - PGY-4  [facility]: [id number]  NS: [phone] [id number] 76 yo man with HTN, pre-diabetes, HLD, afib ablation 2004/2005 and DCCV 2017 on Eliquis with ILR, GERD. Initially presented to [facility] ED on 8/9 with 2 days of dark urine with CBD mass for ERCP likely 2/2 cholangiocarcinoma vs ampullary neoplasm without met disease.   AFib/flutter with RVR, suspicion of tachy-georges syndrome, CHADsVASC 4  Asymptomatic, but new LVD, perhaps related to myopathy of tachycardia (no awareness of AF) or element of stress CM.    #AFib/flutter with RVR currently - rate controlled  --  c/w metoprolol, and increase to 50 BID  -- Monitor on tele if downgraded from SICU  -- if no plan for surgery would start pt on AC with heparin gtt.     #HFrEF  -- EF recovered on latest TTE, likely due to better rate control now.   -- Pts MANNY seems to be a function of pre-renal azotemia.   -- would hold off on diuretics at this time.   -- agree with fluids for now.       #HTN  -- Well controlled currently     #Pre-operative medical optimization  -- no further cardiac w/u prior to procedure      Lydia Olivera MD  Cardiology Fellow - PGY-4  [facility]: [id number]  NS: [phone]  [id number]

## 2018-08-31 NOTE — PROGRESS NOTE ADULT - SUBJECTIVE AND OBJECTIVE BOX
Patient seen and examined at bedside.    Overnight Events: No acute events overnight.     Review of Symptoms:  CONSTITUTIONAL: No weakness, fevers or chills  EYES/ENT: No visual changes;  No dysphagia  NECK: No pain or stiffness  RESPIRATORY: No cough, wheezing, hemoptysis; No shortness of breath  CARDIOVASCULAR: No chest pain or palpitations; No lower extremity edema  GASTROINTESTINAL: No abdominal or epigastric pain. No nausea, vomiting, or hematemesis; No diarrhea or constipation. No melena or hematochezia.  BACK: No back pain  GENITOURINARY: No dysuria, frequency or hematuria  NEUROLOGICAL: No numbness or weakness  SKIN: No itching, burning, rashes, or lesions   All other review of systems is negative unless indicated above.          Current Meds:  calcium acetate 1334 milliGRAM(s) Oral three times a day with meals  chlorhexidine 4% Liquid 1 Application(s) Topical <User Schedule>  erythromycin    ethylsuccinate Suspension 40 mG/mL 400 milliGRAM(s) Oral three times a day  insulin lispro (HumaLOG) corrective regimen sliding scale   SubCutaneous three times a day before meals  insulin lispro (HumaLOG) corrective regimen sliding scale   SubCutaneous at bedtime  megestrol Suspension 800 milliGRAM(s) Oral daily  metoprolol tartrate 25 milliGRAM(s) Oral every 12 hours  ondansetron Injectable 4 milliGRAM(s) IV Push once  pantoprazole    Tablet 40 milliGRAM(s) Oral before breakfast  sodium chloride 0.45% 1000 milliLiter(s) IV Continuous <Continuous>      PAST MEDICAL & SURGICAL HISTORY:  Male stress incontinence  Kidney stone: &quot;passed on own&quot;  Varicose vein: (L) 5 years ago  Bilateral cataracts  Appendicitis  Benign colon polyp  Prostate cancer: 2010  Afib: 2006 s/p ablation 2006 , afib resolved  Hyperlipidemia: X 4 years  GERD (Gastroesophageal Reflux Disease): X 10 years  DM (Diabetes Mellitus): X 3 years  Renal Calculi: 2008  MVP (Mitral Valve Prolapse): X 8 years  HTN - Hypertension: X 10 years, controlled  Status post left knee replacement  S/P ablation operation for arrhythmia  Male stress incontinence: s/p artifical urinary sphincter 5/2012  Varicose vein-s/p vein stripping 5 years ago  S/P arthroscopy: right shoulder 12/11  S/P prostatectomy: radical robotic 6/10  Cosmetic Surgery: chin implant 2004  S/P Colonoscopy with Polypectomy: 2009  S/P Appendectomy: 1950      Vitals:  T(F): 98.2 (08-31), Max: 98.6 (08-30)  HR: 92 (08-31) (87 - 109)  BP: 103/63 (08-31) (95/51 - 159/76)  RR: 18 (08-31)  SpO2: 100% (08-31)  I&O's Summary    30 Aug 2018 07:01  -  31 Aug 2018 07:00  --------------------------------------------------------  IN: 3110 mL / OUT: 2340 mL / NET: 770 mL    31 Aug 2018 07:01  -  31 Aug 2018 08:29  --------------------------------------------------------  IN: 100 mL / OUT: 175 mL / NET: -75 mL    Physical Exam:  Appearance: No acute distress; well appearing  Eyes: PERRL, EOMI, pink conjunctiva  HENT: Normal oral mucosa  Cardiovascular: RRR, S1, S2, no murmurs, rubs, or gallops; no edema; no JVD  Respiratory: Clear to auscultation bilaterally  Gastrointestinal: soft, non-tender, non-distended with normal bowel sounds  Musculoskeletal: No clubbing; no joint deformity   Neurologic: Non-focal  Lymphatic: No lymphadenopathy  Psychiatry: AAOx3, mood & affect appropriate  Skin: No rashes, ecchymoses, or cyanosis                          12.2   6.9   )-----------( 258      ( 31 Aug 2018 04:14 )             35.5     08-31    136  |  96  |  120<H>  ----------------------------<  144<H>  3.8   |  21<L>  |  7.70<H>    Ca    7.9<L>      31 Aug 2018 04:14  Phos  7.9     08-31  Mg     2.0     08-31    TPro  5.6<L>  /  Alb  2.7<L>  /  TBili  2.0<H>  /  DBili  1.1<H>  /  AST  26  /  ALT  40  /  AlkPhos  100  08-31    PT/INR - ( 31 Aug 2018 04:14 )   PT: 15.1 sec;   INR: 1.38 ratio         PTT - ( 31 Aug 2018 04:14 )  PTT:31.9 sec      Serum Pro-Brain Natriuretic Peptide: 1574 pg/mL (08-28 @ 14:10)          New ECG(s): Personally reviewed    Echo:  < from: TTE with Doppler (w/Cont) (08.20.18 @ 10:49) >  Conclusions:  1. Calcified trileaflet aortic valve with normal opening.  2. Endocardial visualization enhanced with intravenous  injection of Ultrasonic Enhancing Agent  (Definity).Moderate- Severe left ventricular systolic  dysfunction. Regional wall motion variation is noted on the  setting of atrial fibrillation and ectopy.  3. Normal right ventricular size with decreased right  ventricular systolic function.  4. Normal tricuspid valve. Minimal tricuspid regurgitation.    < end of copied text >    < from: Limited Transthoracic Echo (08.29.18 @ 14:41) >  CONCLUSIONS:  Limited study for biventricular function.  1. Normal left ventricular internal dimensions and wall  thicknesses.  2. Normal left ventricular systolic function.  EF 60%.  3. Normal right ventricular size and function.  *** Compared with echocardiogram of 8/20/2018, the  endocardium is better visualized on today's study.    < end of copied text >        Interpretation of Telemetry: a-fib with many PVCs.

## 2018-08-31 NOTE — CHART NOTE - NSCHARTNOTEFT_GEN_A_CORE
GENERAL SURGERY FLOOR TRANSFER NOTE    77y Male transferred to floor from SICU    SUBJECTIVE:   Doing well overall. Denies N/V, fever, chills, abd pain. Tolerating reg diet. PTC clamped and abx were discontinued yesterday. Adequate UOP.    OBJECTIVE:    T(C): 36.4 (08-31-18 @ 12:00), Max: 37 (08-30-18 @ 19:00)  HR: 88 (08-31-18 @ 15:00) (70 - 103)  BP: 126/66 (08-31-18 @ 15:00) (95/51 - 159/76)  RR: 16 (08-31-18 @ 15:00) (9 - 29)  SpO2: 98% (08-31-18 @ 15:00) (96% - 100%)  Wt(kg): --      I&O's Summary    30 Aug 2018 07:01  -  31 Aug 2018 07:00  --------------------------------------------------------  IN: 3110 mL / OUT: 2490 mL / NET: 620 mL    31 Aug 2018 07:01  -  31 Aug 2018 16:09  --------------------------------------------------------  IN: 450 mL / OUT: 575 mL / NET: -125 mL                              12.2   6.9   )-----------( 258      ( 31 Aug 2018 04:14 )             35.5       08-31    136  |  96  |  120<H>  ----------------------------<  144<H>  3.8   |  21<L>  |  7.70<H>    Ca    7.9<L>      31 Aug 2018 04:14  Phos  7.9     08-31  Mg     2.0     08-31    TPro  5.6<L>  /  Alb  2.7<L>  /  TBili  2.0<H>  /  DBili  1.1<H>  /  AST  26  /  ALT  40  /  AlkPhos  100  08-31      MEDICATIONS  (STANDING):  calcium acetate 1334 milliGRAM(s) Oral three times a day with meals  chlorhexidine 4% Liquid 1 Application(s) Topical <User Schedule>  erythromycin    ethylsuccinate Suspension 40 mG/mL 400 milliGRAM(s) Oral three times a day  heparin  Injectable 5000 Unit(s) SubCutaneous every 8 hours  insulin lispro (HumaLOG) corrective regimen sliding scale   SubCutaneous three times a day before meals  insulin lispro (HumaLOG) corrective regimen sliding scale   SubCutaneous at bedtime  lactated ringers. 1000 milliLiter(s) (50 mL/Hr) IV Continuous <Continuous>  megestrol Suspension 800 milliGRAM(s) Oral daily  metoprolol tartrate 25 milliGRAM(s) Oral every 12 hours  ondansetron Injectable 4 milliGRAM(s) IV Push once  pantoprazole    Tablet 40 milliGRAM(s) Oral before breakfast    MEDICATIONS  (PRN):        PHYSICAL EXAM:  Gen: NAD  Resp: normal work of breathing  ABD: soft, NT, ND, PTC clamped  NEURO: A&Ox3    ASSESSMENT   78yo M with a PMHx of HNT, Afib on Eliquis, MVP, GERD, dx with distal cholangiocarcinoma s/p PTC placement by IR into segment 6 of the liver on 8/16 after unsuccessful ERCP c/b high PTC output resulting in pre-renal MANNY, acidosis, hyperkalemia. Presented with hypotension and hypothermia concerning for cholangitis. Found to have acute MANNY with an uptrending Cr from 1.18 to 8.35. Awaiting medical optimization for pancreaticoduodenectomy. TTE demonstrated EF of 60% w/ normal LV.    PLAN:     - Flush PTC BID and leave it capped  - Obtain labs q12h  - Per cardiology recs: will monitor on telemetry while on floor, continue with metoprolol but increase to 50mg BID from 25mg BID  - Diet: Reg, carb control  - Monitor vitals and UOP  - OOB/IS  - DVT ppx: hep subq q8h  - Dispo: admitted to [facility] team      Hemalatha Tracy, PGY-1  [facility] General Surgery [phone] GENERAL SURGERY FLOOR TRANSFER NOTE    77y Male transferred to floor from SICU    SUBJECTIVE:   Doing well overall. Denies N/V, fever, chills, abd pain. Tolerating reg diet. PTC clamped and abx were discontinued yesterday. Adequate UOP.    OBJECTIVE:    T(C): 36.4 (08-31-18 @ 12:00), Max: 37 (08-30-18 @ 19:00)  HR: 88 (08-31-18 @ 15:00) (70 - 103)  BP: 126/66 (08-31-18 @ 15:00) (95/51 - 159/76)  RR: 16 (08-31-18 @ 15:00) (9 - 29)  SpO2: 98% (08-31-18 @ 15:00) (96% - 100%)  Wt(kg): --      I&O's Summary    30 Aug 2018 07:01  -  31 Aug 2018 07:00  --------------------------------------------------------  IN: 3110 mL / OUT: 2490 mL / NET: 620 mL    31 Aug 2018 07:01  -  31 Aug 2018 16:09  --------------------------------------------------------  IN: 450 mL / OUT: 575 mL / NET: -125 mL                              12.2   6.9   )-----------( 258      ( 31 Aug 2018 04:14 )             35.5       08-31    136  |  96  |  120<H>  ----------------------------<  144<H>  3.8   |  21<L>  |  7.70<H>    Ca    7.9<L>      31 Aug 2018 04:14  Phos  7.9     08-31  Mg     2.0     08-31    TPro  5.6<L>  /  Alb  2.7<L>  /  TBili  2.0<H>  /  DBili  1.1<H>  /  AST  26  /  ALT  40  /  AlkPhos  100  08-31      MEDICATIONS  (STANDING):  calcium acetate 1334 milliGRAM(s) Oral three times a day with meals  chlorhexidine 4% Liquid 1 Application(s) Topical <User Schedule>  erythromycin    ethylsuccinate Suspension 40 mG/mL 400 milliGRAM(s) Oral three times a day  heparin  Injectable 5000 Unit(s) SubCutaneous every 8 hours  insulin lispro (HumaLOG) corrective regimen sliding scale   SubCutaneous three times a day before meals  insulin lispro (HumaLOG) corrective regimen sliding scale   SubCutaneous at bedtime  lactated ringers. 1000 milliLiter(s) (50 mL/Hr) IV Continuous <Continuous>  megestrol Suspension 800 milliGRAM(s) Oral daily  metoprolol tartrate 25 milliGRAM(s) Oral every 12 hours  ondansetron Injectable 4 milliGRAM(s) IV Push once  pantoprazole    Tablet 40 milliGRAM(s) Oral before breakfast    MEDICATIONS  (PRN):        PHYSICAL EXAM:  Gen: NAD  Resp: normal work of breathing  ABD: soft, NT, ND, PTC clamped  NEURO: A&Ox3    ASSESSMENT   76yo M with a PMHx of HNT, Afib on Eliquis, MVP, GERD, dx with distal cholangiocarcinoma s/p PTC placement by IR into segment 6 of the liver on 8/16 after unsuccessful ERCP c/b high PTC output resulting in pre-renal MANNY, acidosis, hyperkalemia. Presented with hypotension and hypothermia concerning for cholangitis. Found to have acute MANNY with an uptrending Cr from 1.18 to 8.35, now downtrending to 7.7. Awaiting medical optimization for pancreaticoduodenectomy. TTE demonstrated EF of 60% w/ normal LV.    PLAN:     - Flush PTC BID and leave it capped  - Obtain labs q12h  - Per cardiology recs: will monitor on telemetry while on floor, continue with metoprolol but increase to 50mg BID from 25mg BID  - Diet: Reg, carb control  - Monitor vitals and UOP  - OOB/IS  - DVT ppx: hep subq q8h  - Dispo: admitted to [facility] team      Hemalatha Tracy, PGY-1  [facility] General Surgery [phone]

## 2018-09-01 LAB
ALBUMIN SERPL ELPH-MCNC: 2.5 G/DL — LOW (ref 3.3–5)
ALBUMIN SERPL ELPH-MCNC: 3 G/DL — LOW (ref 3.3–5)
ALP SERPL-CCNC: 132 U/L — HIGH (ref 40–120)
ALP SERPL-CCNC: 134 U/L — HIGH (ref 40–120)
ALT FLD-CCNC: 40 U/L — SIGNIFICANT CHANGE UP (ref 10–45)
ALT FLD-CCNC: 42 U/L — SIGNIFICANT CHANGE UP (ref 10–45)
ANION GAP SERPL CALC-SCNC: 17 MMOL/L — SIGNIFICANT CHANGE UP (ref 5–17)
ANION GAP SERPL CALC-SCNC: 17 MMOL/L — SIGNIFICANT CHANGE UP (ref 5–17)
APTT BLD: 31.6 SEC — SIGNIFICANT CHANGE UP (ref 27.5–37.4)
AST SERPL-CCNC: 32 U/L — SIGNIFICANT CHANGE UP (ref 10–40)
AST SERPL-CCNC: 34 U/L — SIGNIFICANT CHANGE UP (ref 10–40)
BILIRUB DIRECT SERPL-MCNC: 0.9 MG/DL — HIGH (ref 0–0.2)
BILIRUB DIRECT SERPL-MCNC: 1.1 MG/DL — HIGH (ref 0–0.2)
BILIRUB INDIRECT FLD-MCNC: 1 MG/DL — SIGNIFICANT CHANGE UP (ref 0.2–1)
BILIRUB INDIRECT FLD-MCNC: 1 MG/DL — SIGNIFICANT CHANGE UP (ref 0.2–1)
BILIRUB SERPL-MCNC: 1.9 MG/DL — HIGH (ref 0.2–1.2)
BILIRUB SERPL-MCNC: 2.1 MG/DL — HIGH (ref 0.2–1.2)
BUN SERPL-MCNC: 102 MG/DL — HIGH (ref 7–23)
BUN SERPL-MCNC: 113 MG/DL — HIGH (ref 7–23)
CA-I BLD-SCNC: 1.1 MMOL/L — LOW (ref 1.12–1.3)
CALCIUM SERPL-MCNC: 8.2 MG/DL — LOW (ref 8.4–10.5)
CALCIUM SERPL-MCNC: 8.7 MG/DL — SIGNIFICANT CHANGE UP (ref 8.4–10.5)
CHLORIDE SERPL-SCNC: 100 MMOL/L — SIGNIFICANT CHANGE UP (ref 96–108)
CHLORIDE SERPL-SCNC: 97 MMOL/L — SIGNIFICANT CHANGE UP (ref 96–108)
CO2 SERPL-SCNC: 20 MMOL/L — LOW (ref 22–31)
CO2 SERPL-SCNC: 24 MMOL/L — SIGNIFICANT CHANGE UP (ref 22–31)
CREAT SERPL-MCNC: 5.95 MG/DL — HIGH (ref 0.5–1.3)
CREAT SERPL-MCNC: 6.6 MG/DL — HIGH (ref 0.5–1.3)
GLUCOSE SERPL-MCNC: 133 MG/DL — HIGH (ref 70–99)
GLUCOSE SERPL-MCNC: 186 MG/DL — HIGH (ref 70–99)
HCT VFR BLD CALC: 35.3 % — LOW (ref 39–50)
HGB BLD-MCNC: 11.9 G/DL — LOW (ref 13–17)
INR BLD: 1.26 RATIO — HIGH (ref 0.88–1.16)
MAGNESIUM SERPL-MCNC: 1.9 MG/DL — SIGNIFICANT CHANGE UP (ref 1.6–2.6)
MAGNESIUM SERPL-MCNC: 2 MG/DL — SIGNIFICANT CHANGE UP (ref 1.6–2.6)
MCHC RBC-ENTMCNC: 30.4 PG — SIGNIFICANT CHANGE UP (ref 27–34)
MCHC RBC-ENTMCNC: 33.7 GM/DL — SIGNIFICANT CHANGE UP (ref 32–36)
MCV RBC AUTO: 90.1 FL — SIGNIFICANT CHANGE UP (ref 80–100)
PHOSPHATE SERPL-MCNC: 4.9 MG/DL — HIGH (ref 2.5–4.5)
PHOSPHATE SERPL-MCNC: 5.7 MG/DL — HIGH (ref 2.5–4.5)
PLATELET # BLD AUTO: 268 K/UL — SIGNIFICANT CHANGE UP (ref 150–400)
POTASSIUM SERPL-MCNC: 3.4 MMOL/L — LOW (ref 3.5–5.3)
POTASSIUM SERPL-MCNC: 3.9 MMOL/L — SIGNIFICANT CHANGE UP (ref 3.5–5.3)
POTASSIUM SERPL-SCNC: 3.4 MMOL/L — LOW (ref 3.5–5.3)
POTASSIUM SERPL-SCNC: 3.9 MMOL/L — SIGNIFICANT CHANGE UP (ref 3.5–5.3)
PROT SERPL-MCNC: 5.8 G/DL — LOW (ref 6–8.3)
PROT SERPL-MCNC: 5.8 G/DL — LOW (ref 6–8.3)
PROTHROM AB SERPL-ACNC: 14.3 SEC — HIGH (ref 10–13.1)
RBC # BLD: 3.92 M/UL — LOW (ref 4.2–5.8)
RBC # FLD: 12.9 % — SIGNIFICANT CHANGE UP (ref 10.3–14.5)
SODIUM SERPL-SCNC: 137 MMOL/L — SIGNIFICANT CHANGE UP (ref 135–145)
SODIUM SERPL-SCNC: 138 MMOL/L — SIGNIFICANT CHANGE UP (ref 135–145)
WBC # BLD: 5.72 K/UL — SIGNIFICANT CHANGE UP (ref 3.8–10.5)
WBC # FLD AUTO: 5.72 K/UL — SIGNIFICANT CHANGE UP (ref 3.8–10.5)

## 2018-09-01 PROCEDURE — 99233 SBSQ HOSP IP/OBS HIGH 50: CPT | Mod: GC

## 2018-09-01 RX ORDER — METOPROLOL TARTRATE 50 MG
50 TABLET ORAL
Qty: 0 | Refills: 0 | Status: DISCONTINUED | OUTPATIENT
Start: 2018-09-01 | End: 2018-09-01

## 2018-09-01 RX ORDER — POTASSIUM CHLORIDE 20 MEQ
20 PACKET (EA) ORAL ONCE
Qty: 0 | Refills: 0 | Status: DISCONTINUED | OUTPATIENT
Start: 2018-09-01 | End: 2018-09-01

## 2018-09-01 RX ORDER — SODIUM BICARBONATE 1 MEQ/ML
650 SYRINGE (ML) INTRAVENOUS THREE TIMES A DAY
Qty: 0 | Refills: 0 | Status: DISCONTINUED | OUTPATIENT
Start: 2018-09-01 | End: 2018-09-06

## 2018-09-01 RX ORDER — APIXABAN 2.5 MG/1
2.5 TABLET, FILM COATED ORAL EVERY 12 HOURS
Qty: 0 | Refills: 0 | Status: DISCONTINUED | OUTPATIENT
Start: 2018-09-01 | End: 2018-09-06

## 2018-09-01 RX ORDER — METOPROLOL TARTRATE 50 MG
25 TABLET ORAL
Qty: 0 | Refills: 0 | Status: DISCONTINUED | OUTPATIENT
Start: 2018-09-01 | End: 2018-09-06

## 2018-09-01 RX ORDER — POTASSIUM CHLORIDE 20 MEQ
40 PACKET (EA) ORAL ONCE
Qty: 0 | Refills: 0 | Status: DISCONTINUED | OUTPATIENT
Start: 2018-09-01 | End: 2018-09-01

## 2018-09-01 RX ADMIN — Medication 1334 MILLIGRAM(S): at 10:38

## 2018-09-01 RX ADMIN — HEPARIN SODIUM 5000 UNIT(S): 5000 INJECTION INTRAVENOUS; SUBCUTANEOUS at 13:36

## 2018-09-01 RX ADMIN — Medication 50 MILLIGRAM(S): at 05:00

## 2018-09-01 RX ADMIN — Medication 400 MILLIGRAM(S): at 05:00

## 2018-09-01 RX ADMIN — Medication 4: at 18:44

## 2018-09-01 RX ADMIN — HEPARIN SODIUM 5000 UNIT(S): 5000 INJECTION INTRAVENOUS; SUBCUTANEOUS at 05:00

## 2018-09-01 RX ADMIN — Medication 650 MILLIGRAM(S): at 10:38

## 2018-09-01 RX ADMIN — PANTOPRAZOLE SODIUM 40 MILLIGRAM(S): 20 TABLET, DELAYED RELEASE ORAL at 05:00

## 2018-09-01 RX ADMIN — Medication 2: at 14:40

## 2018-09-01 RX ADMIN — SODIUM CHLORIDE 50 MILLILITER(S): 9 INJECTION, SOLUTION INTRAVENOUS at 23:38

## 2018-09-01 RX ADMIN — Medication 650 MILLIGRAM(S): at 14:41

## 2018-09-01 RX ADMIN — Medication 2: at 11:11

## 2018-09-01 RX ADMIN — Medication 400 MILLIGRAM(S): at 13:36

## 2018-09-01 RX ADMIN — Medication 25 MILLIGRAM(S): at 17:39

## 2018-09-01 RX ADMIN — Medication 1334 MILLIGRAM(S): at 17:39

## 2018-09-01 RX ADMIN — Medication 650 MILLIGRAM(S): at 23:38

## 2018-09-01 RX ADMIN — Medication 1334 MILLIGRAM(S): at 13:36

## 2018-09-01 RX ADMIN — MEGESTROL ACETATE 800 MILLIGRAM(S): 40 SUSPENSION ORAL at 13:36

## 2018-09-01 NOTE — PROGRESS NOTE ADULT - ASSESSMENT
78 yo man with HTN, pre-diabetes, HLD, afib ablation 2004/2005 and DCCV 2017 on Eliquis with ILR, GERD. 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   AFib/flutter with RVR, suspicion of tachy-georges syndrome, CHADsVASC 4  Recovered stress-induced cardiomyopathy.    #AFib/flutter with RVR currently - rate controlled  --  c/w metoprolol, and increase to 50mg BID as tolerated  --  no significant palpitations, which the pt reports fair sensitivity. if has more palpitations would monitor on tele.  -- if no plan for surgery would start pt on AC with heparin gtt.     #HFrEF  -- EF recovered on latest TTE  -- monitor Cr  -- would hold off on diuretics at this time.     #HTN  -- Well controlled currently

## 2018-09-01 NOTE — PROGRESS NOTE ADULT - SUBJECTIVE AND OBJECTIVE BOX
Cardiology Progress Note    Chief Complaint:  ectopy, afib    Interval Events:  transient palpitations. otherwise no acute events    MEDICATIONS:  heparin  Injectable 5000 Unit(s) SubCutaneous every 8 hours  metoprolol tartrate 25 milliGRAM(s) Oral two times a day  erythromycin    ethylsuccinate Suspension 40 mG/mL 400 milliGRAM(s) Oral three times a day  ondansetron Injectable 4 milliGRAM(s) IV Push once  pantoprazole    Tablet 40 milliGRAM(s) Oral before breakfast  insulin lispro (HumaLOG) corrective regimen sliding scale   SubCutaneous three times a day before meals  insulin lispro (HumaLOG) corrective regimen sliding scale   SubCutaneous at bedtime  megestrol Suspension 800 milliGRAM(s) Oral daily  calcium acetate 1334 milliGRAM(s) Oral three times a day with meals  lactated ringers. 1000 milliLiter(s) IV Continuous <Continuous>  sodium bicarbonate 650 milliGRAM(s) Oral three times a day    PHYSICAL EXAM:  T(C): 36.6 (09-01-18 @ 14:23), Max: 37.2 (08-31-18 @ 16:00)  HR: 91 (09-01-18 @ 14:23) (71 - 94)  BP: 135/78 (09-01-18 @ 14:23) (116/64 - 154/80)  RR: 18 (09-01-18 @ 14:23) (16 - 33)  SpO2: 99% (09-01-18 @ 14:23) (96% - 99%)  Wt(kg): --  I&O's Summary    31 Aug 2018 07:01  -  01 Sep 2018 07:00  --------------------------------------------------------  IN: 1610 mL / OUT: 1475 mL / NET: 135 mL    01 Sep 2018 07:01  -  01 Sep 2018 14:28  --------------------------------------------------------  IN: 0 mL / OUT: 559 mL / NET: -559 mL    Daily       Appearance: No distress  HEENT:  mmm	  Cardiovascular: Normal S1 S2,no edema  Respiratory: Lungs clear to auscultation anteriorly  Psychiatry: A & O x 3, Mood & affect appropriate  Gastrointestinal:  soft nt   Skin: No cyanosis	  Neurologic: grossly nonfocal  Extremities: Normal range of motion, No clubbing, cyanosis    LABS:	 	  CBC Full  -  ( 01 Sep 2018 10:26 )  WBC Count : 5.72 K/uL  Hemoglobin : 11.9 g/dL  Hematocrit : 35.3 %  Platelet Count - Automated : 268 K/uL    09-01    138  |  97  |  113<H>  ----------------------------<  133<H>  3.4<L>   |  24  |  6.60<H>    08-31  135  |  95<L>  |  116<H>  ----------------------------<  213<H>  3.7   |  25  |  7.07<H>    Ca    8.7      01 Sep 2018 07:21  Ca    8.5      31 Aug 2018 15:53  Phos  5.7     09-01  Phos  6.7     08-31  Mg     2.0     09-01  Mg     2.0     08-31    TPro  5.8<L>  /  Alb  3.0<L>  /  TBili  2.1<H>  /  DBili  1.1<H>  /  AST  32  /  ALT  40  /  AlkPhos  132<H>  09-01  TPro  5.7<L>  /  Alb  3.0<L>  /  TBili  2.0<H>  /  DBili  1.2<H>  /  AST  29  /  ALT  39  /  AlkPhos  101  08-31 Cardiology Progress Note    Chief Complaint:  ectopy, afib    Interval Events:  transient palpitations. otherwise no acute events    REVIEW OF SYSTEMS:  Constitutional: No Fever, Fatigue, Weight Changes  Eyes: No Recent Vision Changes, Eye Pain  ENT: No Congestion, Ear Pain, Sore Throat  Endocrine: No Excess Sweating, Temperature Intolerance  Cardiovascular: No Chest Pain, +Palpitations, Shortness of Breath, Pre-syncope, Syncope, LE Edema  Respiratory: No Cough, Congestion, Wheezing  Gastrointestinal: No Abdominal Pain, Nausea, Vomiting  Genitourinary: No dysuria, hematuria  Musculoskeletal: No Joint Pain, Swelling  Neurologic: No headaches, Imbalance, Focal Deficits  Skin: No rashes, hematoma, purprura      MEDICATIONS:  heparin  Injectable 5000 Unit(s) SubCutaneous every 8 hours  metoprolol tartrate 25 milliGRAM(s) Oral two times a day  erythromycin    ethylsuccinate Suspension 40 mG/mL 400 milliGRAM(s) Oral three times a day  ondansetron Injectable 4 milliGRAM(s) IV Push once  pantoprazole    Tablet 40 milliGRAM(s) Oral before breakfast  insulin lispro (HumaLOG) corrective regimen sliding scale   SubCutaneous three times a day before meals  insulin lispro (HumaLOG) corrective regimen sliding scale   SubCutaneous at bedtime  megestrol Suspension 800 milliGRAM(s) Oral daily  calcium acetate 1334 milliGRAM(s) Oral three times a day with meals  lactated ringers. 1000 milliLiter(s) IV Continuous <Continuous>  sodium bicarbonate 650 milliGRAM(s) Oral three times a day    PHYSICAL EXAM:  T(C): 36.6 (09-01-18 @ 14:23), Max: 37.2 (08-31-18 @ 16:00)  HR: 91 (09-01-18 @ 14:23) (71 - 94)  BP: 135/78 (09-01-18 @ 14:23) (116/64 - 154/80)  RR: 18 (09-01-18 @ 14:23) (16 - 33)  SpO2: 99% (09-01-18 @ 14:23) (96% - 99%)  Wt(kg): --  I&O's Summary    31 Aug 2018 07:01  -  01 Sep 2018 07:00  --------------------------------------------------------  IN: 1610 mL / OUT: 1475 mL / NET: 135 mL    01 Sep 2018 07:01  -  01 Sep 2018 14:28  --------------------------------------------------------  IN: 0 mL / OUT: 559 mL / NET: -559 mL    Daily       Appearance: No distress  HEENT:  mmm	  Cardiovascular: Normal S1 S2,no edema  Respiratory: Lungs clear to auscultation anteriorly  Psychiatry: A & O x 3, Mood & affect appropriate  Gastrointestinal:  soft nt   Skin: No cyanosis	  Neurologic: grossly nonfocal  Extremities: Normal range of motion, No clubbing, cyanosis    LABS:	 	Reviewed 9/1 personally  CBC Full  -  ( 01 Sep 2018 10:26 )  WBC Count : 5.72 K/uL  Hemoglobin : 11.9 g/dL  Hematocrit : 35.3 %  Platelet Count - Automated : 268 K/uL    09-01    138  |  97  |  113<H>  ----------------------------<  133<H>  3.4<L>   |  24  |  6.60<H>    08-31  135  |  95<L>  |  116<H>  ----------------------------<  213<H>  3.7   |  25  |  7.07<H>    Ca    8.7      01 Sep 2018 07:21  Ca    8.5      31 Aug 2018 15:53  Phos  5.7     09-01  Phos  6.7     08-31  Mg     2.0     09-01  Mg     2.0     08-31    TPro  5.8<L>  /  Alb  3.0<L>  /  TBili  2.1<H>  /  DBili  1.1<H>  /  AST  32  /  ALT  40  /  AlkPhos  132<H>  09-01  TPro  5.7<L>  /  Alb  3.0<L>  /  TBili  2.0<H>  /  DBili  1.2<H>  /  AST  29  /  ALT  39  /  AlkPhos  101  08-31

## 2018-09-01 NOTE — PROGRESS NOTE ADULT - PROBLEM SELECTOR PLAN 4
In the setting of MANNY  Improving hold binders for now, and check phos daily. In the setting of MANNY  Improving   can stop binders

## 2018-09-01 NOTE — PROGRESS NOTE ADULT - ASSESSMENT
76yo M with an extensive cardiac h/x on Eliquis for Afib, diagnosed with distal cholangiocarcinoma s/p PTC placement by IR into segment 6 of the liver on 8/16 after unsuccessful ERCP c/b high PTC output resulting in pre-renal MANNY, acidosis, hyperkalemia. Presented with septic shock in setting of cardiomyopathy, awaiting medical optimization for pancreaticoduodenectomy. TTE demonstrated EF of 60% w/ normal LV.    Plan:  - Pain control  - Trend Cr, slowing improving  - f/u cards regarding metoprolol dose  - Keep PTC clamped   - Abx d/c, monitor CBC (WBC)  - OOB ambulate

## 2018-09-01 NOTE — PROGRESS NOTE ADULT - SUBJECTIVE AND OBJECTIVE BOX
[facility] DIVISION OF KIDNEY DISEASES AND HYPERTENSION -- FOLLOW UP NOTE  --------------------------------------------------------------------------------  Chief Complaint:  MANNY  24 hour events/subjective:  Pt examined at bed side. No new complains, making urine.     PAST HISTORY  --------------------------------------------------------------------------------  No significant changes to PMH, PSH, FHx, SHx, unless otherwise noted    ALLERGIES & MEDICATIONS  --------------------------------------------------------------------------------  Allergies    No Known Allergies    Intolerances      Standing Inpatient Medications  calcium acetate 1334 milliGRAM(s) Oral three times a day with meals  erythromycin    ethylsuccinate Suspension 40 mG/mL 400 milliGRAM(s) Oral three times a day  heparin  Injectable 5000 Unit(s) SubCutaneous every 8 hours  insulin lispro (HumaLOG) corrective regimen sliding scale   SubCutaneous three times a day before meals  insulin lispro (HumaLOG) corrective regimen sliding scale   SubCutaneous at bedtime  lactated ringers. 1000 milliLiter(s) IV Continuous <Continuous>  megestrol Suspension 800 milliGRAM(s) Oral daily  metoprolol tartrate 25 milliGRAM(s) Oral two times a day  ondansetron Injectable 4 milliGRAM(s) IV Push once  pantoprazole    Tablet 40 milliGRAM(s) Oral before breakfast  sodium bicarbonate 650 milliGRAM(s) Oral three times a day    PRN Inpatient Medications      REVIEW OF SYSTEMS  --------------------------------------------------------------------------------  Constitutional: No Fever,  Chills,  Fatigue, Weight change   HEENT: No Blurred vision, Eye Pain, Headache, Runny nose, Sore Throat   Respiratory:No Cough, Wheezing, Shortness of breath  Cardiovascular: No Chest Pain, Palpitations, CHACON, PND, Orthopnea  Gastrointestinal:No Nausea, Vomiting, Abdominal Pain,  Diarrhea, Constipation, Hemorrhoids  Genitourinary:No  Nocturia, Hematuria,  Dysuria, Incontinence, Foam in urine  Extremities:  No Swelling , Joint Pain  Neurologic:  No Focal deficit, Paresthesias, Syncope  Lymphatic:   No Lymphadenopathy   Skin: No Rash,  Ecchymoses , Wounds, Lesions  Psychiatry: Denies Depression,  Suicidal/Homicidal Ideation, Anxiety, Sleep Disturbances    All other systems were reviewed and are negative, except as noted.    VITALS/PHYSICAL EXAM  --------------------------------------------------------------------------------  T(C): 36.6 (09-01-18 @ 14:23), Max: 37.2 (09-01-18 @ 01:05)  HR: 91 (09-01-18 @ 14:23) (71 - 94)  BP: 135/78 (09-01-18 @ 14:23) (116/64 - 154/80)  RR: 18 (09-01-18 @ 14:23) (18 - 33)  SpO2: 99% (09-01-18 @ 14:23) (96% - 99%)  Wt(kg): --        08-31-18 @ 07:01  -  09-01-18 @ 07:00  --------------------------------------------------------  IN: 1610 mL / OUT: 1475 mL / NET: 135 mL    09-01-18 @ 07:01  -  09-01-18 @ 16:08  --------------------------------------------------------  IN: 720 mL / OUT: 850 mL / NET: -130 mL      Physical Exam:    Gen: NAD  HEENT: anicteric  Pulm: CTA B/L   CVS: RRR,  Normal S1 S2  Abd: soft, nontender, nondistended  Neuro: AAO x3, no asterixis   Back: No dependent edema  : No daisy  LE: Warm, no edema  Skin: Warm, without rashes  Vascular access: none    LABS/STUDIES  --------------------------------------------------------------------------------              11.9   5.72  >-----------<  268      [09-01-18 @ 10:26]              35.3     138  |  97  |  113  ----------------------------<  133      [09-01-18 @ 07:21]  3.4   |  24  |  6.60        Ca     8.7     [09-01-18 @ 07:21]      iCa    1.10     [09-01 @ 07:21]      Mg     2.0     [09-01-18 @ 07:21]      Phos  5.7     [09-01-18 @ 07:21]    TPro  5.8  /  Alb  3.0  /  TBili  2.1  /  DBili  1.1  /  AST  32  /  ALT  40  /  AlkPhos  132  [09-01-18 @ 07:21]    PT/INR: PT 14.3 , INR 1.26       [09-01-18 @ 10:26]  PTT: 31.6       [09-01-18 @ 10:26]          [08-31-18 @ 04:14]    Creatinine Trend:  SCr 6.60 [09-01 @ 07:21]  SCr 7.07 [08-31 @ 15:53]  SCr 7.70 [08-31 @ 04:14]  SCr 7.74 [08-30 @ 21:52]  SCr 7.81 [08-30 @ 15:17]

## 2018-09-01 NOTE — PROGRESS NOTE ADULT - SUBJECTIVE AND OBJECTIVE BOX
[facility] Progress Note     Subjective/24hour Events: Patient w/ afib overnight (HR ). Metoprolol increased to 50 BID per cards rec. Feels well. Pain controlled. Tolerating diet. No N/V. No CP or SOB.    Vital Signs:  Vital Signs Last 24 Hrs  T(C): 36.7 (01 Sep 2018 09:15), Max: 37.2 (31 Aug 2018 16:00)  T(F): 98.1 (01 Sep 2018 09:15), Max: 99 (01 Sep 2018 01:05)  HR: 85 (01 Sep 2018 09:15) (70 - 94)  BP: 116/64 (01 Sep 2018 09:15) (101/57 - 154/80)  BP(mean): 89 (31 Aug 2018 16:00) (69 - 91)  RR: 18 (01 Sep 2018 09:15) (15 - 33)  SpO2: 96% (01 Sep 2018 09:15) (96% - 99%)    CAPILLARY BLOOD GLUCOSE      POCT Blood Glucose.: 154 mg/dL (31 Aug 2018 21:58)  POCT Blood Glucose.: 173 mg/dL (31 Aug 2018 16:53)  POCT Blood Glucose.: 174 mg/dL (31 Aug 2018 11:04)      I&O's Detail    31 Aug 2018 07:01  -  01 Sep 2018 07:00  --------------------------------------------------------  IN:    lactated ringers.: 1050 mL    Oral Fluid: 360 mL    sodium chloride 0.45%: 200 mL  Total IN: 1610 mL    OUT:    Voided: 1475 mL  Total OUT: 1475 mL    Total NET: 135 mL      01 Sep 2018 07:01  -  01 Sep 2018 09:36  --------------------------------------------------------  IN:  Total IN: 0 mL    OUT:    Voided: 400 mL  Total OUT: 400 mL    Total NET: -400 mL            MEDICATIONS  (STANDING):  calcium acetate 1334 milliGRAM(s) Oral three times a day with meals  erythromycin    ethylsuccinate Suspension 40 mG/mL 400 milliGRAM(s) Oral three times a day  heparin  Injectable 5000 Unit(s) SubCutaneous every 8 hours  insulin lispro (HumaLOG) corrective regimen sliding scale   SubCutaneous three times a day before meals  insulin lispro (HumaLOG) corrective regimen sliding scale   SubCutaneous at bedtime  lactated ringers. 1000 milliLiter(s) (50 mL/Hr) IV Continuous <Continuous>  megestrol Suspension 800 milliGRAM(s) Oral daily  metoprolol tartrate 50 milliGRAM(s) Oral two times a day  ondansetron Injectable 4 milliGRAM(s) IV Push once  pantoprazole    Tablet 40 milliGRAM(s) Oral before breakfast  potassium chloride    Tablet ER 40 milliEquivalent(s) Oral once  sodium bicarbonate 650 milliGRAM(s) Oral three times a day    MEDICATIONS  (PRN):        Physical Exam:  Gen: NAD  LS: nml respiratory effort  Card: pulse regularly present  GI: abd soft, nontender, PTC tube clamped  Ext: warm      Labs:    09-01    138  |  97  |  113<H>  ----------------------------<  133<H>  3.4<L>   |  24  |  6.60<H>    Ca    8.7      01 Sep 2018 07:21  Phos  5.7     09-01  Mg     2.0     09-01    TPro  5.8<L>  /  Alb  3.0<L>  /  TBili  2.1<H>  /  DBili  1.1<H>  /  AST  32  /  ALT  40  /  AlkPhos  132<H>  09-01    LIVER FUNCTIONS - ( 01 Sep 2018 07:21 )  Alb: 3.0 g/dL / Pro: 5.8 g/dL / ALK PHOS: 132 U/L / ALT: 40 U/L / AST: 32 U/L / GGT: x                                 12.2   6.9   )-----------( 258      ( 31 Aug 2018 04:14 )             35.5     PT/INR - ( 31 Aug 2018 04:14 )   PT: 15.1 sec;   INR: 1.38 ratio         PTT - ( 31 Aug 2018 04:14 )  PTT:31.9 sec          Imaging:  EXAM:  XR CHEST PORTABLE ROUTINE 1V                          PROCEDURE DATE:  08/31/2018      INTERPRETATION:  CLINICAL INFORMATION: Shortness of breath. Evaluate for   pulmonary edema..    A single portable view of the chest was obtained.     Comparison: 8/30/2018.     The mediastinal cardiac silhouette is unremarkable. Loop recorder.    The lungs are clear.     No acute osseous finding.     IMPRESSION:    No acute process.

## 2018-09-01 NOTE — PROGRESS NOTE ADULT - PROBLEM SELECTOR PLAN 2
HAGMA in setting of uremia and MANNY , lactate normal.  Continue with Na Bicarb 650 mg PO TID  monitor serum CO2 level;

## 2018-09-01 NOTE — PROGRESS NOTE ADULT - PROBLEM SELECTOR PLAN 1
Likely hemodynamically mediated MANNY in the setting of septic shock. Likely ischemic/atn Pt with normal baseline renal function.   SCr ~ 0.94 on 08/20. now trending down 6.6  Continue with IV fluids.   No uremic symptoms, K+ wnl  No urgent indication for HD at this time.  Consent obtained/placed in charts in event HD becomes imminent.  Monitor BMP. Strict I/Os. Avoid nephrotoxins. Renally dose all medications.  For atrial fibrillation if needed eliquis start 2.5 BID.

## 2018-09-02 LAB
ALBUMIN SERPL ELPH-MCNC: 2.9 G/DL — LOW (ref 3.3–5)
ALP SERPL-CCNC: 258 U/L — HIGH (ref 40–120)
ALT FLD-CCNC: 80 U/L — HIGH (ref 10–45)
ANION GAP SERPL CALC-SCNC: 14 MMOL/L — SIGNIFICANT CHANGE UP (ref 5–17)
ANION GAP SERPL CALC-SCNC: 15 MMOL/L — SIGNIFICANT CHANGE UP (ref 5–17)
AST SERPL-CCNC: 87 U/L — HIGH (ref 10–40)
BILIRUB DIRECT SERPL-MCNC: 1.2 MG/DL — HIGH (ref 0–0.2)
BILIRUB INDIRECT FLD-MCNC: 0.8 MG/DL — SIGNIFICANT CHANGE UP (ref 0.2–1)
BILIRUB SERPL-MCNC: 2 MG/DL — HIGH (ref 0.2–1.2)
BUN SERPL-MCNC: 104 MG/DL — HIGH (ref 7–23)
BUN SERPL-MCNC: 93 MG/DL — HIGH (ref 7–23)
CALCIUM SERPL-MCNC: 8.4 MG/DL — SIGNIFICANT CHANGE UP (ref 8.4–10.5)
CALCIUM SERPL-MCNC: 8.5 MG/DL — SIGNIFICANT CHANGE UP (ref 8.4–10.5)
CHLORIDE SERPL-SCNC: 99 MMOL/L — SIGNIFICANT CHANGE UP (ref 96–108)
CHLORIDE SERPL-SCNC: 99 MMOL/L — SIGNIFICANT CHANGE UP (ref 96–108)
CO2 SERPL-SCNC: 26 MMOL/L — SIGNIFICANT CHANGE UP (ref 22–31)
CO2 SERPL-SCNC: 26 MMOL/L — SIGNIFICANT CHANGE UP (ref 22–31)
CREAT SERPL-MCNC: 5.05 MG/DL — HIGH (ref 0.5–1.3)
CREAT SERPL-MCNC: 5.52 MG/DL — HIGH (ref 0.5–1.3)
CULTURE RESULTS: SIGNIFICANT CHANGE UP
GLUCOSE SERPL-MCNC: 186 MG/DL — HIGH (ref 70–99)
GLUCOSE SERPL-MCNC: 197 MG/DL — HIGH (ref 70–99)
HCT VFR BLD CALC: 33.5 % — LOW (ref 39–50)
HGB BLD-MCNC: 11.9 G/DL — LOW (ref 13–17)
MAGNESIUM SERPL-MCNC: 1.8 MG/DL — SIGNIFICANT CHANGE UP (ref 1.6–2.6)
MAGNESIUM SERPL-MCNC: 1.9 MG/DL — SIGNIFICANT CHANGE UP (ref 1.6–2.6)
MCHC RBC-ENTMCNC: 32 PG — SIGNIFICANT CHANGE UP (ref 27–34)
MCHC RBC-ENTMCNC: 35.5 GM/DL — SIGNIFICANT CHANGE UP (ref 32–36)
MCV RBC AUTO: 90.1 FL — SIGNIFICANT CHANGE UP (ref 80–100)
PHOSPHATE SERPL-MCNC: 3.8 MG/DL — SIGNIFICANT CHANGE UP (ref 2.5–4.5)
PHOSPHATE SERPL-MCNC: 4.1 MG/DL — SIGNIFICANT CHANGE UP (ref 2.5–4.5)
PLATELET # BLD AUTO: 244 K/UL — SIGNIFICANT CHANGE UP (ref 150–400)
POTASSIUM SERPL-MCNC: 3.2 MMOL/L — LOW (ref 3.5–5.3)
POTASSIUM SERPL-MCNC: 3.6 MMOL/L — SIGNIFICANT CHANGE UP (ref 3.5–5.3)
POTASSIUM SERPL-SCNC: 3.2 MMOL/L — LOW (ref 3.5–5.3)
POTASSIUM SERPL-SCNC: 3.6 MMOL/L — SIGNIFICANT CHANGE UP (ref 3.5–5.3)
PROT SERPL-MCNC: 5.7 G/DL — LOW (ref 6–8.3)
RBC # BLD: 3.72 M/UL — LOW (ref 4.2–5.8)
RBC # FLD: 12.8 % — SIGNIFICANT CHANGE UP (ref 10.3–14.5)
SODIUM SERPL-SCNC: 139 MMOL/L — SIGNIFICANT CHANGE UP (ref 135–145)
SODIUM SERPL-SCNC: 140 MMOL/L — SIGNIFICANT CHANGE UP (ref 135–145)
SPECIMEN SOURCE: SIGNIFICANT CHANGE UP
WBC # BLD: 5.86 K/UL — SIGNIFICANT CHANGE UP (ref 3.8–10.5)
WBC # FLD AUTO: 5.86 K/UL — SIGNIFICANT CHANGE UP (ref 3.8–10.5)

## 2018-09-02 RX ORDER — POLYETHYLENE GLYCOL 3350 17 G/17G
17 POWDER, FOR SOLUTION ORAL DAILY
Qty: 0 | Refills: 0 | Status: DISCONTINUED | OUTPATIENT
Start: 2018-09-02 | End: 2018-09-06

## 2018-09-02 RX ADMIN — Medication 1334 MILLIGRAM(S): at 18:10

## 2018-09-02 RX ADMIN — POLYETHYLENE GLYCOL 3350 17 GRAM(S): 17 POWDER, FOR SOLUTION ORAL at 23:59

## 2018-09-02 RX ADMIN — Medication 2: at 18:09

## 2018-09-02 RX ADMIN — Medication 400 MILLIGRAM(S): at 01:27

## 2018-09-02 RX ADMIN — APIXABAN 2.5 MILLIGRAM(S): 2.5 TABLET, FILM COATED ORAL at 18:13

## 2018-09-02 RX ADMIN — MEGESTROL ACETATE 800 MILLIGRAM(S): 40 SUSPENSION ORAL at 13:05

## 2018-09-02 RX ADMIN — Medication 2: at 13:05

## 2018-09-02 RX ADMIN — PANTOPRAZOLE SODIUM 40 MILLIGRAM(S): 20 TABLET, DELAYED RELEASE ORAL at 05:18

## 2018-09-02 RX ADMIN — SODIUM CHLORIDE 50 MILLILITER(S): 9 INJECTION, SOLUTION INTRAVENOUS at 18:10

## 2018-09-02 RX ADMIN — Medication 400 MILLIGRAM(S): at 13:53

## 2018-09-02 RX ADMIN — Medication 2: at 07:52

## 2018-09-02 RX ADMIN — Medication 400 MILLIGRAM(S): at 08:22

## 2018-09-02 RX ADMIN — APIXABAN 2.5 MILLIGRAM(S): 2.5 TABLET, FILM COATED ORAL at 05:19

## 2018-09-02 RX ADMIN — Medication 25 MILLIGRAM(S): at 18:10

## 2018-09-02 RX ADMIN — Medication 650 MILLIGRAM(S): at 13:53

## 2018-09-02 RX ADMIN — Medication 1334 MILLIGRAM(S): at 07:53

## 2018-09-02 RX ADMIN — Medication 25 MILLIGRAM(S): at 05:18

## 2018-09-02 RX ADMIN — Medication 1334 MILLIGRAM(S): at 13:05

## 2018-09-02 RX ADMIN — Medication 650 MILLIGRAM(S): at 05:18

## 2018-09-02 NOTE — PROGRESS NOTE ADULT - SUBJECTIVE AND OBJECTIVE BOX
General Surgery Progress Note    SUBJECTIVE:  The patient was seen and examined. No acute events overnight. OOB, denies N/V.      OBJECTIVE:     ** VITAL SIGNS / I&O's **    Vital Signs Last 24 Hrs  T(C): 36.7 (02 Sep 2018 05:18), Max: 36.9 (01 Sep 2018 22:00)  T(F): 98.1 (02 Sep 2018 05:18), Max: 98.5 (02 Sep 2018 01:44)  HR: 69 (02 Sep 2018 05:18) (65 - 91)  BP: 154/64 (02 Sep 2018 05:18) (116/64 - 154/64)  BP(mean): --  RR: 18 (02 Sep 2018 05:18) (18 - 18)  SpO2: 97% (02 Sep 2018 05:18) (96% - 99%)      31 Aug 2018 07:01  -  01 Sep 2018 07:00  --------------------------------------------------------  IN:    lactated ringers.: 1050 mL    Oral Fluid: 360 mL    sodium chloride 0.45%: 200 mL  Total IN: 1610 mL    OUT:    Voided: 1475 mL  Total OUT: 1475 mL    Total NET: 135 mL      01 Sep 2018 07:01  -  02 Sep 2018 06:55  --------------------------------------------------------  IN:    Oral Fluid: 1100 mL  Total IN: 1100 mL    OUT:    Voided: 2150 mL  Total OUT: 2150 mL    Total NET: -1050 mL          ** PHYSICAL EXAM **    -- CONSTITUTIONAL: Alert, NAD  -- PULMONARY: non-labored respirations  -- ABDOMEN: soft, non-distended, non-tender, PTC capped, SS output  -- NEURO: A&Ox3    ** LABS **                          11.9   5.72  )-----------( 268      ( 01 Sep 2018 10:26 )             35.3     01 Sep 2018 17:36    137    |  100    |  102    ----------------------------<  186    3.9     |  20     |  5.95     Ca    8.2        01 Sep 2018 17:36  Phos  4.9       01 Sep 2018 17:36  Mg     1.9       01 Sep 2018 17:36    TPro  5.8    /  Alb  2.5    /  TBili  1.9    /  DBili  0.9    /  AST  34     /  ALT  42     /  AlkPhos  134    01 Sep 2018 17:36    PT/INR - ( 01 Sep 2018 10:26 )   PT: 14.3 sec;   INR: 1.26 ratio         PTT - ( 01 Sep 2018 10:26 )  PTT:31.6 sec  CAPILLARY BLOOD GLUCOSE      POCT Blood Glucose.: 207 mg/dL (01 Sep 2018 21:31)  POCT Blood Glucose.: 214 mg/dL (01 Sep 2018 18:41)  POCT Blood Glucose.: 199 mg/dL (01 Sep 2018 14:37)  POCT Blood Glucose.: 180 mg/dL (01 Sep 2018 11:08)        LIVER FUNCTIONS - ( 01 Sep 2018 17:36 )  Alb: 2.5 g/dL / Pro: 5.8 g/dL / ALK PHOS: 134 U/L / ALT: 42 U/L / AST: 34 U/L / GGT: x                 MEDICATIONS  (STANDING):  apixaban 2.5 milliGRAM(s) Oral every 12 hours  calcium acetate 1334 milliGRAM(s) Oral three times a day with meals  erythromycin    ethylsuccinate Suspension 40 mG/mL 400 milliGRAM(s) Oral three times a day  insulin lispro (HumaLOG) corrective regimen sliding scale   SubCutaneous three times a day before meals  insulin lispro (HumaLOG) corrective regimen sliding scale   SubCutaneous at bedtime  lactated ringers. 1000 milliLiter(s) (50 mL/Hr) IV Continuous <Continuous>  megestrol Suspension 800 milliGRAM(s) Oral daily  metoprolol tartrate 25 milliGRAM(s) Oral two times a day  ondansetron Injectable 4 milliGRAM(s) IV Push once  pantoprazole    Tablet 40 milliGRAM(s) Oral before breakfast  sodium bicarbonate 650 milliGRAM(s) Oral three times a day    MEDICATIONS  (PRN): General Surgery Progress Note    SUBJECTIVE:  The patient was seen and examined. No acute events overnight. OOB, emma reg diet, denies N/V.     OBJECTIVE:     ** VITAL SIGNS / I&O's **    Vital Signs Last 24 Hrs  T(C): 36.7 (02 Sep 2018 05:18), Max: 36.9 (01 Sep 2018 22:00)  T(F): 98.1 (02 Sep 2018 05:18), Max: 98.5 (02 Sep 2018 01:44)  HR: 69 (02 Sep 2018 05:18) (65 - 91)  BP: 154/64 (02 Sep 2018 05:18) (116/64 - 154/64)  BP(mean): --  RR: 18 (02 Sep 2018 05:18) (18 - 18)  SpO2: 97% (02 Sep 2018 05:18) (96% - 99%)      31 Aug 2018 07:01  -  01 Sep 2018 07:00  --------------------------------------------------------  IN:    lactated ringers.: 1050 mL    Oral Fluid: 360 mL    sodium chloride 0.45%: 200 mL  Total IN: 1610 mL    OUT:    Voided: 1475 mL  Total OUT: 1475 mL    Total NET: 135 mL      01 Sep 2018 07:01  -  02 Sep 2018 06:55  --------------------------------------------------------  IN:    Oral Fluid: 1100 mL  Total IN: 1100 mL    OUT:    Voided: 2150 mL  Total OUT: 2150 mL    Total NET: -1050 mL          ** PHYSICAL EXAM **    -- CONSTITUTIONAL: Alert, NAD  -- PULMONARY: non-labored respirations  -- ABDOMEN: soft, non-distended, non-tender, PTC capped, SS output  -- NEURO: A&Ox3    ** LABS **                          11.9   5.72  )-----------( 268      ( 01 Sep 2018 10:26 )             35.3     01 Sep 2018 17:36    137    |  100    |  102    ----------------------------<  186    3.9     |  20     |  5.95     Ca    8.2        01 Sep 2018 17:36  Phos  4.9       01 Sep 2018 17:36  Mg     1.9       01 Sep 2018 17:36    TPro  5.8    /  Alb  2.5    /  TBili  1.9    /  DBili  0.9    /  AST  34     /  ALT  42     /  AlkPhos  134    01 Sep 2018 17:36    PT/INR - ( 01 Sep 2018 10:26 )   PT: 14.3 sec;   INR: 1.26 ratio         PTT - ( 01 Sep 2018 10:26 )  PTT:31.6 sec  CAPILLARY BLOOD GLUCOSE      POCT Blood Glucose.: 207 mg/dL (01 Sep 2018 21:31)  POCT Blood Glucose.: 214 mg/dL (01 Sep 2018 18:41)  POCT Blood Glucose.: 199 mg/dL (01 Sep 2018 14:37)  POCT Blood Glucose.: 180 mg/dL (01 Sep 2018 11:08)        LIVER FUNCTIONS - ( 01 Sep 2018 17:36 )  Alb: 2.5 g/dL / Pro: 5.8 g/dL / ALK PHOS: 134 U/L / ALT: 42 U/L / AST: 34 U/L / GGT: x                 MEDICATIONS  (STANDING):  apixaban 2.5 milliGRAM(s) Oral every 12 hours  calcium acetate 1334 milliGRAM(s) Oral three times a day with meals  erythromycin    ethylsuccinate Suspension 40 mG/mL 400 milliGRAM(s) Oral three times a day  insulin lispro (HumaLOG) corrective regimen sliding scale   SubCutaneous three times a day before meals  insulin lispro (HumaLOG) corrective regimen sliding scale   SubCutaneous at bedtime  lactated ringers. 1000 milliLiter(s) (50 mL/Hr) IV Continuous <Continuous>  megestrol Suspension 800 milliGRAM(s) Oral daily  metoprolol tartrate 25 milliGRAM(s) Oral two times a day  ondansetron Injectable 4 milliGRAM(s) IV Push once  pantoprazole    Tablet 40 milliGRAM(s) Oral before breakfast  sodium bicarbonate 650 milliGRAM(s) Oral three times a day    MEDICATIONS  (PRN):

## 2018-09-02 NOTE — PROGRESS NOTE ADULT - ASSESSMENT
78yo M with an extensive cardiac h/x on Eliquis for Afib, diagnosed with distal cholangiocarcinoma s/p PTC placement by IR into segment 6 of the liver on 8/16 after unsuccessful ERCP c/b high PTC output resulting in pre-renal MANNY, acidosis, hyperkalemia. Presented with hypotension in setting of cardiomyopathy, awaiting medical optimization for pancreaticoduodenectomy. TTE demonstrated EF of 60% w/ normal LV.    Plan:  - Per Cardio: metoprolol at 25mg BID, increase to 50mg if symptomatic  - Per Nephro: resumed Eliquis at 2.5mg BID  - Trend Cr, currently improving  - Keep PTC clamped, flush BID  - Abx d/c, monitor CBC (WBC)  - OOB ambulate   - has been afebrile, however, if febrile- perform fever workup and uncap PTC     Hemalatha Tracy, PGY-1  [facility] General Surgery [phone]

## 2018-09-03 LAB
ALBUMIN SERPL ELPH-MCNC: 2.9 G/DL — LOW (ref 3.3–5)
ALP SERPL-CCNC: 259 U/L — HIGH (ref 40–120)
ALT FLD-CCNC: 68 U/L — HIGH (ref 10–45)
ANION GAP SERPL CALC-SCNC: 11 MMOL/L — SIGNIFICANT CHANGE UP (ref 5–17)
ANION GAP SERPL CALC-SCNC: 14 MMOL/L — SIGNIFICANT CHANGE UP (ref 5–17)
AST SERPL-CCNC: 52 U/L — HIGH (ref 10–40)
BILIRUB DIRECT SERPL-MCNC: 1 MG/DL — HIGH (ref 0–0.2)
BILIRUB INDIRECT FLD-MCNC: 0.8 MG/DL — SIGNIFICANT CHANGE UP (ref 0.2–1)
BILIRUB SERPL-MCNC: 1.8 MG/DL — HIGH (ref 0.2–1.2)
BUN SERPL-MCNC: 83 MG/DL — HIGH (ref 7–23)
BUN SERPL-MCNC: 84 MG/DL — HIGH (ref 7–23)
CALCIUM SERPL-MCNC: 8.5 MG/DL — SIGNIFICANT CHANGE UP (ref 8.4–10.5)
CALCIUM SERPL-MCNC: 8.8 MG/DL — SIGNIFICANT CHANGE UP (ref 8.4–10.5)
CHLORIDE SERPL-SCNC: 100 MMOL/L — SIGNIFICANT CHANGE UP (ref 96–108)
CHLORIDE SERPL-SCNC: 98 MMOL/L — SIGNIFICANT CHANGE UP (ref 96–108)
CO2 SERPL-SCNC: 27 MMOL/L — SIGNIFICANT CHANGE UP (ref 22–31)
CO2 SERPL-SCNC: 27 MMOL/L — SIGNIFICANT CHANGE UP (ref 22–31)
CREAT SERPL-MCNC: 4.36 MG/DL — HIGH (ref 0.5–1.3)
CREAT SERPL-MCNC: 4.51 MG/DL — HIGH (ref 0.5–1.3)
GLUCOSE SERPL-MCNC: 200 MG/DL — HIGH (ref 70–99)
GLUCOSE SERPL-MCNC: 203 MG/DL — HIGH (ref 70–99)
HCT VFR BLD CALC: 33.9 % — LOW (ref 39–50)
HGB BLD-MCNC: 11.7 G/DL — LOW (ref 13–17)
MAGNESIUM SERPL-MCNC: 1.7 MG/DL — SIGNIFICANT CHANGE UP (ref 1.6–2.6)
MCHC RBC-ENTMCNC: 31.3 PG — SIGNIFICANT CHANGE UP (ref 27–34)
MCHC RBC-ENTMCNC: 34.5 GM/DL — SIGNIFICANT CHANGE UP (ref 32–36)
MCV RBC AUTO: 90.6 FL — SIGNIFICANT CHANGE UP (ref 80–100)
PHOSPHATE SERPL-MCNC: 3.2 MG/DL — SIGNIFICANT CHANGE UP (ref 2.5–4.5)
PLATELET # BLD AUTO: 230 K/UL — SIGNIFICANT CHANGE UP (ref 150–400)
POTASSIUM SERPL-MCNC: 4 MMOL/L — SIGNIFICANT CHANGE UP (ref 3.5–5.3)
POTASSIUM SERPL-MCNC: 4.4 MMOL/L — SIGNIFICANT CHANGE UP (ref 3.5–5.3)
POTASSIUM SERPL-SCNC: 4 MMOL/L — SIGNIFICANT CHANGE UP (ref 3.5–5.3)
POTASSIUM SERPL-SCNC: 4.4 MMOL/L — SIGNIFICANT CHANGE UP (ref 3.5–5.3)
PROT SERPL-MCNC: 5.6 G/DL — LOW (ref 6–8.3)
RBC # BLD: 3.74 M/UL — LOW (ref 4.2–5.8)
RBC # FLD: 12.8 % — SIGNIFICANT CHANGE UP (ref 10.3–14.5)
SODIUM SERPL-SCNC: 138 MMOL/L — SIGNIFICANT CHANGE UP (ref 135–145)
SODIUM SERPL-SCNC: 139 MMOL/L — SIGNIFICANT CHANGE UP (ref 135–145)
WBC # BLD: 5.62 K/UL — SIGNIFICANT CHANGE UP (ref 3.8–10.5)
WBC # FLD AUTO: 5.62 K/UL — SIGNIFICANT CHANGE UP (ref 3.8–10.5)

## 2018-09-03 RX ADMIN — Medication 400 MILLIGRAM(S): at 00:01

## 2018-09-03 RX ADMIN — Medication 2: at 13:01

## 2018-09-03 RX ADMIN — PANTOPRAZOLE SODIUM 40 MILLIGRAM(S): 20 TABLET, DELAYED RELEASE ORAL at 06:57

## 2018-09-03 RX ADMIN — Medication 1334 MILLIGRAM(S): at 08:16

## 2018-09-03 RX ADMIN — Medication 400 MILLIGRAM(S): at 14:40

## 2018-09-03 RX ADMIN — MEGESTROL ACETATE 800 MILLIGRAM(S): 40 SUSPENSION ORAL at 12:59

## 2018-09-03 RX ADMIN — Medication 25 MILLIGRAM(S): at 17:58

## 2018-09-03 RX ADMIN — Medication 2: at 08:16

## 2018-09-03 RX ADMIN — Medication 650 MILLIGRAM(S): at 00:00

## 2018-09-03 RX ADMIN — Medication 400 MILLIGRAM(S): at 06:56

## 2018-09-03 RX ADMIN — Medication 25 MILLIGRAM(S): at 06:57

## 2018-09-03 RX ADMIN — APIXABAN 2.5 MILLIGRAM(S): 2.5 TABLET, FILM COATED ORAL at 17:58

## 2018-09-03 RX ADMIN — Medication 2: at 17:58

## 2018-09-03 RX ADMIN — Medication 650 MILLIGRAM(S): at 06:57

## 2018-09-03 RX ADMIN — Medication 1334 MILLIGRAM(S): at 17:58

## 2018-09-03 RX ADMIN — APIXABAN 2.5 MILLIGRAM(S): 2.5 TABLET, FILM COATED ORAL at 06:56

## 2018-09-03 RX ADMIN — POLYETHYLENE GLYCOL 3350 17 GRAM(S): 17 POWDER, FOR SOLUTION ORAL at 13:02

## 2018-09-03 RX ADMIN — Medication 1334 MILLIGRAM(S): at 12:59

## 2018-09-03 RX ADMIN — Medication 400 MILLIGRAM(S): at 23:08

## 2018-09-03 RX ADMIN — SODIUM CHLORIDE 50 MILLILITER(S): 9 INJECTION, SOLUTION INTRAVENOUS at 23:14

## 2018-09-03 RX ADMIN — Medication 650 MILLIGRAM(S): at 23:08

## 2018-09-03 RX ADMIN — Medication 650 MILLIGRAM(S): at 14:40

## 2018-09-03 NOTE — PROGRESS NOTE ADULT - SUBJECTIVE AND OBJECTIVE BOX
[facility] Surgery Progress Note     Subjective/24hour Events: No acute events overnight. C/o constipation and received Miralax. Pain controlled. Tolerating diet. No N/V. No CP or SOB.    Vital Signs:  Vital Signs Last 24 Hrs  T(C): 37.1 (03 Sep 2018 05:17), Max: 37.3 (03 Sep 2018 01:27)  T(F): 98.8 (03 Sep 2018 05:17), Max: 99.1 (03 Sep 2018 01:27)  HR: 83 (03 Sep 2018 05:17) (69 - 86)  BP: 162/77 (03 Sep 2018 05:17) (128/67 - 162/77)  BP(mean): --  RR: 18 (03 Sep 2018 05:17) (16 - 18)  SpO2: 98% (03 Sep 2018 05:17) (98% - 98%)    CAPILLARY BLOOD GLUCOSE      POCT Blood Glucose.: 191 mg/dL (03 Sep 2018 08:13)  POCT Blood Glucose.: 218 mg/dL (02 Sep 2018 21:41)  POCT Blood Glucose.: 181 mg/dL (02 Sep 2018 18:03)  POCT Blood Glucose.: 176 mg/dL (02 Sep 2018 13:03)      I&O's Detail    02 Sep 2018 07:01  -  03 Sep 2018 07:00  --------------------------------------------------------  IN:    lactated ringers.: 1200 mL    Oral Fluid: 960 mL  Total IN: 2160 mL    OUT:    Voided: 2100 mL  Total OUT: 2100 mL    Total NET: 60 mL            MEDICATIONS  (STANDING):  apixaban 2.5 milliGRAM(s) Oral every 12 hours  calcium acetate 1334 milliGRAM(s) Oral three times a day with meals  erythromycin    ethylsuccinate Suspension 40 mG/mL 400 milliGRAM(s) Oral three times a day  insulin lispro (HumaLOG) corrective regimen sliding scale   SubCutaneous three times a day before meals  insulin lispro (HumaLOG) corrective regimen sliding scale   SubCutaneous at bedtime  lactated ringers. 1000 milliLiter(s) (50 mL/Hr) IV Continuous <Continuous>  megestrol Suspension 800 milliGRAM(s) Oral daily  metoprolol tartrate 25 milliGRAM(s) Oral two times a day  ondansetron Injectable 4 milliGRAM(s) IV Push once  pantoprazole    Tablet 40 milliGRAM(s) Oral before breakfast  polyethylene glycol 3350 17 Gram(s) Oral daily  sodium bicarbonate 650 milliGRAM(s) Oral three times a day    MEDICATIONS  (PRN):        Physical Exam:  Gen: NAD  LS: nml respiratory effort  Card: pulse regularly present  GI: abd soft, nontender  Ext: warm      Labs:    09-02    140  |  99  |  93<H>  ----------------------------<  197<H>  3.6   |  26  |  5.05<H>    Ca    8.4      02 Sep 2018 20:06  Phos  3.8     09-02  Mg     1.8     09-02    TPro  5.7<L>  /  Alb  2.9<L>  /  TBili  2.0<H>  /  DBili  1.2<H>  /  AST  87<H>  /  ALT  80<H>  /  AlkPhos  258<H>  09-02    LIVER FUNCTIONS - ( 02 Sep 2018 20:06 )  Alb: 2.9 g/dL / Pro: 5.7 g/dL / ALK PHOS: 258 U/L / ALT: 80 U/L / AST: 87 U/L / GGT: x                                 11.9   5.86  )-----------( 244      ( 02 Sep 2018 10:09 )             33.5     PT/INR - ( 01 Sep 2018 10:26 )   PT: 14.3 sec;   INR: 1.26 ratio         PTT - ( 01 Sep 2018 10:26 )  PTT:31.6 sec

## 2018-09-03 NOTE — PROGRESS NOTE ADULT - ASSESSMENT
78yo M with an extensive cardiac h/x on Eliquis for Afib, diagnosed with distal cholangiocarcinoma s/p PTC placement by IR into segment 6 of the liver on 8/16 after unsuccessful ERCP c/b high PTC output resulting in pre-renal MANNY, acidosis, hyperkalemia. Presented with hypotension in setting of cardiomyopathy, awaiting medical optimization for pancreaticoduodenectomy. TTE demonstrated EF of 60% w/ normal LV.    Plan:  - Per Cardio: metoprolol at 25mg BID, increase to 50mg if symptomatic  - Per Nephro: resumed Eliquis at 2.5mg BID  - Trend Cr, currently improving (5.52 --> 5.05 --> )  - Keep PTC clamped, flush BID  - Abx d/c, monitor CBC (WBC)  - OOB ambulate   - has been afebrile, however, if febrile- perform fever workup and uncap PTC

## 2018-09-04 LAB
ANION GAP SERPL CALC-SCNC: 11 MMOL/L — SIGNIFICANT CHANGE UP (ref 5–17)
BUN SERPL-MCNC: 75 MG/DL — HIGH (ref 7–23)
CALCIUM SERPL-MCNC: 8.5 MG/DL — SIGNIFICANT CHANGE UP (ref 8.4–10.5)
CHLORIDE SERPL-SCNC: 99 MMOL/L — SIGNIFICANT CHANGE UP (ref 96–108)
CO2 SERPL-SCNC: 26 MMOL/L — SIGNIFICANT CHANGE UP (ref 22–31)
CREAT SERPL-MCNC: 3.93 MG/DL — HIGH (ref 0.5–1.3)
GLUCOSE SERPL-MCNC: 144 MG/DL — HIGH (ref 70–99)
HCT VFR BLD CALC: 32.4 % — LOW (ref 39–50)
HGB BLD-MCNC: 10.8 G/DL — LOW (ref 13–17)
MAGNESIUM SERPL-MCNC: 1.7 MG/DL — SIGNIFICANT CHANGE UP (ref 1.6–2.6)
MCHC RBC-ENTMCNC: 30.1 PG — SIGNIFICANT CHANGE UP (ref 27–34)
MCHC RBC-ENTMCNC: 33.3 GM/DL — SIGNIFICANT CHANGE UP (ref 32–36)
MCV RBC AUTO: 90.3 FL — SIGNIFICANT CHANGE UP (ref 80–100)
PHOSPHATE SERPL-MCNC: 3.3 MG/DL — SIGNIFICANT CHANGE UP (ref 2.5–4.5)
PLATELET # BLD AUTO: 231 K/UL — SIGNIFICANT CHANGE UP (ref 150–400)
POTASSIUM SERPL-MCNC: 3.3 MMOL/L — LOW (ref 3.5–5.3)
POTASSIUM SERPL-SCNC: 3.3 MMOL/L — LOW (ref 3.5–5.3)
RBC # BLD: 3.59 M/UL — LOW (ref 4.2–5.8)
RBC # FLD: 12.6 % — SIGNIFICANT CHANGE UP (ref 10.3–14.5)
SODIUM SERPL-SCNC: 136 MMOL/L — SIGNIFICANT CHANGE UP (ref 135–145)
WBC # BLD: 6.64 K/UL — SIGNIFICANT CHANGE UP (ref 3.8–10.5)
WBC # FLD AUTO: 6.64 K/UL — SIGNIFICANT CHANGE UP (ref 3.8–10.5)

## 2018-09-04 PROCEDURE — 99232 SBSQ HOSP IP/OBS MODERATE 35: CPT | Mod: GC

## 2018-09-04 RX ORDER — POTASSIUM CHLORIDE 20 MEQ
40 PACKET (EA) ORAL ONCE
Qty: 0 | Refills: 0 | Status: DISCONTINUED | OUTPATIENT
Start: 2018-09-04 | End: 2018-09-04

## 2018-09-04 RX ORDER — POTASSIUM CHLORIDE 20 MEQ
20 PACKET (EA) ORAL ONCE
Qty: 0 | Refills: 0 | Status: COMPLETED | OUTPATIENT
Start: 2018-09-04 | End: 2018-09-04

## 2018-09-04 RX ADMIN — POLYETHYLENE GLYCOL 3350 17 GRAM(S): 17 POWDER, FOR SOLUTION ORAL at 11:16

## 2018-09-04 RX ADMIN — SODIUM CHLORIDE 50 MILLILITER(S): 9 INJECTION, SOLUTION INTRAVENOUS at 14:49

## 2018-09-04 RX ADMIN — Medication 400 MILLIGRAM(S): at 14:49

## 2018-09-04 RX ADMIN — Medication 650 MILLIGRAM(S): at 06:51

## 2018-09-04 RX ADMIN — Medication 25 MILLIGRAM(S): at 17:50

## 2018-09-04 RX ADMIN — APIXABAN 2.5 MILLIGRAM(S): 2.5 TABLET, FILM COATED ORAL at 17:50

## 2018-09-04 RX ADMIN — Medication 4: at 18:36

## 2018-09-04 RX ADMIN — Medication 2: at 09:07

## 2018-09-04 RX ADMIN — Medication 0: at 22:14

## 2018-09-04 RX ADMIN — Medication 20 MILLIEQUIVALENT(S): at 12:16

## 2018-09-04 RX ADMIN — MEGESTROL ACETATE 800 MILLIGRAM(S): 40 SUSPENSION ORAL at 11:16

## 2018-09-04 RX ADMIN — Medication 650 MILLIGRAM(S): at 12:51

## 2018-09-04 RX ADMIN — Medication 25 MILLIGRAM(S): at 06:51

## 2018-09-04 RX ADMIN — SODIUM CHLORIDE 50 MILLILITER(S): 9 INJECTION, SOLUTION INTRAVENOUS at 12:16

## 2018-09-04 RX ADMIN — Medication 400 MILLIGRAM(S): at 06:51

## 2018-09-04 RX ADMIN — Medication 650 MILLIGRAM(S): at 22:14

## 2018-09-04 RX ADMIN — APIXABAN 2.5 MILLIGRAM(S): 2.5 TABLET, FILM COATED ORAL at 06:52

## 2018-09-04 RX ADMIN — Medication 2: at 12:45

## 2018-09-04 RX ADMIN — SODIUM CHLORIDE 50 MILLILITER(S): 9 INJECTION, SOLUTION INTRAVENOUS at 22:15

## 2018-09-04 RX ADMIN — PANTOPRAZOLE SODIUM 40 MILLIGRAM(S): 20 TABLET, DELAYED RELEASE ORAL at 06:52

## 2018-09-04 RX ADMIN — Medication 1334 MILLIGRAM(S): at 12:51

## 2018-09-04 RX ADMIN — Medication 400 MILLIGRAM(S): at 22:13

## 2018-09-04 RX ADMIN — Medication 1334 MILLIGRAM(S): at 09:07

## 2018-09-04 NOTE — PROVIDER CONTACT NOTE (OTHER) - ACTION/TREATMENT ORDERED:
will continue to monitor
500cc NS bolus; Continue to monitor and notify provider for UO < 30cc/hr
MD notified, instructed to draw regular AM labs and to not activate q12h labs.
MD notified, instructed to do EKG, then called back and dc'd EKG, instructed to give 25mg metoprolol now, daily dosage increased to 50mg as per cardiology note, will continue to monitor.
SICU team assessed pt at bedside; EKG performed; Continue to monitor and notify provider for s/s r/t bradycardia or any other VS changes; No other interventions ordered at this time

## 2018-09-04 NOTE — PROVIDER CONTACT NOTE (OTHER) - SITUATION
UO low - 25cc and 20cc for last two hours respectively
Pt bradycardic - HR 46bpm
Pt called RN into room, stated he felt like he was in Afib.
Pt has labs ordered q12 for activation. Contacting MD to see if these were ordered from SICU or we need to continue doing labs q12h.
/80, JR 80

## 2018-09-04 NOTE — PROGRESS NOTE ADULT - PROBLEM SELECTOR PLAN 1
Likely hemodynamically mediated MANNY in the setting of septic shock. Likely ischemic/atn Pt with normal baseline renal function (SCr ~ 0.94 on 08/20).  Scr now trending down to 3.9  No uremic symptoms, K+ wnl  No indication for HD.  Please arrange follow up in renal clinic @ [address] [place] [address]  [phone]) 1-2 weeks after discharge .

## 2018-09-04 NOTE — PROVIDER CONTACT NOTE (OTHER) - BACKGROUND
cholangiocarcinoma
Pt has history of Afib. Admitted for increased PTC drain output.
Transferred from SICU 8/31
cholangiocarcinoma
Admitted with septic shock

## 2018-09-04 NOTE — PROVIDER CONTACT NOTE (OTHER) - REASON
Labs ordered q12h
Pt bradycardic - HR 46bpm
Pt feels he is in AFib
UO low - 25cc and 20cc for last two hours respectively
Elevated BP

## 2018-09-04 NOTE — PROGRESS NOTE ADULT - SUBJECTIVE AND OBJECTIVE BOX
[facility], [place] DIVISION OF KIDNEY DISEASES AND HYPERTENSION -- FOLLOW UP NOTE  --------------------------------------------------------------------------------  Chief Complaint:  MANNY    24 hour events/subjective:  no acute events.  pt has no complaint.        PAST HISTORY  --------------------------------------------------------------------------------  No significant changes to PMH, PSH, FHx, SHx, unless otherwise noted    ALLERGIES & MEDICATIONS  --------------------------------------------------------------------------------  Allergies    No Known Allergies    Intolerances      Standing Inpatient Medications  apixaban 2.5 milliGRAM(s) Oral every 12 hours  calcium acetate 1334 milliGRAM(s) Oral three times a day with meals  erythromycin    ethylsuccinate Suspension 40 mG/mL 400 milliGRAM(s) Oral three times a day  insulin lispro (HumaLOG) corrective regimen sliding scale   SubCutaneous three times a day before meals  insulin lispro (HumaLOG) corrective regimen sliding scale   SubCutaneous at bedtime  lactated ringers. 1000 milliLiter(s) IV Continuous <Continuous>  megestrol Suspension 800 milliGRAM(s) Oral daily  metoprolol tartrate 25 milliGRAM(s) Oral two times a day  ondansetron Injectable 4 milliGRAM(s) IV Push once  pantoprazole    Tablet 40 milliGRAM(s) Oral before breakfast  polyethylene glycol 3350 17 Gram(s) Oral daily  potassium chloride    Tablet ER 20 milliEquivalent(s) Oral once  sodium bicarbonate 650 milliGRAM(s) Oral three times a day    PRN Inpatient Medications      REVIEW OF SYSTEMS  --------------------------------------------------------------------------------  Gen: No weight changes, fatigue, fevers/chills, weakness  Skin: No rashes  Head/Eyes/Ears/Mouth: No headache; Normal hearing; Normal vision w/o blurriness; No sinus pain/discomfort, sore throat  Respiratory: No dyspnea, cough, wheezing, hemoptysis  CV: No chest pain, PND, orthopnea  GI: No abdominal pain, diarrhea, constipation, nausea, vomiting, melena, hematochezia  : No increased frequency, dysuria, hematuria, nocturia  MSK: No joint pain/swelling; no back pain; no edema  Neuro: No dizziness/lightheadedness, weakness, seizures, numbness, tingling      VITALS/PHYSICAL EXAM  --------------------------------------------------------------------------------  T(C): 37 (09-04-18 @ 10:00), Max: 37.3 (09-03-18 @ 14:38)  HR: 88 (09-04-18 @ 10:00) (78 - 90)  BP: 134/72 (09-04-18 @ 10:00) (130/80 - 158/78)  RR: 19 (09-04-18 @ 10:00) (18 - 19)  SpO2: 96% (09-04-18 @ 10:00) (96% - 98%)  Wt(kg): --    Weight (kg): 92.3 (09-03-18 @ 12:44)      09-03-18 @ 07:01  -  09-04-18 @ 07:00  --------------------------------------------------------  IN: 1920 mL / OUT: 2300 mL / NET: -380 mL    09-04-18 @ 07:01  -  09-04-18 @ 12:10  --------------------------------------------------------  IN: 0 mL / OUT: 250 mL / NET: -250 mL      Physical Exam:  	Gen: NAD  	HEENT:  supple neck  	Pulm: CTA B/L  	CV: RRR, S1S2; no rub  	Back: No spinal or CVA tenderness  	Abd: +BS, soft, nontender/nondistended  	: No suprapubic tenderness  	UE: Warm, no edema; no asterixis  	LE: Warm, no edema  	Neuro: No focal deficits, intact gait  	Psych: Normal affect and mood  	Skin: Warm, without rashes      LABS/STUDIES  --------------------------------------------------------------------------------              10.8   6.64  >-----------<  231      [09-04-18 @ 09:02]              32.4     136  |  99  |  75  ----------------------------<  144      [09-04-18 @ 07:50]  3.3   |  26  |  3.93        Ca     8.5     [09-04-18 @ 07:50]      Mg     1.7     [09-04-18 @ 07:50]      Phos  3.3     [09-04-18 @ 07:50]    TPro  5.6  /  Alb  2.9  /  TBili  1.8  /  DBili  1.0  /  AST  52  /  ALT  68  /  AlkPhos  259  [09-03-18 @ 18:24]          Creatinine Trend:  SCr 3.93 [09-04 @ 07:50]  SCr 4.36 [09-03 @ 18:24]  SCr 4.51 [09-03 @ 10:45]  SCr 5.05 [09-02 @ 20:06]  SCr 5.52 [09-02 @ 07:38]    Urinalysis - [08-28-18 @ 21:55]      Color Yellow / Appearance SL Turbid / SG 1.015 / pH 5.5      Gluc Negative / Ketone Negative  / Bili Negative / Urobili Negative       Blood Large / Protein 30 / Leuk Est Negative / Nitrite Negative      RBC >50 / WBC 2-5 / Hyaline 2-5 / Gran  / Sq Epi  / Non Sq Epi Occasional / Bacteria Few    Urine Creatinine 167      [08-28-18 @ 18:25]  Urine Sodium <20      [08-28-18 @ 18:25]  Urine Urea Nitrogen 375      [08-28-18 @ 21:32]  Urine Potassium 64      [08-28-18 @ 18:25]  Urine Osmolality 358      [08-28-18 @ 18:25]    HbA1c 6.9      [08-29-18 @ 07:46]  TSH 1.23      [08-02-18 @ 05:55]      LUZ ELENA: titer Negative, pattern --      [08-28-18 @ 19:46]  ANCA: cANCA Negative, pANCA Negative, atypical ANCA Negative      [08-28-18 @ 19:46]  anti-GBM <0.2      [08-29-18 @ 05:43]

## 2018-09-04 NOTE — PROVIDER CONTACT NOTE (OTHER) - ASSESSMENT
UO low - 25cc and 20cc for last two hours respectively; Ness patent; Bradycardic at times - VSS at this time
HR ranging from 70's to lows 100's. Last /80.
Last labs done around 0600 8/31
Pt bradycardic - HR 46bpm; All other VSS at this time; Pt A&Ox4 and denies any discomfort; UO 30+ cc/hr
/80, JR 80. Pt is asymptomatic with no c/o chest pian, ,SOB or any acute distress

## 2018-09-04 NOTE — CHART NOTE - NSCHARTNOTEFT_GEN_A_CORE
Patient seen for malnutrition follow-up on 2MON.     Pertinent Information: This is a77yo M with an extensive cardiac h/x on Eliquis for Afib, diagnosed with distal cholangiocarcinoma s/p PTC placement by IR into segment 6 of the liver on 8/16 after unsuccessful ERCP c/b high PTC output resulting in pre-renal MANNY, acidosis, hyperkalemia. Presented with hypotension in setting of cardiomyopathy, awaiting medical optimization for pancreaticoduodenectomy.    Source: Patient [x ]    Family [ x]     other [ ]    Diet : CSTCHOSN + Renal + Nepro 2x per day       Patient reports [ ] nausea  [ ] vomiting [ ] diarrhea [ ] constipation  [ ]chewing problems [ ] swallowing issues  [ ] other: Pt denies any acute GI distress. Previously reported constipation however now improved with bowel regimen. Last BM noted in chart on 8/30      PO intake:  < 50% [ ] 50-75% [ ]   % [ x]  other :     Source for PO intake [ x] Patient [ x] family [ ] chart [ ] staff [ ] other    Current Weight: Weight (kg): 92.3 (09-03 @ 12:44)--increase in weight noted, will continue to monitor. Likely related to fluid shifts.   84.5 Kg,     Pertinent Medications: MEDICATIONS  (STANDING):  apixaban 2.5 milliGRAM(s) Oral every 12 hours  calcium acetate 1334 milliGRAM(s) Oral three times a day with meals  erythromycin    ethylsuccinate Suspension 40 mG/mL 400 milliGRAM(s) Oral three times a day  insulin lispro (HumaLOG) corrective regimen sliding scale   SubCutaneous three times a day before meals  insulin lispro (HumaLOG) corrective regimen sliding scale   SubCutaneous at bedtime  lactated ringers. 1000 milliLiter(s) (50 mL/Hr) IV Continuous <Continuous>  megestrol Suspension 800 milliGRAM(s) Oral daily  metoprolol tartrate 25 milliGRAM(s) Oral two times a day  ondansetron Injectable 4 milliGRAM(s) IV Push once  pantoprazole    Tablet 40 milliGRAM(s) Oral before breakfast  polyethylene glycol 3350 17 Gram(s) Oral daily  potassium chloride    Tablet ER 20 milliEquivalent(s) Oral once  sodium bicarbonate 650 milliGRAM(s) Oral three times a day    MEDICATIONS  (PRN):    Pertinent Labs:  09-04 Na136 mmol/L Glu 144 mg/dL<H> K+ 3.3 mmol/L<L> Cr  3.93 mg/dL<H> BUN 75 mg/dL<H> 09-04 Phos 3.3 mg/dL 09-03 Alb 2.9 g/dL<L> 08-29 [id number] 6.9 %<H>  POCT Glucose: 9/4: 108-181mg/dL, 9/3: 158-196mg/dL, 9/2: 176-218mg/dL     Skin: free of pressure injuries   No edema     Estimated Needs:   [x ] no change since previous assessment  [ ] recalculated:       Previous Nutrition Diagnosis:      [x ] Malnutrition (severe)      Nutrition Diagnosis is [ x] ongoing  [ ] resolved [ ] not applicable : being addressed with PO diet and nutrition supplements       New Nutrition Diagnosis: [x ] not applicable         Interventions:     Recommend  1. Recommend liberalize diet to carbohydrate consistent (evening snack) + low sodium + no concentrated phosphorous diet given potassium now WNL.   2. Continue Nepro 2x per day   3. Obtain/honor food preferences as able.   4. Provided diet education review per pt request.      Monitoring and Evaluation:     [x ] PO intake [x ] Tolerance to diet prescription [x ] weights [x] follow up per protocol    [x ] other: RD to remain available and follow-up as medically appropriate.  Lucretia Noriega RD, CDN, Pager # [phone]

## 2018-09-04 NOTE — PROGRESS NOTE ADULT - ASSESSMENT
78yo M with an extensive cardiac h/x on Eliquis for Afib, diagnosed with distal cholangiocarcinoma s/p PTC placement by IR into segment 6 of the liver on 8/16 after unsuccessful ERCP c/b high PTC output resulting in pre-renal MANNY, acidosis, hyperkalemia. Presented with hypotension in setting of cardiomyopathy, awaiting medical optimization for pancreaticoduodenectomy. TTE demonstrated EF of 60% w/ normal LV.    Plan:  - Check orthostatics  - F/u nephro for medical optimization recs  - Per Cardio: metoprolol at 25mg BID, increase to 50mg if symptomatic  - Trend Cr, currently improving (4.36--> 3.93)  - Keep PTC clamped, flush BID  - Abx d/c, monitor CBC (WBC)  - OOB  - has been afebrile, however, if febrile- perform fever workup and uncap PTC     Hemalatha Tracy, PGY-1  [facility] General Surgery [phone]

## 2018-09-04 NOTE — PROGRESS NOTE ADULT - SUBJECTIVE AND OBJECTIVE BOX
General Surgery Progress Note    SUBJECTIVE:  The patient was seen and examined. No acute events overnight. C/o dizziness o/n. Sari regular diet. Denies N/V.     OBJECTIVE:     ** VITAL SIGNS / I&O's **    Vital Signs Last 24 Hrs  T(C): 37 (04 Sep 2018 06:47), Max: 37.3 (03 Sep 2018 14:38)  T(F): 98.6 (04 Sep 2018 06:47), Max: 99.1 (03 Sep 2018 14:38)  HR: 78 (04 Sep 2018 06:47) (75 - 90)  BP: 158/78 (04 Sep 2018 06:47) (120/70 - 158/78)  BP(mean): --  RR: 18 (04 Sep 2018 06:47) (18 - 18)  SpO2: 96% (04 Sep 2018 06:47) (96% - 98%)      03 Sep 2018 07:01  -  04 Sep 2018 07:00  --------------------------------------------------------  IN:    lactated ringers.: 1200 mL    Oral Fluid: 720 mL  Total IN: 1920 mL    OUT:    Voided: 2300 mL  Total OUT: 2300 mL    Total NET: -380 mL          ** PHYSICAL EXAM **    -- CONSTITUTIONAL: Alert, NAD  -- PULMONARY: non-labored respirations  -- ABDOMEN: soft, non-distended, non-tender  -- NEURO: A&Ox3    ** LABS **                          10.8   6.64  )-----------( 231      ( 04 Sep 2018 09:02 )             32.4     04 Sep 2018 07:50    136    |  99     |  75     ----------------------------<  144    3.3     |  26     |  3.93     Ca    8.5        04 Sep 2018 07:50  Phos  3.3       04 Sep 2018 07:50  Mg     1.7       04 Sep 2018 07:50    TPro  5.6    /  Alb  2.9    /  TBili  1.8    /  DBili  1.0    /  AST  52     /  ALT  68     /  AlkPhos  259    03 Sep 2018 18:24      CAPILLARY BLOOD GLUCOSE      POCT Blood Glucose.: 180 mg/dL (04 Sep 2018 09:02)  POCT Blood Glucose.: 181 mg/dL (03 Sep 2018 22:14)  POCT Blood Glucose.: 196 mg/dL (03 Sep 2018 17:56)  POCT Blood Glucose.: 158 mg/dL (03 Sep 2018 12:55)        LIVER FUNCTIONS - ( 03 Sep 2018 18:24 )  Alb: 2.9 g/dL / Pro: 5.6 g/dL / ALK PHOS: 259 U/L / ALT: 68 U/L / AST: 52 U/L / GGT: x                 MEDICATIONS  (STANDING):  apixaban 2.5 milliGRAM(s) Oral every 12 hours  calcium acetate 1334 milliGRAM(s) Oral three times a day with meals  erythromycin    ethylsuccinate Suspension 40 mG/mL 400 milliGRAM(s) Oral three times a day  insulin lispro (HumaLOG) corrective regimen sliding scale   SubCutaneous three times a day before meals  insulin lispro (HumaLOG) corrective regimen sliding scale   SubCutaneous at bedtime  lactated ringers. 1000 milliLiter(s) (50 mL/Hr) IV Continuous <Continuous>  megestrol Suspension 800 milliGRAM(s) Oral daily  metoprolol tartrate 25 milliGRAM(s) Oral two times a day  ondansetron Injectable 4 milliGRAM(s) IV Push once  pantoprazole    Tablet 40 milliGRAM(s) Oral before breakfast  polyethylene glycol 3350 17 Gram(s) Oral daily  potassium chloride    Tablet ER 40 milliEquivalent(s) Oral once  sodium bicarbonate 650 milliGRAM(s) Oral three times a day    MEDICATIONS  (PRN):

## 2018-09-05 ENCOUNTER — APPOINTMENT (OUTPATIENT)
Dept: PULMONOLOGY | Facility: CLINIC | Age: 78
End: 2018-09-05

## 2018-09-05 LAB
ALBUMIN SERPL ELPH-MCNC: 2.7 G/DL — LOW (ref 3.3–5)
ALP SERPL-CCNC: 218 U/L — HIGH (ref 40–120)
ALT FLD-CCNC: 51 U/L — HIGH (ref 10–45)
ANION GAP SERPL CALC-SCNC: 12 MMOL/L — SIGNIFICANT CHANGE UP (ref 5–17)
AST SERPL-CCNC: 31 U/L — SIGNIFICANT CHANGE UP (ref 10–40)
BILIRUB SERPL-MCNC: 1.3 MG/DL — HIGH (ref 0.2–1.2)
BUN SERPL-MCNC: 69 MG/DL — HIGH (ref 7–23)
CALCIUM SERPL-MCNC: 8.3 MG/DL — LOW (ref 8.4–10.5)
CHLORIDE SERPL-SCNC: 103 MMOL/L — SIGNIFICANT CHANGE UP (ref 96–108)
CO2 SERPL-SCNC: 27 MMOL/L — SIGNIFICANT CHANGE UP (ref 22–31)
CREAT SERPL-MCNC: 3.94 MG/DL — HIGH (ref 0.5–1.3)
GLUCOSE SERPL-MCNC: 167 MG/DL — HIGH (ref 70–99)
HCT VFR BLD CALC: 33.5 % — LOW (ref 39–50)
HGB BLD-MCNC: 11.4 G/DL — LOW (ref 13–17)
MAGNESIUM SERPL-MCNC: 1.7 MG/DL — SIGNIFICANT CHANGE UP (ref 1.6–2.6)
MCHC RBC-ENTMCNC: 31.6 PG — SIGNIFICANT CHANGE UP (ref 27–34)
MCHC RBC-ENTMCNC: 34 GM/DL — SIGNIFICANT CHANGE UP (ref 32–36)
MCV RBC AUTO: 92.8 FL — SIGNIFICANT CHANGE UP (ref 80–100)
PHOSPHATE SERPL-MCNC: 2.9 MG/DL — SIGNIFICANT CHANGE UP (ref 2.5–4.5)
PLATELET # BLD AUTO: 258 K/UL — SIGNIFICANT CHANGE UP (ref 150–400)
POTASSIUM SERPL-MCNC: 3.9 MMOL/L — SIGNIFICANT CHANGE UP (ref 3.5–5.3)
POTASSIUM SERPL-SCNC: 3.9 MMOL/L — SIGNIFICANT CHANGE UP (ref 3.5–5.3)
PROT SERPL-MCNC: 5.4 G/DL — LOW (ref 6–8.3)
RBC # BLD: 3.61 M/UL — LOW (ref 4.2–5.8)
RBC # FLD: 12.4 % — SIGNIFICANT CHANGE UP (ref 10.3–14.5)
SODIUM SERPL-SCNC: 142 MMOL/L — SIGNIFICANT CHANGE UP (ref 135–145)
WBC # BLD: 7.95 K/UL — SIGNIFICANT CHANGE UP (ref 3.8–10.5)
WBC # FLD AUTO: 7.95 K/UL — SIGNIFICANT CHANGE UP (ref 3.8–10.5)

## 2018-09-05 PROCEDURE — 99232 SBSQ HOSP IP/OBS MODERATE 35: CPT | Mod: GC

## 2018-09-05 RX ORDER — POTASSIUM CHLORIDE 20 MEQ
10 PACKET (EA) ORAL ONCE
Qty: 0 | Refills: 0 | Status: DISCONTINUED | OUTPATIENT
Start: 2018-09-05 | End: 2018-09-05

## 2018-09-05 RX ORDER — MAGNESIUM SULFATE 500 MG/ML
2 VIAL (ML) INJECTION ONCE
Qty: 0 | Refills: 0 | Status: COMPLETED | OUTPATIENT
Start: 2018-09-05 | End: 2018-09-05

## 2018-09-05 RX ADMIN — Medication 400 MILLIGRAM(S): at 21:37

## 2018-09-05 RX ADMIN — APIXABAN 2.5 MILLIGRAM(S): 2.5 TABLET, FILM COATED ORAL at 06:09

## 2018-09-05 RX ADMIN — Medication 400 MILLIGRAM(S): at 15:08

## 2018-09-05 RX ADMIN — PANTOPRAZOLE SODIUM 40 MILLIGRAM(S): 20 TABLET, DELAYED RELEASE ORAL at 06:09

## 2018-09-05 RX ADMIN — Medication 0: at 22:51

## 2018-09-05 RX ADMIN — Medication 50 GRAM(S): at 11:04

## 2018-09-05 RX ADMIN — Medication 30 MILLILITER(S): at 11:04

## 2018-09-05 RX ADMIN — Medication 25 MILLIGRAM(S): at 06:09

## 2018-09-05 RX ADMIN — Medication 400 MILLIGRAM(S): at 06:10

## 2018-09-05 RX ADMIN — POLYETHYLENE GLYCOL 3350 17 GRAM(S): 17 POWDER, FOR SOLUTION ORAL at 11:04

## 2018-09-05 RX ADMIN — Medication 650 MILLIGRAM(S): at 21:37

## 2018-09-05 RX ADMIN — Medication 2: at 18:33

## 2018-09-05 RX ADMIN — Medication 2: at 14:11

## 2018-09-05 RX ADMIN — Medication 650 MILLIGRAM(S): at 14:12

## 2018-09-05 RX ADMIN — Medication 25 MILLIGRAM(S): at 17:20

## 2018-09-05 RX ADMIN — APIXABAN 2.5 MILLIGRAM(S): 2.5 TABLET, FILM COATED ORAL at 17:20

## 2018-09-05 RX ADMIN — Medication 650 MILLIGRAM(S): at 06:09

## 2018-09-05 NOTE — PROGRESS NOTE ADULT - SUBJECTIVE AND OBJECTIVE BOX
Surgery Progress Note: [facility]     S: Patient seen and examined. No acute events overnight. Still with complaint of slight dizziness when standing up. No pain. Tolerating regular diet. Denies N/V.     O:  Vital Signs Last 24 Hrs  T(C): 36.9 (05 Sep 2018 06:10), Max: 37.3 (04 Sep 2018 14:09)  T(F): 98.5 (05 Sep 2018 06:10), Max: 99.2 (04 Sep 2018 17:48)  HR: 82 (05 Sep 2018 06:10) (79 - 89)  BP: 146/84 (05 Sep 2018 06:10) (134/72 - 169/70)  BP(mean): --  RR: 18 (05 Sep 2018 06:10) (18 - 19)  SpO2: 96% (05 Sep 2018 06:10) (96% - 98%)    I&O's Detail    04 Sep 2018 07:01  -  05 Sep 2018 07:00  --------------------------------------------------------  IN:    lactated ringers.: 1200 mL    Oral Fluid: 1240 mL  Total IN: 2440 mL    OUT:    Voided: 2050 mL  Total OUT: 2050 mL    Total NET: 390 mL          MEDICATIONS  (STANDING):  apixaban 2.5 milliGRAM(s) Oral every 12 hours  erythromycin    ethylsuccinate Suspension 40 mG/mL 400 milliGRAM(s) Oral three times a day  insulin lispro (HumaLOG) corrective regimen sliding scale   SubCutaneous three times a day before meals  insulin lispro (HumaLOG) corrective regimen sliding scale   SubCutaneous at bedtime  lactated ringers. 1000 milliLiter(s) (50 mL/Hr) IV Continuous <Continuous>  megestrol Suspension 800 milliGRAM(s) Oral daily  metoprolol tartrate 25 milliGRAM(s) Oral two times a day  ondansetron Injectable 4 milliGRAM(s) IV Push once  pantoprazole    Tablet 40 milliGRAM(s) Oral before breakfast  polyethylene glycol 3350 17 Gram(s) Oral daily  sodium bicarbonate 650 milliGRAM(s) Oral three times a day    MEDICATIONS  (PRN):                            10.8   6.64  )-----------( 231      ( 04 Sep 2018 09:02 )             32.4       09-04    136  |  99  |  75<H>  ----------------------------<  144<H>  3.3<L>   |  26  |  3.93<H>    Ca    8.5      04 Sep 2018 07:50  Phos  3.3     09-04  Mg     1.7     09-04    TPro  5.6<L>  /  Alb  2.9<L>  /  TBili  1.8<H>  /  DBili  1.0<H>  /  AST  52<H>  /  ALT  68<H>  /  AlkPhos  259<H>  09-03      Physical Exam:  Gen: Laying in bed, NAD, AAO X 3  Abdomen: soft, nontender, non-distended

## 2018-09-05 NOTE — PROGRESS NOTE ADULT - PROBLEM SELECTOR PROBLEM 2
Metabolic acidosis

## 2018-09-05 NOTE — PROGRESS NOTE ADULT - PROBLEM SELECTOR PLAN 1
Likely hemodynamically mediated MANNY in the setting of septic shock. Likely ischemic ATN. Pt with normal baseline renal function (SCr ~ 0.94 on 08/20).  Scr now stable at 3.9  No uremic symptoms, K+ wnl  No indication for HD.  Pt with good po intake, now with trace leg edema. recommend to c/c IV fluids.  Please arrange follow up in renal clinic @ [address] [place] [address]  ([phone]) 1-2 weeks after discharge .

## 2018-09-05 NOTE — PROGRESS NOTE ADULT - PROBLEM SELECTOR PROBLEM 1
MANNY (acute kidney injury)

## 2018-09-05 NOTE — PROGRESS NOTE ADULT - ASSESSMENT
78yo M with an extensive cardiac h/x on Eliquis for Afib, diagnosed with distal cholangiocarcinoma s/p PTC placement by IR into segment 6 of the liver on 8/16 after unsuccessful ERCP c/b high PTC output resulting in pre-renal MANNY, acidosis, hyperkalemia. Presented with hypotension in setting of cardiomyopathy, awaiting medical optimization for pancreaticoduodenectomy. TTE demonstrated EF of 60% w/ normal LV.    Plan:  - F/u nephro for medical optimization recs  - Per Cardio: metoprolol at 25mg BID, increase to 50mg if symptomatic  - Trend Cr, currently improving, f/u labs from this morning   - Keep PTC clamped, flush BID  - Abx d/c, monitor CBC (WBC)  - OOB  - has been afebrile, however, if febrile- perform fever workup and uncap PTC     Valerie Mcarthur Team General Surgery [phone] 76yo M with an extensive cardiac h/x on Eliquis for Afib, diagnosed with distal cholangiocarcinoma s/p PTC placement by IR into segment 6 of the liver on 8/16 after unsuccessful ERCP c/b high PTC output resulting in pre-renal MANNY, acidosis, hyperkalemia. Presented with hypotension in setting of cardiomyopathy, awaiting medical optimization for pancreaticoduodenectomy. TTE demonstrated EF of 60% w/ normal LV.    Plan:  - F/u nephro for medical optimization recs  - Per Cardio: metoprolol at 25mg BID, increase to 50mg if symptomatic  - Trend Cr, currently improving, f/u labs from this morning   - Keep PTC clamped, flush BID  - Abx d/c, monitor CBC (WBC)  - OOB  - has been afebrile, however, if febrile- perform fever workup and uncap PTC   - IR consulted for tube check to evaluate location     Valerie Mcarthur Team General Surgery [phone]

## 2018-09-05 NOTE — PROGRESS NOTE ADULT - ASSESSMENT
76 yo man with HTN, pre-diabetes, HLD, afib ablation 2004/2005 and DCCV 2017 on Eliquis with ILR, GERD. Initially presented to [facility] ED on 8/9 with 2 days of dark urine with CBD mass for ERCP likely 2/2 cholangiocarcinoma vs ampullary neoplasm without met disease.   AFib/flutter with RVR, suspicion of tachy-georges syndrome, CHADsVASC 4. Recovered stress-induced cardiomyopathy.    #AFib/flutter with RVR currently - rate controlled  -  c/w metoprolol, and increase to 50mg BID.  -  Would suggest checking daily ECG given pt is not on tele.  -  C/w Full AC w/ Eliquis    -  If planning of going for surgery would hold AC 2-3 days prior to surgery.     #HFrEF  - EF recovered on latest TTE  - monitor Cr  - would hold off on diuretics at this time.   - Pt has no signs of volume overload at this time, and is optimized for surgery     #HTN  - Well controlled currently     Lydia Olivera MD  Cardiology Fellow - PGY-4  JOJO: [id number]  NS: [phone]  [id number] 78 yo man with HTN, pre-diabetes, HLD, afib ablation 2004/2005 and DCCV 2017 on Eliquis with ILR, GERD. Initially presented to [place] ED on 8/9 with 2 days of dark urine with CBD mass for ERCP likely 2/2 cholangiocarcinoma vs ampullary neoplasm without met disease.   AFib/flutter with RVR, suspicion of tachy-georges syndrome, CHADsVASC 4. Recovered stress-induced cardiomyopathy.    #AFib/flutter with RVR currently - rate controlled  -  c/w metoprolol, and increase to 50mg BID.  -  Would suggest checking daily ECG given pt is not on tele.  -  C/w Full AC w/ Eliquis, will likely need to increase dose to 5 mg once Cr <1.5   -  If planning of going for surgery would hold AC 2-3 days prior to surgery.     #HFrEF  - EF recovered on latest TTE  - monitor Cr  - would hold off on diuretics at this time, but would also stop IVF and encourage PO intake.   - Pt has no signs of volume overload at this time, and is optimized for surgery     #HTN  - Well controlled currently     Lydia Olivera MD  Cardiology Fellow - PGY-4  LIKIMO: [id number]  NS: [phone]  [id number]

## 2018-09-05 NOTE — PROGRESS NOTE ADULT - SUBJECTIVE AND OBJECTIVE BOX
Patient seen and examined at bedside.    Overnight Events:       REVIEW OF SYSTEMS:  Constitutional:     [ ] negative [ ] fevers [ ] chills [ ] weight loss [ ] weight gain  HEENT:                  [ ] negative [ ] dry eyes [ ] eye irritation [ ] postnasal drip [ ] nasal congestion  CV:                         [ ] negative  [ ] chest pain [ ] orthopnea [ ] palpitations [ ] murmur  Resp:                     [ ] negative [ ] cough [ ] shortness of breath [ ] dyspnea [ ] wheezing [ ] sputum [ ]hemoptysis  GI:                          [ ] negative [ ] nausea [ ] vomiting [ ] diarrhea [ ] constipation [ ] abd pain [ ] dysphagia   :                        [ ] negative [ ] dysuria [ ] nocturia [ ] hematuria [ ] increased urinary frequency  Musculoskeletal: [ ] negative [ ] back pain [ ] myalgias [ ] arthralgias [ ] fracture  Skin:                       [ ] negative [ ] rash [ ] itch  Neurological:        [ ] negative [ ] headache [ ] dizziness [ ] syncope [ ] weakness [ ] numbness  Psychiatric:           [ ] negative [ ] anxiety [ ] depression  Endocrine:            [ ] negative [ ] diabetes [ ] thyroid problem  Heme/Lymph:      [ ] negative [ ] anemia [ ] bleeding problem  Allergic/Immune: [ ] negative [ ] itchy eyes [ ] nasal discharge [ ] hives [ ] angioedema    [ ] All other systems negative  [ ] Unable to assess ROS because sedated with anoxic brain injury.    Current Meds:  apixaban 2.5 milliGRAM(s) Oral every 12 hours  erythromycin    ethylsuccinate Suspension 40 mG/mL 400 milliGRAM(s) Oral three times a day  insulin lispro (HumaLOG) corrective regimen sliding scale   SubCutaneous three times a day before meals  insulin lispro (HumaLOG) corrective regimen sliding scale   SubCutaneous at bedtime  lactated ringers. 1000 milliLiter(s) IV Continuous <Continuous>  megestrol Suspension 800 milliGRAM(s) Oral daily  metoprolol tartrate 25 milliGRAM(s) Oral two times a day  ondansetron Injectable 4 milliGRAM(s) IV Push once  pantoprazole    Tablet 40 milliGRAM(s) Oral before breakfast  polyethylene glycol 3350 17 Gram(s) Oral daily  sodium bicarbonate 650 milliGRAM(s) Oral three times a day      PAST MEDICAL & SURGICAL HISTORY:  Male stress incontinence  Kidney stone: &quot;passed on own&quot;  Varicose vein: (L) 5 years ago  Bilateral cataracts  Appendicitis  Benign colon polyp  Prostate cancer: 2010  Afib: 2006 s/p ablation 2006 , afib resolved  Hyperlipidemia: X 4 years  GERD (Gastroesophageal Reflux Disease): X 10 years  DM (Diabetes Mellitus): X 3 years  Renal Calculi: 2008  MVP (Mitral Valve Prolapse): X 8 years  HTN - Hypertension: X 10 years, controlled  Status post left knee replacement  S/P ablation operation for arrhythmia  Male stress incontinence: s/p artifical urinary sphincter 5/2012  Varicose vein-s/p vein stripping 5 years ago  S/P arthroscopy: right shoulder 12/11  S/P prostatectomy: radical robotic 6/10  Cosmetic Surgery: chin implant 2004  S/P Colonoscopy with Polypectomy: 2009  S/P Appendectomy: 1950      Vitals:  T(F): 98.8 (09-05), Max: 99.2 (09-04)  HR: 87 (09-05) (78 - 89)  BP: 169/70 (09-05) (134/72 - 169/70)  RR: 18 (09-05)  SpO2: 97% (09-05)  I&O's Summary    03 Sep 2018 07:01  -  04 Sep 2018 07:00  --------------------------------------------------------  IN: 1920 mL / OUT: 2300 mL / NET: -380 mL    04 Sep 2018 07:01  -  05 Sep 2018 05:34  --------------------------------------------------------  IN: 1840 mL / OUT: 1350 mL / NET: 490 mL        Physical Exam:  Appearance: No acute distress; well appearing  Eyes: PERRL, EOMI, pink conjunctiva  HENT: Normal oral mucosa  Cardiovascular: RRR, S1, S2, no murmurs, rubs, or gallops; no edema; no JVD  Respiratory: Clear to auscultation bilaterally  Gastrointestinal: soft, non-tender, non-distended with normal bowel sounds  Musculoskeletal: No clubbing; no joint deformity   Neurologic: Non-focal  Lymphatic: No lymphadenopathy  Psychiatry: AAOx3, mood & affect appropriate  Skin: No rashes, ecchymoses, or cyanosis                          10.8   6.64  )-----------( 231      ( 04 Sep 2018 09:02 )             32.4     09-04    136  |  99  |  75<H>  ----------------------------<  144<H>  3.3<L>   |  26  |  3.93<H>    Ca    8.5      04 Sep 2018 07:50  Phos  3.3     09-04  Mg     1.7     09-04    TPro  5.6<L>  /  Alb  2.9<L>  /  TBili  1.8<H>  /  DBili  1.0<H>  /  AST  52<H>  /  ALT  68<H>  /  AlkPhos  259<H>  09-03                  New ECG(s): Personally reviewed    Echo:  < from: Limited Transthoracic Echo (08.29.18 @ 14:41) >  Limited study for biventricular function.  1. Normal left ventricular internal dimensions and wall  thicknesses.  2. Normal left ventricular systolic function.  EF 60%.  3. Normal right ventricular size and function.  *** Compared with echocardiogram of 8/20/2018, the  endocardium is better visualized on today's study.    < end of copied text >    Stress Testing:     Cath:    Imaging:    Interpretation of Telemetry: Not on tele Patient seen and examined at bedside.    Overnight Events:  No acute events overnight. Pt states he would like to go home.       REVIEW OF SYSTEMS:  Constitutional:     [x ] negative [ ] fevers [ ] chills [ ] weight loss [ ] weight gain  HEENT:                  [x ] negative [ ] dry eyes [ ] eye irritation [ ] postnasal drip [ ] nasal congestion  CV:                         [x ] negative  [ ] chest pain [ ] orthopnea [ ] palpitations [ ] murmur  Resp:                     [ x] negative [ ] cough [ ] shortness of breath [ ] dyspnea [ ] wheezing [ ] sputum [ ]hemoptysis  GI:                          [x ] negative [ ] nausea [ ] vomiting [ ] diarrhea [ ] constipation [ ] abd pain [ ] dysphagia   :                        [ x] negative [ ] dysuria [ ] nocturia [ ] hematuria [ ] increased urinary frequency  Musculoskeletal: [x ] negative [ ] back pain [ ] myalgias [ ] arthralgias [ ] fracture  Skin:                       [ x] negative [ ] rash [ ] itch  Neurological:        [x ] negative [ ] headache [ ] dizziness [ ] syncope [ ] weakness [ ] numbness  Psychiatric:           [x ] negative [ ] anxiety [ ] depression  Endocrine:            [x ] negative [ ] diabetes [ ] thyroid problem  Heme/Lymph:      [ x] negative [ ] anemia [ ] bleeding problem  Allergic/Immune: [x ] negative [ ] itchy eyes [ ] nasal discharge [ ] hives [ ] angioedema    [x ] All other systems negative  [ ] Unable to assess ROS because sedated with anoxic brain injury.    Current Meds:  apixaban 2.5 milliGRAM(s) Oral every 12 hours  erythromycin    ethylsuccinate Suspension 40 mG/mL 400 milliGRAM(s) Oral three times a day  insulin lispro (HumaLOG) corrective regimen sliding scale   SubCutaneous three times a day before meals  insulin lispro (HumaLOG) corrective regimen sliding scale   SubCutaneous at bedtime  lactated ringers. 1000 milliLiter(s) IV Continuous <Continuous>  megestrol Suspension 800 milliGRAM(s) Oral daily  metoprolol tartrate 25 milliGRAM(s) Oral two times a day  ondansetron Injectable 4 milliGRAM(s) IV Push once  pantoprazole    Tablet 40 milliGRAM(s) Oral before breakfast  polyethylene glycol 3350 17 Gram(s) Oral daily  sodium bicarbonate 650 milliGRAM(s) Oral three times a day      PAST MEDICAL & SURGICAL HISTORY:  Male stress incontinence  Kidney stone: &quot;passed on own&quot;  Varicose vein: (L) 5 years ago  Bilateral cataracts  Appendicitis  Benign colon polyp  Prostate cancer: 2010  Afib: 2006 s/p ablation 2006 , afib resolved  Hyperlipidemia: X 4 years  GERD (Gastroesophageal Reflux Disease): X 10 years  DM (Diabetes Mellitus): X 3 years  Renal Calculi: 2008  MVP (Mitral Valve Prolapse): X 8 years  HTN - Hypertension: X 10 years, controlled  Status post left knee replacement  S/P ablation operation for arrhythmia  Male stress incontinence: s/p artifical urinary sphincter 5/2012  Varicose vein-s/p vein stripping 5 years ago  S/P arthroscopy: right shoulder 12/11  S/P prostatectomy: radical robotic 6/10  Cosmetic Surgery: chin implant 2004  S/P Colonoscopy with Polypectomy: 2009  S/P Appendectomy: 1950      Vitals:  T(F): 98.8 (09-05), Max: 99.2 (09-04)  HR: 87 (09-05) (78 - 89)  BP: 169/70 (09-05) (134/72 - 169/70)  RR: 18 (09-05)  SpO2: 97% (09-05)  I&O's Summary    03 Sep 2018 07:01  -  04 Sep 2018 07:00  --------------------------------------------------------  IN: 1920 mL / OUT: 2300 mL / NET: -380 mL    04 Sep 2018 07:01  -  05 Sep 2018 05:34  --------------------------------------------------------  IN: 1840 mL / OUT: 1350 mL / NET: 490 mL        Physical Exam:  Appearance: No acute distress; well appearing  Eyes: PERRL, EOMI, pink conjunctiva  HENT: Normal oral mucosa  Cardiovascular: RRR, S1, S2, no murmurs, rubs, or gallops; no JVD. trace LE edema in feet.   Respiratory: Clear to auscultation bilaterally  Gastrointestinal: soft, non-tender, non-distended with normal bowel sounds  Musculoskeletal: No clubbing; no joint deformity   Neurologic: Non-focal  Lymphatic: No lymphadenopathy  Psychiatry: AAOx3, mood & affect appropriate  Skin: No rashes, ecchymoses, or cyanosis                          10.8   6.64  )-----------( 231      ( 04 Sep 2018 09:02 )             32.4     09-04    136  |  99  |  75<H>  ----------------------------<  144<H>  3.3<L>   |  26  |  3.93<H>    Ca    8.5      04 Sep 2018 07:50  Phos  3.3     09-04  Mg     1.7     09-04    TPro  5.6<L>  /  Alb  2.9<L>  /  TBili  1.8<H>  /  DBili  1.0<H>  /  AST  52<H>  /  ALT  68<H>  /  AlkPhos  259<H>  09-03                  New ECG(s): Personally reviewed    Echo:  < from: Limited Transthoracic Echo (08.29.18 @ 14:41) >  Limited study for biventricular function.  1. Normal left ventricular internal dimensions and wall  thicknesses.  2. Normal left ventricular systolic function.  EF 60%.  3. Normal right ventricular size and function.  *** Compared with echocardiogram of 8/20/2018, the  endocardium is better visualized on today's study.    < end of copied text >    Stress Testing:     Cath:    Imaging:    Interpretation of Telemetry: Not on tele

## 2018-09-05 NOTE — PROGRESS NOTE ADULT - NSHPATTENDINGPLANDISCUSS_GEN_ALL_CORE
[facility]/Dr. George
Surgical Critical Care. To reach Cardiology Attending call during weekdays Spectra [phone] or Fellow [phone].
Surgical Critical Care. To reach Cardiology Attending call during weekdays Spectra [phone] or Fellow [phone].
To reach Cardiology Attending call during weekdays Spectra [phone] or Fellow [phone].
SICU
med team
med team
SICU

## 2018-09-05 NOTE — PROGRESS NOTE ADULT - SUBJECTIVE AND OBJECTIVE BOX
[facility] DIVISION OF KIDNEY DISEASES AND HYPERTENSION -- FOLLOW UP NOTE  --------------------------------------------------------------------------------  Chief Complaint:  MANNY    24 hour events/subjective:  no acute events.  pt has no complaint.    PAST HISTORY  --------------------------------------------------------------------------------  No significant changes to PMH, PSH, FHx, SHx, unless otherwise noted    ALLERGIES & MEDICATIONS  --------------------------------------------------------------------------------  Allergies    No Known Allergies    Intolerances      Standing Inpatient Medications  apixaban 2.5 milliGRAM(s) Oral every 12 hours  erythromycin    ethylsuccinate Suspension 40 mG/mL 400 milliGRAM(s) Oral three times a day  insulin lispro (HumaLOG) corrective regimen sliding scale   SubCutaneous three times a day before meals  insulin lispro (HumaLOG) corrective regimen sliding scale   SubCutaneous at bedtime  lactated ringers. 1000 milliLiter(s) IV Continuous <Continuous>  magnesium sulfate  IVPB 2 Gram(s) IV Intermittent once  megestrol Suspension 800 milliGRAM(s) Oral daily  metoprolol tartrate 25 milliGRAM(s) Oral two times a day  ondansetron Injectable 4 milliGRAM(s) IV Push once  pantoprazole    Tablet 40 milliGRAM(s) Oral before breakfast  polyethylene glycol 3350 17 Gram(s) Oral daily  potassium chloride  10 mEq/100 mL IVPB 10 milliEquivalent(s) IV Intermittent once  sodium bicarbonate 650 milliGRAM(s) Oral three times a day    PRN Inpatient Medications      REVIEW OF SYSTEMS  --------------------------------------------------------------------------------  Gen: No weight changes, fatigue, fevers/chills, weakness  Skin: No rashes  Head/Eyes/Ears/Mouth: No headache; Normal hearing; Normal vision w/o blurriness; No sinus pain/discomfort, sore throat  Respiratory: No dyspnea, cough, wheezing, hemoptysis  CV: No chest pain, PND, orthopnea  GI: No abdominal pain, diarrhea, constipation, nausea, vomiting, melena, hematochezia  : No increased frequency, dysuria, hematuria, nocturia  MSK: No joint pain/swelling; no back pain; no edema  Neuro: No dizziness/lightheadedness, weakness, seizures, numbness, tingling  Heme: No easy bruising or bleeding  Endo: No heat/cold intolerance  Psych: No significant nervousness, anxiety, stress, depression    All other systems were reviewed and are negative, except as noted.    VITALS/PHYSICAL EXAM  --------------------------------------------------------------------------------  T(C): 36.9 (09-05-18 @ 06:10), Max: 37.3 (09-04-18 @ 14:09)  HR: 82 (09-05-18 @ 06:10) (79 - 89)  BP: 146/84 (09-05-18 @ 06:10) (134/72 - 169/70)  RR: 18 (09-05-18 @ 06:10) (18 - 19)  SpO2: 96% (09-05-18 @ 06:10) (96% - 98%)  Wt(kg): --    Weight (kg): 92 (09-04-18 @ 10:00)      09-04-18 @ 07:01  -  09-05-18 @ 07:00  --------------------------------------------------------  IN: 2440 mL / OUT: 2050 mL / NET: 390 mL      Physical Exam:  	Gen: NAD  	HEENT:  supple neck  	Pulm: CTA B/L  	CV: RRR, S1S2; no rub  	Back: No spinal or CVA tenderness  	Abd: +BS, soft, nontender/nondistended  	: No suprapubic tenderness  	UE: Warm, no edema; no asterixis  	LE: Warm,trace edema   	Neuro: No focal deficits, intact gait  	Psych: Normal affect and mood  	Skin: Warm, without rashes    LABS/STUDIES  --------------------------------------------------------------------------------              11.4   7.95  >-----------<  258      [09-05-18 @ 07:57]              33.5     142  |  103  |  69  ----------------------------<  167      [09-05-18 @ 07:09]  3.9   |  27  |  3.94        Ca     8.3     [09-05-18 @ 07:09]      Mg     1.7     [09-05-18 @ 07:09]      Phos  2.9     [09-05-18 @ 07:09]    TPro  5.4  /  Alb  2.7  /  TBili  1.3  /  DBili  x   /  AST  31  /  ALT  51  /  AlkPhos  218  [09-05-18 @ 07:09]          Creatinine Trend:  SCr 3.94 [09-05 @ 07:09]  SCr 3.93 [09-04 @ 07:50]  SCr 4.36 [09-03 @ 18:24]  SCr 4.51 [09-03 @ 10:45]  SCr 5.05 [09-02 @ 20:06]    Urinalysis - [08-28-18 @ 21:55]      Color Yellow / Appearance SL Turbid / SG 1.015 / pH 5.5      Gluc Negative / Ketone Negative  / Bili Negative / Urobili Negative       Blood Large / Protein 30 / Leuk Est Negative / Nitrite Negative      RBC >50 / WBC 2-5 / Hyaline 2-5 / Gran  / Sq Epi  / Non Sq Epi Occasional / Bacteria Few      HbA1c 6.9      [08-29-18 @ 07:46]  TSH 1.23      [08-02-18 @ 05:55]      LUZ ELENA: titer Negative, pattern --      [08-28-18 @ 19:46]  ANCA: cANCA Negative, pANCA Negative, atypical ANCA Negative      [08-28-18 @ 19:46]  anti-GBM <0.2      [08-29-18 @ 05:43]

## 2018-09-06 ENCOUNTER — TRANSCRIPTION ENCOUNTER (OUTPATIENT)
Age: 78
End: 2018-09-06

## 2018-09-06 VITALS
RESPIRATION RATE: 18 BRPM | HEART RATE: 95 BPM | TEMPERATURE: 98 F | SYSTOLIC BLOOD PRESSURE: 135 MMHG | DIASTOLIC BLOOD PRESSURE: 84 MMHG | OXYGEN SATURATION: 99 %

## 2018-09-06 LAB
ANION GAP SERPL CALC-SCNC: 11 MMOL/L — SIGNIFICANT CHANGE UP (ref 5–17)
BUN SERPL-MCNC: 63 MG/DL — HIGH (ref 7–23)
CALCIUM SERPL-MCNC: 8.4 MG/DL — SIGNIFICANT CHANGE UP (ref 8.4–10.5)
CHLORIDE SERPL-SCNC: 100 MMOL/L — SIGNIFICANT CHANGE UP (ref 96–108)
CO2 SERPL-SCNC: 27 MMOL/L — SIGNIFICANT CHANGE UP (ref 22–31)
CREAT SERPL-MCNC: 3.77 MG/DL — HIGH (ref 0.5–1.3)
GLUCOSE SERPL-MCNC: 178 MG/DL — HIGH (ref 70–99)
HCT VFR BLD CALC: 32.5 % — LOW (ref 39–50)
HGB BLD-MCNC: 11.2 G/DL — LOW (ref 13–17)
MAGNESIUM SERPL-MCNC: 2 MG/DL — SIGNIFICANT CHANGE UP (ref 1.6–2.6)
MCHC RBC-ENTMCNC: 31.7 PG — SIGNIFICANT CHANGE UP (ref 27–34)
MCHC RBC-ENTMCNC: 34.5 GM/DL — SIGNIFICANT CHANGE UP (ref 32–36)
MCV RBC AUTO: 92.1 FL — SIGNIFICANT CHANGE UP (ref 80–100)
PHOSPHATE SERPL-MCNC: 3.3 MG/DL — SIGNIFICANT CHANGE UP (ref 2.5–4.5)
PLATELET # BLD AUTO: 191 K/UL — SIGNIFICANT CHANGE UP (ref 150–400)
POTASSIUM SERPL-MCNC: 4.1 MMOL/L — SIGNIFICANT CHANGE UP (ref 3.5–5.3)
POTASSIUM SERPL-SCNC: 4.1 MMOL/L — SIGNIFICANT CHANGE UP (ref 3.5–5.3)
RBC # BLD: 3.53 M/UL — LOW (ref 4.2–5.8)
RBC # FLD: 13.1 % — SIGNIFICANT CHANGE UP (ref 10.3–14.5)
SODIUM SERPL-SCNC: 138 MMOL/L — SIGNIFICANT CHANGE UP (ref 135–145)
WBC # BLD: 9.75 K/UL — SIGNIFICANT CHANGE UP (ref 3.8–10.5)
WBC # FLD AUTO: 9.75 K/UL — SIGNIFICANT CHANGE UP (ref 3.8–10.5)

## 2018-09-06 PROCEDURE — 87102 FUNGUS ISOLATION CULTURE: CPT

## 2018-09-06 PROCEDURE — 82570 ASSAY OF URINE CREATININE: CPT

## 2018-09-06 PROCEDURE — 86850 RBC ANTIBODY SCREEN: CPT

## 2018-09-06 PROCEDURE — 82947 ASSAY GLUCOSE BLOOD QUANT: CPT

## 2018-09-06 PROCEDURE — 82962 GLUCOSE BLOOD TEST: CPT

## 2018-09-06 PROCEDURE — 80076 HEPATIC FUNCTION PANEL: CPT

## 2018-09-06 PROCEDURE — 97110 THERAPEUTIC EXERCISES: CPT

## 2018-09-06 PROCEDURE — 99285 EMERGENCY DEPT VISIT HI MDM: CPT

## 2018-09-06 PROCEDURE — 84145 PROCALCITONIN (PCT): CPT

## 2018-09-06 PROCEDURE — 86038 ANTINUCLEAR ANTIBODIES: CPT

## 2018-09-06 PROCEDURE — 84100 ASSAY OF PHOSPHORUS: CPT

## 2018-09-06 PROCEDURE — 86901 BLOOD TYPING SEROLOGIC RH(D): CPT

## 2018-09-06 PROCEDURE — 83605 ASSAY OF LACTIC ACID: CPT

## 2018-09-06 PROCEDURE — 87070 CULTURE OTHR SPECIMN AEROBIC: CPT

## 2018-09-06 PROCEDURE — 84295 ASSAY OF SERUM SODIUM: CPT

## 2018-09-06 PROCEDURE — 80053 COMPREHEN METABOLIC PANEL: CPT

## 2018-09-06 PROCEDURE — 81001 URINALYSIS AUTO W/SCOPE: CPT

## 2018-09-06 PROCEDURE — 82435 ASSAY OF BLOOD CHLORIDE: CPT

## 2018-09-06 PROCEDURE — 83036 HEMOGLOBIN GLYCOSYLATED A1C: CPT

## 2018-09-06 PROCEDURE — 71045 X-RAY EXAM CHEST 1 VIEW: CPT

## 2018-09-06 PROCEDURE — 83735 ASSAY OF MAGNESIUM: CPT

## 2018-09-06 PROCEDURE — 93005 ELECTROCARDIOGRAM TRACING: CPT

## 2018-09-06 PROCEDURE — 74176 CT ABD & PELVIS W/O CONTRAST: CPT

## 2018-09-06 PROCEDURE — 83010 ASSAY OF HAPTOGLOBIN QUANT: CPT

## 2018-09-06 PROCEDURE — 83516 IMMUNOASSAY NONANTIBODY: CPT

## 2018-09-06 PROCEDURE — 85014 HEMATOCRIT: CPT

## 2018-09-06 PROCEDURE — 83935 ASSAY OF URINE OSMOLALITY: CPT

## 2018-09-06 PROCEDURE — 86900 BLOOD TYPING SEROLOGIC ABO: CPT

## 2018-09-06 PROCEDURE — 84300 ASSAY OF URINE SODIUM: CPT

## 2018-09-06 PROCEDURE — 99232 SBSQ HOSP IP/OBS MODERATE 35: CPT | Mod: GC

## 2018-09-06 PROCEDURE — 76775 US EXAM ABDO BACK WALL LIM: CPT

## 2018-09-06 PROCEDURE — 84133 ASSAY OF URINE POTASSIUM: CPT

## 2018-09-06 PROCEDURE — 97162 PT EVAL MOD COMPLEX 30 MIN: CPT

## 2018-09-06 PROCEDURE — 82248 BILIRUBIN DIRECT: CPT

## 2018-09-06 PROCEDURE — 85730 THROMBOPLASTIN TIME PARTIAL: CPT

## 2018-09-06 PROCEDURE — 82565 ASSAY OF CREATININE: CPT

## 2018-09-06 PROCEDURE — 85027 COMPLETE CBC AUTOMATED: CPT

## 2018-09-06 PROCEDURE — 36415 COLL VENOUS BLD VENIPUNCTURE: CPT

## 2018-09-06 PROCEDURE — 84132 ASSAY OF SERUM POTASSIUM: CPT

## 2018-09-06 PROCEDURE — 97116 GAIT TRAINING THERAPY: CPT

## 2018-09-06 PROCEDURE — 86036 ANCA SCREEN EACH ANTIBODY: CPT

## 2018-09-06 PROCEDURE — 83690 ASSAY OF LIPASE: CPT

## 2018-09-06 PROCEDURE — 84540 ASSAY OF URINE/UREA-N: CPT

## 2018-09-06 PROCEDURE — 87040 BLOOD CULTURE FOR BACTERIA: CPT

## 2018-09-06 PROCEDURE — 87205 SMEAR GRAM STAIN: CPT

## 2018-09-06 PROCEDURE — 93308 TTE F-UP OR LMTD: CPT

## 2018-09-06 PROCEDURE — 80048 BASIC METABOLIC PNL TOTAL CA: CPT

## 2018-09-06 PROCEDURE — 82803 BLOOD GASES ANY COMBINATION: CPT

## 2018-09-06 PROCEDURE — 82330 ASSAY OF CALCIUM: CPT

## 2018-09-06 PROCEDURE — 85610 PROTHROMBIN TIME: CPT

## 2018-09-06 PROCEDURE — 93321 DOPPLER ECHO F-UP/LMTD STD: CPT

## 2018-09-06 PROCEDURE — 83880 ASSAY OF NATRIURETIC PEPTIDE: CPT

## 2018-09-06 PROCEDURE — 83615 LACTATE (LD) (LDH) ENZYME: CPT

## 2018-09-06 PROCEDURE — 87075 CULTR BACTERIA EXCEPT BLOOD: CPT

## 2018-09-06 RX ORDER — ERYTHROMYCIN ETHYLSUCCINATE 400 MG
1 TABLET ORAL
Qty: 42 | Refills: 0 | OUTPATIENT
Start: 2018-09-06 | End: 2018-09-19

## 2018-09-06 RX ORDER — METOPROLOL TARTRATE 50 MG
1 TABLET ORAL
Qty: 60 | Refills: 0 | OUTPATIENT
Start: 2018-09-06 | End: 2018-10-05

## 2018-09-06 RX ORDER — APIXABAN 2.5 MG/1
1 TABLET, FILM COATED ORAL
Qty: 60 | Refills: 0 | OUTPATIENT
Start: 2018-09-06 | End: 2018-10-05

## 2018-09-06 RX ORDER — APIXABAN 2.5 MG/1
1 TABLET, FILM COATED ORAL
Qty: 0 | Refills: 0 | COMMUNITY

## 2018-09-06 RX ORDER — ERYTHROMYCIN ETHYLSUCCINATE 400 MG
5 TABLET ORAL
Qty: 500 | Refills: 0 | OUTPATIENT
Start: 2018-09-06 | End: 2018-10-05

## 2018-09-06 RX ORDER — ATORVASTATIN CALCIUM 80 MG/1
1 TABLET, FILM COATED ORAL
Qty: 0 | Refills: 0 | COMMUNITY

## 2018-09-06 RX ORDER — SODIUM BICARBONATE 1 MEQ/ML
1 SYRINGE (ML) INTRAVENOUS
Qty: 90 | Refills: 0 | OUTPATIENT
Start: 2018-09-06 | End: 2018-10-05

## 2018-09-06 RX ADMIN — APIXABAN 2.5 MILLIGRAM(S): 2.5 TABLET, FILM COATED ORAL at 06:17

## 2018-09-06 RX ADMIN — Medication 4: at 08:28

## 2018-09-06 RX ADMIN — PANTOPRAZOLE SODIUM 40 MILLIGRAM(S): 20 TABLET, DELAYED RELEASE ORAL at 06:17

## 2018-09-06 RX ADMIN — Medication 400 MILLIGRAM(S): at 14:37

## 2018-09-06 RX ADMIN — Medication 650 MILLIGRAM(S): at 06:18

## 2018-09-06 RX ADMIN — Medication 400 MILLIGRAM(S): at 08:01

## 2018-09-06 RX ADMIN — Medication 25 MILLIGRAM(S): at 06:17

## 2018-09-06 RX ADMIN — MEGESTROL ACETATE 800 MILLIGRAM(S): 40 SUSPENSION ORAL at 12:28

## 2018-09-06 RX ADMIN — Medication 25 MILLIGRAM(S): at 17:26

## 2018-09-06 RX ADMIN — APIXABAN 2.5 MILLIGRAM(S): 2.5 TABLET, FILM COATED ORAL at 17:26

## 2018-09-06 RX ADMIN — Medication 650 MILLIGRAM(S): at 14:37

## 2018-09-06 RX ADMIN — POLYETHYLENE GLYCOL 3350 17 GRAM(S): 17 POWDER, FOR SOLUTION ORAL at 12:28

## 2018-09-06 NOTE — DISCHARGE NOTE ADULT - CARE PROVIDER_API CALL
Jeff George (MD), Surgery  [address] [place] [address]  Phone: [phone]  Fax: [phone]    Mulugeta Stockton (MD; MPH), Adv Heart Fail Trns[facility] Cardio; Cardiology  [address] [place] [address]  Phone: [phone]  Fax: [phone]    Nephrology Clinic,   [address] [place] [address]  Phone: [phone]  Fax: (   )    -

## 2018-09-06 NOTE — DISCHARGE NOTE ADULT - MEDICATION SUMMARY - MEDICATIONS TO TAKE
I will START or STAY ON the medications listed below when I get home from the hospital:    sodium bicarbonate 650 mg oral tablet  -- 1 tab(s) by mouth 3 times a day  -- Indication: For Supplement    apixaban 2.5 mg oral tablet  -- 1 tab(s) by mouth every 12 hours  -- Indication: For Atrial fibrillation     Zofran ODT 4 mg oral tablet, disintegrating  -- 1 tab(s) by mouth every 6 hours, As Needed for nausea or vomiting   -- Indication: For nausea    diphenhydrAMINE 25 mg oral capsule  -- 1 cap(s) by mouth every 6 hours, As needed, Rash and/or Itching  -- Indication: For itch relief    megestrol 40 mg oral tablet  -- 1 tab(s) by mouth once a day   -- Do not take this drug if you are pregnant.  It is very important that you take or use this exactly as directed.  Do not skip doses or discontinue unless directed by your doctor.    -- Indication: For Appetite stimulator     metoprolol tartrate 50 mg oral tablet  -- 1 tab(s) by mouth 2 times a day   -- It is very important that you take or use this exactly as directed.  Do not skip doses or discontinue unless directed by your doctor.  May cause drowsiness.  Alcohol may intensify this effect.  Use care when operating dangerous machinery.  Some non-prescription drugs may aggravate your condition.  Read all labels carefully.  If a warning appears, check with your doctor before taking.  Take with food or milk.  This drug may impair the ability to drive or operate machinery.  Use care until you become familiar with its effects.    -- Indication: For Atrial fibrillation     polyethylene glycol 3350 oral powder for reconstitution  -- 17 gram(s) by mouth once a day, As Needed  -- Indication: For laxative     erythromycin ethylsuccinate 400 mg/5 mL oral liquid  -- 5 milliliter(s) by mouth 3 times a day   -- Expires___________________  Finish all this medication unless otherwise directed by prescriber.  Refrigerate and shake well.  Expires_______________________  Take medication on an empty stomach 1 hour before or 2 to 3 hours after a meal unless otherwise directed by your doctor.    -- Indication: For delayed gastric emptying     simethicone 80 mg oral tablet, chewable  -- 1 tab(s) by mouth every 8 hours, As needed, Indigestion  -- Indication: For gas relief     pantoprazole 40 mg oral delayed release tablet  -- 1 tab(s) by mouth once a day  -- Indication: For Acid reflux I will START or STAY ON the medications listed below when I get home from the hospital:    nepro cans   -- 1  by mouth 2 times a day   -- Indication: For Supplement    sodium bicarbonate 650 mg oral tablet  -- 1 tab(s) by mouth 3 times a day  -- Indication: For Supplement    apixaban 2.5 mg oral tablet  -- 1 tab(s) by mouth every 12 hours  -- Indication: For Atrial fibrillation     Zofran ODT 4 mg oral tablet, disintegrating  -- 1 tab(s) by mouth every 6 hours, As Needed for nausea or vomiting   -- Indication: For nausea    diphenhydrAMINE 25 mg oral capsule  -- 1 cap(s) by mouth every 6 hours, As needed, Rash and/or Itching  -- Indication: For itch relief    megestrol 40 mg oral tablet  -- 1 tab(s) by mouth once a day   -- Do not take this drug if you are pregnant.  It is very important that you take or use this exactly as directed.  Do not skip doses or discontinue unless directed by your doctor.    -- Indication: For Appetite stimulator     metoprolol tartrate 50 mg oral tablet  -- 1 tab(s) by mouth 2 times a day   -- It is very important that you take or use this exactly as directed.  Do not skip doses or discontinue unless directed by your doctor.  May cause drowsiness.  Alcohol may intensify this effect.  Use care when operating dangerous machinery.  Some non-prescription drugs may aggravate your condition.  Read all labels carefully.  If a warning appears, check with your doctor before taking.  Take with food or milk.  This drug may impair the ability to drive or operate machinery.  Use care until you become familiar with its effects.    -- Indication: For Atrial fibrillation     polyethylene glycol 3350 oral powder for reconstitution  -- 17 gram(s) by mouth once a day, As Needed  -- Indication: For laxative     erythromycin 500 mg oral tablet  -- 1 tab(s) by mouth 3 times a day   -- Finish all this medication unless otherwise directed by prescriber.  Take medication on an empty stomach 1 hour before or 2 to 3 hours after a meal unless otherwise directed by your doctor.    -- Indication: For delayed gastric emptying     simethicone 80 mg oral tablet, chewable  -- 1 tab(s) by mouth every 8 hours, As needed, Indigestion  -- Indication: For gas relief     pantoprazole 40 mg oral delayed release tablet  -- 1 tab(s) by mouth once a day  -- Indication: For Acid reflux I will START or STAY ON the medications listed below when I get home from the hospital:    nepro cans   -- 1  by mouth 2 times a day   -- Indication: For Supplement    sodium bicarbonate 650 mg oral tablet  -- 1 tab(s) by mouth 3 times a day  -- Indication: For Supplement    apixaban 2.5 mg oral tablet  -- 1 tab(s) by mouth every 12 hours  -- Indication: For Atrial fibrillation     glipiZIDE 5 mg oral tablet  -- 1 tab(s) by mouth once a day  -- Indication: For Home med    Zofran ODT 4 mg oral tablet, disintegrating  -- 1 tab(s) by mouth every 6 hours, As Needed for nausea or vomiting   -- Indication: For nausea    diphenhydrAMINE 25 mg oral capsule  -- 1 cap(s) by mouth every 6 hours, As needed, Rash and/or Itching  -- Indication: For itch relief    megestrol 40 mg oral tablet  -- 1 tab(s) by mouth once a day   -- Do not take this drug if you are pregnant.  It is very important that you take or use this exactly as directed.  Do not skip doses or discontinue unless directed by your doctor.    -- Indication: For Appetite stimulator     metoprolol tartrate 50 mg oral tablet  -- 1 tab(s) by mouth 2 times a day   -- It is very important that you take or use this exactly as directed.  Do not skip doses or discontinue unless directed by your doctor.  May cause drowsiness.  Alcohol may intensify this effect.  Use care when operating dangerous machinery.  Some non-prescription drugs may aggravate your condition.  Read all labels carefully.  If a warning appears, check with your doctor before taking.  Take with food or milk.  This drug may impair the ability to drive or operate machinery.  Use care until you become familiar with its effects.    -- Indication: For Atrial fibrillation     polyethylene glycol 3350 oral powder for reconstitution  -- 17 gram(s) by mouth once a day, As Needed  -- Indication: For laxative     erythromycin 500 mg oral tablet  -- 1 tab(s) by mouth 3 times a day   -- Finish all this medication unless otherwise directed by prescriber.  Take medication on an empty stomach 1 hour before or 2 to 3 hours after a meal unless otherwise directed by your doctor.    -- Indication: For delayed gastric emptying     simethicone 80 mg oral tablet, chewable  -- 1 tab(s) by mouth every 8 hours, As needed, Indigestion  -- Indication: For gas relief     pantoprazole 40 mg oral delayed release tablet  -- 1 tab(s) by mouth once a day  -- Indication: For Acid reflux

## 2018-09-06 NOTE — DISCHARGE NOTE ADULT - ADDITIONAL INSTRUCTIONS
Please follow up with Dr. George within 1 week of discharge from the hospital.   Please follow up with the Nephrology at the Nephrology clinic within 1 week of discharge from the hospital.  Please follow up with your cardiologist (Dr. Guevara) and Dr. Stockton within 1 week of discharge from the hospital.

## 2018-09-06 NOTE — DISCHARGE NOTE ADULT - MEDICATION SUMMARY - MEDICATIONS TO CHANGE
I will SWITCH the dose or number of times a day I take the medications listed below when I get home from the hospital:    Eliquis 5 mg oral tablet  -- 1 tab(s) by mouth 2 times a day I will SWITCH the dose or number of times a day I take the medications listed below when I get home from the hospital:  None

## 2018-09-06 NOTE — DISCHARGE NOTE ADULT - PROVIDER TOKENS
TOKEN:'[id number]:'[id number]',FREE:[LAST:[Nephrology Clinic],PHONE:[phone]],FAX:[(   )    -],ADDRESS:[[address] [place] [address]]]

## 2018-09-06 NOTE — DISCHARGE NOTE ADULT - HOSPITAL COURSE
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  Patient was recently admitted to [facility] on 8/14 after hospital stay at [facility] (8/9-8/14) where he was found to be jaundiced with a CT showing severe extrahepatic biliary duct obstruction with 2 cm dilation of CBD with marked intrahepatic biliary duct dilation. RUQ US showed no evidence of gallstones. 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 During stay at [facility] patient became cholangitic spiking fevers was started on empiric cefepime and  transferred to [facility] for ERCP with possible stent and biopsy. ERCP was attempted which revealed a distal CBD stricture that was unable to be traversed for stent placement or biopsy.  Patient underwent PTC placement by IR into segment 6 of the liver. Patient clinically improved after PTC placement (Bili downtrended from 16 to 5.5).  During admission Dr. George (B surgery) was consulted for possible pancreaticoduodenectomy during this hospital stay but patient was found to be high risk for surgery (EF 35%, Severe left ventricular systolic dysfunction and decreased right ventricular systolic function).  Patient was discharged on 8/21 and instructed to follow up with Dr. George as an outpatient to plan surgery.  August 28, 2018 patient presented to Dr. George's office with an overall feeling of malaise, nausea, anorexia. States he has been unable to eat or drink adequately and has not been urinating.  Biliary drainage from his PTC has been approximately 1.2 L per day and states that the fluid now appears cloudy.  Has not seen IR doctors since discharge.  Denied any fevers or chills, vomiting, changes in bowel habits, burning with urination, chest pain. Does endorse some shortness of breath.  In the ER the patient was hypotensive to 87/59 with a HR of 62 (on lopressor).  He is normally SBP of 130s-140s. His temperature was 36.1, saturating 100% on RA and he was actively rigoring,  On exam he was not jaundiced, his breathing with very mildly labored.  His abdomen was soft, non tender non distended without rebound or guarding.  His PTC contained copious cloudy bilious fluid.  He did not have any peripheral edema on exam. Labs revealed a leukocytosis of 13.7, Hct 45, significant metabolic acidosis on VBG with pH of 7.11 and bicarb 13, lactate 2.1. Tbili 2.3. Patient was admitted with Septic Shock to the SICU. House Cardiology was consulted for history of atrial fibrillation. Patient was restarted on Eliquis at a reduced dose (2.5 mg BID) secondary to MANNY and Metoprolol was increased to 50 mg BID. Continue to hold Lisinopril secondary to MANNY. Upon discharge, patient needs an ischemic workup prior to surgery. Patient aware.  A TTE was obtained with an EF 60%. House Nephrology consulted for MANNY. Throughout hospital stay Cr downtrending, and on day of discharge Cr 3.77. Patient is to follow up at the Nephrology clinic within 1 week of discharge from the hospital.   The patient remained in the SICU until 8/31/18. The PTC drain was clamped on 8/30. IR called for possible tube check. CT scan was reviewed showing tube with distal tip in the small bowel and the tube is draining into the duodenum. Drain can remain clamped and is to be flushed every other day per IR.   At the time of discharge, the patient was hemodynamically stable, was tolerating PO diet, was voiding urine and passing stool, was ambulating, and was comfortable with adequate pain control. The patient was instructed to follow up with Dr. George, Dr. Stockton (per Dr. George), Nephrology clinic and private cardiologist within 1-2 weeks after discharge from the hospital. The patient and wife felt comfortable with discharge. The patient was discharged to home. The patient had no other issues.

## 2018-09-06 NOTE — PROGRESS NOTE ADULT - ATTENDING COMMENTS
Dr. Gardner (Attending Physician)  Acute Renal Failure likely prerenal plus ACEI induced - Uremic to 130s but downtrending, Creatinine beginning to improve  Bradycardia improved will increase metoprolol to home dose.  Biliary obstruction with PTC in place output 650 yesterday repleting output 1:1  Holding off on dvt ppx since patient is ambulating with PT, INR 1.6 after vitamin K
as noted above.
improving cr. follow up with renal.
pt seen and examined with residents. creatinine coming down. appreciate nephrology and cardiology follow up.
77 year old man had biliary sepsis and rapid atrial fibrillation which was of uncertain duration and echo demonstrated severe ventricular dysfunction, possibly tachycardia induced.  Returned septic and responding to treatment. Volume status seems euvolemic and overall clinical picture distinctly improving each day. Has acute kidney injury, ATN due to multiple morbidities prior to admission. No diuretics or nephrotoxic medications. 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
77 year old man had biliary sepsis and rapid atrial fibrillation which was of uncertain duration and echo demonstrated severe ventricular dysfunction, possibly tachycardia induced.  Returned septic and responding to treatment. Volume status seems euvolemic and overall clinical picture distinctly improving each day. Has acute kidney injury, ATN due to multiple morbidities prior to admission. No diuretics or nephrotoxic medications. 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 Now ambulating in intensive care unit, encouraging progress. Awaiting recovery of renal function.
77 year old man had biliary sepsis and rapid atrial fibrillation which was of uncertain duration and echo demonstrated severe ventricular dysfunction, possibly tachycardia induced.  Returns septic and responding to treatment. Volume status seems euvolemic and overall clinical picture distinctly improved this morning. On admission observe acute kidney injury.
Agree with plan as outlined above.
Dr. Gardner (Attending Physician)    Acute Renal Failure improving BUN/Cr decreasing stop 1/2 NS Bicarb infusion today, start LR for supplemental hydration  AFib HR 70s with metoprolol  Repeat PTC Cx stopped abx and clamped PTC procalcitonin decreased  INR 1.38 will start SQH for DVT ppx for now, holding Eliquis with decreased renal function  Will list for the floor today
as noted above.
dc planning.
Dr. Gardner (Attending Physician)  78 yo male with recent diagnosis of obstructive ampullary mass s/p PTC placement by IR into segment 6 of the liver in early August 2018 c/b high PTC output resulting in pre-renal MANNY, acidosis, hyperkalemia.
Agree with fellow assessment and plan. Echo reviewed while EF has improved there still appears to be hypokinesis of the inferior wall. Therefore ischemic eval prior to surgery is likely warranted.
Agree with plan as outlined above.
ARF-now improving  1.  ARF--non oliguric, no HD at this time  2.  Acidosis--NaHCO3 supplementation  3.  HyperPO4--can hold binders at present
ARF, improving  1.  ARF--baseline nl Cr.  Now in 3s, non oliguric, no HD  2.  Acidosis--NaHCO3 supplement  3.  HyperPO4--resolved.  Can d/c binder
Evelyn Mesa MD  Cell : [phone]  Office : [phone]

## 2018-09-06 NOTE — PROGRESS NOTE ADULT - ASSESSMENT
76yo M with an extensive cardiac h/x on Eliquis for Afib, diagnosed with distal cholangiocarcinoma s/p PTC placement by IR into segment 6 of the liver on 8/16 after unsuccessful ERCP c/b high PTC output resulting in pre-renal MANNY, acidosis, hyperkalemia. Presented with hypotension in setting of cardiomyopathy, awaiting medical optimization for pancreaticoduodenectomy. TTE demonstrated EF of 60% w/ normal LV.    Plan:  - F/u nephro for medical optimization recs  - Per Cardio: metoprolol at 25mg BID, increase to 50mg if symptomatic  - Trend Cr, currently improving, f/u labs from this morning   - Keep PTC clamped, flush BID  - Abx d/c, monitor CBC (WBC)  - OOB  - has been afebrile, however, if febrile- perform fever workup and uncap PTC       [facility] [phone] 78yo M with an extensive cardiac h/x on Eliquis for Afib, diagnosed with distal cholangiocarcinoma s/p PTC placement by IR into segment 6 of the liver on 8/16 after unsuccessful ERCP c/b high PTC output resulting in pre-renal MANNY, acidosis, hyperkalemia. Presented with hypotension in setting of cardiomyopathy, awaiting medical optimization for pancreaticoduodenectomy. TTE demonstrated EF of 60% w/ normal LV.    Plan:  - F/u nephro for medical optimization recs, Cr downtrending to 3.7  - Per Cardio: metoprolol at 25mg BID, increase to 50mg if symptomatic  - Trend Cr, currently improving, f/u labs from this morning   - Keep PTC clamped, flush BID  - Abx d/c, monitor CBC (WBC)  - OOB  - has been afebrile, however, if febrile- perform fever workup and uncap PTC       [facility] [phone]

## 2018-09-06 NOTE — DISCHARGE NOTE ADULT - PLAN OF CARE
resolved Drain Care: Please flush biliary drain with 10 cc sterile water every other day.   BATHING: Please do not submerge wound underwater. You may shower and/or sponge bathe.  ACTIVITY: No heavy lifting or straining. Otherwise, you may return to your usual level of physical activity. If you are taking narcotic pain medication (such as Percocet), do NOT drive a car, operate machinery or make important decisions.  DIET: Return to your usual diet.  NOTIFY YOUR SURGEON IF: You have any bleeding that does not stop, any pus draining from your wound, any fever (over 100.4 F) or chills, persistent nausea/vomiting, persistent diarrhea, or if your pain is not controlled on your discharge pain medications.  FOLLOW-UP:  1. Please call to make a follow-up appointment within one week of discharge   2. Please follow up with your primary care physician in one week regarding your hospitalization. kidney to return to baseline follow up within 1 week at the nephrology clinic at [address] [place] [address] please call [phone] to schedule an appointment     take sodium bicarbonate 3 times a day until follow up     once your kidney numbers return to normal you will need to change the dose of eliqiuis you are taking control heart rate toprol xl changed to metoprolol tartrate 50mg twice a day  eliquis dose decreased to 2.5 mg twice daily due to kidney function once kidney injury resolves will resume 5mg

## 2018-09-06 NOTE — DISCHARGE NOTE ADULT - HOME CARE AGENCY
Your case has been referred to [facility]. A nurse will call you to schedule a visit the day after discharge. If you have any questions you may call [facility] at

## 2018-09-06 NOTE — PROGRESS NOTE ADULT - ASSESSMENT
76 yo man with HTN, pre-diabetes, HLD, afib ablation 2004/2005 and DCCV 2017 on Eliquis with ILR, GERD. 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   AFib/flutter with RVR, suspicion of tachy-georges syndrome, CHADsVASC 4. Recovered stress-induced cardiomyopathy.    #AFib/flutter with RVR currently - rate controlled  -  c/w metoprolol, and increase to 50mg BID.  -  C/w Full AC w/ Eliquis, will likely need to increase dose to 5 mg once Cr <1.5   -  If planning of going for surgery would hold AC 2-3 days prior to surgery.     #HFrEF  - EF recovered on latest TTE (60%)  - monitor Cr  - would hold off on diuretics at this time, but would also not given IVF given pt has some pedal edema.   - After review of pts echo pt does seem to have some inferior wall motion abnormality on TTE.   - Given hx pt would actually need ischemic eval prior to surgery.   - Pt could have stress test prior to surgery.     #HTN  - Well controlled currently     Lydia Olivera MD  Cardiology Fellow - PGY-4  [facility]: [id number]  NS: [phone]  [id number]

## 2018-09-06 NOTE — DISCHARGE NOTE ADULT - CARE PROVIDERS DIRECT ADDRESSES
[email or web address]

## 2018-09-06 NOTE — DISCHARGE NOTE ADULT - PATIENT PORTAL LINK FT
You can access the [facility] Patient Portal, offered by [facility], by registering with the following website: [email or web address]

## 2018-09-06 NOTE — PROGRESS NOTE ADULT - PROVIDER SPECIALTY LIST ADULT
Cardiology
Nephrology
SICU
Surgery
Cardiology
Cardiology
Surgery

## 2018-09-06 NOTE — DISCHARGE NOTE ADULT - CARE PLAN
Principal Discharge DX:	Sepsis  Goal:	resolved  Assessment and plan of treatment:	Drain Care: Please flush biliary drain with 10 cc sterile water every other day.   BATHING: Please do not submerge wound underwater. You may shower and/or sponge bathe.  ACTIVITY: No heavy lifting or straining. Otherwise, you may return to your usual level of physical activity. If you are taking narcotic pain medication (such as Percocet), do NOT drive a car, operate machinery or make important decisions.  DIET: Return to your usual diet.  NOTIFY YOUR SURGEON IF: You have any bleeding that does not stop, any pus draining from your wound, any fever (over 100.4 F) or chills, persistent nausea/vomiting, persistent diarrhea, or if your pain is not controlled on your discharge pain medications.  FOLLOW-UP:  1. Please call to make a follow-up appointment within one week of discharge   2. Please follow up with your primary care physician in one week regarding your hospitalization. Principal Discharge DX:	Sepsis  Goal:	resolved  Assessment and plan of treatment:	Drain Care: Please flush biliary drain with 10 cc sterile water every other day.   BATHING: Please do not submerge wound underwater. You may shower and/or sponge bathe.  ACTIVITY: No heavy lifting or straining. Otherwise, you may return to your usual level of physical activity. If you are taking narcotic pain medication (such as Percocet), do NOT drive a car, operate machinery or make important decisions.  DIET: Return to your usual diet.  NOTIFY YOUR SURGEON IF: You have any bleeding that does not stop, any pus draining from your wound, any fever (over 100.4 F) or chills, persistent nausea/vomiting, persistent diarrhea, or if your pain is not controlled on your discharge pain medications.  FOLLOW-UP:  1. Please call to make a follow-up appointment within one week of discharge   2. Please follow up with your primary care physician in one week regarding your hospitalization.  Secondary Diagnosis:	MANNY (acute kidney injury)  Goal:	kidney to return to baseline  Assessment and plan of treatment:	follow up within 1 week at the nephrology clinic at [address] [place] [address] please call [phone] to schedule an appointment     take sodium bicarbonate 3 times a day until follow up     once your kidney numbers return to normal you will need to change the dose of eliqiuis you are taking  Secondary Diagnosis:	Afib  Goal:	control heart rate  Assessment and plan of treatment:	toprol xl changed to metoprolol tartrate 50mg twice a day  eliquis dose decreased to 2.5 mg twice daily due to kidney function once kidney injury resolves will resume 5mg

## 2018-09-06 NOTE — PROGRESS NOTE ADULT - SUBJECTIVE AND OBJECTIVE BOX
Patient seen and examined at bedside.    Overnight Events:       REVIEW OF SYSTEMS:  Constitutional:     [ ] negative [ ] fevers [ ] chills [ ] weight loss [ ] weight gain  HEENT:                  [ ] negative [ ] dry eyes [ ] eye irritation [ ] postnasal drip [ ] nasal congestion  CV:                         [ ] negative  [ ] chest pain [ ] orthopnea [ ] palpitations [ ] murmur  Resp:                     [ ] negative [ ] cough [ ] shortness of breath [ ] dyspnea [ ] wheezing [ ] sputum [ ]hemoptysis  GI:                          [ ] negative [ ] nausea [ ] vomiting [ ] diarrhea [ ] constipation [ ] abd pain [ ] dysphagia   :                        [ ] negative [ ] dysuria [ ] nocturia [ ] hematuria [ ] increased urinary frequency  Musculoskeletal: [ ] negative [ ] back pain [ ] myalgias [ ] arthralgias [ ] fracture  Skin:                       [ ] negative [ ] rash [ ] itch  Neurological:        [ ] negative [ ] headache [ ] dizziness [ ] syncope [ ] weakness [ ] numbness  Psychiatric:           [ ] negative [ ] anxiety [ ] depression  Endocrine:            [ ] negative [ ] diabetes [ ] thyroid problem  Heme/Lymph:      [ ] negative [ ] anemia [ ] bleeding problem  Allergic/Immune: [ ] negative [ ] itchy eyes [ ] nasal discharge [ ] hives [ ] angioedema    [ ] All other systems negative  [ ] Unable to assess ROS because sedated with anoxic brain injury.    Current Meds:  apixaban 2.5 milliGRAM(s) Oral every 12 hours  erythromycin    ethylsuccinate Suspension 40 mG/mL 400 milliGRAM(s) Oral three times a day  insulin lispro (HumaLOG) corrective regimen sliding scale   SubCutaneous three times a day before meals  insulin lispro (HumaLOG) corrective regimen sliding scale   SubCutaneous at bedtime  megestrol Suspension 800 milliGRAM(s) Oral daily  metoprolol tartrate 25 milliGRAM(s) Oral two times a day  ondansetron Injectable 4 milliGRAM(s) IV Push once  pantoprazole    Tablet 40 milliGRAM(s) Oral before breakfast  polyethylene glycol 3350 17 Gram(s) Oral daily  sodium bicarbonate 650 milliGRAM(s) Oral three times a day      PAST MEDICAL & SURGICAL HISTORY:  Male stress incontinence  Kidney stone: &quot;passed on own&quot;  Varicose vein: (L) 5 years ago  Bilateral cataracts  Appendicitis  Benign colon polyp  Prostate cancer: 2010  Afib: 2006 s/p ablation 2006 , afib resolved  Hyperlipidemia: X 4 years  GERD (Gastroesophageal Reflux Disease): X 10 years  DM (Diabetes Mellitus): X 3 years  Renal Calculi: 2008  MVP (Mitral Valve Prolapse): X 8 years  HTN - Hypertension: X 10 years, controlled  Status post left knee replacement  S/P ablation operation for arrhythmia  Male stress incontinence: s/p artifical urinary sphincter 5/2012  Varicose vein-s/p vein stripping 5 years ago  S/P arthroscopy: right shoulder 12/11  S/P prostatectomy: radical robotic 6/10  Cosmetic Surgery: chin implant 2004  S/P Colonoscopy with Polypectomy: 2009  S/P Appendectomy: 1950      Vitals:  T(F): 98.9 (09-06), Max: 99 (09-06)  HR: 99 (09-06) (86 - 99)  BP: 152/85 (09-06) (112/78 - 157/97)  RR: 18 (09-06)  SpO2: 99% (09-06)  I&O's Summary    05 Sep 2018 07:01  -  06 Sep 2018 07:00  --------------------------------------------------------  IN: 1630 mL / OUT: 1150 mL / NET: 480 mL    06 Sep 2018 07:01  -  06 Sep 2018 10:43  --------------------------------------------------------  IN: 480 mL / OUT: 0 mL / NET: 480 mL        Physical Exam:  Appearance: No acute distress; well appearing  Eyes: PERRL, EOMI, pink conjunctiva  HENT: Normal oral mucosa  Cardiovascular: RRR, S1, S2, no murmurs, rubs, or gallops; pedal edema; no JVD  Respiratory: Clear to auscultation bilaterally  Gastrointestinal: soft, non-tender, non-distended with normal bowel sounds  Musculoskeletal: No clubbing; no joint deformity   Neurologic: Non-focal  Lymphatic: No lymphadenopathy  Psychiatry: AAOx3, mood & affect appropriate  Skin: No rashes, ecchymoses, or cyanosis                          11.2   9.75  )-----------( 191      ( 06 Sep 2018 09:17 )             32.5     09-06    138  |  100  |  63<H>  ----------------------------<  178<H>  4.1   |  27  |  3.77<H>    Ca    8.4      06 Sep 2018 07:11  Phos  3.3     09-06  Mg     2.0     09-06    TPro  5.4<L>  /  Alb  2.7<L>  /  TBili  1.3<H>  /  DBili  x   /  AST  31  /  ALT  51<H>  /  AlkPhos  218<H>  09-05                  New ECG(s): Personally reviewed    Echo:  < from: Limited Transthoracic Echo (08.29.18 @ 14:41) >  CONCLUSIONS:  Limited study for biventricular function.  1. Normal left ventricular internal dimensions and wall  thicknesses.  2. Normal left ventricular systolic function.  EF 60%.  3. Normal right ventricular size and function.  *** Compared with echocardiogram of 8/20/2018, the  endocardium is better visualized on today's study.    < end of copied text >    Stress Testing:     Cath:    Imaging:    Interpretation of Telemetry: not on tele Patient seen and examined at bedside.    Overnight Events: Pt had no acute events overnight.       REVIEW OF SYSTEMS:  Constitutional:     [ ] negative [ ] fevers [ ] chills [ ] weight loss [ ] weight gain  HEENT:                  [ ] negative [ ] dry eyes [ ] eye irritation [ ] postnasal drip [ ] nasal congestion  CV:                         [x ] negative  [ ] chest pain [ ] orthopnea [ ] palpitations [ ] murmur  Resp:                     [ x] negative [ ] cough [ ] shortness of breath [ ] dyspnea [ ] wheezing [ ] sputum [ ]hemoptysis  GI:                          [ ] negative [ ] nausea [ ] vomiting [ ] diarrhea [ ] constipation [ ] abd pain [ ] dysphagia   :                        [ ] negative [ ] dysuria [ ] nocturia [ ] hematuria [ ] increased urinary frequency  Musculoskeletal: [ ] negative [ ] back pain [ ] myalgias [ ] arthralgias [ ] fracture  Skin:                       [ ] negative [ ] rash [ ] itch  Neurological:        [ ] negative [ ] headache [ ] dizziness [ ] syncope [ ] weakness [ ] numbness  Psychiatric:           [ ] negative [ ] anxiety [ ] depression  Endocrine:            [ ] negative [ ] diabetes [ ] thyroid problem  Heme/Lymph:      [ ] negative [ ] anemia [ ] bleeding problem  Allergic/Immune: [ ] negative [ ] itchy eyes [ ] nasal discharge [ ] hives [ ] angioedema    [x ] All other systems negative  [ ] Unable to assess ROS because sedated with anoxic brain injury.    Current Meds:  apixaban 2.5 milliGRAM(s) Oral every 12 hours  erythromycin    ethylsuccinate Suspension 40 mG/mL 400 milliGRAM(s) Oral three times a day  insulin lispro (HumaLOG) corrective regimen sliding scale   SubCutaneous three times a day before meals  insulin lispro (HumaLOG) corrective regimen sliding scale   SubCutaneous at bedtime  megestrol Suspension 800 milliGRAM(s) Oral daily  metoprolol tartrate 25 milliGRAM(s) Oral two times a day  ondansetron Injectable 4 milliGRAM(s) IV Push once  pantoprazole    Tablet 40 milliGRAM(s) Oral before breakfast  polyethylene glycol 3350 17 Gram(s) Oral daily  sodium bicarbonate 650 milliGRAM(s) Oral three times a day      PAST MEDICAL & SURGICAL HISTORY:  Male stress incontinence  Kidney stone: &quot;passed on own&quot;  Varicose vein: (L) 5 years ago  Bilateral cataracts  Appendicitis  Benign colon polyp  Prostate cancer: 2010  Afib: 2006 s/p ablation 2006 , afib resolved  Hyperlipidemia: X 4 years  GERD (Gastroesophageal Reflux Disease): X 10 years  DM (Diabetes Mellitus): X 3 years  Renal Calculi: 2008  MVP (Mitral Valve Prolapse): X 8 years  HTN - Hypertension: X 10 years, controlled  Status post left knee replacement  S/P ablation operation for arrhythmia  Male stress incontinence: s/p artifical urinary sphincter 5/2012  Varicose vein-s/p vein stripping 5 years ago  S/P arthroscopy: right shoulder 12/11  S/P prostatectomy: radical robotic 6/10  Cosmetic Surgery: chin implant 2004  S/P Colonoscopy with Polypectomy: 2009  S/P Appendectomy: 1950      Vitals:  T(F): 98.9 (09-06), Max: 99 (09-06)  HR: 99 (09-06) (86 - 99)  BP: 152/85 (09-06) (112/78 - 157/97)  RR: 18 (09-06)  SpO2: 99% (09-06)  I&O's Summary    05 Sep 2018 07:01  -  06 Sep 2018 07:00  --------------------------------------------------------  IN: 1630 mL / OUT: 1150 mL / NET: 480 mL    06 Sep 2018 07:01  -  06 Sep 2018 10:43  --------------------------------------------------------  IN: 480 mL / OUT: 0 mL / NET: 480 mL        Physical Exam:  Appearance: No acute distress; well appearing  Eyes: PERRL, EOMI, pink conjunctiva  HENT: Normal oral mucosa  Cardiovascular: RRR, S1, S2, no murmurs, rubs, or gallops; pedal edema; + JVD  Respiratory: Bibasilar rales.   Gastrointestinal: soft, non-tender, non-distended with normal bowel sounds  Musculoskeletal: No clubbing; no joint deformity   Neurologic: Non-focal  Lymphatic: No lymphadenopathy  Psychiatry: AAOx3, mood & affect appropriate  Skin: No rashes, ecchymoses, or cyanosis                          11.2   9.75  )-----------( 191      ( 06 Sep 2018 09:17 )             32.5     09-06    138  |  100  |  63<H>  ----------------------------<  178<H>  4.1   |  27  |  3.77<H>    Ca    8.4      06 Sep 2018 07:11  Phos  3.3     09-06  Mg     2.0     09-06    TPro  5.4<L>  /  Alb  2.7<L>  /  TBili  1.3<H>  /  DBili  x   /  AST  31  /  ALT  51<H>  /  AlkPhos  218<H>  09-05                  New ECG(s): Personally reviewed    Echo:  < from: Limited Transthoracic Echo (08.29.18 @ 14:41) >  CONCLUSIONS:  Limited study for biventricular function.  1. Normal left ventricular internal dimensions and wall  thicknesses.  2. Normal left ventricular systolic function.  EF 60%.  3. Normal right ventricular size and function.  *** Compared with echocardiogram of 8/20/2018, the  endocardium is better visualized on today's study.    < end of copied text >    Stress Testing:     Cath:    Imaging:    Interpretation of Telemetry: not on tele Patient seen and examined at bedside.    Overnight Events: Pt had no acute events overnight.       REVIEW OF SYSTEMS:  Constitutional:     [ ] negative [ ] fevers [ ] chills [ ] weight loss [ ] weight gain  HEENT:                  [ ] negative [ ] dry eyes [ ] eye irritation [ ] postnasal drip [ ] nasal congestion  CV:                         [x ] negative  [ ] chest pain [ ] orthopnea [ ] palpitations [ ] murmur  Resp:                     [ x] negative [ ] cough [ ] shortness of breath [ ] dyspnea [ ] wheezing [ ] sputum [ ]hemoptysis-  GI:                          [ ] negative [ ] nausea [ ] vomiting [ ] diarrhea [ ] constipation [ ] abd pain [ ] dysphagia   :                        [ ] negative [ ] dysuria [ ] nocturia [ ] hematuria [ ] increased urinary frequency  Musculoskeletal: [ ] negative [ ] back pain [ ] myalgias [ ] arthralgias [ ] fracture  Skin:                       [ ] negative [ ] rash [ ] itch  Neurological:        [ ] negative [ ] headache [ ] dizziness [ ] syncope [ ] weakness [ ] numbness  Psychiatric:           [ ] negative [ ] anxiety [ ] depression  Endocrine:            [ ] negative [ ] diabetes [ ] thyroid problem  Heme/Lymph:      [ ] negative [ ] anemia [ ] bleeding problem  Allergic/Immune: [ ] negative [ ] itchy eyes [ ] nasal discharge [ ] hives [ ] angioedema    [x ] All other systems negative  [ ] Unable to assess ROS because sedated with anoxic brain injury.    Current Meds:  apixaban 2.5 milliGRAM(s) Oral every 12 hours  erythromycin    ethylsuccinate Suspension 40 mG/mL 400 milliGRAM(s) Oral three times a day  insulin lispro (HumaLOG) corrective regimen sliding scale   SubCutaneous three times a day before meals  insulin lispro (HumaLOG) corrective regimen sliding scale   SubCutaneous at bedtime  megestrol Suspension 800 milliGRAM(s) Oral daily  metoprolol tartrate 25 milliGRAM(s) Oral two times a day  ondansetron Injectable 4 milliGRAM(s) IV Push once  pantoprazole    Tablet 40 milliGRAM(s) Oral before breakfast  polyethylene glycol 3350 17 Gram(s) Oral daily  sodium bicarbonate 650 milliGRAM(s) Oral three times a day      PAST MEDICAL & SURGICAL HISTORY:  Male stress incontinence  Kidney stone: &quot;passed on own&quot;  Varicose vein: (L) 5 years ago  Bilateral cataracts  Appendicitis  Benign colon polyp  Prostate cancer: 2010  Afib: 2006 s/p ablation 2006 , afib resolved  Hyperlipidemia: X 4 years  GERD (Gastroesophageal Reflux Disease): X 10 years  DM (Diabetes Mellitus): X 3 years  Renal Calculi: 2008  MVP (Mitral Valve Prolapse): X 8 years  HTN - Hypertension: X 10 years, controlled  Status post left knee replacement  S/P ablation operation for arrhythmia  Male stress incontinence: s/p artifical urinary sphincter 5/2012  Varicose vein-s/p vein stripping 5 years ago  S/P arthroscopy: right shoulder 12/11  S/P prostatectomy: radical robotic 6/10  Cosmetic Surgery: chin implant 2004  S/P Colonoscopy with Polypectomy: 2009  S/P Appendectomy: 1950      Vitals:  T(F): 98.9 (09-06), Max: 99 (09-06)  HR: 99 (09-06) (86 - 99)  BP: 152/85 (09-06) (112/78 - 157/97)  RR: 18 (09-06)  SpO2: 99% (09-06)  I&O's Summary    05 Sep 2018 07:01  -  06 Sep 2018 07:00  --------------------------------------------------------  IN: 1630 mL / OUT: 1150 mL / NET: 480 mL    06 Sep 2018 07:01  -  06 Sep 2018 10:43  --------------------------------------------------------  IN: 480 mL / OUT: 0 mL / NET: 480 mL        Physical Exam:  Appearance: No acute distress; well appearing  Eyes: PERRL, EOMI, pink conjunctiva  HENT: Normal oral mucosa  Cardiovascular: RRR, S1, S2, no murmurs, rubs, or gallops; pedal edema; + JVD  Respiratory: Bibasilar rales.   Gastrointestinal: soft, non-tender, non-distended with normal bowel sounds  Musculoskeletal: No clubbing; no joint deformity   Neurologic: Non-focal  Lymphatic: No lymphadenopathy  Psychiatry: AAOx3, mood & affect appropriate  Skin: No rashes, ecchymoses, or cyanosis                          11.2   9.75  )-----------( 191      ( 06 Sep 2018 09:17 )             32.5     09-06    138  |  100  |  63<H>  ----------------------------<  178<H>  4.1   |  27  |  3.77<H>    Ca    8.4      06 Sep 2018 07:11  Phos  3.3     09-06  Mg     2.0     09-06    TPro  5.4<L>  /  Alb  2.7<L>  /  TBili  1.3<H>  /  DBili  x   /  AST  31  /  ALT  51<H>  /  AlkPhos  218<H>  09-05                  New ECG(s): Personally reviewed    Echo:  < from: Limited Transthoracic Echo (08.29.18 @ 14:41) >  CONCLUSIONS:  Limited study for biventricular function.  1. Normal left ventricular internal dimensions and wall  thicknesses.  2. Normal left ventricular systolic function.  EF 60%.  3. Normal right ventricular size and function.  *** Compared with echocardiogram of 8/20/2018, the  endocardium is better visualized on today's study.    < end of copied text >    Stress Testing:     Cath:    Imaging:    Interpretation of Telemetry: not on tele

## 2018-09-06 NOTE — PROGRESS NOTE ADULT - SUBJECTIVE AND OBJECTIVE BOX
General Surgery Progress Note    SUBJECTIVE:  The patient was seen and examined. No acute events overnight. Yesterday, IR noted no need for tube check, can keep clamped. Denies N/V.     OBJECTIVE:     ** VITAL SIGNS / I&O's **    Vital Signs Last 24 Hrs  T(C): 36.5 (06 Sep 2018 05:33), Max: 37.2 (06 Sep 2018 00:41)  T(F): 97.7 (06 Sep 2018 05:33), Max: 99 (06 Sep 2018 00:41)  HR: 96 (06 Sep 2018 05:33) (86 - 96)  BP: 133/80 (06 Sep 2018 05:33) (112/78 - 157/97)  BP(mean): --  RR: 18 (06 Sep 2018 05:33) (18 - 19)  SpO2: 97% (06 Sep 2018 05:33) (97% - 98%)      05 Sep 2018 07:01  -  06 Sep 2018 07:00  --------------------------------------------------------  IN:    IV PiggyBack: 50 mL    lactated ringers.: 800 mL    Oral Fluid: 780 mL  Total IN: 1630 mL    OUT:    Voided: 1150 mL  Total OUT: 1150 mL    Total NET: 480 mL          ** PHYSICAL EXAM **    -- CONSTITUTIONAL: Alert, NAD  -- PULMONARY: non-labored respirations  -- ABDOMEN: soft, non-distended, non-tender  -- NEURO: A&Ox3    ** LABS **                          11.4   7.95  )-----------( 258      ( 05 Sep 2018 07:57 )             33.5     06 Sep 2018 07:11    138    |  100    |  63     ----------------------------<  178    4.1     |  27     |  3.77     Ca    8.4        06 Sep 2018 07:11  Phos  3.3       06 Sep 2018 07:11  Mg     2.0       06 Sep 2018 07:11    TPro  5.4    /  Alb  2.7    /  TBili  1.3    /  DBili  x      /  AST  31     /  ALT  51     /  AlkPhos  218    05 Sep 2018 07:09      CAPILLARY BLOOD GLUCOSE      POCT Blood Glucose.: 209 mg/dL (06 Sep 2018 08:21)  POCT Blood Glucose.: 182 mg/dL (05 Sep 2018 22:30)  POCT Blood Glucose.: 171 mg/dL (05 Sep 2018 18:24)  POCT Blood Glucose.: 162 mg/dL (05 Sep 2018 14:05)        LIVER FUNCTIONS - ( 05 Sep 2018 07:09 )  Alb: 2.7 g/dL / Pro: 5.4 g/dL / ALK PHOS: 218 U/L / ALT: 51 U/L / AST: 31 U/L / GGT: x                 MEDICATIONS  (STANDING):  apixaban 2.5 milliGRAM(s) Oral every 12 hours  erythromycin    ethylsuccinate Suspension 40 mG/mL 400 milliGRAM(s) Oral three times a day  insulin lispro (HumaLOG) corrective regimen sliding scale   SubCutaneous three times a day before meals  insulin lispro (HumaLOG) corrective regimen sliding scale   SubCutaneous at bedtime  megestrol Suspension 800 milliGRAM(s) Oral daily  metoprolol tartrate 25 milliGRAM(s) Oral two times a day  ondansetron Injectable 4 milliGRAM(s) IV Push once  pantoprazole    Tablet 40 milliGRAM(s) Oral before breakfast  polyethylene glycol 3350 17 Gram(s) Oral daily  sodium bicarbonate 650 milliGRAM(s) Oral three times a day    MEDICATIONS  (PRN):

## 2018-09-06 NOTE — DISCHARGE NOTE ADULT - MEDICATION SUMMARY - MEDICATIONS TO STOP TAKING
I will STOP taking the medications listed below when I get home from the hospital:    atorvastatin 10 mg oral tablet  -- 1 tab(s) by mouth once a day    Toprol-XL 50 mg oral tablet, extended release  -- 1 tab(s) by mouth once a day   -- It is very important that you take or use this exactly as directed.  Do not skip doses or discontinue unless directed by your doctor.  May cause drowsiness.  Alcohol may intensify this effect.  Use care when operating dangerous machinery.  Some non-prescription drugs may aggravate your condition.  Read all labels carefully.  If a warning appears, check with your doctor before taking.  Swallow whole.  Do not crush.  Take with food or milk.  This drug may impair the ability to drive or operate machinery.  Use care until you become familiar with its effects.    lisinopril 5 mg oral tablet  -- 1 tab(s) by mouth once a day   -- Do not take this drug if you are pregnant.  It is very important that you take or use this exactly as directed.  Do not skip doses or discontinue unless directed by your doctor.  Some non-prescription drugs may aggravate your condition.  Read all labels carefully.  If a warning appears, check with your doctor before taking.    Cipro 500 mg oral tablet  -- 1 tab(s) by mouth every 12 hours   -- Avoid prolonged or excessive exposure to direct and/or artificial sunlight while taking this medication.  Check with your doctor before becoming pregnant.  Do not take dairy products, antacids, or iron preparations within one hour of this medication.  Finish all this medication unless otherwise directed by prescriber.  Medication should be taken with plenty of water. I will STOP taking the medications listed below when I get home from the hospital:  None

## 2018-09-11 ENCOUNTER — APPOINTMENT (OUTPATIENT)
Dept: SURGERY | Facility: CLINIC | Age: 78
End: 2018-09-11
Payer: MEDICARE

## 2018-09-11 VITALS
RESPIRATION RATE: 16 BRPM | DIASTOLIC BLOOD PRESSURE: 83 MMHG | WEIGHT: 185 LBS | HEART RATE: 57 BPM | SYSTOLIC BLOOD PRESSURE: 153 MMHG | BODY MASS INDEX: 25.06 KG/M2 | OXYGEN SATURATION: 98 % | HEIGHT: 72 IN

## 2018-09-11 DIAGNOSIS — K31.84 GASTROPARESIS: ICD-10-CM

## 2018-09-11 PROCEDURE — 99214 OFFICE O/P EST MOD 30 MIN: CPT

## 2018-09-13 ENCOUNTER — APPOINTMENT (OUTPATIENT)
Dept: NEPHROLOGY | Facility: CLINIC | Age: 78
End: 2018-09-13
Payer: MEDICARE

## 2018-09-13 ENCOUNTER — LABORATORY RESULT (OUTPATIENT)
Age: 78
End: 2018-09-13

## 2018-09-13 VITALS
WEIGHT: 205 LBS | HEIGHT: 72 IN | DIASTOLIC BLOOD PRESSURE: 100 MMHG | OXYGEN SATURATION: 99 % | HEART RATE: 107 BPM | SYSTOLIC BLOOD PRESSURE: 155 MMHG | BODY MASS INDEX: 27.77 KG/M2

## 2018-09-13 DIAGNOSIS — I10 ESSENTIAL (PRIMARY) HYPERTENSION: ICD-10-CM

## 2018-09-13 DIAGNOSIS — K83.1 OBSTRUCTION OF BILE DUCT: ICD-10-CM

## 2018-09-13 DIAGNOSIS — E78.5 HYPERLIPIDEMIA, UNSPECIFIED: ICD-10-CM

## 2018-09-13 LAB
BASOPHILS # BLD AUTO: 0.02 K/UL
BASOPHILS NFR BLD AUTO: 0.4 %
EOSINOPHIL # BLD AUTO: 0.24 K/UL
EOSINOPHIL NFR BLD AUTO: 4.6 %
HCT VFR BLD CALC: 33.3 %
HGB BLD-MCNC: 11 G/DL
IMM GRANULOCYTES NFR BLD AUTO: 0.4 %
LYMPHOCYTES # BLD AUTO: 0.93 K/UL
LYMPHOCYTES NFR BLD AUTO: 17.7 %
MAN DIFF?: NORMAL
MCHC RBC-ENTMCNC: 30.7 PG
MCHC RBC-ENTMCNC: 33 GM/DL
MCV RBC AUTO: 93 FL
MONOCYTES # BLD AUTO: 0.5 K/UL
MONOCYTES NFR BLD AUTO: 9.5 %
NEUTROPHILS # BLD AUTO: 3.55 K/UL
NEUTROPHILS NFR BLD AUTO: 67.4 %
PLATELET # BLD AUTO: 224 K/UL
RBC # BLD: 3.58 M/UL
RBC # FLD: 12.9 %
WBC # FLD AUTO: 5.26 K/UL

## 2018-09-13 PROCEDURE — 99215 OFFICE O/P EST HI 40 MIN: CPT

## 2018-09-13 RX ORDER — RAMIPRIL 5 MG/1
5 CAPSULE ORAL
Refills: 0 | Status: DISCONTINUED | COMMUNITY
End: 2018-09-13

## 2018-09-17 LAB
ALBUMIN SERPL ELPH-MCNC: 3.6 G/DL
ANA SER IF-ACNC: NEGATIVE
ANION GAP SERPL CALC-SCNC: 15 MMOL/L
APPEARANCE: CLEAR
BACTERIA: NEGATIVE
BILIRUBIN URINE: NEGATIVE
BLOOD URINE: NEGATIVE
BUN SERPL-MCNC: 44 MG/DL
C3 SERPL-MCNC: 125 MG/DL
CALCIUM SERPL-MCNC: 8.3 MG/DL
CHLORIDE SERPL-SCNC: 105 MMOL/L
CO2 SERPL-SCNC: 23 MMOL/L
COLOR: YELLOW
CREAT SERPL-MCNC: 2.16 MG/DL
CREAT SPEC-SCNC: 92 MG/DL
CREAT/PROT UR: 0.3 RATIO
DEPRECATED KAPPA LC FREE/LAMBDA SER: 1.23 RATIO
DSDNA AB SER-ACNC: 18 IU/ML
GLUCOSE QUALITATIVE U: NEGATIVE MG/DL
GLUCOSE SERPL-MCNC: 166 MG/DL
HBV SURFACE AB SER QL: NONREACTIVE
HBV SURFACE AG SER QL: NONREACTIVE
HCV AB SER QL: NONREACTIVE
HCV S/CO RATIO: 0.27 S/CO
HYALINE CASTS: 2 /LPF
IGA 24H UR QL IFE: NORMAL
IGA SER QL IEP: 140 MG/DL
IGG SER QL IEP: 896 MG/DL
IGM SER QL IEP: 36 MG/DL
KAPPA LC CSF-MCNC: 1.49 MG/DL
KAPPA LC SERPL-MCNC: 1.84 MG/DL
KETONES URINE: NEGATIVE
LEUKOCYTE ESTERASE URINE: NEGATIVE
M PROTEIN SPEC IFE-MCNC: NORMAL
MICROSCOPIC-UA: NORMAL
MPO AB + PR3 PNL SER: NORMAL
NITRITE URINE: NEGATIVE
PH URINE: 5.5
PHOSPHATE SERPL-MCNC: 3.2 MG/DL
POTASSIUM SERPL-SCNC: 3.4 MMOL/L
PROT UR-MCNC: 30 MG/DL
PROTEIN URINE: 30 MG/DL
RED BLOOD CELLS URINE: 5 /HPF
SODIUM SERPL-SCNC: 143 MMOL/L
SPECIFIC GRAVITY URINE: 1.02
SQUAMOUS EPITHELIAL CELLS: 1 /HPF
UROBILINOGEN URINE: NEGATIVE MG/DL
WHITE BLOOD CELLS URINE: 2 /HPF

## 2018-09-19 NOTE — H&P ADULT - PROBLEM/PLAN-2
DATE OF SERVICE:  09/18/2018    PREOPERATIVE DIAGNOSES:  1.  End-stage liver disease.  2.  Known esophageal varices.    POSTOPERATIVE DIAGNOSES:  1.  Esophageal varices, status post banding x5.  2.  Portal hypertensive gastropathy throughout the stomach, overall mild.    PROCEDURE:  EGD with endoscopic variceal band ligation.    SEDATION:  General anesthesia per anesthesia department with endotracheal   intubation for airway protection.    CONSENT:  Written informed consent was obtained.    PROCEDURE IN DETAIL:  The patient was positioned in the left lateral   decubitus.  Endotracheal intubation was then placed.  The diagnostic upper   endoscope was advanced without difficulty up to the third portion of the   duodenum.  It was retroflexed in the stomach before complete withdrawal.  We   then noted nonreducible varices that could not be flattened out with maximal   insufflation.  The 2 columns were noted.  They started in the midesophagus,   but mostly in the distal esophagus.  The scope was withdrawn and a band   ligator was attached to the endoscope.  We reintroduced the upper endoscope   and proceeded with banding x5, 1 band misfired.  There was good decompression   of the varices.  There was no bleeding, no immediate complications.  The   patient tolerated the procedure well.    RECOMMENDATIONS:  1.  Start with clear liquids today.  2.  Proceed with repeat EGD and EVL in approximately 6 months.  3.  Ultrasound, alpha-fetoprotein, CBC prior to next office visit.       ____________________________________     Matt Young MD EMD / NGA    DD:  09/18/2018 14:04:40  DT:  09/18/2018 21:05:19    D#:  [id number]  Job#:  [id number]  
DISPLAY PLAN FREE TEXT

## 2018-09-24 PROBLEM — K31.84 GASTROPARESIS: Status: ACTIVE | Noted: 2018-09-24

## 2018-09-26 ENCOUNTER — OUTPATIENT (OUTPATIENT)
Dept: OUTPATIENT SERVICES | Facility: HOSPITAL | Age: 78
LOS: 1 days | End: 2018-09-26
Payer: MEDICARE

## 2018-09-26 VITALS
HEIGHT: 72 IN | SYSTOLIC BLOOD PRESSURE: 140 MMHG | DIASTOLIC BLOOD PRESSURE: 85 MMHG | RESPIRATION RATE: 16 BRPM | OXYGEN SATURATION: 99 % | WEIGHT: 197.09 LBS | HEART RATE: 88 BPM | TEMPERATURE: 98 F

## 2018-09-26 DIAGNOSIS — Z01.818 ENCOUNTER FOR OTHER PREPROCEDURAL EXAMINATION: ICD-10-CM

## 2018-09-26 DIAGNOSIS — Z96.652 PRESENCE OF LEFT ARTIFICIAL KNEE JOINT: Chronic | ICD-10-CM

## 2018-09-26 DIAGNOSIS — I10 ESSENTIAL (PRIMARY) HYPERTENSION: ICD-10-CM

## 2018-09-26 DIAGNOSIS — C22.1 INTRAHEPATIC BILE DUCT CARCINOMA: ICD-10-CM

## 2018-09-26 DIAGNOSIS — Z29.9 ENCOUNTER FOR PROPHYLACTIC MEASURES, UNSPECIFIED: ICD-10-CM

## 2018-09-26 DIAGNOSIS — G47.33 OBSTRUCTIVE SLEEP APNEA (ADULT) (PEDIATRIC): ICD-10-CM

## 2018-09-26 DIAGNOSIS — E11.9 TYPE 2 DIABETES MELLITUS WITHOUT COMPLICATIONS: ICD-10-CM

## 2018-09-26 DIAGNOSIS — Z98.890 OTHER SPECIFIED POSTPROCEDURAL STATES: Chronic | ICD-10-CM

## 2018-09-26 DIAGNOSIS — I48.91 UNSPECIFIED ATRIAL FIBRILLATION: ICD-10-CM

## 2018-09-26 LAB
ALBUMIN SERPL ELPH-MCNC: 4.3 G/DL — SIGNIFICANT CHANGE UP (ref 3.3–5)
ALP SERPL-CCNC: 350 U/L — HIGH (ref 40–120)
ALT FLD-CCNC: 85 U/L — HIGH (ref 10–45)
ANION GAP SERPL CALC-SCNC: 11 MMOL/L — SIGNIFICANT CHANGE UP (ref 5–17)
AST SERPL-CCNC: 37 U/L — SIGNIFICANT CHANGE UP (ref 10–40)
BILIRUB SERPL-MCNC: 1.7 MG/DL — HIGH (ref 0.2–1.2)
BLD GP AB SCN SERPL QL: NEGATIVE — SIGNIFICANT CHANGE UP
BUN SERPL-MCNC: 39 MG/DL — HIGH (ref 7–23)
CALCIUM SERPL-MCNC: 9.4 MG/DL — SIGNIFICANT CHANGE UP (ref 8.4–10.5)
CHLORIDE SERPL-SCNC: 105 MMOL/L — SIGNIFICANT CHANGE UP (ref 96–108)
CO2 SERPL-SCNC: 25 MMOL/L — SIGNIFICANT CHANGE UP (ref 22–31)
CREAT SERPL-MCNC: 1.66 MG/DL — HIGH (ref 0.5–1.3)
GLUCOSE SERPL-MCNC: 168 MG/DL — HIGH (ref 70–99)
HBA1C BLD-MCNC: 7.3 % — HIGH (ref 4–5.6)
HCT VFR BLD CALC: 34.4 % — LOW (ref 39–50)
HGB BLD-MCNC: 11.6 G/DL — LOW (ref 13–17)
MCHC RBC-ENTMCNC: 30.5 PG — SIGNIFICANT CHANGE UP (ref 27–34)
MCHC RBC-ENTMCNC: 33.7 GM/DL — SIGNIFICANT CHANGE UP (ref 32–36)
MCV RBC AUTO: 90.5 FL — SIGNIFICANT CHANGE UP (ref 80–100)
PLATELET # BLD AUTO: 215 K/UL — SIGNIFICANT CHANGE UP (ref 150–400)
POTASSIUM SERPL-MCNC: 3.9 MMOL/L — SIGNIFICANT CHANGE UP (ref 3.5–5.3)
POTASSIUM SERPL-SCNC: 3.9 MMOL/L — SIGNIFICANT CHANGE UP (ref 3.5–5.3)
PROT SERPL-MCNC: 7.4 G/DL — SIGNIFICANT CHANGE UP (ref 6–8.3)
RBC # BLD: 3.8 M/UL — LOW (ref 4.2–5.8)
RBC # FLD: 13.5 % — SIGNIFICANT CHANGE UP (ref 10.3–14.5)
RH IG SCN BLD-IMP: POSITIVE — SIGNIFICANT CHANGE UP
SODIUM SERPL-SCNC: 142 MMOL/L — SIGNIFICANT CHANGE UP (ref 135–145)
WBC # BLD: 6.39 K/UL — SIGNIFICANT CHANGE UP (ref 3.8–10.5)
WBC # FLD AUTO: 6.39 K/UL — SIGNIFICANT CHANGE UP (ref 3.8–10.5)

## 2018-09-26 PROCEDURE — 86850 RBC ANTIBODY SCREEN: CPT

## 2018-09-26 PROCEDURE — 80053 COMPREHEN METABOLIC PANEL: CPT

## 2018-09-26 PROCEDURE — 86901 BLOOD TYPING SEROLOGIC RH(D): CPT

## 2018-09-26 PROCEDURE — G0463: CPT

## 2018-09-26 PROCEDURE — 83036 HEMOGLOBIN GLYCOSYLATED A1C: CPT

## 2018-09-26 PROCEDURE — 85027 COMPLETE CBC AUTOMATED: CPT

## 2018-09-26 PROCEDURE — 86900 BLOOD TYPING SEROLOGIC ABO: CPT

## 2018-09-26 RX ORDER — LIDOCAINE HCL 20 MG/ML
0.2 VIAL (ML) INJECTION ONCE
Qty: 0 | Refills: 0 | Status: DISCONTINUED | OUTPATIENT
Start: 2018-10-03 | End: 2018-10-03

## 2018-09-26 RX ORDER — SODIUM CHLORIDE 9 MG/ML
3 INJECTION INTRAMUSCULAR; INTRAVENOUS; SUBCUTANEOUS EVERY 8 HOURS
Qty: 0 | Refills: 0 | Status: DISCONTINUED | OUTPATIENT
Start: 2018-10-03 | End: 2018-10-03

## 2018-09-26 RX ORDER — CEFAZOLIN SODIUM 1 G
2000 VIAL (EA) INJECTION ONCE
Qty: 0 | Refills: 0 | Status: DISCONTINUED | OUTPATIENT
Start: 2018-10-03 | End: 2018-10-03

## 2018-09-26 NOTE — H&P PST ADULT - GASTROINTESTINAL DETAILS
has biliary drainage tube I place with dressing  site benign   no drainage was clamped/soft/bowel sounds normal

## 2018-09-26 NOTE — H&P PST ADULT - NSANTHOSAYNRD_GEN_A_CORE
Yes/by symptoms hx snoring by symptoms hx snoring/No. EMANUEL screening performed.  STOP BANG Legend: 0-2 = LOW Risk; 3-4 = INTERMEDIATE Risk; 5-8 = HIGH Risk

## 2018-09-26 NOTE — H&P PST ADULT - PROBLEM SELECTOR PLAN 2
On Eliquis - Last dose on Sept 25,2018 as per Surgeon   Patient has loop monitor  [employer]  [id number]   To continue taking metoprolol  regularly and on the DOS

## 2018-09-26 NOTE — H&P PST ADULT - HISTORY OF PRESENT ILLNESS
77 y/o male with hx of atrial fibrillation, Hypertension, GERD,prostate cancer and recent  failed ERCP had l billary duct obstruction with billary duct drainage and pancreatic stent implant . Patient came in for PSt today for whipple procedure on Ocr 3,2018.Patient was recent diagnosed with billiary mass with blockage . Was referred to Dr George , evaluated and scheduled this procedure for treatment. 79 y/o male with hx of atrial fibrillation on  Eliquis and loop monitor , Hypertension, GERD, prostate cancer on remission  and recent  failed ERCP had l billary duct obstruction with billary duct drainage and pancreatic stent implant . Patient came in for PSt today for whipple procedure on Ocr 3,2018.Patient was recent diagnosed with billary mass with blockage . Was referred to Dr George , evaluated and scheduled this procedure for treatment. 77 y/o male with hx of atrial fibrillation on  Eliquis and loop monitor , Hypertension, GERD, prostate cancer on remission  and recent  failed ERCP had  billary duct obstruction with billary duct drainage  which resulted to renal failure clamped and had pancreatic stent implant . Patient came in for PSt today for whipple procedure on OcT 3,2018. Patient was recent diagnosed with billary mass with blockage . Was referred to Dr George , evaluated and scheduled this procedure for treatment.

## 2018-09-26 NOTE — H&P PST ADULT - OTHER CARE PROVIDERS
cardiologist Dr. Aaron Guevara [phone] cardiologist Dr. Aaron Guevara [phone] Dr Pope Urologist /Dr Daley  Nephrologist Dr Demarco [facility] [place]  same phone no.

## 2018-09-26 NOTE — H&P PST ADULT - PSH
Cosmetic Surgery  chin implant 2004  Male stress incontinence  s/p artifical urinary sphincter 5/2012  S/P ablation operation for arrhythmia    S/P Appendectomy  1950  S/P arthroscopy  right shoulder 12/11  S/P Colonoscopy with Polypectomy  2009  S/P prostatectomy  radical robotic 6/10  Status post left knee replacement  June 25 ,2018 Left  Jan2018  right knee  Varicose vein-s/p vein stripping 5 years ago

## 2018-09-26 NOTE — H&P PST ADULT - PMH
Afib  2006 s/p ablation 2006 , afib resolved   on Eliquies  Appendicitis  age 11 tears old  appendectomy  Benign colon polyp  removed colonoscopy 2014  Bilateral cataracts  removed with lens implants  DM (Diabetes Mellitus)  X 3 years  GERD (Gastroesophageal Reflux Disease)  X 10 years  HTN - Hypertension  X 10 years, controlled  Hyperlipidemia  X 4 years not on any meds  Kidney stone  "passed on own"  Male stress incontinence    MVP (Mitral Valve Prolapse)  X 8 years  Prostate cancer  2010 prostatectomy  stable  Renal Calculi  2008  Varicose vein  (L) 5 years ago    had venous stripping 2007 Afib  2006 s/p ablation 2006 , afib resolved   on Eliquies  Benign colon polyp  removed colonoscopy 2014  Bilateral cataracts  removed with lens implants  DM (Diabetes Mellitus)  X 3 years  GERD (Gastroesophageal Reflux Disease)  X 10 years  Hyperlipidemia  X 4 years not on any meds  Kidney stone  "passed on own"  Male stress incontinence    MVP (Mitral Valve Prolapse)  X 8 years stable  Prostate cancer  2010 prostatectomy  stable  Renal Calculi  2008  Varicose vein  (L) 5 years ago    had venous stripping 2007

## 2018-09-26 NOTE — H&P PST ADULT - ASSESSMENT
CAPRINI SCORE [CLOT]    AGE RELATED RISK FACTORS                                                       MOBILITY RELATED FACTORS  [ ] Age 41-60 years                                            (1 Point)                  [ ] Bed rest                                                        (1 Point)  [ ] Age: 61-74 years                                           (2 Points)                 [ ] Plaster cast                                                   (2 Points)  [X ] Age= 75 years                                              (3 Points)                 [ ] Bed bound for more than 72 hours                 (2 Points)    DISEASE RELATED RISK FACTORS                                               GENDER SPECIFIC FACTORS  [ ] Edema in the lower extremities                       (1 Point)                  [ ] Pregnancy                                                     (1 Point)  [X ] Varicose veins                                               (1 Point)                  [ ] Post-partum < 6 weeks                                   (1 Point)             [ ] BMI > 25 Kg/m2                                            (1 Point)                  [ ] Hormonal therapy  or oral contraception          (1 Point)                 [ ] Sepsis (in the previous month)                        (1 Point)                  [ ] History of pregnancy complications                 (1 point)  [ ] Pneumonia or serious lung disease                                               [ ] Unexplained or recurrent                     (1 Point)           (in the previous month)                               (1 Point)  [ ] Abnormal pulmonary function test                     (1 Point)                 SURGERY RELATED RISK FACTORS  [ ] Acute myocardial infarction                              (1 Point)                 [ ]  Section                                             (1 Point)  [ ] Congestive heart failure (in the previous month)  (1 Point)               [ ] Minor surgery                                                  (1 Point)   [ ] Inflammatory bowel disease                             (1 Point)                 [ ] Arthroscopic surgery                                        (2 Points)  [ ] Central venous access                                      (2 Points)                X[ ] General surgery lasting more than 45 minutes   (2 Points)       [ ] Stroke (in the previous month)                          (5 Points)               [ ] Elective arthroplasty                                         (5 Points)                                                                                                                                               HEMATOLOGY RELATED FACTORS                                                 TRAUMA RELATED RISK FACTORS  [ ] Prior episodes of VTE                                     (3 Points)                 [ ] Fracture of the hip, pelvis, or leg                       (5 Points)  [ ] Positive family history for VTE                         (3 Points)                 [ ] Acute spinal cord injury (in the previous month)  (5 Points)  [ ] Prothrombin 23524 A                                     (3 Points)                 [ ] Paralysis  (less than 1 month)                             (5 Points)  [ ] Factor V Leiden                                             (3 Points)                  [ ] Multiple Trauma within 1 month                        (5 Points)  [ ] Lupus anticoagulants                                     (3 Points)                                                           [ ] Anticardiolipin antibodies                               (3 Points)                                                       [ ] High homocysteine in the blood                      (3 Points)                                             [ ] Other congenital or acquired thrombophilia      (3 Points)                                                [ ] Heparin induced thrombocytopenia                  (3 Points)                                          Total Score [    6      ]

## 2018-09-26 NOTE — H&P PST ADULT - PROBLEM SELECTOR PLAN 1
Whipple    PST instruction given , 3 days antibacterial soap given for prevention of post op infection  Patient has previous  blood test done  repeated due to recent implant of bile drainage and biliary stent and patient taking antibiotics  and  recent low  K level  CBC/CMP/hga1c and Type and screen drawn  sent had recent PT/ptt/INR  result in the sunrise   To continue taking prescribed antibiotics erythromycin regularly as prescribed until DOS Whipple    PST instruction given , 3 days antibacterial soap given for prevention of post op infection  Patient has previous  blood test done  repeated due to recent implant of bile drainage and biliary stent and patient taking antibiotics  and  recent low  K level  CBC/CMP/hgba1c and Type and screen drawn  sent had recent PT/ptt/INR  result in the sunrise   To continue taking prescribed antibiotics erythromycin regularly as prescribed until DOS

## 2018-09-27 LAB
CULTURE RESULTS: SIGNIFICANT CHANGE UP
SPECIMEN SOURCE: SIGNIFICANT CHANGE UP

## 2018-10-02 ENCOUNTER — TRANSCRIPTION ENCOUNTER (OUTPATIENT)
Age: 78
End: 2018-10-02

## 2018-10-03 ENCOUNTER — RESULT REVIEW (OUTPATIENT)
Age: 78
End: 2018-10-03

## 2018-10-03 ENCOUNTER — APPOINTMENT (OUTPATIENT)
Dept: SURGERY | Facility: HOSPITAL | Age: 78
End: 2018-10-03

## 2018-10-03 ENCOUNTER — INPATIENT (INPATIENT)
Facility: HOSPITAL | Age: 78
LOS: 8 days | Discharge: HOME | DRG: 405 | End: 2018-10-12
Attending: SURGERY | Admitting: SURGERY
Payer: MEDICARE

## 2018-10-03 VITALS
TEMPERATURE: 98 F | WEIGHT: 184.97 LBS | SYSTOLIC BLOOD PRESSURE: 153 MMHG | DIASTOLIC BLOOD PRESSURE: 76 MMHG | HEART RATE: 104 BPM | OXYGEN SATURATION: 99 % | HEIGHT: 72 IN | RESPIRATION RATE: 18 BRPM

## 2018-10-03 DIAGNOSIS — Z96.652 PRESENCE OF LEFT ARTIFICIAL KNEE JOINT: Chronic | ICD-10-CM

## 2018-10-03 DIAGNOSIS — C22.1 INTRAHEPATIC BILE DUCT CARCINOMA: ICD-10-CM

## 2018-10-03 DIAGNOSIS — Z98.890 OTHER SPECIFIED POSTPROCEDURAL STATES: Chronic | ICD-10-CM

## 2018-10-03 LAB
ANION GAP SERPL CALC-SCNC: 18 MMOL/L — HIGH (ref 5–17)
APTT BLD: 29 SEC — SIGNIFICANT CHANGE UP (ref 27.5–37.4)
APTT BLD: 31.7 SEC — SIGNIFICANT CHANGE UP (ref 27.5–37.4)
BUN SERPL-MCNC: 33 MG/DL — HIGH (ref 7–23)
CALCIUM SERPL-MCNC: 7.5 MG/DL — LOW (ref 8.4–10.5)
CHLORIDE SERPL-SCNC: 106 MMOL/L — SIGNIFICANT CHANGE UP (ref 96–108)
CO2 SERPL-SCNC: 16 MMOL/L — LOW (ref 22–31)
CREAT SERPL-MCNC: 1.25 MG/DL — SIGNIFICANT CHANGE UP (ref 0.5–1.3)
GAS PNL BLDA: SIGNIFICANT CHANGE UP
GAS PNL BLDA: SIGNIFICANT CHANGE UP
GLUCOSE SERPL-MCNC: 185 MG/DL — HIGH (ref 70–99)
HCT VFR BLD CALC: 26.6 % — LOW (ref 39–50)
HGB BLD-MCNC: 9.5 G/DL — LOW (ref 13–17)
INR BLD: 1.18 RATIO — HIGH (ref 0.88–1.16)
INR BLD: 1.29 RATIO — HIGH (ref 0.88–1.16)
MAGNESIUM SERPL-MCNC: 2.2 MG/DL — SIGNIFICANT CHANGE UP (ref 1.6–2.6)
MCHC RBC-ENTMCNC: 32.2 PG — SIGNIFICANT CHANGE UP (ref 27–34)
MCHC RBC-ENTMCNC: 35.6 GM/DL — SIGNIFICANT CHANGE UP (ref 32–36)
MCV RBC AUTO: 90.4 FL — SIGNIFICANT CHANGE UP (ref 80–100)
PHOSPHATE SERPL-MCNC: 4.3 MG/DL — SIGNIFICANT CHANGE UP (ref 2.5–4.5)
PLATELET # BLD AUTO: 201 K/UL — SIGNIFICANT CHANGE UP (ref 150–400)
POTASSIUM SERPL-MCNC: 2.8 MMOL/L — CRITICAL LOW (ref 3.5–5.3)
POTASSIUM SERPL-MCNC: 3.7 MMOL/L — SIGNIFICANT CHANGE UP (ref 3.5–5.3)
POTASSIUM SERPL-SCNC: 2.8 MMOL/L — CRITICAL LOW (ref 3.5–5.3)
POTASSIUM SERPL-SCNC: 3.7 MMOL/L — SIGNIFICANT CHANGE UP (ref 3.5–5.3)
PROTHROM AB SERPL-ACNC: 12.9 SEC — HIGH (ref 9.8–12.7)
PROTHROM AB SERPL-ACNC: 14 SEC — HIGH (ref 9.8–12.7)
RBC # BLD: 2.94 M/UL — LOW (ref 4.2–5.8)
RBC # FLD: 12.6 % — SIGNIFICANT CHANGE UP (ref 10.3–14.5)
SODIUM SERPL-SCNC: 140 MMOL/L — SIGNIFICANT CHANGE UP (ref 135–145)
WBC # BLD: 9.6 K/UL — SIGNIFICANT CHANGE UP (ref 3.8–10.5)
WBC # FLD AUTO: 9.6 K/UL — SIGNIFICANT CHANGE UP (ref 3.8–10.5)

## 2018-10-03 PROCEDURE — 88305 TISSUE EXAM BY PATHOLOGIST: CPT | Mod: 26

## 2018-10-03 PROCEDURE — 88307 TISSUE EXAM BY PATHOLOGIST: CPT | Mod: 26

## 2018-10-03 PROCEDURE — 88309 TISSUE EXAM BY PATHOLOGIST: CPT | Mod: 26

## 2018-10-03 PROCEDURE — 88300 SURGICAL PATH GROSS: CPT | Mod: 26

## 2018-10-03 PROCEDURE — 99291 CRITICAL CARE FIRST HOUR: CPT

## 2018-10-03 PROCEDURE — 71045 X-RAY EXAM CHEST 1 VIEW: CPT | Mod: 26

## 2018-10-03 PROCEDURE — 48153 PANCREATECTOMY: CPT

## 2018-10-03 PROCEDURE — 88304 TISSUE EXAM BY PATHOLOGIST: CPT | Mod: 26

## 2018-10-03 PROCEDURE — 88331 PATH CONSLTJ SURG 1 BLK 1SPC: CPT | Mod: 26

## 2018-10-03 RX ORDER — POTASSIUM CHLORIDE 20 MEQ
10 PACKET (EA) ORAL
Qty: 0 | Refills: 0 | Status: COMPLETED | OUTPATIENT
Start: 2018-10-03 | End: 2018-10-04

## 2018-10-03 RX ORDER — NALOXONE HYDROCHLORIDE 4 MG/.1ML
0.1 SPRAY NASAL
Qty: 0 | Refills: 0 | Status: DISCONTINUED | OUTPATIENT
Start: 2018-10-06 | End: 2018-10-08

## 2018-10-03 RX ORDER — CHLORHEXIDINE GLUCONATE 213 G/1000ML
15 SOLUTION TOPICAL
Qty: 0 | Refills: 0 | Status: DISCONTINUED | OUTPATIENT
Start: 2018-10-03 | End: 2018-10-04

## 2018-10-03 RX ORDER — PANTOPRAZOLE SODIUM 20 MG/1
40 TABLET, DELAYED RELEASE ORAL EVERY 24 HOURS
Qty: 0 | Refills: 0 | Status: DISCONTINUED | OUTPATIENT
Start: 2018-10-03 | End: 2018-10-09

## 2018-10-03 RX ORDER — VANCOMYCIN HCL 1 G
1000 VIAL (EA) INTRAVENOUS EVERY 12 HOURS
Qty: 0 | Refills: 0 | Status: DISCONTINUED | OUTPATIENT
Start: 2018-10-03 | End: 2018-10-05

## 2018-10-03 RX ORDER — SODIUM CHLORIDE 9 MG/ML
1000 INJECTION, SOLUTION INTRAVENOUS
Qty: 0 | Refills: 0 | Status: DISCONTINUED | OUTPATIENT
Start: 2018-10-03 | End: 2018-10-04

## 2018-10-03 RX ORDER — DEXMEDETOMIDINE HYDROCHLORIDE IN 0.9% SODIUM CHLORIDE 4 UG/ML
0.2 INJECTION INTRAVENOUS
Qty: 200 | Refills: 0 | Status: DISCONTINUED | OUTPATIENT
Start: 2018-10-03 | End: 2018-10-03

## 2018-10-03 RX ORDER — CHLORHEXIDINE GLUCONATE 213 G/1000ML
1 SOLUTION TOPICAL
Qty: 0 | Refills: 0 | Status: DISCONTINUED | OUTPATIENT
Start: 2018-10-03 | End: 2018-10-06

## 2018-10-03 RX ORDER — ONDANSETRON 8 MG/1
4 TABLET, FILM COATED ORAL EVERY 6 HOURS
Qty: 0 | Refills: 0 | Status: DISCONTINUED | OUTPATIENT
Start: 2018-10-06 | End: 2018-10-07

## 2018-10-03 RX ORDER — HEPARIN SODIUM 5000 [USP'U]/ML
5000 INJECTION INTRAVENOUS; SUBCUTANEOUS EVERY 8 HOURS
Qty: 0 | Refills: 0 | Status: DISCONTINUED | OUTPATIENT
Start: 2018-10-03 | End: 2018-10-09

## 2018-10-03 RX ORDER — HYDROMORPHONE HYDROCHLORIDE 2 MG/ML
0.5 INJECTION INTRAMUSCULAR; INTRAVENOUS; SUBCUTANEOUS ONCE
Qty: 0 | Refills: 0 | Status: DISCONTINUED | OUTPATIENT
Start: 2018-10-03 | End: 2018-10-03

## 2018-10-03 RX ORDER — PHENYLEPHRINE HYDROCHLORIDE 10 MG/ML
0.4 INJECTION INTRAVENOUS
Qty: 40 | Refills: 0 | Status: DISCONTINUED | OUTPATIENT
Start: 2018-10-03 | End: 2018-10-03

## 2018-10-03 RX ADMIN — Medication 100 MILLIEQUIVALENT(S): at 22:57

## 2018-10-03 RX ADMIN — HYDROMORPHONE HYDROCHLORIDE 0.5 MILLIGRAM(S): 2 INJECTION INTRAMUSCULAR; INTRAVENOUS; SUBCUTANEOUS at 18:23

## 2018-10-03 RX ADMIN — Medication 250 MILLIGRAM(S): at 22:57

## 2018-10-03 RX ADMIN — SODIUM CHLORIDE 100 MILLILITER(S): 9 INJECTION, SOLUTION INTRAVENOUS at 19:57

## 2018-10-03 RX ADMIN — CHLORHEXIDINE GLUCONATE 15 MILLILITER(S): 213 SOLUTION TOPICAL at 22:57

## 2018-10-03 RX ADMIN — HEPARIN SODIUM 5000 UNIT(S): 5000 INJECTION INTRAVENOUS; SUBCUTANEOUS at 22:57

## 2018-10-03 RX ADMIN — HYDROMORPHONE HYDROCHLORIDE 0.5 MILLIGRAM(S): 2 INJECTION INTRAMUSCULAR; INTRAVENOUS; SUBCUTANEOUS at 18:38

## 2018-10-03 RX ADMIN — PANTOPRAZOLE SODIUM 40 MILLIGRAM(S): 20 TABLET, DELAYED RELEASE ORAL at 22:57

## 2018-10-03 NOTE — CHART NOTE - NSCHARTNOTEFT_GEN_A_CORE
POST-OPERATIVE NOTE    Subjective:  Patient is s/p Whipple procedure. Patient denies any n/v, chest pain, or SOB. Patient is currently intubated but completely alert and able to answer question by moving his head and writing on paper. Pain is currently well controlled. Recovering appropriately.    Objective:  Vital Signs Last 24 Hrs  T(C): 36.6 (03 Oct 2018 19:00), Max: 37 (03 Oct 2018 17:24)  T(F): 97.9 (03 Oct 2018 19:00), Max: 98.6 (03 Oct 2018 17:24)  HR: 73 (03 Oct 2018 21:34) (52 - 104)  BP: 114/67 (03 Oct 2018 17:24) (114/67 - 153/76)  BP(mean): 87 (03 Oct 2018 17:24) (87 - 87)  RR: 22 (03 Oct 2018 20:00) (18 - 28)  SpO2: 100% (03 Oct 2018 21:34) (99% - 100%)  I&O's Detail    03 Oct 2018 07:01  -  03 Oct 2018 22:29  --------------------------------------------------------  IN:    dexmedetomidine Infusion: 40 mL    multiple electrolytes Injection Type 1: 400 mL    phenylephrine   Infusion: 9 mL  Total IN: 449 mL    OUT:    Bulb: 50 mL    Bulb: 70 mL    Bulb: 5 mL    Indwelling Catheter - Urethral: 400 mL  Total OUT: 525 mL    Total NET: -76 mL        vancomycin  IVPB 1000  heparin  Injectable 5000  vancomycin  IVPB 1000    PAST MEDICAL & SURGICAL HISTORY:  Male stress incontinence  Kidney stone: &quot;passed on own&quot;  Varicose vein: (L) 5 years ago    had venous stripping 2007  Bilateral cataracts: removed with lens implants  Benign colon polyp: removed colonoscopy 2014  Prostate cancer: 2010 prostatectomy  stable  Afib: 2006 s/p ablation 2006 , afib resolved   on Eliquies  Hyperlipidemia: X 4 years not on any meds  GERD (Gastroesophageal Reflux Disease): X 10 years  DM (Diabetes Mellitus): X 3 years  Renal Calculi: 2008  MVP (Mitral Valve Prolapse): X 8 years stable  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 ,2018 Left  Jan2018  right knee  S/P ablation operation for arrhythmia  Male stress incontinence: s/p artifical urinary sphincter 5/2012  Varicose vein-s/p vein stripping 5 years ago  S/P arthroscopy: right shoulder 12/11  S/P prostatectomy: radical robotic 6/10  Cosmetic Surgery: chin implant 2004  S/P Colonoscopy with Polypectomy: 2009  S/P Appendectomy: 1950        Physical Exam:  General: NAD, resting comfortably in bed, intubated, NGT in place to suction  Pulmonary: Nonlabored breathing, no respiratory distress, on ventilator  Cardiovascular: RRR  Abdominal: soft, mildly distended, NT, dressings c/d/i, 1 RUQ 1 RLQ 1 LUQ DEMARIO with SS output  Extremities: WWP  : ivlla in place draining yellow urine      LABS:                        9.5    9.6   )-----------( 201      ( 03 Oct 2018 17:48 )             26.6     10-03    140  |  106  |  33<H>  ----------------------------<  185<H>  3.7   |  16<L>  |  1.25    Ca    7.5<L>      03 Oct 2018 17:48  Phos  4.3     10-03  Mg     2.2     10-03      PT/INR - ( 03 Oct 2018 17:48 )   PT: 14.0 sec;   INR: 1.29 ratio         PTT - ( 03 Oct 2018 17:48 )  PTT:29.0 sec  CAPILLARY BLOOD GLUCOSE      POCT Blood Glucose.: 152 mg/dL (03 Oct 2018 06:23)      Radiology and Additional Studies:    Assessment:  The patient is a 78y Male who is now several hours post-op from a Whipple procedure.     Plan:  - Pain control as needed with PCA  - patient to be extubated tonight  - follow up respiratory status  - subq heparin for DVT ppx  - monitor UOP  - monitor DEMARIO output  - OOB and ambulating as tolerated  - F/u AM labs  - care per SICU

## 2018-10-03 NOTE — PROGRESS NOTE ADULT - SUBJECTIVE AND OBJECTIVE BOX
The patient is scheduled for a Whipple procedure today. He has had no change in his clinical status since last seen by me

## 2018-10-03 NOTE — PRE-OP CHECKLIST - SITE MARKED BY ANESTHESIOLOGIST
1.  Date/reason for appt: 6/28/17 - Knee Injury  2.  Referring provider: ED/Hosp  3.  Call to patient (Yes / No - short description): no, hosp f/u  4.  Previous care at / records requested from: [facility] ED -- ED note 6/25/17 and imaging in Epic/pacs     yes

## 2018-10-03 NOTE — AIRWAY REMOVAL NOTE  ADULT & PEDS - COUGH
Pt called and notified that Dr. Josselyn Traore wanted pt to come in before refills can be approved. Pt stated that they are still trying to pay on an outstanding bill. I told pt that I would give  that msg. Pt verbalized understanding but said that they cannot make an appt at this time. good/nonproductive

## 2018-10-03 NOTE — BRIEF OPERATIVE NOTE - SPECIMENS
proximal biliary margin (frozen- benign), gallbladder, product of whipple (head/uncinate of pancreas, duodenum, proximal jejunum, and distal bile duct)

## 2018-10-03 NOTE — AIRWAY REMOVAL NOTE  ADULT & PEDS - ARTIFICAL AIRWAY REMOVAL COMMENTS
Extubation order placed. Order checked and confirmed by name, MRN and . Extubation done successfully with no issues recorded. Patient currently stable on aerosol mask..

## 2018-10-03 NOTE — BRIEF OPERATIVE NOTE - OPERATION/FINDINGS
Exlap, whipple with antrectomy (not pylorus sparing), hand-sewn pancreatiojejunal (pds single layer), hepaticojejunal (pds single layer), gastrojejunal (silk and vicryl double layer) anastomoses.

## 2018-10-03 NOTE — CONSULT NOTE ADULT - SUBJECTIVE AND OBJECTIVE BOX
CONSULT RESULT - SURGICAL INTENSIVE CARE UNIT    Consulting attending: Dr. Silver    HPI:  79 yo man with a hx of Afib on eliquis, HTN, GERD, prostate CA s/p prostatectomy, and 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 The patient was admitted to the SICU post-operatively for close monitoring. He was not able to be extubated due acidosis and possible intraop bronchospasm. Additionally, the patient was briefly in atrial fibrillation but responded well on verapamil. Thoracic epidural was placed by anesthesia for pain control. Of note, the patient had persistently high bicarb rich output from the PTC, contributing to a baseline metabolic acidosis -- base deficit was found to be 6 preoperatively.    Operative course:  Approx OR time: 8 hrs  EBL 1.4L  Crystalloid 9L  Colloids 200 mL of 25%  1u pRBC  UOP 1L  Intraop vancomycin    PAST MEDICAL HISTORY:  Male stress incontinence  Kidney stone  Varicose vein  Bilateral cataracts  Appendicitis  Benign colon polyp  Prostate cancer  Afib  Hyperlipidemia  GERD (Gastroesophageal Reflux Disease)  DM (Diabetes Mellitus)  Renal Calculi  MVP (Mitral Valve Prolapse)  HTN - Hypertension      PAST SURGICAL HISTORY:  Status post left knee replacement  S/P ablation operation for arrhythmia  Male stress incontinence  Varicose vein-s/p vein stripping 5 years ago  S/P arthroscopy  S/P prostatectomy (2010)  Cosmetic Surgery  S/P Colonoscopy with Polypectomy  S/P Appendectomy      MEDICATIONS:  chlorhexidine 0.12% Liquid 15 milliLiter(s) Oral Mucosa <User Schedule>  chlorhexidine 4% Liquid 1 Application(s) Topical <User Schedule>  heparin  Injectable 5000 Unit(s) SubCutaneous every 8 hours  HYDROmorphone (10 MICROgram(s)/mL) + BUpivacaine 0.0625% in 0.9% Sodium Chloride PCEA 250 milliLiter(s) Epidural PCA Continuous  HYDROmorphone (10 MICROgram(s)/mL) + BUpivacaine 0.0625% in 0.9% Sodium Chloride PCEA Rescue Clinician  Bolus 3 milliLiter(s) Epidural every 15 minutes PRN  multiple electrolytes Injection Type 1 1000 milliLiter(s) IV Continuous <Continuous>  pantoprazole  Injectable 40 milliGRAM(s) IV Push every 24 hours  vancomycin  IVPB 1000 milliGRAM(s) IV Intermittent every 12 hours      ALLERGIES:  adhesives (Hives)  No Known Drug Allergies      VITALS & I/Os:  Vital Signs Last 24 Hrs  T(C): 37 (03 Oct 2018 17:24), Max: 37 (03 Oct 2018 17:24)  T(F): 98.6 (03 Oct 2018 17:24), Max: 98.6 (03 Oct 2018 17:24)  HR: 54 (03 Oct 2018 20:07) (52 - 104)  BP: 114/67 (03 Oct 2018 17:24) (114/67 - 153/76)  BP(mean): 87 (03 Oct 2018 17:24) (87 - 87)  RR: 22 (03 Oct 2018 19:00) (18 - 28)  SpO2: 100% (03 Oct 2018 20:07) (99% - 100%)  Mode: AC/ CMV (Assist Control/ Continuous Mandatory Ventilation)  RR (machine): 20  TV (machine): 550  FiO2: 40  PEEP: 5  ITime: 1  MAP: 10  PIP: 13    I&O's Summary    03 Oct 2018 07:01  -  03 Oct 2018 20:29  --------------------------------------------------------  IN: 449 mL / OUT: 360 mL / NET: 89 mL        PHYSICAL EXAM:  GEN: NAD, intubated, sedated, jaundiced  PULM: symmetric chest rise bilaterally, no increased WOB  CV: regular rate, peripheral pulses intact  ABD: soft, slightly distended, midline dressings c/d/i, DEMARIO drain x3  EXTR: no cyanosis or edema, moving all extremities    LABS:                   9.5    9.6   )-----------( 201      ( 03 Oct 2018 17:48 )             26.6     10-03    140  |  106  |  33<H>  ----------------------------<  185<H>  3.7   |  16<L>  |  1.25    Ca    7.5<L>      03 Oct 2018 17:48  Phos  4.3     10-03  Mg     2.2     10-03    Lactate:    PT/INR - ( 03 Oct 2018 17:48 )   PT: 14.0 sec;   INR: 1.29 ratio      PTT - ( 03 Oct 2018 17:48 )  PTT:29.0 sec  ABG - ( 03 Oct 2018 17:41 )  pH, Arterial: 7.42  pH, Blood: x     /  pCO2: 26    /  pO2: 408   / HCO3: 17    / Base Excess: -6.1  /  SaO2: 100       IMAGING:  CXR: clear lung fields bilaterally; NGT in place.

## 2018-10-03 NOTE — PATIENT PROFILE ADULT. - PMH
Afib  2006 s/p ablation 2006 , afib resolved   on Eliquies  Benign colon polyp  removed colonoscopy 2014  Bilateral cataracts  removed with lens implants  DM (Diabetes Mellitus)  X 3 years  GERD (Gastroesophageal Reflux Disease)  X 10 years  Hyperlipidemia  X 4 years not on any meds  Kidney stone  "passed on own"  Male stress incontinence    MVP (Mitral Valve Prolapse)  X 8 years stable  Prostate cancer  2010 prostatectomy  stable  Renal Calculi  2008  Varicose vein  (L) 5 years ago    had venous stripping 2007

## 2018-10-03 NOTE — CONSULT NOTE ADULT - ASSESSMENT
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 Patient admitted to SICU for post-op monitoring. He was unable to be extubated immediately post-op due to acidosis and questionable intraop bronchospasm. Additionally, the patient was briefly in atrial fibrillation treated with verapamil.    PLAN:  NEURO: acute post-op pain.  -cont thoracic dilaudid epidural  -tylenol atc    PULM: currently intubated.  -SBT now, likely extubate  -CXR in am    CV: on phenylephrine 0.1, sinus bradycardia.  -wean phenylephrine as tolerated  -plasmalyte @ 100 cc/hr    GI: NPO, NGT.  -cont NGT to low continuous suction  -protonix for GI ppx    : BUN/Cr stable, making adequate urine. Hx of artificial urinary sphincter.  -cont villa. Plan to remove tomorrow  -strict I/O    HEME: 1u pRBC in OR. H/H stable, no indication for transfusion.   -daily CBC  -SQH for VTE ppx    ID: hx PCT, intraop cultures pending.  -cont vancomycin    ENDO: BG wnl, no current issues.    LINES:  ET  NGT  R rad a line  DEMARIO drain x3    DISPO: SICU for close hemodynamic monitoring

## 2018-10-03 NOTE — CONSULT NOTE ADULT - ATTENDING COMMENTS
S/p Whipple procedure for cholangio ca  Acute resp failure post   Met acidosis  Lactate HCT stable    Placed on SBT, ABG compensated met acidosis s/p extubated, tolerating well  A-G met acidosis partially from CKD, improving  IVF plasmalyte with improving acidosis  Pain well controlled with PCEA S/p Whipple procedure for cholangio ca  Acute resp failure post   Met acidosis  Lactate HCT stable  A fib with RVR in OR    Placed on SBT, ABG compensated met acidosis s/p extubated, tolerating well  A-G met acidosis partially from CKD, improving  IVF plasmalyte with improving acidosis  Add IV BB to prevent a fib with RVR  Pain well controlled with PCEA

## 2018-10-04 LAB
ANION GAP SERPL CALC-SCNC: 13 MMOL/L — SIGNIFICANT CHANGE UP (ref 5–17)
APTT BLD: 28.9 SEC — SIGNIFICANT CHANGE UP (ref 27.5–37.4)
BUN SERPL-MCNC: 34 MG/DL — HIGH (ref 7–23)
CALCIUM SERPL-MCNC: 7.5 MG/DL — LOW (ref 8.4–10.5)
CHLORIDE SERPL-SCNC: 108 MMOL/L — SIGNIFICANT CHANGE UP (ref 96–108)
CO2 SERPL-SCNC: 18 MMOL/L — LOW (ref 22–31)
CREAT SERPL-MCNC: 1.44 MG/DL — HIGH (ref 0.5–1.3)
GAS PNL BLDA: SIGNIFICANT CHANGE UP
GLUCOSE SERPL-MCNC: 263 MG/DL — HIGH (ref 70–99)
HCT VFR BLD CALC: 27.2 % — LOW (ref 39–50)
HCT VFR BLD CALC: 28.2 % — LOW (ref 39–50)
HGB BLD-MCNC: 9.5 G/DL — LOW (ref 13–17)
HGB BLD-MCNC: 9.7 G/DL — LOW (ref 13–17)
INR BLD: 1.28 RATIO — HIGH (ref 0.88–1.16)
MAGNESIUM SERPL-MCNC: 2.1 MG/DL — SIGNIFICANT CHANGE UP (ref 1.6–2.6)
MCHC RBC-ENTMCNC: 31.5 PG — SIGNIFICANT CHANGE UP (ref 27–34)
MCHC RBC-ENTMCNC: 31.8 PG — SIGNIFICANT CHANGE UP (ref 27–34)
MCHC RBC-ENTMCNC: 34.5 GM/DL — SIGNIFICANT CHANGE UP (ref 32–36)
MCHC RBC-ENTMCNC: 35 GM/DL — SIGNIFICANT CHANGE UP (ref 32–36)
MCV RBC AUTO: 90.8 FL — SIGNIFICANT CHANGE UP (ref 80–100)
MCV RBC AUTO: 91.2 FL — SIGNIFICANT CHANGE UP (ref 80–100)
PHOSPHATE SERPL-MCNC: 4.6 MG/DL — HIGH (ref 2.5–4.5)
PLATELET # BLD AUTO: 191 K/UL — SIGNIFICANT CHANGE UP (ref 150–400)
PLATELET # BLD AUTO: 203 K/UL — SIGNIFICANT CHANGE UP (ref 150–400)
POTASSIUM SERPL-MCNC: 4.1 MMOL/L — SIGNIFICANT CHANGE UP (ref 3.5–5.3)
POTASSIUM SERPL-SCNC: 4.1 MMOL/L — SIGNIFICANT CHANGE UP (ref 3.5–5.3)
PROTHROM AB SERPL-ACNC: 13.9 SEC — HIGH (ref 9.8–12.7)
RBC # BLD: 3 M/UL — LOW (ref 4.2–5.8)
RBC # BLD: 3.09 M/UL — LOW (ref 4.2–5.8)
RBC # FLD: 12.7 % — SIGNIFICANT CHANGE UP (ref 10.3–14.5)
RBC # FLD: 12.8 % — SIGNIFICANT CHANGE UP (ref 10.3–14.5)
SODIUM SERPL-SCNC: 139 MMOL/L — SIGNIFICANT CHANGE UP (ref 135–145)
VANCOMYCIN FLD-MCNC: 12.7 UG/ML — SIGNIFICANT CHANGE UP
VANCOMYCIN TROUGH SERPL-MCNC: 18.2 UG/ML — SIGNIFICANT CHANGE UP (ref 10–20)
WBC # BLD: 9 K/UL — SIGNIFICANT CHANGE UP (ref 3.8–10.5)
WBC # BLD: 9.6 K/UL — SIGNIFICANT CHANGE UP (ref 3.8–10.5)
WBC # FLD AUTO: 9 K/UL — SIGNIFICANT CHANGE UP (ref 3.8–10.5)
WBC # FLD AUTO: 9.6 K/UL — SIGNIFICANT CHANGE UP (ref 3.8–10.5)

## 2018-10-04 PROCEDURE — 71045 X-RAY EXAM CHEST 1 VIEW: CPT | Mod: 26

## 2018-10-04 RX ORDER — INSULIN LISPRO 100/ML
VIAL (ML) SUBCUTANEOUS EVERY 4 HOURS
Qty: 0 | Refills: 0 | Status: DISCONTINUED | OUTPATIENT
Start: 2018-10-04 | End: 2018-10-07

## 2018-10-04 RX ORDER — CALCIUM GLUCONATE 100 MG/ML
1 VIAL (ML) INTRAVENOUS ONCE
Qty: 0 | Refills: 0 | Status: COMPLETED | OUTPATIENT
Start: 2018-10-04 | End: 2018-10-04

## 2018-10-04 RX ORDER — METOPROLOL TARTRATE 50 MG
2.5 TABLET ORAL EVERY 6 HOURS
Qty: 0 | Refills: 0 | Status: DISCONTINUED | OUTPATIENT
Start: 2018-10-04 | End: 2018-10-05

## 2018-10-04 RX ORDER — INSULIN LISPRO 100/ML
VIAL (ML) SUBCUTANEOUS EVERY 6 HOURS
Qty: 0 | Refills: 0 | Status: DISCONTINUED | OUTPATIENT
Start: 2018-10-04 | End: 2018-10-04

## 2018-10-04 RX ORDER — INFLUENZA VIRUS VACCINE 15; 15; 15; 15 UG/.5ML; UG/.5ML; UG/.5ML; UG/.5ML
0.5 SUSPENSION INTRAMUSCULAR ONCE
Qty: 0 | Refills: 0 | Status: COMPLETED | OUTPATIENT
Start: 2018-10-04 | End: 2018-10-04

## 2018-10-04 RX ORDER — ACETAMINOPHEN 500 MG
1000 TABLET ORAL ONCE
Qty: 0 | Refills: 0 | Status: COMPLETED | OUTPATIENT
Start: 2018-10-05 | End: 2018-10-05

## 2018-10-04 RX ORDER — METOPROLOL TARTRATE 50 MG
5 TABLET ORAL ONCE
Qty: 0 | Refills: 0 | Status: COMPLETED | OUTPATIENT
Start: 2018-10-04 | End: 2018-10-04

## 2018-10-04 RX ORDER — DIPHENHYDRAMINE HCL 50 MG
25 CAPSULE ORAL ONCE
Qty: 0 | Refills: 0 | Status: COMPLETED | OUTPATIENT
Start: 2018-10-04 | End: 2018-10-04

## 2018-10-04 RX ORDER — ACETAMINOPHEN 500 MG
1000 TABLET ORAL ONCE
Qty: 0 | Refills: 0 | Status: COMPLETED | OUTPATIENT
Start: 2018-10-04 | End: 2018-10-04

## 2018-10-04 RX ORDER — SODIUM BICARBONATE 1 MEQ/ML
0.06 SYRINGE (ML) INTRAVENOUS
Qty: 50 | Refills: 0 | Status: DISCONTINUED | OUTPATIENT
Start: 2018-10-04 | End: 2018-10-05

## 2018-10-04 RX ORDER — METOPROLOL TARTRATE 50 MG
5 TABLET ORAL EVERY 6 HOURS
Qty: 0 | Refills: 0 | Status: DISCONTINUED | OUTPATIENT
Start: 2018-10-04 | End: 2018-10-04

## 2018-10-04 RX ADMIN — Medication 2.5 MILLIGRAM(S): at 17:40

## 2018-10-04 RX ADMIN — HEPARIN SODIUM 5000 UNIT(S): 5000 INJECTION INTRAVENOUS; SUBCUTANEOUS at 06:20

## 2018-10-04 RX ADMIN — Medication 1000 MILLIGRAM(S): at 22:28

## 2018-10-04 RX ADMIN — Medication 5 MILLIGRAM(S): at 01:58

## 2018-10-04 RX ADMIN — Medication 400 MILLIGRAM(S): at 16:13

## 2018-10-04 RX ADMIN — Medication 250 MILLIGRAM(S): at 22:13

## 2018-10-04 RX ADMIN — Medication 1000 MILLIGRAM(S): at 16:31

## 2018-10-04 RX ADMIN — Medication 100 MILLIEQUIVALENT(S): at 02:02

## 2018-10-04 RX ADMIN — CHLORHEXIDINE GLUCONATE 1 APPLICATION(S): 213 SOLUTION TOPICAL at 06:21

## 2018-10-04 RX ADMIN — Medication 200 GRAM(S): at 06:21

## 2018-10-04 RX ADMIN — Medication 100 MEQ/KG/HR: at 19:38

## 2018-10-04 RX ADMIN — CHLORHEXIDINE GLUCONATE 15 MILLILITER(S): 213 SOLUTION TOPICAL at 06:20

## 2018-10-04 RX ADMIN — Medication 25 MILLIGRAM(S): at 10:30

## 2018-10-04 RX ADMIN — Medication 2.5 MILLIGRAM(S): at 12:38

## 2018-10-04 RX ADMIN — HEPARIN SODIUM 5000 UNIT(S): 5000 INJECTION INTRAVENOUS; SUBCUTANEOUS at 14:34

## 2018-10-04 RX ADMIN — Medication 2.5 MILLIGRAM(S): at 23:17

## 2018-10-04 RX ADMIN — Medication 250 MILLIGRAM(S): at 10:34

## 2018-10-04 RX ADMIN — Medication 400 MILLIGRAM(S): at 22:13

## 2018-10-04 RX ADMIN — Medication 2: at 20:21

## 2018-10-04 RX ADMIN — Medication 100 MEQ/KG/HR: at 14:56

## 2018-10-04 RX ADMIN — Medication 4: at 16:26

## 2018-10-04 RX ADMIN — Medication 4: at 23:20

## 2018-10-04 RX ADMIN — HEPARIN SODIUM 5000 UNIT(S): 5000 INJECTION INTRAVENOUS; SUBCUTANEOUS at 22:14

## 2018-10-04 RX ADMIN — Medication 100 MILLIEQUIVALENT(S): at 00:48

## 2018-10-04 RX ADMIN — Medication 6: at 12:00

## 2018-10-04 RX ADMIN — PANTOPRAZOLE SODIUM 40 MILLIGRAM(S): 20 TABLET, DELAYED RELEASE ORAL at 22:13

## 2018-10-04 NOTE — PROGRESS NOTE ADULT - SUBJECTIVE AND OBJECTIVE BOX
Surgery Progress Note     Subjective/24hour Events:   Patient seen and examined.  Yesterday OR for [place].   No acute events overnight.   Pain well controlled on PCA.   No flatus, no BM.     Vital Signs:  Vital Signs Last 24 Hrs  T(C): 36.8 (04 Oct 2018 03:00), Max: 37 (03 Oct 2018 17:24)  T(F): 98.2 (04 Oct 2018 03:00), Max: 98.6 (03 Oct 2018 17:24)  HR: 116 (04 Oct 2018 07:00) (52 - 116)  BP: 114/67 (03 Oct 2018 17:24) (114/67 - 114/67)  BP(mean): 87 (03 Oct 2018 17:24) (87 - 87)  RR: 23 (04 Oct 2018 07:00) (14 - 28)  SpO2: 97% (04 Oct 2018 05:00) (97% - 100%)    CAPILLARY BLOOD GLUCOSE          I&O's Detail    03 Oct 2018 07:01  -  04 Oct 2018 07:00  --------------------------------------------------------  IN:    dexmedetomidine Infusion: 40 mL    multiple electrolytes Injection Type 1: 1500 mL    phenylephrine   Infusion: 9 mL    Solution: 300 mL    Solution: 250 mL    Solution: 100 mL  Total IN: 2199 mL    OUT:    Bulb: 125 mL    Bulb: 155 mL    Bulb: 105 mL    Indwelling Catheter - Urethral: 1205 mL    Nasoenteral Tube: 30 mL  Total OUT: 1620 mL    Total NET: 579 mL          MEDICATIONS  (STANDING):  chlorhexidine 4% Liquid 1 Application(s) Topical <User Schedule>  heparin  Injectable 5000 Unit(s) SubCutaneous every 8 hours  HYDROmorphone (10 MICROgram(s)/mL) + BUpivacaine 0.0625% in 0.9% Sodium Chloride PCEA 250 milliLiter(s) Epidural PCA Continuous  insulin lispro (HumaLOG) corrective regimen sliding scale   SubCutaneous every 6 hours  metoprolol tartrate Injectable 5 milliGRAM(s) IV Push every 6 hours  multiple electrolytes Injection Type 1 1000 milliLiter(s) (100 mL/Hr) IV Continuous <Continuous>  pantoprazole  Injectable 40 milliGRAM(s) IV Push every 24 hours  vancomycin  IVPB 1000 milliGRAM(s) IV Intermittent every 12 hours    MEDICATIONS  (PRN):  HYDROmorphone (10 MICROgram(s)/mL) + BUpivacaine 0.0625% in 0.9% Sodium Chloride PCEA Rescue Clinician  Bolus 3 milliLiter(s) Epidural every 15 minutes PRN for Pain Score greater than 6      Physical Exam:  Gen: NAD, sitting in chair, converses easily. NGT in place with minimal output.   Lungs: Non labored breathing.   Ab: Soft, appropriately tender, nondistended. Dressings cdi, 3 DEMARIO drains with SS output.   : Ness in place, clear yellow urine.   Ext: Moves all 4 spontaneously.     Labs:    10-04    139  |  108  |  34<H>  ----------------------------<  263<H>  4.1   |  18<L>  |  1.44<H>    Ca    7.5<L>      04 Oct 2018 04:14  Phos  4.6     10-04  Mg     2.1     10-04                              9.7    9.6   )-----------( 203      ( 04 Oct 2018 04:14 )             28.2     PT/INR - ( 04 Oct 2018 04:14 )   PT: 13.9 sec;   INR: 1.28 ratio         PTT - ( 04 Oct 2018 04:14 )  PTT:28.9 sec

## 2018-10-04 NOTE — PROGRESS NOTE ADULT - SUBJECTIVE AND OBJECTIVE BOX
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 The patient was admitted to the SICU post-operatively for close monitoring. He was not able to be extubated due acidosis and possible intraop bronchospasm. Additionally, the patient was briefly in atrial fibrillation but responded well on verapamil. Thoracic epidural was placed by anesthesia for pain control. Of note, the patient had persistently high bicarb rich output from the PTC, contributing to a baseline metabolic acidosis -- base deficit was found to be 6 preoperatively.    24 HOUR EVENTS: Pt weaned off Phenylephrine.  Pt passed SBT and was extubated to facemask, weaned down to NC.  Hypertensive and tachycardic, started on Metoprolol 5mg IV q6hrs.  No other acute events.     SUBJECTIVE/ROS:  [ ] A ten-point review of systems was otherwise negative except as noted.  [ ] Due to altered mental status/intubation, subjective information were not able to be obtained from the patient. History was obtained, to the extent possible, from review of the chart and collateral sources of information.      NEURO  RASS:     GCS:     CAM ICU:  Exam: Awake, alert, following commands. In NAD.   Meds: HYDROmorphone (10 MICROgram(s)/mL) + BUpivacaine 0.0625% in 0.9% Sodium Chloride PCEA 250 milliLiter(s) Epidural PCA Continuous  HYDROmorphone (10 MICROgram(s)/mL) + BUpivacaine 0.0625% in 0.9% Sodium Chloride PCEA Rescue Clinician  Bolus 3 milliLiter(s) Epidural every 15 minutes PRN for Pain Score greater than 6    [x] Adequacy of sedation and pain control has been assessed and adjusted      RESPIRATORY  RR: 19 (10-04-18 @ 02:00) (14 - 28)  SpO2: 100% (10-04-18 @ 02:00) (99% - 100%)  Wt(kg): --  Exam: unlabored, clear to auscultation bilaterally  Mechanical Ventilation: N/A  pH: 7.40  /  pCO2: 25    /  pO2: 173   / HCO3: 16    / Base Excess: -7.7  /  SaO2: 100     Lactate: x      [ ] Extubation Readiness Assessed  Meds:       CARDIOVASCULAR  HR: 112 (10-04-18 @ 02:00) (52 - 112)  BP: 114/67 (10-03-18 @ 17:24) (114/67 - 153/76)  BP(mean): 87 (10-03-18 @ 17:24) (87 - 87)  ABP: 146/63 (10-04-18 @ 02:00) (101/49 - 155/70)  ABP(mean): 92 (10-04-18 @ 02:00) (66 - 101)  Wt(kg): --  CVP(cm H2O): --    Exam: Tachycardic, regular rhythm. +S1, +S2.   Cardiac Rhythm: Sinus   Perfusion     [x ]Adequate   [ ]Inadequate  Mentation   [x ]Normal       [ ]Reduced  Extremities  [ x]Warm         [ ]Cool  Volume Status [ ]Hypervolemic [x ]Euvolemic [ ]Hypovolemic  Meds: metoprolol tartrate Injectable 5 milliGRAM(s) IV Push every 6 hours      GI/NUTRITION  Exam: +midline incision clean, dry, intact. +DEMARIO x 3 draining serosanguinous fluid.   Diet: NPO  Meds: pantoprazole  Injectable 40 milliGRAM(s) IV Push every 24 hours      GENITOURINARY  I&O's Detail    10-03 @ 07:01  -  10-04 @ 04:10  --------------------------------------------------------  IN:    dexmedetomidine Infusion: 40 mL    multiple electrolytes Injection Type 1: 400 mL    phenylephrine   Infusion: 9 mL  Total IN: 449 mL    OUT:    Bulb: 55 mL    Bulb: 65 mL    Bulb: 100 mL    Indwelling Catheter - Urethral: 795 mL  Total OUT: 1015 mL    Total NET: -566 mL      Weight (kg): 83.9 (10-03 @ 06:38)  10-03    140  |  106  |  33<H>  ----------------------------<  185<H>  3.7   |  16<L>  |  1.25    Ca    7.5<L>      03 Oct 2018 17:48  Phos  4.3     10-03  Mg     2.2     10-03      [x ] Ness catheter, indication: N/A  Meds: multiple electrolytes Injection Type 1 1000 milliLiter(s) IV Continuous <Continuous>      HEMATOLOGIC  Meds: heparin  Injectable 5000 Unit(s) SubCutaneous every 8 hours    [x] VTE Prophylaxis                        9.5    9.0   )-----------( 191      ( 03 Oct 2018 23:50 )             27.2     PT/INR - ( 03 Oct 2018 17:48 )   PT: 14.0 sec;   INR: 1.29 ratio         PTT - ( 03 Oct 2018 17:48 )  PTT:29.0 sec  Transfusion     [ ] PRBC   [ ] Platelets   [ ] FFP   [ ] Cryoprecipitate      INFECTIOUS DISEASES  T(C): 36.9 (10-03-18 @ 23:00), Max: 37 (10-03-18 @ 17:24)  Wt(kg): --  WBC Count: 9.0 K/uL (10-03 @ 23:50)  WBC Count: 9.6 K/uL (10-03 @ 17:48)    Recent Cultures:    Meds: vancomycin  IVPB 1000 milliGRAM(s) IV Intermittent every 12 hours    ENDOCRINE  Capillary Blood Glucose    Meds:       ACCESS DEVICES:  [x ] Peripheral IV  [ ] Central Venous Line	[ ] R	[ ] L	[ ] IJ	[ ] Fem	[ ] SC	Placed:   [x ] Arterial Line		[x ] R	[ ] L	[ ] Fem	[x ] Rad	[ ] Ax	Placed:   [ ] PICC:					[ ] Mediport  [x ] Urinary Catheter, Date Placed:   [x] Necessity of urinary, arterial, and venous catheters discussed    OTHER MEDICATIONS:  chlorhexidine 0.12% Liquid 15 milliLiter(s) Oral Mucosa <User Schedule>  chlorhexidine 4% Liquid 1 Application(s) Topical <User Schedule>      CODE STATUS: Full code     IMAGING: 79 yo man with a hx of Afib on eliquis, HTN, GERD, prostate CA s/p prostatectomy, and 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 The patient was admitted to the SICU post-operatively for close monitoring. He was not able to be extubated due acidosis and possible intraop bronchospasm. Additionally, the patient was briefly in atrial fibrillation but responded well on verapamil. Thoracic epidural was placed by anesthesia for pain control. Of note, the patient had persistently high bicarb rich output from the PTC, contributing to a baseline metabolic acidosis -- base deficit was found to be 6 preoperatively.    24 HOUR EVENTS: Pt weaned off Phenylephrine.  Pt passed SBT and was extubated to facemask, weaned down to NC.  Hypertensive and tachycardic, started on Metoprolol 5mg IV q6hrs.  Random vanco level 12.7.  No other acute events.     SUBJECTIVE/ROS: Pt admits to incisional abdominal pain.  Pt denies HA, CP, SOB, N/V.   [x ] A ten-point review of systems was otherwise negative except as noted.  [ ] Due to altered mental status/intubation, subjective information were not able to be obtained from the patient. History was obtained, to the extent possible, from review of the chart and collateral sources of information.      NEURO  RASS:     GCS:     CAM ICU:  Exam: Awake, alert, following commands. In NAD.   Meds: HYDROmorphone (10 MICROgram(s)/mL) + BUpivacaine 0.0625% in 0.9% Sodium Chloride PCEA 250 milliLiter(s) Epidural PCA Continuous  HYDROmorphone (10 MICROgram(s)/mL) + BUpivacaine 0.0625% in 0.9% Sodium Chloride PCEA Rescue Clinician  Bolus 3 milliLiter(s) Epidural every 15 minutes PRN for Pain Score greater than 6    [x] Adequacy of sedation and pain control has been assessed and adjusted      RESPIRATORY  RR: 19 (10-04-18 @ 02:00) (14 - 28)  SpO2: 100% (10-04-18 @ 02:00) (99% - 100%)  Wt(kg): --  Exam: unlabored, clear to auscultation bilaterally  Mechanical Ventilation: N/A  pH: 7.40  /  pCO2: 25    /  pO2: 173   / HCO3: 16    / Base Excess: -7.7  /  SaO2: 100     Lactate: x      [ ] Extubation Readiness Assessed  Meds:       CARDIOVASCULAR  HR: 112 (10-04-18 @ 02:00) (52 - 112)  BP: 114/67 (10-03-18 @ 17:24) (114/67 - 153/76)  BP(mean): 87 (10-03-18 @ 17:24) (87 - 87)  ABP: 146/63 (10-04-18 @ 02:00) (101/49 - 155/70)  ABP(mean): 92 (10-04-18 @ 02:00) (66 - 101)  Wt(kg): --  CVP(cm H2O): --    Exam: Tachycardic, regular rhythm. +S1, +S2.   Cardiac Rhythm: Sinus   Perfusion     [x ]Adequate   [ ]Inadequate  Mentation   [x ]Normal       [ ]Reduced  Extremities  [ x]Warm         [ ]Cool  Volume Status [ ]Hypervolemic [x ]Euvolemic [ ]Hypovolemic  Meds: metoprolol tartrate Injectable 5 milliGRAM(s) IV Push every 6 hours      GI/NUTRITION  Exam: +midline incision clean, dry, intact. Soft, appropriately tender. +DEMARIO x 3 draining serosanguinous fluid.   Diet: NPO  Meds: pantoprazole  Injectable 40 milliGRAM(s) IV Push every 24 hours      GENITOURINARY  I&O's Detail    10-03 @ 07:01  -  10-04 @ 04:10  --------------------------------------------------------  IN:    dexmedetomidine Infusion: 40 mL    multiple electrolytes Injection Type 1: 400 mL    phenylephrine   Infusion: 9 mL  Total IN: 449 mL    OUT:    Bulb: 55 mL    Bulb: 65 mL    Bulb: 100 mL    Indwelling Catheter - Urethral: 795 mL  Total OUT: 1015 mL    Total NET: -566 mL      Weight (kg): 83.9 (10-03 @ 06:38)  10-04    139  |  108  |  34<H>  ----------------------------<  263<H>  4.1   |  18<L>  |  1.44<H>    Ca    7.5<L>      04 Oct 2018 04:14  Phos  4.6     10-04  Mg     2.1     10-04      [x ] Ness catheter, indication: N/A  Meds: multiple electrolytes Injection Type 1 1000 milliLiter(s) IV Continuous <Continuous>      HEMATOLOGIC  Meds: heparin  Injectable 5000 Unit(s) SubCutaneous every 8 hours    [x] VTE Prophylaxis                                9.7    9.6   )-----------( 203      ( 04 Oct 2018 04:14 )             28.2     PT/INR - ( 04 Oct 2018 04:14 )   PT: 13.9 sec;   INR: 1.28 ratio         PTT - ( 04 Oct 2018 04:14 )  PTT:28.9 sec  Transfusion     [ ] PRBC   [ ] Platelets   [ ] FFP   [ ] Cryoprecipitate      INFECTIOUS DISEASES  T(C): 36.9 (10-03-18 @ 23:00), Max: 37 (10-03-18 @ 17:24)  Wt(kg): --  WBC Count: 9.0 K/uL (10-03 @ 23:50)  WBC Count: 9.6 K/uL (10-03 @ 17:48)    Recent Cultures:    Meds: vancomycin  IVPB 1000 milliGRAM(s) IV Intermittent every 12 hours    ENDOCRINE  Capillary Blood Glucose    Meds:       ACCESS DEVICES:  [x ] Peripheral IV  [ ] Central Venous Line	[ ] R	[ ] L	[ ] IJ	[ ] Fem	[ ] SC	Placed:   [x ] Arterial Line		[x ] R	[ ] L	[ ] Fem	[x ] Rad	[ ] Ax	Placed:   [ ] PICC:					[ ] Mediport  [x ] Urinary Catheter, Date Placed:   [x] Necessity of urinary, arterial, and venous catheters discussed    OTHER MEDICATIONS:  chlorhexidine 0.12% Liquid 15 milliLiter(s) Oral Mucosa <User Schedule>  chlorhexidine 4% Liquid 1 Application(s) Topical <User Schedule>      CODE STATUS: Full code     IMAGING:

## 2018-10-04 NOTE — PROGRESS NOTE ADULT - ATTENDING COMMENTS
Patient seen and examined and agree with above.  Overall hemodynamics improved.   Respiratory status is stable on 2 liters nasal cannula supplementation.  CXR within normal limits  acute worsening of CKD stage 3-  making adequate urine output and will continue to monitor  Continue vancomycin empirically as per surgical team  Postoperative anemia is stable.   WIll start beta blockade to metoprol 2.5 aq 6 hours.  HYperglycemia with DM type 2- continue ISS with goal BG < 180    Continue NPO with ivf  Discontinue villa catheter  PCEA currently and will continue.

## 2018-10-04 NOTE — PROGRESS NOTE ADULT - ASSESSMENT
Surgery Progress Note     Subjective/24hour Events:     Vital Signs:  Vital Signs Last 24 Hrs  T(C): 36.8 (04 Oct 2018 03:00), Max: 37 (03 Oct 2018 17:24)  T(F): 98.2 (04 Oct 2018 03:00), Max: 98.6 (03 Oct 2018 17:24)  HR: 116 (04 Oct 2018 07:00) (52 - 116)  BP: 114/67 (03 Oct 2018 17:24) (114/67 - 114/67)  BP(mean): 87 (03 Oct 2018 17:24) (87 - 87)  RR: 23 (04 Oct 2018 07:00) (14 - 28)  SpO2: 97% (04 Oct 2018 05:00) (97% - 100%)    CAPILLARY BLOOD GLUCOSE          I&O's Detail    03 Oct 2018 07:01  -  04 Oct 2018 07:00  --------------------------------------------------------  IN:    dexmedetomidine Infusion: 40 mL    multiple electrolytes Injection Type 1: 1500 mL    phenylephrine   Infusion: 9 mL    Solution: 300 mL    Solution: 250 mL    Solution: 100 mL  Total IN: 2199 mL    OUT:    Bulb: 125 mL    Bulb: 155 mL    Bulb: 105 mL    Indwelling Catheter - Urethral: 1205 mL    Nasoenteral Tube: 30 mL  Total OUT: 1620 mL    Total NET: 579 mL          MEDICATIONS  (STANDING):  chlorhexidine 4% Liquid 1 Application(s) Topical <User Schedule>  heparin  Injectable 5000 Unit(s) SubCutaneous every 8 hours  HYDROmorphone (10 MICROgram(s)/mL) + BUpivacaine 0.0625% in 0.9% Sodium Chloride PCEA 250 milliLiter(s) Epidural PCA Continuous  insulin lispro (HumaLOG) corrective regimen sliding scale   SubCutaneous every 6 hours  metoprolol tartrate Injectable 5 milliGRAM(s) IV Push every 6 hours  multiple electrolytes Injection Type 1 1000 milliLiter(s) (100 mL/Hr) IV Continuous <Continuous>  pantoprazole  Injectable 40 milliGRAM(s) IV Push every 24 hours  vancomycin  IVPB 1000 milliGRAM(s) IV Intermittent every 12 hours    MEDICATIONS  (PRN):  HYDROmorphone (10 MICROgram(s)/mL) + BUpivacaine 0.0625% in 0.9% Sodium Chloride PCEA Rescue Clinician  Bolus 3 milliLiter(s) Epidural every 15 minutes PRN for Pain Score greater than 6      Physical Exam:  Gen: NAD, laying comfortably in bed, converses easily.   Lungs: Non labored breathing.   Ab: Soft, nontender, nondistended.   Ext: Moves all 4 spontaneously.     Labs:    10-04    139  |  108  |  34<H>  ----------------------------<  263<H>  4.1   |  18<L>  |  1.44<H>    Ca    7.5<L>      04 Oct 2018 04:14  Phos  4.6     10-04  Mg     2.1     10-04  78M s/p 10/3 whipple for bile duct mass, postoperatively transferred to SICU and successfully extubated, overall recovering appropriately.     -Pain control with PCA.   -NGT/NPO/IVF.   -Continue drain care.   -Continue IV vanco.   -Encourage OOB to chair.   -Monitor and replete electrolytes.   -Rest of care as per primary team.     Blue Team General Surgery [phone]

## 2018-10-04 NOTE — PROGRESS NOTE ADULT - SUBJECTIVE AND OBJECTIVE BOX
Day 1 of Anesthesia Pain Management Service    SUBJECTIVE: Patient doing well with PCEA    Pain Scale Score:   Refer to charted pain scores    THERAPY:  [X] Epidural Bupivacaine 0.0625% and Hydromorphone         [X] 10 micrograms/mL 	[ ] 5 micrograms/mL  [ ] Epidural Ropivacaine 0.1% plain – 1 mg/mL    Demand dose: 2 mL  Lockout: 15 minutes  Continuous Rate: 6 mL/hr    MEDICATIONS  (STANDING):  chlorhexidine 4% Liquid 1 Application(s) Topical <User Schedule>  heparin  Injectable 5000 Unit(s) SubCutaneous every 8 hours  HYDROmorphone (10 MICROgram(s)/mL) + BUpivacaine 0.0625% in 0.9% Sodium Chloride PCEA 250 milliLiter(s) Epidural PCA Continuous  influenza   Vaccine 0.5 milliLiter(s) IntraMuscular once  insulin lispro (HumaLOG) corrective regimen sliding scale   SubCutaneous every 6 hours  metoprolol tartrate Injectable 5 milliGRAM(s) IV Push every 6 hours  multiple electrolytes Injection Type 1 1000 milliLiter(s) (100 mL/Hr) IV Continuous <Continuous>  pantoprazole  Injectable 40 milliGRAM(s) IV Push every 24 hours  vancomycin  IVPB 1000 milliGRAM(s) IV Intermittent every 12 hours    MEDICATIONS  (PRN):  HYDROmorphone (10 MICROgram(s)/mL) + BUpivacaine 0.0625% in 0.9% Sodium Chloride PCEA Rescue Clinician  Bolus 3 milliLiter(s) Epidural every 15 minutes PRN for Pain Score greater than 6      OBJECTIVE:    Assessment of Catheter Site:    [X] Epidural 	  [X] Dressing intact	[X] Site non-tender	[X] Site without erythema, discharge, edema  [X] Epidural tubing and connection checked	[X] Gross neurological exam within normal limits  [ ] Catheter removed – tip intact		[X] Afebrile	            [ ] Febrile: ___    PT/INR - ( 04 Oct 2018 04:14 )   PT: 13.9 sec;   INR: 1.28 ratio         PTT - ( 04 Oct 2018 04:14 )  PTT:28.9 sec                      9.7    9.6   )-----------( 203      ( 04 Oct 2018 04:14 )             28.2     Vital Signs Last 24 Hrs  T(C): 36.5 (10-04-18 @ 08:00), Max: 37 (10-03-18 @ 17:24)  T(F): 97.7 (10-04-18 @ 08:00), Max: 98.6 (10-03-18 @ 17:24)  HR: 112 (10-04-18 @ 11:00) (52 - 116)  BP: 114/67 (10-03-18 @ 17:24) (114/67 - 114/67)  BP(mean): 87 (10-03-18 @ 17:24) (87 - 87)  RR: 16 (10-04-18 @ 11:00) (14 - 28)  SpO2: 100% (10-04-18 @ 11:00) (97% - 100%)      Sedation Score:	[X] Alert	[ ] Drowsy	[ ] Arousable  [ ] Asleep     [ ] Unresponsive    Side Effects:	[X] None	[ ] Nausea	[ ] Vomiting   [ ] Pruritus  		[ ] Weakness     [ ] Numbness	[ ] Other:    ASSESSMENT/ PLAN:    Therapy:	[X] Continue   [ ] Discontinue   [ ] Change to PRN Analgesics   [ ] Change to PCA    Documentation and Verification of current medications:  [X] Done	[ ] Not done, not eligible, reason:    COMMENTS: OOB in chair. Pain well controlled with epidural.   Continue.

## 2018-10-04 NOTE — PROGRESS NOTE ADULT - ASSESSMENT
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 Patient admitted to SICU for post-op monitoring. He was unable to be extubated immediately post-op due to acidosis and questionable intraop bronchospasm. Additionally, the patient was briefly in atrial fibrillation treated with verapamil.    PLAN:  NEURO: acute post-op pain.  -Dilaudid PCEA    PULM:   -Encourage incentive spirometry  -NC prn     CV: h/o HTN, A.fib on Eliquis   -Hemodynamic monitoring   -Metoprolol 5mg IV q6hrs    GI: s/p Whipple  -cont NGT to low continuous suction  -protonix for GI ppx    /Renal: Hx of artificial urinary sphincter.  -Plasmalyate @ 100mL/hr   -D/C villa     HEME: 1u pRBC in OR. H/H stable, no indication for transfusion.   -SQH for VTE ppx  -CBC q 12hrs    ID: hx Klebsiella, intraop cultures pending.  -cont vancomycin    ENDO:  -Monitor serum glucose     LINES:  -R radial A-line  -PIV    DISPO: SICU care. Full code.    HUAN FreemanC # [id number]

## 2018-10-05 LAB
-  AMIKACIN: SIGNIFICANT CHANGE UP
-  AMOXICILLIN/CLAVULANIC ACID: SIGNIFICANT CHANGE UP
-  AMPICILLIN/SULBACTAM: SIGNIFICANT CHANGE UP
-  AMPICILLIN: SIGNIFICANT CHANGE UP
-  AMPICILLIN: SIGNIFICANT CHANGE UP
-  AZTREONAM: SIGNIFICANT CHANGE UP
-  CEFAZOLIN: SIGNIFICANT CHANGE UP
-  CEFEPIME: SIGNIFICANT CHANGE UP
-  CEFOXITIN: SIGNIFICANT CHANGE UP
-  CEFTRIAXONE: SIGNIFICANT CHANGE UP
-  CIPROFLOXACIN: SIGNIFICANT CHANGE UP
-  ERTAPENEM: SIGNIFICANT CHANGE UP
-  GENTAMICIN: SIGNIFICANT CHANGE UP
-  IMIPENEM: SIGNIFICANT CHANGE UP
-  LEVOFLOXACIN: SIGNIFICANT CHANGE UP
-  MEROPENEM: SIGNIFICANT CHANGE UP
-  PIPERACILLIN/TAZOBACTAM: SIGNIFICANT CHANGE UP
-  TETRACYCLINE: SIGNIFICANT CHANGE UP
-  TOBRAMYCIN: SIGNIFICANT CHANGE UP
-  TRIMETHOPRIM/SULFAMETHOXAZOLE: SIGNIFICANT CHANGE UP
-  VANCOMYCIN: SIGNIFICANT CHANGE UP
ALBUMIN SERPL ELPH-MCNC: 2.9 G/DL — LOW (ref 3.3–5)
ALP SERPL-CCNC: 207 U/L — HIGH (ref 40–120)
ALT FLD-CCNC: 76 U/L — HIGH (ref 10–45)
ANION GAP SERPL CALC-SCNC: 13 MMOL/L — SIGNIFICANT CHANGE UP (ref 5–17)
APTT BLD: 24.6 SEC — LOW (ref 27.5–37.4)
AST SERPL-CCNC: 33 U/L — SIGNIFICANT CHANGE UP (ref 10–40)
BILIRUB DIRECT SERPL-MCNC: 1.4 MG/DL — HIGH (ref 0–0.2)
BILIRUB INDIRECT FLD-MCNC: 1.3 MG/DL — HIGH (ref 0.2–1)
BILIRUB SERPL-MCNC: 2.7 MG/DL — HIGH (ref 0.2–1.2)
BUN SERPL-MCNC: 35 MG/DL — HIGH (ref 7–23)
CALCIUM SERPL-MCNC: 8.2 MG/DL — LOW (ref 8.4–10.5)
CHLORIDE SERPL-SCNC: 105 MMOL/L — SIGNIFICANT CHANGE UP (ref 96–108)
CO2 SERPL-SCNC: 21 MMOL/L — LOW (ref 22–31)
CREAT SERPL-MCNC: 1.75 MG/DL — HIGH (ref 0.5–1.3)
GLUCOSE SERPL-MCNC: 153 MG/DL — HIGH (ref 70–99)
HBA1C BLD-MCNC: 6.8 % — HIGH (ref 4–5.6)
HCT VFR BLD CALC: 29.4 % — LOW (ref 39–50)
HGB BLD-MCNC: 9.9 G/DL — LOW (ref 13–17)
INR BLD: 1.28 RATIO — HIGH (ref 0.88–1.16)
MAGNESIUM SERPL-MCNC: 2.2 MG/DL — SIGNIFICANT CHANGE UP (ref 1.6–2.6)
MCHC RBC-ENTMCNC: 31 PG — SIGNIFICANT CHANGE UP (ref 27–34)
MCHC RBC-ENTMCNC: 33.6 GM/DL — SIGNIFICANT CHANGE UP (ref 32–36)
MCV RBC AUTO: 92.3 FL — SIGNIFICANT CHANGE UP (ref 80–100)
METHOD TYPE: SIGNIFICANT CHANGE UP
METHOD TYPE: SIGNIFICANT CHANGE UP
PHOSPHATE SERPL-MCNC: 2.7 MG/DL — SIGNIFICANT CHANGE UP (ref 2.5–4.5)
PLATELET # BLD AUTO: 212 K/UL — SIGNIFICANT CHANGE UP (ref 150–400)
POTASSIUM SERPL-MCNC: 3.4 MMOL/L — LOW (ref 3.5–5.3)
POTASSIUM SERPL-SCNC: 3.4 MMOL/L — LOW (ref 3.5–5.3)
PROT SERPL-MCNC: 5.7 G/DL — LOW (ref 6–8.3)
PROTHROM AB SERPL-ACNC: 14 SEC — HIGH (ref 9.8–12.7)
RBC # BLD: 3.19 M/UL — LOW (ref 4.2–5.8)
RBC # FLD: 13 % — SIGNIFICANT CHANGE UP (ref 10.3–14.5)
SODIUM SERPL-SCNC: 139 MMOL/L — SIGNIFICANT CHANGE UP (ref 135–145)
WBC # BLD: 10.7 K/UL — HIGH (ref 3.8–10.5)
WBC # FLD AUTO: 10.7 K/UL — HIGH (ref 3.8–10.5)

## 2018-10-05 PROCEDURE — 93010 ELECTROCARDIOGRAM REPORT: CPT

## 2018-10-05 PROCEDURE — 99291 CRITICAL CARE FIRST HOUR: CPT

## 2018-10-05 PROCEDURE — 71045 X-RAY EXAM CHEST 1 VIEW: CPT | Mod: 26

## 2018-10-05 RX ORDER — DEXTROSE MONOHYDRATE, SODIUM CHLORIDE, AND POTASSIUM CHLORIDE 50; .745; 4.5 G/1000ML; G/1000ML; G/1000ML
1000 INJECTION, SOLUTION INTRAVENOUS
Qty: 0 | Refills: 0 | Status: DISCONTINUED | OUTPATIENT
Start: 2018-10-05 | End: 2018-10-05

## 2018-10-05 RX ORDER — ERYTHROMYCIN ETHYLSUCCINATE 400 MG
400 TABLET ORAL EVERY 8 HOURS
Qty: 0 | Refills: 0 | Status: DISCONTINUED | OUTPATIENT
Start: 2018-10-05 | End: 2018-10-06

## 2018-10-05 RX ORDER — ALBUMIN HUMAN 25 %
50 VIAL (ML) INTRAVENOUS EVERY 6 HOURS
Qty: 0 | Refills: 0 | Status: COMPLETED | OUTPATIENT
Start: 2018-10-05 | End: 2018-10-06

## 2018-10-05 RX ORDER — VANCOMYCIN HCL 1 G
1000 VIAL (EA) INTRAVENOUS EVERY 12 HOURS
Qty: 0 | Refills: 0 | Status: DISCONTINUED | OUTPATIENT
Start: 2018-10-05 | End: 2018-10-07

## 2018-10-05 RX ORDER — PIPERACILLIN AND TAZOBACTAM 4; .5 G/20ML; G/20ML
3.38 INJECTION, POWDER, LYOPHILIZED, FOR SOLUTION INTRAVENOUS EVERY 8 HOURS
Qty: 0 | Refills: 0 | Status: DISCONTINUED | OUTPATIENT
Start: 2018-10-05 | End: 2018-10-05

## 2018-10-05 RX ORDER — METOPROLOL TARTRATE 50 MG
5 TABLET ORAL EVERY 6 HOURS
Qty: 0 | Refills: 0 | Status: DISCONTINUED | OUTPATIENT
Start: 2018-10-05 | End: 2018-10-09

## 2018-10-05 RX ORDER — POTASSIUM CHLORIDE 20 MEQ
10 PACKET (EA) ORAL
Qty: 0 | Refills: 0 | Status: COMPLETED | OUTPATIENT
Start: 2018-10-05 | End: 2018-10-05

## 2018-10-05 RX ORDER — PIPERACILLIN AND TAZOBACTAM 4; .5 G/20ML; G/20ML
3.38 INJECTION, POWDER, LYOPHILIZED, FOR SOLUTION INTRAVENOUS EVERY 8 HOURS
Qty: 0 | Refills: 0 | Status: DISCONTINUED | OUTPATIENT
Start: 2018-10-05 | End: 2018-10-06

## 2018-10-05 RX ORDER — PIPERACILLIN AND TAZOBACTAM 4; .5 G/20ML; G/20ML
3.38 INJECTION, POWDER, LYOPHILIZED, FOR SOLUTION INTRAVENOUS ONCE
Qty: 0 | Refills: 0 | Status: COMPLETED | OUTPATIENT
Start: 2018-10-05 | End: 2018-10-05

## 2018-10-05 RX ORDER — METOPROLOL TARTRATE 50 MG
5 TABLET ORAL ONCE
Qty: 0 | Refills: 0 | Status: COMPLETED | OUTPATIENT
Start: 2018-10-05 | End: 2018-10-05

## 2018-10-05 RX ORDER — DEXTROSE MONOHYDRATE, SODIUM CHLORIDE, AND POTASSIUM CHLORIDE 50; .745; 4.5 G/1000ML; G/1000ML; G/1000ML
1000 INJECTION, SOLUTION INTRAVENOUS
Qty: 0 | Refills: 0 | Status: DISCONTINUED | OUTPATIENT
Start: 2018-10-05 | End: 2018-10-09

## 2018-10-05 RX ORDER — POTASSIUM PHOSPHATE, MONOBASIC POTASSIUM PHOSPHATE, DIBASIC 236; 224 MG/ML; MG/ML
15 INJECTION, SOLUTION INTRAVENOUS ONCE
Qty: 0 | Refills: 0 | Status: COMPLETED | OUTPATIENT
Start: 2018-10-05 | End: 2018-10-05

## 2018-10-05 RX ORDER — DIPHENHYDRAMINE HCL 50 MG
25 CAPSULE ORAL EVERY 6 HOURS
Qty: 0 | Refills: 0 | Status: DISCONTINUED | OUTPATIENT
Start: 2018-10-05 | End: 2018-10-12

## 2018-10-05 RX ADMIN — PIPERACILLIN AND TAZOBACTAM 25 GRAM(S): 4; .5 INJECTION, POWDER, LYOPHILIZED, FOR SOLUTION INTRAVENOUS at 22:39

## 2018-10-05 RX ADMIN — PIPERACILLIN AND TAZOBACTAM 200 GRAM(S): 4; .5 INJECTION, POWDER, LYOPHILIZED, FOR SOLUTION INTRAVENOUS at 10:13

## 2018-10-05 RX ADMIN — POTASSIUM PHOSPHATE, MONOBASIC POTASSIUM PHOSPHATE, DIBASIC 62.5 MILLIMOLE(S): 236; 224 INJECTION, SOLUTION INTRAVENOUS at 07:28

## 2018-10-05 RX ADMIN — Medication 400 MILLIGRAM(S): at 22:03

## 2018-10-05 RX ADMIN — Medication 2: at 22:57

## 2018-10-05 RX ADMIN — CHLORHEXIDINE GLUCONATE 1 APPLICATION(S): 213 SOLUTION TOPICAL at 06:46

## 2018-10-05 RX ADMIN — Medication 5 MILLIGRAM(S): at 12:24

## 2018-10-05 RX ADMIN — Medication 25 MILLIGRAM(S): at 17:43

## 2018-10-05 RX ADMIN — Medication 400 MILLIGRAM(S): at 03:13

## 2018-10-05 RX ADMIN — Medication 50 MILLILITER(S): at 17:44

## 2018-10-05 RX ADMIN — Medication 25 MILLIGRAM(S): at 11:00

## 2018-10-05 RX ADMIN — Medication 100 MILLIEQUIVALENT(S): at 06:50

## 2018-10-05 RX ADMIN — Medication 5 MILLIGRAM(S): at 22:03

## 2018-10-05 RX ADMIN — Medication 400 MILLIGRAM(S): at 15:02

## 2018-10-05 RX ADMIN — Medication 100 MEQ/KG/HR: at 11:00

## 2018-10-05 RX ADMIN — Medication 4: at 12:24

## 2018-10-05 RX ADMIN — Medication 400 MILLIGRAM(S): at 23:44

## 2018-10-05 RX ADMIN — Medication 100 MILLIEQUIVALENT(S): at 08:22

## 2018-10-05 RX ADMIN — PANTOPRAZOLE SODIUM 40 MILLIGRAM(S): 20 TABLET, DELAYED RELEASE ORAL at 22:39

## 2018-10-05 RX ADMIN — Medication 1000 MILLIGRAM(S): at 22:33

## 2018-10-05 RX ADMIN — HEPARIN SODIUM 5000 UNIT(S): 5000 INJECTION INTRAVENOUS; SUBCUTANEOUS at 15:02

## 2018-10-05 RX ADMIN — HEPARIN SODIUM 5000 UNIT(S): 5000 INJECTION INTRAVENOUS; SUBCUTANEOUS at 22:39

## 2018-10-05 RX ADMIN — Medication 2: at 07:35

## 2018-10-05 RX ADMIN — DEXTROSE MONOHYDRATE, SODIUM CHLORIDE, AND POTASSIUM CHLORIDE 75 MILLILITER(S): 50; .745; 4.5 INJECTION, SOLUTION INTRAVENOUS at 12:32

## 2018-10-05 RX ADMIN — Medication 2: at 03:13

## 2018-10-05 RX ADMIN — Medication 250 MILLIGRAM(S): at 10:23

## 2018-10-05 RX ADMIN — Medication 2.5 MILLIGRAM(S): at 06:49

## 2018-10-05 RX ADMIN — HEPARIN SODIUM 5000 UNIT(S): 5000 INJECTION INTRAVENOUS; SUBCUTANEOUS at 06:46

## 2018-10-05 RX ADMIN — Medication 250 MILLIGRAM(S): at 22:39

## 2018-10-05 RX ADMIN — Medication 1000 MILLIGRAM(S): at 03:28

## 2018-10-05 RX ADMIN — Medication 5 MILLIGRAM(S): at 17:35

## 2018-10-05 RX ADMIN — Medication 100 MILLIEQUIVALENT(S): at 09:26

## 2018-10-05 RX ADMIN — Medication 2: at 17:35

## 2018-10-05 RX ADMIN — Medication 50 MILLILITER(S): at 23:50

## 2018-10-05 NOTE — PHYSICAL THERAPY INITIAL EVALUATION ADULT - DIAGNOSIS, PT EVAL
decreased functional mobility secondary to generalized weakness, impaired balance, post-op pain and discomfort

## 2018-10-05 NOTE — PROGRESS NOTE ADULT - SUBJECTIVE AND OBJECTIVE BOX
Day 2 of Anesthesia Pain Management Service    SUBJECTIVE: Patient doing well with PCEA    Pain Scale Score:   Refer to charted pain scores    THERAPY:  [X] Epidural Bupivacaine 0.0625% and Hydromorphone         [X] 10 micrograms/mL 	[ ] 5 micrograms/mL  [ ] Epidural Ropivacaine 0.1% plain – 1 mg/mL    Demand dose: 2 mL  Lockout: 15 minutes  Continuous Rate: 6 mL/hr    MEDICATIONS  (STANDING):  acetaminophen  IVPB .. 1000 milliGRAM(s) IV Intermittent once  chlorhexidine 4% Liquid 1 Application(s) Topical <User Schedule>  heparin  Injectable 5000 Unit(s) SubCutaneous every 8 hours  HYDROmorphone (10 MICROgram(s)/mL) + BUpivacaine 0.0625% in 0.9% Sodium Chloride PCEA 250 milliLiter(s) Epidural PCA Continuous  influenza   Vaccine 0.5 milliLiter(s) IntraMuscular once  insulin lispro (HumaLOG) corrective regimen sliding scale   SubCutaneous every 4 hours  metoprolol tartrate Injectable 5 milliGRAM(s) IV Push every 6 hours  pantoprazole  Injectable 40 milliGRAM(s) IV Push every 24 hours  piperacillin/tazobactam IVPB. 3.375 Gram(s) IV Intermittent every 8 hours  sodium bicarbonate  Infusion 0.06 mEq/kG/Hr (100 mL/Hr) IV Continuous <Continuous>  vancomycin  IVPB 1000 milliGRAM(s) IV Intermittent every 12 hours    MEDICATIONS  (PRN):  diphenhydrAMINE   Injectable 25 milliGRAM(s) IV Push every 6 hours PRN Rash and/or Itching  HYDROmorphone (10 MICROgram(s)/mL) + BUpivacaine 0.0625% in 0.9% Sodium Chloride PCEA Rescue Clinician  Bolus 3 milliLiter(s) Epidural every 15 minutes PRN for Pain Score greater than 6      OBJECTIVE:    Assessment of Catheter Site:    [X] Epidural 	  [X] Dressing intact: paper tape	[X] Site non-tender	[X] Site without erythema, discharge, edema  [X] Epidural tubing and connection checked	[X] Gross neurological exam within normal limits  [ ] Catheter removed – tip intact		[X] Afebrile	            [ ] Febrile: ___    PT/INR - ( 05 Oct 2018 03:46 )   PT: 14.0 sec;   INR: 1.28 ratio         PTT - ( 05 Oct 2018 03:46 )  PTT:24.6 sec                      9.9    10.7  )-----------( 212      ( 05 Oct 2018 03:45 )             29.4     Vital Signs Last 24 Hrs  T(C): 36.8 (10-05-18 @ 07:00), Max: 36.9 (10-04-18 @ 15:00)  T(F): 98.2 (10-05-18 @ 07:00), Max: 98.5 (10-04-18 @ 15:00)  HR: 110 (10-05-18 @ 07:00) (108 - 117)  BP: 134/71 (10-05-18 @ 07:00) (104/59 - 147/67)  BP(mean): 94 (10-05-18 @ 07:00) (73 - 96)  RR: 17 (10-05-18 @ 07:00) (15 - 28)  SpO2: 99% (10-05-18 @ 07:00) (96% - 100%)      Sedation Score:	[X] Alert	  [ ] Drowsy	[ ] Arousable  [ ] Asleep     [ ] Unresponsive    Side Effects:	[X] None	[ ] Nausea	[ ] Vomiting   [X ] Pruritus: Benadryl PRN  		[ ] Weakness     [ ] Numbness	[ ] Other:    ASSESSMENT/ PLAN:    Therapy:	[X] Continue   [ ] Discontinue   [ ] Change to PRN Analgesics   [ ] Change to PCA    Documentation and Verification of current medications:  [X] Done	[ ] Not done, not eligible, reason:    COMMENTS: Continue

## 2018-10-05 NOTE — PHYSICAL THERAPY INITIAL EVALUATION ADULT - PERTINENT HX OF CURRENT PROBLEM, REHAB EVAL
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 Pt admitted to SICU for post-op monitoring. He was unable to be extubated immediately post-op due to acidosis and questionable intraop bronchospasm.

## 2018-10-05 NOTE — PHYSICAL THERAPY INITIAL EVALUATION ADULT - GENERAL OBSERVATIONS, REHAB EVAL
Pt received seated in chair, A&Ox4, +tele, +PCEA, +FC, +NGT, +JPx2, agreeable to session, emma 30 min.

## 2018-10-05 NOTE — PHYSICAL THERAPY INITIAL EVALUATION ADULT - BALANCE TRAINING, PT EVAL
GOAL: Pt will demonstrate improved static/dynamic standing balance to good, in order to improve stability, decrease fall risk and increase independence with transfers and ambulation within 2 weeks.

## 2018-10-05 NOTE — PROGRESS NOTE ADULT - SUBJECTIVE AND OBJECTIVE BOX
Pain Management Attending Addendum    SUBJECTIVE: Patient doing well with PCEA    Therapy:    [X] PCEA    OBJECTIVE:   [X] Pain appropriately controlled    [ ] Other:    Side Effects:  [X] None	             [ ] Nausea              [ ] Pruritis        [ ] Weakness          [ ] Numbness        	[ ] Other:    ASSESSMENT/PLAN:    [X] Continue current therapy    [ ] Therapy changed to:    [ ] PRN Analgesics   [ ] IV PCA    Comments: I discussed the case with the resident/NP and agree with their plan.

## 2018-10-05 NOTE — PROGRESS NOTE ADULT - ASSESSMENT
78M s/p 10/3 whipple for bile duct mass, postoperatively transferred to SICU and successfully extubated, overall recovering appropriately.     -Pain control with PCA and IV tylenol  -NGT/NPO/IVF.   -Continue drain care.   -Continue IV vanco.   - add zosyn  - d/c villa and f/u TOV  -Encourage OOB to chair.   -Monitor and replete electrolytes.   -Rest of care as per primary team.     Blue Team General Surgery [phone]

## 2018-10-05 NOTE — PHYSICAL THERAPY INITIAL EVALUATION ADULT - LIVES WITH, PROFILE
Pt states he lives with his wife  in a private house with 3 steps to enter from the front with no HRs and 3 steps to enter from the back with +HR.  Pts bedroom on first floor. Pt owns a cane, crutches and RW but does not use it for ambulation. Pt drives and wears reading glasses & hearing aides./spouse spouse/Pt reports he lives with his wife in a private house with 3 steps to enter from the front with no HRs and 3 steps to enter from the back with +HR.  Pts bedroom on first floor. Pt owns a cane, crutches and RW from previous knee sx, but was not in use.

## 2018-10-05 NOTE — PROGRESS NOTE ADULT - ATTENDING COMMENTS
MANNY  -likely pre-renal based on BUN=35 and Cr = 1.75  -bedside U/S suggests hypovolemia so gave albumin 25% x 5 mL    afib/RVR  -metoprolol IV    metabolic acidosis from PTC bile losses has resolved  -stop bicarb infusion      Critical Care Time - 35 minutes

## 2018-10-05 NOTE — PHYSICAL THERAPY INITIAL EVALUATION ADULT - PRECAUTIONS/LIMITATIONS, REHAB EVAL
fall precautions Pt was briefly in atrial fibrillation treated with verapamil. Pt now extubated. Of note, pt had persistently high bicarb rich output from the PTC, contributing to a baseline metabolic acidosis -- base deficit was found to be 6 preoperatively./fall precautions

## 2018-10-05 NOTE — PROGRESS NOTE ADULT - SUBJECTIVE AND OBJECTIVE BOX
Surgery Progress Note    S: Patient seen and examined. No acute events overnight. patient denies n/v. - flatus, - BM. pain is well controlled with PCA and IV tylenol.     O:  Vital Signs Last 24 Hrs  T(C): 36.8 (05 Oct 2018 07:00), Max: 36.9 (04 Oct 2018 15:00)  T(F): 98.2 (05 Oct 2018 07:00), Max: 98.5 (04 Oct 2018 15:00)  HR: 110 (05 Oct 2018 07:00) (108 - 117)  BP: 134/71 (05 Oct 2018 07:00) (104/59 - 147/67)  BP(mean): 94 (05 Oct 2018 07:00) (73 - 96)  RR: 17 (05 Oct 2018 07:00) (15 - 28)  SpO2: 99% (05 Oct 2018 07:00) (96% - 100%)    I&O's Detail    04 Oct 2018 07:01  -  05 Oct 2018 07:00  --------------------------------------------------------  IN:    Enteral Tube Flush: 30 mL    multiple electrolytes Injection Type 1multiple electrolytes Injection Type 1: 500 mL    sodium bicarbonate  Infusion: 1700 mL    Solution: 500 mL    Solution: 300 mL    Solution: 100 mL  Total IN: 3130 mL    OUT:    Bulb: 35 mL    Bulb: 45 mL    Bulb: 50 mL    Indwelling Catheter - Urethral: 1465 mL    Nasoenteral Tube: 370 mL  Total OUT: 1965 mL    Total NET: 1165 mL          MEDICATIONS  (STANDING):  acetaminophen  IVPB .. 1000 milliGRAM(s) IV Intermittent once  chlorhexidine 4% Liquid 1 Application(s) Topical <User Schedule>  heparin  Injectable 5000 Unit(s) SubCutaneous every 8 hours  HYDROmorphone (10 MICROgram(s)/mL) + BUpivacaine 0.0625% in 0.9% Sodium Chloride PCEA 250 milliLiter(s) Epidural PCA Continuous  influenza   Vaccine 0.5 milliLiter(s) IntraMuscular once  insulin lispro (HumaLOG) corrective regimen sliding scale   SubCutaneous every 4 hours  metoprolol tartrate Injectable 2.5 milliGRAM(s) IV Push every 6 hours  pantoprazole  Injectable 40 milliGRAM(s) IV Push every 24 hours  potassium chloride  10 mEq/100 mL IVPB 10 milliEquivalent(s) IV Intermittent every 1 hour  sodium bicarbonate  Infusion 0.06 mEq/kG/Hr (100 mL/Hr) IV Continuous <Continuous>  vancomycin  IVPB 1000 milliGRAM(s) IV Intermittent every 12 hours    MEDICATIONS  (PRN):  HYDROmorphone (10 MICROgram(s)/mL) + BUpivacaine 0.0625% in 0.9% Sodium Chloride PCEA Rescue Clinician  Bolus 3 milliLiter(s) Epidural every 15 minutes PRN for Pain Score greater than 6                            9.9    10.7  )-----------( 212      ( 05 Oct 2018 03:45 )             29.4       10-05    139  |  105  |  35<H>  ----------------------------<  153<H>  3.4<L>   |  21<L>  |  1.75<H>    Ca    8.2<L>      05 Oct 2018 03:46  Phos  2.7     10-05  Mg     2.2     10-05    TPro  5.7<L>  /  Alb  2.9<L>  /  TBili  2.7<H>  /  DBili  1.4<H>  /  AST  33  /  ALT  76<H>  /  AlkPhos  207<H>  10-05      Physical Exam:  Gen: Laying in bed, NAD, NGT in place  Resp: Unlabored breathing  Abd: soft, NT, mildly distended, 1 RUQ 1 RLQ 1 LUQ DEMARIO with SS output, midline incision c/d/i, no rebound or guarding  Ext: WWP  Skin: No rashes

## 2018-10-05 NOTE — PHYSICAL THERAPY INITIAL EVALUATION ADULT - PLANNED THERAPY INTERVENTIONS, PT EVAL
bed mobility training/transfer training/balance training/gait training/stair training; GOAL: Pt will negotiate up & down 3 stairs independently with no HR & least restrictive AD in 2 weeks.

## 2018-10-05 NOTE — PHYSICAL THERAPY INITIAL EVALUATION ADULT - CRITERIA FOR SKILLED THERAPEUTIC INTERVENTIONS
risk reduction/prevention/impairments found/therapy frequency/functional limitations in following categories/anticipated equipment needs at discharge/anticipated discharge recommendation/rehab potential/predicted duration of therapy intervention

## 2018-10-05 NOTE — PROGRESS NOTE ADULT - ASSESSMENT
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 Patient admitted to SICU for post-op monitoring. He was unable to be extubated immediately post-op due to acidosis and questionable intraop bronchospasm. Additionally, the patient was briefly in atrial fibrillation treated with verapamil. Patient now extubated.     PLAN:  NEURO: acute post-op pain.  -Dilaudid PCEA    PULM:   -Encourage incentive spirometry  -NC prn     CV: h/o HTN, A.fib on Eliquis   -Hemodynamic monitoring   -Metoprolol 5mg IV q6hrs    GI: s/p Whipple  -cont NGT to low continuous suction  -protonix for GI ppx    /Renal: Hx of artificial urinary sphincter.  -Plasmalyate @ 100mL/hr   -D/C villa     HEME: 1u pRBC in OR. H/H stable, no indication for transfusion.   -SQH for VTE ppx  -CBC q 12hrs    ID: hx Klebsiella, intraop cultures pending.  -cont vancomycin    ENDO:  -Monitor serum glucose     LINES:  -R radial A-line  -PIV    DISPO: SICU care. Full code. 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 Patient admitted to SICU for post-op monitoring. He was unable to be extubated immediately post-op due to acidosis and questionable intraop bronchospasm. Additionally, the patient was briefly in atrial fibrillation treated with verapamil. Patient now extubated.     PLAN:  NEURO: acute post-op pain.  -Dilaudid PCEA    PULM:   -Encourage incentive spirometry  -NC prn     CV: h/o HTN, A.fib on Eliquis   -Hemodynamic monitoring   -Metoprolol 2.5mg IV q6hrs    GI: s/p Whipple  -cont NGT to low continuous suction  -protonix for GI ppx    /Renal: Hx of artificial urinary sphincter.  - Bicarb gtt 100mL/hr   -D/C villa     HEME: 1u pRBC in OR. H/H stable, no indication for transfusion.   -SQH for VTE ppx  -CBC q 12hrs    ID: hx Klebsiella, intraop cultures pending.  -cont vancomycin    ENDO:  -Monitor serum glucose     LINES:  -R radial A-line  -PIV    DISPO: Stable for floor care. Full code.

## 2018-10-05 NOTE — PROGRESS NOTE ADULT - SUBJECTIVE AND OBJECTIVE BOX
HISTORY  78y Male hx of Afib on eliquis, HTN, GERD, prostate CA s/p prostatectomy, and 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 The patient was admitted to the SICU post-operatively for close monitoring. He was not able to be extubated due acidosis and possible intraop bronchospasm. Additionally, the patient was briefly in atrial fibrillation but responded well on verapamil. Thoracic epidural was placed by anesthesia for pain control. Of note, the patient had persistently high bicarb rich output from the PTC, contributing to a baseline metabolic acidosis -- base deficit was found to be 6 preoperatively.      24 HOUR EVENTS: Metoprolol decreased to 2.5mg. q6 hours for hypotension. Started on low dose bicarb gtt for acidemia. No overnight events.     SUBJECTIVE/ROS:  [ ] A ten-point review of systems was otherwise negative except as noted.  [ ] Due to altered mental status/intubation, subjective information were not able to be obtained from the patient. History was obtained, to the extent possible, from review of the chart and collateral sources of information.      NEURO  Exam: awake, alert, oriented  Meds: acetaminophen  IVPB .. 1000 milliGRAM(s) IV Intermittent once  acetaminophen  IVPB .. 1000 milliGRAM(s) IV Intermittent once  HYDROmorphone (10 MICROgram(s)/mL) + BUpivacaine 0.0625% in 0.9% Sodium Chloride PCEA 250 milliLiter(s) Epidural PCA Continuous  HYDROmorphone (10 MICROgram(s)/mL) + BUpivacaine 0.0625% in 0.9% Sodium Chloride PCEA Rescue Clinician  Bolus 3 milliLiter(s) Epidural every 15 minutes PRN for Pain Score greater than 6    [x] Adequacy of sedation and pain control has been assessed and adjusted      RESPIRATORY  RR: 16 (10-05-18 @ 01:00) (15 - 28)  SpO2: 96% (10-05-18 @ 01:00) (96% - 100%)  Exam: unlabored, clear to auscultation bilaterally  Mechanical Ventilation: none  ABG - ( 04 Oct 2018 04:13 )  pH: 7.42  /  pCO2: 30    /  pO2: 146   / HCO3: 19    / Base Excess: -3.8  /  SaO2: 99      Lactate: x      [N/A] Extubation Readiness Assessed  Meds: none      CARDIOVASCULAR  HR: 108 (10-05-18 @ 01:00) (108 - 117)  BP: 147/67 (10-05-18 @ 01:00) (104/59 - 147/67)  BP(mean): 96 (10-05-18 @ 01:00) (73 - 96)  ABP: 103/59 (10-04-18 @ 18:00) (90/41 - 137/62)  ABP(mean): 71 (10-04-18 @ 18:00) (58 - 88)  Exam: regular rate and rhythm  Cardiac Rhythm: sinus  Perfusion     [x]Adequate   [ ]Inadequate  Mentation   [x]Normal       [ ]Reduced  Extremities  [x]Warm         [ ]Cool  Volume Status [ ]Hypervolemic [x]Euvolemic [ ]Hypovolemic  Meds: metoprolol tartrate Injectable 2.5 milliGRAM(s) IV Push every 6 hours        GI/NUTRITION  Exam: soft, nontender, nondistended, incision C/D/I  Diet: NPO  Meds: pantoprazole  Injectable 40 milliGRAM(s) IV Push every 24 hours      GENITOURINARY  I&O's Detail    10-03 @ 07:01  -  10-04 @ 07:00  --------------------------------------------------------  IN:    dexmedetomidine Infusion: 40 mL    multiple electrolytes Injection Type 1multiple electrolytes Injection Type 1: 1500 mL    phenylephrine   Infusion: 9 mL    Solution: 300 mL    Solution: 250 mL    Solution: 100 mL  Total IN: 2199 mL    OUT:    Bulb: 125 mL    Bulb: 155 mL    Bulb: 105 mL    Indwelling Catheter - Urethral: 1205 mL    Nasoenteral Tube: 30 mL  Total OUT: 1620 mL    Total NET: 579 mL      10-04 @ 07:01  -  10-05 @ 02:02  --------------------------------------------------------  IN:    multiple electrolytes Injection Type 1multiple electrolytes Injection Type 1: 500 mL    sodium bicarbonate  Infusion: 1100 mL    Solution: 200 mL    Solution: 500 mL  Total IN: 2300 mL    OUT:    Bulb: 25 mL    Bulb: 35 mL    Bulb: 20 mL    Indwelling Catheter - Urethral: 950 mL    Nasoenteral Tube: 170 mL  Total OUT: 1200 mL    Total NET: 1100 mL          10-04    139  |  108  |  34<H>  ----------------------------<  263<H>  4.1   |  18<L>  |  1.44<H>    Ca    7.5<L>      04 Oct 2018 04:14  Phos  4.6     10-04  Mg     2.1     10-04      [x] Ness catheter, indication: urine output monitoring in critically ill   Meds: sodium bicarbonate  Infusion 0.06 mEq/kG/Hr IV Continuous <Continuous>        HEMATOLOGIC  Meds: heparin  Injectable 5000 Unit(s) SubCutaneous every 8 hours    [x] VTE Prophylaxis                        9.7    9.6   )-----------( 203      ( 04 Oct 2018 04:14 )             28.2     PT/INR - ( 04 Oct 2018 04:14 )   PT: 13.9 sec;   INR: 1.28 ratio         PTT - ( 04 Oct 2018 04:14 )  PTT:28.9 sec  Transfusion     [ ] PRBC   [ ] Platelets   [ ] FFP   [ ] Cryoprecipitate      INFECTIOUS DISEASES  WBC Count: 9.6 K/uL (10-04 @ 04:14)    RECENT CULTURES:  Specimen Source: .Surgical Swab BILE DUCT BILE  Date/Time: 10-04 @ 00:38  Culture Results:   Moderate Klebsiella pneumoniae  Moderate Enterococcus faecalis  Gram Stain: --  Organism: --    Meds: influenza   Vaccine 0.5 milliLiter(s) IntraMuscular once  vancomycin  IVPB 1000 milliGRAM(s) IV Intermittent every 12 hours        ENDOCRINE  CAPILLARY BLOOD GLUCOSE      POCT Blood Glucose.: 212 mg/dL (04 Oct 2018 23:19)  POCT Blood Glucose.: 183 mg/dL (04 Oct 2018 20:19)  POCT Blood Glucose.: 239 mg/dL (04 Oct 2018 16:22)  POCT Blood Glucose.: 299 mg/dL (04 Oct 2018 11:52)    Meds: insulin lispro (HumaLOG) corrective regimen sliding scale   SubCutaneous every 4 hours        ACCESS DEVICES:  [x] Peripheral IV  [ ] Central Venous Line	[ ] R	[ ] L	[ ] IJ	[ ] Fem	[ ] SC	Placed:   [ ] Arterial Line		[ ] R	[ ] L	[ ] Fem	[ ] Rad	[ ] Ax	Placed:   [ ] PICC:					[ ] Mediport  [x] Urinary Catheter, Date Placed:   [x] Necessity of urinary, arterial, and venous catheters discussed    OTHER MEDICATIONS:  chlorhexidine 4% Liquid 1 Application(s) Topical <User Schedule>      CODE STATUS: full code       IMAGING: < from: CT Abdomen and Pelvis No Cont (08.28.18 @ 15:37) >  IMPRESSION:     Status post percutaneous transhepatic biliary stent placement unchanged   in position with distal tip in the small bowel.      < end of copied text > HISTORY  78y Male hx of Afib on eliquis, HTN, GERD, prostate CA s/p prostatectomy, and 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 The patient was admitted to the SICU post-operatively for close monitoring. He was not able to be extubated due acidosis and possible intraop bronchospasm. Additionally, the patient was briefly in atrial fibrillation but responded well on verapamil. Thoracic epidural was placed by anesthesia for pain control. Of note, the patient had persistently high bicarb rich output from the PTC, contributing to a baseline metabolic acidosis -- base deficit was found to be 6 preoperatively.      24 HOUR EVENTS: Metoprolol decreased to 2.5mg. q6 hours for hypotension. Started on low dose bicarb gtt for acidemia. No overnight events.     SUBJECTIVE/ROS:  [ ] A ten-point review of systems was otherwise negative except as noted.  [ ] Due to altered mental status/intubation, subjective information were not able to be obtained from the patient. History was obtained, to the extent possible, from review of the chart and collateral sources of information.      NEURO  Exam: awake, alert, oriented  Meds: acetaminophen  IVPB .. 1000 milliGRAM(s) IV Intermittent once  acetaminophen  IVPB .. 1000 milliGRAM(s) IV Intermittent once  HYDROmorphone (10 MICROgram(s)/mL) + BUpivacaine 0.0625% in 0.9% Sodium Chloride PCEA 250 milliLiter(s) Epidural PCA Continuous  HYDROmorphone (10 MICROgram(s)/mL) + BUpivacaine 0.0625% in 0.9% Sodium Chloride PCEA Rescue Clinician  Bolus 3 milliLiter(s) Epidural every 15 minutes PRN for Pain Score greater than 6    [x] Adequacy of sedation and pain control has been assessed and adjusted      RESPIRATORY  RR: 16 (10-05-18 @ 01:00) (15 - 28)  SpO2: 96% (10-05-18 @ 01:00) (96% - 100%)  Exam: unlabored, clear to auscultation bilaterally  Mechanical Ventilation: none  ABG - ( 04 Oct 2018 04:13 )  pH: 7.42  /  pCO2: 30    /  pO2: 146   / HCO3: 19    / Base Excess: -3.8  /  SaO2: 99      Lactate: x      [N/A] Extubation Readiness Assessed  Meds: none      CARDIOVASCULAR  HR: 108 (10-05-18 @ 01:00) (108 - 117)  BP: 147/67 (10-05-18 @ 01:00) (104/59 - 147/67)  BP(mean): 96 (10-05-18 @ 01:00) (73 - 96)  ABP: 103/59 (10-04-18 @ 18:00) (90/41 - 137/62)  ABP(mean): 71 (10-04-18 @ 18:00) (58 - 88)  Exam: regular rate and rhythm  Cardiac Rhythm: sinus  Perfusion     [x]Adequate   [ ]Inadequate  Mentation   [x]Normal       [ ]Reduced  Extremities  [x]Warm         [ ]Cool  Volume Status [ ]Hypervolemic [x]Euvolemic [ ]Hypovolemic  Meds: metoprolol tartrate Injectable 2.5 milliGRAM(s) IV Push every 6 hours        GI/NUTRITION  Exam: soft, nontender, nondistended, incision C/D/I  Diet: NPO  Meds: pantoprazole  Injectable 40 milliGRAM(s) IV Push every 24 hours      GENITOURINARY  I&O's Detail    10-03 @ 07:01  -  10-04 @ 07:00  --------------------------------------------------------  IN:    dexmedetomidine Infusion: 40 mL    multiple electrolytes Injection Type 1multiple electrolytes Injection Type 1: 1500 mL    phenylephrine   Infusion: 9 mL    Solution: 300 mL    Solution: 250 mL    Solution: 100 mL  Total IN: 2199 mL    OUT:    Bulb: 125 mL    Bulb: 155 mL    Bulb: 105 mL    Indwelling Catheter - Urethral: 1205 mL    Nasoenteral Tube: 30 mL  Total OUT: 1620 mL    Total NET: 579 mL      10-04 @ 07:01  -  10-05 @ 02:02  --------------------------------------------------------  IN:    multiple electrolytes Injection Type 1multiple electrolytes Injection Type 1: 500 mL    sodium bicarbonate  Infusion: 1100 mL    Solution: 200 mL    Solution: 500 mL  Total IN: 2300 mL    OUT:    Bulb: 25 mL    Bulb: 35 mL    Bulb: 20 mL    Indwelling Catheter - Urethral: 950 mL    Nasoenteral Tube: 170 mL  Total OUT: 1200 mL    Total NET: 1100 mL                      x                    139  | 21   | 35           x     >-----------< x       ------------------------< 153                   x                    3.4  | 105  | 1.75                                         Ca 8.2   Mg 2.2   Ph 2.7      [x] Ness catheter, indication: urine output monitoring in critically ill   Meds: sodium bicarbonate  Infusion 0.06 mEq/kG/Hr IV Continuous <Continuous>        HEMATOLOGIC  Meds: heparin  Injectable 5000 Unit(s) SubCutaneous every 8 hours    [x] VTE Prophylaxis             PT/INR -  14.0 sec / 1.28 ratio   ( 05 Oct 2018 03:46 )       PTT -  24.6 sec   ( 05 Oct 2018 03:46 )  Transfusion     [ ] PRBC   [ ] Platelets   [ ] FFP   [ ] Cryoprecipitate      INFECTIOUS DISEASES  WBC Count: 9.6 K/uL (10-04 @ 04:14)    RECENT CULTURES:  Specimen Source: .Surgical Swab BILE DUCT BILE  Date/Time: 10-04 @ 00:38  Culture Results:   Moderate Klebsiella pneumoniae  Moderate Enterococcus faecalis  Gram Stain: --  Organism: --    Meds: influenza   Vaccine 0.5 milliLiter(s) IntraMuscular once  vancomycin  IVPB 1000 milliGRAM(s) IV Intermittent every 12 hours        ENDOCRINE  CAPILLARY BLOOD GLUCOSE      POCT Blood Glucose.: 212 mg/dL (04 Oct 2018 23:19)  POCT Blood Glucose.: 183 mg/dL (04 Oct 2018 20:19)  POCT Blood Glucose.: 239 mg/dL (04 Oct 2018 16:22)  POCT Blood Glucose.: 299 mg/dL (04 Oct 2018 11:52)    Meds: insulin lispro (HumaLOG) corrective regimen sliding scale   SubCutaneous every 4 hours        ACCESS DEVICES:  [x] Peripheral IV  [ ] Central Venous Line	[ ] R	[ ] L	[ ] IJ	[ ] Fem	[ ] SC	Placed:   [ ] Arterial Line		[ ] R	[ ] L	[ ] Fem	[ ] Rad	[ ] Ax	Placed:   [ ] PICC:					[ ] Mediport  [x] Urinary Catheter, Date Placed:   [x] Necessity of urinary, arterial, and venous catheters discussed    OTHER MEDICATIONS:  chlorhexidine 4% Liquid 1 Application(s) Topical <User Schedule>      CODE STATUS: full code       IMAGING: < from: CT Abdomen and Pelvis No Cont (08.28.18 @ 15:37) >  IMPRESSION:     Status post percutaneous transhepatic biliary stent placement unchanged   in position with distal tip in the small bowel.      < end of copied text >

## 2018-10-06 LAB
ALBUMIN SERPL ELPH-MCNC: 2.8 G/DL — LOW (ref 3.3–5)
ALP SERPL-CCNC: 152 U/L — HIGH (ref 40–120)
ALT FLD-CCNC: 48 U/L — HIGH (ref 10–45)
AMYLASE FLD-CCNC: 17 U/L — SIGNIFICANT CHANGE UP
ANION GAP SERPL CALC-SCNC: 11 MMOL/L — SIGNIFICANT CHANGE UP (ref 5–17)
APTT BLD: 32.8 SEC — SIGNIFICANT CHANGE UP (ref 27.5–37.4)
AST SERPL-CCNC: 20 U/L — SIGNIFICANT CHANGE UP (ref 10–40)
BILIRUB DIRECT SERPL-MCNC: 1.1 MG/DL — HIGH (ref 0–0.2)
BILIRUB INDIRECT FLD-MCNC: 1.3 MG/DL — HIGH (ref 0.2–1)
BILIRUB SERPL-MCNC: 2.4 MG/DL — HIGH (ref 0.2–1.2)
BUN SERPL-MCNC: 20 MG/DL — SIGNIFICANT CHANGE UP (ref 7–23)
CALCIUM SERPL-MCNC: 8 MG/DL — LOW (ref 8.4–10.5)
CHLORIDE SERPL-SCNC: 104 MMOL/L — SIGNIFICANT CHANGE UP (ref 96–108)
CO2 SERPL-SCNC: 22 MMOL/L — SIGNIFICANT CHANGE UP (ref 22–31)
CREAT SERPL-MCNC: 1.48 MG/DL — HIGH (ref 0.5–1.3)
GLUCOSE SERPL-MCNC: 214 MG/DL — HIGH (ref 70–99)
HCT VFR BLD CALC: 23.2 % — LOW (ref 39–50)
HCT VFR BLD CALC: 26.6 % — LOW (ref 39–50)
HGB BLD-MCNC: 7.8 G/DL — LOW (ref 13–17)
HGB BLD-MCNC: 8.5 G/DL — LOW (ref 13–17)
INR BLD: 1.34 RATIO — HIGH (ref 0.88–1.16)
MAGNESIUM SERPL-MCNC: 1.8 MG/DL — SIGNIFICANT CHANGE UP (ref 1.6–2.6)
MCHC RBC-ENTMCNC: 29.7 PG — SIGNIFICANT CHANGE UP (ref 27–34)
MCHC RBC-ENTMCNC: 31.1 PG — SIGNIFICANT CHANGE UP (ref 27–34)
MCHC RBC-ENTMCNC: 32 GM/DL — SIGNIFICANT CHANGE UP (ref 32–36)
MCHC RBC-ENTMCNC: 33.5 GM/DL — SIGNIFICANT CHANGE UP (ref 32–36)
MCV RBC AUTO: 92.9 FL — SIGNIFICANT CHANGE UP (ref 80–100)
MCV RBC AUTO: 93 FL — SIGNIFICANT CHANGE UP (ref 80–100)
PHOSPHATE SERPL-MCNC: 2.5 MG/DL — SIGNIFICANT CHANGE UP (ref 2.5–4.5)
PLATELET # BLD AUTO: 203 K/UL — SIGNIFICANT CHANGE UP (ref 150–400)
PLATELET # BLD AUTO: 220 K/UL — SIGNIFICANT CHANGE UP (ref 150–400)
POTASSIUM SERPL-MCNC: 3.9 MMOL/L — SIGNIFICANT CHANGE UP (ref 3.5–5.3)
POTASSIUM SERPL-SCNC: 3.9 MMOL/L — SIGNIFICANT CHANGE UP (ref 3.5–5.3)
PROT SERPL-MCNC: 5.4 G/DL — LOW (ref 6–8.3)
PROTHROM AB SERPL-ACNC: 15.2 SEC — HIGH (ref 10–13.1)
RBC # BLD: 2.5 M/UL — LOW (ref 4.2–5.8)
RBC # BLD: 2.86 M/UL — LOW (ref 4.2–5.8)
RBC # FLD: 12.5 % — SIGNIFICANT CHANGE UP (ref 10.3–14.5)
RBC # FLD: 14.2 % — SIGNIFICANT CHANGE UP (ref 10.3–14.5)
SODIUM SERPL-SCNC: 137 MMOL/L — SIGNIFICANT CHANGE UP (ref 135–145)
VANCOMYCIN TROUGH SERPL-MCNC: 17.8 UG/ML — SIGNIFICANT CHANGE UP (ref 10–20)
WBC # BLD: 10.69 K/UL — HIGH (ref 3.8–10.5)
WBC # BLD: 9.2 K/UL — SIGNIFICANT CHANGE UP (ref 3.8–10.5)
WBC # FLD AUTO: 10.69 K/UL — HIGH (ref 3.8–10.5)
WBC # FLD AUTO: 9.2 K/UL — SIGNIFICANT CHANGE UP (ref 3.8–10.5)

## 2018-10-06 PROCEDURE — 71045 X-RAY EXAM CHEST 1 VIEW: CPT | Mod: 26

## 2018-10-06 RX ORDER — POTASSIUM PHOSPHATE, MONOBASIC POTASSIUM PHOSPHATE, DIBASIC 236; 224 MG/ML; MG/ML
15 INJECTION, SOLUTION INTRAVENOUS ONCE
Qty: 0 | Refills: 0 | Status: COMPLETED | OUTPATIENT
Start: 2018-10-06 | End: 2018-10-06

## 2018-10-06 RX ORDER — ALBUMIN HUMAN 25 %
50 VIAL (ML) INTRAVENOUS EVERY 6 HOURS
Qty: 0 | Refills: 0 | Status: COMPLETED | OUTPATIENT
Start: 2018-10-06 | End: 2018-10-07

## 2018-10-06 RX ORDER — MAGNESIUM SULFATE 500 MG/ML
2 VIAL (ML) INJECTION ONCE
Qty: 0 | Refills: 0 | Status: COMPLETED | OUTPATIENT
Start: 2018-10-06 | End: 2018-10-06

## 2018-10-06 RX ORDER — MEROPENEM 1 G/30ML
1000 INJECTION INTRAVENOUS ONCE
Qty: 0 | Refills: 0 | Status: COMPLETED | OUTPATIENT
Start: 2018-10-06 | End: 2018-10-06

## 2018-10-06 RX ORDER — ACETAMINOPHEN 500 MG
1000 TABLET ORAL ONCE
Qty: 0 | Refills: 0 | Status: COMPLETED | OUTPATIENT
Start: 2018-10-07 | End: 2018-10-07

## 2018-10-06 RX ORDER — MEROPENEM 1 G/30ML
INJECTION INTRAVENOUS
Qty: 0 | Refills: 0 | Status: DISCONTINUED | OUTPATIENT
Start: 2018-10-06 | End: 2018-10-12

## 2018-10-06 RX ORDER — ACETAMINOPHEN 500 MG
1000 TABLET ORAL ONCE
Qty: 0 | Refills: 0 | Status: COMPLETED | OUTPATIENT
Start: 2018-10-06 | End: 2018-10-06

## 2018-10-06 RX ORDER — MEROPENEM 1 G/30ML
1000 INJECTION INTRAVENOUS EVERY 8 HOURS
Qty: 0 | Refills: 0 | Status: DISCONTINUED | OUTPATIENT
Start: 2018-10-06 | End: 2018-10-12

## 2018-10-06 RX ORDER — ERYTHROMYCIN ETHYLSUCCINATE 400 MG
250 TABLET ORAL EVERY 8 HOURS
Qty: 0 | Refills: 0 | Status: DISCONTINUED | OUTPATIENT
Start: 2018-10-06 | End: 2018-10-12

## 2018-10-06 RX ADMIN — Medication 250 MILLIGRAM(S): at 06:49

## 2018-10-06 RX ADMIN — Medication 5 MILLIGRAM(S): at 18:10

## 2018-10-06 RX ADMIN — POTASSIUM PHOSPHATE, MONOBASIC POTASSIUM PHOSPHATE, DIBASIC 62.5 MILLIMOLE(S): 236; 224 INJECTION, SOLUTION INTRAVENOUS at 15:26

## 2018-10-06 RX ADMIN — Medication 250 MILLIGRAM(S): at 20:44

## 2018-10-06 RX ADMIN — Medication 400 MILLIGRAM(S): at 10:15

## 2018-10-06 RX ADMIN — Medication 2: at 10:03

## 2018-10-06 RX ADMIN — MEROPENEM 100 MILLIGRAM(S): 1 INJECTION INTRAVENOUS at 20:00

## 2018-10-06 RX ADMIN — PIPERACILLIN AND TAZOBACTAM 25 GRAM(S): 4; .5 INJECTION, POWDER, LYOPHILIZED, FOR SOLUTION INTRAVENOUS at 05:08

## 2018-10-06 RX ADMIN — Medication 250 MILLIGRAM(S): at 15:13

## 2018-10-06 RX ADMIN — Medication 50 MILLILITER(S): at 18:08

## 2018-10-06 RX ADMIN — Medication 5 MILLIGRAM(S): at 05:50

## 2018-10-06 RX ADMIN — Medication 400 MILLIGRAM(S): at 15:25

## 2018-10-06 RX ADMIN — DEXTROSE MONOHYDRATE, SODIUM CHLORIDE, AND POTASSIUM CHLORIDE 100 MILLILITER(S): 50; .745; 4.5 INJECTION, SOLUTION INTRAVENOUS at 21:08

## 2018-10-06 RX ADMIN — Medication 50 MILLILITER(S): at 12:08

## 2018-10-06 RX ADMIN — Medication 2: at 02:13

## 2018-10-06 RX ADMIN — Medication 5 MILLIGRAM(S): at 12:10

## 2018-10-06 RX ADMIN — Medication 2: at 05:50

## 2018-10-06 RX ADMIN — Medication 50 MILLILITER(S): at 05:03

## 2018-10-06 RX ADMIN — Medication 400 MILLIGRAM(S): at 05:08

## 2018-10-06 RX ADMIN — Medication 2: at 22:32

## 2018-10-06 RX ADMIN — Medication 50 MILLILITER(S): at 23:40

## 2018-10-06 RX ADMIN — Medication 5 MILLIGRAM(S): at 00:12

## 2018-10-06 RX ADMIN — HEPARIN SODIUM 5000 UNIT(S): 5000 INJECTION INTRAVENOUS; SUBCUTANEOUS at 21:08

## 2018-10-06 RX ADMIN — Medication 5 MILLIGRAM(S): at 23:01

## 2018-10-06 RX ADMIN — Medication 1000 MILLIGRAM(S): at 10:45

## 2018-10-06 RX ADMIN — Medication 400 MILLIGRAM(S): at 21:08

## 2018-10-06 RX ADMIN — Medication 1000 MILLIGRAM(S): at 15:55

## 2018-10-06 RX ADMIN — HEPARIN SODIUM 5000 UNIT(S): 5000 INJECTION INTRAVENOUS; SUBCUTANEOUS at 13:52

## 2018-10-06 RX ADMIN — DEXTROSE MONOHYDRATE, SODIUM CHLORIDE, AND POTASSIUM CHLORIDE 100 MILLILITER(S): 50; .745; 4.5 INJECTION, SOLUTION INTRAVENOUS at 09:40

## 2018-10-06 RX ADMIN — Medication 250 MILLIGRAM(S): at 21:08

## 2018-10-06 RX ADMIN — HEPARIN SODIUM 5000 UNIT(S): 5000 INJECTION INTRAVENOUS; SUBCUTANEOUS at 05:08

## 2018-10-06 RX ADMIN — Medication 50 GRAM(S): at 15:25

## 2018-10-06 RX ADMIN — MEROPENEM 100 MILLIGRAM(S): 1 INJECTION INTRAVENOUS at 09:40

## 2018-10-06 RX ADMIN — Medication 1000 MILLIGRAM(S): at 21:11

## 2018-10-06 RX ADMIN — PANTOPRAZOLE SODIUM 40 MILLIGRAM(S): 20 TABLET, DELAYED RELEASE ORAL at 22:18

## 2018-10-06 NOTE — PROGRESS NOTE ADULT - SUBJECTIVE AND OBJECTIVE BOX
Surgery Progress Note     Subjective/24hour Events:   Patient seen and examined.  Yesterday villa out and passed TOV.   Overnight transferred from SICU to floor.   No acute events overnight.   Continues to have midline abdominal pain.   NGT with minimal output.   Passing flatus, no BM.     Vital Signs:  Vital Signs Last 24 Hrs  T(C): 36.4 (06 Oct 2018 09:17), Max: 37.3 (05 Oct 2018 19:00)  T(F): 97.6 (06 Oct 2018 09:17), Max: 99.1 (05 Oct 2018 19:00)  HR: 88 (06 Oct 2018 09:17) (88 - 122)  BP: 157/79 (06 Oct 2018 09:17) (123/84 - 168/90)  BP(mean): 94 (05 Oct 2018 19:00) (94 - 105)  RR: 18 (06 Oct 2018 09:17) (18 - 24)  SpO2: 97% (06 Oct 2018 09:17) (94% - 99%)    CAPILLARY BLOOD GLUCOSE      POCT Blood Glucose.: 162 mg/dL (06 Oct 2018 05:41)  POCT Blood Glucose.: 175 mg/dL (06 Oct 2018 02:02)  POCT Blood Glucose.: 155 mg/dL (05 Oct 2018 22:46)  POCT Blood Glucose.: 167 mg/dL (05 Oct 2018 17:19)  POCT Blood Glucose.: 223 mg/dL (05 Oct 2018 12:17)      I&O's Detail    05 Oct 2018 07:01  -  06 Oct 2018 07:00  --------------------------------------------------------  IN:    dextrose 5% + sodium chloride 0.45% with potassium chloride 20 mEq/L: 225 mL    dextrose 5% + sodium chloride 0.45% with potassium chloride 20 mEq/L: 400 mL    sodium bicarbonate  Infusion: 400 mL    Solution: 250 mL    Solution: 250 mL    Solution: 100 mL    Solution: 100 mL    Solution: 500 mL  Total IN: 2225 mL    OUT:    Bulb: 565 mL    Bulb: 30 mL    Bulb: 65 mL    Indwelling Catheter - Urethral: 575 mL    Nasoenteral Tube: 220 mL    Voided: 1050 mL  Total OUT: 2505 mL    Total NET: -280 mL      06 Oct 2018 07:01  -  06 Oct 2018 09:32  --------------------------------------------------------  IN:  Total IN: 0 mL    OUT:    Voided: 250 mL  Total OUT: 250 mL    Total NET: -250 mL          MEDICATIONS  (STANDING):  acetaminophen  IVPB .. 1000 milliGRAM(s) IV Intermittent once  acetaminophen  IVPB .. 1000 milliGRAM(s) IV Intermittent once  acetaminophen  IVPB .. 1000 milliGRAM(s) IV Intermittent once  albumin human 25% IVPB 50 milliLiter(s) IV Intermittent every 6 hours  albumin human 25% IVPB 50 milliLiter(s) IV Intermittent every 6 hours  dextrose 5% + sodium chloride 0.45% with potassium chloride 20 mEq/L 1000 milliLiter(s) (100 mL/Hr) IV Continuous <Continuous>  erythromycin    ethylsuccinate Suspension 40 mG/mL 400 milliGRAM(s) Oral every 8 hours  heparin  Injectable 5000 Unit(s) SubCutaneous every 8 hours  HYDROmorphone (10 MICROgram(s)/mL) + BUpivacaine 0.0625% in 0.9% Sodium Chloride PCEA 250 milliLiter(s) Epidural PCA Continuous  influenza   Vaccine 0.5 milliLiter(s) IntraMuscular once  insulin lispro (HumaLOG) corrective regimen sliding scale   SubCutaneous every 4 hours  meropenem  IVPB      meropenem  IVPB 1000 milliGRAM(s) IV Intermittent once  meropenem  IVPB 1000 milliGRAM(s) IV Intermittent every 8 hours  metoprolol tartrate Injectable 5 milliGRAM(s) IV Push every 6 hours  pantoprazole  Injectable 40 milliGRAM(s) IV Push every 24 hours  vancomycin  IVPB 1000 milliGRAM(s) IV Intermittent every 12 hours    MEDICATIONS  (PRN):  diphenhydrAMINE   Injectable 25 milliGRAM(s) IV Push every 6 hours PRN Rash and/or Itching  HYDROmorphone (10 MICROgram(s)/mL) + BUpivacaine 0.0625% in 0.9% Sodium Chloride PCEA Rescue Clinician  Bolus 3 milliLiter(s) Epidural every 15 minutes PRN for Pain Score greater than 6      Physical Exam:  Gen: Laying in bed, converses easily.   Lungs: Non labored breathing.   Ab: Soft, Appropriately, tender, nondistended. Midline incision with minimal erythema, some clear fluid expressed between staples. Drains on R SS with 65 and 565 cc SS output, L drain with 30cc milky fluid.   Ext: Moves all 4 spontaneously.     Labs:    10-06    137  |  104  |  20  ----------------------------<  214<H>  3.9   |  22  |  1.48<H>    Ca    8.0<L>      06 Oct 2018 06:29  Phos  2.5     10-06  Mg     1.8     10-06    TPro  5.4<L>  /  Alb  2.8<L>  /  TBili  2.4<H>  /  DBili  1.1<H>  /  AST  20  /  ALT  48<H>  /  AlkPhos  152<H>  10-06    LIVER FUNCTIONS - ( 06 Oct 2018 06:29 )  Alb: 2.8 g/dL / Pro: 5.4 g/dL / ALK PHOS: 152 U/L / ALT: 48 U/L / AST: 20 U/L / GGT: x                                 8.5    10.69 )-----------( 220      ( 06 Oct 2018 07:52 )             26.6     PT/INR - ( 06 Oct 2018 07:47 )   PT: 15.2 sec;   INR: 1.34 ratio         PTT - ( 06 Oct 2018 07:47 )  PTT:32.8 sec

## 2018-10-06 NOTE — PROGRESS NOTE ADULT - ASSESSMENT
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     -Pain control with IV tylenol, PCEA. Appreciate recs from acute pain service.   -NGT out, keep NPO/IVF. Possible trial CLD tomorrow.   -IV vancomycin and meropenem.   -Encourage OOB to chair.   -Monitor and replete electrolytes.   -DVT ppx: subq heparin, sequential compression devices.     Blue Team General Surgery [phone]

## 2018-10-06 NOTE — PROGRESS NOTE ADULT - SUBJECTIVE AND OBJECTIVE BOX
Day __3_ of Anesthesia Pain Management Service    SUBJECTIVE:  Pain Scale Score	At rest: ___ 	With Activity: ___ 	[ X ] Refer to charted pain scores    THERAPY:  [X Epidural Bupivacaine 0.0625% and Hydromorphone 10 micrograms/mL  [ ] Epidural Ropivacaine 0.2% plain   [ ] Epidural Bupivacaine 0.01 % and Fentanyl 3 micrograms/mL  (OB)    Demand dose __2_ lockout _15__ (minutes) Continuous Rate _6__       MEDICATIONS  (STANDING):  acetaminophen  IVPB .. 1000 milliGRAM(s) IV Intermittent once  acetaminophen  IVPB .. 1000 milliGRAM(s) IV Intermittent once  acetaminophen  IVPB .. 1000 milliGRAM(s) IV Intermittent once  albumin human 25% IVPB 50 milliLiter(s) IV Intermittent every 6 hours  albumin human 25% IVPB 50 milliLiter(s) IV Intermittent every 6 hours  dextrose 5% + sodium chloride 0.45% with potassium chloride 20 mEq/L 1000 milliLiter(s) (100 mL/Hr) IV Continuous <Continuous>  erythromycin    ethylsuccinate Suspension 40 mG/mL 400 milliGRAM(s) Oral every 8 hours  heparin  Injectable 5000 Unit(s) SubCutaneous every 8 hours  HYDROmorphone (10 MICROgram(s)/mL) + BUpivacaine 0.0625% in 0.9% Sodium Chloride PCEA 250 milliLiter(s) Epidural PCA Continuous  influenza   Vaccine 0.5 milliLiter(s) IntraMuscular once  insulin lispro (HumaLOG) corrective regimen sliding scale   SubCutaneous every 4 hours  meropenem  IVPB      meropenem  IVPB 1000 milliGRAM(s) IV Intermittent every 8 hours  metoprolol tartrate Injectable 5 milliGRAM(s) IV Push every 6 hours  pantoprazole  Injectable 40 milliGRAM(s) IV Push every 24 hours  vancomycin  IVPB 1000 milliGRAM(s) IV Intermittent every 12 hours    MEDICATIONS  (PRN):  diphenhydrAMINE   Injectable 25 milliGRAM(s) IV Push every 6 hours PRN Rash and/or Itching  HYDROmorphone (10 MICROgram(s)/mL) + BUpivacaine 0.0625% in 0.9% Sodium Chloride PCEA Rescue Clinician  Bolus 3 milliLiter(s) Epidural every 15 minutes PRN for Pain Score greater than 6  naloxone Injectable 0.1 milliGRAM(s) IV Push every 3 minutes PRN For ANY of the following changes in patient status:  A. RR LESS THAN 10 breaths per minute, B. Oxygen saturation LESS THAN 90%, C. Sedation score of 6  ondansetron Injectable 4 milliGRAM(s) IV Push every 6 hours PRN Nausea      OBJECTIVE:    Assessment of Epidural Catheter Site: 	    [ ] Dressing intact	[ ] Site non-tender	[ ] Site without erythema, discharge, edema  [ ] Epidural tubing and connection checked	[ [ Gross neurological exam within normal limits  [ ] Catheter removed – tip intact		    PT/INR - ( 06 Oct 2018 07:47 )   PT: 15.2 sec;   INR: 1.34 ratio         PTT - ( 06 Oct 2018 07:47 )  PTT:32.8 sec                      8.5    10.69 )-----------( 220      ( 06 Oct 2018 07:52 )             26.6     Vital Signs Last 24 Hrs  T(C): 36.4 (10-06-18 @ 09:17), Max: 37.3 (10-05-18 @ 19:00)  T(F): 97.6 (10-06-18 @ 09:17), Max: 99.1 (10-05-18 @ 19:00)  HR: 88 (10-06-18 @ 09:17) (88 - 122)  BP: 157/79 (10-06-18 @ 09:17) (123/84 - 168/90)  BP(mean): 94 (10-05-18 @ 19:00) (94 - 105)  RR: 18 (10-06-18 @ 09:17) (18 - 24)  SpO2: 97% (10-06-18 @ 09:17) (94% - 99%)      Sedation Score:	[x ] Alert	[ ] Drowsy	[ ] Arousable  [ ] Asleep  [ ] Unresponsive    Side Effects:	[x ] None	[ ] Nausea	[ ] Vomiting  [ ] Pruritus  		[ ] Weakness  [ ] Numbness  [ ] Other:    ASSESSMENT/ PLAN:    Therapy to  be:	[x ] Continue   [ ] Discontinued   [ ] Change to prn Analgesics    Documentation and Verification of current medications:  [ X ] Done	[ ] Not done, not eligible, reason:    Comments:

## 2018-10-07 LAB
ALBUMIN SERPL ELPH-MCNC: 2.9 G/DL — LOW (ref 3.3–5)
ALBUMIN SERPL ELPH-MCNC: 3.1 G/DL — LOW (ref 3.3–5)
ALP SERPL-CCNC: 128 U/L — HIGH (ref 40–120)
ALP SERPL-CCNC: 135 U/L — HIGH (ref 40–120)
ALT FLD-CCNC: 31 U/L — SIGNIFICANT CHANGE UP (ref 10–45)
ALT FLD-CCNC: 36 U/L — SIGNIFICANT CHANGE UP (ref 10–45)
ANION GAP SERPL CALC-SCNC: 10 MMOL/L — SIGNIFICANT CHANGE UP (ref 5–17)
ANION GAP SERPL CALC-SCNC: 13 MMOL/L — SIGNIFICANT CHANGE UP (ref 5–17)
ANION GAP SERPL CALC-SCNC: 13 MMOL/L — SIGNIFICANT CHANGE UP (ref 5–17)
APTT BLD: 35.2 SEC — SIGNIFICANT CHANGE UP (ref 27.5–37.4)
AST SERPL-CCNC: 12 U/L — SIGNIFICANT CHANGE UP (ref 10–40)
AST SERPL-CCNC: 15 U/L — SIGNIFICANT CHANGE UP (ref 10–40)
BILIRUB DIRECT SERPL-MCNC: 0.7 MG/DL — HIGH (ref 0–0.2)
BILIRUB INDIRECT FLD-MCNC: 0.9 MG/DL — SIGNIFICANT CHANGE UP (ref 0.2–1)
BILIRUB SERPL-MCNC: 1.6 MG/DL — HIGH (ref 0.2–1.2)
BILIRUB SERPL-MCNC: 1.8 MG/DL — HIGH (ref 0.2–1.2)
BUN SERPL-MCNC: 12 MG/DL — SIGNIFICANT CHANGE UP (ref 7–23)
BUN SERPL-MCNC: 13 MG/DL — SIGNIFICANT CHANGE UP (ref 7–23)
BUN SERPL-MCNC: 15 MG/DL — SIGNIFICANT CHANGE UP (ref 7–23)
CALCIUM SERPL-MCNC: 7.2 MG/DL — LOW (ref 8.4–10.5)
CALCIUM SERPL-MCNC: 8.1 MG/DL — LOW (ref 8.4–10.5)
CALCIUM SERPL-MCNC: 8.4 MG/DL — SIGNIFICANT CHANGE UP (ref 8.4–10.5)
CHLORIDE SERPL-SCNC: 101 MMOL/L — SIGNIFICANT CHANGE UP (ref 96–108)
CHLORIDE SERPL-SCNC: 102 MMOL/L — SIGNIFICANT CHANGE UP (ref 96–108)
CHLORIDE SERPL-SCNC: 102 MMOL/L — SIGNIFICANT CHANGE UP (ref 96–108)
CO2 SERPL-SCNC: 18 MMOL/L — LOW (ref 22–31)
CO2 SERPL-SCNC: 22 MMOL/L — SIGNIFICANT CHANGE UP (ref 22–31)
CO2 SERPL-SCNC: 23 MMOL/L — SIGNIFICANT CHANGE UP (ref 22–31)
CREAT SERPL-MCNC: 1.18 MG/DL — SIGNIFICANT CHANGE UP (ref 0.5–1.3)
CREAT SERPL-MCNC: 1.23 MG/DL — SIGNIFICANT CHANGE UP (ref 0.5–1.3)
CREAT SERPL-MCNC: 1.28 MG/DL — SIGNIFICANT CHANGE UP (ref 0.5–1.3)
CULTURE RESULTS: SIGNIFICANT CHANGE UP
GLUCOSE SERPL-MCNC: 178 MG/DL — HIGH (ref 70–99)
GLUCOSE SERPL-MCNC: 194 MG/DL — HIGH (ref 70–99)
GLUCOSE SERPL-MCNC: 472 MG/DL — CRITICAL HIGH (ref 70–99)
HCT VFR BLD CALC: 24 % — LOW (ref 39–50)
HCT VFR BLD CALC: 24.5 % — LOW (ref 39–50)
HGB BLD-MCNC: 8.1 G/DL — LOW (ref 13–17)
HGB BLD-MCNC: 8.3 G/DL — LOW (ref 13–17)
INR BLD: 1.39 RATIO — HIGH (ref 0.88–1.16)
MAGNESIUM SERPL-MCNC: 1.7 MG/DL — SIGNIFICANT CHANGE UP (ref 1.6–2.6)
MAGNESIUM SERPL-MCNC: 1.9 MG/DL — SIGNIFICANT CHANGE UP (ref 1.6–2.6)
MCHC RBC-ENTMCNC: 30.1 PG — SIGNIFICANT CHANGE UP (ref 27–34)
MCHC RBC-ENTMCNC: 31.9 PG — SIGNIFICANT CHANGE UP (ref 27–34)
MCHC RBC-ENTMCNC: 33 GM/DL — SIGNIFICANT CHANGE UP (ref 32–36)
MCHC RBC-ENTMCNC: 34.6 GM/DL — SIGNIFICANT CHANGE UP (ref 32–36)
MCV RBC AUTO: 91.3 FL — SIGNIFICANT CHANGE UP (ref 80–100)
MCV RBC AUTO: 92.4 FL — SIGNIFICANT CHANGE UP (ref 80–100)
ORGANISM # SPEC MICROSCOPIC CNT: SIGNIFICANT CHANGE UP
PHOSPHATE SERPL-MCNC: 2.6 MG/DL — SIGNIFICANT CHANGE UP (ref 2.5–4.5)
PHOSPHATE SERPL-MCNC: 2.8 MG/DL — SIGNIFICANT CHANGE UP (ref 2.5–4.5)
PLATELET # BLD AUTO: 230 K/UL — SIGNIFICANT CHANGE UP (ref 150–400)
PLATELET # BLD AUTO: 242 K/UL — SIGNIFICANT CHANGE UP (ref 150–400)
POTASSIUM SERPL-MCNC: 3.1 MMOL/L — LOW (ref 3.5–5.3)
POTASSIUM SERPL-MCNC: 3.5 MMOL/L — SIGNIFICANT CHANGE UP (ref 3.5–5.3)
POTASSIUM SERPL-MCNC: 4.6 MMOL/L — SIGNIFICANT CHANGE UP (ref 3.5–5.3)
POTASSIUM SERPL-SCNC: 3.1 MMOL/L — LOW (ref 3.5–5.3)
POTASSIUM SERPL-SCNC: 3.5 MMOL/L — SIGNIFICANT CHANGE UP (ref 3.5–5.3)
POTASSIUM SERPL-SCNC: 4.6 MMOL/L — SIGNIFICANT CHANGE UP (ref 3.5–5.3)
PROT SERPL-MCNC: 5.7 G/DL — LOW (ref 6–8.3)
PROT SERPL-MCNC: 5.8 G/DL — LOW (ref 6–8.3)
PROTHROM AB SERPL-ACNC: 15.1 SEC — HIGH (ref 9.8–12.7)
RBC # BLD: 2.59 M/UL — LOW (ref 4.2–5.8)
RBC # BLD: 2.68 M/UL — LOW (ref 4.2–5.8)
RBC # FLD: 12.3 % — SIGNIFICANT CHANGE UP (ref 10.3–14.5)
RBC # FLD: 12.3 % — SIGNIFICANT CHANGE UP (ref 10.3–14.5)
SODIUM SERPL-SCNC: 133 MMOL/L — LOW (ref 135–145)
SODIUM SERPL-SCNC: 135 MMOL/L — SIGNIFICANT CHANGE UP (ref 135–145)
SODIUM SERPL-SCNC: 136 MMOL/L — SIGNIFICANT CHANGE UP (ref 135–145)
SPECIMEN SOURCE: SIGNIFICANT CHANGE UP
WBC # BLD: 10.7 K/UL — HIGH (ref 3.8–10.5)
WBC # BLD: 9.1 K/UL — SIGNIFICANT CHANGE UP (ref 3.8–10.5)
WBC # FLD AUTO: 10.7 K/UL — HIGH (ref 3.8–10.5)
WBC # FLD AUTO: 9.1 K/UL — SIGNIFICANT CHANGE UP (ref 3.8–10.5)

## 2018-10-07 RX ORDER — MAGNESIUM SULFATE 500 MG/ML
2 VIAL (ML) INJECTION ONCE
Qty: 0 | Refills: 0 | Status: COMPLETED | OUTPATIENT
Start: 2018-10-07 | End: 2018-10-07

## 2018-10-07 RX ORDER — ACETAMINOPHEN 500 MG
1000 TABLET ORAL ONCE
Qty: 0 | Refills: 0 | Status: COMPLETED | OUTPATIENT
Start: 2018-10-07 | End: 2018-10-07

## 2018-10-07 RX ORDER — MEGESTROL ACETATE 40 MG/ML
800 SUSPENSION ORAL DAILY
Qty: 0 | Refills: 0 | Status: DISCONTINUED | OUTPATIENT
Start: 2018-10-07 | End: 2018-10-12

## 2018-10-07 RX ORDER — ACETAMINOPHEN 500 MG
1000 TABLET ORAL ONCE
Qty: 0 | Refills: 0 | Status: COMPLETED | OUTPATIENT
Start: 2018-10-08 | End: 2018-10-08

## 2018-10-07 RX ORDER — ONDANSETRON 8 MG/1
4 TABLET, FILM COATED ORAL EVERY 6 HOURS
Qty: 0 | Refills: 0 | Status: DISCONTINUED | OUTPATIENT
Start: 2018-10-07 | End: 2018-10-12

## 2018-10-07 RX ORDER — INSULIN LISPRO 100/ML
VIAL (ML) SUBCUTANEOUS EVERY 6 HOURS
Qty: 0 | Refills: 0 | Status: DISCONTINUED | OUTPATIENT
Start: 2018-10-07 | End: 2018-10-08

## 2018-10-07 RX ORDER — VANCOMYCIN HCL 1 G
1000 VIAL (EA) INTRAVENOUS EVERY 12 HOURS
Qty: 0 | Refills: 0 | Status: DISCONTINUED | OUTPATIENT
Start: 2018-10-07 | End: 2018-10-09

## 2018-10-07 RX ADMIN — Medication 400 MILLIGRAM(S): at 03:04

## 2018-10-07 RX ADMIN — Medication 5 MILLIGRAM(S): at 05:09

## 2018-10-07 RX ADMIN — Medication 50 MILLILITER(S): at 05:14

## 2018-10-07 RX ADMIN — Medication 400 MILLIGRAM(S): at 09:13

## 2018-10-07 RX ADMIN — Medication 1000 MILLIGRAM(S): at 03:08

## 2018-10-07 RX ADMIN — Medication 62.5 MILLIMOLE(S): at 02:29

## 2018-10-07 RX ADMIN — Medication 250 MILLIGRAM(S): at 05:08

## 2018-10-07 RX ADMIN — Medication 2: at 18:04

## 2018-10-07 RX ADMIN — Medication 250 MILLIGRAM(S): at 08:09

## 2018-10-07 RX ADMIN — MEROPENEM 100 MILLIGRAM(S): 1 INJECTION INTRAVENOUS at 20:43

## 2018-10-07 RX ADMIN — PANTOPRAZOLE SODIUM 40 MILLIGRAM(S): 20 TABLET, DELAYED RELEASE ORAL at 21:43

## 2018-10-07 RX ADMIN — MEGESTROL ACETATE 800 MILLIGRAM(S): 40 SUSPENSION ORAL at 14:11

## 2018-10-07 RX ADMIN — MEROPENEM 100 MILLIGRAM(S): 1 INJECTION INTRAVENOUS at 03:40

## 2018-10-07 RX ADMIN — DEXTROSE MONOHYDRATE, SODIUM CHLORIDE, AND POTASSIUM CHLORIDE 100 MILLILITER(S): 50; .745; 4.5 INJECTION, SOLUTION INTRAVENOUS at 18:04

## 2018-10-07 RX ADMIN — Medication 4: at 06:07

## 2018-10-07 RX ADMIN — Medication 50 GRAM(S): at 02:08

## 2018-10-07 RX ADMIN — Medication 1000 MILLIGRAM(S): at 09:43

## 2018-10-07 RX ADMIN — Medication 2: at 02:04

## 2018-10-07 RX ADMIN — Medication 1000 MILLIGRAM(S): at 14:41

## 2018-10-07 RX ADMIN — Medication 250 MILLIGRAM(S): at 14:10

## 2018-10-07 RX ADMIN — HEPARIN SODIUM 5000 UNIT(S): 5000 INJECTION INTRAVENOUS; SUBCUTANEOUS at 21:42

## 2018-10-07 RX ADMIN — Medication 5 MILLIGRAM(S): at 18:04

## 2018-10-07 RX ADMIN — Medication 250 MILLIGRAM(S): at 20:43

## 2018-10-07 RX ADMIN — Medication 1000 MILLIGRAM(S): at 22:03

## 2018-10-07 RX ADMIN — DEXTROSE MONOHYDRATE, SODIUM CHLORIDE, AND POTASSIUM CHLORIDE 100 MILLILITER(S): 50; .745; 4.5 INJECTION, SOLUTION INTRAVENOUS at 08:09

## 2018-10-07 RX ADMIN — Medication 5 MILLIGRAM(S): at 12:08

## 2018-10-07 RX ADMIN — Medication 50 MILLILITER(S): at 12:08

## 2018-10-07 RX ADMIN — Medication 400 MILLIGRAM(S): at 14:11

## 2018-10-07 RX ADMIN — Medication 2: at 12:10

## 2018-10-07 RX ADMIN — HEPARIN SODIUM 5000 UNIT(S): 5000 INJECTION INTRAVENOUS; SUBCUTANEOUS at 14:11

## 2018-10-07 RX ADMIN — Medication 400 MILLIGRAM(S): at 21:43

## 2018-10-07 RX ADMIN — Medication 250 MILLIGRAM(S): at 21:42

## 2018-10-07 RX ADMIN — HEPARIN SODIUM 5000 UNIT(S): 5000 INJECTION INTRAVENOUS; SUBCUTANEOUS at 05:08

## 2018-10-07 RX ADMIN — MEROPENEM 100 MILLIGRAM(S): 1 INJECTION INTRAVENOUS at 12:22

## 2018-10-07 NOTE — PROGRESS NOTE ADULT - SUBJECTIVE AND OBJECTIVE BOX
Day __4_ of Anesthesia Pain Management Service    SUBJECTIVE:  Pain Scale Score	At rest: ___ 	With Activity: ___ 	[  x] Refer to charted pain scores    THERAPY:  [x ] Epidural Bupivacaine 0.0625% and Hydromorphone 10 micrograms/mL  [ ] Epidural Ropivacaine 0.2% plain     Demand dose _2__ lockout _15__ (minutes) Continuous Rate _8__       MEDICATIONS  (STANDING):  acetaminophen  IVPB .. 1000 milliGRAM(s) IV Intermittent once  acetaminophen  IVPB .. 1000 milliGRAM(s) IV Intermittent once  albumin human 25% IVPB 50 milliLiter(s) IV Intermittent every 6 hours  dextrose 5% + sodium chloride 0.45% with potassium chloride 20 mEq/L 1000 milliLiter(s) (100 mL/Hr) IV Continuous <Continuous>  erythromycin     base Tablet 250 milliGRAM(s) Oral every 8 hours  heparin  Injectable 5000 Unit(s) SubCutaneous every 8 hours  HYDROmorphone (10 MICROgram(s)/mL) + BUpivacaine 0.0625% in 0.9% Sodium Chloride PCEA 250 milliLiter(s) Epidural PCA Continuous  influenza   Vaccine 0.5 milliLiter(s) IntraMuscular once  insulin lispro (HumaLOG) corrective regimen sliding scale   SubCutaneous every 6 hours  megestrol Suspension 800 milliGRAM(s) Oral daily  meropenem  IVPB      meropenem  IVPB 1000 milliGRAM(s) IV Intermittent every 8 hours  metoprolol tartrate Injectable 5 milliGRAM(s) IV Push every 6 hours  ondansetron Injectable 4 milliGRAM(s) IV Push every 6 hours  pantoprazole  Injectable 40 milliGRAM(s) IV Push every 24 hours  vancomycin  IVPB 1000 milliGRAM(s) IV Intermittent every 12 hours    MEDICATIONS  (PRN):  diphenhydrAMINE   Injectable 25 milliGRAM(s) IV Push every 6 hours PRN Rash and/or Itching  HYDROmorphone (10 MICROgram(s)/mL) + BUpivacaine 0.0625% in 0.9% Sodium Chloride PCEA Rescue Clinician  Bolus 3 milliLiter(s) Epidural every 15 minutes PRN for Pain Score greater than 6  naloxone Injectable 0.1 milliGRAM(s) IV Push every 3 minutes PRN For ANY of the following changes in patient status:  A. RR LESS THAN 10 breaths per minute, B. Oxygen saturation LESS THAN 90%, C. Sedation score of 6      OBJECTIVE:    Assessment of Epidural Catheter Site: 	    [ x] Dressing intact	[x ] Site non-tender	[x ] Site without erythema, discharge, edema  [ x] Epidural tubing and connection checked	[x) Gross neurological exam within normal limits  [ ] Catheter removed – tip intact		    PT/INR - ( 07 Oct 2018 09:48 )   PT: 15.1 sec;   INR: 1.39 ratio         PTT - ( 07 Oct 2018 09:48 )  PTT:35.2 sec                      8.3    10.7  )-----------( 230      ( 07 Oct 2018 08:12 )             24.0     Vital Signs Last 24 Hrs  T(C): 36.5 (10-07-18 @ 09:55), Max: 37.3 (10-06-18 @ 22:09)  T(F): 97.7 (10-07-18 @ 09:55), Max: 99.2 (10-06-18 @ 22:09)  HR: 106 (10-07-18 @ 09:55) (61 - 106)  BP: 143/72 (10-07-18 @ 09:55) (116/85 - 161/83)  BP(mean): --  RR: 18 (10-07-18 @ 09:55) (17 - 18)  SpO2: 96% (10-07-18 @ 09:55) (95% - 97%)      Sedation Score:	[x ] Alert	[ ] Drowsy	[ ] Arousable  [ ] Asleep  [ ] Unresponsive    Side Effects:	[x ] None	[ ] Nausea	[ ] Vomiting  [ ] Pruritus  		[ ] Weakness  [ ] Numbness  [ ] Other:    ASSESSMENT/ PLAN:    Therapy to  be:	[x ] Continue   [ ] Discontinued   [ ] Change to prn Analgesics    Documentation and Verification of current medications:  [ X ] Done	[ ] Not done, not eligible, reason:    Comments:

## 2018-10-07 NOTE — PROGRESS NOTE ADULT - SUBJECTIVE AND OBJECTIVE BOX
Surgery Progress Note     Subjective/24hour Events:   Patient seen and examined.   Yesterday NGT out, drain #2 removed with low amylase.   No acute events overnight.   Does have incisional pain, improved with PCEA use.   No N/V.   Passing flatus, no BM.     Vital Signs:  Vital Signs Last 24 Hrs  T(C): 36.7 (06 Oct 2018 23:45), Max: 37.3 (06 Oct 2018 22:09)  T(F): 98.1 (06 Oct 2018 23:45), Max: 99.2 (06 Oct 2018 22:09)  HR: 103 (06 Oct 2018 23:45) (61 - 109)  BP: 144/80 (06 Oct 2018 23:45) (143/80 - 161/83)  BP(mean): --  RR: 18 (06 Oct 2018 23:45) (18 - 18)  SpO2: 97% (06 Oct 2018 23:45) (95% - 97%)    CAPILLARY BLOOD GLUCOSE      POCT Blood Glucose.: 161 mg/dL (06 Oct 2018 10:00)  POCT Blood Glucose.: 162 mg/dL (06 Oct 2018 05:41)  POCT Blood Glucose.: 175 mg/dL (06 Oct 2018 02:02)      I&O's Detail    05 Oct 2018 07:01  -  06 Oct 2018 07:00  --------------------------------------------------------  IN:    dextrose 5% + sodium chloride 0.45% with potassium chloride 20 mEq/L: 225 mL    dextrose 5% + sodium chloride 0.45% with potassium chloride 20 mEq/L: 400 mL    sodium bicarbonate  Infusion: 400 mL    Solution: 250 mL    Solution: 250 mL    Solution: 100 mL    Solution: 100 mL    Solution: 500 mL  Total IN: 2225 mL    OUT:    Bulb: 565 mL    Bulb: 30 mL    Bulb: 65 mL    Indwelling Catheter - Urethral: 575 mL    Nasoenteral Tube: 220 mL    Voided: 1050 mL  Total OUT: 2505 mL    Total NET: -280 mL      06 Oct 2018 07:01  -  07 Oct 2018 00:35  --------------------------------------------------------  IN:    Albumin 25%: 100 mL    dextrose 5% + sodium chloride 0.45% with potassium chloride 20 mEq/L: 1500 mL    Solution: 50 mL    Solution: 100 mL  Total IN: 1750 mL    OUT:    Bulb: 20 mL    Bulb: 50 mL    Bulb: 15 mL    Voided: 1995 mL  Total OUT: 2080 mL    Total NET: -330 mL          MEDICATIONS  (STANDING):  acetaminophen  IVPB .. 1000 milliGRAM(s) IV Intermittent once  albumin human 25% IVPB 50 milliLiter(s) IV Intermittent every 6 hours  dextrose 5% + sodium chloride 0.45% with potassium chloride 20 mEq/L 1000 milliLiter(s) (100 mL/Hr) IV Continuous <Continuous>  erythromycin     base Tablet 250 milliGRAM(s) Oral every 8 hours  heparin  Injectable 5000 Unit(s) SubCutaneous every 8 hours  HYDROmorphone (10 MICROgram(s)/mL) + BUpivacaine 0.0625% in 0.9% Sodium Chloride PCEA 250 milliLiter(s) Epidural PCA Continuous  influenza   Vaccine 0.5 milliLiter(s) IntraMuscular once  insulin lispro (HumaLOG) corrective regimen sliding scale   SubCutaneous every 4 hours  meropenem  IVPB      meropenem  IVPB 1000 milliGRAM(s) IV Intermittent every 8 hours  metoprolol tartrate Injectable 5 milliGRAM(s) IV Push every 6 hours  pantoprazole  Injectable 40 milliGRAM(s) IV Push every 24 hours  vancomycin  IVPB 1000 milliGRAM(s) IV Intermittent every 12 hours    MEDICATIONS  (PRN):  diphenhydrAMINE   Injectable 25 milliGRAM(s) IV Push every 6 hours PRN Rash and/or Itching  HYDROmorphone (10 MICROgram(s)/mL) + BUpivacaine 0.0625% in 0.9% Sodium Chloride PCEA Rescue Clinician  Bolus 3 milliLiter(s) Epidural every 15 minutes PRN for Pain Score greater than 6  naloxone Injectable 0.1 milliGRAM(s) IV Push every 3 minutes PRN For ANY of the following changes in patient status:  A. RR LESS THAN 10 breaths per minute, B. Oxygen saturation LESS THAN 90%, C. Sedation score of 6  ondansetron Injectable 4 milliGRAM(s) IV Push every 6 hours PRN Nausea      Physical Exam:  Gen: Laying in bed, converses easily.   Lungs: Non labored breathing.   Ab: Soft, Appropriately, tender, nondistended. Midline incision with minimal erythema. Drain on R SS with 20cc SS output, L drain with 50cc milky fluid.   Ext: Moves all 4 spontaneously.     Labs:    10-06    133<L>  |  102  |  15  ----------------------------<  x   4.6   |  18<L>  |  1.23    Ca    7.2<L>      06 Oct 2018 23:47  Phos  2.8     10-06  Mg     1.7     10-06    TPro  5.4<L>  /  Alb  2.8<L>  /  TBili  2.4<H>  /  DBili  1.1<H>  /  AST  20  /  ALT  48<H>  /  AlkPhos  152<H>  10-06    LIVER FUNCTIONS - ( 06 Oct 2018 06:29 )  Alb: 2.8 g/dL / Pro: 5.4 g/dL / ALK PHOS: 152 U/L / ALT: 48 U/L / AST: 20 U/L / GGT: x                                 7.8    9.2   )-----------( 203      ( 06 Oct 2018 23:47 )             23.2     PT/INR - ( 06 Oct 2018 07:47 )   PT: 15.2 sec;   INR: 1.34 ratio         PTT - ( 06 Oct 2018 07:47 )  PTT:32.8 sec

## 2018-10-07 NOTE — PROGRESS NOTE ADULT - ASSESSMENT
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     -Pain control with IV tylenol, PCEA. Appreciate recs from acute pain service.   -NPO/IVF. Possible CLD.  -IV vancomycin and meropenem.   -Encourage OOB to chair.   -Monitor and replete electrolytes.   -DVT ppx: subq heparin, sequential compression devices.     Blue Team General Surgery [phone]

## 2018-10-08 LAB
ALBUMIN SERPL ELPH-MCNC: 2.9 G/DL — LOW (ref 3.3–5)
ALP SERPL-CCNC: 132 U/L — HIGH (ref 40–120)
ALT FLD-CCNC: 28 U/L — SIGNIFICANT CHANGE UP (ref 10–45)
ANION GAP SERPL CALC-SCNC: 9 MMOL/L — SIGNIFICANT CHANGE UP (ref 5–17)
APTT BLD: 32.5 SEC — SIGNIFICANT CHANGE UP (ref 27.5–37.4)
AST SERPL-CCNC: 14 U/L — SIGNIFICANT CHANGE UP (ref 10–40)
BILIRUB DIRECT SERPL-MCNC: 0.6 MG/DL — HIGH (ref 0–0.2)
BILIRUB INDIRECT FLD-MCNC: 0.7 MG/DL — SIGNIFICANT CHANGE UP (ref 0.2–1)
BILIRUB SERPL-MCNC: 1.3 MG/DL — HIGH (ref 0.2–1.2)
BUN SERPL-MCNC: 11 MG/DL — SIGNIFICANT CHANGE UP (ref 7–23)
CALCIUM SERPL-MCNC: 8.1 MG/DL — LOW (ref 8.4–10.5)
CHLORIDE SERPL-SCNC: 102 MMOL/L — SIGNIFICANT CHANGE UP (ref 96–108)
CO2 SERPL-SCNC: 23 MMOL/L — SIGNIFICANT CHANGE UP (ref 22–31)
CREAT SERPL-MCNC: 1.29 MG/DL — SIGNIFICANT CHANGE UP (ref 0.5–1.3)
GLUCOSE SERPL-MCNC: 164 MG/DL — HIGH (ref 70–99)
HCT VFR BLD CALC: 24.3 % — LOW (ref 39–50)
HGB BLD-MCNC: 8.5 G/DL — LOW (ref 13–17)
INR BLD: 1.42 RATIO — HIGH (ref 0.88–1.16)
MAGNESIUM SERPL-MCNC: 1.8 MG/DL — SIGNIFICANT CHANGE UP (ref 1.6–2.6)
MCHC RBC-ENTMCNC: 31.9 PG — SIGNIFICANT CHANGE UP (ref 27–34)
MCHC RBC-ENTMCNC: 35.1 GM/DL — SIGNIFICANT CHANGE UP (ref 32–36)
MCV RBC AUTO: 90.9 FL — SIGNIFICANT CHANGE UP (ref 80–100)
PHOSPHATE SERPL-MCNC: 2.5 MG/DL — SIGNIFICANT CHANGE UP (ref 2.5–4.5)
PLATELET # BLD AUTO: 313 K/UL — SIGNIFICANT CHANGE UP (ref 150–400)
POTASSIUM SERPL-MCNC: 3.3 MMOL/L — LOW (ref 3.5–5.3)
POTASSIUM SERPL-MCNC: 3.4 MMOL/L — LOW (ref 3.5–5.3)
POTASSIUM SERPL-SCNC: 3.3 MMOL/L — LOW (ref 3.5–5.3)
POTASSIUM SERPL-SCNC: 3.4 MMOL/L — LOW (ref 3.5–5.3)
PROT SERPL-MCNC: 6.1 G/DL — SIGNIFICANT CHANGE UP (ref 6–8.3)
PROTHROM AB SERPL-ACNC: 16.2 SEC — HIGH (ref 10–13.1)
RBC # BLD: 2.67 M/UL — LOW (ref 4.2–5.8)
RBC # FLD: 11.9 % — SIGNIFICANT CHANGE UP (ref 10.3–14.5)
SODIUM SERPL-SCNC: 134 MMOL/L — LOW (ref 135–145)
VANCOMYCIN TROUGH SERPL-MCNC: 18.5 UG/ML — SIGNIFICANT CHANGE UP (ref 10–20)
WBC # BLD: 10.1 K/UL — SIGNIFICANT CHANGE UP (ref 3.8–10.5)
WBC # FLD AUTO: 10.1 K/UL — SIGNIFICANT CHANGE UP (ref 3.8–10.5)

## 2018-10-08 RX ORDER — MAGNESIUM SULFATE 500 MG/ML
1 VIAL (ML) INJECTION ONCE
Qty: 0 | Refills: 0 | Status: COMPLETED | OUTPATIENT
Start: 2018-10-08 | End: 2018-10-08

## 2018-10-08 RX ORDER — OXYCODONE HYDROCHLORIDE 5 MG/1
10 TABLET ORAL EVERY 4 HOURS
Qty: 0 | Refills: 0 | Status: DISCONTINUED | OUTPATIENT
Start: 2018-10-08 | End: 2018-10-12

## 2018-10-08 RX ORDER — POTASSIUM CHLORIDE 20 MEQ
10 PACKET (EA) ORAL ONCE
Qty: 0 | Refills: 0 | Status: COMPLETED | OUTPATIENT
Start: 2018-10-08 | End: 2018-10-08

## 2018-10-08 RX ORDER — HYDROMORPHONE HYDROCHLORIDE 2 MG/ML
0.25 INJECTION INTRAMUSCULAR; INTRAVENOUS; SUBCUTANEOUS EVERY 4 HOURS
Qty: 0 | Refills: 0 | Status: DISCONTINUED | OUTPATIENT
Start: 2018-10-08 | End: 2018-10-12

## 2018-10-08 RX ORDER — INSULIN LISPRO 100/ML
VIAL (ML) SUBCUTANEOUS
Qty: 0 | Refills: 0 | Status: DISCONTINUED | OUTPATIENT
Start: 2018-10-08 | End: 2018-10-09

## 2018-10-08 RX ORDER — OXYCODONE HYDROCHLORIDE 5 MG/1
5 TABLET ORAL EVERY 4 HOURS
Qty: 0 | Refills: 0 | Status: DISCONTINUED | OUTPATIENT
Start: 2018-10-08 | End: 2018-10-12

## 2018-10-08 RX ADMIN — MEROPENEM 100 MILLIGRAM(S): 1 INJECTION INTRAVENOUS at 11:48

## 2018-10-08 RX ADMIN — Medication 5 MILLIGRAM(S): at 06:45

## 2018-10-08 RX ADMIN — OXYCODONE HYDROCHLORIDE 5 MILLIGRAM(S): 5 TABLET ORAL at 22:03

## 2018-10-08 RX ADMIN — Medication 100 MILLIEQUIVALENT(S): at 13:36

## 2018-10-08 RX ADMIN — Medication 4: at 18:43

## 2018-10-08 RX ADMIN — Medication 250 MILLIGRAM(S): at 21:33

## 2018-10-08 RX ADMIN — ONDANSETRON 4 MILLIGRAM(S): 8 TABLET, FILM COATED ORAL at 06:45

## 2018-10-08 RX ADMIN — Medication 2: at 13:31

## 2018-10-08 RX ADMIN — ONDANSETRON 4 MILLIGRAM(S): 8 TABLET, FILM COATED ORAL at 11:52

## 2018-10-08 RX ADMIN — Medication 100 GRAM(S): at 01:42

## 2018-10-08 RX ADMIN — Medication 250 MILLIGRAM(S): at 06:45

## 2018-10-08 RX ADMIN — OXYCODONE HYDROCHLORIDE 5 MILLIGRAM(S): 5 TABLET ORAL at 16:04

## 2018-10-08 RX ADMIN — MEROPENEM 100 MILLIGRAM(S): 1 INJECTION INTRAVENOUS at 05:01

## 2018-10-08 RX ADMIN — Medication 100 MILLIEQUIVALENT(S): at 05:01

## 2018-10-08 RX ADMIN — Medication 250 MILLIGRAM(S): at 13:33

## 2018-10-08 RX ADMIN — Medication 5 MILLIGRAM(S): at 17:04

## 2018-10-08 RX ADMIN — Medication 2: at 00:52

## 2018-10-08 RX ADMIN — MEGESTROL ACETATE 800 MILLIGRAM(S): 40 SUSPENSION ORAL at 16:06

## 2018-10-08 RX ADMIN — Medication 2: at 06:51

## 2018-10-08 RX ADMIN — Medication 1000 MILLIGRAM(S): at 03:19

## 2018-10-08 RX ADMIN — ONDANSETRON 4 MILLIGRAM(S): 8 TABLET, FILM COATED ORAL at 00:52

## 2018-10-08 RX ADMIN — Medication 250 MILLIGRAM(S): at 10:03

## 2018-10-08 RX ADMIN — Medication 2: at 22:09

## 2018-10-08 RX ADMIN — HEPARIN SODIUM 5000 UNIT(S): 5000 INJECTION INTRAVENOUS; SUBCUTANEOUS at 21:33

## 2018-10-08 RX ADMIN — HEPARIN SODIUM 5000 UNIT(S): 5000 INJECTION INTRAVENOUS; SUBCUTANEOUS at 06:45

## 2018-10-08 RX ADMIN — OXYCODONE HYDROCHLORIDE 5 MILLIGRAM(S): 5 TABLET ORAL at 16:35

## 2018-10-08 RX ADMIN — OXYCODONE HYDROCHLORIDE 5 MILLIGRAM(S): 5 TABLET ORAL at 21:33

## 2018-10-08 RX ADMIN — PANTOPRAZOLE SODIUM 40 MILLIGRAM(S): 20 TABLET, DELAYED RELEASE ORAL at 21:34

## 2018-10-08 RX ADMIN — ONDANSETRON 4 MILLIGRAM(S): 8 TABLET, FILM COATED ORAL at 17:06

## 2018-10-08 RX ADMIN — Medication 62.5 MILLIMOLE(S): at 12:02

## 2018-10-08 RX ADMIN — Medication 100 MILLIEQUIVALENT(S): at 02:59

## 2018-10-08 RX ADMIN — HEPARIN SODIUM 5000 UNIT(S): 5000 INJECTION INTRAVENOUS; SUBCUTANEOUS at 13:33

## 2018-10-08 RX ADMIN — Medication 400 MILLIGRAM(S): at 02:59

## 2018-10-08 RX ADMIN — Medication 5 MILLIGRAM(S): at 11:55

## 2018-10-08 RX ADMIN — MEROPENEM 100 MILLIGRAM(S): 1 INJECTION INTRAVENOUS at 20:45

## 2018-10-08 RX ADMIN — Medication 5 MILLIGRAM(S): at 00:52

## 2018-10-08 NOTE — PROGRESS NOTE ADULT - SUBJECTIVE AND OBJECTIVE BOX
Pain Management Attending Addendum    SUBJECTIVE: Patient doing well with PCEA    Therapy:    [X] PCEA    OBJECTIVE:   [X] Pain appropriately controlled    [ ] Other:    Side Effects:  [X] None	             [ ] Nausea              [ ] Pruritis        [ ] Weakness          [ ] Numbness        	[ ] Other:    ASSESSMENT/PLAN:    [ ] Continue current therapy    [X] Therapy changed to:    [X] PRN Analgesics   [ ] IV PCA    Comments:  Waiting for repeat coags. if normal plan to remove the epidural catheter.

## 2018-10-08 NOTE — PROGRESS NOTE ADULT - SUBJECTIVE AND OBJECTIVE BOX
Day 5 of Anesthesia Pain Management Service    SUBJECTIVE: Patient doing well with PCEA    Pain Scale Score:   Refer to charted pain scores    THERAPY:  [X] Epidural Bupivacaine 0.0625% and Hydromorphone         [X] 10 micrograms/mL 	[ ] 5 micrograms/mL  [ ] Epidural Ropivacaine 0.1% plain – 1 mg/mL    Demand dose: 3 mL  Lockout: 15 minutes  Continuous Rate: 8 mL/hr    MEDICATIONS  (STANDING):  dextrose 5% + sodium chloride 0.45% with potassium chloride 20 mEq/L 1000 milliLiter(s) (100 mL/Hr) IV Continuous <Continuous>  erythromycin     base Tablet 250 milliGRAM(s) Oral every 8 hours  heparin  Injectable 5000 Unit(s) SubCutaneous every 8 hours  HYDROmorphone (10 MICROgram(s)/mL) + BUpivacaine 0.0625% in 0.9% Sodium Chloride PCEA 250 milliLiter(s) Epidural PCA Continuous  influenza   Vaccine 0.5 milliLiter(s) IntraMuscular once  insulin lispro (HumaLOG) corrective regimen sliding scale   SubCutaneous Before meals and at bedtime  megestrol Suspension 800 milliGRAM(s) Oral daily  meropenem  IVPB      meropenem  IVPB 1000 milliGRAM(s) IV Intermittent every 8 hours  metoprolol tartrate Injectable 5 milliGRAM(s) IV Push every 6 hours  ondansetron Injectable 4 milliGRAM(s) IV Push every 6 hours  pantoprazole  Injectable 40 milliGRAM(s) IV Push every 24 hours  potassium chloride  10 mEq/100 mL IVPB 10 milliEquivalent(s) IV Intermittent once  sodium phosphate IVPB 15 milliMole(s) IV Intermittent once  vancomycin  IVPB 1000 milliGRAM(s) IV Intermittent every 12 hours    MEDICATIONS  (PRN):  diphenhydrAMINE   Injectable 25 milliGRAM(s) IV Push every 6 hours PRN Rash and/or Itching  HYDROmorphone (10 MICROgram(s)/mL) + BUpivacaine 0.0625% in 0.9% Sodium Chloride PCEA Rescue Clinician  Bolus 3 milliLiter(s) Epidural every 15 minutes PRN for Pain Score greater than 6  naloxone Injectable 0.1 milliGRAM(s) IV Push every 3 minutes PRN For ANY of the following changes in patient status:  A. RR LESS THAN 10 breaths per minute, B. Oxygen saturation LESS THAN 90%, C. Sedation score of 6      OBJECTIVE:    Assessment of Catheter Site:    [X] Epidural 	  [X] Dressing intact	[X] Site non-tender	[X] Site without erythema, discharge, edema  [X] Epidural tubing and connection checked	[X] Gross neurological exam within normal limits  [ ] Catheter removed – tip intact		[X] Afebrile	            [ ] Febrile: ___    PT/INR - ( 08 Oct 2018 09:13 )   PT: 16.2 sec;   INR: 1.42 ratio         PTT - ( 08 Oct 2018 09:13 )  PTT:32.5 sec                      8.1    9.1   )-----------( 242      ( 07 Oct 2018 22:35 )             24.5     Vital Signs Last 24 Hrs  T(C): 36.6 (10-08-18 @ 09:13), Max: 37.2 (10-07-18 @ 22:29)  T(F): 97.9 (10-08-18 @ 09:13), Max: 98.9 (10-07-18 @ 22:29)  HR: 104 (10-08-18 @ 09:13) (69 - 104)  BP: 164/86 (10-08-18 @ 09:13) (126/76 - 164/86)  BP(mean): --  RR: 18 (10-08-18 @ 09:13) (18 - 18)  SpO2: 98% (10-08-18 @ 09:13) (95% - 100%)      Sedation Score:	[X] Alert	[ ] Drowsy	[ ] Arousable  [ ] Asleep     [ ] Unresponsive    Side Effects:	[ ] None	[ ] Nausea	[ ] Vomiting   [ ] Pruritus  		[ ] Weakness     [ ] Numbness	[X ] Other: visual hallucinations    ASSESSMENT/ PLAN:    Therapy:	[  ] Continue   [X ] Discontinue   [X ] Change to PRN Analgesics   [ ] Change to PCA    Documentation and Verification of current medications:  [X] Done	[ ] Not done, not eligible, reason:    COMMENTS: Patient with visual hallucinations as per wife. Will D\C PCEA and change to PRN analgesics Day 5 of Anesthesia Pain Management Service    SUBJECTIVE: Patient doing well with PCEA    Pain Scale Score:   Refer to charted pain scores    THERAPY:  [X] Epidural Bupivacaine 0.0625% and Hydromorphone         [X] 10 micrograms/mL 	[ ] 5 micrograms/mL  [ ] Epidural Ropivacaine 0.1% plain – 1 mg/mL    Demand dose: 3 mL  Lockout: 15 minutes  Continuous Rate: 8 mL/hr    MEDICATIONS  (STANDING):  dextrose 5% + sodium chloride 0.45% with potassium chloride 20 mEq/L 1000 milliLiter(s) (100 mL/Hr) IV Continuous <Continuous>  erythromycin     base Tablet 250 milliGRAM(s) Oral every 8 hours  heparin  Injectable 5000 Unit(s) SubCutaneous every 8 hours  HYDROmorphone (10 MICROgram(s)/mL) + BUpivacaine 0.0625% in 0.9% Sodium Chloride PCEA 250 milliLiter(s) Epidural PCA Continuous  influenza   Vaccine 0.5 milliLiter(s) IntraMuscular once  insulin lispro (HumaLOG) corrective regimen sliding scale   SubCutaneous Before meals and at bedtime  megestrol Suspension 800 milliGRAM(s) Oral daily  meropenem  IVPB      meropenem  IVPB 1000 milliGRAM(s) IV Intermittent every 8 hours  metoprolol tartrate Injectable 5 milliGRAM(s) IV Push every 6 hours  ondansetron Injectable 4 milliGRAM(s) IV Push every 6 hours  pantoprazole  Injectable 40 milliGRAM(s) IV Push every 24 hours  potassium chloride  10 mEq/100 mL IVPB 10 milliEquivalent(s) IV Intermittent once  sodium phosphate IVPB 15 milliMole(s) IV Intermittent once  vancomycin  IVPB 1000 milliGRAM(s) IV Intermittent every 12 hours    MEDICATIONS  (PRN):  diphenhydrAMINE   Injectable 25 milliGRAM(s) IV Push every 6 hours PRN Rash and/or Itching  HYDROmorphone (10 MICROgram(s)/mL) + BUpivacaine 0.0625% in 0.9% Sodium Chloride PCEA Rescue Clinician  Bolus 3 milliLiter(s) Epidural every 15 minutes PRN for Pain Score greater than 6  naloxone Injectable 0.1 milliGRAM(s) IV Push every 3 minutes PRN For ANY of the following changes in patient status:  A. RR LESS THAN 10 breaths per minute, B. Oxygen saturation LESS THAN 90%, C. Sedation score of 6      OBJECTIVE:    Assessment of Catheter Site:    [X] Epidural 	  [X] Dressing intact	[X] Site non-tender	[X] Site without erythema, discharge, edema  [X] Epidural tubing and connection checked	[X] Gross neurological exam within normal limits  [ ] Catheter removed – tip intact		[X] Afebrile	            [ ] Febrile: ___    PT/INR - ( 08 Oct 2018 09:13 )   PT: 16.2 sec;   INR: 1.42 ratio         PTT - ( 08 Oct 2018 09:13 )  PTT:32.5 sec                      8.1    9.1   )-----------( 242      ( 07 Oct 2018 22:35 )             24.5     Vital Signs Last 24 Hrs  T(C): 36.6 (10-08-18 @ 09:13), Max: 37.2 (10-07-18 @ 22:29)  T(F): 97.9 (10-08-18 @ 09:13), Max: 98.9 (10-07-18 @ 22:29)  HR: 104 (10-08-18 @ 09:13) (69 - 104)  BP: 164/86 (10-08-18 @ 09:13) (126/76 - 164/86)  BP(mean): --  RR: 18 (10-08-18 @ 09:13) (18 - 18)  SpO2: 98% (10-08-18 @ 09:13) (95% - 100%)      Sedation Score:	[X] Alert	[ ] Drowsy	[ ] Arousable  [ ] Asleep     [ ] Unresponsive    Side Effects:	[ ] None	[ ] Nausea	[ ] Vomiting   [ ] Pruritus  		[ ] Weakness     [ ] Numbness	[X ] Other: visual hallucinations    ASSESSMENT/ PLAN:    Therapy:	[  ] Continue   [X ] Discontinue   [X ] Change to PRN Analgesics   [ ] Change to PCA    Documentation and Verification of current medications:  [X] Done	[ ] Not done, not eligible, reason:    COMMENTS: Patient with visual hallucinations as per wife. As per discussion with Dr Terence meléndez D\C PCEA and change to PRN analgesics.

## 2018-10-08 NOTE — PROGRESS NOTE ADULT - SUBJECTIVE AND OBJECTIVE BOX
Surgery Progress Note     Subjective/24hour Events:   Patient seen and examined.   No acute events overnight.   Pain well controlled.   Has been OOB.   Passing flatus, no BM.      Vital Signs:  Vital Signs Last 24 Hrs  T(C): 36.5 (08 Oct 2018 06:30), Max: 37.2 (07 Oct 2018 22:29)  T(F): 97.7 (08 Oct 2018 06:30), Max: 98.9 (07 Oct 2018 22:29)  HR: 103 (08 Oct 2018 06:30) (69 - 106)  BP: 160/79 (08 Oct 2018 06:30) (126/76 - 160/79)  BP(mean): --  RR: 18 (08 Oct 2018 06:30) (18 - 18)  SpO2: 95% (08 Oct 2018 06:30) (95% - 100%)    CAPILLARY BLOOD GLUCOSE  165 (07 Oct 2018 17:48)  165 (07 Oct 2018 12:05)      POCT Blood Glucose.: 189 mg/dL (08 Oct 2018 06:49)  POCT Blood Glucose.: 178 mg/dL (08 Oct 2018 00:48)  POCT Blood Glucose.: 165 mg/dL (07 Oct 2018 17:48)  POCT Blood Glucose.: 165 mg/dL (07 Oct 2018 12:04)      I&O's Detail    07 Oct 2018 07:01  -  08 Oct 2018 07:00  --------------------------------------------------------  IN:    Albumin 25%: 50 mL    dextrose 5% + sodium chloride 0.45% with potassium chloride 20 mEq/L: 2400 mL    Solution: 200 mL    Solution: 500 mL    Solution: 200 mL    Solution: 100 mL    Solution: 150 mL    Solution: 200 mL  Total IN: 3800 mL    OUT:    Bulb: 20 mL    Bulb: 45 mL    Voided: 2275 mL  Total OUT: 2340 mL    Total NET: 1460 mL          MEDICATIONS  (STANDING):  dextrose 5% + sodium chloride 0.45% with potassium chloride 20 mEq/L 1000 milliLiter(s) (100 mL/Hr) IV Continuous <Continuous>  erythromycin     base Tablet 250 milliGRAM(s) Oral every 8 hours  heparin  Injectable 5000 Unit(s) SubCutaneous every 8 hours  HYDROmorphone (10 MICROgram(s)/mL) + BUpivacaine 0.0625% in 0.9% Sodium Chloride PCEA 250 milliLiter(s) Epidural PCA Continuous  influenza   Vaccine 0.5 milliLiter(s) IntraMuscular once  insulin lispro (HumaLOG) corrective regimen sliding scale   SubCutaneous Before meals and at bedtime  megestrol Suspension 800 milliGRAM(s) Oral daily  meropenem  IVPB      meropenem  IVPB 1000 milliGRAM(s) IV Intermittent every 8 hours  metoprolol tartrate Injectable 5 milliGRAM(s) IV Push every 6 hours  ondansetron Injectable 4 milliGRAM(s) IV Push every 6 hours  pantoprazole  Injectable 40 milliGRAM(s) IV Push every 24 hours  potassium chloride  10 mEq/100 mL IVPB 10 milliEquivalent(s) IV Intermittent once  sodium phosphate IVPB 15 milliMole(s) IV Intermittent once  vancomycin  IVPB 1000 milliGRAM(s) IV Intermittent every 12 hours    MEDICATIONS  (PRN):  diphenhydrAMINE   Injectable 25 milliGRAM(s) IV Push every 6 hours PRN Rash and/or Itching  HYDROmorphone (10 MICROgram(s)/mL) + BUpivacaine 0.0625% in 0.9% Sodium Chloride PCEA Rescue Clinician  Bolus 3 milliLiter(s) Epidural every 15 minutes PRN for Pain Score greater than 6  naloxone Injectable 0.1 milliGRAM(s) IV Push every 3 minutes PRN For ANY of the following changes in patient status:  A. RR LESS THAN 10 breaths per minute, B. Oxygen saturation LESS THAN 90%, C. Sedation score of 6      Physical Exam:  Gen: Laying in bed, converses easily.   Lungs: Non labored breathing.   Ab: Soft, Appropriately, tender, nondistended. Midline incision with minimal erythema. Drain on R SS with 20cc SS output, L drain with 45cc milky fluid.   Ext: Moves all 4 spontaneously.     Labs:    10-07    135  |  102  |  12  ----------------------------<  194<H>  3.1<L>   |  23  |  1.18    Ca    8.1<L>      07 Oct 2018 22:35  Phos  2.6     10-07  Mg     1.9     10-07    TPro  5.8<L>  /  Alb  2.9<L>  /  TBili  1.6<H>  /  DBili  0.7<H>  /  AST  12  /  ALT  31  /  AlkPhos  128<H>  10-07    LIVER FUNCTIONS - ( 07 Oct 2018 22:35 )  Alb: 2.9 g/dL / Pro: 5.8 g/dL / ALK PHOS: 128 U/L / ALT: 31 U/L / AST: 12 U/L / GGT: x                                 8.1    9.1   )-----------( 242      ( 07 Oct 2018 22:35 )             24.5     PT/INR - ( 07 Oct 2018 09:48 )   PT: 15.1 sec;   INR: 1.39 ratio         PTT - ( 07 Oct 2018 09:48 )  PTT:35.2 sec

## 2018-10-08 NOTE — PROGRESS NOTE ADULT - ASSESSMENT
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     -Pain control with IV tylenol, PCEA. Appreciate recs from acute pain service.   -Trial CLD.   -Will obtain DEMARIO amylase tomorrow.   -IV vancomycin and meropenem.   -Encourage OOB to chair.   -Monitor and replete electrolytes.   -DVT ppx: subq heparin, sequential compression devices.     Blue Team General Surgery [phone]

## 2018-10-09 LAB
ALBUMIN SERPL ELPH-MCNC: 2.9 G/DL — LOW (ref 3.3–5)
ALP SERPL-CCNC: 134 U/L — HIGH (ref 40–120)
ALT FLD-CCNC: 27 U/L — SIGNIFICANT CHANGE UP (ref 10–45)
AMYLASE FLD-CCNC: 106 U/L — SIGNIFICANT CHANGE UP
AMYLASE FLD-CCNC: >7300 U/L — SIGNIFICANT CHANGE UP
AMYLASE P1 CFR SERPL: 7 U/L — LOW (ref 25–125)
ANION GAP SERPL CALC-SCNC: 13 MMOL/L — SIGNIFICANT CHANGE UP (ref 5–17)
APPEARANCE UR: CLEAR — SIGNIFICANT CHANGE UP
AST SERPL-CCNC: 20 U/L — SIGNIFICANT CHANGE UP (ref 10–40)
BILIRUB DIRECT SERPL-MCNC: 0.5 MG/DL — HIGH (ref 0–0.2)
BILIRUB INDIRECT FLD-MCNC: 0.6 MG/DL — SIGNIFICANT CHANGE UP (ref 0.2–1)
BILIRUB SERPL-MCNC: 1.1 MG/DL — SIGNIFICANT CHANGE UP (ref 0.2–1.2)
BILIRUB UR-MCNC: NEGATIVE — SIGNIFICANT CHANGE UP
BUN SERPL-MCNC: 10 MG/DL — SIGNIFICANT CHANGE UP (ref 7–23)
CALCIUM SERPL-MCNC: 8.4 MG/DL — SIGNIFICANT CHANGE UP (ref 8.4–10.5)
CHLORIDE SERPL-SCNC: 104 MMOL/L — SIGNIFICANT CHANGE UP (ref 96–108)
CO2 SERPL-SCNC: 23 MMOL/L — SIGNIFICANT CHANGE UP (ref 22–31)
COLOR SPEC: YELLOW — SIGNIFICANT CHANGE UP
CREAT SERPL-MCNC: 1.33 MG/DL — HIGH (ref 0.5–1.3)
DIFF PNL FLD: NEGATIVE — SIGNIFICANT CHANGE UP
GLUCOSE SERPL-MCNC: 175 MG/DL — HIGH (ref 70–99)
GLUCOSE UR QL: NEGATIVE MG/DL — SIGNIFICANT CHANGE UP
HCT VFR BLD CALC: 25.2 % — LOW (ref 39–50)
HGB BLD-MCNC: 8.6 G/DL — LOW (ref 13–17)
KETONES UR-MCNC: NEGATIVE — SIGNIFICANT CHANGE UP
LEUKOCYTE ESTERASE UR-ACNC: NEGATIVE — SIGNIFICANT CHANGE UP
MAGNESIUM SERPL-MCNC: 2 MG/DL — SIGNIFICANT CHANGE UP (ref 1.6–2.6)
MCHC RBC-ENTMCNC: 30.9 PG — SIGNIFICANT CHANGE UP (ref 27–34)
MCHC RBC-ENTMCNC: 33.9 GM/DL — SIGNIFICANT CHANGE UP (ref 32–36)
MCV RBC AUTO: 91 FL — SIGNIFICANT CHANGE UP (ref 80–100)
NITRITE UR-MCNC: NEGATIVE — SIGNIFICANT CHANGE UP
PH UR: 6.5 — SIGNIFICANT CHANGE UP (ref 5–8)
PHOSPHATE SERPL-MCNC: 3.1 MG/DL — SIGNIFICANT CHANGE UP (ref 2.5–4.5)
PLATELET # BLD AUTO: 343 K/UL — SIGNIFICANT CHANGE UP (ref 150–400)
POTASSIUM SERPL-MCNC: 3.5 MMOL/L — SIGNIFICANT CHANGE UP (ref 3.5–5.3)
POTASSIUM SERPL-SCNC: 3.5 MMOL/L — SIGNIFICANT CHANGE UP (ref 3.5–5.3)
PROT SERPL-MCNC: 5.9 G/DL — LOW (ref 6–8.3)
PROT UR-MCNC: 30 MG/DL
RBC # BLD: 2.77 M/UL — LOW (ref 4.2–5.8)
RBC # FLD: 12.4 % — SIGNIFICANT CHANGE UP (ref 10.3–14.5)
SODIUM SERPL-SCNC: 140 MMOL/L — SIGNIFICANT CHANGE UP (ref 135–145)
SP GR SPEC: 1.01 — SIGNIFICANT CHANGE UP (ref 1.01–1.02)
SPECIMEN SOURCE FLD: SIGNIFICANT CHANGE UP
UROBILINOGEN FLD QL: NEGATIVE MG/DL — SIGNIFICANT CHANGE UP
WBC # BLD: 7.9 K/UL — SIGNIFICANT CHANGE UP (ref 3.8–10.5)
WBC # FLD AUTO: 7.9 K/UL — SIGNIFICANT CHANGE UP (ref 3.8–10.5)

## 2018-10-09 RX ORDER — INSULIN LISPRO 100/ML
VIAL (ML) SUBCUTANEOUS AT BEDTIME
Qty: 0 | Refills: 0 | Status: DISCONTINUED | OUTPATIENT
Start: 2018-10-09 | End: 2018-10-09

## 2018-10-09 RX ORDER — MAGNESIUM SULFATE 500 MG/ML
2 VIAL (ML) INJECTION ONCE
Qty: 0 | Refills: 0 | Status: COMPLETED | OUTPATIENT
Start: 2018-10-09 | End: 2018-10-09

## 2018-10-09 RX ORDER — INSULIN LISPRO 100/ML
VIAL (ML) SUBCUTANEOUS AT BEDTIME
Qty: 0 | Refills: 0 | Status: DISCONTINUED | OUTPATIENT
Start: 2018-10-09 | End: 2018-10-12

## 2018-10-09 RX ORDER — INSULIN LISPRO 100/ML
VIAL (ML) SUBCUTANEOUS
Qty: 0 | Refills: 0 | Status: DISCONTINUED | OUTPATIENT
Start: 2018-10-09 | End: 2018-10-12

## 2018-10-09 RX ORDER — DEXTROSE 50 % IN WATER 50 %
12.5 SYRINGE (ML) INTRAVENOUS ONCE
Qty: 0 | Refills: 0 | Status: DISCONTINUED | OUTPATIENT
Start: 2018-10-09 | End: 2018-10-12

## 2018-10-09 RX ORDER — METOPROLOL TARTRATE 50 MG
50 TABLET ORAL
Qty: 0 | Refills: 0 | Status: DISCONTINUED | OUTPATIENT
Start: 2018-10-09 | End: 2018-10-10

## 2018-10-09 RX ORDER — GLUCAGON INJECTION, SOLUTION 0.5 MG/.1ML
1 INJECTION, SOLUTION SUBCUTANEOUS ONCE
Qty: 0 | Refills: 0 | Status: DISCONTINUED | OUTPATIENT
Start: 2018-10-09 | End: 2018-10-12

## 2018-10-09 RX ORDER — AMLODIPINE BESYLATE 2.5 MG/1
5 TABLET ORAL DAILY
Qty: 0 | Refills: 0 | Status: DISCONTINUED | OUTPATIENT
Start: 2018-10-09 | End: 2018-10-10

## 2018-10-09 RX ORDER — DEXTROSE 50 % IN WATER 50 %
12.5 SYRINGE (ML) INTRAVENOUS ONCE
Qty: 0 | Refills: 0 | Status: DISCONTINUED | OUTPATIENT
Start: 2018-10-09 | End: 2018-10-09

## 2018-10-09 RX ORDER — INSULIN LISPRO 100/ML
VIAL (ML) SUBCUTANEOUS
Qty: 0 | Refills: 0 | Status: DISCONTINUED | OUTPATIENT
Start: 2018-10-09 | End: 2018-10-09

## 2018-10-09 RX ORDER — ACETAMINOPHEN 500 MG
650 TABLET ORAL EVERY 6 HOURS
Qty: 0 | Refills: 0 | Status: DISCONTINUED | OUTPATIENT
Start: 2018-10-09 | End: 2018-10-12

## 2018-10-09 RX ORDER — APIXABAN 2.5 MG/1
2.5 TABLET, FILM COATED ORAL EVERY 12 HOURS
Qty: 0 | Refills: 0 | Status: DISCONTINUED | OUTPATIENT
Start: 2018-10-09 | End: 2018-10-12

## 2018-10-09 RX ORDER — DEXTROSE 50 % IN WATER 50 %
25 SYRINGE (ML) INTRAVENOUS ONCE
Qty: 0 | Refills: 0 | Status: DISCONTINUED | OUTPATIENT
Start: 2018-10-09 | End: 2018-10-12

## 2018-10-09 RX ORDER — DEXTROSE 50 % IN WATER 50 %
15 SYRINGE (ML) INTRAVENOUS ONCE
Qty: 0 | Refills: 0 | Status: DISCONTINUED | OUTPATIENT
Start: 2018-10-09 | End: 2018-10-09

## 2018-10-09 RX ORDER — POTASSIUM PHOSPHATE, MONOBASIC POTASSIUM PHOSPHATE, DIBASIC 236; 224 MG/ML; MG/ML
30 INJECTION, SOLUTION INTRAVENOUS ONCE
Qty: 0 | Refills: 0 | Status: COMPLETED | OUTPATIENT
Start: 2018-10-09 | End: 2018-10-09

## 2018-10-09 RX ORDER — SODIUM CHLORIDE 9 MG/ML
1000 INJECTION, SOLUTION INTRAVENOUS
Qty: 0 | Refills: 0 | Status: DISCONTINUED | OUTPATIENT
Start: 2018-10-09 | End: 2018-10-09

## 2018-10-09 RX ORDER — GLUCAGON INJECTION, SOLUTION 0.5 MG/.1ML
1 INJECTION, SOLUTION SUBCUTANEOUS ONCE
Qty: 0 | Refills: 0 | Status: DISCONTINUED | OUTPATIENT
Start: 2018-10-09 | End: 2018-10-09

## 2018-10-09 RX ORDER — DEXTROSE 50 % IN WATER 50 %
25 SYRINGE (ML) INTRAVENOUS ONCE
Qty: 0 | Refills: 0 | Status: DISCONTINUED | OUTPATIENT
Start: 2018-10-09 | End: 2018-10-09

## 2018-10-09 RX ORDER — PANTOPRAZOLE SODIUM 20 MG/1
40 TABLET, DELAYED RELEASE ORAL
Qty: 0 | Refills: 0 | Status: DISCONTINUED | OUTPATIENT
Start: 2018-10-09 | End: 2018-10-12

## 2018-10-09 RX ORDER — SODIUM CHLORIDE 9 MG/ML
1000 INJECTION, SOLUTION INTRAVENOUS
Qty: 0 | Refills: 0 | Status: DISCONTINUED | OUTPATIENT
Start: 2018-10-09 | End: 2018-10-12

## 2018-10-09 RX ORDER — DEXTROSE 50 % IN WATER 50 %
15 SYRINGE (ML) INTRAVENOUS ONCE
Qty: 0 | Refills: 0 | Status: DISCONTINUED | OUTPATIENT
Start: 2018-10-09 | End: 2018-10-12

## 2018-10-09 RX ADMIN — Medication 2: at 13:13

## 2018-10-09 RX ADMIN — Medication 4: at 09:09

## 2018-10-09 RX ADMIN — Medication 5 MILLIGRAM(S): at 00:01

## 2018-10-09 RX ADMIN — ONDANSETRON 4 MILLIGRAM(S): 8 TABLET, FILM COATED ORAL at 05:10

## 2018-10-09 RX ADMIN — Medication 650 MILLIGRAM(S): at 22:45

## 2018-10-09 RX ADMIN — Medication 650 MILLIGRAM(S): at 23:15

## 2018-10-09 RX ADMIN — Medication 50 MILLIGRAM(S): at 17:48

## 2018-10-09 RX ADMIN — MEROPENEM 100 MILLIGRAM(S): 1 INJECTION INTRAVENOUS at 03:55

## 2018-10-09 RX ADMIN — MEROPENEM 100 MILLIGRAM(S): 1 INJECTION INTRAVENOUS at 20:12

## 2018-10-09 RX ADMIN — Medication 650 MILLIGRAM(S): at 18:17

## 2018-10-09 RX ADMIN — Medication 250 MILLIGRAM(S): at 22:44

## 2018-10-09 RX ADMIN — HEPARIN SODIUM 5000 UNIT(S): 5000 INJECTION INTRAVENOUS; SUBCUTANEOUS at 13:27

## 2018-10-09 RX ADMIN — POTASSIUM PHOSPHATE, MONOBASIC POTASSIUM PHOSPHATE, DIBASIC 83.33 MILLIMOLE(S): 236; 224 INJECTION, SOLUTION INTRAVENOUS at 08:50

## 2018-10-09 RX ADMIN — Medication 2: at 20:10

## 2018-10-09 RX ADMIN — MEGESTROL ACETATE 800 MILLIGRAM(S): 40 SUSPENSION ORAL at 11:58

## 2018-10-09 RX ADMIN — OXYCODONE HYDROCHLORIDE 10 MILLIGRAM(S): 5 TABLET ORAL at 04:31

## 2018-10-09 RX ADMIN — OXYCODONE HYDROCHLORIDE 10 MILLIGRAM(S): 5 TABLET ORAL at 04:01

## 2018-10-09 RX ADMIN — MEROPENEM 100 MILLIGRAM(S): 1 INJECTION INTRAVENOUS at 11:58

## 2018-10-09 RX ADMIN — Medication 250 MILLIGRAM(S): at 05:11

## 2018-10-09 RX ADMIN — Medication 250 MILLIGRAM(S): at 00:01

## 2018-10-09 RX ADMIN — Medication 650 MILLIGRAM(S): at 17:47

## 2018-10-09 RX ADMIN — Medication 5 MILLIGRAM(S): at 05:11

## 2018-10-09 RX ADMIN — DEXTROSE MONOHYDRATE, SODIUM CHLORIDE, AND POTASSIUM CHLORIDE 100 MILLILITER(S): 50; .745; 4.5 INJECTION, SOLUTION INTRAVENOUS at 11:57

## 2018-10-09 RX ADMIN — ONDANSETRON 4 MILLIGRAM(S): 8 TABLET, FILM COATED ORAL at 00:01

## 2018-10-09 RX ADMIN — Medication 250 MILLIGRAM(S): at 13:27

## 2018-10-09 RX ADMIN — Medication 50 GRAM(S): at 05:22

## 2018-10-09 RX ADMIN — Medication 250 MILLIGRAM(S): at 13:13

## 2018-10-09 RX ADMIN — PANTOPRAZOLE SODIUM 40 MILLIGRAM(S): 20 TABLET, DELAYED RELEASE ORAL at 13:27

## 2018-10-09 RX ADMIN — APIXABAN 2.5 MILLIGRAM(S): 2.5 TABLET, FILM COATED ORAL at 17:48

## 2018-10-09 RX ADMIN — AMLODIPINE BESYLATE 5 MILLIGRAM(S): 2.5 TABLET ORAL at 13:27

## 2018-10-09 RX ADMIN — HEPARIN SODIUM 5000 UNIT(S): 5000 INJECTION INTRAVENOUS; SUBCUTANEOUS at 05:11

## 2018-10-09 NOTE — DIETITIAN INITIAL EVALUATION ADULT. - ENERGY NEEDS
Ht: 72 Wt: 208 pounds BMI: 28.2 kg/m2 IBW: 178 pounds(+/-10%)   +1 bilateral ankle and foot edema.  No pressure ulcers documented.

## 2018-10-09 NOTE — PROGRESS NOTE ADULT - ASSESSMENT
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     -Pain control with PO pain meds.   -CLD, ADAT.   -Obtaining DEMARIO amylases.    -IV vancomycin and meropenem until 10/11.  -Encourage OOB to chair.   -Monitor and replete electrolytes.   -DVT ppx: subq heparin, sequential compression devices.     Blue Team General Surgery [phone]

## 2018-10-09 NOTE — PROVIDER CONTACT NOTE (OTHER) - SITUATION
Pt's Vanco trough 18.5, pt ordered for 1000mg Vanco q12, contacting MD to see if dose should continue to be given as ordered.

## 2018-10-09 NOTE — PROGRESS NOTE ADULT - SUBJECTIVE AND OBJECTIVE BOX
Surgery Progress Note     Subjective/24hour Events:   Patient seen and examined.   No acute events overnight.   Some pain off PCEA. Did not ask for pain meds.   Has been OOB.   Passing flatus, no BM.    Vital Signs:  Vital Signs Last 24 Hrs  T(C): 37 (09 Oct 2018 09:21), Max: 37 (09 Oct 2018 09:21)  T(F): 98.6 (09 Oct 2018 09:21), Max: 98.6 (09 Oct 2018 09:21)  HR: 88 (09 Oct 2018 09:21) (88 - 105)  BP: 146/83 (09 Oct 2018 09:21) (136/79 - 166/96)  BP(mean): --  RR: 18 (09 Oct 2018 09:21) (18 - 18)  SpO2: 98% (09 Oct 2018 09:21) (96% - 98%)    CAPILLARY BLOOD GLUCOSE  210 (09 Oct 2018 08:46)      POCT Blood Glucose.: 210 mg/dL (09 Oct 2018 08:45)  POCT Blood Glucose.: 175 mg/dL (08 Oct 2018 21:40)  POCT Blood Glucose.: 214 mg/dL (08 Oct 2018 18:19)  POCT Blood Glucose.: 195 mg/dL (08 Oct 2018 13:04)      I&O's Detail    08 Oct 2018 07:01  -  09 Oct 2018 07:00  --------------------------------------------------------  IN:    dextrose 5% + sodium chloride 0.45% with potassium chloride 20 mEq/L: 2400 mL    Oral Fluid: 600 mL    Solution: 50 mL    Solution: 1200 mL    Solution: 100 mL    Solution: 150 mL    Solution: 500 mL  Total IN: 5000 mL    OUT:    Bulb: 15 mL    Bulb: 5 mL    Voided: 3120 mL  Total OUT: 3140 mL    Total NET: 1860 mL      09 Oct 2018 07:01  -  09 Oct 2018 10:11  --------------------------------------------------------  IN:    Oral Fluid: 320 mL  Total IN: 320 mL    OUT:    Bulb: 10 mL    Bulb: 5 mL    Voided: 950 mL  Total OUT: 965 mL    Total NET: -645 mL          MEDICATIONS  (STANDING):  amLODIPine   Tablet 5 milliGRAM(s) Oral daily  dextrose 5% + sodium chloride 0.45% with potassium chloride 20 mEq/L 1000 milliLiter(s) (100 mL/Hr) IV Continuous <Continuous>  erythromycin     base Tablet 250 milliGRAM(s) Oral every 8 hours  heparin  Injectable 5000 Unit(s) SubCutaneous every 8 hours  influenza   Vaccine 0.5 milliLiter(s) IntraMuscular once  insulin lispro (HumaLOG) corrective regimen sliding scale   SubCutaneous Before meals and at bedtime  megestrol Suspension 800 milliGRAM(s) Oral daily  meropenem  IVPB      meropenem  IVPB 1000 milliGRAM(s) IV Intermittent every 8 hours  metoprolol tartrate 50 milliGRAM(s) Oral two times a day  ondansetron Injectable 4 milliGRAM(s) IV Push every 6 hours  pantoprazole    Tablet 40 milliGRAM(s) Oral before breakfast  vancomycin  IVPB 1000 milliGRAM(s) IV Intermittent every 12 hours    MEDICATIONS  (PRN):  diphenhydrAMINE   Injectable 25 milliGRAM(s) IV Push every 6 hours PRN Rash and/or Itching  HYDROmorphone  Injectable 0.25 milliGRAM(s) IV Push every 4 hours PRN breakthrough pain  oxyCODONE    IR 5 milliGRAM(s) Oral every 4 hours PRN Moderate Pain (4 - 6)  oxyCODONE    IR 10 milliGRAM(s) Oral every 4 hours PRN Severe Pain (7 - 10)      Physical Exam:  Gen: Laying in bed, converses easily.   Lungs: Non labored breathing.   Ab: Soft, Appropriately, tender, nondistended. Drain on R SS with 20cc SS output, L drain with 45cc milky fluid.   Ext: Moves all 4 spontaneously.     Labs:    10-08    134<L>  |  102  |  11  ----------------------------<  164<H>  3.3<L>   |  23  |  1.29    Ca    8.1<L>      08 Oct 2018 22:19  Phos  2.5     10-08  Mg     1.8     10-08    TPro  6.1  /  Alb  2.9<L>  /  TBili  1.3<H>  /  DBili  0.6<H>  /  AST  14  /  ALT  28  /  AlkPhos  132<H>  10-08    LIVER FUNCTIONS - ( 08 Oct 2018 22:19 )  Alb: 2.9 g/dL / Pro: 6.1 g/dL / ALK PHOS: 132 U/L / ALT: 28 U/L / AST: 14 U/L / GGT: x                                 8.5    10.1  )-----------( 313      ( 08 Oct 2018 22:19 )             24.3     PT/INR - ( 08 Oct 2018 09:13 )   PT: 16.2 sec;   INR: 1.42 ratio         PTT - ( 08 Oct 2018 09:13 )  PTT:32.5 sec

## 2018-10-09 NOTE — DIETITIAN INITIAL EVALUATION ADULT. - OTHER INFO
Pt seen for length of stay. Per chart, pt  s/p 10/3 whipple for bile duct mass. Pt currently on clear liquid diet, reports tolerating. Pt denies any history of chewing/swallowing difficulty or GI distress at this time.  Pt declined to have any nutrition-related questions/concerns at this time. Pt made aware RD remains available.
No

## 2018-10-09 NOTE — DIETITIAN INITIAL EVALUATION ADULT. - NS FNS WEIGHT CHANGE REASON
intentional/Pt reports lowest weight over the summer 182 pounds, per previous RD note (8/30/18) 186.2 pounds , current weight 208 pounds

## 2018-10-10 LAB
ALBUMIN SERPL ELPH-MCNC: 2.8 G/DL — LOW (ref 3.3–5)
ALBUMIN SERPL ELPH-MCNC: 2.8 G/DL — LOW (ref 3.3–5)
ALP SERPL-CCNC: 129 U/L — HIGH (ref 40–120)
ALP SERPL-CCNC: 131 U/L — HIGH (ref 40–120)
ALT FLD-CCNC: 25 U/L — SIGNIFICANT CHANGE UP (ref 10–45)
ALT FLD-CCNC: 26 U/L — SIGNIFICANT CHANGE UP (ref 10–45)
ANION GAP SERPL CALC-SCNC: 13 MMOL/L — SIGNIFICANT CHANGE UP (ref 5–17)
ANION GAP SERPL CALC-SCNC: 14 MMOL/L — SIGNIFICANT CHANGE UP (ref 5–17)
APTT BLD: 24.9 SEC — LOW (ref 27.5–37.4)
AST SERPL-CCNC: 18 U/L — SIGNIFICANT CHANGE UP (ref 10–40)
AST SERPL-CCNC: 19 U/L — SIGNIFICANT CHANGE UP (ref 10–40)
BILIRUB DIRECT SERPL-MCNC: 0.5 MG/DL — HIGH (ref 0–0.2)
BILIRUB INDIRECT FLD-MCNC: 0.6 MG/DL — SIGNIFICANT CHANGE UP (ref 0.2–1)
BILIRUB SERPL-MCNC: 1 MG/DL — SIGNIFICANT CHANGE UP (ref 0.2–1.2)
BILIRUB SERPL-MCNC: 1.1 MG/DL — SIGNIFICANT CHANGE UP (ref 0.2–1.2)
BUN SERPL-MCNC: 10 MG/DL — SIGNIFICANT CHANGE UP (ref 7–23)
BUN SERPL-MCNC: 11 MG/DL — SIGNIFICANT CHANGE UP (ref 7–23)
CALCIUM SERPL-MCNC: 8.3 MG/DL — LOW (ref 8.4–10.5)
CALCIUM SERPL-MCNC: 8.7 MG/DL — SIGNIFICANT CHANGE UP (ref 8.4–10.5)
CHLORIDE SERPL-SCNC: 101 MMOL/L — SIGNIFICANT CHANGE UP (ref 96–108)
CHLORIDE SERPL-SCNC: 104 MMOL/L — SIGNIFICANT CHANGE UP (ref 96–108)
CK MB CFR SERPL CALC: 1.1 NG/ML — SIGNIFICANT CHANGE UP (ref 0–6.7)
CK SERPL-CCNC: 32 U/L — SIGNIFICANT CHANGE UP (ref 30–200)
CO2 SERPL-SCNC: 22 MMOL/L — SIGNIFICANT CHANGE UP (ref 22–31)
CO2 SERPL-SCNC: 23 MMOL/L — SIGNIFICANT CHANGE UP (ref 22–31)
CREAT SERPL-MCNC: 1.23 MG/DL — SIGNIFICANT CHANGE UP (ref 0.5–1.3)
CREAT SERPL-MCNC: 1.31 MG/DL — HIGH (ref 0.5–1.3)
GAS PNL BLDA: SIGNIFICANT CHANGE UP
GLUCOSE SERPL-MCNC: 160 MG/DL — HIGH (ref 70–99)
GLUCOSE SERPL-MCNC: 194 MG/DL — HIGH (ref 70–99)
HCT VFR BLD CALC: 23.3 % — LOW (ref 39–50)
HCT VFR BLD CALC: 25.1 % — LOW (ref 39–50)
HGB BLD-MCNC: 8 G/DL — LOW (ref 13–17)
HGB BLD-MCNC: 8.7 G/DL — LOW (ref 13–17)
INR BLD: 1.5 RATIO — HIGH (ref 0.88–1.16)
MAGNESIUM SERPL-MCNC: 1.7 MG/DL — SIGNIFICANT CHANGE UP (ref 1.6–2.6)
MCHC RBC-ENTMCNC: 31.3 PG — SIGNIFICANT CHANGE UP (ref 27–34)
MCHC RBC-ENTMCNC: 31.7 PG — SIGNIFICANT CHANGE UP (ref 27–34)
MCHC RBC-ENTMCNC: 34.4 GM/DL — SIGNIFICANT CHANGE UP (ref 32–36)
MCHC RBC-ENTMCNC: 34.8 GM/DL — SIGNIFICANT CHANGE UP (ref 32–36)
MCV RBC AUTO: 91 FL — SIGNIFICANT CHANGE UP (ref 80–100)
MCV RBC AUTO: 91.1 FL — SIGNIFICANT CHANGE UP (ref 80–100)
PHOSPHATE SERPL-MCNC: 2.9 MG/DL — SIGNIFICANT CHANGE UP (ref 2.5–4.5)
PLATELET # BLD AUTO: 390 K/UL — SIGNIFICANT CHANGE UP (ref 150–400)
PLATELET # BLD AUTO: 405 K/UL — HIGH (ref 150–400)
POTASSIUM SERPL-MCNC: 3.6 MMOL/L — SIGNIFICANT CHANGE UP (ref 3.5–5.3)
POTASSIUM SERPL-MCNC: 3.7 MMOL/L — SIGNIFICANT CHANGE UP (ref 3.5–5.3)
POTASSIUM SERPL-SCNC: 3.6 MMOL/L — SIGNIFICANT CHANGE UP (ref 3.5–5.3)
POTASSIUM SERPL-SCNC: 3.7 MMOL/L — SIGNIFICANT CHANGE UP (ref 3.5–5.3)
PROT SERPL-MCNC: 5.7 G/DL — LOW (ref 6–8.3)
PROT SERPL-MCNC: 6.1 G/DL — SIGNIFICANT CHANGE UP (ref 6–8.3)
PROTHROM AB SERPL-ACNC: 16.5 SEC — HIGH (ref 9.8–12.7)
RBC # BLD: 2.56 M/UL — LOW (ref 4.2–5.8)
RBC # BLD: 2.76 M/UL — LOW (ref 4.2–5.8)
RBC # FLD: 11.9 % — SIGNIFICANT CHANGE UP (ref 10.3–14.5)
RBC # FLD: 12 % — SIGNIFICANT CHANGE UP (ref 10.3–14.5)
SODIUM SERPL-SCNC: 138 MMOL/L — SIGNIFICANT CHANGE UP (ref 135–145)
SODIUM SERPL-SCNC: 139 MMOL/L — SIGNIFICANT CHANGE UP (ref 135–145)
TROPONIN T, HIGH SENSITIVITY RESULT: 34 NG/L — SIGNIFICANT CHANGE UP (ref 0–51)
WBC # BLD: 6.2 K/UL — SIGNIFICANT CHANGE UP (ref 3.8–10.5)
WBC # BLD: 8.4 K/UL — SIGNIFICANT CHANGE UP (ref 3.8–10.5)
WBC # FLD AUTO: 6.2 K/UL — SIGNIFICANT CHANGE UP (ref 3.8–10.5)
WBC # FLD AUTO: 8.4 K/UL — SIGNIFICANT CHANGE UP (ref 3.8–10.5)

## 2018-10-10 PROCEDURE — 99222 1ST HOSP IP/OBS MODERATE 55: CPT | Mod: GC

## 2018-10-10 PROCEDURE — 93010 ELECTROCARDIOGRAM REPORT: CPT

## 2018-10-10 RX ORDER — AMLODIPINE BESYLATE 2.5 MG/1
5 TABLET ORAL DAILY
Qty: 0 | Refills: 0 | Status: DISCONTINUED | OUTPATIENT
Start: 2018-10-10 | End: 2018-10-12

## 2018-10-10 RX ORDER — METOPROLOL TARTRATE 50 MG
50 TABLET ORAL AT BEDTIME
Qty: 0 | Refills: 0 | Status: DISCONTINUED | OUTPATIENT
Start: 2018-10-10 | End: 2018-10-12

## 2018-10-10 RX ORDER — METOPROLOL TARTRATE 50 MG
75 TABLET ORAL DAILY
Qty: 0 | Refills: 0 | Status: DISCONTINUED | OUTPATIENT
Start: 2018-10-10 | End: 2018-10-10

## 2018-10-10 RX ORDER — METOPROLOL TARTRATE 50 MG
75 TABLET ORAL DAILY
Qty: 0 | Refills: 0 | Status: DISCONTINUED | OUTPATIENT
Start: 2018-10-10 | End: 2018-10-12

## 2018-10-10 RX ORDER — SODIUM CHLORIDE 9 MG/ML
1000 INJECTION, SOLUTION INTRAVENOUS ONCE
Qty: 0 | Refills: 0 | Status: COMPLETED | OUTPATIENT
Start: 2018-10-10 | End: 2018-10-10

## 2018-10-10 RX ORDER — METOPROLOL TARTRATE 50 MG
25 TABLET ORAL ONCE
Qty: 0 | Refills: 0 | Status: COMPLETED | OUTPATIENT
Start: 2018-10-10 | End: 2018-10-10

## 2018-10-10 RX ORDER — POTASSIUM PHOSPHATE, MONOBASIC POTASSIUM PHOSPHATE, DIBASIC 236; 224 MG/ML; MG/ML
15 INJECTION, SOLUTION INTRAVENOUS ONCE
Qty: 0 | Refills: 0 | Status: COMPLETED | OUTPATIENT
Start: 2018-10-10 | End: 2018-10-10

## 2018-10-10 RX ORDER — METOPROLOL TARTRATE 50 MG
50 TABLET ORAL AT BEDTIME
Qty: 0 | Refills: 0 | Status: DISCONTINUED | OUTPATIENT
Start: 2018-10-10 | End: 2018-10-10

## 2018-10-10 RX ADMIN — Medication 650 MILLIGRAM(S): at 13:04

## 2018-10-10 RX ADMIN — Medication 2: at 17:44

## 2018-10-10 RX ADMIN — Medication 650 MILLIGRAM(S): at 04:48

## 2018-10-10 RX ADMIN — Medication 650 MILLIGRAM(S): at 12:34

## 2018-10-10 RX ADMIN — APIXABAN 2.5 MILLIGRAM(S): 2.5 TABLET, FILM COATED ORAL at 17:46

## 2018-10-10 RX ADMIN — Medication 2: at 08:19

## 2018-10-10 RX ADMIN — Medication 50 MILLIGRAM(S): at 22:45

## 2018-10-10 RX ADMIN — POTASSIUM PHOSPHATE, MONOBASIC POTASSIUM PHOSPHATE, DIBASIC 62.5 MILLIMOLE(S): 236; 224 INJECTION, SOLUTION INTRAVENOUS at 01:54

## 2018-10-10 RX ADMIN — MEROPENEM 100 MILLIGRAM(S): 1 INJECTION INTRAVENOUS at 20:11

## 2018-10-10 RX ADMIN — Medication 4: at 13:04

## 2018-10-10 RX ADMIN — Medication 25 MILLIGRAM(S): at 15:43

## 2018-10-10 RX ADMIN — SODIUM CHLORIDE 3000 MILLILITER(S): 9 INJECTION, SOLUTION INTRAVENOUS at 10:00

## 2018-10-10 RX ADMIN — MEROPENEM 100 MILLIGRAM(S): 1 INJECTION INTRAVENOUS at 04:43

## 2018-10-10 RX ADMIN — Medication 250 MILLIGRAM(S): at 04:48

## 2018-10-10 RX ADMIN — Medication 250 MILLIGRAM(S): at 22:45

## 2018-10-10 RX ADMIN — Medication 250 MILLIGRAM(S): at 13:04

## 2018-10-10 RX ADMIN — Medication 650 MILLIGRAM(S): at 17:45

## 2018-10-10 RX ADMIN — AMLODIPINE BESYLATE 5 MILLIGRAM(S): 2.5 TABLET ORAL at 04:48

## 2018-10-10 RX ADMIN — MEGESTROL ACETATE 800 MILLIGRAM(S): 40 SUSPENSION ORAL at 12:34

## 2018-10-10 RX ADMIN — AMLODIPINE BESYLATE 5 MILLIGRAM(S): 2.5 TABLET ORAL at 17:45

## 2018-10-10 RX ADMIN — PANTOPRAZOLE SODIUM 40 MILLIGRAM(S): 20 TABLET, DELAYED RELEASE ORAL at 04:48

## 2018-10-10 RX ADMIN — Medication 650 MILLIGRAM(S): at 05:18

## 2018-10-10 RX ADMIN — APIXABAN 2.5 MILLIGRAM(S): 2.5 TABLET, FILM COATED ORAL at 04:48

## 2018-10-10 RX ADMIN — Medication 50 MILLIGRAM(S): at 04:48

## 2018-10-10 RX ADMIN — MEROPENEM 100 MILLIGRAM(S): 1 INJECTION INTRAVENOUS at 12:33

## 2018-10-10 NOTE — CONSULT NOTE ADULT - SUBJECTIVE AND OBJECTIVE BOX
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 Surgery asked for evaluation for tachycardia. Pt reports no acute complaints at this time. Feels a little short of breath with exertion. Denies any chest pain, palpitations, dizziness, peripheral edema. Reports his heart rate is fast at home at times. His EP at [facility] recently increased his Metoprolol from 50 BID to 75 AM, 50 PM. Additionally at times he receives calls that his ILR detects a fast heart rate. He never notices when his heart is fast and says it is asymptomatic.      Allergies    adhesives (Hives)  No Known Drug Allergies    Intolerances    	    MEDICATIONS:  amLODIPine   Tablet 5 milliGRAM(s) Oral daily  apixaban 2.5 milliGRAM(s) Oral every 12 hours  metoprolol tartrate 75 milliGRAM(s) Oral daily  metoprolol tartrate 50 milliGRAM(s) Oral at bedtime    erythromycin     base Tablet 250 milliGRAM(s) Oral every 8 hours  meropenem  IVPB      meropenem  IVPB 1000 milliGRAM(s) IV Intermittent every 8 hours    diphenhydrAMINE   Injectable 25 milliGRAM(s) IV Push every 6 hours PRN    acetaminophen   Tablet .. 650 milliGRAM(s) Oral every 6 hours  HYDROmorphone  Injectable 0.25 milliGRAM(s) IV Push every 4 hours PRN  ondansetron Injectable 4 milliGRAM(s) IV Push every 6 hours  oxyCODONE    IR 5 milliGRAM(s) Oral every 4 hours PRN  oxyCODONE    IR 10 milliGRAM(s) Oral every 4 hours PRN    pantoprazole    Tablet 40 milliGRAM(s) Oral before breakfast    dextrose 40% Gel 15 Gram(s) Oral once PRN  dextrose 50% Injectable 12.5 Gram(s) IV Push once  dextrose 50% Injectable 25 Gram(s) IV Push once  dextrose 50% Injectable 25 Gram(s) IV Push once  glucagon  Injectable 1 milliGRAM(s) IntraMuscular once PRN  insulin lispro (HumaLOG) corrective regimen sliding scale   SubCutaneous three times a day before meals  insulin lispro (HumaLOG) corrective regimen sliding scale   SubCutaneous at bedtime  megestrol Suspension 800 milliGRAM(s) Oral daily    dextrose 5%. 1000 milliLiter(s) IV Continuous <Continuous>  influenza   Vaccine 0.5 milliLiter(s) IntraMuscular once      PAST MEDICAL & SURGICAL HISTORY:  Male stress incontinence  Kidney stone: &quot;passed on own&quot;  Varicose vein: (L) 5 years ago    had venous stripping 2007  Bilateral cataracts: removed with lens implants  Benign colon polyp: removed colonoscopy 2014  Prostate cancer: 2010 prostatectomy  stable  Afib: 2006 s/p ablation 2006 , afib resolved   on Eliquies  Hyperlipidemia: X 4 years not on any meds  GERD (Gastroesophageal Reflux Disease): X 10 years  DM (Diabetes Mellitus): X 3 years  Renal Calculi: 2008  MVP (Mitral Valve Prolapse): X 8 years stable  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  Skin: no rashes.  Ophthalmologic: no blurred vision, no loss of vision. 	  ENT: no sore throat, rhinorrhea, sinus congestion.  Respiratory: no SOB, cough or wheeze.  Gastrointestinal:  no N/V/D, no melena/hematemesis/hematochezia.  Genitourinary: no dysuria/hesitancy or hematuria.  Musculoskeletal: no myalgias or arthralgias.  Neurological: no changes in vision or hearing, no lightheadedness/dizziness, no syncope/near syncope	  Psychiatric: no unusual stress/anxiety.   Hematology/Lymphatics: no unusual bleeding, bruising and no lymphadenopathy.  Endocrine: no unusual thirst.   All others negative except as stated above and in HPI.    PHYSICAL EXAM:  T(C): 36.6 (10-10-18 @ 13:35), Max: 36.7 (10-10-18 @ 00:37)  HR: 118 (10-10-18 @ 13:35) (92 - 120)  BP: 124/74 (10-10-18 @ 13:35) (124/74 - 157/88)  RR: 18 (10-10-18 @ 13:35) (18 - 20)  SpO2: 99% (10-10-18 @ 13:35) (97% - 100%)  Wt(kg): --  I&O's Summary    09 Oct 2018 07:01  -  10 Oct 2018 07:00  --------------------------------------------------------  IN: 2950 mL / OUT: 4857 mL / NET: -1907 mL    10 Oct 2018 07:01  -  10 Oct 2018 16:06  --------------------------------------------------------  IN: 1830 mL / OUT: 1880 mL / NET: -50 mL        Appearance: No acute distress	  HEENT:   Normal oral mucosa, PERRL, EOMI	  Lymphatic: No lymphadenopathy  Cardiovascular: Irregular irregular, No JVD, No murmurs, No edema  Respiratory: Lungs clear to auscultation	  Psychiatry: A & O x 3, Mood & affect appropriate  Gastrointestinal:  s/p Whipple  Skin: No rashes, No ecchymoses, No cyanosis	  Neurologic: Non-focal  Extremities: Normal range of motion, No clubbing, cyanosis or edema  Vascular: Peripheral pulses palpable 2+ bilaterally        LABS:	 	    CBC Full  -  ( 10 Oct 2018 15:34 )  WBC Count : 8.4 K/uL  Hemoglobin : 8.0 g/dL  Hematocrit : 23.3 %  Platelet Count - Automated : 390 K/uL  Mean Cell Volume : 91.1 fl  Mean Cell Hemoglobin : 31.3 pg  Mean Cell Hemoglobin Concentration : 34.4 gm/dL  Auto Neutrophil # : x  Auto Lymphocyte # : x  Auto Monocyte # : x  Auto Eosinophil # : x  Auto Basophil # : x  Auto Neutrophil % : x  Auto Lymphocyte % : x  Auto Monocyte % : x  Auto Eosinophil % : x  Auto Basophil % : x    10-09    140  |  104  |  10  ----------------------------<  175<H>  3.5   |  23  |  1.33<H>  10-08    134<L>  |  102  |  11  ----------------------------<  164<H>  3.3<L>   |  23  |  1.29    Ca    8.4      09 Oct 2018 23:11  Ca    8.1<L>      08 Oct 2018 22:19  Phos  3.1     10-09  Phos  2.5     10-08  Mg     2.0     10-09  Mg     1.8     10-08    TPro  5.9<L>  /  Alb  2.9<L>  /  TBili  1.1  /  DBili  0.5<H>  /  AST  20  /  ALT  27  /  AlkPhos  134<H>  10-09  TPro  6.1  /  Alb  2.9<L>  /  TBili  1.3<H>  /  DBili  0.6<H>  /  AST  14  /  ALT  28  /  AlkPhos  132<H>  10-08      proBNP:   Lipid Profile:   HgA1c:   TSH:       CARDIAC MARKERS:            TELEMETRY: 	    ECG:  	A-fib   RADIOLOGY:  OTHER: 	    PREVIOUS DIAGNOSTIC TESTING:    [ ] Echocardiogram: < from: Limited Transthoracic Echo (08.29.18 @ 14:41) >  Left Ventricle: Normal left ventricular systolic function.  EF 60%. Normal left ventricular internal dimensions and  wall thicknesses.  Right Heart: Normal right ventricular size and function.  ------------------------------------------------------------------------  CONCLUSIONS:  Limited study for biventricular function.  1. Normal left ventricular internal dimensions and wall  thicknesses.  2. Normal left ventricular systolic function.  EF 60%.  3. Normal right ventricular size and function.    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 Surgery asked for evaluation for tachycardia. Pt reports no acute complaints at this time. Feels a little short of breath with exertion. Denies any chest pain, palpitations, dizziness, peripheral edema. Reports his heart rate is fast at home at times. His EP at [facility] recently increased his Metoprolol from 50 BID to 75 AM, 50 PM. Additionally at times he receives calls that his ILR detects a fast heart rate. He never notices when his heart is fast and says it is asymptomatic.      Allergies    adhesives (Hives)  No Known Drug Allergies    Intolerances    	    MEDICATIONS:  amLODIPine   Tablet 5 milliGRAM(s) Oral daily  apixaban 2.5 milliGRAM(s) Oral every 12 hours  metoprolol tartrate 75 milliGRAM(s) Oral daily  metoprolol tartrate 50 milliGRAM(s) Oral at bedtime    erythromycin     base Tablet 250 milliGRAM(s) Oral every 8 hours  meropenem  IVPB      meropenem  IVPB 1000 milliGRAM(s) IV Intermittent every 8 hours    diphenhydrAMINE   Injectable 25 milliGRAM(s) IV Push every 6 hours PRN    acetaminophen   Tablet .. 650 milliGRAM(s) Oral every 6 hours  HYDROmorphone  Injectable 0.25 milliGRAM(s) IV Push every 4 hours PRN  ondansetron Injectable 4 milliGRAM(s) IV Push every 6 hours  oxyCODONE    IR 5 milliGRAM(s) Oral every 4 hours PRN  oxyCODONE    IR 10 milliGRAM(s) Oral every 4 hours PRN    pantoprazole    Tablet 40 milliGRAM(s) Oral before breakfast    dextrose 40% Gel 15 Gram(s) Oral once PRN  dextrose 50% Injectable 12.5 Gram(s) IV Push once  dextrose 50% Injectable 25 Gram(s) IV Push once  dextrose 50% Injectable 25 Gram(s) IV Push once  glucagon  Injectable 1 milliGRAM(s) IntraMuscular once PRN  insulin lispro (HumaLOG) corrective regimen sliding scale   SubCutaneous three times a day before meals  insulin lispro (HumaLOG) corrective regimen sliding scale   SubCutaneous at bedtime  megestrol Suspension 800 milliGRAM(s) Oral daily    dextrose 5%. 1000 milliLiter(s) IV Continuous <Continuous>  influenza   Vaccine 0.5 milliLiter(s) IntraMuscular once      PAST MEDICAL & SURGICAL HISTORY:  Male stress incontinence  Kidney stone: &quot;passed on own&quot;  Varicose vein: (L) 5 years ago    had venous stripping 2007  Bilateral cataracts: removed with lens implants  Benign colon polyp: removed colonoscopy 2014  Prostate cancer: 2010 prostatectomy  stable  Afib: 2006 s/p ablation 2006 , afib resolved   on Eliquies  Hyperlipidemia: X 4 years not on any meds  GERD (Gastroesophageal Reflux Disease): X 10 years  DM (Diabetes Mellitus): X 3 years  Renal Calculi: 2008  MVP (Mitral Valve Prolapse): X 8 years stable  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  Skin: no rashes.  Ophthalmologic: no blurred vision, no loss of vision. 	  ENT: no sore throat, rhinorrhea, sinus congestion.  Respiratory: no SOB, cough or wheeze.  Gastrointestinal:  no N/V/D, no melena/hematemesis/hematochezia.  Genitourinary: no dysuria/hesitancy or hematuria.  Musculoskeletal: no myalgias or arthralgias.  Neurological: no changes in vision or hearing, no lightheadedness/dizziness, no syncope/near syncope	  Psychiatric: no unusual stress/anxiety.   Hematology/Lymphatics: no unusual bleeding, bruising and no lymphadenopathy.  Endocrine: no unusual thirst.   All others negative except as stated above and in HPI.    PHYSICAL EXAM:  T(C): 36.6 (10-10-18 @ 13:35), Max: 36.7 (10-10-18 @ 00:37)  HR: 118 (10-10-18 @ 13:35) (92 - 120)  BP: 124/74 (10-10-18 @ 13:35) (124/74 - 157/88)  RR: 18 (10-10-18 @ 13:35) (18 - 20)  SpO2: 99% (10-10-18 @ 13:35) (97% - 100%)  Wt(kg): --  I&O's Summary    09 Oct 2018 07:01  -  10 Oct 2018 07:00  --------------------------------------------------------  IN: 2950 mL / OUT: 4857 mL / NET: -1907 mL    10 Oct 2018 07:01  -  10 Oct 2018 16:06  --------------------------------------------------------  IN: 1830 mL / OUT: 1880 mL / NET: -50 mL        Appearance: No acute distress	  HEENT:   Normal oral mucosa, PERRL, EOMI	  Lymphatic: No lymphadenopathy  Cardiovascular: Irregular irregular, No JVD, No murmurs, No edema  Respiratory: Lungs clear to auscultation	  Psychiatry: A & O x 3, Mood & affect appropriate  Gastrointestinal:  s/p Whipple  Skin: No rashes, No ecchymoses, No cyanosis	  Neurologic: Non-focal  Extremities: Normal range of motion, No clubbing, cyanosis or edema  Vascular: Peripheral pulses palpable 2+ bilaterally        LABS:	 	    CBC Full  -  ( 10 Oct 2018 15:34 )  WBC Count : 8.4 K/uL  Hemoglobin : 8.0 g/dL  Hematocrit : 23.3 %  Platelet Count - Automated : 390 K/uL  Mean Cell Volume : 91.1 fl  Mean Cell Hemoglobin : 31.3 pg  Mean Cell Hemoglobin Concentration : 34.4 gm/dL  Auto Neutrophil # : x  Auto Lymphocyte # : x  Auto Monocyte # : x  Auto Eosinophil # : x  Auto Basophil # : x  Auto Neutrophil % : x  Auto Lymphocyte % : x  Auto Monocyte % : x  Auto Eosinophil % : x  Auto Basophil % : x    10-09    140  |  104  |  10  ----------------------------<  175<H>  3.5   |  23  |  1.33<H>  10-08    134<L>  |  102  |  11  ----------------------------<  164<H>  3.3<L>   |  23  |  1.29    Ca    8.4      09 Oct 2018 23:11  Ca    8.1<L>      08 Oct 2018 22:19  Phos  3.1     10-09  Phos  2.5     10-08  Mg     2.0     10-09  Mg     1.8     10-08    TPro  5.9<L>  /  Alb  2.9<L>  /  TBili  1.1  /  DBili  0.5<H>  /  AST  20  /  ALT  27  /  AlkPhos  134<H>  10-09  TPro  6.1  /  Alb  2.9<L>  /  TBili  1.3<H>  /  DBili  0.6<H>  /  AST  14  /  ALT  28  /  AlkPhos  132<H>  10-08      proBNP:   Lipid Profile:   HgA1c:   TSH:       CARDIAC MARKERS:            TELEMETRY: 	    ECG:  	A-flutter   RADIOLOGY:  OTHER: 	    PREVIOUS DIAGNOSTIC TESTING:    [ ] Echocardiogram: < from: Limited Transthoracic Echo (08.29.18 @ 14:41) >  Left Ventricle: Normal left ventricular systolic function.  EF 60%. Normal left ventricular internal dimensions and  wall thicknesses.  Right Heart: Normal right ventricular size and function.  ------------------------------------------------------------------------  CONCLUSIONS:  Limited study for biventricular function.  1. Normal left ventricular internal dimensions and wall  thicknesses.  2. Normal left ventricular systolic function.  EF 60%.  3. Normal right ventricular size and function.    < end of copied text >    [ ]  Catheterization:  [ ] Stress Test: Pt is a 77 yo male w h/o A-fib s/p ablation in 2006.  Experienced recurrent AF in 2017, and was cardioverted at that time, but AF recurred.  Maintained on metoprolol for rate control and Eliquis.  Sees an electrophysiologist on the [facility], and has an implanted loop recorder.  History includes HTN, HLD, preDM.  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   Surgery requests consult for tachycardia.   Pt reports no acute complaints at this time. Feels a little short of breath with exertion. Denies any chest pain, palpitations, dizziness, peripheral edema. Reports his heart rate is fast at home at times.   His EP at [facility] recently increased his Metoprolol from 50 BID to 75 AM, 50 PM. Additionally at times he receives calls that his ILR detects a fast heart rate. He never notices when his heart is fast and says it is asymptomatic.  Also, had one episode of bradycardia to 30s last year; a med was d/c'd at that time, although he does not recall its name.      Allergies:  adhesives (Hives)  No Known Drug Allergies      MEDICATIONS:  amLODIPine   Tablet 5 milliGRAM(s) Oral daily  apixaban 2.5 milliGRAM(s) Oral every 12 hours  metoprolol tartrate 75 milliGRAM(s) Oral daily  metoprolol tartrate 50 milliGRAM(s) Oral at bedtime  erythromycin     base Tablet 250 milliGRAM(s) Oral every 8 hours  meropenem  IVPB 1000 milliGRAM(s) IV Intermittent every 8 hours  diphenhydrAMINE   Injectable 25 milliGRAM(s) IV Push every 6 hours PRN  acetaminophen   Tablet .. 650 milliGRAM(s) Oral every 6 hours  HYDROmorphone  Injectable 0.25 milliGRAM(s) IV Push every 4 hours PRN  ondansetron Injectable 4 milliGRAM(s) IV Push every 6 hours  oxyCODONE    IR 5 milliGRAM(s) Oral every 4 hours PRN  oxyCODONE    IR 10 milliGRAM(s) Oral every 4 hours PRN  pantoprazole    Tablet 40 milliGRAM(s) Oral before breakfast  dextrose 40% Gel 15 Gram(s) Oral once PRN  dextrose 50% Injectable 12.5 Gram(s) IV Push once  dextrose 50% Injectable 25 Gram(s) IV Push once  dextrose 50% Injectable 25 Gram(s) IV Push once  glucagon  Injectable 1 milliGRAM(s) IntraMuscular once PRN  insulin lispro (HumaLOG) corrective regimen sliding scale   SubCutaneous three times a day before meals  insulin lispro (HumaLOG) corrective regimen sliding scale   SubCutaneous at bedtime  megestrol Suspension 800 milliGRAM(s) Oral daily  dextrose 5%. 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  Skin: no rashes.  Ophthalmologic: no blurred vision, no loss of vision. 	  ENT: no sore throat, rhinorrhea, sinus congestion.  Respiratory: no SOB, cough or wheeze.  Gastrointestinal:  no N/V/D, no melena/hematemesis/hematochezia.  Genitourinary: no dysuria/hesitancy or hematuria.  Musculoskeletal: no myalgias or arthralgias.  Neurological: no changes in vision or hearing, no lightheadedness/dizziness, no syncope/near syncope	  Psychiatric: no unusual stress/anxiety.   Hematology/Lymphatics: no unusual bleeding, bruising and no lymphadenopathy.  Endocrine: no unusual thirst.   All others negative except as stated above and in HPI.    PHYSICAL EXAM:  T(C): 36.6 (10-10-18 @ 13:35), Max: 36.7 (10-10-18 @ 00:37)  HR: 118 (10-10-18 @ 13:35) (92 - 120)  BP: 124/74 (10-10-18 @ 13:35) (124/74 - 157/88)  RR: 18 (10-10-18 @ 13:35) (18 - 20)  SpO2: 99% (10-10-18 @ 13:35) (97% - 100%)    Appearance: No acute distress	  HEENT:   Normal oral mucosa, PERRL, EOMI	  Lymphatic: No lymphadenopathy  Cardiovascular: Irregular irregular, No JVD, No murmurs, No edema  Respiratory: Lungs clear to auscultation	  Psychiatry: A & O x 3, Mood & affect appropriate  Gastrointestinal:  s/p Whipple  Skin: No rashes, No ecchymoses, No cyanosis	  Neurologic: Non-focal  Extremities: Normal range of motion, No clubbing, cyanosis or edema  Vascular: Peripheral pulses palpable 2+ bilaterally      I&O's Summary    09 Oct 2018 07:01  -  10 Oct 2018 07:00  --------------------------------------------------------  IN: 2950 mL / OUT: 4857 mL / NET: -1907 mL    10 Oct 2018 07:01  -  10 Oct 2018 16:06  --------------------------------------------------------  IN: 1830 mL / OUT: 1880 mL / NET: -50 mL      LABS:	 	  CBC Full  -  ( 10 Oct 2018 15:34 )  WBC Count : 8.4 K/uL  Hemoglobin : 8.0 g/dL  Hematocrit : 23.3 %  Platelet Count - Automated : 390 K/uL  Mean Cell Volume : 91.1 fl  Mean Cell Hemoglobin : 31.3 pg  Mean Cell Hemoglobin Concentration : 34.4 gm/dL    10-09  140  |  104  |  10  ----------------------------<  175<H>  3.5   |  23  |  1.33<H>    10-08  134<L>  |  102  |  11  ----------------------------<  164<H>  3.3<L>   |  23  |  1.29    Ca    8.4      09 Oct 2018 23:11  Ca    8.1<L>      08 Oct 2018 22:19    Phos  3.1     10-09  Phos  2.5     10-08    Mg     2.0     10-09  Mg     1.8     10-08    TPro  5.9<L>  /  Alb  2.9<L>  /  TBili  1.1  /  DBili  0.5<H>  /  AST  20  /  ALT  27  /  AlkPhos  134<H>  10-09  TPro  6.1  /  Alb  2.9<L>  /  TBili  1.3<H>  /  DBili  0.6<H>  /  AST  14  /  ALT  28  /  AlkPhos  132<H>  10-08     	    ECG:  	Appears to be A-flutter with ventricular rate of 120; NS ST/T changes.      Limited Transthoracic Echo (08.29.18 @ 14:41) >  Left Ventricle: Normal left ventricular systolic function.  EF 60%. Normal left ventricular internal dimensions and wall thicknesses.  Right Heart: Normal right ventricular size and function.  ------------------------------------------------------------------------  CONCLUSIONS:  Limited study for biventricular function.  1. Normal left ventricular internal dimensions and wall thicknesses.  2. Normal left ventricular systolic function.  EF 60%.  3. Normal right ventricular size and function.

## 2018-10-10 NOTE — CONSULT NOTE ADULT - ASSESSMENT
A: 79 yo male w h/o A-fib/flutter (Eliquis) w loop recorder, HTN, HLD, preDM, who is s/p Jose E on 10/3 for bile duct mass, now with elevated heart rate.    1. A-fib/flutter      - Can add Cardizem 60 Q6 for improved rate control      - c/w Metoprolol 75 AM, 50 PM      - c/w Eliquis for anticoagulation        Nico Ta MD  Cardiology Fellow  p: [phone] ASSESSMENT:   77 yo male w h/o s/p A-fib ablation in 2006 with subsequent recurrence, maintained on metoprolol and Eliquis.  Currently s/p Whipple procedure.  Tachycardia noted; current EKG appears to be A. Flutter with accelerated ventricular rate.  Hx. of HTN, HLD, preDM.    REC:  1. 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      Nico Ta MD  Cardiology Fellow  p: [phone]    Ryder Allan M.D.  Cardiology Consult Service  [phone] or [phone]

## 2018-10-10 NOTE — PROGRESS NOTE ADULT - ASSESSMENT
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     -Pain control with PO pain meds.   -c/w regular diet  -meropenem until 10/11.  -Encourage OOB to chair.   -Monitor and replete electrolytes.   -DVT ppx: home apixaban started for a fib, sequential compression devices.   -d/c today vs tomorrow    [facility] [phone]

## 2018-10-10 NOTE — PROVIDER CONTACT NOTE (OTHER) - ASSESSMENT
Vanco trough 18.5
Asymptomatic
Pt resting in bed. In no apparent distress. Wife at bedside. Pt denies SOB and chest pain. Vitals T98.6, , irregular pulse, /90, O2 sat 95% on rm air.

## 2018-10-10 NOTE — PROVIDER CONTACT NOTE (OTHER) - ACTION/TREATMENT ORDERED:
MD notified, instructed to continue with dosage as ordered.
potassium will be given in the OR
EKG
Per MD, EKG will be ordered, administer Metoprolol 5mg IVP. Recheck Vitals at approximately 0000. Will monitor pt closely.

## 2018-10-10 NOTE — CONSULT NOTE ADULT - NSHPATTENDINGPLANDISCUSS_GEN_ALL_CORE
Plan discussed with cardiology fellow, Dr. MIGUEL Pinon; patient seen and examined.       I was physically present for the key portions of the evaluation and management (E/M) service provided.    I agree with the above history, physical, and plan which I have reviewed and edited where appropriate.

## 2018-10-10 NOTE — PROGRESS NOTE ADULT - SUBJECTIVE AND OBJECTIVE BOX
Surgery Progress Note    S: Patient seen and examined. No acute events overnight. patient's pain is well controlled with PO pain meds. urology saw patient yesterday as he was complaining of increased urinary frequency. UA came back negative and urology confirmed functioning of urethral balloon. patient was instructed to follow up with outpatient urologist. patient tolerating regular diet without n/v. patient had one DEMARIO removed yesterday. + flatus, - BM.    O:  Vital Signs Last 24 Hrs  T(C): 36.6 (10 Oct 2018 04:16), Max: 37 (09 Oct 2018 09:21)  T(F): 97.9 (10 Oct 2018 04:16), Max: 98.6 (09 Oct 2018 09:21)  HR: 115 (10 Oct 2018 06:53) (88 - 117)  BP: 124/80 (10 Oct 2018 04:16) (124/80 - 160/82)  BP(mean): --  RR: 20 (10 Oct 2018 04:16) (18 - 20)  SpO2: 97% (10 Oct 2018 04:16) (97% - 100%)    I&O's Detail    09 Oct 2018 07:01  -  10 Oct 2018 07:00  --------------------------------------------------------  IN:    dextrose 5% + sodium chloride 0.45% with potassium chloride 20 mEq/L: 700 mL    Oral Fluid: 1100 mL    Solution: 500 mL    Solution: 150 mL    Solution: 250 mL    Solution: 250 mL  Total IN: 2950 mL    OUT:    Bulb: 5 mL    Bulb: 12 mL    Voided: 4840 mL  Total OUT: 4857 mL    Total NET: -1907 mL      10 Oct 2018 07:01  -  10 Oct 2018 08:48  --------------------------------------------------------  IN:  Total IN: 0 mL    OUT:    Voided: 460 mL  Total OUT: 460 mL    Total NET: -460 mL          MEDICATIONS  (STANDING):  acetaminophen   Tablet .. 650 milliGRAM(s) Oral every 6 hours  amLODIPine   Tablet 5 milliGRAM(s) Oral daily  apixaban 2.5 milliGRAM(s) Oral every 12 hours  dextrose 5%. 1000 milliLiter(s) (50 mL/Hr) IV Continuous <Continuous>  dextrose 50% Injectable 12.5 Gram(s) IV Push once  dextrose 50% Injectable 25 Gram(s) IV Push once  dextrose 50% Injectable 25 Gram(s) IV Push once  erythromycin     base Tablet 250 milliGRAM(s) Oral every 8 hours  influenza   Vaccine 0.5 milliLiter(s) IntraMuscular once  insulin lispro (HumaLOG) corrective regimen sliding scale   SubCutaneous three times a day before meals  insulin lispro (HumaLOG) corrective regimen sliding scale   SubCutaneous at bedtime  megestrol Suspension 800 milliGRAM(s) Oral daily  meropenem  IVPB      meropenem  IVPB 1000 milliGRAM(s) IV Intermittent every 8 hours  metoprolol tartrate 50 milliGRAM(s) Oral two times a day  ondansetron Injectable 4 milliGRAM(s) IV Push every 6 hours  pantoprazole    Tablet 40 milliGRAM(s) Oral before breakfast    MEDICATIONS  (PRN):  dextrose 40% Gel 15 Gram(s) Oral once PRN Blood Glucose LESS THAN 70 milliGRAM(s)/deciliter  diphenhydrAMINE   Injectable 25 milliGRAM(s) IV Push every 6 hours PRN Rash and/or Itching  glucagon  Injectable 1 milliGRAM(s) IntraMuscular once PRN Glucose LESS THAN 70 milligrams/deciliter  HYDROmorphone  Injectable 0.25 milliGRAM(s) IV Push every 4 hours PRN breakthrough pain  oxyCODONE    IR 5 milliGRAM(s) Oral every 4 hours PRN Moderate Pain (4 - 6)  oxyCODONE    IR 10 milliGRAM(s) Oral every 4 hours PRN Severe Pain (7 - 10)                            8.6    7.9   )-----------( 343      ( 09 Oct 2018 23:11 )             25.2       10-09    140  |  104  |  10  ----------------------------<  175<H>  3.5   |  23  |  1.33<H>    Ca    8.4      09 Oct 2018 23:11  Phos  3.1     10-09  Mg     2.0     10-09    TPro  5.9<L>  /  Alb  2.9<L>  /  TBili  1.1  /  DBili  0.5<H>  /  AST  20  /  ALT  27  /  AlkPhos  134<H>  10-09      Physical Exam:  Gen: Laying in bed, NAD  Resp: Unlabored breathing  Abd: soft, NT, mildly distneded, LUQ drain with minimal SS/cloudy output, no rebound or guarding  Ext: WWP  Skin: No rashes

## 2018-10-10 NOTE — CHART NOTE - NSCHARTNOTEFT_GEN_A_CORE
Patient seen prior to discharge by  NP Navigator.  Follow up information provided Patient instructed to follow up with Dr George as outpatient in 1-2 . Patient oriented to [facility] and its comprehensive range of services related to surgery, surgical and medical oncology, GI,  also range of supportive services.

## 2018-10-11 ENCOUNTER — TRANSCRIPTION ENCOUNTER (OUTPATIENT)
Age: 78
End: 2018-10-11

## 2018-10-11 LAB
ALBUMIN SERPL ELPH-MCNC: 2.9 G/DL — LOW (ref 3.3–5)
ALP SERPL-CCNC: 124 U/L — HIGH (ref 40–120)
ALT FLD-CCNC: 22 U/L — SIGNIFICANT CHANGE UP (ref 10–45)
ANION GAP SERPL CALC-SCNC: 13 MMOL/L — SIGNIFICANT CHANGE UP (ref 5–17)
AST SERPL-CCNC: 17 U/L — SIGNIFICANT CHANGE UP (ref 10–40)
BILIRUB DIRECT SERPL-MCNC: 0.4 MG/DL — HIGH (ref 0–0.2)
BILIRUB INDIRECT FLD-MCNC: 0.5 MG/DL — SIGNIFICANT CHANGE UP (ref 0.2–1)
BILIRUB SERPL-MCNC: 0.9 MG/DL — SIGNIFICANT CHANGE UP (ref 0.2–1.2)
BUN SERPL-MCNC: 13 MG/DL — SIGNIFICANT CHANGE UP (ref 7–23)
CALCIUM SERPL-MCNC: 8.4 MG/DL — SIGNIFICANT CHANGE UP (ref 8.4–10.5)
CHLORIDE SERPL-SCNC: 105 MMOL/L — SIGNIFICANT CHANGE UP (ref 96–108)
CO2 SERPL-SCNC: 23 MMOL/L — SIGNIFICANT CHANGE UP (ref 22–31)
CREAT SERPL-MCNC: 1.29 MG/DL — SIGNIFICANT CHANGE UP (ref 0.5–1.3)
GLUCOSE SERPL-MCNC: 170 MG/DL — HIGH (ref 70–99)
HCT VFR BLD CALC: 24.8 % — LOW (ref 39–50)
HGB BLD-MCNC: 8.7 G/DL — LOW (ref 13–17)
MAGNESIUM SERPL-MCNC: 1.9 MG/DL — SIGNIFICANT CHANGE UP (ref 1.6–2.6)
MCHC RBC-ENTMCNC: 31.7 PG — SIGNIFICANT CHANGE UP (ref 27–34)
MCHC RBC-ENTMCNC: 35 GM/DL — SIGNIFICANT CHANGE UP (ref 32–36)
MCV RBC AUTO: 90.7 FL — SIGNIFICANT CHANGE UP (ref 80–100)
PHOSPHATE SERPL-MCNC: 2.8 MG/DL — SIGNIFICANT CHANGE UP (ref 2.5–4.5)
PLATELET # BLD AUTO: 455 K/UL — HIGH (ref 150–400)
POTASSIUM SERPL-MCNC: 4.1 MMOL/L — SIGNIFICANT CHANGE UP (ref 3.5–5.3)
POTASSIUM SERPL-SCNC: 4.1 MMOL/L — SIGNIFICANT CHANGE UP (ref 3.5–5.3)
PROT SERPL-MCNC: 5.8 G/DL — LOW (ref 6–8.3)
RBC # BLD: 2.74 M/UL — LOW (ref 4.2–5.8)
RBC # FLD: 11.7 % — SIGNIFICANT CHANGE UP (ref 10.3–14.5)
SODIUM SERPL-SCNC: 141 MMOL/L — SIGNIFICANT CHANGE UP (ref 135–145)
WBC # BLD: 8.1 K/UL — SIGNIFICANT CHANGE UP (ref 3.8–10.5)
WBC # FLD AUTO: 8.1 K/UL — SIGNIFICANT CHANGE UP (ref 3.8–10.5)

## 2018-10-11 PROCEDURE — 99232 SBSQ HOSP IP/OBS MODERATE 35: CPT | Mod: GC

## 2018-10-11 RX ORDER — SODIUM,POTASSIUM PHOSPHATES 278-250MG
1 POWDER IN PACKET (EA) ORAL ONCE
Qty: 0 | Refills: 0 | Status: COMPLETED | OUTPATIENT
Start: 2018-10-11 | End: 2018-10-12

## 2018-10-11 RX ORDER — POTASSIUM PHOSPHATE, MONOBASIC POTASSIUM PHOSPHATE, DIBASIC 236; 224 MG/ML; MG/ML
15 INJECTION, SOLUTION INTRAVENOUS ONCE
Qty: 0 | Refills: 0 | Status: COMPLETED | OUTPATIENT
Start: 2018-10-11 | End: 2018-10-11

## 2018-10-11 RX ORDER — MAGNESIUM SULFATE 500 MG/ML
2 VIAL (ML) INJECTION ONCE
Qty: 0 | Refills: 0 | Status: COMPLETED | OUTPATIENT
Start: 2018-10-11 | End: 2018-10-12

## 2018-10-11 RX ORDER — POLYETHYLENE GLYCOL 3350 17 G/17G
17 POWDER, FOR SOLUTION ORAL
Qty: 0 | Refills: 0 | Status: DISCONTINUED | OUTPATIENT
Start: 2018-10-11 | End: 2018-10-12

## 2018-10-11 RX ORDER — LACTULOSE 10 G/15ML
20 SOLUTION ORAL DAILY
Qty: 0 | Refills: 0 | Status: DISCONTINUED | OUTPATIENT
Start: 2018-10-11 | End: 2018-10-12

## 2018-10-11 RX ORDER — MAGNESIUM SULFATE 500 MG/ML
2 VIAL (ML) INJECTION ONCE
Qty: 0 | Refills: 0 | Status: COMPLETED | OUTPATIENT
Start: 2018-10-11 | End: 2018-10-11

## 2018-10-11 RX ADMIN — AMLODIPINE BESYLATE 5 MILLIGRAM(S): 2.5 TABLET ORAL at 06:12

## 2018-10-11 RX ADMIN — Medication 2: at 08:03

## 2018-10-11 RX ADMIN — Medication 650 MILLIGRAM(S): at 06:39

## 2018-10-11 RX ADMIN — PANTOPRAZOLE SODIUM 40 MILLIGRAM(S): 20 TABLET, DELAYED RELEASE ORAL at 06:10

## 2018-10-11 RX ADMIN — POTASSIUM PHOSPHATE, MONOBASIC POTASSIUM PHOSPHATE, DIBASIC 62.5 MILLIMOLE(S): 236; 224 INJECTION, SOLUTION INTRAVENOUS at 02:13

## 2018-10-11 RX ADMIN — Medication 650 MILLIGRAM(S): at 01:11

## 2018-10-11 RX ADMIN — Medication 650 MILLIGRAM(S): at 06:09

## 2018-10-11 RX ADMIN — Medication 50 GRAM(S): at 02:13

## 2018-10-11 RX ADMIN — Medication 75 MILLIGRAM(S): at 06:10

## 2018-10-11 RX ADMIN — MEGESTROL ACETATE 800 MILLIGRAM(S): 40 SUSPENSION ORAL at 12:17

## 2018-10-11 RX ADMIN — Medication 2: at 12:29

## 2018-10-11 RX ADMIN — MEROPENEM 100 MILLIGRAM(S): 1 INJECTION INTRAVENOUS at 12:24

## 2018-10-11 RX ADMIN — LACTULOSE 20 GRAM(S): 10 SOLUTION ORAL at 12:23

## 2018-10-11 RX ADMIN — Medication 250 MILLIGRAM(S): at 06:10

## 2018-10-11 RX ADMIN — APIXABAN 2.5 MILLIGRAM(S): 2.5 TABLET, FILM COATED ORAL at 18:53

## 2018-10-11 RX ADMIN — Medication 50 MILLIGRAM(S): at 22:15

## 2018-10-11 RX ADMIN — Medication 4: at 19:00

## 2018-10-11 RX ADMIN — Medication 650 MILLIGRAM(S): at 01:41

## 2018-10-11 RX ADMIN — Medication 250 MILLIGRAM(S): at 22:15

## 2018-10-11 RX ADMIN — POLYETHYLENE GLYCOL 3350 17 GRAM(S): 17 POWDER, FOR SOLUTION ORAL at 18:56

## 2018-10-11 RX ADMIN — Medication 250 MILLIGRAM(S): at 13:33

## 2018-10-11 RX ADMIN — Medication 650 MILLIGRAM(S): at 12:17

## 2018-10-11 RX ADMIN — MEROPENEM 100 MILLIGRAM(S): 1 INJECTION INTRAVENOUS at 20:04

## 2018-10-11 RX ADMIN — Medication 650 MILLIGRAM(S): at 18:54

## 2018-10-11 RX ADMIN — Medication 650 MILLIGRAM(S): at 19:24

## 2018-10-11 RX ADMIN — MEROPENEM 100 MILLIGRAM(S): 1 INJECTION INTRAVENOUS at 04:45

## 2018-10-11 RX ADMIN — APIXABAN 2.5 MILLIGRAM(S): 2.5 TABLET, FILM COATED ORAL at 06:10

## 2018-10-11 NOTE — DISCHARGE NOTE ADULT - CARE PLAN
Principal Discharge DX:	Intrahepatic bile duct carcinoma  Goal:	To return to daily living activity prior to surgery, postoperative recovery.  Assessment and plan of treatment:	please follow up with Dr. George in 1-2 weeks  please follow up with your PCP within 1 weeks Principal Discharge DX:	Intrahepatic bile duct carcinoma  Goal:	To return to daily living activity prior to surgery, postoperative recovery.  Assessment and plan of treatment:	please follow up with Dr. George in 1-2 weeks  please follow up with your PCP within 1 weeks  Please follow up with your private cardiologist cardiologist Dr. Aaron Guevara [phone] - call for an appointment, you were started on Cardizem here for atrial fibrillation

## 2018-10-11 NOTE — DISCHARGE NOTE ADULT - ADDITIONAL INSTRUCTIONS
WOUND CARE: Please keep incisions clean and dry. Please do not Scrub or rub incisions. Do not use lotion or powder on incisions.   BATHING: Please do not submerge wound underwater. You may shower and/or sponge bathe.  ACTIVITY: No heavy lifting or straining until your follow up appointment. Otherwise, you may return to your usual level of physical activity. If you are taking narcotic pain medication (such as Oxycodone) DO NOT drive a car, operate machinery or make important decisions.  DIET: Return to your usual diet.  NOTIFY YOUR SURGEON IF: You have any bleeding that does not stop, any pus draining from your wound(s), any fever (over 100.4 F) or chills, persistent nausea/vomiting, persistent diarrhea, or if your pain is not controlled on your discharge pain medications.  FOLLOW-UP: Please follow-up with your surgeon within 1-2 weeks following discharge-please call to schedule an appointment. Please follow up with your PCP within 1 week.

## 2018-10-11 NOTE — DISCHARGE NOTE ADULT - CARE PROVIDER_API CALL
Jeff George), Surgery  [address] [place] [address]  Phone: [phone]  Fax: [phone]

## 2018-10-11 NOTE — PROGRESS NOTE ADULT - ASSESSMENT
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     -Appreciate cardiology: tachycardia improved with additional cardizem.   -Pain control with PO pain meds.   -c/w regular diet  -D/c IV abx today.   -Encourage OOB to chair.   -Monitor and replete electrolytes.   -DVT ppx: home apixaban started for a fib, sequential compression devices.     [facility] General Surgery [phone]

## 2018-10-11 NOTE — PROGRESS NOTE ADULT - NSHPATTENDINGPLANDISCUSS_GEN_ALL_CORE
Plan discussed with cardiology fellow, Dr. MIGUEL Pinon; patient seen and examined.       I was physically present for the key portions of the evaluation and management (E/M) service provided.    I agree with the above history, physical, and plan which I have reviewed and edited where appropriate.
Dr George
Dr. George

## 2018-10-11 NOTE — PROGRESS NOTE ADULT - ASSESSMENT
ASSESSMENT:   77 yo male w h/o s/p A-fib ablation in 2006 with subsequent recurrence, maintained on metoprolol and Eliquis.  Currently s/p Whipple procedure.  Tachycardia noted; current EKG appears to be A. Flutter with accelerated ventricular rate.  Hx. of HTN, HLD, preDM.    REC:  1. 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      Nico Ta MD  Cardiology Fellow  p: [phone]    Ryder Allan M.D.  Cardiology Consult Service  [phone] or [phone] ASSESSMENT:   79 yo male w h/o s/p A-fib ablation in 2006 with subsequent recurrence, maintained on metoprolol and Eliquis.  Currently s/p Whipple procedure.  Tachycardia noted; current EKG appears to be A. Flutter with accelerated ventricular rate.  Hx. of HTN, HLD, preDM.    REC:  1. 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      Nico Ta MD  Cardiology Fellow  p: [phone]    Ryder Allan M.D.  Cardiology Consult Service  [phone] or [phone]

## 2018-10-11 NOTE — PROGRESS NOTE ADULT - SUBJECTIVE AND OBJECTIVE BOX
Patient seen and examined at bedside.    Overnight Events: No acute events.    REVIEW OF SYSTEMS:  General: no fatigue/malaise, weight loss/gain.  Skin: no rashes.  Ophthalmologic: no blurred vision, no loss of vision. 	  ENT: no sore throat, rhinorrhea, sinus congestion.  Respiratory: no SOB, cough or wheeze.  CV: No chest pain. No palpitations.   Gastrointestinal:  no N/V/D, no melena/hematemesis/hematochezia.  Genitourinary: no dysuria/hesitancy or hematuria.  Musculoskeletal: no myalgias or arthralgias.  Neurological: no changes in vision or hearing, no lightheadedness/dizziness, no syncope/near syncope	  Psychiatric: no unusual stress/anxiety.   Hematology/Lymphatics: no unusual bleeding, bruising and no lymphadenopathy.  Endocrine: no unusual thirst.        Current Meds:  acetaminophen   Tablet .. 650 milliGRAM(s) Oral every 6 hours  amLODIPine   Tablet 5 milliGRAM(s) Oral daily  apixaban 2.5 milliGRAM(s) Oral every 12 hours  dextrose 40% Gel 15 Gram(s) Oral once PRN  dextrose 5%. 1000 milliLiter(s) IV Continuous <Continuous>  dextrose 50% Injectable 12.5 Gram(s) IV Push once  dextrose 50% Injectable 25 Gram(s) IV Push once  dextrose 50% Injectable 25 Gram(s) IV Push once  diltiazem    Tablet 60 milliGRAM(s) Oral every 6 hours  diphenhydrAMINE   Injectable 25 milliGRAM(s) IV Push every 6 hours PRN  erythromycin     base Tablet 250 milliGRAM(s) Oral every 8 hours  glucagon  Injectable 1 milliGRAM(s) IntraMuscular once PRN  HYDROmorphone  Injectable 0.25 milliGRAM(s) IV Push every 4 hours PRN  influenza   Vaccine 0.5 milliLiter(s) IntraMuscular once  insulin lispro (HumaLOG) corrective regimen sliding scale   SubCutaneous three times a day before meals  insulin lispro (HumaLOG) corrective regimen sliding scale   SubCutaneous at bedtime  megestrol Suspension 800 milliGRAM(s) Oral daily  meropenem  IVPB      meropenem  IVPB 1000 milliGRAM(s) IV Intermittent every 8 hours  metoprolol tartrate 75 milliGRAM(s) Oral daily  metoprolol tartrate 50 milliGRAM(s) Oral at bedtime  ondansetron Injectable 4 milliGRAM(s) IV Push every 6 hours  oxyCODONE    IR 5 milliGRAM(s) Oral every 4 hours PRN  oxyCODONE    IR 10 milliGRAM(s) Oral every 4 hours PRN  pantoprazole    Tablet 40 milliGRAM(s) Oral before breakfast  polyethylene glycol 3350 17 Gram(s) Oral two times a day      PAST MEDICAL & SURGICAL HISTORY:  Male stress incontinence  Kidney stone: &quot;passed on own&quot;  Varicose vein: (L) 5 years ago    had venous stripping 2007  Bilateral cataracts: removed with lens implants  Benign colon polyp: removed colonoscopy 2014  Prostate cancer: 2010 prostatectomy  stable  Afib: 2006 s/p ablation 2006 , afib resolved   on Eliquies  Hyperlipidemia: X 4 years not on any meds  GERD (Gastroesophageal Reflux Disease): X 10 years  DM (Diabetes Mellitus): X 3 years  Renal Calculi: 2008  MVP (Mitral Valve Prolapse): X 8 years stable  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 ,2018 Left  Jan2018  right knee  S/P ablation operation for arrhythmia  Male stress incontinence: s/p artifical urinary sphincter 5/2012  Varicose vein-s/p vein stripping 5 years ago  S/P arthroscopy: right shoulder 12/11  S/P prostatectomy: radical robotic 6/10  Cosmetic Surgery: chin implant 2004  S/P Colonoscopy with Polypectomy: 2009  S/P Appendectomy: 1950      Vitals:  T(F): 97.1 (10-11), Max: 98.4 (10-10)  HR: 118 (10-11) (92 - 120)  BP: 132/91 (10-11) (124/74 - 156/90)  RR: 17 (10-11)  SpO2: 98% (10-11)  I&O's Summary    10 Oct 2018 07:01  -  11 Oct 2018 07:00  --------------------------------------------------------  IN: 2830 mL / OUT: 4157 mL / NET: -1327 mL        Physical Exam:  Appearance: No acute distress  Eyes: PERRL, EOMI, pink conjunctiva  HENT: Normal oral mucosa  Cardiovascular: Irregular irregular, tachycardic at this time but seen while ambulating, no murmurs, rubs, or gallops; no edema; no JVD  Respiratory: Clear to auscultation bilaterally  Gastrointestinal: s/p abd surgery  Musculoskeletal: No clubbing; no joint deformity   Neurologic: Non-focal  Lymphatic: No lymphadenopathy  Psychiatry: AAOx3, mood & affect appropriate  Skin: No rashes, ecchymoses, or cyanosis                          8.7    6.2   )-----------( 405      ( 10 Oct 2018 22:56 )             25.1     10-10    138  |  101  |  11  ----------------------------<  194<H>  3.6   |  23  |  1.31<H>    Ca    8.7      10 Oct 2018 22:56  Phos  2.9     10-10  Mg     1.7     10-10    TPro  6.1  /  Alb  2.8<L>  /  TBili  1.1  /  DBili  0.5<H>  /  AST  19  /  ALT  26  /  AlkPhos  131<H>  10-10    PT/INR - ( 10 Oct 2018 15:34 )   PT: 16.5 sec;   INR: 1.50 ratio         PTT - ( 10 Oct 2018 15:34 )  PTT:24.9 sec  CARDIAC MARKERS ( 10 Oct 2018 15:34 )  x     / x     / 32 U/L / x     / 1.1 ng/mL              New ECG(s): Personally reviewed    Echo: < from: Limited Transthoracic Echo (08.29.18 @ 14:41) >  OBSERVATIONS:  Left Ventricle: Normal left ventricular systolic function.  EF 60%. Normal left ventricular internal dimensions and  wall thicknesses.  Right Heart: Normal right ventricular size and function.  ------------------------------------------------------------------------  CONCLUSIONS:  Limited study for biventricular function.  1. Normal left ventricular internal dimensions and wall  thicknesses.  2. Normal left ventricular systolic function.  EF 60%.  3. Normal right ventricular size and function.    < end of copied text >      Stress Testing:     Cath:    Imaging:    Interpretation of Telemetry: Patient seen and examined at bedside.    Overnight Events: No acute events.    REVIEW OF SYSTEMS:  General: no fatigue/malaise, weight loss/gain.  Skin: no rashes.  Ophthalmologic: no blurred vision, no loss of vision. 	  ENT: no sore throat, rhinorrhea, sinus congestion.  Respiratory: no SOB, cough or wheeze.  CV: No chest pain. No palpitations.   Gastrointestinal:  no N/V/D, no melena/hematemesis/hematochezia.  Genitourinary: no dysuria/hesitancy or hematuria.  Musculoskeletal: no myalgias or arthralgias.  Neurological: no changes in vision or hearing, no lightheadedness/dizziness, no syncope/near syncope	  Psychiatric: no unusual stress/anxiety.   Hematology/Lymphatics: no unusual bleeding, bruising and no lymphadenopathy.  Endocrine: no unusual thirst.        Current Meds:  acetaminophen   Tablet .. 650 milliGRAM(s) Oral every 6 hours  amLODIPine   Tablet 5 milliGRAM(s) Oral daily  apixaban 2.5 milliGRAM(s) Oral every 12 hours  dextrose 40% Gel 15 Gram(s) Oral once PRN  dextrose 5%. 1000 milliLiter(s) IV Continuous <Continuous>  dextrose 50% Injectable 12.5 Gram(s) IV Push once  dextrose 50% Injectable 25 Gram(s) IV Push once  dextrose 50% Injectable 25 Gram(s) IV Push once  diltiazem    Tablet 60 milliGRAM(s) Oral every 6 hours  diphenhydrAMINE   Injectable 25 milliGRAM(s) IV Push every 6 hours PRN  erythromycin     base Tablet 250 milliGRAM(s) Oral every 8 hours  glucagon  Injectable 1 milliGRAM(s) IntraMuscular once PRN  HYDROmorphone  Injectable 0.25 milliGRAM(s) IV Push every 4 hours PRN  influenza   Vaccine 0.5 milliLiter(s) IntraMuscular once  insulin lispro (HumaLOG) corrective regimen sliding scale   SubCutaneous three times a day before meals  insulin lispro (HumaLOG) corrective regimen sliding scale   SubCutaneous at bedtime  megestrol Suspension 800 milliGRAM(s) Oral daily  meropenem  IVPB      meropenem  IVPB 1000 milliGRAM(s) IV Intermittent every 8 hours  metoprolol tartrate 75 milliGRAM(s) Oral daily  metoprolol tartrate 50 milliGRAM(s) Oral at bedtime  ondansetron Injectable 4 milliGRAM(s) IV Push every 6 hours  oxyCODONE    IR 5 milliGRAM(s) Oral every 4 hours PRN  oxyCODONE    IR 10 milliGRAM(s) Oral every 4 hours PRN  pantoprazole    Tablet 40 milliGRAM(s) Oral before breakfast  polyethylene glycol 3350 17 Gram(s) Oral two times a day      PAST MEDICAL & SURGICAL HISTORY:  Male stress incontinence  Kidney stone: &quot;passed on own&quot;  Varicose vein: (L) 5 years ago    had venous stripping 2007  Bilateral cataracts: removed with lens implants  Benign colon polyp: removed colonoscopy 2014  Prostate cancer: 2010 prostatectomy  stable  Afib: 2006 s/p ablation 2006 , afib resolved   on Eliquies  Hyperlipidemia: X 4 years not on any meds  GERD (Gastroesophageal Reflux Disease): X 10 years  DM (Diabetes Mellitus): X 3 years  Renal Calculi: 2008  MVP (Mitral Valve Prolapse): X 8 years stable  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 ,2018 Left  Jan2018  right knee  S/P ablation operation for arrhythmia  Male stress incontinence: s/p artifical urinary sphincter 5/2012  Varicose vein-s/p vein stripping 5 years ago  S/P arthroscopy: right shoulder 12/11  S/P prostatectomy: radical robotic 6/10  Cosmetic Surgery: chin implant 2004  S/P Colonoscopy with Polypectomy: 2009  S/P Appendectomy: 1950      Vitals:  T(F): 97.1 (10-11), Max: 98.4 (10-10)  HR: 118 (10-11) (92 - 120)  BP: 132/91 (10-11) (124/74 - 156/90)  RR: 17 (10-11)  SpO2: 98% (10-11)    Physical Exam:  Appearance: No acute distress  Eyes: PERRL, EOMI, pink conjunctiva  HENT: Normal oral mucosa  Cardiovascular: Irregular irregular, tachycardic at this time but seen while ambulating, no murmurs, rubs, or gallops; no edema; no JVD  Respiratory: Clear to auscultation bilaterally  Gastrointestinal: s/p abd surgery  Musculoskeletal: No clubbing; no joint deformity   Neurologic: Non-focal  Lymphatic: No lymphadenopathy  Psychiatry: AAOx3, mood & affect appropriate  Skin: No rashes, ecchymoses, or cyanosis    I&O's Summary    10 Oct 2018 07:01  -  11 Oct 2018 07:00  --------------------------------------------------------  IN: 2830 mL / OUT: 4157 mL / NET: -1327 mL      LABS:                      8.7    6.2   )-----------( 405      ( 10 Oct 2018 22:56 )             25.1     10-10  138  |  101  |  11  ----------------------------<  194<H>  3.6   |  23  |  1.31<H>    Ca    8.7      10 Oct 2018 22:56  Phos  2.9     10-10  Mg     1.7     10-10    TPro  6.1  /  Alb  2.8<L>  /  TBili  1.1  /  DBili  0.5<H>  /  AST  19  /  ALT  26  /  AlkPhos  131<H>  10-10    PT/INR - ( 10 Oct 2018 15:34 )   PT: 16.5 sec;   INR: 1.50 ratio    PTT - ( 10 Oct 2018 15:34 )  PTT:24.9 sec    CARDIAC MARKERS ( 10 Oct 2018 15:34 )  x     / x     / 32 U/L / x     / 1.1 ng/mL    Echo: < from: Limited Transthoracic Echo (08.29.18 @ 14:41) >  OBSERVATIONS:  Left Ventricle: Normal left ventricular systolic function.  EF 60%. Normal left ventricular internal dimensions and  wall thicknesses.  Right Heart: Normal right ventricular size and function.  ------------------------------------------------------------------------  CONCLUSIONS:  Limited study for biventricular function.  1. Normal left ventricular internal dimensions and wall  thicknesses.  2. Normal left ventricular systolic function.  EF 60%.  3. Normal right ventricular size and function.

## 2018-10-11 NOTE — PROGRESS NOTE ADULT - SUBJECTIVE AND OBJECTIVE BOX
Surgery Progress Note     Subjective/24hour Events:   Patient seen and examined.   Yesterday R drain removed without incidence.  Was tachycardic to 120s, EKG sinus, no troponin, Cardiology consulted.   No acute events overnight.   Pain well controlled.   Passing flatus, no BM.       Vital Signs:  Vital Signs Last 24 Hrs  T(C): 36.7 (11 Oct 2018 01:07), Max: 36.9 (10 Oct 2018 22:39)  T(F): 98 (11 Oct 2018 01:07), Max: 98.4 (10 Oct 2018 22:39)  HR: 92 (11 Oct 2018 02:10) (92 - 120)  BP: 136/91 (11 Oct 2018 01:07) (124/74 - 156/90)  BP(mean): --  RR: 18 (11 Oct 2018 01:07) (18 - 20)  SpO2: 96% (11 Oct 2018 01:07) (96% - 100%)    CAPILLARY BLOOD GLUCOSE      POCT Blood Glucose.: 212 mg/dL (10 Oct 2018 22:31)  POCT Blood Glucose.: 174 mg/dL (10 Oct 2018 17:33)  POCT Blood Glucose.: 225 mg/dL (10 Oct 2018 13:01)  POCT Blood Glucose.: 166 mg/dL (10 Oct 2018 08:18)      I&O's Detail    09 Oct 2018 07:01  -  10 Oct 2018 07:00  --------------------------------------------------------  IN:    dextrose 5% + sodium chloride 0.45% with potassium chloride 20 mEq/L: 700 mL    Oral Fluid: 1100 mL    Solution: 150 mL    Solution: 250 mL    Solution: 500 mL    Solution: 250 mL  Total IN: 2950 mL    OUT:    Bulb: 5 mL    Bulb: 12 mL    Voided: 4840 mL  Total OUT: 4857 mL    Total NET: -1907 mL      10 Oct 2018 07:01  -  11 Oct 2018 03:09  --------------------------------------------------------  IN:    Lactated Ringers IV Bolus: 1000 mL    Oral Fluid: 1280 mL    Solution: 50 mL  Total IN: 2330 mL    OUT:    Bulb: 2 mL    Voided: 3680 mL  Total OUT: 3682 mL    Total NET: -1352 mL          MEDICATIONS  (STANDING):  acetaminophen   Tablet .. 650 milliGRAM(s) Oral every 6 hours  amLODIPine   Tablet 5 milliGRAM(s) Oral daily  apixaban 2.5 milliGRAM(s) Oral every 12 hours  dextrose 5%. 1000 milliLiter(s) (50 mL/Hr) IV Continuous <Continuous>  dextrose 50% Injectable 12.5 Gram(s) IV Push once  dextrose 50% Injectable 25 Gram(s) IV Push once  dextrose 50% Injectable 25 Gram(s) IV Push once  diltiazem    Tablet 60 milliGRAM(s) Oral every 6 hours  erythromycin     base Tablet 250 milliGRAM(s) Oral every 8 hours  influenza   Vaccine 0.5 milliLiter(s) IntraMuscular once  insulin lispro (HumaLOG) corrective regimen sliding scale   SubCutaneous three times a day before meals  insulin lispro (HumaLOG) corrective regimen sliding scale   SubCutaneous at bedtime  megestrol Suspension 800 milliGRAM(s) Oral daily  meropenem  IVPB      meropenem  IVPB 1000 milliGRAM(s) IV Intermittent every 8 hours  metoprolol tartrate 75 milliGRAM(s) Oral daily  metoprolol tartrate 50 milliGRAM(s) Oral at bedtime  ondansetron Injectable 4 milliGRAM(s) IV Push every 6 hours  pantoprazole    Tablet 40 milliGRAM(s) Oral before breakfast    MEDICATIONS  (PRN):  dextrose 40% Gel 15 Gram(s) Oral once PRN Blood Glucose LESS THAN 70 milliGRAM(s)/deciliter  diphenhydrAMINE   Injectable 25 milliGRAM(s) IV Push every 6 hours PRN Rash and/or Itching  glucagon  Injectable 1 milliGRAM(s) IntraMuscular once PRN Glucose LESS THAN 70 milligrams/deciliter  HYDROmorphone  Injectable 0.25 milliGRAM(s) IV Push every 4 hours PRN breakthrough pain  oxyCODONE    IR 5 milliGRAM(s) Oral every 4 hours PRN Moderate Pain (4 - 6)  oxyCODONE    IR 10 milliGRAM(s) Oral every 4 hours PRN Severe Pain (7 - 10)      Physical Exam:  Gen: NAD, laying comfortably in bed, converses easily.   Lungs: Non labored breathing.   Ab: Soft, nontender, minimally distended, L drain with milky output.   Ext: Moves all 4 spontaneously.     Labs:    10-10    138  |  101  |  11  ----------------------------<  194<H>  3.6   |  23  |  1.31<H>    Ca    8.7      10 Oct 2018 22:56  Phos  2.9     10-10  Mg     1.7     10-10    TPro  6.1  /  Alb  2.8<L>  /  TBili  1.1  /  DBili  0.5<H>  /  AST  19  /  ALT  26  /  AlkPhos  131<H>  10-10    LIVER FUNCTIONS - ( 10 Oct 2018 22:56 )  Alb: 2.8 g/dL / Pro: 6.1 g/dL / ALK PHOS: 131 U/L / ALT: 26 U/L / AST: 19 U/L / GGT: x                                 8.7    6.2   )-----------( 405      ( 10 Oct 2018 22:56 )             25.1     PT/INR - ( 10 Oct 2018 15:34 )   PT: 16.5 sec;   INR: 1.50 ratio         PTT - ( 10 Oct 2018 15:34 )  PTT:24.9 sec    Imaging:

## 2018-10-11 NOTE — DISCHARGE NOTE ADULT - PLAN OF CARE
To return to daily living activity prior to surgery, postoperative recovery. please follow up with Dr. George in 1-2 weeks  please follow up with your PCP within 1 weeks please follow up with Dr. George in 1-2 weeks  please follow up with your PCP within 1 weeks  Please follow up with your private cardiologist cardiologist Dr. Aaron Guevara [phone] - call for an appointment, you were started on Cardizem here for atrial fibrillation

## 2018-10-11 NOTE — DISCHARGE NOTE ADULT - HOSPITAL COURSE
77 y/o M/F diagnosed with intrahepatic biliary duct carcinoma & subsequently underwent a Whipple procedure in the OR. The patient tolerated the procedure well. Postoperatively the patient was sent to the PACU. The patient was hemodynamically stable and was transferred to SICU for intensive monitoring. The patient had his villa catheter removed and voided well. The patient clinically improved in SICU and was transferred to a surgical floor. The patient's pain was controlled by IV pain medications and then by PO pain medications. The patient had return of GI function and was advanced to a CLD followed by a regular diet, which he tolerated with n/v. The patient had fluid analysis of his 3 DEMARIO drains and had each pulled before discharge. The patient was placed on home medications. The patient was noted to be tachycardic on the floors and was seen by cardiology. As per cardiology, the patient was started on cardizem 60mg q6 hours which resulted in improved rate control.     During the hospital course, patient had lab work performed on a daily basis. Prior to discharge lab work was noted to be within normal. A normal WBC was noted and the patient's HCT was within normal limits. Vitals were checked on a four hour basis, and the patient was noted to be normotensive and afebrile upon discharge. Patient had a normal heart rate and adequate oxygen saturations. SOB/CHACON was denied. Pain medication was given prior to discharge with no allergic reaction, and reported adequate pain control.    At the time of discharge, the patient was hemodynamically stable, was tolerating PO diet, was voiding urine and had normal GI function, was ambulating, and was comfortable with adequate pain control. The patient was instructed to follow up with Dr. perez within 7-14 days after discharge from the hospital. The patient felt comfortable with discharge. The patient was discharged with a prescription for oxycodone for pain. The patient had no other issues.

## 2018-10-11 NOTE — DISCHARGE NOTE ADULT - MEDICATION SUMMARY - MEDICATIONS TO TAKE
I will START or STAY ON the medications listed below when I get home from the hospital:    oxyCODONE 5 mg oral tablet  -- 1-2  tab(s) by mouth every 4 hours, As needed, Moderate Pain (4 - 6) MDD:6  -- Indication: For pain    acetaminophen 325 mg oral tablet  -- 2 tab(s) by mouth every 6 hours  -- Indication: For pain    Cardizem 60 mg oral tablet  -- 1 tab(s) by mouth every 6 hours  -- Indication: For atrial fibrillation    apixaban 2.5 mg oral tablet  -- 1 tab(s) by mouth every 12 hours  -- Indication: For anticoagulant    glipiZIDE 5 mg oral tablet  -- 1 tab(s) by mouth once a day  -- Indication: For diabetes    megestrol 40 mg/mL oral suspension  -- 20 milliliter(s) by mouth once a day  -- Indication: For stimulate appetite    metoprolol tartrate 75 mg oral tablet  -- 1 tab(s) by mouth once a day  -- Indication: For afbi    metoprolol tartrate 50 mg oral tablet  -- 1 tab(s) by mouth once a day (at bedtime)  -- Indication: For afib    amLODIPine 5 mg oral tablet  -- 1 tab(s) by mouth once a day  -- Indication: For blood pressure    polyethylene glycol 3350 oral powder for reconstitution  -- 17 gram(s) by mouth 2 times a day  -- Indication: For prevent constipation    Colace 100 mg oral capsule  -- 1 cap(s) by mouth 3 times a day  -- Indication: For prevent constipation    senna 8.6 mg oral tablet  -- 1 tab(s) by mouth once a day (at bedtime)  -- Indication: For prevent constipation    erythromycin 250 mg oral tablet  -- 1 tab(s) by mouth every 8 hours  -- Indication: For s/p whipple    pantoprazole 40 mg oral delayed release tablet  -- 1 tab(s) by mouth once a day (before a meal)  -- Indication: For reflux

## 2018-10-11 NOTE — DISCHARGE NOTE ADULT - PATIENT PORTAL LINK FT
You can access the [facility] Patient Portal, offered by [facility], by registering with the following website: [email or web address]

## 2018-10-12 VITALS — DIASTOLIC BLOOD PRESSURE: 81 MMHG | SYSTOLIC BLOOD PRESSURE: 138 MMHG | HEART RATE: 96 BPM

## 2018-10-12 RX ORDER — MEGESTROL ACETATE 40 MG/ML
20 SUSPENSION ORAL
Qty: 240 | Refills: 0 | OUTPATIENT
Start: 2018-10-12

## 2018-10-12 RX ORDER — DILTIAZEM HCL 120 MG
1 CAPSULE, EXT RELEASE 24 HR ORAL
Qty: 60 | Refills: 0 | OUTPATIENT
Start: 2018-10-12

## 2018-10-12 RX ORDER — DILTIAZEM HCL 120 MG
1 CAPSULE, EXT RELEASE 24 HR ORAL
Qty: 0 | Refills: 0 | COMMUNITY
Start: 2018-10-12

## 2018-10-12 RX ORDER — POLYETHYLENE GLYCOL 3350 17 G/17G
17 POWDER, FOR SOLUTION ORAL
Qty: 0 | Refills: 0 | COMMUNITY
Start: 2018-10-12

## 2018-10-12 RX ORDER — OXYCODONE HYDROCHLORIDE 5 MG/1
1 TABLET ORAL
Qty: 30 | Refills: 0 | OUTPATIENT
Start: 2018-10-12

## 2018-10-12 RX ORDER — PANTOPRAZOLE SODIUM 20 MG/1
1 TABLET, DELAYED RELEASE ORAL
Qty: 0 | Refills: 0 | COMMUNITY

## 2018-10-12 RX ORDER — ACETAMINOPHEN 500 MG
2 TABLET ORAL
Qty: 0 | Refills: 0 | COMMUNITY
Start: 2018-10-12

## 2018-10-12 RX ORDER — ERYTHROMYCIN ETHYLSUCCINATE 400 MG
1 TABLET ORAL
Qty: 60 | Refills: 0 | OUTPATIENT
Start: 2018-10-12

## 2018-10-12 RX ORDER — METOPROLOL TARTRATE 50 MG
1 TABLET ORAL
Qty: 0 | Refills: 0 | COMMUNITY
Start: 2018-10-12

## 2018-10-12 RX ORDER — PANTOPRAZOLE SODIUM 20 MG/1
1 TABLET, DELAYED RELEASE ORAL
Qty: 0 | Refills: 0 | COMMUNITY
Start: 2018-10-12

## 2018-10-12 RX ADMIN — Medication 650 MILLIGRAM(S): at 12:45

## 2018-10-12 RX ADMIN — Medication 1 PACKET(S): at 05:35

## 2018-10-12 RX ADMIN — PANTOPRAZOLE SODIUM 40 MILLIGRAM(S): 20 TABLET, DELAYED RELEASE ORAL at 05:36

## 2018-10-12 RX ADMIN — AMLODIPINE BESYLATE 5 MILLIGRAM(S): 2.5 TABLET ORAL at 05:35

## 2018-10-12 RX ADMIN — Medication 650 MILLIGRAM(S): at 06:05

## 2018-10-12 RX ADMIN — MEROPENEM 100 MILLIGRAM(S): 1 INJECTION INTRAVENOUS at 04:40

## 2018-10-12 RX ADMIN — Medication 50 GRAM(S): at 01:15

## 2018-10-12 RX ADMIN — MEGESTROL ACETATE 800 MILLIGRAM(S): 40 SUSPENSION ORAL at 13:10

## 2018-10-12 RX ADMIN — Medication 650 MILLIGRAM(S): at 05:35

## 2018-10-12 RX ADMIN — APIXABAN 2.5 MILLIGRAM(S): 2.5 TABLET, FILM COATED ORAL at 05:36

## 2018-10-12 RX ADMIN — Medication 2: at 05:36

## 2018-10-12 RX ADMIN — Medication 75 MILLIGRAM(S): at 05:35

## 2018-10-12 NOTE — PROGRESS NOTE ADULT - SUBJECTIVE AND OBJECTIVE BOX
Surgery Progress Note    S: Patient seen and examined. No acute events overnight. patient tolerating regular diet without n/v. pain is well controlled with PO meds. last DEMARIO drain was removed yesterday.    O:  Vital Signs Last 24 Hrs  T(C): 36.9 (12 Oct 2018 09:11), Max: 37.2 (12 Oct 2018 01:08)  T(F): 98.5 (12 Oct 2018 09:11), Max: 98.9 (12 Oct 2018 01:08)  HR: 62 (12 Oct 2018 09:11) (62 - 105)  BP: 103/68 (12 Oct 2018 09:11) (103/68 - 145/66)  BP(mean): --  RR: 18 (12 Oct 2018 09:11) (18 - 18)  SpO2: 98% (12 Oct 2018 09:11) (96% - 98%)    I&O's Detail    11 Oct 2018 07:01  -  12 Oct 2018 07:00  --------------------------------------------------------  IN:    Oral Fluid: 560 mL    Solution: 50 mL  Total IN: 610 mL    OUT:    Voided: 3050 mL  Total OUT: 3050 mL    Total NET: -2440 mL      12 Oct 2018 07:01  -  12 Oct 2018 09:21  --------------------------------------------------------  IN:    Oral Fluid: 320 mL  Total IN: 320 mL    OUT:    Voided: 125 mL  Total OUT: 125 mL    Total NET: 195 mL          MEDICATIONS  (STANDING):  acetaminophen   Tablet .. 650 milliGRAM(s) Oral every 6 hours  amLODIPine   Tablet 5 milliGRAM(s) Oral daily  apixaban 2.5 milliGRAM(s) Oral every 12 hours  dextrose 5%. 1000 milliLiter(s) (50 mL/Hr) IV Continuous <Continuous>  dextrose 50% Injectable 12.5 Gram(s) IV Push once  dextrose 50% Injectable 25 Gram(s) IV Push once  dextrose 50% Injectable 25 Gram(s) IV Push once  diltiazem    Tablet 60 milliGRAM(s) Oral every 6 hours  erythromycin     base Tablet 250 milliGRAM(s) Oral every 8 hours  insulin lispro (HumaLOG) corrective regimen sliding scale   SubCutaneous three times a day before meals  insulin lispro (HumaLOG) corrective regimen sliding scale   SubCutaneous at bedtime  lactulose Syrup 20 Gram(s) Oral daily  megestrol Suspension 800 milliGRAM(s) Oral daily  meropenem  IVPB      meropenem  IVPB 1000 milliGRAM(s) IV Intermittent every 8 hours  metoprolol tartrate 75 milliGRAM(s) Oral daily  metoprolol tartrate 50 milliGRAM(s) Oral at bedtime  ondansetron Injectable 4 milliGRAM(s) IV Push every 6 hours  pantoprazole    Tablet 40 milliGRAM(s) Oral before breakfast  polyethylene glycol 3350 17 Gram(s) Oral two times a day    MEDICATIONS  (PRN):  dextrose 40% Gel 15 Gram(s) Oral once PRN Blood Glucose LESS THAN 70 milliGRAM(s)/deciliter  diphenhydrAMINE   Injectable 25 milliGRAM(s) IV Push every 6 hours PRN Rash and/or Itching  glucagon  Injectable 1 milliGRAM(s) IntraMuscular once PRN Glucose LESS THAN 70 milligrams/deciliter  HYDROmorphone  Injectable 0.25 milliGRAM(s) IV Push every 4 hours PRN breakthrough pain  oxyCODONE    IR 5 milliGRAM(s) Oral every 4 hours PRN Moderate Pain (4 - 6)  oxyCODONE    IR 10 milliGRAM(s) Oral every 4 hours PRN Severe Pain (7 - 10)                            8.7    8.1   )-----------( 455      ( 11 Oct 2018 22:36 )             24.8       10-11    141  |  105  |  13  ----------------------------<  170<H>  4.1   |  23  |  1.29    Ca    8.4      11 Oct 2018 22:36  Phos  2.8     10-11  Mg     1.9     10-11    TPro  5.8<L>  /  Alb  2.9<L>  /  TBili  0.9  /  DBili  0.4<H>  /  AST  17  /  ALT  22  /  AlkPhos  124<H>  10-11      Physical Exam:  Gen: Laying in bed, NAD  Resp: Unlabored breathing  Abd: soft, NTND, midline incision c/d/i, no rebound or guarding  Ext: WWP  Skin: No rashes

## 2018-10-12 NOTE — PROGRESS NOTE ADULT - ASSESSMENT
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     -Pain control with PO pain meds.   -c/w regular diet  -remove staples from midline  -Encourage OOB to chair.   -Monitor and replete electrolytes.   -DVT ppx: home apixaban started for a fib, sequential compression devices.   -d/c today    [facility] [phone]

## 2018-10-12 NOTE — PROGRESS NOTE ADULT - PROVIDER SPECIALTY LIST ADULT
Anesthesia
Cardiology
Critical Care
SICU
Surgery
Urology
Anesthesia
Anesthesia
Surgery

## 2018-10-21 ENCOUNTER — TRANSCRIPTION ENCOUNTER (OUTPATIENT)
Age: 78
End: 2018-10-21

## 2018-10-21 ENCOUNTER — EMERGENCY (EMERGENCY)
Facility: HOSPITAL | Age: 78
LOS: 1 days | Discharge: HOME | End: 2018-10-21
Attending: EMERGENCY MEDICINE
Payer: MEDICARE

## 2018-10-21 VITALS
HEART RATE: 118 BPM | OXYGEN SATURATION: 100 % | DIASTOLIC BLOOD PRESSURE: 94 MMHG | RESPIRATION RATE: 20 BRPM | TEMPERATURE: 98 F | SYSTOLIC BLOOD PRESSURE: 148 MMHG

## 2018-10-21 VITALS
HEIGHT: 72 IN | SYSTOLIC BLOOD PRESSURE: 140 MMHG | HEART RATE: 116 BPM | WEIGHT: 186.95 LBS | DIASTOLIC BLOOD PRESSURE: 55 MMHG | TEMPERATURE: 98 F | RESPIRATION RATE: 19 BRPM | OXYGEN SATURATION: 99 %

## 2018-10-21 DIAGNOSIS — Z98.890 OTHER SPECIFIED POSTPROCEDURAL STATES: Chronic | ICD-10-CM

## 2018-10-21 DIAGNOSIS — Z96.652 PRESENCE OF LEFT ARTIFICIAL KNEE JOINT: Chronic | ICD-10-CM

## 2018-10-21 LAB
ALBUMIN SERPL ELPH-MCNC: 3.7 G/DL — SIGNIFICANT CHANGE UP (ref 3.3–5)
ALP SERPL-CCNC: 117 U/L — SIGNIFICANT CHANGE UP (ref 40–120)
ALT FLD-CCNC: 36 U/L — SIGNIFICANT CHANGE UP (ref 10–45)
ANION GAP SERPL CALC-SCNC: 16 MMOL/L — SIGNIFICANT CHANGE UP (ref 5–17)
AST SERPL-CCNC: 21 U/L — SIGNIFICANT CHANGE UP (ref 10–40)
BASOPHILS # BLD AUTO: 0 K/UL — SIGNIFICANT CHANGE UP (ref 0–0.2)
BASOPHILS NFR BLD AUTO: 0.2 % — SIGNIFICANT CHANGE UP (ref 0–2)
BILIRUB SERPL-MCNC: 0.7 MG/DL — SIGNIFICANT CHANGE UP (ref 0.2–1.2)
BUN SERPL-MCNC: 27 MG/DL — HIGH (ref 7–23)
CALCIUM SERPL-MCNC: 8.9 MG/DL — SIGNIFICANT CHANGE UP (ref 8.4–10.5)
CHLORIDE SERPL-SCNC: 105 MMOL/L — SIGNIFICANT CHANGE UP (ref 96–108)
CO2 SERPL-SCNC: 19 MMOL/L — LOW (ref 22–31)
CREAT SERPL-MCNC: 1.19 MG/DL — SIGNIFICANT CHANGE UP (ref 0.5–1.3)
EOSINOPHIL # BLD AUTO: 0.2 K/UL — SIGNIFICANT CHANGE UP (ref 0–0.5)
EOSINOPHIL NFR BLD AUTO: 2.9 % — SIGNIFICANT CHANGE UP (ref 0–6)
GLUCOSE SERPL-MCNC: 211 MG/DL — HIGH (ref 70–99)
HCT VFR BLD CALC: 28.8 % — LOW (ref 39–50)
HGB BLD-MCNC: 9.7 G/DL — LOW (ref 13–17)
LYMPHOCYTES # BLD AUTO: 2.6 K/UL — SIGNIFICANT CHANGE UP (ref 1–3.3)
LYMPHOCYTES # BLD AUTO: 31.3 % — SIGNIFICANT CHANGE UP (ref 13–44)
MCHC RBC-ENTMCNC: 30.5 PG — SIGNIFICANT CHANGE UP (ref 27–34)
MCHC RBC-ENTMCNC: 33.8 GM/DL — SIGNIFICANT CHANGE UP (ref 32–36)
MCV RBC AUTO: 90.2 FL — SIGNIFICANT CHANGE UP (ref 80–100)
MONOCYTES # BLD AUTO: 0.7 K/UL — SIGNIFICANT CHANGE UP (ref 0–0.9)
MONOCYTES NFR BLD AUTO: 8.2 % — SIGNIFICANT CHANGE UP (ref 2–14)
NEUTROPHILS # BLD AUTO: 4.7 K/UL — SIGNIFICANT CHANGE UP (ref 1.8–7.4)
NEUTROPHILS NFR BLD AUTO: 57.4 % — SIGNIFICANT CHANGE UP (ref 43–77)
PLATELET # BLD AUTO: 491 K/UL — HIGH (ref 150–400)
POTASSIUM SERPL-MCNC: 3.7 MMOL/L — SIGNIFICANT CHANGE UP (ref 3.5–5.3)
POTASSIUM SERPL-SCNC: 3.7 MMOL/L — SIGNIFICANT CHANGE UP (ref 3.5–5.3)
PROT SERPL-MCNC: 6.7 G/DL — SIGNIFICANT CHANGE UP (ref 6–8.3)
RBC # BLD: 3.19 M/UL — LOW (ref 4.2–5.8)
RBC # FLD: 13.8 % — SIGNIFICANT CHANGE UP (ref 10.3–14.5)
SODIUM SERPL-SCNC: 140 MMOL/L — SIGNIFICANT CHANGE UP (ref 135–145)
WBC # BLD: 8.2 K/UL — SIGNIFICANT CHANGE UP (ref 3.8–10.5)
WBC # FLD AUTO: 8.2 K/UL — SIGNIFICANT CHANGE UP (ref 3.8–10.5)

## 2018-10-21 PROCEDURE — 80053 COMPREHEN METABOLIC PANEL: CPT

## 2018-10-21 PROCEDURE — 85027 COMPLETE CBC AUTOMATED: CPT

## 2018-10-21 PROCEDURE — 99284 EMERGENCY DEPT VISIT MOD MDM: CPT | Mod: GC

## 2018-10-21 PROCEDURE — 99284 EMERGENCY DEPT VISIT MOD MDM: CPT

## 2018-10-21 NOTE — ED PROVIDER NOTE - GASTROINTESTINAL, MLM
Abdomen soft, non-tender, no guarding. Surical scar with 2 small areas of flutuance suprior portion of incision. slight surrounding erythema. no tenderness. sobeida ctive drainage.

## 2018-10-21 NOTE — ED PROVIDER NOTE - OBJECTIVE STATEMENT
78 M recent whipple 10/3, afib, htn, p.w swelling superior part of surgical scar. No fever/abd pain/nausea/vomiting/diarrhea. Feeling well otherwise. Slight serosanguenous drainage. went to urgent care and sent to ed for eval.

## 2018-10-21 NOTE — ED PROVIDER NOTE - MEDICAL DECISION MAKING DETAILS
78 M recent whipple, with what appears to be seroma suprerir portion of incision, however slight surrounding erythema. will obtain labs, and d/w surg re advanced imaging

## 2018-10-21 NOTE — CONSULT NOTE ADULT - SUBJECTIVE AND OBJECTIVE BOX
GENERAL SURGERY CONSULT NOTE  --------------------------------------------------------------------------------------------    HPI:   Patient is a 78y old  Male who presents with a fluid collection at the superior aspect of his surgical incision.  Patient is POD 18 from a Whipple procedure for cholangiocarcinoma.  Patient did well postoperatively and was discharged home, tolerating PO diet.  He was seen in the office by Dr. George and had his incisional staples removed.  He has since had small amount of serous drainage from his midline wound, which has been getting dressed daily with dry gauze.  Since yesterday, he has had decreased drainage, and had noticed some swelling with a small amount of redness at the top portion of the incision.  He presents to the ER for further evaluation.  Patient states that he had seen his PCP earlier this week and had blood work done, which showed an elevated WBC but no other abnormalities.  He is scheduled for follow up with Dr. George in the first week of November.      Patient denies fevers/chills, denies lightheadedness/dizziness, denies SOB/chest pain, denies nausea/vomiting, denies constipation/diarrhea.  He has been eating well.      ROS: 10-system review is otherwise negative except HPI above.      PAST MEDICAL & SURGICAL HISTORY:  Male stress incontinence  Kidney stone: &quot;passed on own&quot;  Varicose vein: (L) 5 years ago    had venous stripping 2007  Bilateral cataracts: removed with lens implants  Benign colon polyp: removed colonoscopy 2014  Prostate cancer: 2010 prostatectomy  stable  Afib: 2006 s/p ablation 2006 , afib resolved   on Eliquies  Hyperlipidemia: X 4 years not on any meds  GERD (Gastroesophageal Reflux Disease): X 10 years  DM (Diabetes Mellitus): X 3 years  Renal Calculi: 2008  MVP (Mitral Valve Prolapse): X 8 years stable  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 ,2018 Left  Jan2018  right knee  S/P ablation operation for arrhythmia  Male stress incontinence: s/p artifical urinary sphincter 5/2012  Varicose vein-s/p vein stripping 5 years ago  S/P arthroscopy: right shoulder 12/11  S/P prostatectomy: radical robotic 6/10  Cosmetic Surgery: chin implant 2004  S/P Colonoscopy with Polypectomy: 2009  S/P Appendectomy: 1950    FAMILY HISTORY:  No pertinent family history in first degree relatives    ALLERGIES: adhesives (Hives)  No Known Drug Allergies    HOME MEDICATIONS:      acetaminophen 325 mg oral tablet 2 tab(s) orally every 6 hours  	   amLODIPine 5 mg oral tablet 1 tab(s) orally once a day  	   apixaban 2.5 mg oral tablet 1 tab(s) orally every 12 hours  	   Cardizem 60 mg oral tablet 1 tab(s) orally every 6 hours  	   Colace 100 mg oral capsule 1 cap(s) orally 3 times a day  	   erythromycin 250 mg oral tablet 1 tab(s) orally every 8 hours  	   glipiZIDE 5 mg oral tablet 1 tab(s) orally once a day  	   megestrol 40 mg/mL oral suspension 20 milliliter(s) orally once a day  	   metoprolol tartrate 50 mg oral tablet 1 tab(s) orally once a day (at bedtime)  	   metoprolol tartrate 75 mg oral tablet 1 tab(s) orally once a day  	   oxyCODONE 5 mg oral tablet 1-2  tab(s) orally every 4 hours, As needed, Moderate Pain (4 - 6) MDD:6  	   pantoprazole 40 mg oral delayed release tablet 1 tab(s) orally once a day (before a meal)  	   polyethylene glycol 3350 oral powder for reconstitution 17 gram(s) orally 2 times a day  	   senna 8.6 mg oral tablet 1 tab(s) orally once a day (at bedtime)  	    --------------------------------------------------------------------------------------------    Vitals:   T(C): 37.2 (10-21-18 @ 18:58), Max: 37.2 (10-21-18 @ 18:58)  HR: 119 (10-21-18 @ 21:06) (90 - 119)  BP: 145/93 (10-21-18 @ 21:06) (140/55 - 148/76)  RR: 22 (10-21-18 @ 21:06) (18 - 22)  SpO2: 100% (10-21-18 @ 21:06) (99% - 100%)    Height (cm): 182.88 (10-21 @ 18:17)  Weight (kg): 84.8 (10-21 @ 18:17)  BMI (kg/m2): 25.4 (10-21 @ 18:17)  BSA (m2): 2.07 (10-21 @ 18:17)    PHYSICAL EXAM:   General: NAD, Lying in bed comfortably  Neuro: A+Ox3  HEENT: NC/AT, EOMI  Cardio: RRR  Resp: Good effort b/l  GI/Abd: Soft, NT/ND, no rebound/guarding, midline incision healing well with small amount of serous drainage from inferior aspect, 2 areas of fluctuance about 1cm in size at superior aspect with small amount of surrounding erythema.    Vascular: All 4 extremities warm.  Musculoskeletal: All 4 extremities moving spontaneously, no limitations  --------------------------------------------------------------------------------------------    LABS  CBC (10-21 @ 19:36)                              9.7<L>                         8.2     )----------------(  491<H>     57.4  % Neutrophils, 31.3  % Lymphocytes, ANC: 4.7                                 28.8<L>    BMP (10-21 @ 19:36)             140     |  105     |  27<H> 		Ca++ --      Ca 8.9                ---------------------------------( 211<H>		Mg --                 3.7     |  19<L>   |  1.19  			Ph --        LFTs (10-21 @ 19:36)      TPro 6.7 / Alb 3.7 / TBili 0.7 / DBili -- / AST 21 / ALT 36 / AlkPhos 117      --------------------------------------------------------------------------------------------

## 2018-10-21 NOTE — ED ADULT TRIAGE NOTE - CHIEF COMPLAINT QUOTE
s/p whipple for "tumor in bile duct" by dr perez on oct 3, 2018, presents for possible incisional infection, "boils on the incision", denies fever/chills/nausea/vomiting

## 2018-10-21 NOTE — ED ADULT NURSE NOTE - NSIMPLEMENTINTERV_GEN_ALL_ED
Implemented All Universal Safety Interventions:  [place] to call system. Call bell, personal items and telephone within reach. Instruct patient to call for assistance. Room bathroom lighting operational. Non-slip footwear when patient is off stretcher. Physically safe environment: no spills, clutter or unnecessary equipment. Stretcher in lowest position, wheels locked, appropriate side rails in place.

## 2018-10-21 NOTE — CONSULT NOTE ADULT - ASSESSMENT
ASSESSMENT: Patient is a 78y old m with small superficial fluid collection at healing surgical incision    PLAN:    - s/p bedside incision/drainage of small areas of fluctuance, with serous fluid removed  - Wound dressed with dry gauze  - No further workup/imaging needed  - No indication for abx.  - okay for d/c home with outpatient f/u as scheduled with Dr. George.  - Patient and plan discussed with Attending, Dr. George.     Alli Jackson MD PGY3  [facility], #[id number]

## 2018-10-21 NOTE — ED ADULT NURSE REASSESSMENT NOTE - NS ED NURSE REASSESS COMMENT FT1
Pt discharged to home, tachycardic, MD Santos aware, pt denies chest pain, palpitation, headache or vision changes

## 2018-10-21 NOTE — ED ADULT NURSE REASSESSMENT NOTE - NS ED NURSE REASSESS COMMENT FT1
Received report from Yris RN, pt resting in stretcher comfortably with wife at bedside, Pt tachycardic, on continuous telemetry monitoring.

## 2018-10-21 NOTE — ED ADULT NURSE NOTE - OBJECTIVE STATEMENT
Patient is s/p whipple procedure 10/3/18 at [facility] for "tumor in bile duct". Today sent from urgent care for redness to abdominal incisional site. Denies any fever, chills, abdominal pain, diarrhea or vomiting. Abdomen is non-tender and soft to palpation. Incision site is mid abdomen, redness with mild streaking noted to top of incisional site with scant amount of serosanguinous drainage on dressing. Patient reports he is eating and drinking well, and other wise feeling well.

## 2018-11-06 ENCOUNTER — APPOINTMENT (OUTPATIENT)
Dept: SURGERY | Facility: CLINIC | Age: 78
End: 2018-11-06
Payer: MEDICARE

## 2018-11-06 VITALS
WEIGHT: 192 LBS | OXYGEN SATURATION: 99 % | HEIGHT: 72 IN | SYSTOLIC BLOOD PRESSURE: 121 MMHG | BODY MASS INDEX: 26.01 KG/M2 | HEART RATE: 59 BPM | DIASTOLIC BLOOD PRESSURE: 75 MMHG

## 2018-11-06 PROCEDURE — 99024 POSTOP FOLLOW-UP VISIT: CPT

## 2018-11-08 ENCOUNTER — RESULT REVIEW (OUTPATIENT)
Age: 78
End: 2018-11-08

## 2018-11-08 ENCOUNTER — LABORATORY RESULT (OUTPATIENT)
Age: 78
End: 2018-11-08

## 2018-11-08 ENCOUNTER — OUTPATIENT (OUTPATIENT)
Dept: OUTPATIENT SERVICES | Facility: HOSPITAL | Age: 78
LOS: 1 days | Discharge: HOME | End: 2018-11-08
Payer: MEDICARE

## 2018-11-08 ENCOUNTER — APPOINTMENT (OUTPATIENT)
Dept: HEMATOLOGY ONCOLOGY | Facility: CLINIC | Age: 78
End: 2018-11-08
Payer: MEDICARE

## 2018-11-08 VITALS
BODY MASS INDEX: 25.49 KG/M2 | HEART RATE: 82 BPM | DIASTOLIC BLOOD PRESSURE: 72 MMHG | WEIGHT: 194.43 LBS | OXYGEN SATURATION: 100 % | RESPIRATION RATE: 16 BRPM | HEIGHT: 73.31 IN | SYSTOLIC BLOOD PRESSURE: 188 MMHG

## 2018-11-08 DIAGNOSIS — Z78.9 OTHER SPECIFIED HEALTH STATUS: ICD-10-CM

## 2018-11-08 DIAGNOSIS — Z87.891 PERSONAL HISTORY OF NICOTINE DEPENDENCE: ICD-10-CM

## 2018-11-08 DIAGNOSIS — Z98.890 OTHER SPECIFIED POSTPROCEDURAL STATES: Chronic | ICD-10-CM

## 2018-11-08 DIAGNOSIS — N17.9 ACUTE KIDNEY FAILURE, UNSPECIFIED: ICD-10-CM

## 2018-11-08 DIAGNOSIS — Z96.652 PRESENCE OF LEFT ARTIFICIAL KNEE JOINT: Chronic | ICD-10-CM

## 2018-11-08 DIAGNOSIS — C25.9 MALIGNANT NEOPLASM OF PANCREAS, UNSPECIFIED: ICD-10-CM

## 2018-11-08 DIAGNOSIS — C61 MALIGNANT NEOPLASM OF PROSTATE: ICD-10-CM

## 2018-11-08 LAB
BASOPHILS # BLD AUTO: 0 K/UL — SIGNIFICANT CHANGE UP (ref 0–0.2)
BASOPHILS NFR BLD AUTO: 0.4 % — SIGNIFICANT CHANGE UP (ref 0–2)
EOSINOPHIL # BLD AUTO: 0.2 K/UL — SIGNIFICANT CHANGE UP (ref 0–0.5)
EOSINOPHIL NFR BLD AUTO: 3.3 % — SIGNIFICANT CHANGE UP (ref 0–6)
HCT VFR BLD CALC: 31.1 % — LOW (ref 39–50)
HGB BLD-MCNC: 11 G/DL — LOW (ref 13–17)
LYMPHOCYTES # BLD AUTO: 2.5 K/UL — SIGNIFICANT CHANGE UP (ref 1–3.3)
LYMPHOCYTES # BLD AUTO: 37.1 % — SIGNIFICANT CHANGE UP (ref 13–44)
MCHC RBC-ENTMCNC: 32.3 PG — SIGNIFICANT CHANGE UP (ref 27–34)
MCHC RBC-ENTMCNC: 35.4 G/DL — SIGNIFICANT CHANGE UP (ref 32–36)
MCV RBC AUTO: 91.2 FL — SIGNIFICANT CHANGE UP (ref 80–100)
MONOCYTES # BLD AUTO: 0.5 K/UL — SIGNIFICANT CHANGE UP (ref 0–0.9)
MONOCYTES NFR BLD AUTO: 8 % — SIGNIFICANT CHANGE UP (ref 2–14)
NEUTROPHILS # BLD AUTO: 3.4 K/UL — SIGNIFICANT CHANGE UP (ref 1.8–7.4)
NEUTROPHILS NFR BLD AUTO: 51.2 % — SIGNIFICANT CHANGE UP (ref 43–77)
PLATELET # BLD AUTO: 174 K/UL — SIGNIFICANT CHANGE UP (ref 150–400)
RBC # BLD: 3.41 M/UL — LOW (ref 4.2–5.8)
RBC # FLD: 13.8 % — SIGNIFICANT CHANGE UP (ref 10.3–14.5)
WBC # BLD: 6.6 K/UL — SIGNIFICANT CHANGE UP (ref 3.8–10.5)
WBC # FLD AUTO: 6.6 K/UL — SIGNIFICANT CHANGE UP (ref 3.8–10.5)

## 2018-11-08 PROCEDURE — 99205 OFFICE O/P NEW HI 60 MIN: CPT

## 2018-11-08 RX ORDER — FLECAINIDE ACETATE 50 MG/1
50 TABLET ORAL
Refills: 0 | Status: DISCONTINUED | COMMUNITY
End: 2018-11-08

## 2018-11-08 RX ORDER — PANCRELIPASE 36000; 180000; 114000 [USP'U]/1; [USP'U]/1; [USP'U]/1
36000-114000 CAPSULE, DELAYED RELEASE PELLETS ORAL
Qty: 180 | Refills: 2 | Status: ACTIVE | COMMUNITY
Start: 2018-11-08 | End: 1900-01-01

## 2018-11-08 RX ORDER — PANTOPRAZOLE 40 MG/1
40 TABLET, DELAYED RELEASE ORAL
Refills: 0 | Status: ACTIVE | COMMUNITY

## 2018-11-08 RX ORDER — GLIPIZIDE 5 MG/1
5 TABLET ORAL
Refills: 0 | Status: DISCONTINUED | COMMUNITY
End: 2018-11-08

## 2018-11-09 PROBLEM — N17.9 ACUTE KIDNEY INJURY: Status: ACTIVE | Noted: 2018-09-13

## 2018-11-09 LAB
ALBUMIN SERPL ELPH-MCNC: 4 G/DL
ALP BLD-CCNC: 99 U/L
ALT SERPL-CCNC: 24 U/L
ANION GAP SERPL CALC-SCNC: 14 MMOL/L
APTT BLD: 33.4 SEC
AST SERPL-CCNC: 14 U/L
BILIRUB SERPL-MCNC: 0.8 MG/DL
BUN SERPL-MCNC: 36 MG/DL
CALCIUM SERPL-MCNC: 9 MG/DL
CANCER AG19-9 SERPL-ACNC: 14.1 U/ML
CEA SERPL-MCNC: 3.4 NG/ML
CHLORIDE SERPL-SCNC: 108 MMOL/L
CO2 SERPL-SCNC: 20 MMOL/L
CREAT SERPL-MCNC: 1.32 MG/DL
FERRITIN SERPL-MCNC: 395 NG/ML
GLUCOSE SERPL-MCNC: 180 MG/DL
HBV CORE IGG+IGM SER QL: NONREACTIVE
HBV CORE IGM SER QL: NONREACTIVE
HBV SURFACE AB SER QL: NONREACTIVE
HBV SURFACE AB SERPL IA-ACNC: <3 MIU/ML
HCV AB SER QL: NONREACTIVE
HCV S/CO RATIO: 0.24 S/CO
INR PPP: 1.13 RATIO
IRON SATN MFR SERPL: 31 %
IRON SERPL-MCNC: 87 UG/DL
POTASSIUM SERPL-SCNC: 4 MMOL/L
PROT SERPL-MCNC: 6.7 G/DL
PT BLD: 12.7 SEC
SODIUM SERPL-SCNC: 142 MMOL/L
TIBC SERPL-MCNC: 279 UG/DL
UIBC SERPL-MCNC: 192 UG/DL

## 2018-11-09 NOTE — REASON FOR VISIT
[Initial Consultation] : an initial consultation [Family Member] : family member [Spouse] : spouse [FreeTextEntry2] : Pancreatic CA

## 2018-11-09 NOTE — CONSULT LETTER
[Dear  ___] : Dear  [unfilled], [Consult Letter:] : I had the pleasure of evaluating your patient, [unfilled]. [Please see my note below.] : Please see my note below. [Sincerely,] : Sincerely, [DrAyaka  ___] : Dr. DUMONT [DrAyaka ___] : Dr. DUMONT [___] : [unfilled]

## 2018-11-09 NOTE — PHYSICAL EXAM
[Restricted in physically strenuous activity but ambulatory and able to carry out work of a light or sedentary nature] : Status 1- Restricted in physically strenuous activity but ambulatory and able to carry out work of a light or sedentary nature, e.g., light house work, office work [Normal] : affect appropriate [de-identified] : surgical incision is well healed.

## 2018-11-09 NOTE — HISTORY OF PRESENT ILLNESS
[T: ___] : T[unfilled] [N: ___] : N[unfilled] [M: ___] : M[unfilled] [AJCC Stage: ____] : AJCC Stage: [unfilled] [Disease: _____________________] : Disease: [unfilled] [de-identified] : Mr Mata is a pleasant 69 year old male with past medical history of HTN, HLD, DM2, Afib on Eliquis, EMANUEL, history of Prostate CA presenting with an initial consultation of Pancreatic CA.  \par \par He was hospitalized at [facility] from 8/9/2018 to 8/14/2018 where he was found to be jaundiced with a CT showing severe extrahepatic biliary duct obstruction with 2 cm dilation of CBD with marked intrahepatic biliary duct dilation. RUQ US showed no evidence of gallstones. 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 During stay at [facility] patient developed cholangitic picture, with spiking fevers. He was started on empiric Cefepime and  transferred to [facility] for ERCP with possible stent and biopsy. ERCP was attempted which revealed a distal CBD stricture that was unable to be traversed for stent placement or biopsy.  Patient underwent percutaneous stent placement by IR into segment 6 of the liver. Patient clinically improved after PTC placement (Bili downtrended from 16 to 5.5). During admission Dr. George was consulted for possible pancreaticoduodenectomy during this hospital stay but patient was found to be high risk for surgery (EF 35%, Severe left ventricular systolic dysfunction and decreased right ventricular systolic function).  Patient was discharged on 8/21 and instructed to follow up with Dr. George as an outpatient to plan surgery.\par He was admitted to [facility] from 8/28 to 9/6 for dehydration and MANNY which resolved and was cleared by nephrology.\par \par On 8/29/18 repeat Echo showed: Normal left ventricular systolic function; EF 60%.\par He was able to undergo Whipple procedure on 10/3/2018.  Post op course was relatively unremarkable and he was hospitalized for over 1 week.  Was later seen in ED on 10/21/2018 with abdominal incision seroma which has now resolved.\par \par Pathology report: \par Gallbladder- Chronic cholecystitis with cholelithiasis, negative for carcinoma, one reactive LN, negative for carcinoma (0/1)\par Bile duct margin- negative for carcinoma\par Head of Pancreas, partial duodenum and jejunum- invasive pancreatic adenocarcinoma, pancreatobiliary type. Moderately to poorly differentiated, tumor size 2.3 cm.  Tumor invading into bile duct and peripancreatic soft tissue.  All surgical resection margins negative for carcinoma.  Metastatic pancreatic adenocarcinoma presenting in 2/14 lymph nodes. Lymphovascular and perineural invasion present.\par AJCC staging: pT2N1(2/18).\par Stomach, antrum: Negative for carcinoma.  Three LN negative for carcinoma (0/3). [de-identified] : adenocarcinoma [de-identified] : Surgeon: Dr Jeff George [phone]\par Nephrology: Dr Dajuan Shepherd [phone]\par Sleep, Pulm: Dr Tyler Aviles [phone]\par Urology: Dr Renato Mcnulty [phone]\par Cardiology: Aaron Guevara; [facility] in [place]\par Endocrine: Hanh Quispe; [phone]\par PCP: Shilpa Willis; [phone]\par EPS/Cardiology: Demarco Sellers; [phone]\par \par Wife - Denise.\par Daughter - Jennifer & Derek \par son - Attila\par \par

## 2018-11-09 NOTE — REVIEW OF SYSTEMS
[Negative] : Allergic/Immunologic [FreeTextEntry2] : Feels pretty well. weight is slowing increasing. Had lost ~25 pounds. [FreeTextEntry7] : has occasional bloating if eats too much. No diarrhea. 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

## 2018-11-11 PROCEDURE — 88331 PATH CONSLTJ SURG 1 BLK 1SPC: CPT

## 2018-11-11 PROCEDURE — 71045 X-RAY EXAM CHEST 1 VIEW: CPT

## 2018-11-11 PROCEDURE — 83036 HEMOGLOBIN GLYCOSYLATED A1C: CPT

## 2018-11-11 PROCEDURE — 93005 ELECTROCARDIOGRAM TRACING: CPT

## 2018-11-11 PROCEDURE — 97110 THERAPEUTIC EXERCISES: CPT

## 2018-11-11 PROCEDURE — 84132 ASSAY OF SERUM POTASSIUM: CPT

## 2018-11-11 PROCEDURE — 84295 ASSAY OF SERUM SODIUM: CPT

## 2018-11-11 PROCEDURE — 85027 COMPLETE CBC AUTOMATED: CPT

## 2018-11-11 PROCEDURE — 86923 COMPATIBILITY TEST ELECTRIC: CPT

## 2018-11-11 PROCEDURE — 85730 THROMBOPLASTIN TIME PARTIAL: CPT

## 2018-11-11 PROCEDURE — 97116 GAIT TRAINING THERAPY: CPT

## 2018-11-11 PROCEDURE — 82550 ASSAY OF CK (CPK): CPT

## 2018-11-11 PROCEDURE — 82803 BLOOD GASES ANY COMBINATION: CPT

## 2018-11-11 PROCEDURE — 82553 CREATINE MB FRACTION: CPT

## 2018-11-11 PROCEDURE — 36430 TRANSFUSION BLD/BLD COMPNT: CPT

## 2018-11-11 PROCEDURE — 88307 TISSUE EXAM BY PATHOLOGIST: CPT

## 2018-11-11 PROCEDURE — 87070 CULTURE OTHR SPECIMN AEROBIC: CPT

## 2018-11-11 PROCEDURE — 82150 ASSAY OF AMYLASE: CPT

## 2018-11-11 PROCEDURE — 88309 TISSUE EXAM BY PATHOLOGIST: CPT

## 2018-11-11 PROCEDURE — P9016: CPT

## 2018-11-11 PROCEDURE — 88305 TISSUE EXAM BY PATHOLOGIST: CPT

## 2018-11-11 PROCEDURE — 80202 ASSAY OF VANCOMYCIN: CPT

## 2018-11-11 PROCEDURE — 87186 SC STD MICRODIL/AGAR DIL: CPT

## 2018-11-11 PROCEDURE — 85014 HEMATOCRIT: CPT

## 2018-11-11 PROCEDURE — 88304 TISSUE EXAM BY PATHOLOGIST: CPT

## 2018-11-11 PROCEDURE — 85610 PROTHROMBIN TIME: CPT

## 2018-11-11 PROCEDURE — 83605 ASSAY OF LACTIC ACID: CPT

## 2018-11-11 PROCEDURE — 94002 VENT MGMT INPAT INIT DAY: CPT

## 2018-11-11 PROCEDURE — 82330 ASSAY OF CALCIUM: CPT

## 2018-11-11 PROCEDURE — 81001 URINALYSIS AUTO W/SCOPE: CPT

## 2018-11-11 PROCEDURE — 88300 SURGICAL PATH GROSS: CPT

## 2018-11-11 PROCEDURE — 80053 COMPREHEN METABOLIC PANEL: CPT

## 2018-11-11 PROCEDURE — 82947 ASSAY GLUCOSE BLOOD QUANT: CPT

## 2018-11-11 PROCEDURE — 82962 GLUCOSE BLOOD TEST: CPT

## 2018-11-11 PROCEDURE — 97162 PT EVAL MOD COMPLEX 30 MIN: CPT

## 2018-11-11 PROCEDURE — 83735 ASSAY OF MAGNESIUM: CPT

## 2018-11-11 PROCEDURE — 80076 HEPATIC FUNCTION PANEL: CPT

## 2018-11-11 PROCEDURE — 84100 ASSAY OF PHOSPHORUS: CPT

## 2018-11-11 PROCEDURE — 82435 ASSAY OF BLOOD CHLORIDE: CPT

## 2018-11-11 PROCEDURE — 80048 BASIC METABOLIC PNL TOTAL CA: CPT

## 2018-11-11 PROCEDURE — 84484 ASSAY OF TROPONIN QUANT: CPT

## 2018-11-11 PROCEDURE — P9047: CPT

## 2018-11-14 ENCOUNTER — OTHER (OUTPATIENT)
Age: 78
End: 2018-11-14

## 2018-11-15 ENCOUNTER — APPOINTMENT (OUTPATIENT)
Dept: ENDOVASCULAR SURGERY | Facility: CLINIC | Age: 78
End: 2018-11-15
Payer: MEDICARE

## 2018-11-15 PROCEDURE — 77001 FLUOROGUIDE FOR VEIN DEVICE: CPT

## 2018-11-15 PROCEDURE — 36561 INSERT TUNNELED CV CATH: CPT

## 2018-11-15 PROCEDURE — 76937 US GUIDE VASCULAR ACCESS: CPT

## 2018-11-27 ENCOUNTER — APPOINTMENT (OUTPATIENT)
Dept: HEMATOLOGY ONCOLOGY | Facility: CLINIC | Age: 78
End: 2018-11-27
Payer: MEDICARE

## 2018-11-27 VITALS
SYSTOLIC BLOOD PRESSURE: 177 MMHG | RESPIRATION RATE: 16 BRPM | OXYGEN SATURATION: 100 % | HEART RATE: 62 BPM | TEMPERATURE: 97.8 F | DIASTOLIC BLOOD PRESSURE: 84 MMHG | BODY MASS INDEX: 26.22 KG/M2 | WEIGHT: 200.4 LBS

## 2018-11-27 DIAGNOSIS — I48.91 UNSPECIFIED ATRIAL FIBRILLATION: ICD-10-CM

## 2018-11-27 DIAGNOSIS — E11.9 TYPE 2 DIABETES MELLITUS W/OUT COMPLICATIONS: ICD-10-CM

## 2018-11-27 PROCEDURE — 99215 OFFICE O/P EST HI 40 MIN: CPT

## 2018-11-27 RX ORDER — FERROUS SULFATE 325(65) MG
325 (65 FE) TABLET ORAL
Refills: 0 | Status: ACTIVE | COMMUNITY

## 2018-11-27 RX ORDER — PROCHLORPERAZINE MALEATE 10 MG/1
10 TABLET ORAL EVERY 6 HOURS
Qty: 30 | Refills: 2 | Status: ACTIVE | COMMUNITY
Start: 2018-11-27 | End: 1900-01-01

## 2018-11-27 RX ORDER — DEXAMETHASONE 4 MG/1
4 TABLET ORAL
Qty: 45 | Refills: 1 | Status: ACTIVE | COMMUNITY
Start: 2018-11-27 | End: 1900-01-01

## 2018-11-27 RX ORDER — LIDOCAINE AND PRILOCAINE 25; 25 MG/G; MG/G
2.5-2.5 CREAM TOPICAL
Qty: 1 | Refills: 3 | Status: ACTIVE | COMMUNITY
Start: 2018-11-27 | End: 1900-01-01

## 2018-11-27 RX ORDER — ERYTHROMYCIN 500 MG/1
500 TABLET, DELAYED RELEASE ORAL 3 TIMES DAILY
Qty: 90 | Refills: 2 | Status: DISCONTINUED | COMMUNITY
Start: 2018-09-24 | End: 2018-11-27

## 2018-11-27 RX ORDER — PANTOPRAZOLE 40 MG/1
40 TABLET, DELAYED RELEASE ORAL DAILY
Qty: 1 | Refills: 0 | Status: DISCONTINUED | COMMUNITY
Start: 2018-09-13 | End: 2018-11-27

## 2018-11-27 NOTE — PHYSICAL EXAM
[Restricted in physically strenuous activity but ambulatory and able to carry out work of a light or sedentary nature] : Status 1- Restricted in physically strenuous activity but ambulatory and able to carry out work of a light or sedentary nature, e.g., light house work, office work [Normal] : affect appropriate [de-identified] : surgical incision is well healed.

## 2018-11-27 NOTE — REASON FOR VISIT
[Spouse] : spouse [Family Member] : family member [Follow-Up Visit] : a follow-up [FreeTextEntry2] : Pancreatic CA

## 2018-11-27 NOTE — REVIEW OF SYSTEMS
[Negative] : Allergic/Immunologic [FreeTextEntry2] : Feels pretty well. weight is slowing increasing. Had lost ~25 pounds. [FreeTextEntry7] : has occasional bloating if eats too much. No diarrhea. 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

## 2018-11-27 NOTE — HISTORY OF PRESENT ILLNESS
[Disease: _____________________] : Disease: [unfilled] [T: ___] : T[unfilled] [N: ___] : N[unfilled] [M: ___] : M[unfilled] [AJCC Stage: ____] : AJCC Stage: [unfilled] [de-identified] : Mr Mata is a pleasant 69 year old male with past medical history of HTN, HLD, DM2, Afib on Eliquis, EMANUEL, history of Prostate CA presenting with an initial consultation of Pancreatic CA.  \par \par He was hospitalized at [facility] from 8/9/2018 to 8/14/2018 where he was found to be jaundiced with a CT showing severe extrahepatic biliary duct obstruction with 2 cm dilation of CBD with marked intrahepatic biliary duct dilation. RUQ US showed no evidence of gallstones. 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 During stay at [facility] patient developed cholangitic picture, with spiking fevers. He was started on empiric Cefepime and  transferred to [facility] for ERCP with possible stent and biopsy. ERCP was attempted which revealed a distal CBD stricture that was unable to be traversed for stent placement or biopsy.  Patient underwent percutaneous stent placement by IR into segment 6 of the liver. Patient clinically improved after PTC placement (Bili downtrended from 16 to 5.5). During admission Dr. George was consulted for possible pancreaticoduodenectomy during this hospital stay but patient was found to be high risk for surgery (EF 35%, Severe left ventricular systolic dysfunction and decreased right ventricular systolic function).  Patient was discharged on 8/21 and instructed to follow up with Dr. George as an outpatient to plan surgery.\par He was admitted to [facility] from 8/28 to 9/6 for dehydration and MNANY which resolved and was cleared by nephrology.\par \par On 8/29/18 repeat Echo showed: Normal left ventricular systolic function; EF 60%.\par He was able to undergo Whipple procedure on 10/3/2018.  Post op course was relatively unremarkable and he was hospitalized for over 1 week.  Was later seen in ED on 10/21/2018 with abdominal incision seroma which has now resolved.\par \par Pathology report: \par Gallbladder- Chronic cholecystitis with cholelithiasis, negative for carcinoma, one reactive LN, negative for carcinoma (0/1)\par Bile duct margin- negative for carcinoma\par Head of Pancreas, partial duodenum and jejunum- invasive pancreatic adenocarcinoma, pancreatobiliary type. Moderately to poorly differentiated, tumor size 2.3 cm.  Tumor invading into bile duct and peripancreatic soft tissue.  All surgical resection margins negative for carcinoma.  Metastatic pancreatic adenocarcinoma presenting in 2/14 lymph nodes. Lymphovascular and perineural invasion present.\par AJCC staging: pT2N1(2/18).\par Stomach, antrum: Negative for carcinoma.  Three LN negative for carcinoma (0/3).\par \par 11/27/2018: patient for follow-up of pancreas cancer. He had Mediport placed. He feels well today. He is eating well and has gained weight. Creon has helped digestion and he has not had cramps unless excessively large meal. He has no pain today, and his surgical incision site is well healed. We discussed at length the FOLFIRINOX regimen and potential/usual side effects. His BP has been running a bit high. He saw his Cardiologist a week ago. The amlodipine was stopped since he was already taking diltiazem. However, Eliquis dose has not been changed yet. Also, diltiazem is given as multiple times daily rather than a single extended release dose. He is due to meet with his Endocrinologist tomorrow. [de-identified] : adenocarcinoma [de-identified] : Surgeon: Dr Jeff George [phone]\par Nephrology: Dr Dajuan Shephred [phone]\par Sleep, Pulm: Dr Tyler Aviles [phone]\par Urology: Dr Renato Mcnulty [phone]\par Cardiology: Aaron Guevara; [facility] in [place]\par Endocrine: Hanh Quispe; [phone]\par PCP: Shilpa Willis; [phone]\par EPS/Cardiology: Demarco Sellers; [phone]\par \par Wife - Denise.\par Daughter - Jennifer & Derek \par son - Attila\par \par

## 2018-12-03 ENCOUNTER — OTHER (OUTPATIENT)
Age: 78
End: 2018-12-03

## 2018-12-03 ENCOUNTER — LABORATORY RESULT (OUTPATIENT)
Age: 78
End: 2018-12-03

## 2018-12-03 ENCOUNTER — RESULT REVIEW (OUTPATIENT)
Age: 78
End: 2018-12-03

## 2018-12-03 ENCOUNTER — APPOINTMENT (OUTPATIENT)
Dept: INFUSION THERAPY | Facility: HOSPITAL | Age: 78
End: 2018-12-03

## 2018-12-03 LAB
BASOPHILS # BLD AUTO: 0 K/UL — SIGNIFICANT CHANGE UP (ref 0–0.2)
BASOPHILS NFR BLD AUTO: 0.9 % — SIGNIFICANT CHANGE UP (ref 0–2)
EOSINOPHIL # BLD AUTO: 0.1 K/UL — SIGNIFICANT CHANGE UP (ref 0–0.5)
EOSINOPHIL NFR BLD AUTO: 2.7 % — SIGNIFICANT CHANGE UP (ref 0–6)
HCT VFR BLD CALC: 33.6 % — LOW (ref 39–50)
HGB BLD-MCNC: 11.6 G/DL — LOW (ref 13–17)
LYMPHOCYTES # BLD AUTO: 1.9 K/UL — SIGNIFICANT CHANGE UP (ref 1–3.3)
LYMPHOCYTES # BLD AUTO: 34.6 % — SIGNIFICANT CHANGE UP (ref 13–44)
MCHC RBC-ENTMCNC: 31.9 PG — SIGNIFICANT CHANGE UP (ref 27–34)
MCHC RBC-ENTMCNC: 34.6 G/DL — SIGNIFICANT CHANGE UP (ref 32–36)
MCV RBC AUTO: 92 FL — SIGNIFICANT CHANGE UP (ref 80–100)
MONOCYTES # BLD AUTO: 0.5 K/UL — SIGNIFICANT CHANGE UP (ref 0–0.9)
MONOCYTES NFR BLD AUTO: 8.7 % — SIGNIFICANT CHANGE UP (ref 2–14)
NEUTROPHILS # BLD AUTO: 2.9 K/UL — SIGNIFICANT CHANGE UP (ref 1.8–7.4)
NEUTROPHILS NFR BLD AUTO: 53.2 % — SIGNIFICANT CHANGE UP (ref 43–77)
PLATELET # BLD AUTO: 196 K/UL — SIGNIFICANT CHANGE UP (ref 150–400)
RBC # BLD: 3.65 M/UL — LOW (ref 4.2–5.8)
RBC # FLD: 13.4 % — SIGNIFICANT CHANGE UP (ref 10.3–14.5)
WBC # BLD: 5.4 K/UL — SIGNIFICANT CHANGE UP (ref 3.8–10.5)
WBC # FLD AUTO: 5.4 K/UL — SIGNIFICANT CHANGE UP (ref 3.8–10.5)

## 2018-12-03 PROCEDURE — 93010 ELECTROCARDIOGRAM REPORT: CPT

## 2018-12-03 RX ORDER — DOCUSATE SODIUM 100 MG
1 CAPSULE ORAL
Qty: 0 | Refills: 0 | COMMUNITY

## 2018-12-03 RX ORDER — AMLODIPINE BESYLATE 2.5 MG/1
1 TABLET ORAL
Qty: 0 | Refills: 0 | COMMUNITY

## 2018-12-03 RX ORDER — SENNA PLUS 8.6 MG/1
1 TABLET ORAL
Qty: 0 | Refills: 0 | COMMUNITY

## 2018-12-04 ENCOUNTER — APPOINTMENT (OUTPATIENT)
Dept: SURGERY | Facility: CLINIC | Age: 78
End: 2018-12-04
Payer: MEDICARE

## 2018-12-04 VITALS
HEART RATE: 59 BPM | HEIGHT: 73.31 IN | RESPIRATION RATE: 16 BRPM | OXYGEN SATURATION: 100 % | WEIGHT: 200 LBS | SYSTOLIC BLOOD PRESSURE: 149 MMHG | DIASTOLIC BLOOD PRESSURE: 81 MMHG | BODY MASS INDEX: 26.22 KG/M2

## 2018-12-04 DIAGNOSIS — Z51.11 ENCOUNTER FOR ANTINEOPLASTIC CHEMOTHERAPY: ICD-10-CM

## 2018-12-04 DIAGNOSIS — C24.9 MALIGNANT NEOPLASM OF BILIARY TRACT, UNSPECIFIED: ICD-10-CM

## 2018-12-04 DIAGNOSIS — R11.2 NAUSEA WITH VOMITING, UNSPECIFIED: ICD-10-CM

## 2018-12-04 PROCEDURE — 99024 POSTOP FOLLOW-UP VISIT: CPT

## 2018-12-05 ENCOUNTER — APPOINTMENT (OUTPATIENT)
Dept: INFUSION THERAPY | Facility: HOSPITAL | Age: 78
End: 2018-12-05

## 2018-12-06 ENCOUNTER — OUTPATIENT (OUTPATIENT)
Dept: OUTPATIENT SERVICES | Facility: HOSPITAL | Age: 78
LOS: 1 days | Discharge: HOME | End: 2018-12-06

## 2018-12-06 DIAGNOSIS — E11.9 TYPE 2 DIABETES MELLITUS WITHOUT COMPLICATIONS: Chronic | ICD-10-CM

## 2018-12-06 DIAGNOSIS — C25.9 MALIGNANT NEOPLASM OF PANCREAS, UNSPECIFIED: ICD-10-CM

## 2018-12-06 DIAGNOSIS — I48.91 UNSPECIFIED ATRIAL FIBRILLATION: Chronic | ICD-10-CM

## 2018-12-06 DIAGNOSIS — I10 ESSENTIAL (PRIMARY) HYPERTENSION: Chronic | ICD-10-CM

## 2018-12-06 DIAGNOSIS — Z98.890 OTHER SPECIFIED POSTPROCEDURAL STATES: Chronic | ICD-10-CM

## 2018-12-06 DIAGNOSIS — Z96.652 PRESENCE OF LEFT ARTIFICIAL KNEE JOINT: Chronic | ICD-10-CM

## 2018-12-07 ENCOUNTER — APPOINTMENT (OUTPATIENT)
Dept: NEPHROLOGY | Facility: CLINIC | Age: 78
End: 2018-12-07

## 2018-12-11 ENCOUNTER — RESULT REVIEW (OUTPATIENT)
Age: 78
End: 2018-12-11

## 2018-12-11 ENCOUNTER — APPOINTMENT (OUTPATIENT)
Dept: HEMATOLOGY ONCOLOGY | Facility: CLINIC | Age: 78
End: 2018-12-11
Payer: MEDICARE

## 2018-12-11 VITALS
WEIGHT: 193.98 LBS | BODY MASS INDEX: 25.38 KG/M2 | TEMPERATURE: 98.1 F | RESPIRATION RATE: 16 BRPM | SYSTOLIC BLOOD PRESSURE: 152 MMHG | DIASTOLIC BLOOD PRESSURE: 74 MMHG | OXYGEN SATURATION: 93 % | HEART RATE: 57 BPM

## 2018-12-11 DIAGNOSIS — B97.89 ACUTE UPPER RESPIRATORY INFECTION, UNSPECIFIED: ICD-10-CM

## 2018-12-11 DIAGNOSIS — J06.9 ACUTE UPPER RESPIRATORY INFECTION, UNSPECIFIED: ICD-10-CM

## 2018-12-11 LAB
BASOPHILS # BLD AUTO: 0 K/UL — SIGNIFICANT CHANGE UP (ref 0–0.2)
BASOPHILS NFR BLD AUTO: 0 % — SIGNIFICANT CHANGE UP (ref 0–2)
EOSINOPHIL # BLD AUTO: 0 K/UL — SIGNIFICANT CHANGE UP (ref 0–0.5)
EOSINOPHIL NFR BLD AUTO: 0 % — SIGNIFICANT CHANGE UP (ref 0–6)
HCT VFR BLD CALC: 34.7 % — LOW (ref 39–50)
HGB BLD-MCNC: 12.2 G/DL — LOW (ref 13–17)
LYMPHOCYTES # BLD AUTO: 1.6 K/UL — SIGNIFICANT CHANGE UP (ref 1–3.3)
LYMPHOCYTES # BLD AUTO: 26.3 % — SIGNIFICANT CHANGE UP (ref 13–44)
MCHC RBC-ENTMCNC: 32.2 PG — SIGNIFICANT CHANGE UP (ref 27–34)
MCHC RBC-ENTMCNC: 35.2 G/DL — SIGNIFICANT CHANGE UP (ref 32–36)
MCV RBC AUTO: 91.6 FL — SIGNIFICANT CHANGE UP (ref 80–100)
MONOCYTES # BLD AUTO: 0.4 K/UL — SIGNIFICANT CHANGE UP (ref 0–0.9)
MONOCYTES NFR BLD AUTO: 6.4 % — SIGNIFICANT CHANGE UP (ref 2–14)
NEUTROPHILS # BLD AUTO: 4 K/UL — SIGNIFICANT CHANGE UP (ref 1.8–7.4)
NEUTROPHILS NFR BLD AUTO: 67.3 % — SIGNIFICANT CHANGE UP (ref 43–77)
PLATELET # BLD AUTO: 220 K/UL — SIGNIFICANT CHANGE UP (ref 150–400)
RBC # BLD: 3.78 M/UL — LOW (ref 4.2–5.8)
RBC # FLD: 12.5 % — SIGNIFICANT CHANGE UP (ref 10.3–14.5)
WBC # BLD: 5.9 K/UL — SIGNIFICANT CHANGE UP (ref 3.8–10.5)
WBC # FLD AUTO: 5.9 K/UL — SIGNIFICANT CHANGE UP (ref 3.8–10.5)

## 2018-12-11 PROCEDURE — 99214 OFFICE O/P EST MOD 30 MIN: CPT

## 2018-12-11 NOTE — PHYSICAL EXAM
[Restricted in physically strenuous activity but ambulatory and able to carry out work of a light or sedentary nature] : Status 1- Restricted in physically strenuous activity but ambulatory and able to carry out work of a light or sedentary nature, e.g., light house work, office work [Normal] : affect appropriate [de-identified] : surgical incision is well healed.

## 2018-12-11 NOTE — HISTORY OF PRESENT ILLNESS
[Disease: _____________________] : Disease: [unfilled] [T: ___] : T[unfilled] [N: ___] : N[unfilled] [M: ___] : M[unfilled] [AJCC Stage: ____] : AJCC Stage: [unfilled] [de-identified] : Mr Mata is a pleasant 69 year old male with past medical history of HTN, HLD, DM2, Afib on Eliquis, EMANUEL, history of Prostate CA presenting with an initial consultation of Pancreatic CA.  \par \par He was hospitalized at [facility] from 8/9/2018 to 8/14/2018 where he was found to be jaundiced with a CT showing severe extrahepatic biliary duct obstruction with 2 cm dilation of CBD with marked intrahepatic biliary duct dilation. RUQ US showed no evidence of gallstones. 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 During stay at [facility] patient developed cholangitic picture, with spiking fevers. He was started on empiric Cefepime and  transferred to [facility] for ERCP with possible stent and biopsy. ERCP was attempted which revealed a distal CBD stricture that was unable to be traversed for stent placement or biopsy.  Patient underwent percutaneous stent placement by IR into segment 6 of the liver. Patient clinically improved after PTC placement (Bili downtrended from 16 to 5.5). During admission Dr. George was consulted for possible pancreaticoduodenectomy during this hospital stay but patient was found to be high risk for surgery (EF 35%, Severe left ventricular systolic dysfunction and decreased right ventricular systolic function).  Patient was discharged on 8/21 and instructed to follow up with Dr. George as an outpatient to plan surgery.\par He was admitted to [facility] from 8/28 to 9/6 for dehydration and MANNY which resolved and was cleared by nephrology.\par \par On 8/29/18 repeat Echo showed: Normal left ventricular systolic function; EF 60%.\par He was able to undergo Whipple procedure on 10/3/2018.  Post op course was relatively unremarkable and he was hospitalized for over 1 week.  Was later seen in ED on 10/21/2018 with abdominal incision seroma which has now resolved.\par \par Pathology report: \par Gallbladder- Chronic cholecystitis with cholelithiasis, negative for carcinoma, one reactive LN, negative for carcinoma (0/1)\par Bile duct margin- negative for carcinoma\par Head of Pancreas, partial duodenum and jejunum- invasive pancreatic adenocarcinoma, pancreatobiliary type. Moderately to poorly differentiated, tumor size 2.3 cm.  Tumor invading into bile duct and peripancreatic soft tissue.  All surgical resection margins negative for carcinoma.  Metastatic pancreatic adenocarcinoma presenting in 2/14 lymph nodes. Lymphovascular and perineural invasion present.\par AJCC staging: pT2N1(2/18).\par Stomach, antrum: Negative for carcinoma.  Three LN negative for carcinoma (0/3).\par \par 11/27/2018: patient for follow-up of pancreas cancer. He had Mediport placed. He feels well today. He is eating well and has gained weight. Creon has helped digestion and he has not had cramps unless excessively large meal. He has no pain today, and his surgical incision site is well healed. We discussed at length the FOLFIRINOX regimen and potential/usual side effects. His BP has been running a bit high. He saw his Cardiologist a week ago. The amlodipine was stopped since he was already taking diltiazem. However, Eliquis dose has not been changed yet. Also, diltiazem is given as multiple times daily rather than a single extended release dose. He is due to meet with his Endocrinologist tomorrow.\par \par 12/11/2018: Mr Mata is here for follow up and to review blood counts.  FOLFIRINOX C#1 was given on 12/3/2018.  Today his main complaints is diarrhea and cough.\par Diarrhea: Noted 3-4 days after C#1 D#3 FOLFIRINOX.  On average having 3-5 BM/day.  BM are "muddy" and have a foul smell.  At baseline Mr Mata has one BM every other day.  Denies any recent sick contacts, or having outside food.  He denies any associated abdominal pain, melena, hematochezia or fever/chills.  Admits to taking OTC Imodium with little to no relief.\par Cough: Has a chronic cough that has progressed over the last couple of days.  Cough is non-productive.  Admits to nasal congestion and denies fever, chills or myalgias. [de-identified] : adenocarcinoma [de-identified] : Surgeon: Dr Jeff George [phone]\par Nephrology: Dr Dajuan Shepherd [phone]\par Sleep, Pulm: Dr Tyler Aviles [phone]\par Urology: Dr Renato Mcnulty [phone]\par Cardiology: Aaron Guevara; [facility] in [place]\par Endocrine: Hanh Quispe; [phone]\par PCP: Shilpa Willis; [phone]\par EPS/Cardiology: Demarco Sellers; [phone]\par \par Wife - Denise.\par Daughter - Jennifer & Derek \par son - Attila\par \par

## 2018-12-11 NOTE — REASON FOR VISIT
[Follow-Up Visit] : a follow-up [Spouse] : spouse [Family Member] : family member [FreeTextEntry2] : Pancreatic CA

## 2018-12-11 NOTE — REVIEW OF SYSTEMS
[Negative] : Allergic/Immunologic [Fatigue] : fatigue [Diarrhea] : diarrhea [Recent Change In Weight] : ~T no recent weight change [FreeTextEntry2] : Admits to mild fatigue and weight loss. [FreeTextEntry7] : 5 episodes of diarrhea over the last 2-3 days. 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

## 2018-12-12 ENCOUNTER — LABORATORY RESULT (OUTPATIENT)
Age: 78
End: 2018-12-12

## 2018-12-12 LAB
CORONAVIRUS (229E,HKU1,NL63,OC43): DETECTED
RAPID RVP RESULT: DETECTED

## 2018-12-13 ENCOUNTER — INPATIENT (INPATIENT)
Facility: HOSPITAL | Age: 78
LOS: 1 days | Discharge: HOME | DRG: 394 | End: 2018-12-15
Attending: INTERNAL MEDICINE | Admitting: INTERNAL MEDICINE
Payer: MEDICARE

## 2018-12-13 VITALS
RESPIRATION RATE: 20 BRPM | HEIGHT: 72 IN | TEMPERATURE: 98 F | OXYGEN SATURATION: 98 % | DIASTOLIC BLOOD PRESSURE: 84 MMHG | HEART RATE: 114 BPM | WEIGHT: 192.9 LBS | SYSTOLIC BLOOD PRESSURE: 152 MMHG

## 2018-12-13 DIAGNOSIS — I10 ESSENTIAL (PRIMARY) HYPERTENSION: Chronic | ICD-10-CM

## 2018-12-13 DIAGNOSIS — Z98.890 OTHER SPECIFIED POSTPROCEDURAL STATES: Chronic | ICD-10-CM

## 2018-12-13 DIAGNOSIS — Z96.652 PRESENCE OF LEFT ARTIFICIAL KNEE JOINT: Chronic | ICD-10-CM

## 2018-12-13 DIAGNOSIS — E11.9 TYPE 2 DIABETES MELLITUS WITHOUT COMPLICATIONS: Chronic | ICD-10-CM

## 2018-12-13 DIAGNOSIS — I48.91 UNSPECIFIED ATRIAL FIBRILLATION: Chronic | ICD-10-CM

## 2018-12-13 DIAGNOSIS — R19.7 DIARRHEA, UNSPECIFIED: ICD-10-CM

## 2018-12-13 LAB
ALBUMIN SERPL ELPH-MCNC: 3.1 G/DL — LOW (ref 3.3–5.2)
ALP SERPL-CCNC: 57 U/L — SIGNIFICANT CHANGE UP (ref 40–120)
ALT FLD-CCNC: 12 U/L — SIGNIFICANT CHANGE UP
ANION GAP SERPL CALC-SCNC: 9 MMOL/L — SIGNIFICANT CHANGE UP (ref 5–17)
ANISOCYTOSIS BLD QL: SLIGHT — SIGNIFICANT CHANGE UP
APPEARANCE UR: CLEAR — SIGNIFICANT CHANGE UP
AST SERPL-CCNC: 8 U/L — SIGNIFICANT CHANGE UP
BACTERIA # UR AUTO: ABNORMAL
BILIRUB SERPL-MCNC: 0.6 MG/DL — SIGNIFICANT CHANGE UP (ref 0.4–2)
BILIRUB UR-MCNC: NEGATIVE — SIGNIFICANT CHANGE UP
BUN SERPL-MCNC: 18 MG/DL — SIGNIFICANT CHANGE UP (ref 8–20)
CALCIUM SERPL-MCNC: 8 MG/DL — LOW (ref 8.6–10.2)
CHLORIDE SERPL-SCNC: 104 MMOL/L — SIGNIFICANT CHANGE UP (ref 98–107)
CO2 SERPL-SCNC: 24 MMOL/L — SIGNIFICANT CHANGE UP (ref 22–29)
COLOR SPEC: YELLOW — SIGNIFICANT CHANGE UP
CREAT SERPL-MCNC: 0.97 MG/DL — SIGNIFICANT CHANGE UP (ref 0.5–1.3)
DIFF PNL FLD: NEGATIVE — SIGNIFICANT CHANGE UP
EOSINOPHIL # BLD AUTO: 0 K/UL — SIGNIFICANT CHANGE UP (ref 0–0.5)
EOSINOPHIL NFR BLD AUTO: 1 % — SIGNIFICANT CHANGE UP (ref 0–6)
EPI CELLS # UR: NEGATIVE — SIGNIFICANT CHANGE UP
GLUCOSE BLDC GLUCOMTR-MCNC: 123 MG/DL — HIGH (ref 70–99)
GLUCOSE BLDC GLUCOMTR-MCNC: 152 MG/DL — HIGH (ref 70–99)
GLUCOSE SERPL-MCNC: 123 MG/DL — HIGH (ref 70–115)
GLUCOSE UR QL: NEGATIVE MG/DL — SIGNIFICANT CHANGE UP
HCT VFR BLD CALC: 31.2 % — LOW (ref 42–52)
HGB BLD-MCNC: 10.7 G/DL — LOW (ref 14–18)
HYPOCHROMIA BLD QL: SLIGHT — SIGNIFICANT CHANGE UP
KETONES UR-MCNC: NEGATIVE — SIGNIFICANT CHANGE UP
LEUKOCYTE ESTERASE UR-ACNC: NEGATIVE — SIGNIFICANT CHANGE UP
LIDOCAIN IGE QN: 6 U/L — LOW (ref 22–51)
LYMPHOCYTES # BLD AUTO: 0.8 K/UL — LOW (ref 1–4.8)
LYMPHOCYTES # BLD AUTO: 41 % — SIGNIFICANT CHANGE UP (ref 20–55)
MACROCYTES BLD QL: SLIGHT — SIGNIFICANT CHANGE UP
MCHC RBC-ENTMCNC: 31.1 PG — HIGH (ref 27–31)
MCHC RBC-ENTMCNC: 34.3 G/DL — SIGNIFICANT CHANGE UP (ref 32–36)
MCV RBC AUTO: 90.7 FL — SIGNIFICANT CHANGE UP (ref 80–94)
MICROCYTES BLD QL: SLIGHT — SIGNIFICANT CHANGE UP
MONOCYTES # BLD AUTO: 0.4 K/UL — SIGNIFICANT CHANGE UP (ref 0–0.8)
MONOCYTES NFR BLD AUTO: 10 % — SIGNIFICANT CHANGE UP (ref 3–10)
NEUTROPHILS # BLD AUTO: 0.8 K/UL — LOW (ref 1.8–8)
NEUTROPHILS NFR BLD AUTO: 41 % — SIGNIFICANT CHANGE UP (ref 37–73)
NEUTS BAND # BLD: 2 % — SIGNIFICANT CHANGE UP (ref 0–8)
NITRITE UR-MCNC: NEGATIVE — SIGNIFICANT CHANGE UP
PH UR: 6 — SIGNIFICANT CHANGE UP (ref 5–8)
PLAT MORPH BLD: NORMAL — SIGNIFICANT CHANGE UP
PLATELET # BLD AUTO: 177 K/UL — SIGNIFICANT CHANGE UP (ref 150–400)
POIKILOCYTOSIS BLD QL AUTO: SLIGHT — SIGNIFICANT CHANGE UP
POTASSIUM SERPL-MCNC: 3.4 MMOL/L — LOW (ref 3.5–5.3)
POTASSIUM SERPL-SCNC: 3.4 MMOL/L — LOW (ref 3.5–5.3)
PROT SERPL-MCNC: 5.6 G/DL — LOW (ref 6.6–8.7)
PROT UR-MCNC: 100 MG/DL
RBC # BLD: 3.44 M/UL — LOW (ref 4.6–6.2)
RBC # FLD: 13.4 % — SIGNIFICANT CHANGE UP (ref 11–15.6)
RBC BLD AUTO: ABNORMAL
RBC CASTS # UR COMP ASSIST: SIGNIFICANT CHANGE UP /HPF (ref 0–4)
SODIUM SERPL-SCNC: 137 MMOL/L — SIGNIFICANT CHANGE UP (ref 135–145)
SP GR SPEC: 1.01 — SIGNIFICANT CHANGE UP (ref 1.01–1.02)
UROBILINOGEN FLD QL: NEGATIVE MG/DL — SIGNIFICANT CHANGE UP
VARIANT LYMPHS # BLD: 5 % — SIGNIFICANT CHANGE UP (ref 0–6)
WBC # BLD: 2.1 K/UL — LOW (ref 4.8–10.8)
WBC # FLD AUTO: 2.1 K/UL — LOW (ref 4.8–10.8)
WBC UR QL: SIGNIFICANT CHANGE UP

## 2018-12-13 PROCEDURE — 99285 EMERGENCY DEPT VISIT HI MDM: CPT

## 2018-12-13 PROCEDURE — 99223 1ST HOSP IP/OBS HIGH 75: CPT | Mod: AI

## 2018-12-13 PROCEDURE — 93010 ELECTROCARDIOGRAM REPORT: CPT

## 2018-12-13 RX ORDER — PANTOPRAZOLE SODIUM 20 MG/1
40 TABLET, DELAYED RELEASE ORAL
Qty: 0 | Refills: 0 | Status: DISCONTINUED | OUTPATIENT
Start: 2018-12-13 | End: 2018-12-15

## 2018-12-13 RX ORDER — DEXTROSE 50 % IN WATER 50 %
12.5 SYRINGE (ML) INTRAVENOUS ONCE
Qty: 0 | Refills: 0 | Status: DISCONTINUED | OUTPATIENT
Start: 2018-12-13 | End: 2018-12-15

## 2018-12-13 RX ORDER — DEXTROSE MONOHYDRATE, SODIUM CHLORIDE, AND POTASSIUM CHLORIDE 50; .745; 4.5 G/1000ML; G/1000ML; G/1000ML
1000 INJECTION, SOLUTION INTRAVENOUS
Qty: 0 | Refills: 0 | Status: DISCONTINUED | OUTPATIENT
Start: 2018-12-13 | End: 2018-12-15

## 2018-12-13 RX ORDER — INSULIN LISPRO 100/ML
VIAL (ML) SUBCUTANEOUS AT BEDTIME
Qty: 0 | Refills: 0 | Status: DISCONTINUED | OUTPATIENT
Start: 2018-12-13 | End: 2018-12-15

## 2018-12-13 RX ORDER — DEXTROSE 50 % IN WATER 50 %
25 SYRINGE (ML) INTRAVENOUS ONCE
Qty: 0 | Refills: 0 | Status: DISCONTINUED | OUTPATIENT
Start: 2018-12-13 | End: 2018-12-15

## 2018-12-13 RX ORDER — APIXABAN 2.5 MG/1
5 TABLET, FILM COATED ORAL EVERY 12 HOURS
Qty: 0 | Refills: 0 | Status: DISCONTINUED | OUTPATIENT
Start: 2018-12-13 | End: 2018-12-15

## 2018-12-13 RX ORDER — GLUCAGON INJECTION, SOLUTION 0.5 MG/.1ML
1 INJECTION, SOLUTION SUBCUTANEOUS ONCE
Qty: 0 | Refills: 0 | Status: DISCONTINUED | OUTPATIENT
Start: 2018-12-13 | End: 2018-12-15

## 2018-12-13 RX ORDER — LIPASE/PROTEASE/AMYLASE 16-48-48K
6 CAPSULE,DELAYED RELEASE (ENTERIC COATED) ORAL THREE TIMES A DAY
Qty: 0 | Refills: 0 | Status: DISCONTINUED | OUTPATIENT
Start: 2018-12-13 | End: 2018-12-15

## 2018-12-13 RX ORDER — LIPASE/PROTEASE/AMYLASE 16-48-48K
2 CAPSULE,DELAYED RELEASE (ENTERIC COATED) ORAL
Qty: 0 | Refills: 0 | Status: DISCONTINUED | OUTPATIENT
Start: 2018-12-13 | End: 2018-12-13

## 2018-12-13 RX ORDER — ONDANSETRON 8 MG/1
4 TABLET, FILM COATED ORAL ONCE
Qty: 0 | Refills: 0 | Status: COMPLETED | OUTPATIENT
Start: 2018-12-13 | End: 2018-12-13

## 2018-12-13 RX ORDER — METOPROLOL TARTRATE 50 MG
50 TABLET ORAL
Qty: 0 | Refills: 0 | Status: DISCONTINUED | OUTPATIENT
Start: 2018-12-13 | End: 2018-12-15

## 2018-12-13 RX ORDER — ONDANSETRON 8 MG/1
4 TABLET, FILM COATED ORAL EVERY 6 HOURS
Qty: 0 | Refills: 0 | Status: DISCONTINUED | OUTPATIENT
Start: 2018-12-13 | End: 2018-12-15

## 2018-12-13 RX ORDER — INSULIN GLARGINE 100 [IU]/ML
16 INJECTION, SOLUTION SUBCUTANEOUS AT BEDTIME
Qty: 0 | Refills: 0 | Status: DISCONTINUED | OUTPATIENT
Start: 2018-12-13 | End: 2018-12-15

## 2018-12-13 RX ORDER — SODIUM CHLORIDE 9 MG/ML
1000 INJECTION, SOLUTION INTRAVENOUS
Qty: 0 | Refills: 0 | Status: DISCONTINUED | OUTPATIENT
Start: 2018-12-13 | End: 2018-12-15

## 2018-12-13 RX ORDER — DEXTROSE 50 % IN WATER 50 %
15 SYRINGE (ML) INTRAVENOUS ONCE
Qty: 0 | Refills: 0 | Status: DISCONTINUED | OUTPATIENT
Start: 2018-12-13 | End: 2018-12-15

## 2018-12-13 RX ORDER — SODIUM CHLORIDE 9 MG/ML
1000 INJECTION INTRAMUSCULAR; INTRAVENOUS; SUBCUTANEOUS ONCE
Qty: 0 | Refills: 0 | Status: COMPLETED | OUTPATIENT
Start: 2018-12-13 | End: 2018-12-13

## 2018-12-13 RX ORDER — INSULIN LISPRO 100/ML
VIAL (ML) SUBCUTANEOUS
Qty: 0 | Refills: 0 | Status: DISCONTINUED | OUTPATIENT
Start: 2018-12-13 | End: 2018-12-15

## 2018-12-13 RX ADMIN — ONDANSETRON 4 MILLIGRAM(S): 8 TABLET, FILM COATED ORAL at 10:49

## 2018-12-13 RX ADMIN — SODIUM CHLORIDE 1000 MILLILITER(S): 9 INJECTION INTRAMUSCULAR; INTRAVENOUS; SUBCUTANEOUS at 11:20

## 2018-12-13 RX ADMIN — Medication 50 MILLIGRAM(S): at 18:48

## 2018-12-13 RX ADMIN — ONDANSETRON 4 MILLIGRAM(S): 8 TABLET, FILM COATED ORAL at 18:44

## 2018-12-13 RX ADMIN — DEXTROSE MONOHYDRATE, SODIUM CHLORIDE, AND POTASSIUM CHLORIDE 75 MILLILITER(S): 50; .745; 4.5 INJECTION, SOLUTION INTRAVENOUS at 15:35

## 2018-12-13 RX ADMIN — SODIUM CHLORIDE 2000 MILLILITER(S): 9 INJECTION INTRAMUSCULAR; INTRAVENOUS; SUBCUTANEOUS at 10:08

## 2018-12-13 NOTE — ED ADULT TRIAGE NOTE - CHIEF COMPLAINT QUOTE
Pt ambulatory in ED c/o diarrhea for approx 1 week, currently being treated with chemotherapy for pancreatic CA. Denies any known fever or chills. Afebrile in triage. Mask applied.

## 2018-12-13 NOTE — ED ADULT NURSE NOTE - CHPI ED NUR SYMPTOMS NEG
no abd pain/no abdominal distension/no fever/no hematuria/no nausea/no burning urination/no vomiting

## 2018-12-13 NOTE — H&P ADULT - NSHPLABSRESULTS_GEN_ALL_CORE
VITALS:  Vital Signs Last 24 Hrs  T(C): 36.4 (13 Dec 2018 08:18), Max: 36.4 (13 Dec 2018 08:18)  T(F): 97.5 (13 Dec 2018 08:18), Max: 97.5 (13 Dec 2018 08:18)  HR: 114 (13 Dec 2018 08:18) (114 - 114)  BP: 152/84 (13 Dec 2018 08:18) (152/84 - 152/84)  BP(mean): --  RR: 20 (13 Dec 2018 08:18) (20 - 20)  SpO2: 98% (13 Dec 2018 08:18) (98% - 98%) Daily Height in cm: 182.88 (13 Dec 2018 08:18)    Daily   CAPILLARY BLOOD GLUCOSE      POCT Blood Glucose.: 123 mg/dL (13 Dec 2018 17:09)    I&O's Summary      LABS:                        10.7   2.1   )-----------( 177      ( 13 Dec 2018 10:15 )             31.2     12-13    137  |  104  |  18.0  ----------------------------<  123<H>  3.4<L>   |  24.0  |  0.97    Ca    8.0<L>      13 Dec 2018 10:15    TPro  5.6<L>  /  Alb  3.1<L>  /  TBili  0.6  /  DBili  x   /  AST  8   /  ALT  12  /  AlkPhos  57  12-13      LIVER FUNCTIONS - ( 13 Dec 2018 10:15 )  Alb: 3.1 g/dL / Pro: 5.6 g/dL / ALK PHOS: 57 U/L / ALT: 12 U/L / AST: 8 U/L / GGT: x           Urinalysis Basic - ( 13 Dec 2018 11:04 )    Color: Yellow / Appearance: Clear / S.015 / pH: x  Gluc: x / Ketone: Negative  / Bili: Negative / Urobili: Negative mg/dL   Blood: x / Protein: 100 mg/dL / Nitrite: Negative   Leuk Esterase: Negative / RBC: 0-2 /HPF / WBC 0-2   Sq Epi: x / Non Sq Epi: Negative / Bacteria: Few            MEDICATIONS:  amylase/lipase/protease  (CREON 12,000 Units) 6 Capsule(s) Oral three times a day  apixaban 5 milliGRAM(s) Oral every 12 hours  dextrose 40% Gel 15 Gram(s) Oral once PRN  dextrose 5%. 1000 milliLiter(s) IV Continuous <Continuous>  dextrose 50% Injectable 12.5 Gram(s) IV Push once  dextrose 50% Injectable 25 Gram(s) IV Push once  dextrose 50% Injectable 25 Gram(s) IV Push once  diltiazem    Tablet 60 milliGRAM(s) Oral every 8 hours  glucagon  Injectable 1 milliGRAM(s) IntraMuscular once PRN  insulin glargine Injectable (LANTUS) 16 Unit(s) SubCutaneous at bedtime  insulin lispro (HumaLOG) corrective regimen sliding scale   SubCutaneous three times a day before meals  insulin lispro (HumaLOG) corrective regimen sliding scale   SubCutaneous at bedtime  metoprolol tartrate 50 milliGRAM(s) Oral two times a day  ondansetron Injectable 4 milliGRAM(s) IV Push every 6 hours PRN  pantoprazole    Tablet 40 milliGRAM(s) Oral before breakfast  sodium chloride 0.45% with potassium chloride 20 mEq/L 1000 milliLiter(s) IV Continuous <Continuous>

## 2018-12-13 NOTE — ED ADULT NURSE NOTE - PSH
Atrial fibrillation    Benign essential hypertension    Cosmetic Surgery  chin implant 2004  Diabetes mellitus    Male stress incontinence  s/p artifical urinary sphincter 5/2012  S/P ablation operation for arrhythmia    S/P Appendectomy  1950  S/P arthroscopy  right shoulder 12/11  S/P Colonoscopy with Polypectomy  2009  S/P prostatectomy  radical robotic 6/10  Status post left knee replacement  June 25 ,2018 Left  Jan2018  right knee  Varicose vein-s/p vein stripping 5 years ago

## 2018-12-13 NOTE — H&P ADULT - ASSESSMENT
> Dehydration - elevated BUN: CR, due to persistent diarrhea.  IVF, monitor BMP.  > Diarrhea - likely side effect of chemotherapy.  Supportive care, encourage po intake.  Stool studies to r/o infectious etiology.  > Diabetes Type II on longterm Insulin Therapy - monitor fingersticks.  Insulin coverage for hyperglycemia.  > Chronic Afib - continue rate control, anticoagulation.   DVT Proph -  on ac.

## 2018-12-13 NOTE — ED PROVIDER NOTE - OBJECTIVE STATEMENT
79 y/o M s/p whipple 3 mo prior on chemo proph following with Dr Clark - onc - 2 weeks of progressive diarrhea on NOVA for a fib recently dx with + corona virus c dif neg unable to keep down much po intake watery stool progressive weakness and dehydration     spoke with onc - would prefer intpt hydration for pt overnight will also get occult blodd given drop in h/h - was done as outpt but result not known

## 2018-12-13 NOTE — ED ADULT NURSE REASSESSMENT NOTE - NS ED NURSE REASSESS COMMENT FT1
Report received from offgoing RN, charting as noted. Patient is A&Ox4, denies any pain.  Pt reports nausea, is hypertensive at ~170s systolic, states he took his own medication around 4pm.  Educated patient on the importance of allowing hospital staff to administer his medications while he is here.  Patient expressed understanding.  Patient is normal sinus with PVCs on cardiac monitor, awaiting bed.

## 2018-12-13 NOTE — ED ADULT NURSE NOTE - OBJECTIVE STATEMENT
pt care assumed at 1016, no apparent distress noted at this time. pt received Alert and Oriented to person, place, situation and time sitting in bed comfortably watching tv with spouse at bedside. pt c/o diarrhea for 1 week. pt states he feels weak and dehydrated. pt states he is currently receiving chemo for pancreatic cancer. pt has right chest wall port. pt has medial abd scar that is healing well, no drainage. HR is regular, lung sounds are clear b/l, abd is soft and nontender with positive bowel sounds in all four quadrants, skin is warm, dry and appropriate for age and race. pt educated on plan of care, plan of care taught back to RN. proficiency determined from successful pt teach back. will continue to educate pt throughout ED stay.

## 2018-12-13 NOTE — ED ADULT NURSE NOTE - NSIMPLEMENTINTERV_GEN_ALL_ED
Implemented All Universal Safety Interventions:  [place] to call system. Call bell, personal items and telephone within reach. Instruct patient to call for assistance. Room bathroom lighting operational. Non-slip footwear when patient is off stretcher. Physically safe environment: no spills, clutter or unnecessary equipment. Stretcher in lowest position, wheels locked, appropriate side rails in place.

## 2018-12-14 LAB
ANION GAP SERPL CALC-SCNC: 12 MMOL/L — SIGNIFICANT CHANGE UP (ref 5–17)
BUN SERPL-MCNC: 18 MG/DL — SIGNIFICANT CHANGE UP (ref 8–20)
C DIFF BY PCR RESULT: SIGNIFICANT CHANGE UP
C DIFF TOX GENS STL QL NAA+PROBE: SIGNIFICANT CHANGE UP
CALCIUM SERPL-MCNC: 7.9 MG/DL — LOW (ref 8.6–10.2)
CHLORIDE SERPL-SCNC: 106 MMOL/L — SIGNIFICANT CHANGE UP (ref 98–107)
CO2 SERPL-SCNC: 20 MMOL/L — LOW (ref 22–29)
CREAT SERPL-MCNC: 0.89 MG/DL — SIGNIFICANT CHANGE UP (ref 0.5–1.3)
DEPRECATED O AND P PREP STL: NORMAL
GLUCOSE BLDC GLUCOMTR-MCNC: 165 MG/DL — HIGH (ref 70–99)
GLUCOSE BLDC GLUCOMTR-MCNC: 170 MG/DL — HIGH (ref 70–99)
GLUCOSE BLDC GLUCOMTR-MCNC: 178 MG/DL — HIGH (ref 70–99)
GLUCOSE BLDC GLUCOMTR-MCNC: 192 MG/DL — HIGH (ref 70–99)
GLUCOSE SERPL-MCNC: 180 MG/DL — HIGH (ref 70–115)
HBA1C BLD-MCNC: 7.1 % — HIGH (ref 4–5.6)
HCT VFR BLD CALC: 31.4 % — LOW (ref 42–52)
HGB BLD-MCNC: 10.6 G/DL — LOW (ref 14–18)
MCHC RBC-ENTMCNC: 30.5 PG — SIGNIFICANT CHANGE UP (ref 27–31)
MCHC RBC-ENTMCNC: 33.8 G/DL — SIGNIFICANT CHANGE UP (ref 32–36)
MCV RBC AUTO: 90.5 FL — SIGNIFICANT CHANGE UP (ref 80–94)
OB PNL STL: NEGATIVE — SIGNIFICANT CHANGE UP
PLATELET # BLD AUTO: 192 K/UL — SIGNIFICANT CHANGE UP (ref 150–400)
POTASSIUM SERPL-MCNC: 3.2 MMOL/L — LOW (ref 3.5–5.3)
POTASSIUM SERPL-SCNC: 3.2 MMOL/L — LOW (ref 3.5–5.3)
RBC # BLD: 3.47 M/UL — LOW (ref 4.6–6.2)
RBC # FLD: 13.2 % — SIGNIFICANT CHANGE UP (ref 11–15.6)
SODIUM SERPL-SCNC: 138 MMOL/L — SIGNIFICANT CHANGE UP (ref 135–145)
WBC # BLD: 1.8 K/UL — LOW (ref 4.8–10.8)
WBC # FLD AUTO: 1.8 K/UL — LOW (ref 4.8–10.8)

## 2018-12-14 PROCEDURE — 99232 SBSQ HOSP IP/OBS MODERATE 35: CPT

## 2018-12-14 RX ORDER — POTASSIUM CHLORIDE 20 MEQ
40 PACKET (EA) ORAL ONCE
Qty: 0 | Refills: 0 | Status: COMPLETED | OUTPATIENT
Start: 2018-12-14 | End: 2018-12-14

## 2018-12-14 RX ADMIN — Medication 1: at 09:13

## 2018-12-14 RX ADMIN — Medication 0: at 21:47

## 2018-12-14 RX ADMIN — Medication 6 CAPSULE(S): at 06:18

## 2018-12-14 RX ADMIN — ONDANSETRON 4 MILLIGRAM(S): 8 TABLET, FILM COATED ORAL at 00:29

## 2018-12-14 RX ADMIN — Medication 6 CAPSULE(S): at 15:53

## 2018-12-14 RX ADMIN — INSULIN GLARGINE 16 UNIT(S): 100 INJECTION, SOLUTION SUBCUTANEOUS at 21:48

## 2018-12-14 RX ADMIN — APIXABAN 5 MILLIGRAM(S): 2.5 TABLET, FILM COATED ORAL at 06:18

## 2018-12-14 RX ADMIN — INSULIN GLARGINE 16 UNIT(S): 100 INJECTION, SOLUTION SUBCUTANEOUS at 00:25

## 2018-12-14 RX ADMIN — Medication 50 MILLIGRAM(S): at 17:15

## 2018-12-14 RX ADMIN — DEXTROSE MONOHYDRATE, SODIUM CHLORIDE, AND POTASSIUM CHLORIDE 75 MILLILITER(S): 50; .745; 4.5 INJECTION, SOLUTION INTRAVENOUS at 06:15

## 2018-12-14 RX ADMIN — Medication 40 MILLIEQUIVALENT(S): at 16:10

## 2018-12-14 RX ADMIN — ONDANSETRON 4 MILLIGRAM(S): 8 TABLET, FILM COATED ORAL at 06:21

## 2018-12-14 RX ADMIN — APIXABAN 5 MILLIGRAM(S): 2.5 TABLET, FILM COATED ORAL at 17:15

## 2018-12-14 RX ADMIN — Medication 1: at 12:24

## 2018-12-14 RX ADMIN — Medication 50 MILLIGRAM(S): at 06:18

## 2018-12-14 RX ADMIN — Medication 1: at 17:16

## 2018-12-14 RX ADMIN — PANTOPRAZOLE SODIUM 40 MILLIGRAM(S): 20 TABLET, DELAYED RELEASE ORAL at 06:19

## 2018-12-14 NOTE — PROGRESS NOTE ADULT - SUBJECTIVE AND OBJECTIVE BOX
Patient: JERRY COPPOLA [id number] 78y Male                           Internal Medicine Hospitalist Progress Note    Initial HPI:  78y Male PMH Pancreatic Cancer s/p Whipple, Chronic Afib on Xarelto, Diabetes, presents c/o persistent diarrhea and poor po intake.  Admitted for diarrhea, dehydration.  Started on IVF.  Overnight, pt reports some improvement in po intake.  Had 6 episodes of watery brown stool since yesterday.  No Abdominal pain, nausea, vomiting.  No additional complaints.     ____________________PHYSICAL EXAM:  Vitals reviewed as indicated below  GENERAL:  NAD Alert and Oriented x 3   HEENT: NCAT  CARDIOVASCULAR:  S1, S2  LUNGS: CTAB  ABDOMEN:  soft, (-) tenderness, (-) distension, (+) bowel sounds, (-) guarding, (-) rebound (-) rigidity  EXTREMITIES:  no cyanosis / clubbing / edema.   ____________________      BACKGROUND:  HEALTH ISSUES - PROBLEM Dx:        Allergies    adhesives (Hives)  No Known Drug Allergies    Intolerances      PAST MEDICAL & SURGICAL HISTORY:  Male stress incontinence  Kidney stone: &quot;passed on own&quot;  Varicose vein: (L) 5 years ago    had venous stripping   Bilateral cataracts: removed with lens implants  Benign colon polyp: removed colonoscopy   Prostate cancer:  prostatectomy  stable  Afib:  s/p ablation  , afib resolved   on Eliquies  Hyperlipidemia: X 4 years not on any meds  GERD (Gastroesophageal Reflux Disease): X 10 years  DM (Diabetes Mellitus): X 3 years  Renal Calculi:   MVP (Mitral Valve Prolapse): X 8 years stable  Diabetes mellitus  Atrial fibrillation  Benign essential hypertension  Status post left knee replacement:  Left    right knee  S/P ablation operation for arrhythmia  Male stress incontinence: s/p artifical urinary sphincter 2012  Varicose vein-s/p vein stripping 5 years ago  S/P arthroscopy: right shoulder   S/P prostatectomy: radical robotic 6/10  Cosmetic Surgery: chin implant   S/P Colonoscopy with Polypectomy:   S/P Appendectomy: 1950        VITALS:  Vital Signs Last 24 Hrs  T(C): 37.5 (14 Dec 2018 15:38), Max: 37.8 (14 Dec 2018 08:30)  T(F): 99.5 (14 Dec 2018 15:38), Max: 100 (14 Dec 2018 08:30)  HR: 113 (14 Dec 2018 15:38) (94 - 113)  BP: 145/84 (14 Dec 2018 15:38) (137/82 - 176/82)  BP(mean): --  RR: 17 (14 Dec 2018 15:38) (17 - 20)  SpO2: 98% (14 Dec 2018 15:38) (94% - 99%) Daily     Daily   CAPILLARY BLOOD GLUCOSE      POCT Blood Glucose.: 178 mg/dL (14 Dec 2018 12:22)  POCT Blood Glucose.: 170 mg/dL (14 Dec 2018 08:36)  POCT Blood Glucose.: 152 mg/dL (13 Dec 2018 23:32)  POCT Blood Glucose.: 123 mg/dL (13 Dec 2018 17:09)    I&O's Summary        LABS:                        10.6   1.8   )-----------( 192      ( 14 Dec 2018 07:39 )             31.4     12-14    138  |  106  |  18.0  ----------------------------<  180<H>  3.2<L>   |  20.0<L>  |  0.89    Ca    7.9<L>      14 Dec 2018 07:39    TPro  5.6<L>  /  Alb  3.1<L>  /  TBili  0.6  /  DBili  x   /  AST  8   /  ALT  12  /  AlkPhos  57  12-13      LIVER FUNCTIONS - ( 13 Dec 2018 10:15 )  Alb: 3.1 g/dL / Pro: 5.6 g/dL / ALK PHOS: 57 U/L / ALT: 12 U/L / AST: 8 U/L / GGT: x           Urinalysis Basic - ( 13 Dec 2018 11:04 )    Color: Yellow / Appearance: Clear / S.015 / pH: x  Gluc: x / Ketone: Negative  / Bili: Negative / Urobili: Negative mg/dL   Blood: x / Protein: 100 mg/dL / Nitrite: Negative   Leuk Esterase: Negative / RBC: 0-2 /HPF / WBC 0-2   Sq Epi: x / Non Sq Epi: Negative / Bacteria: Few              MEDICATIONS:  MEDICATIONS  (STANDING):  amylase/lipase/protease  (CREON 12,000 Units) 6 Capsule(s) Oral three times a day  apixaban 5 milliGRAM(s) Oral every 12 hours  dextrose 5%. 1000 milliLiter(s) (50 mL/Hr) IV Continuous <Continuous>  dextrose 50% Injectable 12.5 Gram(s) IV Push once  dextrose 50% Injectable 25 Gram(s) IV Push once  dextrose 50% Injectable 25 Gram(s) IV Push once  diltiazem    Tablet 60 milliGRAM(s) Oral every 8 hours  insulin glargine Injectable (LANTUS) 16 Unit(s) SubCutaneous at bedtime  insulin lispro (HumaLOG) corrective regimen sliding scale   SubCutaneous three times a day before meals  insulin lispro (HumaLOG) corrective regimen sliding scale   SubCutaneous at bedtime  metoprolol tartrate 50 milliGRAM(s) Oral two times a day  pantoprazole    Tablet 40 milliGRAM(s) Oral before breakfast  sodium chloride 0.45% with potassium chloride 20 mEq/L 1000 milliLiter(s) (75 mL/Hr) IV Continuous <Continuous>    MEDICATIONS  (PRN):  dextrose 40% Gel 15 Gram(s) Oral once PRN Blood Glucose LESS THAN 70 milliGRAM(s)/deciliter  glucagon  Injectable 1 milliGRAM(s) IntraMuscular once PRN Glucose LESS THAN 70 milligrams/deciliter  ondansetron Injectable 4 milliGRAM(s) IV Push every 6 hours PRN Nausea and/or Vomiting

## 2018-12-14 NOTE — PROGRESS NOTE ADULT - ASSESSMENT
> Dehydration - elevated BUN: Cr, unchanged after IVF.  Resume IVF.  Encourage po intake.  Monitor lytes, supplement K.  > Diarrhea - likely side effect of chemotherapy.  Supportive care, encourage po intake.  Stool studies to r/o infectious etiology.  > Diabetes Type II on longterm Insulin Therapy - monitor fingersticks.  Insulin coverage for hyperglycemia.  > Chronic Afib - continue rate control, anticoagulation.   DVT Proph -  on ac.   The Hospitalist Service will assume care of this patient beginning tomorrow.

## 2018-12-15 ENCOUNTER — TRANSCRIPTION ENCOUNTER (OUTPATIENT)
Age: 78
End: 2018-12-15

## 2018-12-15 LAB
ANION GAP SERPL CALC-SCNC: 12 MMOL/L — SIGNIFICANT CHANGE UP (ref 5–17)
ANISOCYTOSIS BLD QL: SLIGHT — SIGNIFICANT CHANGE UP
BASOPHILS NFR BLD AUTO: 1 % — SIGNIFICANT CHANGE UP (ref 0–2)
BUN SERPL-MCNC: 18 MG/DL — SIGNIFICANT CHANGE UP (ref 8–20)
CALCIUM SERPL-MCNC: 7.9 MG/DL — LOW (ref 8.6–10.2)
CHLORIDE SERPL-SCNC: 107 MMOL/L — SIGNIFICANT CHANGE UP (ref 98–107)
CO2 SERPL-SCNC: 21 MMOL/L — LOW (ref 22–29)
CREAT SERPL-MCNC: 1.03 MG/DL — SIGNIFICANT CHANGE UP (ref 0.5–1.3)
EOSINOPHIL NFR BLD AUTO: 2 % — SIGNIFICANT CHANGE UP (ref 0–6)
GLUCOSE BLDC GLUCOMTR-MCNC: 117 MG/DL — HIGH (ref 70–99)
GLUCOSE BLDC GLUCOMTR-MCNC: 159 MG/DL — HIGH (ref 70–99)
GLUCOSE BLDC GLUCOMTR-MCNC: 163 MG/DL — HIGH (ref 70–99)
GLUCOSE SERPL-MCNC: 169 MG/DL — HIGH (ref 70–115)
HCT VFR BLD CALC: 30.1 % — LOW (ref 42–52)
HGB BLD-MCNC: 10.3 G/DL — LOW (ref 14–18)
LYMPHOCYTES # BLD AUTO: 42 % — SIGNIFICANT CHANGE UP (ref 20–55)
MAGNESIUM SERPL-MCNC: 1.4 MG/DL — LOW (ref 1.6–2.6)
MCHC RBC-ENTMCNC: 30.8 PG — SIGNIFICANT CHANGE UP (ref 27–31)
MCHC RBC-ENTMCNC: 34.2 G/DL — SIGNIFICANT CHANGE UP (ref 32–36)
MCV RBC AUTO: 90.1 FL — SIGNIFICANT CHANGE UP (ref 80–94)
METAMYELOCYTES # FLD: 1 % — HIGH (ref 0–0)
MONOCYTES NFR BLD AUTO: 16 % — HIGH (ref 3–10)
NEUTROPHILS NFR BLD AUTO: 22 % — LOW (ref 37–73)
NEUTS BAND # BLD: 14 % — HIGH (ref 0–8)
PHOSPHATE SERPL-MCNC: 2.4 MG/DL — SIGNIFICANT CHANGE UP (ref 2.4–4.7)
PLAT MORPH BLD: NORMAL — SIGNIFICANT CHANGE UP
PLATELET # BLD AUTO: 181 K/UL — SIGNIFICANT CHANGE UP (ref 150–400)
POTASSIUM SERPL-MCNC: 3.2 MMOL/L — LOW (ref 3.5–5.3)
POTASSIUM SERPL-SCNC: 3.2 MMOL/L — LOW (ref 3.5–5.3)
RBC # BLD: 3.34 M/UL — LOW (ref 4.6–6.2)
RBC # FLD: 13.3 % — SIGNIFICANT CHANGE UP (ref 11–15.6)
RBC BLD AUTO: SIGNIFICANT CHANGE UP
SODIUM SERPL-SCNC: 140 MMOL/L — SIGNIFICANT CHANGE UP (ref 135–145)
VARIANT LYMPHS # BLD: 2 % — SIGNIFICANT CHANGE UP (ref 0–6)
WBC # BLD: 2.1 K/UL — LOW (ref 4.8–10.8)
WBC # FLD AUTO: 2.1 K/UL — LOW (ref 4.8–10.8)

## 2018-12-15 PROCEDURE — 83690 ASSAY OF LIPASE: CPT

## 2018-12-15 PROCEDURE — 85027 COMPLETE CBC AUTOMATED: CPT

## 2018-12-15 PROCEDURE — 87493 C DIFF AMPLIFIED PROBE: CPT

## 2018-12-15 PROCEDURE — 84100 ASSAY OF PHOSPHORUS: CPT

## 2018-12-15 PROCEDURE — 93005 ELECTROCARDIOGRAM TRACING: CPT

## 2018-12-15 PROCEDURE — 83036 HEMOGLOBIN GLYCOSYLATED A1C: CPT

## 2018-12-15 PROCEDURE — 80053 COMPREHEN METABOLIC PANEL: CPT

## 2018-12-15 PROCEDURE — 83735 ASSAY OF MAGNESIUM: CPT

## 2018-12-15 PROCEDURE — 87045 FECES CULTURE AEROBIC BACT: CPT

## 2018-12-15 PROCEDURE — 87046 STOOL CULTR AEROBIC BACT EA: CPT

## 2018-12-15 PROCEDURE — 99239 HOSP IP/OBS DSCHRG MGMT >30: CPT

## 2018-12-15 PROCEDURE — 82272 OCCULT BLD FECES 1-3 TESTS: CPT

## 2018-12-15 PROCEDURE — 81001 URINALYSIS AUTO W/SCOPE: CPT

## 2018-12-15 PROCEDURE — 80048 BASIC METABOLIC PNL TOTAL CA: CPT

## 2018-12-15 PROCEDURE — 99285 EMERGENCY DEPT VISIT HI MDM: CPT | Mod: 25

## 2018-12-15 PROCEDURE — 96361 HYDRATE IV INFUSION ADD-ON: CPT

## 2018-12-15 PROCEDURE — 36415 COLL VENOUS BLD VENIPUNCTURE: CPT

## 2018-12-15 PROCEDURE — 96374 THER/PROPH/DIAG INJ IV PUSH: CPT

## 2018-12-15 PROCEDURE — 82962 GLUCOSE BLOOD TEST: CPT

## 2018-12-15 RX ORDER — MAGNESIUM OXIDE 400 MG ORAL TABLET 241.3 MG
1 TABLET ORAL
Qty: 30 | Refills: 0 | OUTPATIENT
Start: 2018-12-15 | End: 2019-01-13

## 2018-12-15 RX ORDER — POTASSIUM CHLORIDE 20 MEQ
10 PACKET (EA) ORAL ONCE
Qty: 0 | Refills: 0 | Status: COMPLETED | OUTPATIENT
Start: 2018-12-15 | End: 2018-12-15

## 2018-12-15 RX ORDER — MAGNESIUM SULFATE 500 MG/ML
2 VIAL (ML) INJECTION ONCE
Qty: 0 | Refills: 0 | Status: COMPLETED | OUTPATIENT
Start: 2018-12-15 | End: 2018-12-15

## 2018-12-15 RX ORDER — LIPASE/PROTEASE/AMYLASE 16-48-48K
2 CAPSULE,DELAYED RELEASE (ENTERIC COATED) ORAL
Qty: 0 | Refills: 0 | COMMUNITY

## 2018-12-15 RX ORDER — POTASSIUM CHLORIDE 20 MEQ
40 PACKET (EA) ORAL EVERY 4 HOURS
Qty: 0 | Refills: 0 | Status: COMPLETED | OUTPATIENT
Start: 2018-12-15 | End: 2018-12-15

## 2018-12-15 RX ORDER — APIXABAN 2.5 MG/1
1 TABLET, FILM COATED ORAL
Qty: 0 | Refills: 0 | COMMUNITY
Start: 2018-12-15

## 2018-12-15 RX ORDER — MAGNESIUM OXIDE 400 MG ORAL TABLET 241.3 MG
400 TABLET ORAL DAILY
Qty: 0 | Refills: 0 | Status: DISCONTINUED | OUTPATIENT
Start: 2018-12-15 | End: 2018-12-15

## 2018-12-15 RX ORDER — LIPASE/PROTEASE/AMYLASE 16-48-48K
6 CAPSULE,DELAYED RELEASE (ENTERIC COATED) ORAL
Qty: 0 | Refills: 0 | COMMUNITY
Start: 2018-12-15

## 2018-12-15 RX ORDER — DILTIAZEM HCL 120 MG
1 CAPSULE, EXT RELEASE 24 HR ORAL
Qty: 0 | Refills: 0 | COMMUNITY
Start: 2018-12-15

## 2018-12-15 RX ORDER — FILGRASTIM 480MCG/1.6
480 VIAL (ML) INJECTION ONCE
Qty: 0 | Refills: 0 | Status: COMPLETED | OUTPATIENT
Start: 2018-12-15 | End: 2018-12-15

## 2018-12-15 RX ORDER — POTASSIUM CHLORIDE 20 MEQ
1 PACKET (EA) ORAL
Qty: 3 | Refills: 0 | OUTPATIENT
Start: 2018-12-15 | End: 2018-12-17

## 2018-12-15 RX ADMIN — Medication 40 MILLIEQUIVALENT(S): at 11:34

## 2018-12-15 RX ADMIN — Medication 40 MILLIEQUIVALENT(S): at 15:26

## 2018-12-15 RX ADMIN — APIXABAN 5 MILLIGRAM(S): 2.5 TABLET, FILM COATED ORAL at 18:08

## 2018-12-15 RX ADMIN — Medication 100 MILLIEQUIVALENT(S): at 18:09

## 2018-12-15 RX ADMIN — Medication 50 GRAM(S): at 16:24

## 2018-12-15 RX ADMIN — Medication 480 MICROGRAM(S): at 16:23

## 2018-12-15 RX ADMIN — Medication 50 GRAM(S): at 11:35

## 2018-12-15 RX ADMIN — APIXABAN 5 MILLIGRAM(S): 2.5 TABLET, FILM COATED ORAL at 05:21

## 2018-12-15 RX ADMIN — MAGNESIUM OXIDE 400 MG ORAL TABLET 400 MILLIGRAM(S): 241.3 TABLET ORAL at 11:35

## 2018-12-15 RX ADMIN — Medication 1: at 08:50

## 2018-12-15 RX ADMIN — Medication 6 CAPSULE(S): at 16:24

## 2018-12-15 RX ADMIN — PANTOPRAZOLE SODIUM 40 MILLIGRAM(S): 20 TABLET, DELAYED RELEASE ORAL at 05:21

## 2018-12-15 RX ADMIN — Medication 6 CAPSULE(S): at 08:49

## 2018-12-15 RX ADMIN — Medication 50 MILLIGRAM(S): at 05:21

## 2018-12-15 RX ADMIN — Medication 1: at 11:36

## 2018-12-15 RX ADMIN — Medication 50 MILLIGRAM(S): at 18:09

## 2018-12-15 NOTE — DISCHARGE NOTE ADULT - MEDICATION SUMMARY - MEDICATIONS TO CHANGE
I will SWITCH the dose or number of times a day I take the medications listed below when I get home from the hospital:    apixaban 2.5 mg oral tablet  -- 1 tab(s) by mouth every 12 hours    metoprolol tartrate 75 mg oral tablet  -- 1 tab(s) by mouth once a day    metoprolol tartrate 50 mg oral tablet  -- 1 tab(s) by mouth once a day (at bedtime)    Cardizem 60 mg oral tablet  -- 1 tab(s) by mouth every 8 hours

## 2018-12-15 NOTE — PROGRESS NOTE ADULT - SUBJECTIVE AND OBJECTIVE BOX
JERRY COPPOLA     Chief Complaint: Patient is a 78y old  Male who presents with a chief complaint of Diarrhea (14 Dec 2018 16:07)      PAST MEDICAL & SURGICAL HISTORY:  Male stress incontinence  Kidney stone: &quot;passed on own&quot;  Varicose vein: (L) 5 years ago    had venous stripping 2007  Bilateral cataracts: removed with lens implants  Benign colon polyp: removed colonoscopy 2014  Prostate cancer: 2010 prostatectomy  stable  Afib: 2006 s/p ablation 2006 , afib resolved   on Eliquies  Hyperlipidemia: X 4 years not on any meds  GERD (Gastroesophageal Reflux Disease): X 10 years  DM (Diabetes Mellitus): X 3 years  Renal Calculi: 2008  MVP (Mitral Valve Prolapse): X 8 years stable  Diabetes mellitus  Atrial fibrillation  Benign essential hypertension  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 ,2018 Left  Jan2018  right knee  S/P ablation operation for arrhythmia  Male stress incontinence: s/p artifical urinary sphincter 5/2012  Varicose vein-s/p vein stripping 5 years ago  S/P arthroscopy: right shoulder 12/11  S/P prostatectomy: radical robotic 6/10  Cosmetic Surgery: chin implant 2004  S/P Colonoscopy with Polypectomy: 2009  S/P Appendectomy: 1950      HPI/OVERNIGHT EVENTS: Patient feeling much better. Diarrhea resolved. I spoke to this outpatient oncologist with whom he has an appointment 12/17. Patient requesting to go home. I will discharge with close outpatient follow up.    MEDICATIONS  (STANDING):  amylase/lipase/protease  (CREON 12,000 Units) 6 Capsule(s) Oral three times a day  apixaban 5 milliGRAM(s) Oral every 12 hours  dextrose 5%. 1000 milliLiter(s) (50 mL/Hr) IV Continuous <Continuous>  dextrose 50% Injectable 12.5 Gram(s) IV Push once  dextrose 50% Injectable 25 Gram(s) IV Push once  dextrose 50% Injectable 25 Gram(s) IV Push once  diltiazem    Tablet 60 milliGRAM(s) Oral every 8 hours  filgrastim-sndz Injectable 480 MICROGram(s) SubCutaneous once  insulin glargine Injectable (LANTUS) 16 Unit(s) SubCutaneous at bedtime  insulin lispro (HumaLOG) corrective regimen sliding scale   SubCutaneous three times a day before meals  insulin lispro (HumaLOG) corrective regimen sliding scale   SubCutaneous at bedtime  magnesium oxide 400 milliGRAM(s) Oral daily  magnesium sulfate  IVPB 2 Gram(s) IV Intermittent once  metoprolol tartrate 50 milliGRAM(s) Oral two times a day  pantoprazole    Tablet 40 milliGRAM(s) Oral before breakfast  potassium chloride   Powder 40 milliEquivalent(s) Oral every 4 hours  potassium chloride  10 mEq/100 mL IVPB 10 milliEquivalent(s) IV Intermittent once  sodium chloride 0.45% with potassium chloride 20 mEq/L 1000 milliLiter(s) (75 mL/Hr) IV Continuous <Continuous>      Vital Signs Last 24 Hrs  T(C): 37.1 (15 Dec 2018 08:19), Max: 37.5 (14 Dec 2018 15:38)  T(F): 98.8 (15 Dec 2018 08:19), Max: 99.5 (14 Dec 2018 15:38)  HR: 58 (15 Dec 2018 08:19) (58 - 116)  BP: 120/74 (15 Dec 2018 08:19) (120/58 - 147/69)  BP(mean): --  RR: 18 (15 Dec 2018 08:19) (17 - 18)  SpO2: 98% (15 Dec 2018 08:19) (98% - 99%)    PHYSICAL EXAM:  HEENT: PERRLA, EOMI, Normal Hearing  Neck: No LAD, No JVD  Back: No CVA tenderness  Respiratory: CTAB Cardiovascular: S1 and S2, RRR, no M/G/R  Gastrointestinal: BS+, soft, NT/ND  Extremities: No peripheral edema  Vascular: 2+ peripheral pulses  Neurological: A/O x 3, no focal deficits        CAPILLARY BLOOD GLUCOSE    LABS:                        10.3   2.1   )-----------( 181      ( 15 Dec 2018 07:07 )             30.1     12-15    140  |  107  |  18.0  ----------------------------<  169<H>  3.2<L>   |  21.0<L>  |  1.03    Ca    7.9<L>      15 Dec 2018 07:07  Phos  2.4     12-15  Mg     1.4     12-15            RADIOLOGY & ADDITIONAL TESTS:

## 2018-12-15 NOTE — DISCHARGE NOTE ADULT - PLAN OF CARE
Resolved Follow up with Oncology Rate control and prevent stroke Continue coumadin Normal Potassium level Normal magnesium levels

## 2018-12-15 NOTE — DISCHARGE NOTE ADULT - MEDICATION SUMMARY - MEDICATIONS TO TAKE
I will START or STAY ON the medications listed below when I get home from the hospital:    olmesartan 20 mg oral tablet  -- 1 tab(s) by mouth once a day  -- Indication: For HTN    dilTIAZem 60 mg oral tablet  -- 1 tab(s) by mouth 2 times a day  -- Indication: For HTN    apixaban 5 mg oral tablet  -- 1 tab(s) by mouth every 12 hours  -- Indication: For A fib    Tresiba FlexTouch 100 units/mL subcutaneous solution  -- 16 unit(s) subcutaneous once a day  -- Indication: For Diabetes    loperamide  -- 2 milligram(s) by mouth 2 times a day  -- Indication: For Diarrhea    metoprolol tartrate 75 mg oral tablet  -- 1 tab(s) by mouth once a day  -- Indication: For HTN    metoprolol tartrate 50 mg oral tablet  -- 1 tab(s) by mouth once a day (at bedtime)  -- Indication: For HTN    pancrelipase 12,000 units-38,000 units-60,000 units oral delayed release capsule  -- 6 cap(s) by mouth 3 times a day  -- Indication: For Pancreatic insufficiency    potassium chloride 20 mEq oral tablet, extended release  -- 1 tab(s) by mouth once a day   -- It is very important that you take or use this exactly as directed.  Do not skip doses or discontinue unless directed by your doctor.  Medication should be taken with plenty of water.  Take with food or milk.    -- Indication: For hypokalemia    magnesium oxide 400 mg (241.3 mg elemental magnesium) oral tablet  -- 1 tab(s) by mouth once a day  -- Indication: For hypomagnesemia

## 2018-12-15 NOTE — DISCHARGE NOTE ADULT - MEDICATION SUMMARY - MEDICATIONS TO STOP TAKING
I will STOP taking the medications listed below when I get home from the hospital:    pantoprazole 40 mg oral delayed release tablet  -- 1 tab(s) by mouth once a day (before a meal)    erythromycin 250 mg oral tablet  -- 1 tab(s) by mouth every 8 hours    polyethylene glycol 3350 oral powder for reconstitution  -- 17 gram(s) by mouth 2 times a day

## 2018-12-15 NOTE — DISCHARGE NOTE ADULT - CARE PLAN
Principal Discharge DX:	Diarrhea, unspecified type  Goal:	Resolved  Assessment and plan of treatment:	Follow up with Oncology  Secondary Diagnosis:	Atrial fibrillation, unspecified type  Goal:	Rate control and prevent stroke  Assessment and plan of treatment:	Continue coumadin  Secondary Diagnosis:	Malignant neoplasm of pancreas, unspecified location of malignancy  Goal:	Follow up with Oncology  Secondary Diagnosis:	Hypokalemia  Goal:	Normal Potassium level  Secondary Diagnosis:	Hypomagnesemia  Goal:	Normal magnesium levels  Secondary Diagnosis:	Pancreatic insufficiency

## 2018-12-15 NOTE — DISCHARGE NOTE ADULT - SECONDARY DIAGNOSIS.
Atrial fibrillation, unspecified type Malignant neoplasm of pancreas, unspecified location of malignancy Hypokalemia Hypomagnesemia Pancreatic insufficiency

## 2018-12-15 NOTE — DISCHARGE NOTE ADULT - HOSPITAL COURSE
> Dehydration - elevated BUN: Cr, unchanged after IVF.  Resume IVF.  Encourage po intake.  Monitor lytes, supplement K.  Resolved  >replace potassium and magnesium aand follow up levels.  > Diarrhea - likely side effect of chemotherapy.  Supportive care, encourage po intake.  Stool studies to r/o infectious etiology.  resolved  > Diabetes Type II on longterm Insulin Therapy - monitor fingersticks.  Insulin coverage for hyperglycemia.  Tolerable glycemia  > Chronic Afib - continue rate control, anticoagulation.   DVT Proph -  on ac.        Attending Attestation:   I was physically present for the key portions of the evaluation and management (E/M) service provided.  I agree with the above history, physical, and plan which I have reviewed and edited where appropriate.     49 minutes spent on total encounter; more than 50% of the visit was spent counseling and/or coordinating care by the attending physician. 78 year old male with pancreatic cancer, adjuvant chemotherapy, now admitted with diarrhea and dehydration  > Dehydration - elevated BUN: Cr, unchanged after IVF.  Resume IVF.  Encourage po intake.  Monitor lytes, supplement K.  Resolved  >replace potassium and magnesium and follow up levels as an outpatient  > Diarrhea - likely side effect of chemotherapy.  Supportive care, encourage po intake.  Stool studies to r/o infectious etiology.  resolved  > Diabetes Type II on long-term Insulin Therapy - monitor fingersticks.  Insulin coverage for hyperglycemia.  Tolerable glycemia  > Chronic Afib - continue rate control, anticoagulation.   DVT Proph -  on ac.        Attending Attestation:   I was physically present for the key portions of the evaluation and management (E/M) service provided.  I agree with the above history, physical, and plan which I have reviewed and edited where appropriate.     49 minutes spent on total encounter; more than 50% of the visit was spent counseling and/or coordinating care by the attending physician.

## 2018-12-15 NOTE — PROGRESS NOTE ADULT - ASSESSMENT
> Dehydration - elevated BUN: Cr, unchanged after IVF.  Resume IVF.  Encourage po intake.  Monitor lytes, supplement K.  Resolved  >replace potassium and magnesium aand follow up levels.  > Diarrhea - likely side effect of chemotherapy.  Supportive care, encourage po intake.  Stool studies to r/o infectious etiology.  resolved  > Diabetes Type II on longterm Insulin Therapy - monitor fingersticks.  Insulin coverage for hyperglycemia.  Tolerable glycemia  > Chronic Afib - continue rate control, anticoagulation.   DVT Proph -  on ac.

## 2018-12-15 NOTE — DISCHARGE NOTE ADULT - PATIENT PORTAL LINK FT
Prostate CA
You can access the [facility] Patient Portal, offered by [facility], by registering with the following website: [email or web address]

## 2018-12-16 VITALS
SYSTOLIC BLOOD PRESSURE: 147 MMHG | RESPIRATION RATE: 17 BRPM | HEART RATE: 77 BPM | DIASTOLIC BLOOD PRESSURE: 82 MMHG | TEMPERATURE: 98 F

## 2018-12-16 LAB
CULTURE RESULTS: SIGNIFICANT CHANGE UP
SPECIMEN SOURCE: SIGNIFICANT CHANGE UP

## 2018-12-17 ENCOUNTER — RESULT REVIEW (OUTPATIENT)
Age: 78
End: 2018-12-17

## 2018-12-17 ENCOUNTER — APPOINTMENT (OUTPATIENT)
Dept: HEMATOLOGY ONCOLOGY | Facility: CLINIC | Age: 78
End: 2018-12-17
Payer: MEDICARE

## 2018-12-17 ENCOUNTER — APPOINTMENT (OUTPATIENT)
Dept: INFUSION THERAPY | Facility: HOSPITAL | Age: 78
End: 2018-12-17

## 2018-12-17 VITALS
SYSTOLIC BLOOD PRESSURE: 133 MMHG | BODY MASS INDEX: 25.73 KG/M2 | TEMPERATURE: 97.7 F | HEART RATE: 65 BPM | OXYGEN SATURATION: 100 % | RESPIRATION RATE: 16 BRPM | WEIGHT: 196.65 LBS | DIASTOLIC BLOOD PRESSURE: 82 MMHG

## 2018-12-17 LAB
BACTERIA STL CULT: NORMAL
BASOPHILS # BLD AUTO: 0 K/UL — SIGNIFICANT CHANGE UP (ref 0–0.2)
BASOPHILS NFR BLD AUTO: 0.2 % — SIGNIFICANT CHANGE UP (ref 0–2)
EOSINOPHIL # BLD AUTO: 0.3 K/UL — SIGNIFICANT CHANGE UP (ref 0–0.5)
EOSINOPHIL NFR BLD AUTO: 3.5 % — SIGNIFICANT CHANGE UP (ref 0–6)
HCT VFR BLD CALC: 31.8 % — LOW (ref 39–50)
HGB BLD-MCNC: 10.9 G/DL — LOW (ref 13–17)
LYMPHOCYTES # BLD AUTO: 1.5 K/UL — SIGNIFICANT CHANGE UP (ref 1–3.3)
LYMPHOCYTES # BLD AUTO: 18 % — SIGNIFICANT CHANGE UP (ref 13–44)
MCHC RBC-ENTMCNC: 31.1 PG — SIGNIFICANT CHANGE UP (ref 27–34)
MCHC RBC-ENTMCNC: 34.4 G/DL — SIGNIFICANT CHANGE UP (ref 32–36)
MCV RBC AUTO: 90.4 FL — SIGNIFICANT CHANGE UP (ref 80–100)
MONOCYTES # BLD AUTO: 0.6 K/UL — SIGNIFICANT CHANGE UP (ref 0–0.9)
MONOCYTES NFR BLD AUTO: 7.6 % — SIGNIFICANT CHANGE UP (ref 2–14)
NEUTROPHILS # BLD AUTO: 6 K/UL — SIGNIFICANT CHANGE UP (ref 1.8–7.4)
NEUTROPHILS NFR BLD AUTO: 70.7 % — SIGNIFICANT CHANGE UP (ref 43–77)
PLATELET # BLD AUTO: 250 K/UL — SIGNIFICANT CHANGE UP (ref 150–400)
RBC # BLD: 3.52 M/UL — LOW (ref 4.2–5.8)
RBC # FLD: 12.5 % — SIGNIFICANT CHANGE UP (ref 10.3–14.5)
WBC # BLD: 8.5 K/UL — SIGNIFICANT CHANGE UP (ref 3.8–10.5)
WBC # FLD AUTO: 8.5 K/UL — SIGNIFICANT CHANGE UP (ref 3.8–10.5)

## 2018-12-17 PROCEDURE — 99214 OFFICE O/P EST MOD 30 MIN: CPT

## 2018-12-17 RX ORDER — INSULIN DEGLUDEC INJECTION 100 U/ML
100 INJECTION, SOLUTION SUBCUTANEOUS
Qty: 1 | Refills: 0 | Status: ACTIVE | COMMUNITY
Start: 2018-11-08

## 2018-12-17 RX ORDER — DILTIAZEM HYDROCHLORIDE 60 MG/1
60 TABLET ORAL
Qty: 60 | Refills: 0 | Status: ACTIVE | COMMUNITY
Start: 2018-12-17

## 2018-12-17 RX ORDER — INSULIN ASPART 100 [IU]/ML
100 INJECTION, SOLUTION INTRAVENOUS; SUBCUTANEOUS
Qty: 1 | Refills: 2 | Status: ACTIVE | COMMUNITY
Start: 2018-12-17

## 2018-12-17 RX ORDER — OLMESARTAN MEDOXOMIL 20 MG/1
20 TABLET, FILM COATED ORAL DAILY
Refills: 0 | Status: ACTIVE | COMMUNITY
Start: 2018-12-17

## 2018-12-17 NOTE — REVIEW OF SYSTEMS
[Fatigue] : fatigue [Diarrhea] : diarrhea [Negative] : Allergic/Immunologic [Recent Change In Weight] : ~T no recent weight change [FreeTextEntry2] : Admits to mild fatigue and weight loss. [FreeTextEntry7] : 5 episodes of diarrhea over the last 2-3 days. 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

## 2018-12-17 NOTE — HISTORY OF PRESENT ILLNESS
[Disease: _____________________] : Disease: [unfilled] [T: ___] : T[unfilled] [N: ___] : N[unfilled] [M: ___] : M[unfilled] [AJCC Stage: ____] : AJCC Stage: [unfilled] [de-identified] : Mr Mata is a pleasant 69 year old male with past medical history of HTN, HLD, DM2, Afib on Eliquis, EMANUEL, history of Prostate CA presenting with an initial consultation of Pancreatic CA.  \par \par He was hospitalized at [facility] from 8/9/2018 to 8/14/2018 where he was found to be jaundiced with a CT showing severe extrahepatic biliary duct obstruction with 2 cm dilation of CBD with marked intrahepatic biliary duct dilation. RUQ US showed no evidence of gallstones. 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 During stay at [facility] patient developed cholangitic picture, with spiking fevers. He was started on empiric Cefepime and  transferred to [facility] for ERCP with possible stent and biopsy. ERCP was attempted which revealed a distal CBD stricture that was unable to be traversed for stent placement or biopsy.  Patient underwent percutaneous stent placement by IR into segment 6 of the liver. Patient clinically improved after PTC placement (Bili downtrended from 16 to 5.5). During admission Dr. George was consulted for possible pancreaticoduodenectomy during this hospital stay but patient was found to be high risk for surgery (EF 35%, Severe left ventricular systolic dysfunction and decreased right ventricular systolic function).  Patient was discharged on 8/21 and instructed to follow up with Dr. George as an outpatient to plan surgery.\par He was admitted to [facility] from 8/28 to 9/6 for dehydration and MANNY which resolved and was cleared by nephrology.\par \par On 8/29/18 repeat Echo showed: Normal left ventricular systolic function; EF 60%.\par He was able to undergo Whipple procedure on 10/3/2018.  Post op course was relatively unremarkable and he was hospitalized for over 1 week.  Was later seen in ED on 10/21/2018 with abdominal incision seroma which has now resolved.\par \par Pathology report: \par Gallbladder- Chronic cholecystitis with cholelithiasis, negative for carcinoma, one reactive LN, negative for carcinoma (0/1)\par Bile duct margin- negative for carcinoma\par Head of Pancreas, partial duodenum and jejunum- invasive pancreatic adenocarcinoma, pancreatobiliary type. Moderately to poorly differentiated, tumor size 2.3 cm.  Tumor invading into bile duct and peripancreatic soft tissue.  All surgical resection margins negative for carcinoma.  Metastatic pancreatic adenocarcinoma presenting in 2/14 lymph nodes. Lymphovascular and perineural invasion present.\par AJCC staging: pT2N1(2/18).\par Stomach, antrum: Negative for carcinoma.  Three LN negative for carcinoma (0/3).\par \par 11/27/2018: patient for follow-up of pancreas cancer. He had Mediport placed. He feels well today. He is eating well and has gained weight. Creon has helped digestion and he has not had cramps unless excessively large meal. He has no pain today, and his surgical incision site is well healed. We discussed at length the FOLFIRINOX regimen and potential/usual side effects. His BP has been running a bit high. He saw his Cardiologist a week ago. The amlodipine was stopped since he was already taking diltiazem. However, Eliquis dose has not been changed yet. Also, diltiazem is given as multiple times daily rather than a single extended release dose. He is due to meet with his Endocrinologist tomorrow.\par \par 12/11/2018: Mr Mata is here for follow up and to review blood counts.  FOLFIRINOX C#1 was given on 12/3/2018.  Today his main complaints is diarrhea and cough.\par Diarrhea: Noted 3-4 days after C#1 D#3 FOLFIRINOX.  On average having 3-5 BM/day.  BM are "muddy" and have a foul smell.  At baseline Mr Mata has one BM every other day.  Denies any recent sick contacts, or having outside food.  He denies any associated abdominal pain, melena, hematochezia or fever/chills.  Admits to taking OTC Imodium with little to no relief.\par Cough: Has a chronic cough that has progressed over the last couple of days.  Cough is non-productive.  Admits to nasal congestion and denies fever, chills or myalgias.\par \par 12/17/2018: Mr Mata is here for follow up.  He was admitted to [facility] from 12/13/2018-12/15/2018 for dehydration/diarrhea. His cough is resolved. His diarrhea is still ongoing but has greatly improved. His weight has remained generally stable. He does not feel that he has returned to 100% strength yet. Of note, it is difficult to determine how much of the diarrhea is related to chemotherapy or to the Corona virus that was found on his RVP testing last week. I had several conversations with his doctors at [facility] and helped to inform them and co-manage his care. He remains mildly hypokalemic and hypomagnesemic. He is instructed to take supplement daily for each for 1 more week. We will delay C#2 today for him to better resolve his toxicities. His blood count did cesar with ANC<1000. It is now much improved after one dose of Zarixo over the weekend at [facility].  [de-identified] : adenocarcinoma [de-identified] : Surgeon: Dr Jeff George [phone]\par Nephrology: Dr Dajuan Shepherd [phone]\par Sleep, Pulm: Dr Tyler Aviles [phone]\par Urology: Dr Renato Mcnulty [phone]\par Cardiology: Aaron Guevara; [facility] in [place]\par Endocrine: Hanh Quispe; [phone]\par PCP: Shilpa Willis; [phone]\par EPS/Cardiology: Demarco Sellers; [phone]\par \par Wife - Denise.\par Daughter - Jennifer & Derek \par son - Attila\par \par

## 2018-12-17 NOTE — PHYSICAL EXAM
[Restricted in physically strenuous activity but ambulatory and able to carry out work of a light or sedentary nature] : Status 1- Restricted in physically strenuous activity but ambulatory and able to carry out work of a light or sedentary nature, e.g., light house work, office work [Normal] : affect appropriate [de-identified] : surgical incision is well healed.

## 2018-12-19 ENCOUNTER — APPOINTMENT (OUTPATIENT)
Dept: INFUSION THERAPY | Facility: HOSPITAL | Age: 78
End: 2018-12-19

## 2018-12-20 ENCOUNTER — LABORATORY RESULT (OUTPATIENT)
Age: 78
End: 2018-12-20

## 2018-12-20 ENCOUNTER — APPOINTMENT (OUTPATIENT)
Dept: INFUSION THERAPY | Facility: HOSPITAL | Age: 78
End: 2018-12-20

## 2018-12-20 ENCOUNTER — RESULT REVIEW (OUTPATIENT)
Age: 78
End: 2018-12-20

## 2018-12-20 LAB
BASOPHILS # BLD AUTO: 0 K/UL — SIGNIFICANT CHANGE UP (ref 0–0.2)
BASOPHILS NFR BLD AUTO: 0.2 % — SIGNIFICANT CHANGE UP (ref 0–2)
EOSINOPHIL # BLD AUTO: 0.4 K/UL — SIGNIFICANT CHANGE UP (ref 0–0.5)
EOSINOPHIL NFR BLD AUTO: 6.1 % — HIGH (ref 0–6)
HCT VFR BLD CALC: 28 % — LOW (ref 39–50)
HGB BLD-MCNC: 10.4 G/DL — LOW (ref 13–17)
LYMPHOCYTES # BLD AUTO: 1.3 K/UL — SIGNIFICANT CHANGE UP (ref 1–3.3)
LYMPHOCYTES # BLD AUTO: 19.3 % — SIGNIFICANT CHANGE UP (ref 13–44)
MCHC RBC-ENTMCNC: 33.4 PG — SIGNIFICANT CHANGE UP (ref 27–34)
MCHC RBC-ENTMCNC: 37.3 G/DL — HIGH (ref 32–36)
MCV RBC AUTO: 89.6 FL — SIGNIFICANT CHANGE UP (ref 80–100)
MONOCYTES # BLD AUTO: 0.4 K/UL — SIGNIFICANT CHANGE UP (ref 0–0.9)
MONOCYTES NFR BLD AUTO: 6.5 % — SIGNIFICANT CHANGE UP (ref 2–14)
NEUTROPHILS # BLD AUTO: 4.6 K/UL — SIGNIFICANT CHANGE UP (ref 1.8–7.4)
NEUTROPHILS NFR BLD AUTO: 67.9 % — SIGNIFICANT CHANGE UP (ref 43–77)
PLATELET # BLD AUTO: 228 K/UL — SIGNIFICANT CHANGE UP (ref 150–400)
RBC # BLD: 3.13 M/UL — LOW (ref 4.2–5.8)
RBC # FLD: 12.7 % — SIGNIFICANT CHANGE UP (ref 10.3–14.5)
WBC # BLD: 6.8 K/UL — SIGNIFICANT CHANGE UP (ref 3.8–10.5)
WBC # FLD AUTO: 6.8 K/UL — SIGNIFICANT CHANGE UP (ref 3.8–10.5)

## 2018-12-21 DIAGNOSIS — R11.2 NAUSEA WITH VOMITING, UNSPECIFIED: ICD-10-CM

## 2018-12-21 DIAGNOSIS — Z51.11 ENCOUNTER FOR ANTINEOPLASTIC CHEMOTHERAPY: ICD-10-CM

## 2018-12-22 ENCOUNTER — APPOINTMENT (OUTPATIENT)
Dept: INFUSION THERAPY | Facility: HOSPITAL | Age: 78
End: 2018-12-22

## 2018-12-30 LAB
C DIFF TOX GENS STL QL NAA+PROBE: NORMAL
CDIFF BY PCR: NOT DETECTED

## 2018-12-31 ENCOUNTER — LABORATORY RESULT (OUTPATIENT)
Age: 78
End: 2018-12-31

## 2019-01-01 LAB — DEPRECATED O AND P PREP STL: NORMAL

## 2019-01-02 LAB — BACTERIA STL CULT: NORMAL

## 2019-01-03 ENCOUNTER — APPOINTMENT (OUTPATIENT)
Dept: INFUSION THERAPY | Facility: HOSPITAL | Age: 79
End: 2019-01-03

## 2019-01-05 ENCOUNTER — APPOINTMENT (OUTPATIENT)
Dept: INFUSION THERAPY | Facility: HOSPITAL | Age: 79
End: 2019-01-05

## 2019-01-07 ENCOUNTER — INBOUND DOCUMENT (OUTPATIENT)
Age: 79
End: 2019-01-07

## 2019-01-11 ENCOUNTER — APPOINTMENT (OUTPATIENT)
Dept: INFECTIOUS DISEASE | Facility: CLINIC | Age: 79
End: 2019-01-11
Payer: MEDICARE

## 2019-01-11 VITALS
HEIGHT: 73 IN | HEART RATE: 57 BPM | DIASTOLIC BLOOD PRESSURE: 75 MMHG | OXYGEN SATURATION: 99 % | BODY MASS INDEX: 25.18 KG/M2 | SYSTOLIC BLOOD PRESSURE: 151 MMHG | WEIGHT: 190 LBS | TEMPERATURE: 97.2 F

## 2019-01-11 DIAGNOSIS — A02.9 SALMONELLA INFECTION, UNSPECIFIED: ICD-10-CM

## 2019-01-11 PROCEDURE — 99204 OFFICE O/P NEW MOD 45 MIN: CPT

## 2019-01-11 RX ORDER — CIPROFLOXACIN HYDROCHLORIDE 500 MG/1
500 TABLET, FILM COATED ORAL TWICE DAILY
Qty: 14 | Refills: 0 | Status: ACTIVE | COMMUNITY
Start: 2018-12-28 | End: 1900-01-01

## 2019-01-11 NOTE — REVIEW OF SYSTEMS
[Recent Weight Loss (___ Lbs)] : recent [unfilled] ~Ulb weight loss [As Noted in HPI] : as noted in HPI [Negative] : Heme/Lymph [Fever] : no fever [Chills] : no chills

## 2019-01-11 NOTE — HISTORY OF PRESENT ILLNESS
[FreeTextEntry1] : This is a 77 yo M with pmh AFIB, s/p ablation and cardioversion, on Eliquis, B/l Knee replacements, prostate cancer s/p prostatectomy 2010, DM2, HTN, HLD, EMANUEL diagnosed with pancreatic cancer s/p Whipple Procedure 10/3/18.  \par \par He was started on chemotherapy with Folfirinox on 12/3/18.  He reported diarrhea and cough at his f/up visit on 12/11/18.  Was having multiple loose stools per day.  Took immodium without relief at that time.  \par He was admitted to [facility] 12/13 - 12/15/18.  Found to have coronavirus.  Overall diarrhea did improve.  Was felt diarrhea may be due to coronavirus +/- chemotherapy.  Stool studies from 12/11/18 revealed a normal culture but from 12/12/18 had salmonella.  \par \par Diarrhea returned and cipro was initiated for treatment. He took cipro x 7 days and did improve.  Started 12/28/18.  Repeat stool studies from 12/29/18 were negative for enteric pathogens, C diff, and ova and parasites.\par \par He now has no diarrhea though doesn't feel completely back to baseline.  Moving his bowels about every other day though.  Last was this morning. Denies fevers and abdominal pain.\par \par Going to his daughter's wedding in [place] next weekend.

## 2019-01-11 NOTE — CONSULT LETTER
[Dear  ___] : Dear  [unfilled], [Consult Letter:] : I had the pleasure of evaluating your patient, [unfilled]. [Please see my note below.] : Please see my note below. [Consult Closing:] : Thank you very much for allowing me to participate in the care of this patient.  If you have any questions, please do not hesitate to contact me. [Sincerely,] : Sincerely, [FreeTextEntry2] : Dr. Mukesh Clark [FreeTextEntry3] : \par Chela Arredondo MD\par  of Medicine\par Division of Infectious Diseases\par [facility] [employer] [facility] [employer] [facility]\par [address] [place] [address]\[place] [address]\par Tel: [phone]\par Fax: [phone]\par Email: [email or web address] \par \par [facility]\par Visit us at [facility] [email or web address]

## 2019-01-11 NOTE — ASSESSMENT
[FreeTextEntry1] : This is a very pleasant 77 yo M with pancreatic cancer s/p chemo x 2 who presents today after a diarrheal illness and stool culture was positive for salmonella.\par \par Unclear how patient contracted salmonella. Wife was not sick. Pt was contacted by [facility] already.\par \par His illness initially improved but then diarrhea returned.  He was treated with a 7 day course of cipro 500 mg po bid.  Overall now he is feeling much better. He has no more diarrhea and in fact is not moving his bowels every day.\par \par I did inform him that sometimes patients can be carriers of salmonella and his infection can return with subsequent doses of chemotherapy. He is aware and understands. A repeat stool culture was negative though and I am hoping this is not the case for him. \par \par He is going to his daughter's wedding next week in [place]. I have given a course of cipro to bring with him in case infection returns but he was informed to call me before starting it.  If diarrhea returns I have also provided him with stool specimen collection kit.\par \par I did call the micro lab and his isolate was sensitive to cipro, bactrim, ceftriaxone, levaquin.  \par If salmonella returns, he will likely need to be treated for a longer course.\par \par His illness seems to be localized to GI tract, however if fever occurs would get blood cultures to r/o bacteremia.\par \par Pt can f/up as needed.  \par D/w pt's oncology team as well.

## 2019-01-11 NOTE — PHYSICAL EXAM
[General Appearance - Alert] : alert [General Appearance - In No Acute Distress] : in no acute distress [General Appearance - Well-Appearing] : healthy appearing [Sclera] : the sclera and conjunctiva were normal [PERRL With Normal Accommodation] : pupils were equal in size, round, reactive to light [Outer Ear] : the ears and nose were normal in appearance [Oropharynx] : the oropharynx was normal with no thrush [Neck Appearance] : the appearance of the neck was normal [Neck Cervical Mass (___cm)] : no neck mass was observed [Jugular Venous Distention Increased] : there was no jugular-venous distention [] : no respiratory distress [Auscultation Breath Sounds / Voice Sounds] : lungs were clear to auscultation bilaterally [Heart Rate And Rhythm] : heart rate was normal and rhythm regular [Heart Sounds] : normal S1 and S2 [Bowel Sounds] : normal bowel sounds [Abdomen Soft] : soft [Abdomen Tenderness] : non-tender [No Focal Deficits] : no focal deficits [Oriented To Time, Place, And Person] : oriented to person, place, and time [Affect] : the affect was normal [FreeTextEntry1] : non-toxic

## 2019-01-14 NOTE — DISCHARGE NOTE ADULT - PATIENT PORTAL LINK FT
Dr. Ackerman
You can access the [facility] Patient Portal, offered by [facility], by registering with the following website: [email or web address]

## 2019-01-15 ENCOUNTER — OUTPATIENT (OUTPATIENT)
Dept: OUTPATIENT SERVICES | Facility: HOSPITAL | Age: 79
LOS: 1 days | Discharge: HOME | End: 2019-01-15

## 2019-01-15 DIAGNOSIS — Z96.652 PRESENCE OF LEFT ARTIFICIAL KNEE JOINT: Chronic | ICD-10-CM

## 2019-01-15 DIAGNOSIS — C25.9 MALIGNANT NEOPLASM OF PANCREAS, UNSPECIFIED: ICD-10-CM

## 2019-01-15 DIAGNOSIS — I48.91 UNSPECIFIED ATRIAL FIBRILLATION: Chronic | ICD-10-CM

## 2019-01-15 DIAGNOSIS — E11.9 TYPE 2 DIABETES MELLITUS WITHOUT COMPLICATIONS: Chronic | ICD-10-CM

## 2019-01-15 DIAGNOSIS — I10 ESSENTIAL (PRIMARY) HYPERTENSION: Chronic | ICD-10-CM

## 2019-01-15 DIAGNOSIS — Z98.890 OTHER SPECIFIED POSTPROCEDURAL STATES: Chronic | ICD-10-CM

## 2019-01-22 ENCOUNTER — RESULT REVIEW (OUTPATIENT)
Age: 79
End: 2019-01-22

## 2019-01-22 ENCOUNTER — APPOINTMENT (OUTPATIENT)
Dept: HEMATOLOGY ONCOLOGY | Facility: CLINIC | Age: 79
End: 2019-01-22
Payer: MEDICARE

## 2019-01-22 VITALS
OXYGEN SATURATION: 99 % | SYSTOLIC BLOOD PRESSURE: 147 MMHG | RESPIRATION RATE: 16 BRPM | TEMPERATURE: 97.5 F | WEIGHT: 201.7 LBS | BODY MASS INDEX: 26.61 KG/M2 | DIASTOLIC BLOOD PRESSURE: 80 MMHG | HEART RATE: 65 BPM

## 2019-01-22 LAB
BASOPHILS # BLD AUTO: 0 K/UL — SIGNIFICANT CHANGE UP (ref 0–0.2)
BASOPHILS NFR BLD AUTO: 0.7 % — SIGNIFICANT CHANGE UP (ref 0–2)
EOSINOPHIL # BLD AUTO: 0.1 K/UL — SIGNIFICANT CHANGE UP (ref 0–0.5)
EOSINOPHIL NFR BLD AUTO: 1.8 % — SIGNIFICANT CHANGE UP (ref 0–6)
HCT VFR BLD CALC: 33.4 % — LOW (ref 39–50)
HGB BLD-MCNC: 11.2 G/DL — LOW (ref 13–17)
LYMPHOCYTES # BLD AUTO: 1.2 K/UL — SIGNIFICANT CHANGE UP (ref 1–3.3)
LYMPHOCYTES # BLD AUTO: 25.2 % — SIGNIFICANT CHANGE UP (ref 13–44)
MCHC RBC-ENTMCNC: 31.3 PG — SIGNIFICANT CHANGE UP (ref 27–34)
MCHC RBC-ENTMCNC: 33.5 G/DL — SIGNIFICANT CHANGE UP (ref 32–36)
MCV RBC AUTO: 93.5 FL — SIGNIFICANT CHANGE UP (ref 80–100)
MONOCYTES # BLD AUTO: 0.5 K/UL — SIGNIFICANT CHANGE UP (ref 0–0.9)
MONOCYTES NFR BLD AUTO: 10.6 % — SIGNIFICANT CHANGE UP (ref 2–14)
NEUTROPHILS # BLD AUTO: 3.1 K/UL — SIGNIFICANT CHANGE UP (ref 1.8–7.4)
NEUTROPHILS NFR BLD AUTO: 61.7 % — SIGNIFICANT CHANGE UP (ref 43–77)
PLATELET # BLD AUTO: 201 K/UL — SIGNIFICANT CHANGE UP (ref 150–400)
RBC # BLD: 3.58 M/UL — LOW (ref 4.2–5.8)
RBC # FLD: 15.1 % — HIGH (ref 10.3–14.5)
WBC # BLD: 5 K/UL — SIGNIFICANT CHANGE UP (ref 3.8–10.5)
WBC # FLD AUTO: 5 K/UL — SIGNIFICANT CHANGE UP (ref 3.8–10.5)

## 2019-01-22 PROCEDURE — 99214 OFFICE O/P EST MOD 30 MIN: CPT

## 2019-01-22 NOTE — PHYSICAL EXAM
[Restricted in physically strenuous activity but ambulatory and able to carry out work of a light or sedentary nature] : Status 1- Restricted in physically strenuous activity but ambulatory and able to carry out work of a light or sedentary nature, e.g., light house work, office work [Normal] : affect appropriate [de-identified] : surgical incision is well healed.

## 2019-01-22 NOTE — HISTORY OF PRESENT ILLNESS
[Disease: _____________________] : Disease: [unfilled] [T: ___] : T[unfilled] [N: ___] : N[unfilled] [M: ___] : M[unfilled] [AJCC Stage: ____] : AJCC Stage: [unfilled] [de-identified] : Mr Mata is a pleasant 69 year old male with past medical history of HTN, HLD, DM2, Afib on Eliquis, EMANUEL, history of Prostate CA presenting with an initial consultation of Pancreatic CA.  \par \par He was hospitalized at [facility] from 8/9/2018 to 8/14/2018 where he was found to be jaundiced with a CT showing severe extrahepatic biliary duct obstruction with 2 cm dilation of CBD with marked intrahepatic biliary duct dilation. RUQ US showed no evidence of gallstones. 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 During stay at [facility] patient developed cholangitic picture, with spiking fevers. He was started on empiric Cefepime and  transferred to [facility] for ERCP with possible stent and biopsy. ERCP was attempted which revealed a distal CBD stricture that was unable to be traversed for stent placement or biopsy.  Patient underwent percutaneous stent placement by IR into segment 6 of the liver. Patient clinically improved after PTC placement (Bili downtrended from 16 to 5.5). During admission Dr. George was consulted for possible pancreaticoduodenectomy during this hospital stay but patient was found to be high risk for surgery (EF 35%, Severe left ventricular systolic dysfunction and decreased right ventricular systolic function).  Patient was discharged on 8/21 and instructed to follow up with Dr. George as an outpatient to plan surgery.\par He was admitted to [facility] from 8/28 to 9/6 for dehydration and MANNY which resolved and was cleared by nephrology.\par \par On 8/29/18 repeat Echo showed: Normal left ventricular systolic function; EF 60%.\par He was able to undergo Whipple procedure on 10/3/2018.  Post op course was relatively unremarkable and he was hospitalized for over 1 week.  Was later seen in ED on 10/21/2018 with abdominal incision seroma which has now resolved.\par \par Pathology report: \par Gallbladder- Chronic cholecystitis with cholelithiasis, negative for carcinoma, one reactive LN, negative for carcinoma (0/1)\par Bile duct margin- negative for carcinoma\par Head of Pancreas, partial duodenum and jejunum- invasive pancreatic adenocarcinoma, pancreatobiliary type. Moderately to poorly differentiated, tumor size 2.3 cm.  Tumor invading into bile duct and peripancreatic soft tissue.  All surgical resection margins negative for carcinoma.  Metastatic pancreatic adenocarcinoma presenting in 2/14 lymph nodes. Lymphovascular and perineural invasion present.\par AJCC staging: pT2N1(2/18).\par Stomach, antrum: Negative for carcinoma.  Three LN negative for carcinoma (0/3).\par \par 11/27/2018: patient for follow-up of pancreas cancer. He had Mediport placed. He feels well today. He is eating well and has gained weight. Creon has helped digestion and he has not had cramps unless excessively large meal. He has no pain today, and his surgical incision site is well healed. We discussed at length the FOLFIRINOX regimen and potential/usual side effects. His BP has been running a bit high. He saw his Cardiologist a week ago. The amlodipine was stopped since he was already taking diltiazem. However, Eliquis dose has not been changed yet. Also, diltiazem is given as multiple times daily rather than a single extended release dose. He is due to meet with his Endocrinologist tomorrow.\par \par 12/11/2018: Mr Mata is here for follow up and to review blood counts.  FOLFIRINOX C#1 was given on 12/3/2018.  Today his main complaints is diarrhea and cough.\par Diarrhea: Noted 3-4 days after C#1 D#3 FOLFIRINOX.  On average having 3-5 BM/day.  BM are "muddy" and have a foul smell.  At baseline Mr Mata has one BM every other day.  Denies any recent sick contacts, or having outside food.  He denies any associated abdominal pain, melena, hematochezia or fever/chills.  Admits to taking OTC Imodium with little to no relief.\par Cough: Has a chronic cough that has progressed over the last couple of days.  Cough is non-productive.  Admits to nasal congestion and denies fever, chills or myalgias.\par \par 12/17/2018: Mr Mata is here for follow up.  He was admitted to [facility] from 12/13/2018-12/15/2018 for dehydration/diarrhea. His cough is resolved. His diarrhea is still ongoing but has greatly improved. His weight has remained generally stable. He does not feel that he has returned to 100% strength yet. Of note, it is difficult to determine how much of the diarrhea is related to chemotherapy or to the Corona virus that was found on his RVP testing last week. I had several conversations with his doctors at [facility] and helped to inform them and co-manage his care. He remains mildly hypokalemic and hypomagnesemic. He is instructed to take supplement daily for each for 1 more week. We will delay C#2 today for him to better resolve his toxicities. His blood count did cesar with ANC<1000. It is now much improved after one dose of Zarixo over the weekend at [facility]. \par \par 1/22/2019: Mr Mata is here for follow up.  His diarrhea frequency is gone. He still notes stools are soft. He did not take the second course of Cipro. Has not had nausea during treatment as long as using the dexamethasone and emend. He just returned from a wedding of his daughter in [place]. \par  [de-identified] : adenocarcinoma [de-identified] : Surgeon: Dr Jeff George [phone]\par Nephrology: Dr Dajuan Shepherd [phone]\par Sleep, Pulm: Dr Tyler Aviles [phone]\par Urology: Dr Renato Mcnulty [phone]\par Cardiology: Aaron Guevara; [facility] in [place]\par Endocrine: Hanh Quispe; [phone]\par PCP: Shilpa Willis; [phone]\par EPS/Cardiology: Demarco Sellers; [phone]\par \par Wife - Denise.\par Daughter - Jennifer & Derek \par son - Attila\par \par

## 2019-01-22 NOTE — REVIEW OF SYSTEMS
[Fatigue] : fatigue [Diarrhea] : diarrhea [Negative] : Allergic/Immunologic [Recent Change In Weight] : ~T no recent weight change [FreeTextEntry2] : Admits to mild fatigue and weight loss. [FreeTextEntry7] : 5 episodes of diarrhea over the last 2-3 days. 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

## 2019-01-22 NOTE — DISCUSSION/SUMMARY
[FreeTextEntry1] : Phone:\par Turns out he has + for Salmonella in the stool.\par Diarrhea is essentially gone.\par Nothing to do at this time.\par

## 2019-01-23 LAB
ALBUMIN SERPL ELPH-MCNC: 3.9 G/DL
ALP BLD-CCNC: 118 U/L
ALT SERPL-CCNC: 37 U/L
ANION GAP SERPL CALC-SCNC: 12 MMOL/L
AST SERPL-CCNC: 25 U/L
BILIRUB SERPL-MCNC: 0.6 MG/DL
BUN SERPL-MCNC: 25 MG/DL
CALCIUM SERPL-MCNC: 8.7 MG/DL
CHLORIDE SERPL-SCNC: 110 MMOL/L
CO2 SERPL-SCNC: 24 MMOL/L
CREAT SERPL-MCNC: 1.21 MG/DL
GLUCOSE SERPL-MCNC: 137 MG/DL
MAGNESIUM SERPL-MCNC: 2 MG/DL
POTASSIUM SERPL-SCNC: 3.7 MMOL/L
PROT SERPL-MCNC: 5.8 G/DL
SODIUM SERPL-SCNC: 146 MMOL/L

## 2019-01-28 ENCOUNTER — APPOINTMENT (OUTPATIENT)
Dept: INFUSION THERAPY | Facility: HOSPITAL | Age: 79
End: 2019-01-28

## 2019-01-29 LAB
C DIFF TOX GENS STL QL NAA+PROBE: NORMAL
CDIFF BY PCR: NOT DETECTED

## 2019-01-30 ENCOUNTER — APPOINTMENT (OUTPATIENT)
Dept: INFUSION THERAPY | Facility: HOSPITAL | Age: 79
End: 2019-01-30

## 2019-01-30 LAB — DEPRECATED O AND P PREP STL: NORMAL

## 2019-01-31 ENCOUNTER — LABORATORY RESULT (OUTPATIENT)
Age: 79
End: 2019-01-31

## 2019-01-31 ENCOUNTER — RESULT REVIEW (OUTPATIENT)
Age: 79
End: 2019-01-31

## 2019-01-31 ENCOUNTER — APPOINTMENT (OUTPATIENT)
Dept: INFUSION THERAPY | Facility: HOSPITAL | Age: 79
End: 2019-01-31

## 2019-01-31 LAB
BASOPHILS # BLD AUTO: 0 K/UL — SIGNIFICANT CHANGE UP (ref 0–0.2)
BASOPHILS NFR BLD AUTO: 0.4 % — SIGNIFICANT CHANGE UP (ref 0–2)
EOSINOPHIL # BLD AUTO: 0.2 K/UL — SIGNIFICANT CHANGE UP (ref 0–0.5)
EOSINOPHIL NFR BLD AUTO: 2.5 % — SIGNIFICANT CHANGE UP (ref 0–6)
HCT VFR BLD CALC: 37.1 % — LOW (ref 39–50)
HGB BLD-MCNC: 12.3 G/DL — LOW (ref 13–17)
LYMPHOCYTES # BLD AUTO: 1.6 K/UL — SIGNIFICANT CHANGE UP (ref 1–3.3)
LYMPHOCYTES # BLD AUTO: 23.8 % — SIGNIFICANT CHANGE UP (ref 13–44)
MCHC RBC-ENTMCNC: 30.9 PG — SIGNIFICANT CHANGE UP (ref 27–34)
MCHC RBC-ENTMCNC: 33.2 G/DL — SIGNIFICANT CHANGE UP (ref 32–36)
MCV RBC AUTO: 92.9 FL — SIGNIFICANT CHANGE UP (ref 80–100)
MONOCYTES # BLD AUTO: 0.6 K/UL — SIGNIFICANT CHANGE UP (ref 0–0.9)
MONOCYTES NFR BLD AUTO: 9.9 % — SIGNIFICANT CHANGE UP (ref 2–14)
NEUTROPHILS # BLD AUTO: 4.1 K/UL — SIGNIFICANT CHANGE UP (ref 1.8–7.4)
NEUTROPHILS NFR BLD AUTO: 63.4 % — SIGNIFICANT CHANGE UP (ref 43–77)
PLATELET # BLD AUTO: 215 K/UL — SIGNIFICANT CHANGE UP (ref 150–400)
RBC # BLD: 3.99 M/UL — LOW (ref 4.2–5.8)
RBC # FLD: 14.1 % — SIGNIFICANT CHANGE UP (ref 10.3–14.5)
WBC # BLD: 6.5 K/UL — SIGNIFICANT CHANGE UP (ref 3.8–10.5)
WBC # FLD AUTO: 6.5 K/UL — SIGNIFICANT CHANGE UP (ref 3.8–10.5)

## 2019-01-31 RX ORDER — OLMESARTAN MEDOXOMIL 5 MG/1
1 TABLET, FILM COATED ORAL
Qty: 0 | Refills: 0 | COMMUNITY

## 2019-02-01 DIAGNOSIS — Z51.11 ENCOUNTER FOR ANTINEOPLASTIC CHEMOTHERAPY: ICD-10-CM

## 2019-02-01 DIAGNOSIS — R11.2 NAUSEA WITH VOMITING, UNSPECIFIED: ICD-10-CM

## 2019-02-01 LAB — BACTERIA STL CULT: NORMAL

## 2019-02-02 ENCOUNTER — APPOINTMENT (OUTPATIENT)
Dept: INFUSION THERAPY | Facility: HOSPITAL | Age: 79
End: 2019-02-02

## 2019-02-04 NOTE — ED PROVIDER NOTE - QRS
Daily Note     Today's date: 2019  Patient name: Natalee Escobedo  : 1958  MRN: [id number]  Referring provider: Simon Katz MD  Dx:   Encounter Diagnosis     ICD-10-CM    1  Cerebrovascular accident (CVA), unspecified mechanism ([facility] [address] ) I63 9                 Subjective: Patient reports he went to ER yesterday due to neck pain  "Will this pain ever go away?"      Objective: See treatment diary below  MH cervical spine x 8 min  STM cervical paraspinals, upper trap, scalenes, SCM  Cervical PROM all directions 10 reps each  Cervical AROM all directions 10 reps each    Nustep LE's only x 5 min      Assessment:   Pt tolerated treatment session fair today  Continues to present with significant L sided neck pain which has also traveled to R side of neck as well  Pt cannot tolerate TRP, performed gentle STM with circular strokes as well as longitudinal strokes  Gradually pt began to tolerate more pressure  Limitations in cervical ROM all directions  Pt has poor carryover of recommendations discussed last visit; pt asked about wearing a cervical collar but advised pt not to and to continue moving neck as much as he can tolerate to prevent further restrictions  Pt will continue to benefit from skilled outpatient PT services to improve his balance and mobility to maximize his function  Plan: Continue per plan of care  92

## 2019-02-13 NOTE — ED ADULT NURSE NOTE - CCCP TRG CHIEF CMPLNT
Did not hear back from patient. Denied refill. Patient to call our office.   possible incisional infection

## 2019-02-14 ENCOUNTER — APPOINTMENT (OUTPATIENT)
Dept: HEMATOLOGY ONCOLOGY | Facility: CLINIC | Age: 79
End: 2019-02-14
Payer: MEDICARE

## 2019-02-14 ENCOUNTER — RESULT REVIEW (OUTPATIENT)
Age: 79
End: 2019-02-14

## 2019-02-14 ENCOUNTER — APPOINTMENT (OUTPATIENT)
Dept: INFUSION THERAPY | Facility: HOSPITAL | Age: 79
End: 2019-02-14

## 2019-02-14 DIAGNOSIS — D70.9 NEUTROPENIA, UNSPECIFIED: ICD-10-CM

## 2019-02-14 LAB
EOSINOPHIL # BLD AUTO: 0 K/UL — SIGNIFICANT CHANGE UP (ref 0–0.5)
EOSINOPHIL NFR BLD AUTO: 8 % — HIGH (ref 0–6)
HCT VFR BLD CALC: 33.5 % — LOW (ref 39–50)
HGB BLD-MCNC: 11.6 G/DL — LOW (ref 13–17)
LYMPHOCYTES # BLD AUTO: 1.2 K/UL — SIGNIFICANT CHANGE UP (ref 1–3.3)
LYMPHOCYTES # BLD AUTO: 50 % — HIGH (ref 13–44)
MCHC RBC-ENTMCNC: 31.6 PG — SIGNIFICANT CHANGE UP (ref 27–34)
MCHC RBC-ENTMCNC: 34.6 G/DL — SIGNIFICANT CHANGE UP (ref 32–36)
MCV RBC AUTO: 91.3 FL — SIGNIFICANT CHANGE UP (ref 80–100)
MONOCYTES # BLD AUTO: 0.3 K/UL — SIGNIFICANT CHANGE UP (ref 0–0.9)
MONOCYTES NFR BLD AUTO: 7 % — SIGNIFICANT CHANGE UP (ref 2–14)
NEUTROPHILS # BLD AUTO: 0.7 K/UL — LOW (ref 1.8–7.4)
NEUTROPHILS NFR BLD AUTO: 35 % — LOW (ref 43–77)
PLAT MORPH BLD: NORMAL — SIGNIFICANT CHANGE UP
PLATELET # BLD AUTO: 160 K/UL — SIGNIFICANT CHANGE UP (ref 150–400)
RBC # BLD: 3.67 M/UL — LOW (ref 4.2–5.8)
RBC # FLD: 13.2 % — SIGNIFICANT CHANGE UP (ref 10.3–14.5)
RBC BLD AUTO: SIGNIFICANT CHANGE UP
WBC # BLD: 2.2 K/UL — LOW (ref 3.8–10.5)
WBC # FLD AUTO: 2.2 K/UL — LOW (ref 3.8–10.5)

## 2019-02-14 PROCEDURE — 99214 OFFICE O/P EST MOD 30 MIN: CPT

## 2019-02-14 RX ORDER — LEVOFLOXACIN 750 MG/1
750 TABLET, FILM COATED ORAL DAILY
Qty: 7 | Refills: 0 | Status: DISCONTINUED | COMMUNITY
Start: 2019-02-14 | End: 2019-02-14

## 2019-02-14 NOTE — HISTORY OF PRESENT ILLNESS
[Disease: _____________________] : Disease: [unfilled] [T: ___] : T[unfilled] [N: ___] : N[unfilled] [M: ___] : M[unfilled] [AJCC Stage: ____] : AJCC Stage: [unfilled] [de-identified] : Mr Mata is a pleasant 69 year old male with past medical history of HTN, HLD, DM2, Afib on Eliquis, EMANUEL, history of Prostate CA presenting with an initial consultation of Pancreatic CA.  \par \par He was hospitalized at [facility] from 8/9/2018 to 8/14/2018 where he was found to be jaundiced with a CT showing severe extrahepatic biliary duct obstruction with 2 cm dilation of CBD with marked intrahepatic biliary duct dilation. RUQ US showed no evidence of gallstones. 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 During stay at [facility] patient developed cholangitic picture, with spiking fevers. He was started on empiric Cefepime and  transferred to [facility] for ERCP with possible stent and biopsy. ERCP was attempted which revealed a distal CBD stricture that was unable to be traversed for stent placement or biopsy.  Patient underwent percutaneous stent placement by IR into segment 6 of the liver. Patient clinically improved after PTC placement (Bili downtrended from 16 to 5.5). During admission Dr. George was consulted for possible pancreaticoduodenectomy during this hospital stay but patient was found to be high risk for surgery (EF 35%, Severe left ventricular systolic dysfunction and decreased right ventricular systolic function).  Patient was discharged on 8/21 and instructed to follow up with Dr. George as an outpatient to plan surgery.\par He was admitted to [facility] from 8/28 to 9/6 for dehydration and MANNY which resolved and was cleared by nephrology.\par \par On 8/29/18 repeat Echo showed: Normal left ventricular systolic function; EF 60%.\par He was able to undergo Whipple procedure on 10/3/2018.  Post op course was relatively unremarkable and he was hospitalized for over 1 week.  Was later seen in ED on 10/21/2018 with abdominal incision seroma which has now resolved.\par \par Pathology report: \par Gallbladder- Chronic cholecystitis with cholelithiasis, negative for carcinoma, one reactive LN, negative for carcinoma (0/1)\par Bile duct margin- negative for carcinoma\par Head of Pancreas, partial duodenum and jejunum- invasive pancreatic adenocarcinoma, pancreatobiliary type. Moderately to poorly differentiated, tumor size 2.3 cm.  Tumor invading into bile duct and peripancreatic soft tissue.  All surgical resection margins negative for carcinoma.  Metastatic pancreatic adenocarcinoma presenting in 2/14 lymph nodes. Lymphovascular and perineural invasion present.\par AJCC staging: pT2N1(2/18).\par Stomach, antrum: Negative for carcinoma.  Three LN negative for carcinoma (0/3).\par \par 11/27/2018: patient for follow-up of pancreas cancer. He had Mediport placed. He feels well today. He is eating well and has gained weight. Creon has helped digestion and he has not had cramps unless excessively large meal. He has no pain today, and his surgical incision site is well healed. We discussed at length the FOLFIRINOX regimen and potential/usual side effects. His BP has been running a bit high. He saw his Cardiologist a week ago. The amlodipine was stopped since he was already taking diltiazem. However, Eliquis dose has not been changed yet. Also, diltiazem is given as multiple times daily rather than a single extended release dose. He is due to meet with his Endocrinologist tomorrow.\par \par 12/11/2018: Mr Mata is here for follow up and to review blood counts.  FOLFIRINOX C#1 was given on 12/3/2018.  Today his main complaints is diarrhea and cough.\par Diarrhea: Noted 3-4 days after C#1 D#3 FOLFIRINOX.  On average having 3-5 BM/day.  BM are "muddy" and have a foul smell.  At baseline Mr Mata has one BM every other day.  Denies any recent sick contacts, or having outside food.  He denies any associated abdominal pain, melena, hematochezia or fever/chills.  Admits to taking OTC Imodium with little to no relief.\par Cough: Has a chronic cough that has progressed over the last couple of days.  Cough is non-productive.  Admits to nasal congestion and denies fever, chills or myalgias.\par \par 12/17/2018: Mr Mata is here for follow up.  He was admitted to [facility] from 12/13/2018-12/15/2018 for dehydration/diarrhea. His cough is resolved. His diarrhea is still ongoing but has greatly improved. His weight has remained generally stable. He does not feel that he has returned to 100% strength yet. Of note, it is difficult to determine how much of the diarrhea is related to chemotherapy or to the Corona virus that was found on his RVP testing last week. I had several conversations with his doctors at [facility] and helped to inform them and co-manage his care. He remains mildly hypokalemic and hypomagnesemic. He is instructed to take supplement daily for each for 1 more week. We will delay C#2 today for him to better resolve his toxicities. His blood count did cesar with ANC<1000. It is now much improved after one dose of Zarixo over the weekend at [facility]. \par \par 1/22/2019: Mr Mata is here for follow up.  His diarrhea frequency is gone. He still notes stools are soft. He did not take the second course of Cipro. Has not had nausea during treatment as long as using the dexamethasone and emend. He just returned from a wedding of his daughter in [place]. \par \par 2/14/2019: Patient is here for C#4 FOLFIRINOX. Today Pre-F demonstrated WBC 2.2K/uL and ANC 0.7.  Due to Neutropenia we will not give treatment today.\par Otherwise patient feels well.  He denies fever, chills, myalgia, dysuria, diarrhea or cough.  His BM are irregular and averaging one BM/day which is semi watery.  No associated abdominal pain noted.   [de-identified] : adenocarcinoma [de-identified] : Surgeon: Dr Jeff George [phone]\par Nephrology: Dr Dajuan Shepherd [phone]\par Sleep, Pulm: Dr Tyler Aviles [phone]\par Urology: Dr Renato Mcnulty [phone]\par Cardiology: Aaron Guevara; [facility] in [place]\par Endocrine: Hanh Quispe; [phone]\par PCP: Shilpa Willis; [phone]\par EPS/Cardiology: Demarco Sellers; [phone]\par \par Wife - Denise.\par Daughter - Jennifer & Derek \par son - Attila\par \par

## 2019-02-14 NOTE — REVIEW OF SYSTEMS
[Fatigue] : fatigue [Recent Change In Weight] : ~T no recent weight change [Diarrhea] : diarrhea [Negative] : Allergic/Immunologic [FreeTextEntry7] : Diarrhea has resolved. 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

## 2019-02-14 NOTE — PHYSICAL EXAM
[Restricted in physically strenuous activity but ambulatory and able to carry out work of a light or sedentary nature] : Status 1- Restricted in physically strenuous activity but ambulatory and able to carry out work of a light or sedentary nature, e.g., light house work, office work [Normal] : affect appropriate [de-identified] : surgical incision is well healed.

## 2019-02-15 ENCOUNTER — OUTPATIENT (OUTPATIENT)
Dept: OUTPATIENT SERVICES | Facility: HOSPITAL | Age: 79
LOS: 1 days | Discharge: HOME | End: 2019-02-15

## 2019-02-15 DIAGNOSIS — Z96.652 PRESENCE OF LEFT ARTIFICIAL KNEE JOINT: Chronic | ICD-10-CM

## 2019-02-15 DIAGNOSIS — E11.9 TYPE 2 DIABETES MELLITUS WITHOUT COMPLICATIONS: Chronic | ICD-10-CM

## 2019-02-15 DIAGNOSIS — Z51.89 ENCOUNTER FOR OTHER SPECIFIED AFTERCARE: ICD-10-CM

## 2019-02-15 DIAGNOSIS — I10 ESSENTIAL (PRIMARY) HYPERTENSION: Chronic | ICD-10-CM

## 2019-02-15 DIAGNOSIS — Z98.890 OTHER SPECIFIED POSTPROCEDURAL STATES: Chronic | ICD-10-CM

## 2019-02-15 DIAGNOSIS — C25.9 MALIGNANT NEOPLASM OF PANCREAS, UNSPECIFIED: ICD-10-CM

## 2019-02-15 DIAGNOSIS — I48.91 UNSPECIFIED ATRIAL FIBRILLATION: Chronic | ICD-10-CM

## 2019-02-16 ENCOUNTER — APPOINTMENT (OUTPATIENT)
Dept: INFUSION THERAPY | Facility: HOSPITAL | Age: 79
End: 2019-02-16

## 2019-02-19 ENCOUNTER — APPOINTMENT (OUTPATIENT)
Dept: SURGERY | Facility: CLINIC | Age: 79
End: 2019-02-19
Payer: MEDICARE

## 2019-02-19 ENCOUNTER — RESULT REVIEW (OUTPATIENT)
Age: 79
End: 2019-02-19

## 2019-02-19 ENCOUNTER — APPOINTMENT (OUTPATIENT)
Dept: HEMATOLOGY ONCOLOGY | Facility: CLINIC | Age: 79
End: 2019-02-19
Payer: MEDICARE

## 2019-02-19 VITALS
TEMPERATURE: 97.6 F | HEART RATE: 94 BPM | SYSTOLIC BLOOD PRESSURE: 189 MMHG | DIASTOLIC BLOOD PRESSURE: 85 MMHG | OXYGEN SATURATION: 100 % | RESPIRATION RATE: 16 BRPM | BODY MASS INDEX: 26.09 KG/M2 | WEIGHT: 197.73 LBS

## 2019-02-19 VITALS
HEART RATE: 66 BPM | SYSTOLIC BLOOD PRESSURE: 169 MMHG | DIASTOLIC BLOOD PRESSURE: 92 MMHG | HEIGHT: 73 IN | OXYGEN SATURATION: 100 % | WEIGHT: 198 LBS | BODY MASS INDEX: 26.24 KG/M2

## 2019-02-19 DIAGNOSIS — Z90.410 ACQUIRED TOTAL ABSENCE OF PANCREAS: ICD-10-CM

## 2019-02-19 DIAGNOSIS — C25.0 MALIGNANT NEOPLASM OF HEAD OF PANCREAS: ICD-10-CM

## 2019-02-19 LAB
BASOPHILS # BLD AUTO: 0.1 K/UL — SIGNIFICANT CHANGE UP (ref 0–0.2)
BASOPHILS NFR BLD AUTO: 0.8 % — SIGNIFICANT CHANGE UP (ref 0–2)
EOSINOPHIL # BLD AUTO: 0.2 K/UL — SIGNIFICANT CHANGE UP (ref 0–0.5)
EOSINOPHIL NFR BLD AUTO: 2.5 % — SIGNIFICANT CHANGE UP (ref 0–6)
HCT VFR BLD CALC: 36.1 % — LOW (ref 39–50)
HGB BLD-MCNC: 12.3 G/DL — LOW (ref 13–17)
LYMPHOCYTES # BLD AUTO: 1.4 K/UL — SIGNIFICANT CHANGE UP (ref 1–3.3)
LYMPHOCYTES # BLD AUTO: 20.6 % — SIGNIFICANT CHANGE UP (ref 13–44)
MCHC RBC-ENTMCNC: 31.1 PG — SIGNIFICANT CHANGE UP (ref 27–34)
MCHC RBC-ENTMCNC: 34.2 G/DL — SIGNIFICANT CHANGE UP (ref 32–36)
MCV RBC AUTO: 91.1 FL — SIGNIFICANT CHANGE UP (ref 80–100)
MONOCYTES # BLD AUTO: 0.8 K/UL — SIGNIFICANT CHANGE UP (ref 0–0.9)
MONOCYTES NFR BLD AUTO: 11.6 % — SIGNIFICANT CHANGE UP (ref 2–14)
NEUTROPHILS # BLD AUTO: 4.3 K/UL — SIGNIFICANT CHANGE UP (ref 1.8–7.4)
NEUTROPHILS NFR BLD AUTO: 64.4 % — SIGNIFICANT CHANGE UP (ref 43–77)
PLATELET # BLD AUTO: 156 K/UL — SIGNIFICANT CHANGE UP (ref 150–400)
RBC # BLD: 3.96 M/UL — LOW (ref 4.2–5.8)
RBC # FLD: 13.6 % — SIGNIFICANT CHANGE UP (ref 10.3–14.5)
WBC # BLD: 6.7 K/UL — SIGNIFICANT CHANGE UP (ref 3.8–10.5)
WBC # FLD AUTO: 6.7 K/UL — SIGNIFICANT CHANGE UP (ref 3.8–10.5)

## 2019-02-19 PROCEDURE — 99212 OFFICE O/P EST SF 10 MIN: CPT

## 2019-02-19 PROCEDURE — 99214 OFFICE O/P EST MOD 30 MIN: CPT

## 2019-02-19 NOTE — PHYSICAL EXAM
[Restricted in physically strenuous activity but ambulatory and able to carry out work of a light or sedentary nature] : Status 1- Restricted in physically strenuous activity but ambulatory and able to carry out work of a light or sedentary nature, e.g., light house work, office work [Normal] : affect appropriate [de-identified] : surgical incision is well healed.

## 2019-02-19 NOTE — HISTORY OF PRESENT ILLNESS
[Disease: _____________________] : Disease: [unfilled] [T: ___] : T[unfilled] [N: ___] : N[unfilled] [M: ___] : M[unfilled] [AJCC Stage: ____] : AJCC Stage: [unfilled] [de-identified] : Mr Mata is a pleasant 69 year old male with past medical history of HTN, HLD, DM2, Afib on Eliquis, EMANUEL, history of Prostate CA presenting with an initial consultation of Pancreatic CA.  \par \par He was hospitalized at [facility] from 8/9/2018 to 8/14/2018 where he was found to be jaundiced with a CT showing severe extrahepatic biliary duct obstruction with 2 cm dilation of CBD with marked intrahepatic biliary duct dilation. RUQ US showed no evidence of gallstones. 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 During stay at [facility] patient developed cholangitic picture, with spiking fevers. He was started on empiric Cefepime and  transferred to [facility] for ERCP with possible stent and biopsy. ERCP was attempted which revealed a distal CBD stricture that was unable to be traversed for stent placement or biopsy.  Patient underwent percutaneous stent placement by IR into segment 6 of the liver. Patient clinically improved after PTC placement (Bili downtrended from 16 to 5.5). During admission Dr. George was consulted for possible pancreaticoduodenectomy during this hospital stay but patient was found to be high risk for surgery (EF 35%, Severe left ventricular systolic dysfunction and decreased right ventricular systolic function).  Patient was discharged on 8/21 and instructed to follow up with Dr. George as an outpatient to plan surgery.\par He was admitted to [facility] from 8/28 to 9/6 for dehydration and MANNY which resolved and was cleared by nephrology.\par \par On 8/29/18 repeat Echo showed: Normal left ventricular systolic function; EF 60%.\par He was able to undergo Whipple procedure on 10/3/2018.  Post op course was relatively unremarkable and he was hospitalized for over 1 week.  Was later seen in ED on 10/21/2018 with abdominal incision seroma which has now resolved.\par \par Pathology report: \par Gallbladder- Chronic cholecystitis with cholelithiasis, negative for carcinoma, one reactive LN, negative for carcinoma (0/1)\par Bile duct margin- negative for carcinoma\par Head of Pancreas, partial duodenum and jejunum- invasive pancreatic adenocarcinoma, pancreatobiliary type. Moderately to poorly differentiated, tumor size 2.3 cm.  Tumor invading into bile duct and peripancreatic soft tissue.  All surgical resection margins negative for carcinoma.  Metastatic pancreatic adenocarcinoma presenting in 2/14 lymph nodes. Lymphovascular and perineural invasion present.\par AJCC staging: pT2N1(2/18).\par Stomach, antrum: Negative for carcinoma.  Three LN negative for carcinoma (0/3).\par \par 11/27/2018: patient for follow-up of pancreas cancer. He had Mediport placed. He feels well today. He is eating well and has gained weight. Creon has helped digestion and he has not had cramps unless excessively large meal. He has no pain today, and his surgical incision site is well healed. We discussed at length the FOLFIRINOX regimen and potential/usual side effects. His BP has been running a bit high. He saw his Cardiologist a week ago. The amlodipine was stopped since he was already taking diltiazem. However, Eliquis dose has not been changed yet. Also, diltiazem is given as multiple times daily rather than a single extended release dose. He is due to meet with his Endocrinologist tomorrow.\par \par 12/11/2018: Mr Mata is here for follow up and to review blood counts.  FOLFIRINOX C#1 was given on 12/3/2018.  Today his main complaints is diarrhea and cough.\par Diarrhea: Noted 3-4 days after C#1 D#3 FOLFIRINOX.  On average having 3-5 BM/day.  BM are "muddy" and have a foul smell.  At baseline Mr Mata has one BM every other day.  Denies any recent sick contacts, or having outside food.  He denies any associated abdominal pain, melena, hematochezia or fever/chills.  Admits to taking OTC Imodium with little to no relief.\par Cough: Has a chronic cough that has progressed over the last couple of days.  Cough is non-productive.  Admits to nasal congestion and denies fever, chills or myalgias.\par \par 12/17/2018: Mr Mata is here for follow up.  He was admitted to [facility] from 12/13/2018-12/15/2018 for dehydration/diarrhea. His cough is resolved. His diarrhea is still ongoing but has greatly improved. His weight has remained generally stable. He does not feel that he has returned to 100% strength yet. Of note, it is difficult to determine how much of the diarrhea is related to chemotherapy or to the Corona virus that was found on his RVP testing last week. I had several conversations with his doctors at [facility] and helped to inform them and co-manage his care. He remains mildly hypokalemic and hypomagnesemic. He is instructed to take supplement daily for each for 1 more week. We will delay C#2 today for him to better resolve his toxicities. His blood count did csear with ANC<1000. It is now much improved after one dose of Zarixo over the weekend at [facility]. \par \par 1/22/2019: Mr Mata is here for follow up.  His diarrhea frequency is gone. He still notes stools are soft. He did not take the second course of Cipro. Has not had nausea during treatment as long as using the dexamethasone and emend. He just returned from a wedding of his daughter in [place]. \par \par 2/14/2019: Patient is here for C#4 FOLFIRINOX. Today Pre-F demonstrated WBC 2.2K/uL and ANC 0.7.  Due to Neutropenia we will not give treatment today.\par Otherwise patient feels well.  He denies fever, chills, myalgia, dysuria, diarrhea or cough.  His BM are irregular and averaging one BM/day which is semi watery.  No associated abdominal pain noted.  \par \par 2/19/2019: Due to neutropenia , C#4 was postponed.  He was given Zarxio x 3 (2/14-2/16).  He tolerated injections moderately well.  There was pain on the lower back on D#2 of Zarxio and took Claritin with no relief.  He called the service and was recommended Tylenol and the pain subsided. \par He was seen by his Cardiologist on Friday (2/15) to evaluate his loop recorder.  There is intermittent pauses in the middle of the night, <2 seconds.  Will just monitor, no acute intervention needed.\par Otherwise he feels well today.  [de-identified] : adenocarcinoma [de-identified] : Surgeon: Dr Jeff George [phone]\par Nephrology: Dr Dajuan Shepherd [phone]\par Sleep, Pulm: Dr Tyler Aviles [phone]\par Urology: Dr Renato Mcnulty [phone]\par Cardiology: Aaron Guevara; [facility] in [place]\par Endocrine: Hanh Quispe; [phone]\par PCP: Shilpa Willis; [phone]\par EPS/Cardiology: Demarco Sellers; [phone]\par \par Wife - Denise.\par Daughter - Jennifer & Derek \par son - Attila\par \par

## 2019-02-19 NOTE — REVIEW OF SYSTEMS
[Diarrhea] : diarrhea [Negative] : Allergic/Immunologic [Fatigue] : no fatigue [Recent Change In Weight] : ~T no recent weight change [FreeTextEntry7] : Diarrhea has resolved. 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

## 2019-02-21 ENCOUNTER — RESULT REVIEW (OUTPATIENT)
Age: 79
End: 2019-02-21

## 2019-02-21 ENCOUNTER — LABORATORY RESULT (OUTPATIENT)
Age: 79
End: 2019-02-21

## 2019-02-21 ENCOUNTER — APPOINTMENT (OUTPATIENT)
Dept: INFUSION THERAPY | Facility: HOSPITAL | Age: 79
End: 2019-02-21

## 2019-02-21 LAB
BASOPHILS # BLD AUTO: 0 K/UL — SIGNIFICANT CHANGE UP (ref 0–0.2)
BASOPHILS NFR BLD AUTO: 0.4 % — SIGNIFICANT CHANGE UP (ref 0–2)
EOSINOPHIL # BLD AUTO: 0.2 K/UL — SIGNIFICANT CHANGE UP (ref 0–0.5)
EOSINOPHIL NFR BLD AUTO: 2.2 % — SIGNIFICANT CHANGE UP (ref 0–6)
HCT VFR BLD CALC: 33.8 % — LOW (ref 39–50)
HGB BLD-MCNC: 11.5 G/DL — LOW (ref 13–17)
LYMPHOCYTES # BLD AUTO: 2 K/UL — SIGNIFICANT CHANGE UP (ref 1–3.3)
LYMPHOCYTES # BLD AUTO: 23.6 % — SIGNIFICANT CHANGE UP (ref 13–44)
MCHC RBC-ENTMCNC: 30.7 PG — SIGNIFICANT CHANGE UP (ref 27–34)
MCHC RBC-ENTMCNC: 34 G/DL — SIGNIFICANT CHANGE UP (ref 32–36)
MCV RBC AUTO: 90.3 FL — SIGNIFICANT CHANGE UP (ref 80–100)
MONOCYTES # BLD AUTO: 1 K/UL — HIGH (ref 0–0.9)
MONOCYTES NFR BLD AUTO: 12.2 % — SIGNIFICANT CHANGE UP (ref 2–14)
NEUTROPHILS # BLD AUTO: 5.1 K/UL — SIGNIFICANT CHANGE UP (ref 1.8–7.4)
NEUTROPHILS NFR BLD AUTO: 61.6 % — SIGNIFICANT CHANGE UP (ref 43–77)
PLATELET # BLD AUTO: 180 K/UL — SIGNIFICANT CHANGE UP (ref 150–400)
RBC # BLD: 3.74 M/UL — LOW (ref 4.2–5.8)
RBC # FLD: 13.5 % — SIGNIFICANT CHANGE UP (ref 10.3–14.5)
WBC # BLD: 8.3 K/UL — SIGNIFICANT CHANGE UP (ref 3.8–10.5)
WBC # FLD AUTO: 8.3 K/UL — SIGNIFICANT CHANGE UP (ref 3.8–10.5)

## 2019-02-22 DIAGNOSIS — R11.2 NAUSEA WITH VOMITING, UNSPECIFIED: ICD-10-CM

## 2019-02-22 DIAGNOSIS — Z51.11 ENCOUNTER FOR ANTINEOPLASTIC CHEMOTHERAPY: ICD-10-CM

## 2019-02-23 ENCOUNTER — APPOINTMENT (OUTPATIENT)
Dept: INFUSION THERAPY | Facility: HOSPITAL | Age: 79
End: 2019-02-23

## 2019-03-07 ENCOUNTER — RESULT REVIEW (OUTPATIENT)
Age: 79
End: 2019-03-07

## 2019-03-07 ENCOUNTER — LABORATORY RESULT (OUTPATIENT)
Age: 79
End: 2019-03-07

## 2019-03-07 ENCOUNTER — APPOINTMENT (OUTPATIENT)
Dept: INFUSION THERAPY | Facility: HOSPITAL | Age: 79
End: 2019-03-07

## 2019-03-07 LAB
BASOPHILS # BLD AUTO: 0 K/UL — SIGNIFICANT CHANGE UP (ref 0–0.2)
BASOPHILS NFR BLD AUTO: 0 % — SIGNIFICANT CHANGE UP (ref 0–2)
EOSINOPHIL # BLD AUTO: 0.2 K/UL — SIGNIFICANT CHANGE UP (ref 0–0.5)
EOSINOPHIL NFR BLD AUTO: 2.8 % — SIGNIFICANT CHANGE UP (ref 0–6)
HCT VFR BLD CALC: 30.9 % — LOW (ref 39–50)
HGB BLD-MCNC: 11 G/DL — LOW (ref 13–17)
LYMPHOCYTES # BLD AUTO: 1.2 K/UL — SIGNIFICANT CHANGE UP (ref 1–3.3)
LYMPHOCYTES # BLD AUTO: 18.8 % — SIGNIFICANT CHANGE UP (ref 13–44)
MCHC RBC-ENTMCNC: 32.6 PG — SIGNIFICANT CHANGE UP (ref 27–34)
MCHC RBC-ENTMCNC: 35.7 G/DL — SIGNIFICANT CHANGE UP (ref 32–36)
MCV RBC AUTO: 91.4 FL — SIGNIFICANT CHANGE UP (ref 80–100)
MONOCYTES # BLD AUTO: 0.6 K/UL — SIGNIFICANT CHANGE UP (ref 0–0.9)
MONOCYTES NFR BLD AUTO: 10 % — SIGNIFICANT CHANGE UP (ref 2–14)
NEUTROPHILS # BLD AUTO: 4.3 K/UL — SIGNIFICANT CHANGE UP (ref 1.8–7.4)
NEUTROPHILS NFR BLD AUTO: 68.4 % — SIGNIFICANT CHANGE UP (ref 43–77)
PLATELET # BLD AUTO: 190 K/UL — SIGNIFICANT CHANGE UP (ref 150–400)
RBC # BLD: 3.38 M/UL — LOW (ref 4.2–5.8)
RBC # FLD: 13.8 % — SIGNIFICANT CHANGE UP (ref 10.3–14.5)
WBC # BLD: 6.2 K/UL — SIGNIFICANT CHANGE UP (ref 3.8–10.5)
WBC # FLD AUTO: 6.2 K/UL — SIGNIFICANT CHANGE UP (ref 3.8–10.5)

## 2019-03-07 RX ORDER — POTASSIUM CHLORIDE 1500 MG/1
20 TABLET, FILM COATED, EXTENDED RELEASE ORAL
Qty: 5 | Refills: 0 | Status: ACTIVE | COMMUNITY
Start: 2019-03-07 | End: 1900-01-01

## 2019-03-09 ENCOUNTER — APPOINTMENT (OUTPATIENT)
Dept: INFUSION THERAPY | Facility: HOSPITAL | Age: 79
End: 2019-03-09

## 2019-03-15 ENCOUNTER — OUTPATIENT (OUTPATIENT)
Dept: OUTPATIENT SERVICES | Facility: HOSPITAL | Age: 79
LOS: 1 days | Discharge: HOME | End: 2019-03-15

## 2019-03-15 DIAGNOSIS — I48.91 UNSPECIFIED ATRIAL FIBRILLATION: Chronic | ICD-10-CM

## 2019-03-15 DIAGNOSIS — C25.9 MALIGNANT NEOPLASM OF PANCREAS, UNSPECIFIED: ICD-10-CM

## 2019-03-15 DIAGNOSIS — I10 ESSENTIAL (PRIMARY) HYPERTENSION: Chronic | ICD-10-CM

## 2019-03-15 DIAGNOSIS — Z98.890 OTHER SPECIFIED POSTPROCEDURAL STATES: Chronic | ICD-10-CM

## 2019-03-15 DIAGNOSIS — E11.9 TYPE 2 DIABETES MELLITUS WITHOUT COMPLICATIONS: Chronic | ICD-10-CM

## 2019-03-15 DIAGNOSIS — Z96.652 PRESENCE OF LEFT ARTIFICIAL KNEE JOINT: Chronic | ICD-10-CM

## 2019-03-16 ENCOUNTER — RECORD ABSTRACTING (OUTPATIENT)
Age: 79
End: 2019-03-16

## 2019-03-20 ENCOUNTER — LABORATORY RESULT (OUTPATIENT)
Age: 79
End: 2019-03-20

## 2019-03-21 ENCOUNTER — RESULT REVIEW (OUTPATIENT)
Age: 79
End: 2019-03-21

## 2019-03-21 ENCOUNTER — APPOINTMENT (OUTPATIENT)
Dept: INFUSION THERAPY | Facility: HOSPITAL | Age: 79
End: 2019-03-21

## 2019-03-21 ENCOUNTER — LABORATORY RESULT (OUTPATIENT)
Age: 79
End: 2019-03-21

## 2019-03-21 LAB
BASOPHILS # BLD AUTO: 0 K/UL — SIGNIFICANT CHANGE UP (ref 0–0.2)
BASOPHILS NFR BLD AUTO: 0.2 % — SIGNIFICANT CHANGE UP (ref 0–2)
EOSINOPHIL # BLD AUTO: 0.2 K/UL — SIGNIFICANT CHANGE UP (ref 0–0.5)
EOSINOPHIL NFR BLD AUTO: 2 % — SIGNIFICANT CHANGE UP (ref 0–6)
HCT VFR BLD CALC: 31.1 % — LOW (ref 39–50)
HGB BLD-MCNC: 11.2 G/DL — LOW (ref 13–17)
LYMPHOCYTES # BLD AUTO: 0.9 K/UL — LOW (ref 1–3.3)
LYMPHOCYTES # BLD AUTO: 12 % — LOW (ref 13–44)
MCHC RBC-ENTMCNC: 32.1 PG — SIGNIFICANT CHANGE UP (ref 27–34)
MCHC RBC-ENTMCNC: 36 G/DL — SIGNIFICANT CHANGE UP (ref 32–36)
MCV RBC AUTO: 89.1 FL — SIGNIFICANT CHANGE UP (ref 80–100)
MONOCYTES # BLD AUTO: 0.6 K/UL — SIGNIFICANT CHANGE UP (ref 0–0.9)
MONOCYTES NFR BLD AUTO: 7.8 % — SIGNIFICANT CHANGE UP (ref 2–14)
NEUTROPHILS # BLD AUTO: 6 K/UL — SIGNIFICANT CHANGE UP (ref 1.8–7.4)
NEUTROPHILS NFR BLD AUTO: 78 % — HIGH (ref 43–77)
PLATELET # BLD AUTO: 183 K/UL — SIGNIFICANT CHANGE UP (ref 150–400)
RBC # BLD: 3.49 M/UL — LOW (ref 4.2–5.8)
RBC # FLD: 13.2 % — SIGNIFICANT CHANGE UP (ref 10.3–14.5)
WBC # BLD: 7.6 K/UL — SIGNIFICANT CHANGE UP (ref 3.8–10.5)
WBC # FLD AUTO: 7.6 K/UL — SIGNIFICANT CHANGE UP (ref 3.8–10.5)

## 2019-03-21 RX ORDER — POTASSIUM CHLORIDE 1500 MG/1
20 TABLET, FILM COATED, EXTENDED RELEASE ORAL
Qty: 45 | Refills: 0 | Status: ACTIVE | COMMUNITY
Start: 2019-03-21 | End: 1900-01-01

## 2019-03-22 DIAGNOSIS — R11.2 NAUSEA WITH VOMITING, UNSPECIFIED: ICD-10-CM

## 2019-03-22 DIAGNOSIS — Z51.11 ENCOUNTER FOR ANTINEOPLASTIC CHEMOTHERAPY: ICD-10-CM

## 2019-03-23 ENCOUNTER — APPOINTMENT (OUTPATIENT)
Dept: INFUSION THERAPY | Facility: HOSPITAL | Age: 79
End: 2019-03-23

## 2019-03-25 ENCOUNTER — LABORATORY RESULT (OUTPATIENT)
Age: 79
End: 2019-03-25

## 2019-04-04 ENCOUNTER — RESULT REVIEW (OUTPATIENT)
Age: 79
End: 2019-04-04

## 2019-04-04 ENCOUNTER — APPOINTMENT (OUTPATIENT)
Dept: INFUSION THERAPY | Facility: HOSPITAL | Age: 79
End: 2019-04-04

## 2019-04-04 ENCOUNTER — LABORATORY RESULT (OUTPATIENT)
Age: 79
End: 2019-04-04

## 2019-04-04 LAB
BASOPHILS # BLD AUTO: 0 K/UL — SIGNIFICANT CHANGE UP (ref 0–0.2)
BASOPHILS NFR BLD AUTO: 0.4 % — SIGNIFICANT CHANGE UP (ref 0–2)
EOSINOPHIL # BLD AUTO: 0 K/UL — SIGNIFICANT CHANGE UP (ref 0–0.5)
EOSINOPHIL NFR BLD AUTO: 0 % — SIGNIFICANT CHANGE UP (ref 0–6)
HCT VFR BLD CALC: 30.2 % — LOW (ref 39–50)
HGB BLD-MCNC: 10.7 G/DL — LOW (ref 13–17)
LYMPHOCYTES # BLD AUTO: 1 K/UL — SIGNIFICANT CHANGE UP (ref 1–3.3)
LYMPHOCYTES # BLD AUTO: 10.1 % — LOW (ref 13–44)
MCHC RBC-ENTMCNC: 31.4 PG — SIGNIFICANT CHANGE UP (ref 27–34)
MCHC RBC-ENTMCNC: 35.5 G/DL — SIGNIFICANT CHANGE UP (ref 32–36)
MCV RBC AUTO: 88.3 FL — SIGNIFICANT CHANGE UP (ref 80–100)
MONOCYTES # BLD AUTO: 0.6 K/UL — SIGNIFICANT CHANGE UP (ref 0–0.9)
MONOCYTES NFR BLD AUTO: 6.4 % — SIGNIFICANT CHANGE UP (ref 2–14)
NEUTROPHILS # BLD AUTO: 8.2 K/UL — HIGH (ref 1.8–7.4)
NEUTROPHILS NFR BLD AUTO: 83.1 % — HIGH (ref 43–77)
PLATELET # BLD AUTO: 245 K/UL — SIGNIFICANT CHANGE UP (ref 150–400)
RBC # BLD: 3.41 M/UL — LOW (ref 4.2–5.8)
RBC # FLD: 13.1 % — SIGNIFICANT CHANGE UP (ref 10.3–14.5)
WBC # BLD: 9.8 K/UL — SIGNIFICANT CHANGE UP (ref 3.8–10.5)
WBC # FLD AUTO: 9.8 K/UL — SIGNIFICANT CHANGE UP (ref 3.8–10.5)

## 2019-04-04 RX ORDER — FILGRASTIM 300 UG/.5ML
300 INJECTION, SOLUTION INTRAVENOUS; SUBCUTANEOUS
Qty: 1 | Refills: 3 | Status: ACTIVE | COMMUNITY
Start: 2019-02-14 | End: 1900-01-01

## 2019-04-04 RX ORDER — APREPITANT 80 MG/1
80 CAPSULE ORAL
Qty: 4 | Refills: 5 | Status: ACTIVE | COMMUNITY
Start: 2018-11-28 | End: 1900-01-01

## 2019-04-04 RX ORDER — LOPERAMIDE HYDROCHLORIDE 2 MG/1
2 CAPSULE ORAL
Qty: 90 | Refills: 1 | Status: ACTIVE | COMMUNITY
Start: 2018-12-11 | End: 1900-01-01

## 2019-04-06 ENCOUNTER — APPOINTMENT (OUTPATIENT)
Dept: INFUSION THERAPY | Facility: HOSPITAL | Age: 79
End: 2019-04-06

## 2019-04-12 ENCOUNTER — RESULT REVIEW (OUTPATIENT)
Age: 79
End: 2019-04-12

## 2019-04-12 ENCOUNTER — APPOINTMENT (OUTPATIENT)
Dept: HEMATOLOGY ONCOLOGY | Facility: CLINIC | Age: 79
End: 2019-04-12
Payer: MEDICARE

## 2019-04-12 DIAGNOSIS — C25.0 MALIGNANT NEOPLASM OF HEAD OF PANCREAS: ICD-10-CM

## 2019-04-12 LAB
ALBUMIN SERPL ELPH-MCNC: 3.1 G/DL
ALP BLD-CCNC: 191 U/L
ALT SERPL-CCNC: 17 U/L
ANION GAP SERPL CALC-SCNC: 14 MMOL/L
AST SERPL-CCNC: 37 U/L
BASOPHILS # BLD AUTO: 0 K/UL — SIGNIFICANT CHANGE UP (ref 0–0.2)
BASOPHILS NFR BLD AUTO: 0 % — SIGNIFICANT CHANGE UP (ref 0–2)
BILIRUB SERPL-MCNC: 0.4 MG/DL
BUN SERPL-MCNC: 15 MG/DL
CALCIUM SERPL-MCNC: 8.1 MG/DL
CHLORIDE SERPL-SCNC: 102 MMOL/L
CO2 SERPL-SCNC: 25 MMOL/L
CREAT SERPL-MCNC: 0.96 MG/DL
EOSINOPHIL # BLD AUTO: 0 K/UL — SIGNIFICANT CHANGE UP (ref 0–0.5)
EOSINOPHIL NFR BLD AUTO: 0.4 % — SIGNIFICANT CHANGE UP (ref 0–6)
GLUCOSE SERPL-MCNC: 169 MG/DL
HCT VFR BLD CALC: 28.2 % — LOW (ref 39–50)
HGB BLD-MCNC: 10 G/DL — LOW (ref 13–17)
LYMPHOCYTES # BLD AUTO: 1.1 K/UL — SIGNIFICANT CHANGE UP (ref 1–3.3)
LYMPHOCYTES # BLD AUTO: 9.9 % — LOW (ref 13–44)
MCHC RBC-ENTMCNC: 31.6 PG — SIGNIFICANT CHANGE UP (ref 27–34)
MCHC RBC-ENTMCNC: 35.6 G/DL — SIGNIFICANT CHANGE UP (ref 32–36)
MCV RBC AUTO: 88.7 FL — SIGNIFICANT CHANGE UP (ref 80–100)
MONOCYTES # BLD AUTO: 0.9 K/UL — SIGNIFICANT CHANGE UP (ref 0–0.9)
MONOCYTES NFR BLD AUTO: 7.7 % — SIGNIFICANT CHANGE UP (ref 2–14)
NEUTROPHILS # BLD AUTO: 9.3 K/UL — HIGH (ref 1.8–7.4)
NEUTROPHILS NFR BLD AUTO: 82 % — HIGH (ref 43–77)
PLATELET # BLD AUTO: 116 K/UL — LOW (ref 150–400)
POTASSIUM SERPL-SCNC: 3.6 MMOL/L
PROT SERPL-MCNC: 5.3 G/DL
RBC # BLD: 3.18 M/UL — LOW (ref 4.2–5.8)
RBC # FLD: 13.4 % — SIGNIFICANT CHANGE UP (ref 10.3–14.5)
SODIUM SERPL-SCNC: 141 MMOL/L
WBC # BLD: 11.3 K/UL — HIGH (ref 3.8–10.5)
WBC # FLD AUTO: 11.3 K/UL — HIGH (ref 3.8–10.5)

## 2019-04-12 PROCEDURE — 99214 OFFICE O/P EST MOD 30 MIN: CPT

## 2019-04-12 NOTE — REVIEW OF SYSTEMS
[Diarrhea] : diarrhea [Negative] : Allergic/Immunologic [Fatigue] : no fatigue [Recent Change In Weight] : ~T no recent weight change [FreeTextEntry7] : Diarrhea has resolved. 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

## 2019-04-12 NOTE — PHYSICAL EXAM
[Restricted in physically strenuous activity but ambulatory and able to carry out work of a light or sedentary nature] : Status 1- Restricted in physically strenuous activity but ambulatory and able to carry out work of a light or sedentary nature, e.g., light house work, office work [Normal] : grossly intact [de-identified] : surgical incision is well healed.

## 2019-04-12 NOTE — HISTORY OF PRESENT ILLNESS
[T: ___] : T[unfilled] [Disease: _____________________] : Disease: [unfilled] [N: ___] : N[unfilled] [AJCC Stage: ____] : AJCC Stage: [unfilled] [M: ___] : M[unfilled] [de-identified] : Mr Mata is a pleasant 69 year old male with past medical history of HTN, HLD, DM2, Afib on Eliquis, EMANUEL, history of Prostate CA presenting with an initial consultation of Pancreatic CA.  \par \par He was hospitalized at [facility] from 8/9/2018 to 8/14/2018 where he was found to be jaundiced with a CT showing severe extrahepatic biliary duct obstruction with 2 cm dilation of CBD with marked intrahepatic biliary duct dilation. RUQ US showed no evidence of gallstones. 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 During stay at [facility] patient developed cholangitic picture, with spiking fevers. He was started on empiric Cefepime and  transferred to [facility] for ERCP with possible stent and biopsy. ERCP was attempted which revealed a distal CBD stricture that was unable to be traversed for stent placement or biopsy.  Patient underwent percutaneous stent placement by IR into segment 6 of the liver. Patient clinically improved after PTC placement (Bili downtrended from 16 to 5.5). During admission Dr. George was consulted for possible pancreaticoduodenectomy during this hospital stay but patient was found to be high risk for surgery (EF 35%, Severe left ventricular systolic dysfunction and decreased right ventricular systolic function).  Patient was discharged on 8/21 and instructed to follow up with Dr. George as an outpatient to plan surgery.\par He was admitted to [facility] from 8/28 to 9/6 for dehydration and MANNY which resolved and was cleared by nephrology.\par \par On 8/29/18 repeat Echo showed: Normal left ventricular systolic function; EF 60%.\par He was able to undergo Whipple procedure on 10/3/2018.  Post op course was relatively unremarkable and he was hospitalized for over 1 week.  Was later seen in ED on 10/21/2018 with abdominal incision seroma which has now resolved.\par \par Pathology report: \par Gallbladder- Chronic cholecystitis with cholelithiasis, negative for carcinoma, one reactive LN, negative for carcinoma (0/1)\par Bile duct margin- negative for carcinoma\par Head of Pancreas, partial duodenum and jejunum- invasive pancreatic adenocarcinoma, pancreatobiliary type. Moderately to poorly differentiated, tumor size 2.3 cm.  Tumor invading into bile duct and peripancreatic soft tissue.  All surgical resection margins negative for carcinoma.  Metastatic pancreatic adenocarcinoma presenting in 2/14 lymph nodes. Lymphovascular and perineural invasion present.\par AJCC staging: pT2N1(2/18).\par Stomach, antrum: Negative for carcinoma.  Three LN negative for carcinoma (0/3).\par \par 11/27/2018: patient for follow-up of pancreas cancer. He had Mediport placed. He feels well today. He is eating well and has gained weight. Creon has helped digestion and he has not had cramps unless excessively large meal. He has no pain today, and his surgical incision site is well healed. We discussed at length the FOLFIRINOX regimen and potential/usual side effects. His BP has been running a bit high. He saw his Cardiologist a week ago. The amlodipine was stopped since he was already taking diltiazem. However, Eliquis dose has not been changed yet. Also, diltiazem is given as multiple times daily rather than a single extended release dose. He is due to meet with his Endocrinologist tomorrow.\par \par 12/11/2018: Mr Mata is here for follow up and to review blood counts.  FOLFIRINOX C#1 was given on 12/3/2018.  Today his main complaints is diarrhea and cough.\par Diarrhea: Noted 3-4 days after C#1 D#3 FOLFIRINOX.  On average having 3-5 BM/day.  BM are "muddy" and have a foul smell.  At baseline Mr Mata has one BM every other day.  Denies any recent sick contacts, or having outside food.  He denies any associated abdominal pain, melena, hematochezia or fever/chills.  Admits to taking OTC Imodium with little to no relief.\par Cough: Has a chronic cough that has progressed over the last couple of days.  Cough is non-productive.  Admits to nasal congestion and denies fever, chills or myalgias.\par \par 12/17/2018: Mr Mata is here for follow up.  He was admitted to [facility] from 12/13/2018-12/15/2018 for dehydration/diarrhea. His cough is resolved. His diarrhea is still ongoing but has greatly improved. His weight has remained generally stable. He does not feel that he has returned to 100% strength yet. Of note, it is difficult to determine how much of the diarrhea is related to chemotherapy or to the Corona virus that was found on his RVP testing last week. I had several conversations with his doctors at [place] and helped to inform them and co-manage his care. He remains mildly hypokalemic and hypomagnesemic. He is instructed to take supplement daily for each for 1 more week. We will delay C#2 today for him to better resolve his toxicities. His blood count did cesar with ANC<1000. It is now much improved after one dose of Zarixo over the weekend at [place]. \par \par 1/22/2019: Mr Mata is here for follow up.  His diarrhea frequency is gone. He still notes stools are soft. He did not take the second course of Cipro. Has not had nausea during treatment as long as using the dexamethasone and emend. He just returned from a wedding of his daughter in [place]. \par \par 2/14/2019: Patient is here for C#4 FOLFIRINOX. Today Pre-F demonstrated WBC 2.2K/uL and ANC 0.7.  Due to Neutropenia we will not give treatment today.\par Otherwise patient feels well.  He denies fever, chills, myalgia, dysuria, diarrhea or cough.  His BM are irregular and averaging one BM/day which is semi watery.  No associated abdominal pain noted.  \par \par 2/19/2019: Due to neutropenia , C#4 was postponed.  He was given Zarxio x 3 (2/14-2/16).  He tolerated injections moderately well.  There was pain on the lower back on D#2 of Zarxio and took Claritin with no relief.  He called the service and was recommended Tylenol and the pain subsided. \par He was seen by his Cardiologist on Friday (2/15) to evaluate his loop recorder.  There is intermittent pauses in the middle of the night, <2 seconds.  Will just monitor, no acute intervention needed.\par Otherwise he feels well today. \par \par 4/12/2019:  Mr Mata is here for follow up accompanied with his wife.  He completed C#7 mFOLFIRINOX on 4/4/2019.  Today his main complaint is weakness.  The weakness has increased in severity.  He is still able to perform his normal ADL but has some issues with writing and occasionally dropping utensils.  [de-identified] : adenocarcinoma [de-identified] : Surgeon: Dr Jeff George [phone]\par Nephrology: Dr Dajuan Shepherd [phone]\par Sleep, Pulm: Dr Tyler Aviles [phone]\par Urology: Dr Renato Mcnulty [phone]\par Cardiology: Aaron Guevara; [facility] in [place]\par Endocrine: Hanh Quispe; [phone]\par PCP: Shilpa Willis; [phone]\par EPS/Cardiology: Demarco Sellers; [phone]\par \par Wife - Denise.\par Daughter - Jennifer & Derek \par son - Attila\par \par

## 2019-04-14 ENCOUNTER — FORM ENCOUNTER (OUTPATIENT)
Age: 79
End: 2019-04-14

## 2019-04-15 ENCOUNTER — OUTPATIENT (OUTPATIENT)
Dept: OUTPATIENT SERVICES | Facility: HOSPITAL | Age: 79
LOS: 1 days | End: 2019-04-15
Payer: MEDICARE

## 2019-04-15 ENCOUNTER — OUTPATIENT (OUTPATIENT)
Dept: OUTPATIENT SERVICES | Facility: HOSPITAL | Age: 79
LOS: 1 days | Discharge: HOME | End: 2019-04-15

## 2019-04-15 ENCOUNTER — APPOINTMENT (OUTPATIENT)
Dept: CT IMAGING | Facility: CLINIC | Age: 79
End: 2019-04-15
Payer: MEDICARE

## 2019-04-15 DIAGNOSIS — E11.9 TYPE 2 DIABETES MELLITUS WITHOUT COMPLICATIONS: Chronic | ICD-10-CM

## 2019-04-15 DIAGNOSIS — Z98.890 OTHER SPECIFIED POSTPROCEDURAL STATES: Chronic | ICD-10-CM

## 2019-04-15 DIAGNOSIS — I10 ESSENTIAL (PRIMARY) HYPERTENSION: Chronic | ICD-10-CM

## 2019-04-15 DIAGNOSIS — I48.91 UNSPECIFIED ATRIAL FIBRILLATION: Chronic | ICD-10-CM

## 2019-04-15 DIAGNOSIS — Z96.652 PRESENCE OF LEFT ARTIFICIAL KNEE JOINT: Chronic | ICD-10-CM

## 2019-04-15 DIAGNOSIS — C25.9 MALIGNANT NEOPLASM OF PANCREAS, UNSPECIFIED: ICD-10-CM

## 2019-04-15 DIAGNOSIS — C25.0 MALIGNANT NEOPLASM OF HEAD OF PANCREAS: ICD-10-CM

## 2019-04-15 PROCEDURE — 74177 CT ABD & PELVIS W/CONTRAST: CPT

## 2019-04-15 PROCEDURE — 71260 CT THORAX DX C+: CPT

## 2019-04-15 PROCEDURE — 71260 CT THORAX DX C+: CPT | Mod: 26

## 2019-04-15 PROCEDURE — 74177 CT ABD & PELVIS W/CONTRAST: CPT | Mod: 26

## 2019-04-17 ENCOUNTER — INPATIENT (INPATIENT)
Facility: HOSPITAL | Age: 79
LOS: 8 days | Discharge: HOSPICE | End: 2019-04-26
Attending: STUDENT IN AN ORGANIZED HEALTH CARE EDUCATION/TRAINING PROGRAM | Admitting: STUDENT IN AN ORGANIZED HEALTH CARE EDUCATION/TRAINING PROGRAM
Payer: MEDICARE

## 2019-04-17 VITALS
RESPIRATION RATE: 18 BRPM | TEMPERATURE: 98 F | SYSTOLIC BLOOD PRESSURE: 141 MMHG | HEART RATE: 86 BPM | OXYGEN SATURATION: 99 % | DIASTOLIC BLOOD PRESSURE: 76 MMHG

## 2019-04-17 DIAGNOSIS — Z96.652 PRESENCE OF LEFT ARTIFICIAL KNEE JOINT: Chronic | ICD-10-CM

## 2019-04-17 DIAGNOSIS — I10 ESSENTIAL (PRIMARY) HYPERTENSION: Chronic | ICD-10-CM

## 2019-04-17 DIAGNOSIS — I48.91 UNSPECIFIED ATRIAL FIBRILLATION: Chronic | ICD-10-CM

## 2019-04-17 DIAGNOSIS — E11.9 TYPE 2 DIABETES MELLITUS WITHOUT COMPLICATIONS: Chronic | ICD-10-CM

## 2019-04-17 DIAGNOSIS — Z98.890 OTHER SPECIFIED POSTPROCEDURAL STATES: Chronic | ICD-10-CM

## 2019-04-17 DIAGNOSIS — R06.02 SHORTNESS OF BREATH: ICD-10-CM

## 2019-04-17 LAB
ALBUMIN SERPL ELPH-MCNC: 2.9 G/DL — LOW (ref 3.3–5)
ALP SERPL-CCNC: 186 U/L — HIGH (ref 40–120)
ALT FLD-CCNC: 22 U/L — SIGNIFICANT CHANGE UP (ref 4–41)
ANION GAP SERPL CALC-SCNC: 15 MMO/L — HIGH (ref 7–14)
AST SERPL-CCNC: 64 U/L — HIGH (ref 4–40)
BASE EXCESS BLDV CALC-SCNC: 1.9 MMOL/L — SIGNIFICANT CHANGE UP
BASOPHILS # BLD AUTO: 0.03 K/UL — SIGNIFICANT CHANGE UP (ref 0–0.2)
BASOPHILS NFR BLD AUTO: 0.3 % — SIGNIFICANT CHANGE UP (ref 0–2)
BILIRUB SERPL-MCNC: 0.6 MG/DL — SIGNIFICANT CHANGE UP (ref 0.2–1.2)
BUN SERPL-MCNC: 18 MG/DL — SIGNIFICANT CHANGE UP (ref 7–23)
CALCIUM SERPL-MCNC: 8.2 MG/DL — LOW (ref 8.4–10.5)
CHLORIDE SERPL-SCNC: 102 MMOL/L — SIGNIFICANT CHANGE UP (ref 98–107)
CO2 SERPL-SCNC: 24 MMOL/L — SIGNIFICANT CHANGE UP (ref 22–31)
CREAT SERPL-MCNC: 1.3 MG/DL — SIGNIFICANT CHANGE UP (ref 0.5–1.3)
EOSINOPHIL # BLD AUTO: 0.01 K/UL — SIGNIFICANT CHANGE UP (ref 0–0.5)
EOSINOPHIL NFR BLD AUTO: 0.1 % — SIGNIFICANT CHANGE UP (ref 0–6)
GAS PNL BLDV: 139 MMOL/L — SIGNIFICANT CHANGE UP (ref 136–146)
GLUCOSE BLDV-MCNC: 157 — HIGH (ref 70–99)
GLUCOSE SERPL-MCNC: 163 MG/DL — HIGH (ref 70–99)
HCO3 BLDV-SCNC: 25 MMOL/L — SIGNIFICANT CHANGE UP (ref 20–27)
HCT VFR BLD CALC: 31.3 % — LOW (ref 39–50)
HCT VFR BLDV CALC: 30.2 % — LOW (ref 39–51)
HGB BLD-MCNC: 9.6 G/DL — LOW (ref 13–17)
HGB BLDV-MCNC: 9.7 G/DL — LOW (ref 13–17)
IMM GRANULOCYTES NFR BLD AUTO: 1.6 % — HIGH (ref 0–1.5)
INR BLD: 2.06 — HIGH (ref 0.88–1.17)
LYMPHOCYTES # BLD AUTO: 0.83 K/UL — LOW (ref 1–3.3)
LYMPHOCYTES # BLD AUTO: 7.6 % — LOW (ref 13–44)
MAGNESIUM SERPL-MCNC: 1.6 MG/DL — SIGNIFICANT CHANGE UP (ref 1.6–2.6)
MCHC RBC-ENTMCNC: 27.8 PG — SIGNIFICANT CHANGE UP (ref 27–34)
MCHC RBC-ENTMCNC: 30.7 % — LOW (ref 32–36)
MCV RBC AUTO: 90.7 FL — SIGNIFICANT CHANGE UP (ref 80–100)
MONOCYTES # BLD AUTO: 0.92 K/UL — HIGH (ref 0–0.9)
MONOCYTES NFR BLD AUTO: 8.4 % — SIGNIFICANT CHANGE UP (ref 2–14)
NEUTROPHILS # BLD AUTO: 9 K/UL — HIGH (ref 1.8–7.4)
NEUTROPHILS NFR BLD AUTO: 82 % — HIGH (ref 43–77)
NRBC # FLD: 0 K/UL — SIGNIFICANT CHANGE UP (ref 0–0)
NT-PROBNP SERPL-SCNC: SIGNIFICANT CHANGE UP PG/ML
PCO2 BLDV: 42 MMHG — SIGNIFICANT CHANGE UP (ref 41–51)
PH BLDV: 7.41 PH — SIGNIFICANT CHANGE UP (ref 7.32–7.43)
PLATELET # BLD AUTO: 226 K/UL — SIGNIFICANT CHANGE UP (ref 150–400)
PMV BLD: 10.2 FL — SIGNIFICANT CHANGE UP (ref 7–13)
PO2 BLDV: < 24 MMHG — LOW (ref 35–40)
POTASSIUM BLDV-SCNC: 2.4 MMOL/L — CRITICAL LOW (ref 3.4–4.5)
POTASSIUM SERPL-MCNC: 2.5 MMOL/L — CRITICAL LOW (ref 3.5–5.3)
POTASSIUM SERPL-SCNC: 2.5 MMOL/L — CRITICAL LOW (ref 3.5–5.3)
PROT SERPL-MCNC: 5.7 G/DL — LOW (ref 6–8.3)
PROTHROM AB SERPL-ACNC: 24 SEC — HIGH (ref 9.8–13.1)
RBC # BLD: 3.45 M/UL — LOW (ref 4.2–5.8)
RBC # FLD: 14.4 % — SIGNIFICANT CHANGE UP (ref 10.3–14.5)
SAO2 % BLDV: 21.6 % — LOW (ref 60–85)
SODIUM SERPL-SCNC: 141 MMOL/L — SIGNIFICANT CHANGE UP (ref 135–145)
WBC # BLD: 10.96 K/UL — HIGH (ref 3.8–10.5)
WBC # FLD AUTO: 10.96 K/UL — HIGH (ref 3.8–10.5)

## 2019-04-17 PROCEDURE — 71046 X-RAY EXAM CHEST 2 VIEWS: CPT | Mod: 26

## 2019-04-17 RX ORDER — APIXABAN 5 MG/1
5 TABLET, FILM COATED ORAL
Qty: 60 | Refills: 5 | Status: ACTIVE | COMMUNITY

## 2019-04-17 RX ORDER — POTASSIUM CHLORIDE 20 MEQ
10 PACKET (EA) ORAL
Qty: 0 | Refills: 0 | Status: COMPLETED | OUTPATIENT
Start: 2019-04-17 | End: 2019-04-17

## 2019-04-17 RX ADMIN — Medication 100 MILLIEQUIVALENT(S): at 20:37

## 2019-04-17 RX ADMIN — Medication 100 MILLIEQUIVALENT(S): at 22:44

## 2019-04-17 RX ADMIN — Medication 100 MILLIEQUIVALENT(S): at 23:51

## 2019-04-17 NOTE — ED PROVIDER NOTE - OBJECTIVE STATEMENT
78M with pancreatic cancer s/p whipple in November and chemo last dose 2 weeks ago presenting with 1 week of increased fatigue, decreased PO intake and increasing SOB. 78M with afib on eliquis, HTN, pancreatic cancer s/p whipple in November and chemo last dose 2 weeks ago (FOLFIRINOX - 4/4) presenting with 1 week of increased fatigue, decreased PO intake and increasing SOB. Patient states he has been having on/off SOB for about a week however today it worsened to the point that he could not ambulate. He has been having decreased PO intake and a steady weight loss since the fall of 20 pounds. He has been having diarrhea since the beginning of chemotherapy, nonbloody and unchanged, previously treated for salmonella. He has nausea since the chemo as well, no vomiting. Denies fevers, chills, cough, CP, palpitations. He reports intermittent abdominal pain mostly in the lower quadrants, currently no pain. No recent sick contacts. He also reports LE edema for ~1 week, no pain. He had CT scan done 2 days ago which he was told had "activity in the liver" and is planned for a biopsy on Monday. Follows with Dr Clark at [facility].

## 2019-04-17 NOTE — ED ADULT TRIAGE NOTE - CHIEF COMPLAINT QUOTE
Patient is a cancer patient that has been feeling short of breath, weak and was seen by his oncologist and told to come to ER if he feels worse. Patient is a cancer patient that has been feeling short of breath, weak and was seen by his oncologist, Dr. Mukesh Clark, [phone], and told to come to ER if he feels worse.

## 2019-04-17 NOTE — ED ADULT NURSE NOTE - CHIEF COMPLAINT QUOTE
Patient is a cancer patient that has been feeling short of breath, weak and was seen by his oncologist, Dr. Mukesh Clark, [phone], and told to come to ER if he feels worse.

## 2019-04-17 NOTE — ED ADULT NURSE NOTE - OBJECTIVE STATEMENT
Sebas RN: Pt received a&ox3, multiple c/o, c/o generalized weakness x 2 weeks worse x 2 days/middle abd pain x 3 days/bilateral lower extremity edema w +1 pitting edema x 3 days/dyspnea on exertion x 4 days, pt denies any ha/dizziness/lightheadedness/cp, pt states NVD baseline since chemo start in 12/19, pt tachy at 115, ekg in progress, hx of afib on Eliquis, pt w hx of stage 2 pancreatic CA, whipple in 12/19, states CT performed on Monday which showed suspicion for liver CA, abd soft non tender non distended, vs as reported, 20 gauge IV placed in right ac, labs drawn and sent, report given to RN.

## 2019-04-17 NOTE — ED PROVIDER NOTE - PHYSICAL EXAMINATION
GENERAL: NAD, well-developed  HEAD:  Atraumatic, Normocephalic  EYES: EOMI, PERRLA, conjunctiva and sclera clear  NECK: Supple, No JVD  CHEST/LUNG: decreased breath sounds at left base, otherwise clear  HEART: tachycardic, regular rhythm; No murmurs, rubs, or gallops  ABDOMEN: Soft, mild tenderness in RUQ, Nondistended; Bowel sounds present  EXTREMITIES:  2+ Peripheral Pulses, No clubbing, cyanosis, 1+ b/l pitting edema to knee  PSYCH: AAOx3  NEUROLOGY: non-focal  SKIN: No rashes or lesions

## 2019-04-17 NOTE — ED PROVIDER NOTE - CLINICAL SUMMARY MEDICAL DECISION MAKING FREE TEXT BOX
78M with pancreatic cancer last chemo 2 weeks ago with increasing SOB and weakness - labs, EKG, CXR, consider CTA however patient already on anticoag

## 2019-04-17 NOTE — ED PROVIDER NOTE - ATTENDING CONTRIBUTION TO CARE
Dr. Dumont: I have personally performed a face to face bedside history and physical examination of this patient. I have discussed the history, examination, review of systems, assessment and plan of management with the resident. I have reviewed the electronic medical record and amended it to reflect my history, review of systems, physical exam, assessment and plan.     Attending Exam - Dr. Dumont: GEN: well appearing, NAD  HEENT: +PERRL, EOMI  RESP: CTAB, no signs of respiratory distress CV: s1s2 RRR ABD: soft/non tender/non distended  MSK: no deformities / swelling, normal range of motion, spine grossly normal NEURO: alert, non focal exam SKIN: normal color / temperature / condition.

## 2019-04-18 ENCOUNTER — APPOINTMENT (OUTPATIENT)
Dept: INFUSION THERAPY | Facility: HOSPITAL | Age: 79
End: 2019-04-18

## 2019-04-18 DIAGNOSIS — K21.9 GASTRO-ESOPHAGEAL REFLUX DISEASE WITHOUT ESOPHAGITIS: ICD-10-CM

## 2019-04-18 DIAGNOSIS — Z98.890 OTHER SPECIFIED POSTPROCEDURAL STATES: Chronic | ICD-10-CM

## 2019-04-18 DIAGNOSIS — R06.00 DYSPNEA, UNSPECIFIED: ICD-10-CM

## 2019-04-18 DIAGNOSIS — Z29.9 ENCOUNTER FOR PROPHYLACTIC MEASURES, UNSPECIFIED: ICD-10-CM

## 2019-04-18 DIAGNOSIS — C78.7 SECONDARY MALIGNANT NEOPLASM OF LIVER AND INTRAHEPATIC BILE DUCT: ICD-10-CM

## 2019-04-18 DIAGNOSIS — E11.8 TYPE 2 DIABETES MELLITUS WITH UNSPECIFIED COMPLICATIONS: ICD-10-CM

## 2019-04-18 DIAGNOSIS — I48.91 UNSPECIFIED ATRIAL FIBRILLATION: ICD-10-CM

## 2019-04-18 DIAGNOSIS — E83.42 HYPOMAGNESEMIA: ICD-10-CM

## 2019-04-18 DIAGNOSIS — E87.6 HYPOKALEMIA: ICD-10-CM

## 2019-04-18 DIAGNOSIS — C78.00 SECONDARY MALIGNANT NEOPLASM OF UNSPECIFIED LUNG: ICD-10-CM

## 2019-04-18 LAB
ALBUMIN SERPL ELPH-MCNC: 2.6 G/DL — LOW (ref 3.3–5)
ALP SERPL-CCNC: 204 U/L — HIGH (ref 40–120)
ALT FLD-CCNC: 23 U/L — SIGNIFICANT CHANGE UP (ref 4–41)
ANION GAP SERPL CALC-SCNC: 13 MMO/L — SIGNIFICANT CHANGE UP (ref 7–14)
ANION GAP SERPL CALC-SCNC: 14 MMO/L — SIGNIFICANT CHANGE UP (ref 7–14)
APPEARANCE UR: SIGNIFICANT CHANGE UP
APTT BLD: 32.4 SEC — SIGNIFICANT CHANGE UP (ref 27.5–36.3)
AST SERPL-CCNC: 86 U/L — HIGH (ref 4–40)
BACTERIA # UR AUTO: SIGNIFICANT CHANGE UP
BASOPHILS # BLD AUTO: 0.04 K/UL — SIGNIFICANT CHANGE UP (ref 0–0.2)
BASOPHILS NFR BLD AUTO: 0.3 % — SIGNIFICANT CHANGE UP (ref 0–2)
BILIRUB SERPL-MCNC: 0.4 MG/DL — SIGNIFICANT CHANGE UP (ref 0.2–1.2)
BILIRUB UR-MCNC: SIGNIFICANT CHANGE UP
BLOOD UR QL VISUAL: NEGATIVE — SIGNIFICANT CHANGE UP
BUN SERPL-MCNC: 17 MG/DL — SIGNIFICANT CHANGE UP (ref 7–23)
BUN SERPL-MCNC: 19 MG/DL — SIGNIFICANT CHANGE UP (ref 7–23)
BUN SERPL-MCNC: 20 MG/DL — SIGNIFICANT CHANGE UP (ref 7–23)
BUN SERPL-MCNC: 20 MG/DL — SIGNIFICANT CHANGE UP (ref 7–23)
CA-I BLD-SCNC: 1.08 MMOL/L — SIGNIFICANT CHANGE UP (ref 1.03–1.23)
CALCIUM SERPL-MCNC: 7.4 MG/DL — LOW (ref 8.4–10.5)
CALCIUM SERPL-MCNC: 8.1 MG/DL — LOW (ref 8.4–10.5)
CHLORIDE SERPL-SCNC: 103 MMOL/L — SIGNIFICANT CHANGE UP (ref 98–107)
CHLORIDE SERPL-SCNC: 104 MMOL/L — SIGNIFICANT CHANGE UP (ref 98–107)
CHLORIDE SERPL-SCNC: 105 MMOL/L — SIGNIFICANT CHANGE UP (ref 98–107)
CHLORIDE SERPL-SCNC: 105 MMOL/L — SIGNIFICANT CHANGE UP (ref 98–107)
CHOLEST SERPL-MCNC: 101 MG/DL — LOW (ref 120–199)
CO2 SERPL-SCNC: 22 MMOL/L — SIGNIFICANT CHANGE UP (ref 22–31)
CO2 SERPL-SCNC: 22 MMOL/L — SIGNIFICANT CHANGE UP (ref 22–31)
CO2 SERPL-SCNC: 23 MMOL/L — SIGNIFICANT CHANGE UP (ref 22–31)
CO2 SERPL-SCNC: 23 MMOL/L — SIGNIFICANT CHANGE UP (ref 22–31)
COLOR SPEC: YELLOW — SIGNIFICANT CHANGE UP
CREAT SERPL-MCNC: 1.1 MG/DL — SIGNIFICANT CHANGE UP (ref 0.5–1.3)
CREAT SERPL-MCNC: 1.12 MG/DL — SIGNIFICANT CHANGE UP (ref 0.5–1.3)
CREAT SERPL-MCNC: 1.14 MG/DL — SIGNIFICANT CHANGE UP (ref 0.5–1.3)
CREAT SERPL-MCNC: 1.19 MG/DL — SIGNIFICANT CHANGE UP (ref 0.5–1.3)
EOSINOPHIL # BLD AUTO: 0.03 K/UL — SIGNIFICANT CHANGE UP (ref 0–0.5)
EOSINOPHIL NFR BLD AUTO: 0.2 % — SIGNIFICANT CHANGE UP (ref 0–6)
EPI CELLS # UR: SIGNIFICANT CHANGE UP
GLUCOSE SERPL-MCNC: 144 MG/DL — HIGH (ref 70–99)
GLUCOSE SERPL-MCNC: 211 MG/DL — HIGH (ref 70–99)
GLUCOSE SERPL-MCNC: 215 MG/DL — HIGH (ref 70–99)
GLUCOSE SERPL-MCNC: 222 MG/DL — HIGH (ref 70–99)
GLUCOSE UR-MCNC: NEGATIVE — SIGNIFICANT CHANGE UP
HBA1C BLD-MCNC: 8.3 % — HIGH (ref 4–5.6)
HCT VFR BLD CALC: 29.4 % — LOW (ref 39–50)
HDLC SERPL-MCNC: 20 MG/DL — LOW (ref 35–55)
HGB BLD-MCNC: 9.3 G/DL — LOW (ref 13–17)
HYALINE CASTS # UR AUTO: SIGNIFICANT CHANGE UP
IMM GRANULOCYTES NFR BLD AUTO: 1 % — SIGNIFICANT CHANGE UP (ref 0–1.5)
INR BLD: 1.64 — HIGH (ref 0.88–1.17)
KETONES UR-MCNC: NEGATIVE — SIGNIFICANT CHANGE UP
LEUKOCYTE ESTERASE UR-ACNC: NEGATIVE — SIGNIFICANT CHANGE UP
LIPID PNL WITH DIRECT LDL SERPL: 43 MG/DL — SIGNIFICANT CHANGE UP
LYMPHOCYTES # BLD AUTO: 0.98 K/UL — LOW (ref 1–3.3)
LYMPHOCYTES # BLD AUTO: 7.2 % — LOW (ref 13–44)
MAGNESIUM SERPL-MCNC: 1.5 MG/DL — LOW (ref 1.6–2.6)
MAGNESIUM SERPL-MCNC: 1.7 MG/DL — SIGNIFICANT CHANGE UP (ref 1.6–2.6)
MAGNESIUM SERPL-MCNC: 1.8 MG/DL — SIGNIFICANT CHANGE UP (ref 1.6–2.6)
MAGNESIUM SERPL-MCNC: 1.8 MG/DL — SIGNIFICANT CHANGE UP (ref 1.6–2.6)
MCHC RBC-ENTMCNC: 27.8 PG — SIGNIFICANT CHANGE UP (ref 27–34)
MCHC RBC-ENTMCNC: 31.6 % — LOW (ref 32–36)
MCV RBC AUTO: 87.8 FL — SIGNIFICANT CHANGE UP (ref 80–100)
MONOCYTES # BLD AUTO: 0.91 K/UL — HIGH (ref 0–0.9)
MONOCYTES NFR BLD AUTO: 6.7 % — SIGNIFICANT CHANGE UP (ref 2–14)
NEUTROPHILS # BLD AUTO: 11.49 K/UL — HIGH (ref 1.8–7.4)
NEUTROPHILS NFR BLD AUTO: 84.6 % — HIGH (ref 43–77)
NITRITE UR-MCNC: NEGATIVE — SIGNIFICANT CHANGE UP
NRBC # FLD: 0.02 K/UL — SIGNIFICANT CHANGE UP (ref 0–0)
NT-PROBNP SERPL-SCNC: 7013 PG/ML — SIGNIFICANT CHANGE UP
PH UR: 6 — SIGNIFICANT CHANGE UP (ref 5–8)
PHOSPHATE SERPL-MCNC: 2.8 MG/DL — SIGNIFICANT CHANGE UP (ref 2.5–4.5)
PLATELET # BLD AUTO: 225 K/UL — SIGNIFICANT CHANGE UP (ref 150–400)
PMV BLD: 10.1 FL — SIGNIFICANT CHANGE UP (ref 7–13)
POTASSIUM SERPL-MCNC: 2.6 MMOL/L — CRITICAL LOW (ref 3.5–5.3)
POTASSIUM SERPL-MCNC: 3.2 MMOL/L — LOW (ref 3.5–5.3)
POTASSIUM SERPL-MCNC: 3.3 MMOL/L — LOW (ref 3.5–5.3)
POTASSIUM SERPL-MCNC: 3.3 MMOL/L — LOW (ref 3.5–5.3)
POTASSIUM SERPL-SCNC: 2.6 MMOL/L — CRITICAL LOW (ref 3.5–5.3)
POTASSIUM SERPL-SCNC: 3.2 MMOL/L — LOW (ref 3.5–5.3)
POTASSIUM SERPL-SCNC: 3.3 MMOL/L — LOW (ref 3.5–5.3)
POTASSIUM SERPL-SCNC: 3.3 MMOL/L — LOW (ref 3.5–5.3)
PROT SERPL-MCNC: 5.4 G/DL — LOW (ref 6–8.3)
PROT UR-MCNC: 100 — HIGH
PROTHROM AB SERPL-ACNC: 19 SEC — HIGH (ref 9.8–13.1)
PTH-INTACT SERPL-MCNC: 75.85 PG/ML — HIGH (ref 15–65)
RBC # BLD: 3.35 M/UL — LOW (ref 4.2–5.8)
RBC # FLD: 14.6 % — HIGH (ref 10.3–14.5)
SODIUM SERPL-SCNC: 139 MMOL/L — SIGNIFICANT CHANGE UP (ref 135–145)
SODIUM SERPL-SCNC: 139 MMOL/L — SIGNIFICANT CHANGE UP (ref 135–145)
SODIUM SERPL-SCNC: 141 MMOL/L — SIGNIFICANT CHANGE UP (ref 135–145)
SODIUM SERPL-SCNC: 141 MMOL/L — SIGNIFICANT CHANGE UP (ref 135–145)
SP GR SPEC: 1.02 — SIGNIFICANT CHANGE UP (ref 1–1.04)
TRIGL SERPL-MCNC: 194 MG/DL — HIGH (ref 10–149)
TSH SERPL-MCNC: 1.67 UIU/ML — SIGNIFICANT CHANGE UP (ref 0.27–4.2)
UROBILINOGEN FLD QL: NORMAL — SIGNIFICANT CHANGE UP
WBC # BLD: 13.58 K/UL — HIGH (ref 3.8–10.5)
WBC # FLD AUTO: 13.58 K/UL — HIGH (ref 3.8–10.5)
WBC UR QL: SIGNIFICANT CHANGE UP (ref 0–?)

## 2019-04-18 PROCEDURE — 99223 1ST HOSP IP/OBS HIGH 75: CPT | Mod: AI

## 2019-04-18 PROCEDURE — 99222 1ST HOSP IP/OBS MODERATE 55: CPT

## 2019-04-18 RX ORDER — APIXABAN 2.5 MG/1
5 TABLET, FILM COATED ORAL EVERY 12 HOURS
Qty: 0 | Refills: 0 | Status: DISCONTINUED | OUTPATIENT
Start: 2019-04-18 | End: 2019-04-19

## 2019-04-18 RX ORDER — POTASSIUM CHLORIDE 20 MEQ
10 PACKET (EA) ORAL
Qty: 0 | Refills: 0 | Status: COMPLETED | OUTPATIENT
Start: 2019-04-18 | End: 2019-04-18

## 2019-04-18 RX ORDER — PROCHLORPERAZINE MALEATE 5 MG
10 TABLET ORAL DAILY
Qty: 0 | Refills: 0 | Status: DISCONTINUED | OUTPATIENT
Start: 2019-04-18 | End: 2019-04-26

## 2019-04-18 RX ORDER — DILTIAZEM HCL 120 MG
60 CAPSULE, EXT RELEASE 24 HR ORAL
Qty: 0 | Refills: 0 | Status: DISCONTINUED | OUTPATIENT
Start: 2019-04-18 | End: 2019-04-20

## 2019-04-18 RX ORDER — MAGNESIUM OXIDE 400 MG ORAL TABLET 241.3 MG
400 TABLET ORAL DAILY
Qty: 0 | Refills: 0 | Status: DISCONTINUED | OUTPATIENT
Start: 2019-04-18 | End: 2019-04-26

## 2019-04-18 RX ORDER — INSULIN GLARGINE 100 [IU]/ML
16 INJECTION, SOLUTION SUBCUTANEOUS
Qty: 0 | Refills: 0 | Status: DISCONTINUED | OUTPATIENT
Start: 2019-04-18 | End: 2019-04-26

## 2019-04-18 RX ORDER — SODIUM CHLORIDE 9 MG/ML
1000 INJECTION, SOLUTION INTRAVENOUS
Qty: 0 | Refills: 0 | Status: DISCONTINUED | OUTPATIENT
Start: 2019-04-18 | End: 2019-04-26

## 2019-04-18 RX ORDER — DEXTROSE 50 % IN WATER 50 %
12.5 SYRINGE (ML) INTRAVENOUS ONCE
Qty: 0 | Refills: 0 | Status: DISCONTINUED | OUTPATIENT
Start: 2019-04-18 | End: 2019-04-26

## 2019-04-18 RX ORDER — INSULIN LISPRO 100/ML
VIAL (ML) SUBCUTANEOUS AT BEDTIME
Qty: 0 | Refills: 0 | Status: DISCONTINUED | OUTPATIENT
Start: 2019-04-18 | End: 2019-04-26

## 2019-04-18 RX ORDER — FUROSEMIDE 40 MG
20 TABLET ORAL
Qty: 0 | Refills: 0 | Status: DISCONTINUED | OUTPATIENT
Start: 2019-04-18 | End: 2019-04-19

## 2019-04-18 RX ORDER — POTASSIUM CHLORIDE 20 MEQ
40 PACKET (EA) ORAL ONCE
Qty: 0 | Refills: 0 | Status: COMPLETED | OUTPATIENT
Start: 2019-04-18 | End: 2019-04-18

## 2019-04-18 RX ORDER — DEXTROSE 50 % IN WATER 50 %
25 SYRINGE (ML) INTRAVENOUS ONCE
Qty: 0 | Refills: 0 | Status: DISCONTINUED | OUTPATIENT
Start: 2019-04-18 | End: 2019-04-26

## 2019-04-18 RX ORDER — LIPASE/PROTEASE/AMYLASE 16-48-48K
2 CAPSULE,DELAYED RELEASE (ENTERIC COATED) ORAL
Qty: 0 | Refills: 0 | Status: DISCONTINUED | OUTPATIENT
Start: 2019-04-18 | End: 2019-04-26

## 2019-04-18 RX ORDER — PANTOPRAZOLE SODIUM 20 MG/1
40 TABLET, DELAYED RELEASE ORAL
Qty: 0 | Refills: 0 | Status: DISCONTINUED | OUTPATIENT
Start: 2019-04-18 | End: 2019-04-26

## 2019-04-18 RX ORDER — LOPERAMIDE HCL 2 MG
2 TABLET ORAL
Qty: 0 | Refills: 0 | Status: DISCONTINUED | OUTPATIENT
Start: 2019-04-18 | End: 2019-04-24

## 2019-04-18 RX ORDER — GLUCAGON INJECTION, SOLUTION 0.5 MG/.1ML
1 INJECTION, SOLUTION SUBCUTANEOUS ONCE
Qty: 0 | Refills: 0 | Status: DISCONTINUED | OUTPATIENT
Start: 2019-04-18 | End: 2019-04-26

## 2019-04-18 RX ORDER — METOPROLOL TARTRATE 50 MG
50 TABLET ORAL
Qty: 0 | Refills: 0 | Status: DISCONTINUED | OUTPATIENT
Start: 2019-04-18 | End: 2019-04-20

## 2019-04-18 RX ORDER — INSULIN LISPRO 100/ML
VIAL (ML) SUBCUTANEOUS
Qty: 0 | Refills: 0 | Status: DISCONTINUED | OUTPATIENT
Start: 2019-04-18 | End: 2019-04-26

## 2019-04-18 RX ORDER — LOSARTAN POTASSIUM 100 MG/1
100 TABLET, FILM COATED ORAL DAILY
Qty: 0 | Refills: 0 | Status: DISCONTINUED | OUTPATIENT
Start: 2019-04-18 | End: 2019-04-22

## 2019-04-18 RX ORDER — POTASSIUM CHLORIDE 20 MEQ
40 PACKET (EA) ORAL ONCE
Qty: 0 | Refills: 0 | Status: COMPLETED | OUTPATIENT
Start: 2019-04-18 | End: 2019-04-19

## 2019-04-18 RX ORDER — ACETAMINOPHEN 500 MG
650 TABLET ORAL EVERY 6 HOURS
Qty: 0 | Refills: 0 | Status: DISCONTINUED | OUTPATIENT
Start: 2019-04-18 | End: 2019-04-26

## 2019-04-18 RX ORDER — POTASSIUM CHLORIDE 20 MEQ
20 PACKET (EA) ORAL
Qty: 0 | Refills: 0 | Status: COMPLETED | OUTPATIENT
Start: 2019-04-18 | End: 2019-04-19

## 2019-04-18 RX ORDER — MAGNESIUM SULFATE 500 MG/ML
2 VIAL (ML) INJECTION ONCE
Qty: 0 | Refills: 0 | Status: COMPLETED | OUTPATIENT
Start: 2019-04-18 | End: 2019-04-18

## 2019-04-18 RX ORDER — DEXTROSE 50 % IN WATER 50 %
15 SYRINGE (ML) INTRAVENOUS ONCE
Qty: 0 | Refills: 0 | Status: DISCONTINUED | OUTPATIENT
Start: 2019-04-18 | End: 2019-04-26

## 2019-04-18 RX ADMIN — MAGNESIUM OXIDE 400 MG ORAL TABLET 400 MILLIGRAM(S): 241.3 TABLET ORAL at 14:14

## 2019-04-18 RX ADMIN — Medication 40 MILLIEQUIVALENT(S): at 04:39

## 2019-04-18 RX ADMIN — APIXABAN 5 MILLIGRAM(S): 2.5 TABLET, FILM COATED ORAL at 17:54

## 2019-04-18 RX ADMIN — Medication 20 MILLIEQUIVALENT(S): at 17:55

## 2019-04-18 RX ADMIN — Medication 100 MILLIEQUIVALENT(S): at 11:03

## 2019-04-18 RX ADMIN — Medication 2: at 14:14

## 2019-04-18 RX ADMIN — Medication 2 MILLIGRAM(S): at 18:05

## 2019-04-18 RX ADMIN — Medication 10 MILLIGRAM(S): at 14:36

## 2019-04-18 RX ADMIN — LOSARTAN POTASSIUM 100 MILLIGRAM(S): 100 TABLET, FILM COATED ORAL at 14:15

## 2019-04-18 RX ADMIN — Medication 20 MILLIGRAM(S): at 17:55

## 2019-04-18 RX ADMIN — INSULIN GLARGINE 16 UNIT(S): 100 INJECTION, SOLUTION SUBCUTANEOUS at 14:14

## 2019-04-18 RX ADMIN — Medication 2 CAPSULE(S): at 14:15

## 2019-04-18 RX ADMIN — Medication 50 MILLIGRAM(S): at 17:55

## 2019-04-18 RX ADMIN — Medication 50 GRAM(S): at 04:39

## 2019-04-18 RX ADMIN — Medication 100 MILLIEQUIVALENT(S): at 07:47

## 2019-04-18 RX ADMIN — Medication 60 MILLIGRAM(S): at 17:55

## 2019-04-18 RX ADMIN — Medication 100 MILLIEQUIVALENT(S): at 06:15

## 2019-04-18 NOTE — H&P ADULT - NSICDXPASTSURGICALHX_GEN_ALL_CORE_FT
PAST SURGICAL HISTORY:  Cosmetic Surgery chin implant 2004    Male stress incontinence s/p artifical urinary sphincter 5/2012    S/P ablation operation for arrhythmia in 2004/2005    S/P Appendectomy in 1950    S/P arthroscopy right shoulder 12/11    S/P Colonoscopy with Polypectomy in 2009    S/P prostatectomy radical robotic 6/10    Status post left knee replacement June 25 ,2018    Status post right knee surgery on Jan, 2018    Varicose vein-s/p vein stripping 5 years ago PAST SURGICAL HISTORY:  Cosmetic Surgery chin implant 2004    History of pancreatic surgery s/p Whipple procedure 11/18    Male stress incontinence s/p artifical urinary sphincter 5/2012    S/P ablation operation for arrhythmia in 2004/2005    S/P Appendectomy in 1950    S/P arthroscopy right shoulder 12/11    S/P Colonoscopy with Polypectomy in 2009    S/P prostatectomy radical robotic 6/10    Status post left knee replacement June 25 ,2018    Status post right knee surgery on Jan, 2018    Varicose vein-s/p vein stripping 5 years ago

## 2019-04-18 NOTE — PHYSICAL THERAPY INITIAL EVALUATION ADULT - PERTINENT HX OF CURRENT PROBLEM, REHAB EVAL
Patient presented with increased complaints of shortness of breath;pt recently underwent WHipple procedure and undergoing chemo for Pancreatic Cancer

## 2019-04-18 NOTE — H&P ADULT - PROBLEM SELECTOR PLAN 6
cont with Tresiba Flex Touch  cont with Novolog ISS  Check FBS cont basal insulin for now  cont insulin ssc, hgba1-c  monitor fs

## 2019-04-18 NOTE — CONSULT NOTE ADULT - SUBJECTIVE AND OBJECTIVE BOX
Patient is a 78y old  Male who presents with a chief complaint of SOB X 10 days with increasing severity past few days (18 Apr 2019 08:08)      HPI:  78m with pancreatic adenocarcinoma, s/p whipple surgery 10/2018, on adjuvant FOLFIRINOX, with progression of disease while on adjuvant chemo with new liver lesions, LAP and a lung nodule, Atrial Fibrillation (on Eliquis), p/w weakness, SOB for the past 10 days with worsening symptoms over the past few days, found to have high probnp.  Of note, patient was to have bx of liver lesions on Monday.   Of note, patient has had diarrhea since about 3 months ago. He was diagnosed with salmonella and completed antibiotic course.     ROS: as above     PAST MEDICAL & SURGICAL HISTORY:  Pancreatic cancer  Type II diabetes mellitus: since 2016, on insulin  Nephrolithiasis: 2008, resolved  Male stress incontinence: S/p artificial male urinary sphincter placement  Varicose vein: (L) 5 years ago    had venous stripping 2007  Bilateral cataracts: removed with lens implants  Benign colon polyp: removed colonoscopy 2014  Prostate cancer: 2010 prostatectomy  stable  Afib: 2006 s/p ablation 2006 , afib resolved   on Eliquies  Hyperlipidemia: X 4 years not on any meds  GERD (Gastroesophageal Reflux Disease): X 10 years  Renal Calculi: 2008  MVP (Mitral Valve Prolapse): X 8 years stable  History of pancreatic surgery: s/p Whipple procedure 11/18  Status post right knee surgery: on Jan, 2018  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 ,2018  S/P ablation operation for arrhythmia: in 2004/2005  Male stress incontinence: s/p artifical urinary sphincter 5/2012  Varicose vein-s/p vein stripping 5 years ago  S/P arthroscopy: right shoulder 12/11  S/P prostatectomy: radical robotic 6/10  Cosmetic Surgery: chin implant 2004  S/P Colonoscopy with Polypectomy: in 2009  S/P Appendectomy: in 1950      SOCIAL HISTORY:    FAMILY HISTORY:  FH: Alzheimers disease: father  FHx: diabetes mellitus: mother      MEDICATIONS  (STANDING):  apixaban 5 milliGRAM(s) Oral every 12 hours  dextrose 5%. 1000 milliLiter(s) (50 mL/Hr) IV Continuous <Continuous>  dextrose 50% Injectable 12.5 Gram(s) IV Push once  dextrose 50% Injectable 25 Gram(s) IV Push once  dextrose 50% Injectable 25 Gram(s) IV Push once  diltiazem    Tablet 60 milliGRAM(s) Oral two times a day  furosemide   Injectable 20 milliGRAM(s) IV Push two times a day  insulin glargine Injectable (LANTUS) 16 Unit(s) SubCutaneous <User Schedule>  insulin lispro (HumaLOG) corrective regimen sliding scale   SubCutaneous three times a day before meals  insulin lispro (HumaLOG) corrective regimen sliding scale   SubCutaneous at bedtime  loperamide 2 milliGRAM(s) Oral two times a day  losartan 100 milliGRAM(s) Oral daily  magnesium oxide 400 milliGRAM(s) Oral daily  metoprolol tartrate 50 milliGRAM(s) Oral two times a day  pancrelipase  (CREON 36,000 Lipase Units) 2 Capsule(s) Oral three times a day with meals  pantoprazole    Tablet 40 milliGRAM(s) Oral <User Schedule>  potassium chloride    Tablet ER 20 milliEquivalent(s) Oral two times a day  prochlorperazine   Tablet 10 milliGRAM(s) Oral daily    MEDICATIONS  (PRN):  acetaminophen   Tablet .. 650 milliGRAM(s) Oral every 6 hours PRN Temp greater or equal to 38C (100.4F), Mild Pain (1 - 3), Moderate Pain (4 - 6)  dextrose 40% Gel 15 Gram(s) Oral once PRN Blood Glucose LESS THAN 70 milliGRAM(s)/deciliter  glucagon  Injectable 1 milliGRAM(s) IntraMuscular once PRN Glucose LESS THAN 70 milligrams/deciliter      Allergies    adhesives (Hives)  No Known Drug Allergies    Intolerances        Vital Signs Last 24 Hrs  T(C): 37.2 (18 Apr 2019 06:50), Max: 37.2 (18 Apr 2019 06:50)  T(F): 98.9 (18 Apr 2019 06:50), Max: 98.9 (18 Apr 2019 06:50)  HR: 112 (18 Apr 2019 06:50) (86 - 115)  BP: 158/93 (18 Apr 2019 14:18) (135/84 - 158/93)  BP(mean): --  RR: 18 (18 Apr 2019 14:18) (17 - 20)  SpO2: 100% (18 Apr 2019 14:18) (99% - 100%)    PHYSICAL EXAM  General: adult in NAD  HEENT: clear oropharynx, anicteric sclera, pink conjunctiva  Neck: supple  CV: normal S1/S2 with no murmur rubs or gallops  Lungs: positive air movement b/l ant lungs, clear to auscultation, no wheezes, no rales  Abdomen: soft non-tender non-distended, no hepatosplenomegaly  Ext: no clubbing cyanosis or edema  Skin: no rashes and no petechiae  Neuro: alert and oriented X 3, none focal    LABS:                          9.6    10.96 )-----------( 226      ( 17 Apr 2019 19:05 )             31.3         Mean Cell Volume : 90.7 fL  Mean Cell Hemoglobin : 27.8 pg  Mean Cell Hemoglobin Concentration : 30.7 %  Auto Neutrophil # : 9.00 K/uL  Auto Lymphocyte # : 0.83 K/uL  Auto Monocyte # : 0.92 K/uL  Auto Eosinophil # : 0.01 K/uL  Auto Basophil # : 0.03 K/uL  Auto Neutrophil % : 82.0 %  Auto Lymphocyte % : 7.6 %  Auto Monocyte % : 8.4 %  Auto Eosinophil % : 0.1 %  Auto Basophil % : 0.3 %      Serial CBC's  04-17 @ 19:05  Hct-31.3 / Hgb-9.6 / Plat-226 / RBC-3.45 / WBC-10.96      04-18    141  |  105  |  17  ----------------------------<  144<H>  2.6<LL>   |  23  |  1.12    Ca    7.4<L>      18 Apr 2019 03:25  Phos  2.8     04-18  Mg     1.5     04-18    TPro  5.7<L>  /  Alb  2.9<L>  /  TBili  0.6  /  DBili  x   /  AST  64<H>  /  ALT  22  /  AlkPhos  186<H>  04-17      PT/INR - ( 17 Apr 2019 19:05 )   PT: 24.0 SEC;   INR: 2.06                  RADIOLOGY & ADDITIONAL STUDIES:    MPRESSION:     Interval Whipple procedure. Thickening and heterogeneity of small bowel   loops associated with the choledochojejunostomy and   pancreaticojejunostomy may be related to enteritis and/or tumoral   infiltration in the periportal region.    Interval development of hepatic lesions compatible with metastatic   disease.     Interval development of small bowel mesenteric adenopathy.    Interval development of abdominal retroperitoneal adenopathy.    Interval development of a small amount of abdominal and moderate amount   of pelvic ascites.    New 3 mm left lower lobe lung nodule.

## 2019-04-18 NOTE — H&P ADULT - PROBLEM SELECTOR PLAN 4
CT of chest showed: new 3mm Left lower lung nodule, pt is unaware  Oncology Dr. Clark consulted CT of chest showed: new 3mm Left lower lung nodule,  Oncology Dr. Clark consulted

## 2019-04-18 NOTE — H&P ADULT - PROBLEM SELECTOR PLAN 3
cont with Magnesium Sulfate  Monitor Electrolytes cont with Magnesium Sulfate  Monitor Electrolytes, repleat as needed Likely 2/2 diarrhea as above  cont with Magnesium Sulfate  Monitor Electrolytes, repleat as needed

## 2019-04-18 NOTE — H&P ADULT - NSICDXPASTMEDICALHX_GEN_ALL_CORE_FT
PAST MEDICAL HISTORY:  Afib 2006 s/p ablation 2006 , afib resolved   on Eliquies    Benign colon polyp removed colonoscopy 2014    Bilateral cataracts removed with lens implants    GERD (Gastroesophageal Reflux Disease) X 10 years    Hyperlipidemia X 4 years not on any meds    Male stress incontinence S/p artificial male urinary sphincter placement    MVP (Mitral Valve Prolapse) X 8 years stable    Nephrolithiasis 2008, resolved    Prostate cancer 2010 prostatectomy  stable    Renal Calculi 2008    Type II diabetes mellitus since 2016, on insulin    Varicose vein (L) 5 years ago    had venous stripping 2007 PAST MEDICAL HISTORY:  Afib 2006 s/p ablation 2006 , afib resolved   on Eliquies    Benign colon polyp removed colonoscopy 2014    Bilateral cataracts removed with lens implants    GERD (Gastroesophageal Reflux Disease) X 10 years    Hyperlipidemia X 4 years not on any meds    Male stress incontinence S/p artificial male urinary sphincter placement    MVP (Mitral Valve Prolapse) X 8 years stable    Nephrolithiasis 2008, resolved    Pancreatic cancer     Prostate cancer 2010 prostatectomy  stable    Renal Calculi 2008    Type II diabetes mellitus since 2016, on insulin    Varicose vein (L) 5 years ago    had venous stripping 2007

## 2019-04-18 NOTE — H&P ADULT - PROBLEM SELECTOR PLAN 5
CT of abdomen and liver showed: Interval development of a lesion in the right lobe of liver   Oncology consulted ( Dr. Clark) CT of abdomen and liver showed: Interval development of a lesion in the right lobe of liver   Oncology consulted (Dr. Clark)

## 2019-04-18 NOTE — H&P ADULT - ASSESSMENT
Pt is 77 y/o M with PmHx of Atrial Fibrillation ( on Eliquis), Benign colon polyps, Cataracts, DM II, GERD, HLD, Nephrolithiasis, Stress incontinence, MVP, Prostate Cancer ( s/p prostatectomy), Varicose veins, and Pancreatic Cancer ( s/p whipple chemo) presents today for SOB for 10 days with worsening symptoms past few days. Pt is admitted to telemetry for acute on chronic CHF, hypokalemia, hypomagnesemia,       EKG: NSR @ 115 bpm with PVC and fusion complexes, TWI in lead I, L, V5, V6  CT: ( abdomen and pelvis) Interval development of a lesion in the right lobe of liver   CT: ( chest): New 3mm of left lower lung nodule   WBC: 10.96                K: 2.5-->2.6                 Ma.5 (supplemented)  Ca: 7.4                BNP: 11,039                  INR: 2.06                  Glucose: 144   CXR - clear lungs 77 y/o Male, with a PmHx of Atrial Fibrillation (on Eliquis), Benign colon polyps, Cataracts, DM II, CHF, GERD, HLD, Nephrolithiasis, Stress incontinence, MVP, Prostate Cancer (s/p prostatectomy), Varicose veins, and Pancreatic Cancer (s/p whipple and is on chemo), presents today for SOB for 10 days with worsening symptoms past few days. Pt is admitted to telemetry for acute on chronic CHF, hypokalemia, hypomagnesemia and hypocalcemia. 77 y/o Male, with a PmHx of Atrial Fibrillation (on Eliquis), Benign colon polyps, Cataracts, DM II, CHF, GERD, HLD, Nephrolithiasis, Stress incontinence, MVP, Prostate Cancer (s/p prostatectomy), Varicose veins, and Pancreatic Cancer (s/p whipple and is on chemo), presents today for SOB for 10 days with worsening symptoms past few days. Pt is admitted to telemetry for acute on chronic CHF in the setting of severe electrolyte derangements.

## 2019-04-18 NOTE — H&P ADULT - NSHPSOCIALHISTORY_GEN_ALL_CORE
Pt currently lives with wife. Pt admits to last drink of alcohol on 6/2018, last tobacco use 45 years ago. Pt denies use of illicit drugs in past and present. Pt denies being vaccinated for influenza in 2018. At baseline, pt uses a cane to ambulate around the house and outside.

## 2019-04-18 NOTE — H&P ADULT - PROBLEM SELECTOR PLAN 1
Admitted to telemetry   Trend cardiac enzymes  Serial EKGs  2G of Sodium with 1.5L of fluid restriction  Education about appropriate diet   Strict I&Os  Consider lasix 40mg IV BID  TTE ( last one on 8/2018)  Cardiology Consult Admitted to telemetry   Trend cardiac enzymes  Serial EKGs  2G of Sodium with 1.5L of fluid restriction  Education about appropriate diet   Strict I&Os  daily wts  fluid restriction  Started on Lasix 20 iv bid  probnp  TTE ( last one on 8/2018)  consider Cardiology Consult

## 2019-04-18 NOTE — H&P ADULT - HISTORY OF PRESENT ILLNESS
Pt is 79 y/o M with PmHx of Atrial Fibrillation ( on Eliquis), Benign colon polyps, Cataracts, DM II, GERD, HLD, Nephrolithiasis, Stress incontinence, MVP, Prostate Cancer ( s/p prostatectomy), Varicose veins, and Pancreatic Cancer ( s/p whipple chemo) presents today for SOB for 10 days with worsening symptoms past few days. Pt states he have been experiencing worsening dyspnea on exertion, especially when he takes "2 steps". Pt denies SOB at rest and comfortable with +2 pillow. Pt states he is experiencing worsening fatigue, weakness, decrease PO intake due to chemo interfering with taste buds and appetite, and unintentional wt loss of 20lbs since he been on chemo from 12/2018. Pt states his SOB is new, no prior hx of similar episodes. Pt also complains of lower extremities and upper extremities swelling. Pt states he noticed swelling for past few days and does not believe its from chemo, since last chemo was on 4/4/2019. Pt states he saw his Cardiologist 6 months ago, in which he did stress test and Echo with normal finding. Pt admits to being compliant with medication, but he states his diet have been poor due to his chemo interfering with this appetite. Pt admits he had side effects from his chemotherapy, in which he got diarrhea for past ~3months. Pt denies of any CP, palpations, fever, chills,  HA, blurry vision, orthopnea, paroxysmal nocturnal dyspnea, wheezing, cough, hemoptysis, recent travels, and sick contacts. 77 y/o Male, with a PmHx of Atrial Fibrillation (on Eliquis), Benign colon polyps, Cataracts, DM II, GERD, HLD, Nephrolithiasis, Stress incontinence, MVP, CHF, Prostate Cancer ( s/p prostatectomy), Varicose veins, and Pancreatic Cancer ( s/p whipple chemo 4/4/19), presents today to the [facility] ED for SOB for the past 10 days with worsening symptoms over the past few days. Pt states he have been experiencing worsening dyspnea on exertion, especially when he takes "2 steps". Pt denies SOB at rest and comfortable with +2 pillow. Pt states he is experiencing worsening fatigue, weakness, decrease PO intake due to chemo interfering with taste buds and appetite, and unintentional wt loss of 20lbs since he been on chemo from 12/2018. Pt states his SOB is new, no prior hx of similar episodes. Pt also complains of lower extremities and upper extremities swelling. Pt states he noticed swelling for past few days and does not believe its from chemo, since last chemo was on 4/4/2019. Pt states he saw his Cardiologist 6 months ago, in which he did stress test and Echo with normal finding. Pt admits to being compliant with medication, but he states his diet have been poor due to his chemo interfering with this appetite. Pt admits he had side effects from his chemotherapy, in which he got diarrhea for past ~3months. Pt denies of any CP, palpations, fever, chills,  HA, blurry vision, orthopnea, paroxysmal nocturnal dyspnea, wheezing, cough, hemoptysis, recent travels, and sick contacts.  Pt also states he hasn't been feeling well and had gone to his Oncologist and had a CT Chest/Abd/Pelvis done 2 days ago and was told he has likely hepatic mets and is currently scheduled this coming Monday, April 22 for a biopsy. He appears comfortable at this time with his wife at his bedisde. He is being admitted to telemetry for acute on chronic decompensated heart failure, hypokalemia, and hypomagnesemia.

## 2019-04-18 NOTE — CONSULT NOTE ADULT - ASSESSMENT
79 y/o Male, with a PmHx of CHF (EF 35% 8/2018, however noted to be 60% on repeat 8/2018), MVP, Afib (on Eliquis), DM II, GERD, HLD, nephrolithiasis, prostate Cancer ( s/p prostatectomy), and Pancreatic Cancer (s/p whipple chemo 4/4/19) w/ possible hepatic mets, presents today to the [facility] ED for SOB for the past 10 days with worsening symptoms over the past few days.    #SOB: Unclear etiology. EF 35% 8/2018, however noted to be 60% on repeat 8/29/2018. CXR w/o evidence of pulm vascular congestion. Not on CHF meds at home  -F/u repeat echo    #Afib:  -Currently rate controlled  -Pt is on diltiazem 60 mg BID, metoprolol tartrate 50 BID at home- unclear why on both. Would continue metoprolol for now given possible hx of CHF and convert to long acting pending results of repeat TTE  -Continue home diltiazem, eliquis  -Keep K >4, Mg >2 (severely hypokalemic on admission, on home K, Mg)  -Telemetry monitoring    #HTN:  -Continue losartan, metoprolol 79 y/o Male, with a PmHx of CHF (EF 35% 8/2018, however noted to be 60% on repeat 8/2018), MVP, Afib (on Eliquis), DM II, GERD, HLD, nephrolithiasis, prostate Cancer ( s/p prostatectomy), and Pancreatic Cancer (s/p whipple chemo 4/4/19) w/ possible hepatic mets, presents today to the [facility] ED for SOB for the past 10 days with worsening symptoms over the past few days.    #SOB: Unclear etiology. EF 35% 8/2018, however noted to be 60% on repeat 8/29/2018. CXR w/o evidence of pulm vascular congestion. No evidence of volume overload on exam  -F/u repeat echo  -Would hold further lasix for now given diarrhea/low K    #Afib: Hx of prior ablations with Dr. Pyle at [facility]. Follows Regularly with EP Q3 months  -HR 120s  -Continue home diltiazem 60 mg BID, metoprolol tartrate 50 BID at  -Continue home eliquis  -Keep K >4, Mg >2 (severely hypokalemic on admission, on home K, Mg)  -Telemetry monitoring    #HTN:  -Continue losartan, metoprolol, diltiazem

## 2019-04-18 NOTE — H&P ADULT - NEUROLOGICAL COMMENTS
pt admits to numbness and tingling of hands, denies any medications, uses warm therapeutic gloves for comfort and relief

## 2019-04-18 NOTE — H&P ADULT - PROBLEM SELECTOR PLAN 2
Cont with Potassium Chloride mEq/100mL   monitor Electrolytes Cont with Potassium Chloride mEq/100mL   monitor Electrolytes, repleat as needed Likely 2/2 GI loss in setting of diarrhea  Cont with Potassium Chloride mEq/100mL   monitor Electrolytes, repleat as needed

## 2019-04-18 NOTE — CONSULT NOTE ADULT - SUBJECTIVE AND OBJECTIVE BOX
Patient seen and evaluated at bedside    Chief Complaint: SOB    HPI:  77 y/o Male, with a PmHx of ?CHF (EF 35% 8/2018, however noted to be 60% on repeat 8/2018), MVP, Afib (on Eliquis), DM II, GERD, HLD, nephrolithiasis, prostate Cancer ( s/p prostatectomy), and Pancreatic Cancer (s/p whipple chemo 4/4/19) w/ possible hepatic mets, presents today to the [facility] ED for SOB for the past 10 days with worsening symptoms over the past few days. Pt states he have been experiencing worsening dyspnea on exertion, denies SOB at rest. Pt states he is experiencing worsening fatigue, weakness, decrease PO and unintentional wt loss of 20lbs since he been on chemo from 12/2018. Last chemo 4/4/2018. Pt states his SOB is new, no prior hx of similar episodes. Pt also complains of lower extremities and upper extremities swelling. Pt states he noticed swelling for past few days. Pt admits to being compliant with medication. Pt denies of any CP, palpations, fever, chills,  HA, blurry vision, orthopnea, paroxysmal nocturnal dyspnea, wheezing, cough, hemoptysis, recent travels, and sick contacts.    In the ED, vitals T 37.2, , /93, RR 18, satting 100% on RA. Labs with Hgb 9.6, K 2.6, Mg 1.5, Cr 1.12, BNP >11,000 (No previous in system). Given lasix 20 mg IV.    PMH:   Pancreatic cancer  Type II diabetes mellitus  Nephrolithiasis  Male stress incontinence  Kidney stone  Varicose vein  Bilateral cataracts  Appendicitis  Benign colon polyp  Prostate cancer  Afib  Hyperlipidemia  GERD (Gastroesophageal Reflux Disease)  DM (Diabetes Mellitus)  Renal Calculi  MVP (Mitral Valve Prolapse)  HTN - Hypertension      PSH:   History of pancreatic surgery  Status post right knee surgery  Diabetes mellitus  Atrial fibrillation  Benign essential hypertension  Status post left knee replacement  S/P ablation operation for arrhythmia  Male stress incontinence  Varicose vein-s/p vein stripping 5 years ago  S/P arthroscopy  S/P prostatectomy  Cosmetic Surgery  S/P Colonoscopy with Polypectomy  S/P Appendectomy      Medications:   Home Medications:  Creon 36,000 units oral delayed release capsule: take 2 capsule 3X a day before meal  (18 Apr 2019 10:49)  dilTIAZem 60 mg oral tablet: 1 tab(s) orally 2 times a day (18 Apr 2019 10:49)  Eliquis 5 mg oral tablet: 2 x a day (18 Apr 2019 10:49)  loperamide: 2 milligram(s) orally 2 times a day (18 Apr 2019 10:49)  losartan 100 mg oral tablet: 1 tab(s) orally once a day (18 Apr 2019 10:49)  magnesium oxide 500 mg oral tablet: 1 tab(s) orally once a day (18 Apr 2019 10:49)  metoprolol tartrate 50 mg oral tablet: 1 tab(s) orally 2 times a day (18 Apr 2019 10:49)  Novalog Insulin: 3 x a day as needed (18 Apr 2019 10:49)  pantoprazole 40 mg oral delayed release tablet: 1 tab(s) orally every other day (18 Apr 2019 10:49)  potassium chloride 20 mEq oral tablet, extended release: 1 tab(s) orally 2 times a day (18 Apr 2019 10:49)  prochlorperazine 10 mg oral tablet: take 1 tab po qd  (18 Apr 2019 10:49)  Tresiba FlexTouch 100 units/mL subcutaneous solution: 16 unit(s) subcutaneous once a day (18 Apr 2019 10:49)    Hospital Medications  apixaban 5 milliGRAM(s) Oral every 12 hours  dextrose 5%. 1000 milliLiter(s) (50 mL/Hr) IV Continuous <Continuous>  dextrose 50% Injectable 12.5 Gram(s) IV Push once  dextrose 50% Injectable 25 Gram(s) IV Push once  dextrose 50% Injectable 25 Gram(s) IV Push once  diltiazem    Tablet 60 milliGRAM(s) Oral two times a day  furosemide   Injectable 20 milliGRAM(s) IV Push two times a day  insulin glargine Injectable (LANTUS) 16 Unit(s) SubCutaneous <User Schedule>  insulin lispro (HumaLOG) corrective regimen sliding scale   SubCutaneous three times a day before meals  insulin lispro (HumaLOG) corrective regimen sliding scale   SubCutaneous at bedtime  loperamide 2 milliGRAM(s) Oral two times a day  losartan 100 milliGRAM(s) Oral daily  magnesium oxide 400 milliGRAM(s) Oral daily  metoprolol tartrate 50 milliGRAM(s) Oral two times a day  pancrelipase  (CREON 36,000 Lipase Units) 2 Capsule(s) Oral three times a day with meals  pantoprazole    Tablet 40 milliGRAM(s) Oral <User Schedule>  potassium chloride    Tablet ER 20 milliEquivalent(s) Oral two times a day  prochlorperazine   Tablet 10 milliGRAM(s) Oral daily    MEDICATIONS  (PRN):  acetaminophen   Tablet .. 650 milliGRAM(s) Oral every 6 hours PRN Temp greater or equal to 38C (100.4F), Mild Pain (1 - 3), Moderate Pain (4 - 6)  dextrose 40% Gel 15 Gram(s) Oral once PRN Blood Glucose LESS THAN 70 milliGRAM(s)/deciliter  glucagon  Injectable 1 milliGRAM(s) IntraMuscular once PRN Glucose LESS THAN 70 milligrams/deciliter      Allergies:  adhesives (Hives)  No Known Drug Allergies      FAMILY HISTORY:  FH: Alzheimers disease: father  FHx: diabetes mellitus: mother      Social History:  Smoking History:  Alcohol Use:  Drug Use:    Review of Systems:  REVIEW OF SYSTEMS:  CONSTITUTIONAL: No weakness, fevers or chills  EYES/ENT: No visual changes;  No dysphagia  NECK: No pain or stiffness  RESPIRATORY: No cough, wheezing, hemoptysis; No shortness of breath  CARDIOVASCULAR: No chest pain or palpitations; No lower extremity edema  GASTROINTESTINAL: No abdominal or epigastric pain. No nausea, vomiting, or hematemesis; No diarrhea or constipation. No melena or hematochezia.  BACK: No back pain  GENITOURINARY: No dysuria, frequency or hematuria  NEUROLOGICAL: No numbness or weakness  SKIN: No itching, burning, rashes, or lesions   All other review of systems is negative unless indicated above.    Physical Exam:  T(F): 98.9 (04-18), Max: 98.9 (04-18)  HR: 112 (04-18) (86 - 115)  BP: 158/93 (04-18) (135/84 - 158/93)  RR: 18 (04-18)  SpO2: 100% (04-18)  GENERAL: No acute distress, well-developed  HEAD:  Atraumatic, Normocephalic  ENT: EOMI, PERRLA, conjunctiva and sclera clear, Neck supple, No JVD, moist mucosa  CHEST/LUNG: Clear to auscultation bilaterally; No wheeze, equal breath sounds bilaterally   BACK: No spinal tenderness  HEART: Regular rate and rhythm; No murmurs, rubs, or gallops  ABDOMEN: Soft, Nontender, Nondistended; Bowel sounds present  EXTREMITIES:  No clubbing, cyanosis, or edema  PSYCH: Nl behavior, nl affect  NEUROLOGY: AAOx3, non-focal, cranial nerves intact  SKIN: Normal color, No rashes or lesions  LINES:    Cardiovascular Diagnostic Testing:    ECG: Personally reviewed    Echo:  Study Date: 8/29/2018  ------------------------------------------------------------------------  OBSERVATIONS:  Left Ventricle: Normal left ventricular systolic function.  EF 60%. Normal left ventricular internal dimensions and  wall thicknesses.  Right Heart: Normal right ventricular size and function.  ------------------------------------------------------------------------  CONCLUSIONS:  Limited study for biventricular function.  1. Normal left ventricular internal dimensions and wall  thicknesses.  2. Normal left ventricular systolic function.  EF 60%.  3. Normal right ventricular size and function.  *** Compared with echocardiogram of 8/20/2018, the  endocardium is better visualized on today's study.  ------------------------------------------------------------------------  Confirmed on  8/29/2018 - 16:31:16 by Mattie Dawn M.D.  ------------------------------------------------------------------------    Study Date: 8/20/2018  Dimensions:    Normal Values:  LA:     4.0    2.0 - 4.0 cm  Ao:     3.9    2.0 - 3.8 cm  SEPTUM: 1.2    0.6 - 1.2 cm  PWT:    1.3    0.6 - 1.1 cm  LVIDd:  5.7   3.0 - 5.6 cm  LVIDs:         1.8 - 4.0 cm  Derived variables:  LVMI: 143 g/m2  RWT: 0.45  EF (Visual Estimate): 35 %  ------------------------------------------------------------------------  Observations:  Mitral Valve: Mitral annular calcification, otherwise  normal mitral valve. Mild mitral regurgitation.  Aortic Valve/Aorta: Calcified trileaflet aortic valve with  normal opening. Minimal aortic regurgitation.  Aortic Root: 3.9 cm.  Left Atrium: Normal left atrium.  LA volume index = 27  cc/m2.  Left Ventricle: Endocardial visualization enhanced with  intravenous injection of Ultrasonic Enhancing Agent  (Definity).Moderate- Severe left ventricular systolic  dysfunction. Regional wall motion variation is noted on the  setting of atrial fibrillation and ectopy. Normal left  ventricular internal dimensions and wall thicknesses.  Right Heart: Normal right atrium. Normal right ventricular  size with decreased right ventricular systolic function.  Normal tricuspid valve. Minimal tricuspidregurgitation.  Normal pulmonic valve. Mild pulmonic regurgitation.  Pericardium/Pleura: Normal pericardium with no pericardial  effusion.  Hemodynamic: Estimated right atrial pressure is 8 mm Hg.  ------------------------------------------------------------------------  Conclusions:  1. Calcified trileaflet aortic valve with normal opening.  2. Endocardial visualization enhanced with intravenous  injection of Ultrasonic Enhancing Agent  (Definity).Moderate- Severe left ventricular systolic  dysfunction. Regional wall motion variation is noted on the  setting of atrial fibrillation and ectopy.  3. Normal right ventricular size with decreased right  ventricular systolic function.  4. Normal tricuspid valve. Minimal tricuspid regurgitation.  *** No previous Echo exam.  ------------------------------------------------------------------------  Confirmed on  8/20/2018 - 13:59:47 by Clara Perla M.D.  ------------------------------------------------------------------------    Stress Testing: No previous stress test in [place]    Cath: No previous cath in [facility]    Interpretation of Telemetry:    Imaging:  CXR 4/18/2019    FINDINGS:   The lungs are free of focal abnormalities.  No pleural effusion or congestion to indicate CHF.  Right chest wall chemotherapy port is in place, with distal tip in the SVC.  Cardiomegaly. Loop recorder is noted. Degenerative changes of the spine.    IMPRESSION:   Clear lungs.        Labs: Personally reviewed                        9.6    10.96 )-----------( 226      ( 17 Apr 2019 19:05 )             31.3     04-18    141  |  105  |  17  ----------------------------<  144<H>  2.6<LL>   |  23  |  1.12    Ca    7.4<L>      18 Apr 2019 03:25  Phos  2.8     04-18  Mg     1.5     04-18    TPro  5.7<L>  /  Alb  2.9<L>  /  TBili  0.6  /  DBili  x   /  AST  64<H>  /  ALT  22  /  AlkPhos  186<H>  04-17    PT/INR - ( 17 Apr 2019 19:05 )   PT: 24.0 SEC;   INR: 2.06                Serum Pro-Brain Natriuretic Peptide: [id number] pg/mL (04-17 @ 19:05) Patient seen and evaluated at bedside    Chief Complaint: SOB    HPI:  79 y/o Male, with a PmHx of ?CHF (EF 35% 8/2018, however noted to be 60% on repeat 8/2018), MVP, Afib (on Eliquis), DM II, GERD, HLD, nephrolithiasis, prostate Cancer ( s/p prostatectomy), and Pancreatic Cancer (s/p whipple chemo 4/4/19) w/ possible hepatic mets, presents today to the [facility] ED for SOB for the past 10 days with worsening symptoms over the past few days. Pt states he have been experiencing worsening dyspnea on exertion, denies SOB at rest. Pt states he is experiencing worsening fatigue, weakness, decrease PO, diarrhea and unintentional wt loss of 20lbs since he been on chemo from 12/2018. Last chemo 4/4/2018. Pt states his SOB is new, no prior hx of similar episodes. Pt also complains of lower extremities and L upper extremity swelling. Pt states he noticed swelling for past few days. Pt admits to being compliant with medicatios. Pt denies of any CP, palpations, fever, chills,  HA, blurry vision, orthopnea, paroxysmal nocturnal dyspnea, wheezing, cough, hemoptysis, recent travels, and sick contacts.    Pt follows regularly with his cardiologists (Dr. Aaron Guevara -, Dr. Pyle - EP). Recent TTE/NST around 4 months ago normal per pt report.     In the ED, vitals T 37.2, , /93, RR 18, satting 100% on RA. Labs with Hgb 9.6, K 2.6, Mg 1.5, Cr 1.12, BNP >11,000 (No previous in system). Given lasix 20 mg IV.    PMH:   Pancreatic cancer  Type II diabetes mellitus  Nephrolithiasis  Male stress incontinence  Kidney stone  Varicose vein  Bilateral cataracts  Appendicitis  Benign colon polyp  Prostate cancer  Afib  Hyperlipidemia  GERD (Gastroesophageal Reflux Disease)  DM (Diabetes Mellitus)  Renal Calculi  MVP (Mitral Valve Prolapse)  HTN - Hypertension      PSH:   History of pancreatic surgery  Status post right knee surgery  Diabetes mellitus  Atrial fibrillation  Benign essential hypertension  Status post left knee replacement  S/P ablation operation for arrhythmia  Male stress incontinence  Varicose vein-s/p vein stripping 5 years ago  S/P arthroscopy  S/P prostatectomy  Cosmetic Surgery  S/P Colonoscopy with Polypectomy  S/P Appendectomy      Medications:   Home Medications:  Creon 36,000 units oral delayed release capsule: take 2 capsule 3X a day before meal  (18 Apr 2019 10:49)  dilTIAZem 60 mg oral tablet: 1 tab(s) orally 2 times a day (18 Apr 2019 10:49)  Eliquis 5 mg oral tablet: 2 x a day (18 Apr 2019 10:49)  loperamide: 2 milligram(s) orally 2 times a day (18 Apr 2019 10:49)  losartan 100 mg oral tablet: 1 tab(s) orally once a day (18 Apr 2019 10:49)  magnesium oxide 500 mg oral tablet: 1 tab(s) orally once a day (18 Apr 2019 10:49)  metoprolol tartrate 50 mg oral tablet: 1 tab(s) orally 2 times a day (18 Apr 2019 10:49)  Novalog Insulin: 3 x a day as needed (18 Apr 2019 10:49)  pantoprazole 40 mg oral delayed release tablet: 1 tab(s) orally every other day (18 Apr 2019 10:49)  potassium chloride 20 mEq oral tablet, extended release: 1 tab(s) orally 2 times a day (18 Apr 2019 10:49)  prochlorperazine 10 mg oral tablet: take 1 tab po qd  (18 Apr 2019 10:49)  Tresiba FlexTouch 100 units/mL subcutaneous solution: 16 unit(s) subcutaneous once a day (18 Apr 2019 10:49)    Hospital Medications  apixaban 5 milliGRAM(s) Oral every 12 hours  dextrose 5%. 1000 milliLiter(s) (50 mL/Hr) IV Continuous <Continuous>  dextrose 50% Injectable 12.5 Gram(s) IV Push once  dextrose 50% Injectable 25 Gram(s) IV Push once  dextrose 50% Injectable 25 Gram(s) IV Push once  diltiazem    Tablet 60 milliGRAM(s) Oral two times a day  furosemide   Injectable 20 milliGRAM(s) IV Push two times a day  insulin glargine Injectable (LANTUS) 16 Unit(s) SubCutaneous <User Schedule>  insulin lispro (HumaLOG) corrective regimen sliding scale   SubCutaneous three times a day before meals  insulin lispro (HumaLOG) corrective regimen sliding scale   SubCutaneous at bedtime  loperamide 2 milliGRAM(s) Oral two times a day  losartan 100 milliGRAM(s) Oral daily  magnesium oxide 400 milliGRAM(s) Oral daily  metoprolol tartrate 50 milliGRAM(s) Oral two times a day  pancrelipase  (CREON 36,000 Lipase Units) 2 Capsule(s) Oral three times a day with meals  pantoprazole    Tablet 40 milliGRAM(s) Oral <User Schedule>  potassium chloride    Tablet ER 20 milliEquivalent(s) Oral two times a day  prochlorperazine   Tablet 10 milliGRAM(s) Oral daily    MEDICATIONS  (PRN):  acetaminophen   Tablet .. 650 milliGRAM(s) Oral every 6 hours PRN Temp greater or equal to 38C (100.4F), Mild Pain (1 - 3), Moderate Pain (4 - 6)  dextrose 40% Gel 15 Gram(s) Oral once PRN Blood Glucose LESS THAN 70 milliGRAM(s)/deciliter  glucagon  Injectable 1 milliGRAM(s) IntraMuscular once PRN Glucose LESS THAN 70 milligrams/deciliter      Allergies:  adhesives (Hives)  No Known Drug Allergies      FAMILY HISTORY:  FH: Alzheimers disease: father  FHx: diabetes mellitus: mother      Social History:  Smoking History: Denies  Alcohol Use: Denies  Drug Use: Denies    Review of Systems:  REVIEW OF SYSTEMS:  CONSTITUTIONAL: +Fatigue/weakness  EYES/ENT: No visual changes;  No dysphagia  NECK: No pain or stiffness  RESPIRATORY: No cough, wheezing, hemoptysis; No shortness of breath  CARDIOVASCULAR: No chest pain or palpitations; No lower extremity edema  GASTROINTESTINAL: No abdominal or epigastric pain. No nausea, vomiting, or hematemesis; No diarrhea or constipation. No melena or hematochezia.  BACK: No back pain  GENITOURINARY: No dysuria, frequency or hematuria  NEUROLOGICAL: No numbness or weakness  SKIN: No itching, burning, rashes, or lesions   All other review of systems is negative unless indicated above.    Physical Exam:  T(F): 98.9 (04-18), Max: 98.9 (04-18)  HR: 112 (04-18) (86 - 115)  BP: 158/93 (04-18) (135/84 - 158/93)  RR: 18 (04-18)  SpO2: 100% (04-18)  GENERAL: No acute distress, well-developed  HEAD:  Atraumatic, Normocephalic  ENT: EOMI, PERRLA, conjunctiva and sclera clear, Neck supple, No JVD, moist mucosa  CHEST/LUNG: Clear to auscultation bilaterally; No wheeze, equal breath sounds bilaterally   BACK: No spinal tenderness  HEART: Regular rate and rhythm; No murmurs, rubs, or gallops  ABDOMEN: Soft, Nontender, Nondistended; Bowel sounds present  EXTREMITIES:  No clubbing, cyanosis, or edema  PSYCH: Nl behavior, nl affect  NEUROLOGY: AAOx3, non-focal, cranial nerves intact  SKIN: Normal color, No rashes or lesions  LINES:    Cardiovascular Diagnostic Testing:    ECG: Personally reviewed    Echo:  Study Date: 8/29/2018  ------------------------------------------------------------------------  OBSERVATIONS:  Left Ventricle: Normal left ventricular systolic function.  EF 60%. Normal left ventricular internal dimensions and  wall thicknesses.  Right Heart: Normal right ventricular size and function.  ------------------------------------------------------------------------  CONCLUSIONS:  Limited study for biventricular function.  1. Normal left ventricular internal dimensions and wall  thicknesses.  2. Normal left ventricular systolic function.  EF 60%.  3. Normal right ventricular size and function.  *** Compared with echocardiogram of 8/20/2018, the  endocardium is better visualized on today's study.  ------------------------------------------------------------------------  Confirmed on  8/29/2018 - 16:31:16 by Mattie Dawn M.D.  ------------------------------------------------------------------------    Study Date: 8/20/2018  Dimensions:    Normal Values:  LA:     4.0    2.0 - 4.0 cm  Ao:     3.9    2.0 - 3.8 cm  SEPTUM: 1.2    0.6 - 1.2 cm  PWT:    1.3    0.6 - 1.1 cm  LVIDd:  5.7   3.0 - 5.6 cm  LVIDs:         1.8 - 4.0 cm  Derived variables:  LVMI: 143 g/m2  RWT: 0.45  EF (Visual Estimate): 35 %  ------------------------------------------------------------------------  Observations:  Mitral Valve: Mitral annular calcification, otherwise  normal mitral valve. Mild mitral regurgitation.  Aortic Valve/Aorta: Calcified trileaflet aortic valve with  normal opening. Minimal aortic regurgitation.  Aortic Root: 3.9 cm.  Left Atrium: Normal left atrium.  LA volume index = 27  cc/m2.  Left Ventricle: Endocardial visualization enhanced with  intravenous injection of Ultrasonic Enhancing Agent  (Definity).Moderate- Severe left ventricular systolic  dysfunction. Regional wall motion variation is noted on the  setting of atrial fibrillation and ectopy. Normal left  ventricular internal dimensions and wall thicknesses.  Right Heart: Normal right atrium. Normal right ventricular  size with decreased right ventricular systolic function.  Normal tricuspid valve. Minimal tricuspidregurgitation.  Normal pulmonic valve. Mild pulmonic regurgitation.  Pericardium/Pleura: Normal pericardium with no pericardial  effusion.  Hemodynamic: Estimated right atrial pressure is 8 mm Hg.  ------------------------------------------------------------------------  Conclusions:  1. Calcified trileaflet aortic valve with normal opening.  2. Endocardial visualization enhanced with intravenous  injection of Ultrasonic Enhancing Agent  (Definity).Moderate- Severe left ventricular systolic  dysfunction. Regional wall motion variation is noted on the  setting of atrial fibrillation and ectopy.  3. Normal right ventricular size with decreased right  ventricular systolic function.  4. Normal tricuspid valve. Minimal tricuspid regurgitation.  *** No previous Echo exam.  ------------------------------------------------------------------------  Confirmed on  8/20/2018 - 13:59:47 by Clara Perla M.D.  ------------------------------------------------------------------------    Stress Testing: No previous stress test in [place]    Cath: No previous cath in [place]    Interpretation of Telemetry:    Imaging:  CXR 4/18/2019    FINDINGS:   The lungs are free of focal abnormalities.  No pleural effusion or congestion to indicate CHF.  Right chest wall chemotherapy port is in place, with distal tip in the SVC.  Cardiomegaly. Loop recorder is noted. Degenerative changes of the spine.    IMPRESSION:   Clear lungs.        Labs: Personally reviewed                        9.6    10.96 )-----------( 226      ( 17 Apr 2019 19:05 )             31.3     04-18    141  |  105  |  17  ----------------------------<  144<H>  2.6<LL>   |  23  |  1.12    Ca    7.4<L>      18 Apr 2019 03:25  Phos  2.8     04-18  Mg     1.5     04-18    TPro  5.7<L>  /  Alb  2.9<L>  /  TBili  0.6  /  DBili  x   /  AST  64<H>  /  ALT  22  /  AlkPhos  186<H>  04-17    PT/INR - ( 17 Apr 2019 19:05 )   PT: 24.0 SEC;   INR: 2.06                Serum Pro-Brain Natriuretic Peptide: [id number] pg/mL (04-17 @ 19:05)

## 2019-04-18 NOTE — H&P ADULT - NSHPOUTPATIENTPROVIDERS_GEN_ALL_CORE
Dr.Sarita Willis (PCP)  Dr. Pancho Guevara ( Cardiologist)  Dr. Law (EP)  Dr. Quispe (Endo)  Dr. Pope ( Urologist)  Dr. Clark ( Oncologist)

## 2019-04-18 NOTE — CONSULT NOTE ADULT - ASSESSMENT
78m with pancreatic adenocarcinoma, s/p whipple surgery 10/2018, on adjuvant FOLFIRINOX, with progression of disease while on adjuvant chemo with new liver lesions, LAP and a lung nodule, Atrial Fibrillation (on Eliquis), p/w weakness, SOB for the past 10 days with worsening symptoms over the past few days, found to have high probnp.  Of note, patient was to have bx of liver lesions on Monday.   Of note, patient has had diarrhea since about 3 months ago. He was diagnosed with salmonella and completed antibiotic course.       -Cardiology consult  -Echo  -Please obtain liver biopsy while patient is admitted. Eliquis should be held for 48 hours before biopsy.   -Stool o&p, culture and Cdiff   -Replete lytes as needed  -outpt oncology follow up, plan for second line chemotherapy if bx proven to be metastatic pancreatic adenoca    Will follow. Please do not hesitate to call back with questions.     Annabel Avina MD  Medical Oncology Attending 78m with pancreatic adenocarcinoma, s/p whipple surgery 10/2018, on adjuvant FOLFIRINOX, with progression of disease while on adjuvant chemo with new liver lesions, LAP and a lung nodule, Atrial Fibrillation (on Eliquis), p/w weakness, SOB for the past 10 days with worsening symptoms over the past few days, found to have high probnp.  Of note, patient was to have bx of liver lesions on Monday.   Of note, patient has had diarrhea since about 3 months ago. He was diagnosed with salmonella and completed antibiotic course.       -Cardiology consult  -Echo  -Please obtain liver biopsy while patient is admitted. Eliquis should be held for 48 hours before biopsy.   -Stool o&p, culture and Cdiff   -Replete lytes as needed  -Supportive care, Pain control, nutrition, PT, DVT ppx  -outpt oncology follow up, plan for second line chemotherapy if bx proven to be metastatic pancreatic adenoca    Will follow. Please do not hesitate to call back with questions.     Annabel Avina MD  Medical Oncology Attending

## 2019-04-18 NOTE — H&P ADULT - RS GEN PE MLT RESP DETAILS PC
normal/respirations non-labored/airway patent/breath sounds equal/no chest wall tenderness/clear to auscultation bilaterally/good air movement

## 2019-04-18 NOTE — H&P ADULT - NEGATIVE ENMT SYMPTOMS
no hearing difficulty/no tinnitus/no vertigo/no ear pain no ear pain/no tinnitus/no hearing difficulty/no sinus symptoms/no vertigo

## 2019-04-18 NOTE — H&P ADULT - NEGATIVE OPHTHALMOLOGIC SYMPTOMS
no blurred vision L/no lacrimation L/no lacrimation R/no photophobia/no blurred vision R/no diplopia

## 2019-04-18 NOTE — H&P ADULT - NEUROLOGICAL DETAILS
responds to pain/responds to verbal commands/alert and oriented x 3 cranial nerves intact/alert and oriented x 3/responds to pain/responds to verbal commands

## 2019-04-18 NOTE — H&P ADULT - NSHPPHYSICALEXAM_GEN_ALL_CORE
Vital Signs Last 24 Hrs  T(C): 37.2 (18 Apr 2019 06:50), Max: 37.2 (18 Apr 2019 06:50)  T(F): 98.9 (18 Apr 2019 06:50), Max: 98.9 (18 Apr 2019 06:50)  HR: 112 (18 Apr 2019 06:50) (86 - 115)  BP: 158/90 (18 Apr 2019 06:50) (135/84 - 158/90)  BP(mean): --  RR: 20 (18 Apr 2019 06:50) (17 - 20)  SpO2: 100% (18 Apr 2019 06:50) (99% - 100%)    EKG: Sinus Tach @ 115, PVC's, T inv I, AVL, V4-6, ST dep V5-6

## 2019-04-18 NOTE — ED ADULT NURSE REASSESSMENT NOTE - NS ED NURSE REASSESS COMMENT FT1
Received report from break RICHARD Madsen, pt appears comfortable sleeping ins stretcher at this time, plan to repeat BMP x1 hr after last K+ run. Pt on cardiac monitor, will continue to montior.

## 2019-04-19 DIAGNOSIS — E87.8 OTHER DISORDERS OF ELECTROLYTE AND FLUID BALANCE, NOT ELSEWHERE CLASSIFIED: ICD-10-CM

## 2019-04-19 DIAGNOSIS — R19.7 DIARRHEA, UNSPECIFIED: ICD-10-CM

## 2019-04-19 PROBLEM — N20.0 CALCULUS OF KIDNEY: Chronic | Status: ACTIVE | Noted: 2019-04-18

## 2019-04-19 PROBLEM — E11.9 TYPE 2 DIABETES MELLITUS WITHOUT COMPLICATIONS: Chronic | Status: ACTIVE | Noted: 2019-04-18

## 2019-04-19 LAB
ANION GAP SERPL CALC-SCNC: 13 MMO/L — SIGNIFICANT CHANGE UP (ref 7–14)
APTT BLD: 34.9 SEC — SIGNIFICANT CHANGE UP (ref 27.5–36.3)
BUN SERPL-MCNC: 20 MG/DL — SIGNIFICANT CHANGE UP (ref 7–23)
CALCIUM SERPL-MCNC: 8.1 MG/DL — LOW (ref 8.4–10.5)
CHLORIDE SERPL-SCNC: 107 MMOL/L — SIGNIFICANT CHANGE UP (ref 98–107)
CO2 SERPL-SCNC: 23 MMOL/L — SIGNIFICANT CHANGE UP (ref 22–31)
CREAT SERPL-MCNC: 1.17 MG/DL — SIGNIFICANT CHANGE UP (ref 0.5–1.3)
GLUCOSE SERPL-MCNC: 195 MG/DL — HIGH (ref 70–99)
HBA1C BLD-MCNC: 8.3 % — HIGH (ref 4–5.6)
HCT VFR BLD CALC: 26.8 % — LOW (ref 39–50)
HGB BLD-MCNC: 8.3 G/DL — LOW (ref 13–17)
MAGNESIUM SERPL-MCNC: 1.7 MG/DL — SIGNIFICANT CHANGE UP (ref 1.6–2.6)
MCHC RBC-ENTMCNC: 27.9 PG — SIGNIFICANT CHANGE UP (ref 27–34)
MCHC RBC-ENTMCNC: 31 % — LOW (ref 32–36)
MCV RBC AUTO: 89.9 FL — SIGNIFICANT CHANGE UP (ref 80–100)
NRBC # FLD: 0.02 K/UL — SIGNIFICANT CHANGE UP (ref 0–0)
PLATELET # BLD AUTO: 209 K/UL — SIGNIFICANT CHANGE UP (ref 150–400)
PMV BLD: 10.7 FL — SIGNIFICANT CHANGE UP (ref 7–13)
POTASSIUM SERPL-MCNC: 4 MMOL/L — SIGNIFICANT CHANGE UP (ref 3.5–5.3)
POTASSIUM SERPL-SCNC: 4 MMOL/L — SIGNIFICANT CHANGE UP (ref 3.5–5.3)
RBC # BLD: 2.98 M/UL — LOW (ref 4.2–5.8)
RBC # FLD: 14.8 % — HIGH (ref 10.3–14.5)
SODIUM SERPL-SCNC: 143 MMOL/L — SIGNIFICANT CHANGE UP (ref 135–145)
TROPONIN T, HIGH SENSITIVITY: 51 NG/L — SIGNIFICANT CHANGE UP (ref ?–14)
VIT D25+D1,25 OH+D1,25 PNL SERPL-MCNC: 51.8 PG/ML — SIGNIFICANT CHANGE UP (ref 19.9–79.3)
WBC # BLD: 14.27 K/UL — HIGH (ref 3.8–10.5)
WBC # FLD AUTO: 14.27 K/UL — HIGH (ref 3.8–10.5)

## 2019-04-19 PROCEDURE — 93970 EXTREMITY STUDY: CPT | Mod: 26

## 2019-04-19 PROCEDURE — 93306 TTE W/DOPPLER COMPLETE: CPT | Mod: 26

## 2019-04-19 PROCEDURE — 99222 1ST HOSP IP/OBS MODERATE 55: CPT

## 2019-04-19 PROCEDURE — 99232 SBSQ HOSP IP/OBS MODERATE 35: CPT

## 2019-04-19 PROCEDURE — 99233 SBSQ HOSP IP/OBS HIGH 50: CPT

## 2019-04-19 RX ORDER — HEPARIN SODIUM 5000 [USP'U]/ML
6500 INJECTION INTRAVENOUS; SUBCUTANEOUS EVERY 6 HOURS
Qty: 0 | Refills: 0 | Status: DISCONTINUED | OUTPATIENT
Start: 2019-04-19 | End: 2019-04-22

## 2019-04-19 RX ORDER — HEPARIN SODIUM 5000 [USP'U]/ML
6500 INJECTION INTRAVENOUS; SUBCUTANEOUS ONCE
Qty: 0 | Refills: 0 | Status: DISCONTINUED | OUTPATIENT
Start: 2019-04-19 | End: 2019-04-19

## 2019-04-19 RX ORDER — HEPARIN SODIUM 5000 [USP'U]/ML
3000 INJECTION INTRAVENOUS; SUBCUTANEOUS EVERY 6 HOURS
Qty: 0 | Refills: 0 | Status: DISCONTINUED | OUTPATIENT
Start: 2019-04-19 | End: 2019-04-22

## 2019-04-19 RX ORDER — FUROSEMIDE 40 MG
40 TABLET ORAL ONCE
Qty: 0 | Refills: 0 | Status: COMPLETED | OUTPATIENT
Start: 2019-04-19 | End: 2019-04-19

## 2019-04-19 RX ORDER — HEPARIN SODIUM 5000 [USP'U]/ML
INJECTION INTRAVENOUS; SUBCUTANEOUS
Qty: 25000 | Refills: 0 | Status: DISCONTINUED | OUTPATIENT
Start: 2019-04-19 | End: 2019-04-22

## 2019-04-19 RX ORDER — HEPARIN SODIUM 5000 [USP'U]/ML
6500 INJECTION INTRAVENOUS; SUBCUTANEOUS ONCE
Qty: 0 | Refills: 0 | Status: COMPLETED | OUTPATIENT
Start: 2019-04-19 | End: 2019-04-19

## 2019-04-19 RX ADMIN — Medication 1: at 14:42

## 2019-04-19 RX ADMIN — Medication 40 MILLIEQUIVALENT(S): at 00:59

## 2019-04-19 RX ADMIN — HEPARIN SODIUM 1500 UNIT(S)/HR: 5000 INJECTION INTRAVENOUS; SUBCUTANEOUS at 20:10

## 2019-04-19 RX ADMIN — Medication 30 MILLILITER(S): at 17:32

## 2019-04-19 RX ADMIN — Medication 650 MILLIGRAM(S): at 21:50

## 2019-04-19 RX ADMIN — Medication 1: at 18:30

## 2019-04-19 RX ADMIN — Medication 2 CAPSULE(S): at 14:47

## 2019-04-19 RX ADMIN — MAGNESIUM OXIDE 400 MG ORAL TABLET 400 MILLIGRAM(S): 241.3 TABLET ORAL at 14:46

## 2019-04-19 RX ADMIN — Medication 1: at 14:53

## 2019-04-19 RX ADMIN — Medication 50 MILLIGRAM(S): at 18:23

## 2019-04-19 RX ADMIN — Medication 20 MILLIEQUIVALENT(S): at 05:54

## 2019-04-19 RX ADMIN — LOSARTAN POTASSIUM 100 MILLIGRAM(S): 100 TABLET, FILM COATED ORAL at 05:54

## 2019-04-19 RX ADMIN — Medication 60 MILLIGRAM(S): at 18:22

## 2019-04-19 RX ADMIN — APIXABAN 5 MILLIGRAM(S): 2.5 TABLET, FILM COATED ORAL at 05:54

## 2019-04-19 RX ADMIN — Medication 50 MILLIGRAM(S): at 05:54

## 2019-04-19 RX ADMIN — Medication 2 MILLIGRAM(S): at 05:54

## 2019-04-19 RX ADMIN — Medication 2 MILLIGRAM(S): at 18:23

## 2019-04-19 RX ADMIN — Medication 10 MILLIGRAM(S): at 14:46

## 2019-04-19 RX ADMIN — HEPARIN SODIUM 6500 UNIT(S): 5000 INJECTION INTRAVENOUS; SUBCUTANEOUS at 20:10

## 2019-04-19 RX ADMIN — Medication 2 CAPSULE(S): at 18:22

## 2019-04-19 RX ADMIN — INSULIN GLARGINE 16 UNIT(S): 100 INJECTION, SOLUTION SUBCUTANEOUS at 14:52

## 2019-04-19 RX ADMIN — Medication 60 MILLIGRAM(S): at 05:54

## 2019-04-19 RX ADMIN — Medication 650 MILLIGRAM(S): at 22:50

## 2019-04-19 RX ADMIN — Medication 2 CAPSULE(S): at 09:52

## 2019-04-19 RX ADMIN — PANTOPRAZOLE SODIUM 40 MILLIGRAM(S): 20 TABLET, DELAYED RELEASE ORAL at 09:53

## 2019-04-19 RX ADMIN — Medication 40 MILLIGRAM(S): at 18:58

## 2019-04-19 NOTE — PROGRESS NOTE ADULT - ASSESSMENT
77 y/o Male, with a PmHx of CHF (EF 35% 8/2018, however noted to be 60% on repeat 8/2018), MVP, Afib (on Eliquis), DM II, GERD, HLD, nephrolithiasis, prostate Cancer ( s/p prostatectomy), and Pancreatic Cancer (s/p whipple chemo 4/4/19) w/ possible hepatic mets, presents today to the [facility] ED for SOB for the past 10 days with worsening symptoms over the past few days.    #SOB: Unclear etiology. EF 35% 8/2018, however noted to be 60% on repeat 8/29/2018. Today's exam with +JVD, faint crackles a the bases  -Can give 20-40 mg IV lasix (monitor and replete K BID)  -F/u repeat echo  -Daily weights, I/Os    #Afib: Hx of prior ablations with Dr. Pyle at [facility]. Follows Regularly with EP Q3 months  -HR 120s  -Continue home diltiazem 60 mg BID, metoprolol tartrate 50 BID at  -Continue home eliquis  -Keep K >4, Mg >2 (severely hypokalemic on admission, on home K, Mg)  -Telemetry monitoring    #HTN:  -Continue losartan, metoprolol, diltiazem

## 2019-04-19 NOTE — DIETITIAN INITIAL EVALUATION ADULT. - PROBLEM SELECTOR PLAN 5
CT of abdomen and liver showed: Interval development of a lesion in the right lobe of liver   Oncology consulted (Dr. Clark)

## 2019-04-19 NOTE — PROGRESS NOTE ADULT - SUBJECTIVE AND OBJECTIVE BOX
Patient is a 78y old  Male who presents with a chief complaint of SOB X 10 days with increasing severity past few days (2019 14:50)    SUBJECTIVE / OVERNIGHT EVENTS:  Patient seen with his wife at bedside, reports improvement in his diarrhea, continues to have CHACON.     MEDICATIONS  (STANDING):  dextrose 5%. 1000 milliLiter(s) (50 mL/Hr) IV Continuous <Continuous>  dextrose 50% Injectable 12.5 Gram(s) IV Push once  dextrose 50% Injectable 25 Gram(s) IV Push once  dextrose 50% Injectable 25 Gram(s) IV Push once  diltiazem    Tablet 60 milliGRAM(s) Oral two times a day  heparin  Infusion.  Unit(s)/Hr (15 mL/Hr) IV Continuous <Continuous>  heparin  Injectable 6500 Unit(s) IV Push once  insulin glargine Injectable (LANTUS) 16 Unit(s) SubCutaneous <User Schedule>  insulin lispro (HumaLOG) corrective regimen sliding scale   SubCutaneous three times a day before meals  insulin lispro (HumaLOG) corrective regimen sliding scale   SubCutaneous at bedtime  loperamide 2 milliGRAM(s) Oral two times a day  losartan 100 milliGRAM(s) Oral daily  magnesium oxide 400 milliGRAM(s) Oral daily  metoprolol tartrate 50 milliGRAM(s) Oral two times a day  pancrelipase  (CREON 36,000 Lipase Units) 2 Capsule(s) Oral three times a day with meals  pantoprazole    Tablet 40 milliGRAM(s) Oral <User Schedule>  prochlorperazine   Tablet 10 milliGRAM(s) Oral daily    MEDICATIONS  (PRN):  acetaminophen   Tablet .. 650 milliGRAM(s) Oral every 6 hours PRN Temp greater or equal to 38C (100.4F), Mild Pain (1 - 3), Moderate Pain (4 - 6)  dextrose 40% Gel 15 Gram(s) Oral once PRN Blood Glucose LESS THAN 70 milliGRAM(s)/deciliter  glucagon  Injectable 1 milliGRAM(s) IntraMuscular once PRN Glucose LESS THAN 70 milligrams/deciliter  heparin  Injectable 6500 Unit(s) IV Push every 6 hours PRN For aPTT less than 40  heparin  Injectable 3000 Unit(s) IV Push every 6 hours PRN For aPTT between 40 - 57      Vital Signs Last 24 Hrs  T(C): 36.7 (2019 05:52), Max: 37.1 (2019 15:37)  T(F): 98 (2019 05:52), Max: 98.7 (2019 15:37)  HR: 102 (2019 05:52) (63 - 120)  BP: 140/81 (2019 05:52) (116/63 - 158/93)  BP(mean): --  RR: 16 (2019 05:52) (16 - 18)  SpO2: 100% (2019 05:52) (97% - 100%)  CAPILLARY BLOOD GLUCOSE      POCT Blood Glucose.: 139 mg/dL (2019 08:53)  POCT Blood Glucose.: 231 mg/dL (2019 21:38)  POCT Blood Glucose.: 218 mg/dL (2019 17:43)  POCT Blood Glucose.: 209 mg/dL (2019 14:00)    I&O's Summary    2019 07:01  -  2019 07:00  --------------------------------------------------------  IN: 120 mL / OUT: 750 mL / NET: -630 mL          PHYSICAL EXAM  GENERAL: NAD, well-developed  HEAD:  Atraumatic, Normocephalic  EYES: EOMI, PERRLA, conjunctiva and sclera clear  NECK: Supple, No JVD  CHEST/LUNG: Clear to auscultation bilaterally; No wheeze  HEART: Regular rate and rhythm; No murmurs, rubs, or gallops  ABDOMEN: Soft, Nontender, Nondistended; Bowel sounds present  EXTREMITIES:  2+ Peripheral Pulses, No clubbing, cyanosis, or edema  PSYCH: AAOx3  SKIN: No rashes or lesions    LABS:                        9.3    13.58 )-----------( 225      ( 2019 15:00 )             29.4     04-18    141  |  105  |  19  ----------------------------<  222<H>  3.2<L>   |  23  |  1.19    Ca    8.1<L>      2019 21:46  Phos  2.8     04-18  Mg     1.7         TPro  5.4<L>  /  Alb  2.6<L>  /  TBili  0.4  /  DBili  x   /  AST  86<H>  /  ALT  23  /  AlkPhos  204<H>      PT/INR - ( 2019 15:00 )   PT: 19.0 SEC;   INR: 1.64          PTT - ( 2019 15:00 )  PTT:32.4 SEC      Urinalysis Basic - ( 2019 15:19 )    Color: YELLOW / Appearance: HAZY / S.024 / pH: 6.0  Gluc: NEGATIVE / Ketone: NEGATIVE  / Bili: MODERATE / Urobili: NORMAL   Blood: NEGATIVE / Protein: 100 / Nitrite: NEGATIVE   Leuk Esterase: NEGATIVE / RBC: x / WBC 2-5   Sq Epi: x / Non Sq Epi: SMALL / Bacteria: SMALL          RADIOLOGY & ADDITIONAL TESTS:    Imaging Personally Reviewed:  Consultant(s) Notes Reviewed:    Care Discussed with Consultants/Other Providers: Patient is a 78y old  Male who presents with a chief complaint of SOB X 10 days with increasing severity past few days (2019 14:50)    SUBJECTIVE / OVERNIGHT EVENTS:  Patient seen with his wife at bedside, reports improvement in his diarrhea, continues to have CHACON.     MEDICATIONS  (STANDING):  dextrose 5%. 1000 milliLiter(s) (50 mL/Hr) IV Continuous <Continuous>  dextrose 50% Injectable 12.5 Gram(s) IV Push once  dextrose 50% Injectable 25 Gram(s) IV Push once  dextrose 50% Injectable 25 Gram(s) IV Push once  diltiazem    Tablet 60 milliGRAM(s) Oral two times a day  heparin  Infusion.  Unit(s)/Hr (15 mL/Hr) IV Continuous <Continuous>  heparin  Injectable 6500 Unit(s) IV Push once  insulin glargine Injectable (LANTUS) 16 Unit(s) SubCutaneous <User Schedule>  insulin lispro (HumaLOG) corrective regimen sliding scale   SubCutaneous three times a day before meals  insulin lispro (HumaLOG) corrective regimen sliding scale   SubCutaneous at bedtime  loperamide 2 milliGRAM(s) Oral two times a day  losartan 100 milliGRAM(s) Oral daily  magnesium oxide 400 milliGRAM(s) Oral daily  metoprolol tartrate 50 milliGRAM(s) Oral two times a day  pancrelipase  (CREON 36,000 Lipase Units) 2 Capsule(s) Oral three times a day with meals  pantoprazole    Tablet 40 milliGRAM(s) Oral <User Schedule>  prochlorperazine   Tablet 10 milliGRAM(s) Oral daily    MEDICATIONS  (PRN):  acetaminophen   Tablet .. 650 milliGRAM(s) Oral every 6 hours PRN Temp greater or equal to 38C (100.4F), Mild Pain (1 - 3), Moderate Pain (4 - 6)  dextrose 40% Gel 15 Gram(s) Oral once PRN Blood Glucose LESS THAN 70 milliGRAM(s)/deciliter  glucagon  Injectable 1 milliGRAM(s) IntraMuscular once PRN Glucose LESS THAN 70 milligrams/deciliter  heparin  Injectable 6500 Unit(s) IV Push every 6 hours PRN For aPTT less than 40  heparin  Injectable 3000 Unit(s) IV Push every 6 hours PRN For aPTT between 40 - 57      Vital Signs Last 24 Hrs  T(C): 36.7 (2019 05:52), Max: 37.1 (2019 15:37)  T(F): 98 (2019 05:52), Max: 98.7 (2019 15:37)  HR: 102 (2019 05:52) (63 - 120)  BP: 140/81 (2019 05:52) (116/63 - 158/93)  BP(mean): --  RR: 16 (2019 05:52) (16 - 18)  SpO2: 100% (2019 05:52) (97% - 100%)  CAPILLARY BLOOD GLUCOSE      POCT Blood Glucose.: 139 mg/dL (2019 08:53)  POCT Blood Glucose.: 231 mg/dL (2019 21:38)  POCT Blood Glucose.: 218 mg/dL (2019 17:43)  POCT Blood Glucose.: 209 mg/dL (2019 14:00)    I&O's Summary    2019 07:01  -  2019 07:00  --------------------------------------------------------  IN: 120 mL / OUT: 750 mL / NET: -630 mL          PHYSICAL EXAM  GENERAL: NAD, well-developed  HEAD:  Atraumatic, Normocephalic  EYES: EOMI, PERRLA, conjunctiva and sclera clear  NECK: Supple, No JVD  CHEST/LUNG: Crackles at bases  HEART: Irregularly irregular rhythm   ABDOMEN: Soft, Nontender, Nondistended; Bowel sounds present  EXTREMITIES:  2+ Peripheral Pulses, No clubbing, cyanosis, or edema  PSYCH: AAOx3  SKIN: No rashes or lesions    LABS:                        9.3    13.58 )-----------( 225      ( 2019 15:00 )             29.4     04-18    141  |  105  |  19  ----------------------------<  222<H>  3.2<L>   |  23  |  1.19    Ca    8.1<L>      2019 21:46  Phos  2.8     04-18  Mg     1.7     -18    TPro  5.4<L>  /  Alb  2.6<L>  /  TBili  0.4  /  DBili  x   /  AST  86<H>  /  ALT  23  /  AlkPhos  204<H>  04-18    PT/INR - ( 2019 15:00 )   PT: 19.0 SEC;   INR: 1.64          PTT - ( 2019 15:00 )  PTT:32.4 SEC      Urinalysis Basic - ( 2019 15:19 )    Color: YELLOW / Appearance: HAZY / S.024 / pH: 6.0  Gluc: NEGATIVE / Ketone: NEGATIVE  / Bili: MODERATE / Urobili: NORMAL   Blood: NEGATIVE / Protein: 100 / Nitrite: NEGATIVE   Leuk Esterase: NEGATIVE / RBC: x / WBC 2-5   Sq Epi: x / Non Sq Epi: SMALL / Bacteria: SMALL          RADIOLOGY & ADDITIONAL TESTS:    Imaging Personally Reviewed:  Consultant(s) Notes Reviewed:    Care Discussed with Consultants/Other Providers: Patient is a 78y old  Male who presents with a chief complaint of SOB X 10 days with increasing severity past few days (2019 14:50)    SUBJECTIVE / OVERNIGHT EVENTS:  Patient seen with his wife at bedside, reports improvement in his diarrhea, continues to have CHACON.     MEDICATIONS  (STANDING):  dextrose 5%. 1000 milliLiter(s) (50 mL/Hr) IV Continuous <Continuous>  dextrose 50% Injectable 12.5 Gram(s) IV Push once  dextrose 50% Injectable 25 Gram(s) IV Push once  dextrose 50% Injectable 25 Gram(s) IV Push once  diltiazem    Tablet 60 milliGRAM(s) Oral two times a day  heparin  Infusion.  Unit(s)/Hr (15 mL/Hr) IV Continuous <Continuous>  heparin  Injectable 6500 Unit(s) IV Push once  insulin glargine Injectable (LANTUS) 16 Unit(s) SubCutaneous <User Schedule>  insulin lispro (HumaLOG) corrective regimen sliding scale   SubCutaneous three times a day before meals  insulin lispro (HumaLOG) corrective regimen sliding scale   SubCutaneous at bedtime  loperamide 2 milliGRAM(s) Oral two times a day  losartan 100 milliGRAM(s) Oral daily  magnesium oxide 400 milliGRAM(s) Oral daily  metoprolol tartrate 50 milliGRAM(s) Oral two times a day  pancrelipase  (CREON 36,000 Lipase Units) 2 Capsule(s) Oral three times a day with meals  pantoprazole    Tablet 40 milliGRAM(s) Oral <User Schedule>  prochlorperazine   Tablet 10 milliGRAM(s) Oral daily    MEDICATIONS  (PRN):  acetaminophen   Tablet .. 650 milliGRAM(s) Oral every 6 hours PRN Temp greater or equal to 38C (100.4F), Mild Pain (1 - 3), Moderate Pain (4 - 6)  dextrose 40% Gel 15 Gram(s) Oral once PRN Blood Glucose LESS THAN 70 milliGRAM(s)/deciliter  glucagon  Injectable 1 milliGRAM(s) IntraMuscular once PRN Glucose LESS THAN 70 milligrams/deciliter  heparin  Injectable 6500 Unit(s) IV Push every 6 hours PRN For aPTT less than 40  heparin  Injectable 3000 Unit(s) IV Push every 6 hours PRN For aPTT between 40 - 57      Vital Signs Last 24 Hrs  T(C): 36.7 (2019 05:52), Max: 37.1 (2019 15:37)  T(F): 98 (2019 05:52), Max: 98.7 (2019 15:37)  HR: 102 (2019 05:52) (63 - 120)  BP: 140/81 (2019 05:52) (116/63 - 158/93)  BP(mean): --  RR: 16 (2019 05:52) (16 - 18)  SpO2: 100% (2019 05:52) (97% - 100%)  CAPILLARY BLOOD GLUCOSE      POCT Blood Glucose.: 139 mg/dL (2019 08:53)  POCT Blood Glucose.: 231 mg/dL (2019 21:38)  POCT Blood Glucose.: 218 mg/dL (2019 17:43)  POCT Blood Glucose.: 209 mg/dL (2019 14:00)    I&O's Summary    2019 07:01  -  2019 07:00  --------------------------------------------------------  IN: 120 mL / OUT: 750 mL / NET: -630 mL          PHYSICAL EXAM  GENERAL: NAD, well-developed  HEAD:  Atraumatic, Normocephalic  EYES: EOMI, PERRLA, conjunctiva and sclera clear  NECK: Supple, + JVD  CHEST/LUNG: Crackles at bases  HEART: Irregularly irregular rhythm   ABDOMEN: Soft, Nontender, Nondistended; Bowel sounds present  EXTREMITIES:  2+ Peripheral Pulses, No clubbing, cyanosis, or edema  PSYCH: AAOx3  SKIN: No rashes or lesions    LABS:                        9.3    13.58 )-----------( 225      ( 2019 15:00 )             29.4     04-18    141  |  105  |  19  ----------------------------<  222<H>  3.2<L>   |  23  |  1.19    Ca    8.1<L>      2019 21:46  Phos  2.8     04-18  Mg     1.7     04-18    TPro  5.4<L>  /  Alb  2.6<L>  /  TBili  0.4  /  DBili  x   /  AST  86<H>  /  ALT  23  /  AlkPhos  204<H>  04-18    PT/INR - ( 2019 15:00 )   PT: 19.0 SEC;   INR: 1.64          PTT - ( 2019 15:00 )  PTT:32.4 SEC      Urinalysis Basic - ( 2019 15:19 )    Color: YELLOW / Appearance: HAZY / S.024 / pH: 6.0  Gluc: NEGATIVE / Ketone: NEGATIVE  / Bili: MODERATE / Urobili: NORMAL   Blood: NEGATIVE / Protein: 100 / Nitrite: NEGATIVE   Leuk Esterase: NEGATIVE / RBC: x / WBC 2-5   Sq Epi: x / Non Sq Epi: SMALL / Bacteria: SMALL          RADIOLOGY & ADDITIONAL TESTS:    Imaging Personally Reviewed:  Consultant(s) Notes Reviewed:    Care Discussed with Consultants/Other Providers: Discussed case with Dr. Avina, Heme/Onc, Mulugtea Jalloh, Cardiology fellow

## 2019-04-19 NOTE — PROGRESS NOTE ADULT - ASSESSMENT
78m with pancreatic adenocarcinoma, s/p whipple surgery 10/2018, on adjuvant FOLFIRINOX, with progression of disease while on adjuvant chemo with new liver lesions, LAP and a lung nodule, Atrial Fibrillation (on Eliquis), p/w weakness, SOB for the past 10 days with worsening symptoms over the past few days, found to have high probnp.  Of note, patient was to have bx of liver lesions on Monday.   Of note, patient has had diarrhea since about 3 months ago. He was diagnosed with salmonella and completed antibiotic course.     Has RUE and b/l LE (L>R) swelling    -Cardiology input appreciated  -Echo pending  -Dopplers of RUE and b/l LE  -Please obtain liver biopsy while patient is admitted. Eliquis should be held for 48 hours before biopsy.   -Stool o&p, culture and Cdiff pending  -Replete lytes as needed  -Supportive care, Pain control, nutrition, PT, DVT ppx  -outpt oncology follow up, plan for second line chemotherapy if bx proven to be metastatic pancreatic adenoca    Will follow. Please do not hesitate to call back with questions.     Annabel Avina MD  Medical Oncology Attending 78m with pancreatic adenocarcinoma, s/p whipple surgery 10/2018, on adjuvant FOLFIRINOX, with progression of disease while on adjuvant chemo with new liver lesions, LAP and a lung nodule, Atrial Fibrillation (on Eliquis), p/w weakness, SOB for the past 10 days with worsening symptoms over the past few days, found to have high probnp.  Of note, patient was to have bx of liver lesions on Monday.   Of note, patient has had diarrhea since about 3 months ago. He was diagnosed with salmonella and completed antibiotic course.     Has RUE and b/l LE (L>R) swelling    -Cardiology input appreciated  -Echo pending  -Dopplers of RUE and b/l LE  -Please obtain CORE liver biopsy while patient is admitted. Eliquis should be held for 48 hours before biopsy.   -Stool o&p, culture and Cdiff pending  -Replete lytes as needed  -Supportive care, Pain control, nutrition, PT, DVT ppx  -outpt oncology follow up, plan for second line chemotherapy if bx proven to be metastatic pancreatic adenoca    Will follow. Please do not hesitate to call back with questions.     Annabel Avina MD  Medical Oncology Attending

## 2019-04-19 NOTE — PROGRESS NOTE ADULT - SUBJECTIVE AND OBJECTIVE BOX
Patient seen and examined at bedside.    Overnight Events: No events overnight. Pt feels ok today- denies shortness of breath at rest. Feels a little winded when walking    Review Of Systems: No chest pain, shortness of breath, or palpitations            Medications:  acetaminophen   Tablet .. 650 milliGRAM(s) Oral every 6 hours PRN  dextrose 40% Gel 15 Gram(s) Oral once PRN  dextrose 5%. 1000 milliLiter(s) IV Continuous <Continuous>  dextrose 50% Injectable 12.5 Gram(s) IV Push once  dextrose 50% Injectable 25 Gram(s) IV Push once  dextrose 50% Injectable 25 Gram(s) IV Push once  diltiazem    Tablet 60 milliGRAM(s) Oral two times a day  glucagon  Injectable 1 milliGRAM(s) IntraMuscular once PRN  heparin  Infusion.  Unit(s)/Hr IV Continuous <Continuous>  heparin  Injectable 6500 Unit(s) IV Push every 6 hours PRN  heparin  Injectable 3000 Unit(s) IV Push every 6 hours PRN  heparin  Injectable 6500 Unit(s) IV Push once  insulin glargine Injectable (LANTUS) 16 Unit(s) SubCutaneous <User Schedule>  insulin lispro (HumaLOG) corrective regimen sliding scale   SubCutaneous three times a day before meals  insulin lispro (HumaLOG) corrective regimen sliding scale   SubCutaneous at bedtime  loperamide 2 milliGRAM(s) Oral two times a day  losartan 100 milliGRAM(s) Oral daily  magnesium oxide 400 milliGRAM(s) Oral daily  metoprolol tartrate 50 milliGRAM(s) Oral two times a day  pancrelipase  (CREON 36,000 Lipase Units) 2 Capsule(s) Oral three times a day with meals  pantoprazole    Tablet 40 milliGRAM(s) Oral <User Schedule>  prochlorperazine   Tablet 10 milliGRAM(s) Oral daily      PAST MEDICAL & SURGICAL HISTORY:  Pancreatic cancer  Type II diabetes mellitus: since , on insulin  Nephrolithiasis: , resolved  Male stress incontinence: S/p artificial male urinary sphincter placement  Varicose vein: (L) 5 years ago    had venous stripping   Bilateral cataracts: removed with lens implants  Benign colon polyp: removed colonoscopy   Prostate cancer: 2010 prostatectomy  stable  Afib: 2006 s/p ablation 2006 , afib resolved   on Eliquies  Hyperlipidemia: X 4 years not on any meds  GERD (Gastroesophageal Reflux Disease): X 10 years  Renal Calculi: 2008  MVP (Mitral Valve Prolapse): X 8 years stable  History of pancreatic surgery: s/p Whipple procedure   Status post right knee surgery: on 2018  Status post left knee replacement:   S/P ablation operation for arrhythmia: in   Male stress incontinence: s/p artifical urinary sphincter 2012  Varicose vein-s/p vein stripping 5 years ago  S/P arthroscopy: right shoulder   S/P prostatectomy: radical robotic 6/10  Cosmetic Surgery: chin implant   S/P Colonoscopy with Polypectomy: in   S/P Appendectomy: in       Vitals:  T(F): 98 (), Max: 98.7 ()  HR: 102 () (63 - 120)  BP: 140/81 () (116/63 - 158/93)  RR: 16 ()  SpO2: 100% ()  I&O's Summary    2019 07:01  -  2019 07:00  --------------------------------------------------------  IN: 120 mL / OUT: 750 mL / NET: -630 mL        Physical Exam:  Appearance: No acute distress; well appearing  Eyes: PERRL, EOMI, pink conjunctiva  HENT: Normal oral muscosa  Cardiovascular: RRR, S1, S2, no murmurs, rubs, or gallops; no edema; 4-5 cm JVD at 45 degrees  Respiratory: Trace crackles at the bases  Gastrointestinal: soft, non-tender, non-distended with normal bowel sounds  Musculoskeletal: B/l LE edema   Neurologic: Non-focal  Lymphatic: No lymphadenopathy  Psychiatry: AAOx3, mood & affect appropriate  Skin: No rashes, ecchymoses, or cyanosis                          9.3    13.58 )-----------( 225      ( 2019 15:00 )             29.4     -    141  |  105  |  19  ----------------------------<  222<H>  3.2<L>   |  23  |  1.19    Ca    8.1<L>      2019 21:46  Phos  2.8       Mg     1.7         TPro  5.4<L>  /  Alb  2.6<L>  /  TBili  0.4  /  DBili  x   /  AST  86<H>  /  ALT  23  /  AlkPhos  204<H>      PT/INR - ( 2019 15:00 )   PT: 19.0 SEC;   INR: 1.64          PTT - ( 2019 15:00 )  PTT:32.4 SEC      Serum Pro-Brain Natriuretic Peptide: 7013 pg/mL ( @ 15:00)  Serum Pro-Brain Natriuretic Peptide: 98894 pg/mL ( @ 19:05)    Total Cholesterol: 101  LDL: 43  HDL: 20  T    Hemoglobin A1C, Whole Blood: 8.3 % ( @ 06:10)  Hemoglobin A1C, Whole Blood: 8.3 % ( @ 15:00)    Echo:  Study Date: 2018  ------------------------------------------------------------------------  OBSERVATIONS:  Left Ventricle: Normal left ventricular systolic function.  EF 60%. Normal left ventricular internal dimensions and  wall thicknesses.  Right Heart: Normal right ventricular size and function.  ------------------------------------------------------------------------  CONCLUSIONS:  Limited study for biventricular function.  1. Normal left ventricular internal dimensions and wall  thicknesses.  2. Normal left ventricular systolic function.  EF 60%.  3. Normal right ventricular size and function.  *** Compared with echocardiogram of 2018, the  endocardium is better visualized on today's study.  ------------------------------------------------------------------------  Confirmed on  2018 - 16:31:16 by Mattie Dawn M.D.  ------------------------------------------------------------------------    Study Date: 2018  Dimensions:    Normal Values:  LA:     4.0    2.0 - 4.0 cm  Ao:     3.9    2.0 - 3.8 cm  SEPTUM: 1.2    0.6 - 1.2 cm  PWT:    1.3    0.6 - 1.1 cm  LVIDd:  5.7   3.0 - 5.6 cm  LVIDs:         1.8 - 4.0 cm  Derived variables:  LVMI: 143 g/m2  RWT: 0.45  EF (Visual Estimate): 35 %  ------------------------------------------------------------------------  Observations:  Mitral Valve: Mitral annular calcification, otherwise  normal mitral valve. Mild mitral regurgitation.  Aortic Valve/Aorta: Calcified trileaflet aortic valve with  normal opening. Minimal aortic regurgitation.  Aortic Root: 3.9 cm.  Left Atrium: Normal left atrium.  LA volume index = 27  cc/m2.  Left Ventricle: Endocardial visualization enhanced with  intravenous injection of Ultrasonic Enhancing Agent  (Definity).Moderate- Severe left ventricular systolic  dysfunction. Regional wall motion variation is noted on the  setting of atrial fibrillation and ectopy. Normal left  ventricular internal dimensions and wall thicknesses.  Right Heart: Normal right atrium. Normal right ventricular  size with decreased right ventricular systolic function.  Normal tricuspid valve. Minimal tricuspidregurgitation.  Normal pulmonic valve. Mild pulmonic regurgitation.  Pericardium/Pleura: Normal pericardium with no pericardial  effusion.  Hemodynamic: Estimated right atrial pressure is 8 mm Hg.  ------------------------------------------------------------------------  Conclusions:  1. Calcified trileaflet aortic valve with normal opening.  2. Endocardial visualization enhanced with intravenous  injection of Ultrasonic Enhancing Agent  (Definity).Moderate- Severe left ventricular systolic  dysfunction. Regional wall motion variation is noted on the  setting of atrial fibrillation and ectopy.  3. Normal right ventricular size with decreased right  ventricular systolic function.  4. Normal tricuspid valve. Minimal tricuspid regurgitation.  *** No previous Echo exam.  ------------------------------------------------------------------------  Confirmed on  2018 - 13:59:47 by Clara Perla M.D.  ------------------------------------------------------------------------    Stress Testing: No previous stress test in [place]    Cath: No previous cath in [place]    Interpretation of Telemetry:    Imaging:  CXR 2019    FINDINGS:   The lungs are free of focal abnormalities.  No pleural effusion or congestion to indicate CHF.  Right chest wall chemotherapy port is in place, with distal tip in the SVC.  Cardiomegaly. Loop recorder is noted. Degenerative changes of the spine.    IMPRESSION:   Clear lungs.    Tele: Afib/flutter 80-90s

## 2019-04-19 NOTE — PROGRESS NOTE ADULT - PROBLEM SELECTOR PLAN 2
Previously diagnosed with salmonella and completed antibiotic course  - F/U GI PCR, stool cx, O&P  - continue to monitor stool counts

## 2019-04-19 NOTE — DIETITIAN INITIAL EVALUATION ADULT. - PROBLEM SELECTOR PLAN 2
Likely 2/2 GI loss in setting of diarrhea  Cont with Potassium Chloride mEq/100mL   monitor Electrolytes, repleat as needed

## 2019-04-19 NOTE — DIETITIAN INITIAL EVALUATION ADULT. - SOURCE
EMR Reviewed, Previous admit medical chart reviewed./patient/family/significant other/other (specify)

## 2019-04-19 NOTE — DIETITIAN INITIAL EVALUATION ADULT. - NS AS NUTRI INTERV MEALS SNACK
Texture-modified diet/1. Recommend change diet to Soft, Low Na with 1500 mL fluid restriction.  2. Recommend Glucerna Shake 2x daily./General/healthful diet

## 2019-04-19 NOTE — CHART NOTE - NSCHARTNOTEFT_GEN_A_CORE
NUTRITION SERVICES     Upon Nutritional Assessment by the Registered Dietitian your patient was determined to meet criteria/ has evidence of the following diagnosis/diagnoses:  [ ] Mild Protein Calorie Malnutrition   [ ] Moderate Protein Calorie Malnutrition   [X] Severe Protein Calorie Malnutrition   [ ] Unspecified Protein Calorie Malnutrition   [ ] Underweight / BMI <19  [ ] Morbid Obesity / BMI >40    Findings as based on:  •  Comprehensive nutritional assessment and consultation    Please refer to Initial Dietitian Evaluation via documents section of [facility] for further recommendations.    Kim Rice RD,CD/N,CDE

## 2019-04-19 NOTE — PROGRESS NOTE ADULT - PROBLEM SELECTOR PLAN 3
Pancreatic adenocarcinoma, s/p whipple surgery 10/2018, on adjuvant FOLFIRINOX, with progression of disease while on adjuvant chemo with new liver lesions, LAP and a lung nodule, primary oncologist Dr. Clark aware  - outpt oncology follow up, plan for second line chemotherapy if bx proven to be metastatic pancreatic adenoca  - Oncology recommendations appreciated

## 2019-04-19 NOTE — DIETITIAN INITIAL EVALUATION ADULT. - NS FNS WEIGHT USED FOR CALC
ideal/.     admit weight 84 kg, height (reported) 73 inches, BMI 24.4 kg/m^2,  Ideal weight 83 kg +/- 10%, currently remains with 2+ edema to R/L Ankle and R Hand

## 2019-04-19 NOTE — PROGRESS NOTE ADULT - ASSESSMENT
79 y/o Male, with a PmHx of Atrial Fibrillation (on Eliquis), Benign colon polyps, Cataracts, DM II, CHF, GERD, HLD, Nephrolithiasis, Stress incontinence, MVP, Prostate Cancer (s/p prostatectomy), Varicose veins, and Pancreatic Cancer (s/p whipple and is on chemo), presents today for SOB for 10 days with worsening symptoms past few days. Pt is admitted to telemetry for acute on chronic CHF in the setting of severe electrolyte derangements.

## 2019-04-19 NOTE — PROGRESS NOTE ADULT - SUBJECTIVE AND OBJECTIVE BOX
INTERVAL HPI/OVERNIGHT EVENTS:  Patient seen at bedside. Continues to have diarrhea, with some improvement.   Has RUE and b/l LE (L>R) swelling    VITAL SIGNS:  T(F): 98 (19 @ 05:52)  HR: 102 (19 @ 05:52)  BP: 140/81 (19 @ 05:52)  RR: 16 (19 @ 05:52)  SpO2: 100% (19 @ 05:52)  Wt(kg): --    PHYSICAL EXAM:    Constitutional: NAD, resting in bed comfortably  Eyes: EOMI, sclera non-icteric  Neck: supple, no LAP  Respiratory: CTA b/l, good air entry b/l, no wheezing, rhonchi or crackels  Cardiovascular: RRR, normal S1S2, no M/R/G  Gastrointestinal: soft, NTND  Extremities: RUE and b/l LE (L>R) swelling  Neurological: AAOx3, non focal  Skin: Normal temperature    MEDICATIONS  (STANDING):  dextrose 5%. 1000 milliLiter(s) (50 mL/Hr) IV Continuous <Continuous>  dextrose 50% Injectable 12.5 Gram(s) IV Push once  dextrose 50% Injectable 25 Gram(s) IV Push once  dextrose 50% Injectable 25 Gram(s) IV Push once  diltiazem    Tablet 60 milliGRAM(s) Oral two times a day  heparin  Infusion.  Unit(s)/Hr (15 mL/Hr) IV Continuous <Continuous>  heparin  Injectable 6500 Unit(s) IV Push once  insulin glargine Injectable (LANTUS) 16 Unit(s) SubCutaneous <User Schedule>  insulin lispro (HumaLOG) corrective regimen sliding scale   SubCutaneous three times a day before meals  insulin lispro (HumaLOG) corrective regimen sliding scale   SubCutaneous at bedtime  loperamide 2 milliGRAM(s) Oral two times a day  losartan 100 milliGRAM(s) Oral daily  magnesium oxide 400 milliGRAM(s) Oral daily  metoprolol tartrate 50 milliGRAM(s) Oral two times a day  pancrelipase  (CREON 36,000 Lipase Units) 2 Capsule(s) Oral three times a day with meals  pantoprazole    Tablet 40 milliGRAM(s) Oral <User Schedule>  prochlorperazine   Tablet 10 milliGRAM(s) Oral daily    MEDICATIONS  (PRN):  acetaminophen   Tablet .. 650 milliGRAM(s) Oral every 6 hours PRN Temp greater or equal to 38C (100.4F), Mild Pain (1 - 3), Moderate Pain (4 - 6)  dextrose 40% Gel 15 Gram(s) Oral once PRN Blood Glucose LESS THAN 70 milliGRAM(s)/deciliter  glucagon  Injectable 1 milliGRAM(s) IntraMuscular once PRN Glucose LESS THAN 70 milligrams/deciliter  heparin  Injectable 6500 Unit(s) IV Push every 6 hours PRN For aPTT less than 40  heparin  Injectable 3000 Unit(s) IV Push every 6 hours PRN For aPTT between 40 - 57      Allergies    adhesives (Hives)  No Known Drug Allergies    Intolerances        LABS:                        9.3    13.58 )-----------( 225      ( 2019 15:00 )             29.4     04-18    141  |  105  |  19  ----------------------------<  222<H>  3.2<L>   |  23  |  1.19    Ca    8.1<L>      2019 21:46  Phos  2.8     -18  Mg     1.7     18    TPro  5.4<L>  /  Alb  2.6<L>  /  TBili  0.4  /  DBili  x   /  AST  86<H>  /  ALT  23  /  AlkPhos  204<H>  -18    PT/INR - ( 2019 15:00 )   PT: 19.0 SEC;   INR: 1.64          PTT - ( 2019 15:00 )  PTT:32.4 SEC  Urinalysis Basic - ( 2019 15:19 )    Color: YELLOW / Appearance: HAZY / S.024 / pH: 6.0  Gluc: NEGATIVE / Ketone: NEGATIVE  / Bili: MODERATE / Urobili: NORMAL   Blood: NEGATIVE / Protein: 100 / Nitrite: NEGATIVE   Leuk Esterase: NEGATIVE / RBC: x / WBC 2-5   Sq Epi: x / Non Sq Epi: SMALL / Bacteria: SMALL        RADIOLOGY & ADDITIONAL TESTS:  Studies reviewed.

## 2019-04-19 NOTE — DIETITIAN INITIAL EVALUATION ADULT. - PROBLEM SELECTOR PLAN 1
Admitted to telemetry   Trend cardiac enzymes  Serial EKGs  2G of Sodium with 1.5L of fluid restriction  Education about appropriate diet   Strict I&Os  daily wts  fluid restriction  Started on Lasix 20 iv bid  probnp  TTE ( last one on 8/2018)  consider Cardiology Consult

## 2019-04-19 NOTE — PROGRESS NOTE ADULT - PROBLEM SELECTOR PLAN 1
Likely 2/2 CHFe, elevated pro-BNP, pleural effusions on CT chest, LE pitting edema with negative dopplers, no PE on CTA  c/w IV lasix 40 mg  2G of Sodium with 1.5L of fluid restriction   Strict I&Os, daily wts  EF 35% 8/2018, however noted to be 60% on 8/29/2018, F/U repeat TTE  Continue to monitor on telemetry   Cardiology following closely, recommendations appreciated

## 2019-04-19 NOTE — DIETITIAN INITIAL EVALUATION ADULT. - OTHER INFO
Pt with a PmHx of Atrial Fibrillation (on Eliquis), Benign colon polyps, Cataracts, DM II, GERD, HLD, Nephrolithiasis, Stress incontinence, MVP, CHF, Prostate Cancer ( s/p prostatectomy), Varicose veins, and Pancreatic Cancer ( s/p whipple chemo 4/4/19), presents today to the [facility] ED for SOB for the past 10 days with worsening symptoms over the past few days. Pt remains with poor po intakes, and is able to only tolerate soups and foods that are very moist. Also remains with occasional nausea and reports lose bowel movements are improving. CDiff is pending. Suggested small frequent po intakes as tolerated, encouraged menu selection to allow for tolerated foods. Would like to try supp Glucerna Shakes. Educated on current 1500 ml fluid restriction as ordered. Noted admit height 78 inches, Pt states height is 72 inches.

## 2019-04-19 NOTE — DIETITIAN INITIAL EVALUATION ADULT. - ADHERENCE
good/able to tolerate mainly soups, well moistened foods and was using Unjury protein supplement as suggested by outpatient RD.

## 2019-04-20 ENCOUNTER — APPOINTMENT (OUTPATIENT)
Dept: INFUSION THERAPY | Facility: HOSPITAL | Age: 79
End: 2019-04-20

## 2019-04-20 DIAGNOSIS — R60.0 LOCALIZED EDEMA: ICD-10-CM

## 2019-04-20 LAB
ANION GAP SERPL CALC-SCNC: 12 MMO/L — SIGNIFICANT CHANGE UP (ref 7–14)
ANION GAP SERPL CALC-SCNC: 15 MMO/L — HIGH (ref 7–14)
APTT BLD: 78.3 SEC — HIGH (ref 27.5–36.3)
APTT BLD: 92.2 SEC — HIGH (ref 27.5–36.3)
BASOPHILS # BLD AUTO: 0.04 K/UL — SIGNIFICANT CHANGE UP (ref 0–0.2)
BASOPHILS NFR BLD AUTO: 0.3 % — SIGNIFICANT CHANGE UP (ref 0–2)
BUN SERPL-MCNC: 21 MG/DL — SIGNIFICANT CHANGE UP (ref 7–23)
BUN SERPL-MCNC: 24 MG/DL — HIGH (ref 7–23)
CALCIUM SERPL-MCNC: 7.9 MG/DL — LOW (ref 8.4–10.5)
CALCIUM SERPL-MCNC: 8.2 MG/DL — LOW (ref 8.4–10.5)
CHLORIDE SERPL-SCNC: 104 MMOL/L — SIGNIFICANT CHANGE UP (ref 98–107)
CHLORIDE SERPL-SCNC: 108 MMOL/L — HIGH (ref 98–107)
CO2 SERPL-SCNC: 22 MMOL/L — SIGNIFICANT CHANGE UP (ref 22–31)
CO2 SERPL-SCNC: 23 MMOL/L — SIGNIFICANT CHANGE UP (ref 22–31)
CREAT SERPL-MCNC: 1.11 MG/DL — SIGNIFICANT CHANGE UP (ref 0.5–1.3)
CREAT SERPL-MCNC: 1.33 MG/DL — HIGH (ref 0.5–1.3)
EOSINOPHIL # BLD AUTO: 0.08 K/UL — SIGNIFICANT CHANGE UP (ref 0–0.5)
EOSINOPHIL NFR BLD AUTO: 0.6 % — SIGNIFICANT CHANGE UP (ref 0–6)
GI PCR PANEL, STOOL: SIGNIFICANT CHANGE UP
GLUCOSE SERPL-MCNC: 141 MG/DL — HIGH (ref 70–99)
GLUCOSE SERPL-MCNC: 166 MG/DL — HIGH (ref 70–99)
HCT VFR BLD CALC: 29.5 % — LOW (ref 39–50)
HCT VFR BLD CALC: 29.5 % — LOW (ref 39–50)
HGB BLD-MCNC: 9 G/DL — LOW (ref 13–17)
HGB BLD-MCNC: 9 G/DL — LOW (ref 13–17)
IMM GRANULOCYTES NFR BLD AUTO: 1 % — SIGNIFICANT CHANGE UP (ref 0–1.5)
LYMPHOCYTES # BLD AUTO: 0.86 K/UL — LOW (ref 1–3.3)
LYMPHOCYTES # BLD AUTO: 6.1 % — LOW (ref 13–44)
MAGNESIUM SERPL-MCNC: 1.7 MG/DL — SIGNIFICANT CHANGE UP (ref 1.6–2.6)
MCHC RBC-ENTMCNC: 28 PG — SIGNIFICANT CHANGE UP (ref 27–34)
MCHC RBC-ENTMCNC: 28 PG — SIGNIFICANT CHANGE UP (ref 27–34)
MCHC RBC-ENTMCNC: 30.5 % — LOW (ref 32–36)
MCHC RBC-ENTMCNC: 30.5 % — LOW (ref 32–36)
MCV RBC AUTO: 91.6 FL — SIGNIFICANT CHANGE UP (ref 80–100)
MCV RBC AUTO: 91.6 FL — SIGNIFICANT CHANGE UP (ref 80–100)
MONOCYTES # BLD AUTO: 1.09 K/UL — HIGH (ref 0–0.9)
MONOCYTES NFR BLD AUTO: 7.7 % — SIGNIFICANT CHANGE UP (ref 2–14)
NEUTROPHILS # BLD AUTO: 11.86 K/UL — HIGH (ref 1.8–7.4)
NEUTROPHILS NFR BLD AUTO: 84.3 % — HIGH (ref 43–77)
NRBC # FLD: 0 K/UL — SIGNIFICANT CHANGE UP (ref 0–0)
NRBC # FLD: 0 K/UL — SIGNIFICANT CHANGE UP (ref 0–0)
PLATELET # BLD AUTO: 212 K/UL — SIGNIFICANT CHANGE UP (ref 150–400)
PLATELET # BLD AUTO: 212 K/UL — SIGNIFICANT CHANGE UP (ref 150–400)
PMV BLD: 10.3 FL — SIGNIFICANT CHANGE UP (ref 7–13)
PMV BLD: 10.3 FL — SIGNIFICANT CHANGE UP (ref 7–13)
POTASSIUM SERPL-MCNC: 3.3 MMOL/L — LOW (ref 3.5–5.3)
POTASSIUM SERPL-MCNC: 4.4 MMOL/L — SIGNIFICANT CHANGE UP (ref 3.5–5.3)
POTASSIUM SERPL-SCNC: 3.3 MMOL/L — LOW (ref 3.5–5.3)
POTASSIUM SERPL-SCNC: 4.4 MMOL/L — SIGNIFICANT CHANGE UP (ref 3.5–5.3)
RBC # BLD: 3.22 M/UL — LOW (ref 4.2–5.8)
RBC # BLD: 3.22 M/UL — LOW (ref 4.2–5.8)
RBC # FLD: 14.9 % — HIGH (ref 10.3–14.5)
RBC # FLD: 14.9 % — HIGH (ref 10.3–14.5)
SODIUM SERPL-SCNC: 141 MMOL/L — SIGNIFICANT CHANGE UP (ref 135–145)
SODIUM SERPL-SCNC: 143 MMOL/L — SIGNIFICANT CHANGE UP (ref 135–145)
SPECIMEN SOURCE: SIGNIFICANT CHANGE UP
SPECIMEN SOURCE: SIGNIFICANT CHANGE UP
WBC # BLD: 14.07 K/UL — HIGH (ref 3.8–10.5)
WBC # BLD: 14.07 K/UL — HIGH (ref 3.8–10.5)
WBC # FLD AUTO: 14.07 K/UL — HIGH (ref 3.8–10.5)
WBC # FLD AUTO: 14.07 K/UL — HIGH (ref 3.8–10.5)

## 2019-04-20 PROCEDURE — 99233 SBSQ HOSP IP/OBS HIGH 50: CPT | Mod: GC

## 2019-04-20 PROCEDURE — 99232 SBSQ HOSP IP/OBS MODERATE 35: CPT | Mod: GC

## 2019-04-20 PROCEDURE — 99233 SBSQ HOSP IP/OBS HIGH 50: CPT

## 2019-04-20 RX ORDER — ONDANSETRON 8 MG/1
4 TABLET, FILM COATED ORAL ONCE
Qty: 0 | Refills: 0 | Status: COMPLETED | OUTPATIENT
Start: 2019-04-20 | End: 2019-04-20

## 2019-04-20 RX ORDER — POTASSIUM CHLORIDE 20 MEQ
40 PACKET (EA) ORAL EVERY 4 HOURS
Qty: 0 | Refills: 0 | Status: COMPLETED | OUTPATIENT
Start: 2019-04-20 | End: 2019-04-20

## 2019-04-20 RX ORDER — FUROSEMIDE 40 MG
40 TABLET ORAL DAILY
Qty: 0 | Refills: 0 | Status: DISCONTINUED | OUTPATIENT
Start: 2019-04-20 | End: 2019-04-21

## 2019-04-20 RX ORDER — METOPROLOL TARTRATE 50 MG
50 TABLET ORAL THREE TIMES A DAY
Qty: 0 | Refills: 0 | Status: DISCONTINUED | OUTPATIENT
Start: 2019-04-20 | End: 2019-04-21

## 2019-04-20 RX ORDER — FUROSEMIDE 40 MG
40 TABLET ORAL DAILY
Qty: 0 | Refills: 0 | Status: DISCONTINUED | OUTPATIENT
Start: 2019-04-20 | End: 2019-04-20

## 2019-04-20 RX ADMIN — Medication 1: at 13:08

## 2019-04-20 RX ADMIN — Medication 650 MILLIGRAM(S): at 17:12

## 2019-04-20 RX ADMIN — Medication 10 MILLIGRAM(S): at 13:08

## 2019-04-20 RX ADMIN — Medication 50 MILLIGRAM(S): at 05:49

## 2019-04-20 RX ADMIN — Medication 2 MILLIGRAM(S): at 05:49

## 2019-04-20 RX ADMIN — Medication 2 CAPSULE(S): at 13:08

## 2019-04-20 RX ADMIN — INSULIN GLARGINE 16 UNIT(S): 100 INJECTION, SOLUTION SUBCUTANEOUS at 13:07

## 2019-04-20 RX ADMIN — Medication 2 MILLIGRAM(S): at 17:12

## 2019-04-20 RX ADMIN — HEPARIN SODIUM 1500 UNIT(S)/HR: 5000 INJECTION INTRAVENOUS; SUBCUTANEOUS at 03:10

## 2019-04-20 RX ADMIN — Medication 50 MILLIGRAM(S): at 13:08

## 2019-04-20 RX ADMIN — ONDANSETRON 4 MILLIGRAM(S): 8 TABLET, FILM COATED ORAL at 16:05

## 2019-04-20 RX ADMIN — Medication 650 MILLIGRAM(S): at 08:30

## 2019-04-20 RX ADMIN — Medication 60 MILLIGRAM(S): at 05:49

## 2019-04-20 RX ADMIN — HEPARIN SODIUM 1500 UNIT(S)/HR: 5000 INJECTION INTRAVENOUS; SUBCUTANEOUS at 10:09

## 2019-04-20 RX ADMIN — Medication 1: at 08:59

## 2019-04-20 RX ADMIN — Medication 40 MILLIEQUIVALENT(S): at 05:50

## 2019-04-20 RX ADMIN — Medication 650 MILLIGRAM(S): at 07:39

## 2019-04-20 RX ADMIN — Medication 50 MILLIGRAM(S): at 21:40

## 2019-04-20 RX ADMIN — Medication 2 CAPSULE(S): at 17:12

## 2019-04-20 RX ADMIN — Medication 650 MILLIGRAM(S): at 18:00

## 2019-04-20 RX ADMIN — Medication 2 CAPSULE(S): at 08:57

## 2019-04-20 RX ADMIN — Medication 40 MILLIEQUIVALENT(S): at 09:01

## 2019-04-20 RX ADMIN — LOSARTAN POTASSIUM 100 MILLIGRAM(S): 100 TABLET, FILM COATED ORAL at 05:49

## 2019-04-20 RX ADMIN — Medication 650 MILLIGRAM(S): at 23:56

## 2019-04-20 RX ADMIN — MAGNESIUM OXIDE 400 MG ORAL TABLET 400 MILLIGRAM(S): 241.3 TABLET ORAL at 13:08

## 2019-04-20 NOTE — PROGRESS NOTE ADULT - SUBJECTIVE AND OBJECTIVE BOX
24H hour events: No acute events.    MEDICATIONS:  heparin  Infusion.  Unit(s)/Hr IV Continuous <Continuous>  heparin  Injectable 6500 Unit(s) IV Push every 6 hours PRN  heparin  Injectable 3000 Unit(s) IV Push every 6 hours PRN  losartan 100 milliGRAM(s) Oral daily  metoprolol tartrate 50 milliGRAM(s) Oral three times a day  acetaminophen   Tablet .. 650 milliGRAM(s) Oral every 6 hours PRN  prochlorperazine   Tablet 10 milliGRAM(s) Oral daily  aluminum hydroxide/magnesium hydroxide/simethicone Suspension 30 milliLiter(s) Oral every 4 hours PRN  loperamide 2 milliGRAM(s) Oral two times a day  pancrelipase  (CREON 36,000 Lipase Units) 2 Capsule(s) Oral three times a day with meals  pantoprazole    Tablet 40 milliGRAM(s) Oral <User Schedule>  dextrose 40% Gel 15 Gram(s) Oral once PRN  dextrose 50% Injectable 12.5 Gram(s) IV Push once  dextrose 50% Injectable 25 Gram(s) IV Push once  dextrose 50% Injectable 25 Gram(s) IV Push once  glucagon  Injectable 1 milliGRAM(s) IntraMuscular once PRN  insulin glargine Injectable (LANTUS) 16 Unit(s) SubCutaneous <User Schedule>  insulin lispro (HumaLOG) corrective regimen sliding scale   SubCutaneous three times a day before meals  insulin lispro (HumaLOG) corrective regimen sliding scale   SubCutaneous at bedtime  dextrose 5%. 1000 milliLiter(s) IV Continuous <Continuous>  magnesium oxide 400 milliGRAM(s) Oral daily    REVIEW OF SYSTEMS:  Breathing improved. Able to ambulate, but limited w dyspnea. No longer sob at rest. Extremity swelling improving.    PHYSICAL EXAM:  T(C): 36.3 (04-20-19 @ 05:44), Max: 37.3 (04-19-19 @ 14:44)  HR: 116 (04-20-19 @ 05:44) (70 - 119)  BP: 140/90 (04-20-19 @ 05:44) (121/58 - 140/90)  RR: 18 (04-20-19 @ 05:44) (17 - 18)  SpO2: 100% (04-20-19 @ 05:44) (98% - 100%)  Wt(kg): --  I&O's Summary    19 Apr 2019 07:01  -  20 Apr 2019 07:00  --------------------------------------------------------  IN: 700 mL / OUT: 1125 mL / NET: -425 mL      Appearance: NAD  HEENT:   Normal oral mucosa, PERRL, EOMI	  Cardiovascular: Normal S1 S2, No JVD, No murmurs, RUE pitting edema 2+ to elbow  Respiratory: Lungs clear to auscultation	  Gastrointestinal:  Soft, Non-tender, + BS	  Neurologic: Non-focal  Vascular: Peripheral pulses palpable 2+ bilaterally        LABS:	 	    CBC Full  -  ( 20 Apr 2019 02:18 )  WBC Count : 14.07 K/uL  Hemoglobin : 9.0 g/dL  Hematocrit : 29.5 %  Platelet Count - Automated : 212 K/uL  Mean Cell Volume : 91.6 fL  Mean Cell Hemoglobin : 28.0 pg  Mean Cell Hemoglobin Concentration : 30.5 %  Auto Neutrophil # : 11.86 K/uL  Auto Lymphocyte # : 0.86 K/uL  Auto Monocyte # : 1.09 K/uL  Auto Eosinophil # : 0.08 K/uL  Auto Basophil # : 0.04 K/uL  Auto Neutrophil % : 84.3 %  Auto Lymphocyte % : 6.1 %  Auto Monocyte % : 7.7 %  Auto Eosinophil % : 0.6 %  Auto Basophil % : 0.3 %    04-20    143  |  108<H>  |  21  ----------------------------<  141<H>  3.3<L>   |  23  |  1.11  04-19    143  |  107  |  20  ----------------------------<  195<H>  4.0   |  23  |  1.17    Ca    7.9<L>      20 Apr 2019 02:18  Ca    8.1<L>      19 Apr 2019 16:57  Mg     1.7     04-20  Mg     1.7     04-19    TPro  5.4<L>  /  Alb  2.6<L>  /  TBili  0.4  /  DBili  x   /  AST  86<H>  /  ALT  23  /  AlkPhos  204<H>  04-18    proBNP: Serum Pro-Brain Natriuretic Peptide: 7013 pg/mL (04-18 @ 15:00)  Serum Pro-Brain Natriuretic Peptide: 17913 pg/mL (04-17 @ 19:05)    TELEMETRY: AF, PVCs, 9 beats NSVT starting 160s then decreasing speed before self terminating    < from: Transthoracic Echocardiogram (04.19.19 @ 12:50) >  CONCLUSIONS:  1. Normal left ventricular internal dimensions and wall  thicknesses.  2. Limited left ventricular function assessment in setting  of tachycardia, grossly mild global left ventricular  systolic dysfunction.  3. Normal right ventricular size and function.    < end of copied text >

## 2019-04-20 NOTE — PROGRESS NOTE ADULT - PROBLEM SELECTOR PLAN 3
Pancreatic adenocarcinoma, s/p whipple surgery 10/2018, on adjuvant FOLFIRINOX, with progression of disease while on adjuvant chemo with new liver lesions, LAP and a lung nodule, primary oncologist Dr. Clark aware  Outpt oncology follow up, plan for second line chemotherapy if bx proven to be metastatic pancreatic adenoca  Oncology recommendations appreciated

## 2019-04-20 NOTE — PROGRESS NOTE ADULT - PROBLEM SELECTOR PLAN 1
Likely 2/2 CHFe, elevated pro-BNP, pleural effusions on CT chest, LE pitting edema with negative dopplers, no PE on CTA, TTE grossly mild global  left ventricular systolic dysfunction, limited assessment in setting of tachycardia  Continue po lasix 40 mg  2G of Sodium with 1.5L of fluid restriction   Strict I&Os, daily wts  Continue to monitor on telemetry   Cardiology following closely, recommendations appreciated

## 2019-04-20 NOTE — PROGRESS NOTE ADULT - PROBLEM SELECTOR PLAN 8
Hx of prior ablations with Dr. Pyle at [facility]. Follows Regularly with EP Q3 months  cont with heparin drip  DC diltiazem, increase metoprolol to 50 TID  continue to monitor on telemetry

## 2019-04-20 NOTE — PROGRESS NOTE ADULT - PROBLEM SELECTOR PLAN 2
Previously diagnosed with salmonella and completed antibiotic course  F/U GI PCR, stool cx in progress, O&P  Continue to monitor stool counts

## 2019-04-20 NOTE — PROGRESS NOTE ADULT - ASSESSMENT
78m with pancreatic adenocarcinoma, s/p whipple surgery 10/2018, on adjuvant FOLFIRINOX, with progression of disease while on adjuvant chemo with new liver lesions, LAP and a lung nodule, Atrial Fibrillation (on Eliquis), p/w weakness, SOB for the past 10 days with worsening symptoms over the past few days, found to have high probnp      Problem 1: Pancreatic cancer  - Dopplers of b/l LE negative for DVT,   - did not yet undergo doppler of RUE, please order  - Restarted on AC (heparin drip) for Afib  - liver biopsy planned monday  - Stool GI PCR negative, cx pending  -Supportive care, Pain control, nutrition, PT, DVT ppx  -outpt oncology follow up, plan for second line chemotherapy if bx proven to be metastatic pancreatic adenoca    *Weekend coverage, please reach out to primary team during week*

## 2019-04-20 NOTE — PROGRESS NOTE ADULT - SUBJECTIVE AND OBJECTIVE BOX
Interval Hx:  Patient seen and examined at bedside today. He feels somewhat better in regards to weakness and SOB today and was walking around the unit earlier.     Vital Signs Last 24 Hrs  T(C): 36.4 (20 Apr 2019 13:06), Max: 36.4 (20 Apr 2019 13:06)  T(F): 97.6 (20 Apr 2019 13:06), Max: 97.6 (20 Apr 2019 13:06)  HR: 117 (20 Apr 2019 13:06) (116 - 117)  BP: 118/69 (20 Apr 2019 13:06) (118/69 - 140/90)  BP(mean): --  RR: 17 (20 Apr 2019 13:06) (17 - 18)  SpO2: 99% (20 Apr 2019 13:06) (99% - 100%)    04-19-19 @ 07:01  -  04-20-19 @ 07:00  --------------------------------------------------------  IN: 700 mL / OUT: 1125 mL / NET: -425 mL    04-20-19 @ 07:01  -  04-20-19 @ 21:12  --------------------------------------------------------  IN: 530 mL / OUT: 200 mL / NET: 330 mL        PHYSICAL EXAM:  Constitutional: well developed, in no acute distress  Eyes: Anicteric.   ENT: Oropharynx is unremarkable, no petechiae or masses  Pulmonary: Clear to auscultation bilaterally  Cardiac: RRR today  Abdomen: Normoactive bowel sounds, soft and nontender, no hepatosplenomegaly or masses appreciated.  MSK: Extremities: RUE edema, + LE edema  Skin: normal appearance, no significant bruising or petechiae  Neurology: Grossly intact, AAOx3    LABS:  04-20    141  |  104  |  24<H>  ----------------------------<  166<H>  4.4   |  22  |  1.33<H>    Ca    8.2<L>      20 Apr 2019 16:08  Mg     1.7     04-20                            9.0    14.07 )-----------( 212      ( 20 Apr 2019 02:18 )             29.5       PTT - ( 20 Apr 2019 09:03 )  PTT:78.3 SEC      RADIOLOGY and ADDITIONAL STUDIES:  Reviewed - Relevant findings discussed in assessment

## 2019-04-20 NOTE — PROGRESS NOTE ADULT - ASSESSMENT
77 y/o man, with a PmHx of CHF (EF 35% 8/2018, however noted to be 60% on repeat 8/2018), MVP, Afib (on Eliquis), DM II, GERD, HLD, nephrolithiasis, prostate Cancer ( s/p prostatectomy), and Pancreatic Cancer (s/p whipple chemo 4/4/19) w/ possible hepatic mets, presents today to the [facility] ED for SOB for the past 10 days with worsening symptoms over the past few days; found metastatic disease likely ?pancreatic    RUE swelling  -check venous duplex  -warm compress  -compression wrap with ACE bandage, keep elevated above level of heart, if possible avoid iv lines on that arm    #SOB: Unclear etiology. EF 35% 8/2018, however noted to be 60% on repeat 8/29/2018.   -Daily weights, I/Os  -cont lasix 40mg PO once daily    #Afib: Hx of prior ablations with Dr. Pyle at [facility]. Follows Regularly with EP Q3 months  -Continuemetoprolol tartrate 50 BID at  -Continue home eliquis  -Keep K >4, Mg >2 (severely hypokalemic on admission, on home K, Mg)  -Telemetry monitoring    #HTN:  -Continue losartan, metoprolol    [id number]

## 2019-04-20 NOTE — PROGRESS NOTE ADULT - SUBJECTIVE AND OBJECTIVE BOX
Patient is a 78y old  Male who presents with a chief complaint of SOB X 10 days with increasing severity past few days (2019 13:06)    SUBJECTIVE / OVERNIGHT EVENTS:  Patient seen this morning complaining of waking up in sweats, was afebrile when checked, feels less dyspneic, denies any CP, complaining of some Rt sided discomfort. Only had 2 BM yesterday.     MEDICATIONS  (STANDING):  dextrose 5%. 1000 milliLiter(s) (50 mL/Hr) IV Continuous <Continuous>  dextrose 50% Injectable 12.5 Gram(s) IV Push once  dextrose 50% Injectable 25 Gram(s) IV Push once  dextrose 50% Injectable 25 Gram(s) IV Push once  furosemide    Tablet 40 milliGRAM(s) Oral daily  heparin  Infusion.  Unit(s)/Hr (15 mL/Hr) IV Continuous <Continuous>  insulin glargine Injectable (LANTUS) 16 Unit(s) SubCutaneous <User Schedule>  insulin lispro (HumaLOG) corrective regimen sliding scale   SubCutaneous three times a day before meals  insulin lispro (HumaLOG) corrective regimen sliding scale   SubCutaneous at bedtime  loperamide 2 milliGRAM(s) Oral two times a day  losartan 100 milliGRAM(s) Oral daily  magnesium oxide 400 milliGRAM(s) Oral daily  metoprolol tartrate 50 milliGRAM(s) Oral three times a day  pancrelipase  (CREON 36,000 Lipase Units) 2 Capsule(s) Oral three times a day with meals  pantoprazole    Tablet 40 milliGRAM(s) Oral <User Schedule>  prochlorperazine   Tablet 10 milliGRAM(s) Oral daily    MEDICATIONS  (PRN):  acetaminophen   Tablet .. 650 milliGRAM(s) Oral every 6 hours PRN Temp greater or equal to 38C (100.4F), Mild Pain (1 - 3), Moderate Pain (4 - 6)  aluminum hydroxide/magnesium hydroxide/simethicone Suspension 30 milliLiter(s) Oral every 4 hours PRN Dyspepsia  dextrose 40% Gel 15 Gram(s) Oral once PRN Blood Glucose LESS THAN 70 milliGRAM(s)/deciliter  glucagon  Injectable 1 milliGRAM(s) IntraMuscular once PRN Glucose LESS THAN 70 milligrams/deciliter  heparin  Injectable 6500 Unit(s) IV Push every 6 hours PRN For aPTT less than 40  heparin  Injectable 3000 Unit(s) IV Push every 6 hours PRN For aPTT between 40 - 57      Vital Signs Last 24 Hrs  T(C): 36.4 (2019 13:06), Max: 37.3 (2019 14:44)  T(F): 97.6 (2019 13:06), Max: 99.2 (2019 14:44)  HR: 117 (2019 13:06) (70 - 119)  BP: 118/69 (2019 13:06) (118/69 - 140/90)  BP(mean): --  RR: 17 (2019 13:06) (17 - 18)  SpO2: 99% (2019 13:06) (98% - 100%)  CAPILLARY BLOOD GLUCOSE      POCT Blood Glucose.: 191 mg/dL (2019 12:56)  POCT Blood Glucose.: 159 mg/dL (2019 08:56)  POCT Blood Glucose.: 171 mg/dL (2019 21:15)  POCT Blood Glucose.: 189 mg/dL (2019 17:42)  POCT Blood Glucose.: 151 mg/dL (2019 14:41)    I&O's Summary    2019 07:01  -  2019 07:00  --------------------------------------------------------  IN: 700 mL / OUT: 1125 mL / NET: -425 mL      PHYSICAL EXAM  GENERAL: NAD, well-developed, cooperative with exam  HEAD:  Atraumatic, Normocephalic  EYES: EOMI, PERRLA, conjunctiva and sclera clear  NECK: Supple  CHEST/LUNG: CTA B/L  HEART: Irregularly irregular rhythm, tachycardic  ABDOMEN: Soft, Nontender, Nondistended; Bowel sounds present  EXTREMITIES:  2+ Peripheral Pulses, No clubbing, cyanosis, or edema  PSYCH: AAOx3  SKIN: No rashes or lesions      LABS:                        9.0    14.07 )-----------( 212      ( 2019 02:18 )             29.5     04-20    143  |  108<H>  |  21  ----------------------------<  141<H>  3.3<L>   |  23  |  1.11    Ca    7.9<L>      2019 02:18  Mg     1.7     -20    TPro  5.4<L>  /  Alb  2.6<L>  /  TBili  0.4  /  DBili  x   /  AST  86<H>  /  ALT  23  /  AlkPhos  204<H>  04-18    PT/INR - ( 2019 15:00 )   PT: 19.0 SEC;   INR: 1.64          PTT - ( 2019 09:03 )  PTT:78.3 SEC      Urinalysis Basic - ( 2019 15:19 )    Color: YELLOW / Appearance: HAZY / S.024 / pH: 6.0  Gluc: NEGATIVE / Ketone: NEGATIVE  / Bili: MODERATE / Urobili: NORMAL   Blood: NEGATIVE / Protein: 100 / Nitrite: NEGATIVE   Leuk Esterase: NEGATIVE / RBC: x / WBC 2-5   Sq Epi: x / Non Sq Epi: SMALL / Bacteria: SMALL        Culture - Stool (collected 2019 09:41)  Source: FECES  Preliminary Report (2019 11:29):    CULTURE IN PROGRESS, FURTHER REPORT TO FOLLOW.        RADIOLOGY & ADDITIONAL TESTS:    Imaging Personally Reviewed:  Consultant(s) Notes Reviewed:    Care Discussed with Consultants/Other Providers:

## 2019-04-20 NOTE — PROGRESS NOTE ADULT - ASSESSMENT
77 y/o Male, with a PmHx of Atrial Fibrillation (on Eliquis), Benign colon polyps, Cataracts, DM II, CHF, GERD, HLD, Nephrolithiasis, Stress incontinence, MVP, Prostate Cancer (s/p prostatectomy), Varicose veins, and Pancreatic Cancer (s/p whipple and is on chemo), presents today for SOB for 10 days with worsening symptoms past few days. Pt is admitted to telemetry for acute on chronic CHF in the setting of severe electrolyte derangements.

## 2019-04-21 DIAGNOSIS — D72.829 ELEVATED WHITE BLOOD CELL COUNT, UNSPECIFIED: ICD-10-CM

## 2019-04-21 LAB
ALBUMIN SERPL ELPH-MCNC: 2.3 G/DL — LOW (ref 3.3–5)
ALP SERPL-CCNC: 207 U/L — HIGH (ref 40–120)
ALT FLD-CCNC: 29 U/L — SIGNIFICANT CHANGE UP (ref 4–41)
ANION GAP SERPL CALC-SCNC: 14 MMO/L — SIGNIFICANT CHANGE UP (ref 7–14)
APTT BLD: 72.6 SEC — HIGH (ref 27.5–36.3)
AST SERPL-CCNC: 124 U/L — HIGH (ref 4–40)
BACTERIA STL CULT: SIGNIFICANT CHANGE UP
BASOPHILS # BLD AUTO: 0.04 K/UL — SIGNIFICANT CHANGE UP (ref 0–0.2)
BASOPHILS NFR BLD AUTO: 0.2 % — SIGNIFICANT CHANGE UP (ref 0–2)
BILIRUB DIRECT SERPL-MCNC: < 0.2 MG/DL — SIGNIFICANT CHANGE UP (ref 0.1–0.2)
BILIRUB SERPL-MCNC: 0.3 MG/DL — SIGNIFICANT CHANGE UP (ref 0.2–1.2)
BUN SERPL-MCNC: 26 MG/DL — HIGH (ref 7–23)
CALCIUM SERPL-MCNC: 7.8 MG/DL — LOW (ref 8.4–10.5)
CHLORIDE SERPL-SCNC: 106 MMOL/L — SIGNIFICANT CHANGE UP (ref 98–107)
CO2 SERPL-SCNC: 21 MMOL/L — LOW (ref 22–31)
CREAT SERPL-MCNC: 1.55 MG/DL — HIGH (ref 0.5–1.3)
EOSINOPHIL # BLD AUTO: 0.11 K/UL — SIGNIFICANT CHANGE UP (ref 0–0.5)
EOSINOPHIL NFR BLD AUTO: 0.7 % — SIGNIFICANT CHANGE UP (ref 0–6)
GLUCOSE SERPL-MCNC: 111 MG/DL — HIGH (ref 70–99)
HCT VFR BLD CALC: 28.1 % — LOW (ref 39–50)
HCT VFR BLD CALC: 28.1 % — LOW (ref 39–50)
HGB BLD-MCNC: 8.8 G/DL — LOW (ref 13–17)
HGB BLD-MCNC: 8.8 G/DL — LOW (ref 13–17)
IMM GRANULOCYTES NFR BLD AUTO: 1.3 % — SIGNIFICANT CHANGE UP (ref 0–1.5)
LYMPHOCYTES # BLD AUTO: 1.07 K/UL — SIGNIFICANT CHANGE UP (ref 1–3.3)
LYMPHOCYTES # BLD AUTO: 6.4 % — LOW (ref 13–44)
MAGNESIUM SERPL-MCNC: 1.8 MG/DL — SIGNIFICANT CHANGE UP (ref 1.6–2.6)
MCHC RBC-ENTMCNC: 28.1 PG — SIGNIFICANT CHANGE UP (ref 27–34)
MCHC RBC-ENTMCNC: 28.1 PG — SIGNIFICANT CHANGE UP (ref 27–34)
MCHC RBC-ENTMCNC: 31.3 % — LOW (ref 32–36)
MCHC RBC-ENTMCNC: 31.3 % — LOW (ref 32–36)
MCV RBC AUTO: 89.8 FL — SIGNIFICANT CHANGE UP (ref 80–100)
MCV RBC AUTO: 89.8 FL — SIGNIFICANT CHANGE UP (ref 80–100)
MONOCYTES # BLD AUTO: 1.39 K/UL — HIGH (ref 0–0.9)
MONOCYTES NFR BLD AUTO: 8.3 % — SIGNIFICANT CHANGE UP (ref 2–14)
NEUTROPHILS # BLD AUTO: 13.93 K/UL — HIGH (ref 1.8–7.4)
NEUTROPHILS NFR BLD AUTO: 83.1 % — HIGH (ref 43–77)
NRBC # FLD: 0 K/UL — SIGNIFICANT CHANGE UP (ref 0–0)
NRBC # FLD: 0 K/UL — SIGNIFICANT CHANGE UP (ref 0–0)
PLATELET # BLD AUTO: 234 K/UL — SIGNIFICANT CHANGE UP (ref 150–400)
PLATELET # BLD AUTO: 234 K/UL — SIGNIFICANT CHANGE UP (ref 150–400)
PMV BLD: 10.1 FL — SIGNIFICANT CHANGE UP (ref 7–13)
PMV BLD: 10.1 FL — SIGNIFICANT CHANGE UP (ref 7–13)
POTASSIUM SERPL-MCNC: 4.3 MMOL/L — SIGNIFICANT CHANGE UP (ref 3.5–5.3)
POTASSIUM SERPL-SCNC: 4.3 MMOL/L — SIGNIFICANT CHANGE UP (ref 3.5–5.3)
PROT SERPL-MCNC: 4.9 G/DL — LOW (ref 6–8.3)
RBC # BLD: 3.13 M/UL — LOW (ref 4.2–5.8)
RBC # BLD: 3.13 M/UL — LOW (ref 4.2–5.8)
RBC # FLD: 15.2 % — HIGH (ref 10.3–14.5)
RBC # FLD: 15.2 % — HIGH (ref 10.3–14.5)
SODIUM SERPL-SCNC: 141 MMOL/L — SIGNIFICANT CHANGE UP (ref 135–145)
WBC # BLD: 16.75 K/UL — HIGH (ref 3.8–10.5)
WBC # BLD: 16.75 K/UL — HIGH (ref 3.8–10.5)
WBC # FLD AUTO: 16.75 K/UL — HIGH (ref 3.8–10.5)
WBC # FLD AUTO: 16.75 K/UL — HIGH (ref 3.8–10.5)

## 2019-04-21 PROCEDURE — 99232 SBSQ HOSP IP/OBS MODERATE 35: CPT | Mod: GC

## 2019-04-21 PROCEDURE — 78582 LUNG VENTILAT&PERFUS IMAGING: CPT | Mod: 26,GC

## 2019-04-21 PROCEDURE — 99233 SBSQ HOSP IP/OBS HIGH 50: CPT

## 2019-04-21 PROCEDURE — 93971 EXTREMITY STUDY: CPT | Mod: 26

## 2019-04-21 RX ORDER — METOPROLOL TARTRATE 50 MG
100 TABLET ORAL EVERY 12 HOURS
Qty: 0 | Refills: 0 | Status: DISCONTINUED | OUTPATIENT
Start: 2019-04-21 | End: 2019-04-21

## 2019-04-21 RX ORDER — OXYCODONE HYDROCHLORIDE 5 MG/1
5 TABLET ORAL ONCE
Qty: 0 | Refills: 0 | Status: DISCONTINUED | OUTPATIENT
Start: 2019-04-21 | End: 2019-04-21

## 2019-04-21 RX ORDER — METOPROLOL TARTRATE 50 MG
100 TABLET ORAL EVERY 12 HOURS
Qty: 0 | Refills: 0 | Status: DISCONTINUED | OUTPATIENT
Start: 2019-04-21 | End: 2019-04-23

## 2019-04-21 RX ORDER — SODIUM CHLORIDE 9 MG/ML
500 INJECTION, SOLUTION INTRAVENOUS
Qty: 0 | Refills: 0 | Status: COMPLETED | OUTPATIENT
Start: 2019-04-21 | End: 2019-04-21

## 2019-04-21 RX ADMIN — Medication 30 MILLILITER(S): at 17:58

## 2019-04-21 RX ADMIN — Medication 650 MILLIGRAM(S): at 23:38

## 2019-04-21 RX ADMIN — Medication 2 CAPSULE(S): at 17:58

## 2019-04-21 RX ADMIN — SODIUM CHLORIDE 75 MILLILITER(S): 9 INJECTION, SOLUTION INTRAVENOUS at 09:37

## 2019-04-21 RX ADMIN — MAGNESIUM OXIDE 400 MG ORAL TABLET 400 MILLIGRAM(S): 241.3 TABLET ORAL at 13:07

## 2019-04-21 RX ADMIN — Medication 650 MILLIGRAM(S): at 00:56

## 2019-04-21 RX ADMIN — Medication 50 MILLIGRAM(S): at 05:14

## 2019-04-21 RX ADMIN — LOSARTAN POTASSIUM 100 MILLIGRAM(S): 100 TABLET, FILM COATED ORAL at 05:14

## 2019-04-21 RX ADMIN — HEPARIN SODIUM 1500 UNIT(S)/HR: 5000 INJECTION INTRAVENOUS; SUBCUTANEOUS at 05:13

## 2019-04-21 RX ADMIN — OXYCODONE HYDROCHLORIDE 5 MILLIGRAM(S): 5 TABLET ORAL at 06:36

## 2019-04-21 RX ADMIN — INSULIN GLARGINE 16 UNIT(S): 100 INJECTION, SOLUTION SUBCUTANEOUS at 11:08

## 2019-04-21 RX ADMIN — Medication 50 MILLIGRAM(S): at 13:10

## 2019-04-21 RX ADMIN — Medication 2: at 13:06

## 2019-04-21 RX ADMIN — Medication 10 MILLIGRAM(S): at 13:07

## 2019-04-21 RX ADMIN — Medication 2 CAPSULE(S): at 08:57

## 2019-04-21 RX ADMIN — Medication 2 MILLIGRAM(S): at 17:58

## 2019-04-21 RX ADMIN — Medication 1: at 17:58

## 2019-04-21 RX ADMIN — PANTOPRAZOLE SODIUM 40 MILLIGRAM(S): 20 TABLET, DELAYED RELEASE ORAL at 08:57

## 2019-04-21 RX ADMIN — OXYCODONE HYDROCHLORIDE 5 MILLIGRAM(S): 5 TABLET ORAL at 06:06

## 2019-04-21 RX ADMIN — Medication 2 CAPSULE(S): at 13:07

## 2019-04-21 RX ADMIN — Medication 40 MILLIGRAM(S): at 05:13

## 2019-04-21 RX ADMIN — Medication 100 MILLIGRAM(S): at 19:04

## 2019-04-21 NOTE — PROGRESS NOTE ADULT - SUBJECTIVE AND OBJECTIVE BOX
24H hour events: No acute events. Renal markers increased and patient received IVF this am.     MEDICATIONS:  heparin  Infusion.  Unit(s)/Hr IV Continuous <Continuous>  heparin  Injectable 6500 Unit(s) IV Push every 6 hours PRN  heparin  Injectable 3000 Unit(s) IV Push every 6 hours PRN  losartan 100 milliGRAM(s) Oral daily  metoprolol tartrate 50 milliGRAM(s) Oral three times a day  acetaminophen   Tablet .. 650 milliGRAM(s) Oral every 6 hours PRN  prochlorperazine   Tablet 10 milliGRAM(s) Oral daily  aluminum hydroxide/magnesium hydroxide/simethicone Suspension 30 milliLiter(s) Oral every 4 hours PRN  loperamide 2 milliGRAM(s) Oral two times a day  pancrelipase  (CREON 36,000 Lipase Units) 2 Capsule(s) Oral three times a day with meals  pantoprazole    Tablet 40 milliGRAM(s) Oral <User Schedule>  dextrose 40% Gel 15 Gram(s) Oral once PRN  dextrose 50% Injectable 12.5 Gram(s) IV Push once  dextrose 50% Injectable 25 Gram(s) IV Push once  dextrose 50% Injectable 25 Gram(s) IV Push once  glucagon  Injectable 1 milliGRAM(s) IntraMuscular once PRN  insulin glargine Injectable (LANTUS) 16 Unit(s) SubCutaneous <User Schedule>  insulin lispro (HumaLOG) corrective regimen sliding scale   SubCutaneous three times a day before meals  insulin lispro (HumaLOG) corrective regimen sliding scale   SubCutaneous at bedtime  dextrose 5%. 1000 milliLiter(s) IV Continuous <Continuous>  magnesium oxide 400 milliGRAM(s) Oral daily      REVIEW OF SYSTEMS:  Very fatigued and sob walking to bathroom about 4 ft away. RUE swelling much improved.     PHYSICAL EXAM:  T(C): 36.3 (04-21-19 @ 05:09), Max: 36.6 (04-20-19 @ 21:12)  HR: 118 (04-21-19 @ 05:09) (67 - 118)  BP: 121/87 (04-21-19 @ 05:09) (100/64 - 121/87)  RR: 16 (04-21-19 @ 05:09) (16 - 18)  SpO2: 100% (04-21-19 @ 05:09) (98% - 100%)  Wt(kg): --  I&O's Summary    20 Apr 2019 07:01  -  21 Apr 2019 07:00  --------------------------------------------------------  IN: 730 mL / OUT: 900 mL / NET: -170 mL      Appearance: NAD  HEENT:   Normal oral mucosa, PERRL, EOMI	  Cardiovascular: Normal S1 S2, No JVD, No murmurs, RUE pitting edema 1+ to elbow  Respiratory: Lungs clear to auscultation	  Gastrointestinal:  Soft, Non-tender, + BS	  Neurologic: Non-focal  Vascular: Peripheral pulses palpable 2+ bilaterally      LABS:	 	    CBC Full  -  ( 21 Apr 2019 04:21 )  WBC Count : 16.75 K/uL  Hemoglobin : 8.8 g/dL  Hematocrit : 28.1 %  Platelet Count - Automated : 234 K/uL  Mean Cell Volume : 89.8 fL  Mean Cell Hemoglobin : 28.1 pg  Mean Cell Hemoglobin Concentration : 31.3 %  Auto Neutrophil # : 13.93 K/uL  Auto Lymphocyte # : 1.07 K/uL  Auto Monocyte # : 1.39 K/uL  Auto Eosinophil # : 0.11 K/uL  Auto Basophil # : 0.04 K/uL  Auto Neutrophil % : 83.1 %  Auto Lymphocyte % : 6.4 %  Auto Monocyte % : 8.3 %  Auto Eosinophil % : 0.7 %  Auto Basophil % : 0.2 %    04-21    141  |  106  |  26<H>  ----------------------------<  111<H>  4.3   |  21<L>  |  1.55<H>  04-20    141  |  104  |  24<H>  ----------------------------<  166<H>  4.4   |  22  |  1.33<H>    Ca    7.8<L>      21 Apr 2019 04:21  Ca    8.2<L>      20 Apr 2019 16:08  Mg     1.8     04-21  Mg     1.7     04-20    proBNP: Serum Pro-Brain Natriuretic Peptide: 7013 pg/mL (04-18 @ 15:00)  Serum Pro-Brain Natriuretic Peptide: 97155 pg/mL (04-17 @ 19:05)      TELEMETRY: AF 120s, sustained 110s     < from: CT Chest w/ IV Cont (04.15.19 @ 10:49) >  IMPRESSION:     Interval Whipple procedure. Thickening and heterogeneity of small bowel   loops associated with the choledochojejunostomy and   pancreaticojejunostomy may be related to enteritis and/or tumoral   infiltration in the periportal region.    Interval development of hepatic lesions compatible with metastatic   disease.     Interval development of small bowel mesenteric adenopathy.    Interval development of abdominal retroperitoneal adenopathy.    Interval development of a small amount of abdominal and moderate amount   of pelvic ascites.    New 3 mm left lower lobe lung nodule.    < end of copied text >

## 2019-04-21 NOTE — PROGRESS NOTE ADULT - PROBLEM SELECTOR PLAN 3
Likely reactive, no evidence of infectious etiologies, afebrile  - F/U CXR to r/o pulmonary infiltrate  - monitor CBC Likely reactive, no evidence of infectious etiologies, afebrile  - F/U CXR to r/o pulmonary infiltrate  - F/U RUQ US to r/o acute infectious process  - monitor CBC

## 2019-04-21 NOTE — PROGRESS NOTE ADULT - PROBLEM SELECTOR PLAN 5
CT of abdomen and liver showed: Interval development of a lesion in the right lobe of liver   Liver bx as above  Oncology consulted (Dr. Clark) CT of abdomen and liver showed: Interval development of a lesion in the right lobe of liver, patient endorsing worsening RUQ pain  F/U RUQ US  Liver bx as above  Oncology consulted (Dr. Clark)

## 2019-04-21 NOTE — PROGRESS NOTE ADULT - PROBLEM SELECTOR PLAN 1
Likely 2/2 CHFe, elevated pro-BNP, pleural effusions on CT chest, LE pitting edema with negative dopplers, TTE grossly mild global left ventricular systolic dysfunction, limited assessment in setting of tachycardia  Holding lasix in setting of MANNY   F/U V/Q scan to r/o PE  2G of Sodium with 1.5L of fluid restriction   Strict I&Os, daily wts  Continue to monitor on telemetry   Cardiology following closely, recommendations appreciated

## 2019-04-21 NOTE — PROGRESS NOTE ADULT - ASSESSMENT
77 y/o man, with a PmHx of CHF (EF 35% 8/2018, however noted to be 60% on repeat 8/2018), MVP, Afib (on Eliquis), DM II, GERD, HLD, nephrolithiasis, prostate Cancer ( s/p prostatectomy), and Pancreatic Cancer (s/p whipple chemo 4/4/19) w/ possible hepatic mets, presents today to the [facility] ED for SOB for the past 10 days with worsening symptoms over the past few days; found metastatic disease likely ?pancreatic    RUE swelling  -pending venous duplex RUE  -warm compress  -compression wrap with ACE bandage, keep elevated above level of heart, if possible avoid iv lines on that arm    #SOB: Unclear etiology. EF 35% 8/2018, now recovered  -Daily weights, I/Os  -given rapid rise renal markers would hold off on diuretics for now  -warrants repeat chest imaging given rise in wbc and symptoms  -v/q for potential subclinical perfusion mismatch -- alternatively CT chest w IV contrast from 4/15 did not expressly deny presence of PE. If radiology can exclude from this scan should addend, if not then may warrant repeat CT angiography chest  -may need PFTs if above unrevealing  -anemia not yet severe, but fairly rapid decrease in hgb...just last month in the 11s now 8s...could be contributing to symptoms    #Afib: Hx of prior ablations with Dr. Pyle at [facility]. Follows Regularly with EP Q3 months  -inc metoprolol to 100mg bid hold hr <60, sbp <100  -hep gtt for cva ppx; will convert to oral agent if no plans for procedures  -Keep K >4, Mg >2 (severely hypokalemic on admission, on home K, Mg)  -Telemetry monitoring    #HTN:  -Continue losartan, metoprolol    [id number]

## 2019-04-21 NOTE — PROGRESS NOTE ADULT - PROBLEM SELECTOR PLAN 2
Previously diagnosed with salmonella and completed antibiotic course, improved  GI PCR negative, stool cx in progress, O&P pending  Continue to monitor stool counts

## 2019-04-21 NOTE — PROGRESS NOTE ADULT - ASSESSMENT
77 y/o Male, with a PmHx of Atrial Fibrillation (on Eliquis), Benign colon polyps, Cataracts, DM II, CHF, GERD, HLD, Nephrolithiasis, Stress incontinence, MVP, Prostate Cancer (s/p prostatectomy), Varicose veins, and Pancreatic Cancer (s/p whipple and is on chemo), presents today for SOB for 10 days with worsening symptoms past few days. Pt is admitted to telemetry for acute on chronic CHF in the setting of severe electrolyte derangements.               #MANNY:  Baseline Cr ~1.1-1.2, elevation likely 2/2 diuretics  - DC lasix, IV  cc, encourage po hydration  - monitor BMP, urine output 79 y/o Male, with a PmHx of Atrial Fibrillation (on Eliquis), Benign colon polyps, Cataracts, DM II, CHF, GERD, HLD, Nephrolithiasis, Stress incontinence, MVP, Prostate Cancer (s/p prostatectomy), Varicose veins, and Pancreatic Cancer (s/p whipple and is on chemo), presents today for SOB for 10 days with worsening symptoms past few days. Pt is admitted to telemetry for acute on chronic CHF in the setting of severe electrolyte derangements.               #MANNY:  Baseline Cr ~1.1-1.2, elevation likely 2/2 diuretics  - DC lasix, IV  cc, encourage po hydration  - renally dose medications, avoid nephrotoxic agents  - monitor BMP, urine output

## 2019-04-21 NOTE — PROGRESS NOTE ADULT - SUBJECTIVE AND OBJECTIVE BOX
Patient is a 78y old  Male who presents with a chief complaint of SOB X 10 days with increasing severity past few days (21 Apr 2019 10:43)    SUBJECTIVE / OVERNIGHT EVENTS:  Patient seen denying any diarrhea, improvement in his CHACON, was able to walk 2 laps on floor with wife although still not at baseline.   Tele: [facility]    MEDICATIONS  (STANDING):  dextrose 5%. 1000 milliLiter(s) (50 mL/Hr) IV Continuous <Continuous>  dextrose 50% Injectable 12.5 Gram(s) IV Push once  dextrose 50% Injectable 25 Gram(s) IV Push once  dextrose 50% Injectable 25 Gram(s) IV Push once  heparin  Infusion.  Unit(s)/Hr (15 mL/Hr) IV Continuous <Continuous>  insulin glargine Injectable (LANTUS) 16 Unit(s) SubCutaneous <User Schedule>  insulin lispro (HumaLOG) corrective regimen sliding scale   SubCutaneous three times a day before meals  insulin lispro (HumaLOG) corrective regimen sliding scale   SubCutaneous at bedtime  loperamide 2 milliGRAM(s) Oral two times a day  losartan 100 milliGRAM(s) Oral daily  magnesium oxide 400 milliGRAM(s) Oral daily  metoprolol tartrate 50 milliGRAM(s) Oral three times a day  pancrelipase  (CREON 36,000 Lipase Units) 2 Capsule(s) Oral three times a day with meals  pantoprazole    Tablet 40 milliGRAM(s) Oral <User Schedule>  prochlorperazine   Tablet 10 milliGRAM(s) Oral daily    MEDICATIONS  (PRN):  acetaminophen   Tablet .. 650 milliGRAM(s) Oral every 6 hours PRN Temp greater or equal to 38C (100.4F), Mild Pain (1 - 3), Moderate Pain (4 - 6)  aluminum hydroxide/magnesium hydroxide/simethicone Suspension 30 milliLiter(s) Oral every 4 hours PRN Dyspepsia  dextrose 40% Gel 15 Gram(s) Oral once PRN Blood Glucose LESS THAN 70 milliGRAM(s)/deciliter  glucagon  Injectable 1 milliGRAM(s) IntraMuscular once PRN Glucose LESS THAN 70 milligrams/deciliter  heparin  Injectable 6500 Unit(s) IV Push every 6 hours PRN For aPTT less than 40  heparin  Injectable 3000 Unit(s) IV Push every 6 hours PRN For aPTT between 40 - 57      Vital Signs Last 24 Hrs  T(C): 36.3 (21 Apr 2019 05:09), Max: 36.6 (20 Apr 2019 21:12)  T(F): 97.4 (21 Apr 2019 05:09), Max: 97.8 (20 Apr 2019 21:12)  HR: 118 (21 Apr 2019 05:09) (67 - 118)  BP: 121/87 (21 Apr 2019 05:09) (100/64 - 121/87)  BP(mean): --  RR: 16 (21 Apr 2019 05:09) (16 - 18)  SpO2: 100% (21 Apr 2019 05:09) (98% - 100%)  CAPILLARY BLOOD GLUCOSE      POCT Blood Glucose.: 222 mg/dL (21 Apr 2019 12:35)  POCT Blood Glucose.: 199 mg/dL (21 Apr 2019 11:07)  POCT Blood Glucose.: 132 mg/dL (21 Apr 2019 08:32)  POCT Blood Glucose.: 133 mg/dL (20 Apr 2019 22:39)  POCT Blood Glucose.: 150 mg/dL (20 Apr 2019 17:24)    I&O's Summary    20 Apr 2019 07:01  -  21 Apr 2019 07:00  --------------------------------------------------------  IN: 730 mL / OUT: 900 mL / NET: -170 mL        PHYSICAL EXAM  GENERAL: NAD, well-developed, cooperative with exam  HEAD:  Atraumatic, Normocephalic  EYES: EOMI, PERRLA, conjunctiva and sclera clear  NECK: Supple  CHEST/LUNG: CTA B/L  HEART: Irregularly irregular rhythm, tachycardic  ABDOMEN: Soft, Nontender, Nondistended; Bowel sounds present  EXTREMITIES:  2+ Peripheral Pulses, No clubbing, cyanosis, Rt UE edematous wrapped in compression bandage  PSYCH: AAOx3  SKIN: No rashes or lesions      LABS:                        8.8    16.75 )-----------( 234      ( 21 Apr 2019 04:21 )             28.1     04-21    141  |  106  |  26<H>  ----------------------------<  111<H>  4.3   |  21<L>  |  1.55<H>    Ca    7.8<L>      21 Apr 2019 04:21  Mg     1.8     04-21      PTT - ( 21 Apr 2019 04:21 )  PTT:72.6 SEC          Culture - Stool (collected 19 Apr 2019 09:41)  Source: [facility]  Preliminary Report (20 Apr 2019 11:29):    CULTURE IN PROGRESS, FURTHER REPORT TO FOLLOW.    GI PCR Panel, Stool (collected 19 Apr 2019 09:41)  Source: [facility]        RADIOLOGY & ADDITIONAL TESTS:    Imaging Personally Reviewed:  Consultant(s) Notes Reviewed:    Care Discussed with Consultants/Other Providers: Discussed care with Cardiology fellow En Raymond Patient is a 78y old  Male who presents with a chief complaint of SOB X 10 days with increasing severity past few days (21 Apr 2019 10:43)    SUBJECTIVE / OVERNIGHT EVENTS:  Patient seen denying any diarrhea, improvement in his CHACON, was able to walk 2 laps on floor with wife although still not at baseline.   Tele: [facility].    MEDICATIONS  (STANDING):  dextrose 5%. 1000 milliLiter(s) (50 mL/Hr) IV Continuous <Continuous>  dextrose 50% Injectable 12.5 Gram(s) IV Push once  dextrose 50% Injectable 25 Gram(s) IV Push once  dextrose 50% Injectable 25 Gram(s) IV Push once  heparin  Infusion.  Unit(s)/Hr (15 mL/Hr) IV Continuous <Continuous>  insulin glargine Injectable (LANTUS) 16 Unit(s) SubCutaneous <User Schedule>  insulin lispro (HumaLOG) corrective regimen sliding scale   SubCutaneous three times a day before meals  insulin lispro (HumaLOG) corrective regimen sliding scale   SubCutaneous at bedtime  loperamide 2 milliGRAM(s) Oral two times a day  losartan 100 milliGRAM(s) Oral daily  magnesium oxide 400 milliGRAM(s) Oral daily  metoprolol tartrate 50 milliGRAM(s) Oral three times a day  pancrelipase  (CREON 36,000 Lipase Units) 2 Capsule(s) Oral three times a day with meals  pantoprazole    Tablet 40 milliGRAM(s) Oral <User Schedule>  prochlorperazine   Tablet 10 milliGRAM(s) Oral daily    MEDICATIONS  (PRN):  acetaminophen   Tablet .. 650 milliGRAM(s) Oral every 6 hours PRN Temp greater or equal to 38C (100.4F), Mild Pain (1 - 3), Moderate Pain (4 - 6)  aluminum hydroxide/magnesium hydroxide/simethicone Suspension 30 milliLiter(s) Oral every 4 hours PRN Dyspepsia  dextrose 40% Gel 15 Gram(s) Oral once PRN Blood Glucose LESS THAN 70 milliGRAM(s)/deciliter  glucagon  Injectable 1 milliGRAM(s) IntraMuscular once PRN Glucose LESS THAN 70 milligrams/deciliter  heparin  Injectable 6500 Unit(s) IV Push every 6 hours PRN For aPTT less than 40  heparin  Injectable 3000 Unit(s) IV Push every 6 hours PRN For aPTT between 40 - 57      Vital Signs Last 24 Hrs  T(C): 36.3 (21 Apr 2019 05:09), Max: 36.6 (20 Apr 2019 21:12)  T(F): 97.4 (21 Apr 2019 05:09), Max: 97.8 (20 Apr 2019 21:12)  HR: 118 (21 Apr 2019 05:09) (67 - 118)  BP: 121/87 (21 Apr 2019 05:09) (100/64 - 121/87)  BP(mean): --  RR: 16 (21 Apr 2019 05:09) (16 - 18)  SpO2: 100% (21 Apr 2019 05:09) (98% - 100%)  CAPILLARY BLOOD GLUCOSE      POCT Blood Glucose.: 222 mg/dL (21 Apr 2019 12:35)  POCT Blood Glucose.: 199 mg/dL (21 Apr 2019 11:07)  POCT Blood Glucose.: 132 mg/dL (21 Apr 2019 08:32)  POCT Blood Glucose.: 133 mg/dL (20 Apr 2019 22:39)  POCT Blood Glucose.: 150 mg/dL (20 Apr 2019 17:24)    I&O's Summary    20 Apr 2019 07:01  -  21 Apr 2019 07:00  --------------------------------------------------------  IN: 730 mL / OUT: 900 mL / NET: -170 mL      PHYSICAL EXAM  GENERAL: NAD, well-developed, cooperative with exam  HEAD:  Atraumatic, Normocephalic  EYES: EOMI, PERRLA, conjunctiva and sclera clear  NECK: Supple  CHEST/LUNG: CTA B/L  HEART: Irregularly irregular rhythm, tachycardic  ABDOMEN: Soft, Nontender, Nondistended; Bowel sounds present  EXTREMITIES:  2+ Peripheral Pulses, No clubbing, cyanosis, Rt UE edematous wrapped in compression bandage  PSYCH: AAOx3  SKIN: No rashes or lesions      LABS:                        8.8    16.75 )-----------( 234      ( 21 Apr 2019 04:21 )             28.1     04-21    141  |  106  |  26<H>  ----------------------------<  111<H>  4.3   |  21<L>  |  1.55<H>    Ca    7.8<L>      21 Apr 2019 04:21  Mg     1.8     04-21      PTT - ( 21 Apr 2019 04:21 )  PTT:72.6 SEC          Culture - Stool (collected 19 Apr 2019 09:41)  Source: [facility]  Preliminary Report (20 Apr 2019 11:29):    CULTURE IN PROGRESS, FURTHER REPORT TO FOLLOW.    GI PCR Panel, Stool (collected 19 Apr 2019 09:41)  Source: [facility]        RADIOLOGY & ADDITIONAL TESTS:    Imaging Personally Reviewed:  Consultant(s) Notes Reviewed:    Care Discussed with Consultants/Other Providers: Discussed care with Cardiology fellow En Raymond Patient is a 78y old  Male who presents with a chief complaint of SOB X 10 days with increasing severity past few days (21 Apr 2019 10:43)    SUBJECTIVE / OVERNIGHT EVENTS:  Patient seen denying any diarrhea, improvement in his CHACON, was able to walk 2 laps on floor with wife although still not at baseline. Also complaining of right upper abdominal pain, relieved with oxycodone.   Tele: [facility].    MEDICATIONS  (STANDING):  dextrose 5%. 1000 milliLiter(s) (50 mL/Hr) IV Continuous <Continuous>  dextrose 50% Injectable 12.5 Gram(s) IV Push once  dextrose 50% Injectable 25 Gram(s) IV Push once  dextrose 50% Injectable 25 Gram(s) IV Push once  heparin  Infusion.  Unit(s)/Hr (15 mL/Hr) IV Continuous <Continuous>  insulin glargine Injectable (LANTUS) 16 Unit(s) SubCutaneous <User Schedule>  insulin lispro (HumaLOG) corrective regimen sliding scale   SubCutaneous three times a day before meals  insulin lispro (HumaLOG) corrective regimen sliding scale   SubCutaneous at bedtime  loperamide 2 milliGRAM(s) Oral two times a day  losartan 100 milliGRAM(s) Oral daily  magnesium oxide 400 milliGRAM(s) Oral daily  metoprolol tartrate 50 milliGRAM(s) Oral three times a day  pancrelipase  (CREON 36,000 Lipase Units) 2 Capsule(s) Oral three times a day with meals  pantoprazole    Tablet 40 milliGRAM(s) Oral <User Schedule>  prochlorperazine   Tablet 10 milliGRAM(s) Oral daily    MEDICATIONS  (PRN):  acetaminophen   Tablet .. 650 milliGRAM(s) Oral every 6 hours PRN Temp greater or equal to 38C (100.4F), Mild Pain (1 - 3), Moderate Pain (4 - 6)  aluminum hydroxide/magnesium hydroxide/simethicone Suspension 30 milliLiter(s) Oral every 4 hours PRN Dyspepsia  dextrose 40% Gel 15 Gram(s) Oral once PRN Blood Glucose LESS THAN 70 milliGRAM(s)/deciliter  glucagon  Injectable 1 milliGRAM(s) IntraMuscular once PRN Glucose LESS THAN 70 milligrams/deciliter  heparin  Injectable 6500 Unit(s) IV Push every 6 hours PRN For aPTT less than 40  heparin  Injectable 3000 Unit(s) IV Push every 6 hours PRN For aPTT between 40 - 57      Vital Signs Last 24 Hrs  T(C): 36.3 (21 Apr 2019 05:09), Max: 36.6 (20 Apr 2019 21:12)  T(F): 97.4 (21 Apr 2019 05:09), Max: 97.8 (20 Apr 2019 21:12)  HR: 118 (21 Apr 2019 05:09) (67 - 118)  BP: 121/87 (21 Apr 2019 05:09) (100/64 - 121/87)  BP(mean): --  RR: 16 (21 Apr 2019 05:09) (16 - 18)  SpO2: 100% (21 Apr 2019 05:09) (98% - 100%)  CAPILLARY BLOOD GLUCOSE      POCT Blood Glucose.: 222 mg/dL (21 Apr 2019 12:35)  POCT Blood Glucose.: 199 mg/dL (21 Apr 2019 11:07)  POCT Blood Glucose.: 132 mg/dL (21 Apr 2019 08:32)  POCT Blood Glucose.: 133 mg/dL (20 Apr 2019 22:39)  POCT Blood Glucose.: 150 mg/dL (20 Apr 2019 17:24)    I&O's Summary    20 Apr 2019 07:01  -  21 Apr 2019 07:00  --------------------------------------------------------  IN: 730 mL / OUT: 900 mL / NET: -170 mL      PHYSICAL EXAM  GENERAL: NAD, well-developed, cooperative with exam  HEAD:  Atraumatic, Normocephalic  EYES: EOMI, PERRLA, conjunctiva and sclera clear  NECK: Supple  CHEST/LUNG: CTA B/L  HEART: Irregularly irregular rhythm, tachycardic  ABDOMEN: Soft, Nontender, Nondistended; Bowel sounds present  EXTREMITIES:  2+ Peripheral Pulses, No clubbing, cyanosis, Rt UE edematous wrapped in compression bandage  PSYCH: AAOx3  SKIN: No rashes or lesions      LABS:                        8.8    16.75 )-----------( 234      ( 21 Apr 2019 04:21 )             28.1     04-21    141  |  106  |  26<H>  ----------------------------<  111<H>  4.3   |  21<L>  |  1.55<H>    Ca    7.8<L>      21 Apr 2019 04:21  Mg     1.8     04-21      PTT - ( 21 Apr 2019 04:21 )  PTT:72.6 SEC          Culture - Stool (collected 19 Apr 2019 09:41)  Source: [facility]  Preliminary Report (20 Apr 2019 11:29):    CULTURE IN PROGRESS, FURTHER REPORT TO FOLLOW.    GI PCR Panel, Stool (collected 19 Apr 2019 09:41)  Source: [facility]        RADIOLOGY & ADDITIONAL TESTS:    Imaging Personally Reviewed:  Consultant(s) Notes Reviewed:    Care Discussed with Consultants/Other Providers: Discussed care with Cardiology fellow En Raymond

## 2019-04-22 ENCOUNTER — APPOINTMENT (OUTPATIENT)
Dept: ULTRASOUND IMAGING | Facility: IMAGING CENTER | Age: 79
End: 2019-04-22

## 2019-04-22 ENCOUNTER — RESULT REVIEW (OUTPATIENT)
Age: 79
End: 2019-04-22

## 2019-04-22 LAB
ANION GAP SERPL CALC-SCNC: 12 MMO/L — SIGNIFICANT CHANGE UP (ref 7–14)
APTT BLD: 66.7 SEC — HIGH (ref 27.5–36.3)
BASOPHILS # BLD AUTO: 0.04 K/UL — SIGNIFICANT CHANGE UP (ref 0–0.2)
BASOPHILS NFR BLD AUTO: 0.2 % — SIGNIFICANT CHANGE UP (ref 0–2)
BUN SERPL-MCNC: 31 MG/DL — HIGH (ref 7–23)
CALCIUM SERPL-MCNC: 8.3 MG/DL — LOW (ref 8.4–10.5)
CHLORIDE SERPL-SCNC: 104 MMOL/L — SIGNIFICANT CHANGE UP (ref 98–107)
CO2 SERPL-SCNC: 24 MMOL/L — SIGNIFICANT CHANGE UP (ref 22–31)
CREAT SERPL-MCNC: 1.78 MG/DL — HIGH (ref 0.5–1.3)
EOSINOPHIL # BLD AUTO: 0.12 K/UL — SIGNIFICANT CHANGE UP (ref 0–0.5)
EOSINOPHIL NFR BLD AUTO: 0.7 % — SIGNIFICANT CHANGE UP (ref 0–6)
GLUCOSE SERPL-MCNC: 118 MG/DL — HIGH (ref 70–99)
HCT VFR BLD CALC: 28 % — LOW (ref 39–50)
HCT VFR BLD CALC: 28 % — LOW (ref 39–50)
HGB BLD-MCNC: 8.6 G/DL — LOW (ref 13–17)
HGB BLD-MCNC: 8.6 G/DL — LOW (ref 13–17)
IMM GRANULOCYTES NFR BLD AUTO: 1 % — SIGNIFICANT CHANGE UP (ref 0–1.5)
LYMPHOCYTES # BLD AUTO: 0.87 K/UL — LOW (ref 1–3.3)
LYMPHOCYTES # BLD AUTO: 5.4 % — LOW (ref 13–44)
MAGNESIUM SERPL-MCNC: 1.7 MG/DL — SIGNIFICANT CHANGE UP (ref 1.6–2.6)
MCHC RBC-ENTMCNC: 27.3 PG — SIGNIFICANT CHANGE UP (ref 27–34)
MCHC RBC-ENTMCNC: 27.3 PG — SIGNIFICANT CHANGE UP (ref 27–34)
MCHC RBC-ENTMCNC: 30.7 % — LOW (ref 32–36)
MCHC RBC-ENTMCNC: 30.7 % — LOW (ref 32–36)
MCV RBC AUTO: 88.9 FL — SIGNIFICANT CHANGE UP (ref 80–100)
MCV RBC AUTO: 88.9 FL — SIGNIFICANT CHANGE UP (ref 80–100)
MONOCYTES # BLD AUTO: 1.44 K/UL — HIGH (ref 0–0.9)
MONOCYTES NFR BLD AUTO: 8.9 % — SIGNIFICANT CHANGE UP (ref 2–14)
NEUTROPHILS # BLD AUTO: 13.61 K/UL — HIGH (ref 1.8–7.4)
NEUTROPHILS NFR BLD AUTO: 83.8 % — HIGH (ref 43–77)
NRBC # FLD: 0.03 K/UL — SIGNIFICANT CHANGE UP (ref 0–0)
NRBC # FLD: 0.03 K/UL — SIGNIFICANT CHANGE UP (ref 0–0)
PHOSPHATE SERPL-MCNC: 3.3 MG/DL — SIGNIFICANT CHANGE UP (ref 2.5–4.5)
PLATELET # BLD AUTO: 259 K/UL — SIGNIFICANT CHANGE UP (ref 150–400)
PLATELET # BLD AUTO: 259 K/UL — SIGNIFICANT CHANGE UP (ref 150–400)
PMV BLD: 10.2 FL — SIGNIFICANT CHANGE UP (ref 7–13)
PMV BLD: 10.2 FL — SIGNIFICANT CHANGE UP (ref 7–13)
POTASSIUM SERPL-MCNC: 4.4 MMOL/L — SIGNIFICANT CHANGE UP (ref 3.5–5.3)
POTASSIUM SERPL-SCNC: 4.4 MMOL/L — SIGNIFICANT CHANGE UP (ref 3.5–5.3)
RBC # BLD: 3.15 M/UL — LOW (ref 4.2–5.8)
RBC # BLD: 3.15 M/UL — LOW (ref 4.2–5.8)
RBC # FLD: 15.3 % — HIGH (ref 10.3–14.5)
RBC # FLD: 15.3 % — HIGH (ref 10.3–14.5)
SODIUM SERPL-SCNC: 140 MMOL/L — SIGNIFICANT CHANGE UP (ref 135–145)
WBC # BLD: 16.24 K/UL — HIGH (ref 3.8–10.5)
WBC # BLD: 16.24 K/UL — HIGH (ref 3.8–10.5)
WBC # FLD AUTO: 16.24 K/UL — HIGH (ref 3.8–10.5)
WBC # FLD AUTO: 16.24 K/UL — HIGH (ref 3.8–10.5)

## 2019-04-22 PROCEDURE — 88342 IMHCHEM/IMCYTCHM 1ST ANTB: CPT | Mod: 26

## 2019-04-22 PROCEDURE — 47000 NEEDLE BIOPSY OF LIVER PERQ: CPT

## 2019-04-22 PROCEDURE — 99233 SBSQ HOSP IP/OBS HIGH 50: CPT

## 2019-04-22 PROCEDURE — 88307 TISSUE EXAM BY PATHOLOGIST: CPT | Mod: 26

## 2019-04-22 PROCEDURE — 76705 ECHO EXAM OF ABDOMEN: CPT | Mod: 26

## 2019-04-22 PROCEDURE — 99232 SBSQ HOSP IP/OBS MODERATE 35: CPT

## 2019-04-22 PROCEDURE — 71046 X-RAY EXAM CHEST 2 VIEWS: CPT | Mod: 26

## 2019-04-22 PROCEDURE — 88341 IMHCHEM/IMCYTCHM EA ADD ANTB: CPT | Mod: 26

## 2019-04-22 PROCEDURE — 76942 ECHO GUIDE FOR BIOPSY: CPT | Mod: 26

## 2019-04-22 RX ORDER — ACETAMINOPHEN 500 MG
1000 TABLET ORAL ONCE
Qty: 0 | Refills: 0 | Status: COMPLETED | OUTPATIENT
Start: 2019-04-22 | End: 2019-04-23

## 2019-04-22 RX ORDER — OXYCODONE HYDROCHLORIDE 5 MG/1
5 TABLET ORAL ONCE
Qty: 0 | Refills: 0 | Status: DISCONTINUED | OUTPATIENT
Start: 2019-04-22 | End: 2019-04-22

## 2019-04-22 RX ORDER — SODIUM CHLORIDE 9 MG/ML
1000 INJECTION, SOLUTION INTRAVENOUS
Qty: 0 | Refills: 0 | Status: COMPLETED | OUTPATIENT
Start: 2019-04-22 | End: 2019-04-22

## 2019-04-22 RX ADMIN — PANTOPRAZOLE SODIUM 40 MILLIGRAM(S): 20 TABLET, DELAYED RELEASE ORAL at 11:32

## 2019-04-22 RX ADMIN — Medication 2 MILLIGRAM(S): at 05:54

## 2019-04-22 RX ADMIN — OXYCODONE HYDROCHLORIDE 5 MILLIGRAM(S): 5 TABLET ORAL at 05:20

## 2019-04-22 RX ADMIN — OXYCODONE HYDROCHLORIDE 5 MILLIGRAM(S): 5 TABLET ORAL at 04:28

## 2019-04-22 RX ADMIN — LOSARTAN POTASSIUM 100 MILLIGRAM(S): 100 TABLET, FILM COATED ORAL at 05:54

## 2019-04-22 RX ADMIN — Medication 2 CAPSULE(S): at 17:59

## 2019-04-22 RX ADMIN — Medication 100 MILLIGRAM(S): at 23:16

## 2019-04-22 RX ADMIN — Medication 100 MILLIGRAM(S): at 11:29

## 2019-04-22 RX ADMIN — INSULIN GLARGINE 16 UNIT(S): 100 INJECTION, SOLUTION SUBCUTANEOUS at 11:33

## 2019-04-22 RX ADMIN — SODIUM CHLORIDE 100 MILLILITER(S): 9 INJECTION, SOLUTION INTRAVENOUS at 17:52

## 2019-04-22 RX ADMIN — Medication 10 MILLIGRAM(S): at 11:34

## 2019-04-22 RX ADMIN — MAGNESIUM OXIDE 400 MG ORAL TABLET 400 MILLIGRAM(S): 241.3 TABLET ORAL at 11:28

## 2019-04-22 RX ADMIN — Medication 650 MILLIGRAM(S): at 00:38

## 2019-04-22 NOTE — PROGRESS NOTE ADULT - PROBLEM SELECTOR PLAN 5
CT of abdomen and liver showed: Interval development of a lesion in the right lobe of liver, patient endorsing worsening RUQ pain  RUQ US with large heterogeneous mass in the right hepatic lobe, suspicious for   neoplasm  Liver bx as above  Oncology consulted (Dr. Clark)

## 2019-04-22 NOTE — PROGRESS NOTE ADULT - ASSESSMENT
77 y/o Male, with a PmHx of Atrial Fibrillation (on Eliquis), Benign colon polyps, Cataracts, DM II, CHF, GERD, HLD, Nephrolithiasis, Stress incontinence, MVP, Prostate Cancer (s/p prostatectomy), Varicose veins, and Pancreatic Cancer (s/p whipple and is on chemo), presents today for SOB for 10 days with worsening symptoms past few days. Pt is admitted to telemetry for acute on chronic CHF in the setting of severe electrolyte derangements.               #MANNY:  Baseline Cr ~1.1-1.2, elevation likely 2/2 diuretics and lisinopril  - continue IV LR @ 100 cc/hr, encourage po hydration  - renally dose medications, avoid nephrotoxic agents  - monitor BMP, urine output

## 2019-04-22 NOTE — PROGRESS NOTE ADULT - SUBJECTIVE AND OBJECTIVE BOX
Patient is a 78y old  Male who presents with a chief complaint of SOB X 10 days with increasing severity past few days (22 Apr 2019 13:34)    SUBJECTIVE / OVERNIGHT EVENTS:  Patient seen with his wife and daughter at bedside, reports increased fatigue, poor appetite.     MEDICATIONS  (STANDING):  dextrose 5%. 1000 milliLiter(s) (50 mL/Hr) IV Continuous <Continuous>  dextrose 50% Injectable 12.5 Gram(s) IV Push once  dextrose 50% Injectable 25 Gram(s) IV Push once  dextrose 50% Injectable 25 Gram(s) IV Push once  insulin glargine Injectable (LANTUS) 16 Unit(s) SubCutaneous <User Schedule>  insulin lispro (HumaLOG) corrective regimen sliding scale   SubCutaneous three times a day before meals  insulin lispro (HumaLOG) corrective regimen sliding scale   SubCutaneous at bedtime  lactated ringers. 1000 milliLiter(s) (100 mL/Hr) IV Continuous <Continuous>  loperamide 2 milliGRAM(s) Oral two times a day  magnesium oxide 400 milliGRAM(s) Oral daily  metoprolol tartrate 100 milliGRAM(s) Oral every 12 hours  pancrelipase  (CREON 36,000 Lipase Units) 2 Capsule(s) Oral three times a day with meals  pantoprazole    Tablet 40 milliGRAM(s) Oral <User Schedule>  prochlorperazine   Tablet 10 milliGRAM(s) Oral daily    MEDICATIONS  (PRN):  acetaminophen   Tablet .. 650 milliGRAM(s) Oral every 6 hours PRN Temp greater or equal to 38C (100.4F), Mild Pain (1 - 3), Moderate Pain (4 - 6)  acetaminophen  IVPB .. 1000 milliGRAM(s) IV Intermittent once PRN Severe Pain (7 - 10)  aluminum hydroxide/magnesium hydroxide/simethicone Suspension 30 milliLiter(s) Oral every 4 hours PRN Dyspepsia  dextrose 40% Gel 15 Gram(s) Oral once PRN Blood Glucose LESS THAN 70 milliGRAM(s)/deciliter  glucagon  Injectable 1 milliGRAM(s) IntraMuscular once PRN Glucose LESS THAN 70 milligrams/deciliter      Vital Signs Last 24 Hrs  T(C): 36.6 (22 Apr 2019 11:25), Max: 36.7 (22 Apr 2019 05:51)  T(F): 97.8 (22 Apr 2019 11:25), Max: 98 (22 Apr 2019 05:51)  HR: 118 (22 Apr 2019 11:25) (107 - 119)  BP: 119/78 (22 Apr 2019 11:25) (105/73 - 120/72)  BP(mean): --  RR: 18 (22 Apr 2019 11:25) (16 - 18)  SpO2: 100% (22 Apr 2019 11:25) (97% - 100%)  CAPILLARY BLOOD GLUCOSE      POCT Blood Glucose.: 154 mg/dL (22 Apr 2019 12:46)  POCT Blood Glucose.: 113 mg/dL (22 Apr 2019 08:52)  POCT Blood Glucose.: 145 mg/dL (21 Apr 2019 22:20)  POCT Blood Glucose.: 175 mg/dL (21 Apr 2019 17:34)    I&O's Summary    21 Apr 2019 07:01  -  22 Apr 2019 07:00  --------------------------------------------------------  IN: 0 mL / OUT: 1350 mL / NET: -1350 mL      PHYSICAL EXAM  GENERAL: NAD, well-developed, cooperative with exam  HEAD:  Atraumatic, Normocephalic  EYES: EOMI, PERRLA, conjunctiva and sclera clear  NECK: Supple  CHEST/LUNG: CTA B/L  HEART: Irregularly irregular rhythm, tachycardic  ABDOMEN: Soft, Nontender, Nondistended; Bowel sounds present  EXTREMITIES:  2+ Peripheral Pulses, No clubbing, cyanosis, Rt UE edematous  PSYCH: AAOx3  SKIN: No rashes or lesions      LABS:                        8.6    16.24 )-----------( 259      ( 22 Apr 2019 07:01 )             28.0     04-22    140  |  104  |  31<H>  ----------------------------<  118<H>  4.4   |  24  |  1.78<H>    Ca    8.3<L>      22 Apr 2019 07:01  Phos  3.3     04-22  Mg     1.7     04-22    TPro  4.9<L>  /  Alb  2.3<L>  /  TBili  0.3  /  DBili  < 0.2  /  AST  124<H>  /  ALT  29  /  AlkPhos  207<H>  04-21    PTT - ( 22 Apr 2019 07:01 )  PTT:66.7 SEC            RADIOLOGY & ADDITIONAL TESTS:    Imaging Personally Reviewed:  Consultant(s) Notes Reviewed:    Care Discussed with Consultants/Other Providers:

## 2019-04-22 NOTE — PROGRESS NOTE ADULT - ASSESSMENT
78m with pancreatic adenocarcinoma, s/p whipple surgery 10/2018, on adjuvant FOLFIRINOX, with progression of disease while on adjuvant chemo with new liver lesions, LAP and a lung nodule, Atrial Fibrillation (on Eliquis), p/w weakness, SOB for the past 10 days with worsening symptoms over the past few days, found to have high probnp.  Of note, patient was to have bx of liver lesions on Monday.   Of note, patient has had diarrhea since about 3 months ago. He was diagnosed with salmonella and completed antibiotic course.     Has RUQ pain in the setting of liver mets    -Check liver tests daily, pain control  -Cardiology input appreciated  -Please obtain CORE liver biopsy while patient is admitted. Eliquis should be held for 48 hours before biopsy.   -Replete lytes as needed  -Supportive care, Pain control, nutrition, PT, DVT ppx  -outpt oncology follow up, plan for second line chemotherapy if bx proven to be metastatic pancreatic adenoca. Will need to be seen by palliative care outpatient.     Will follow. Please do not hesitate to call back with questions.     Annabel Avina MD  Medical Oncology Attending

## 2019-04-22 NOTE — PROGRESS NOTE ADULT - ASSESSMENT
79 y/o man, with a PmHx of CHF (EF 35% 8/2018, however noted to be 60% on repeat 8/2018), MVP, Afib (on Eliquis), DM II, GERD, HLD, nephrolithiasis, prostate Cancer ( s/p prostatectomy), and Pancreatic Cancer (s/p whipple chemo 4/4/19) w/ possible hepatic mets, presents today to the [facility] ED for SOB for the past 10 days with worsening symptoms over the past few days; found metastatic disease likely ?pancreatic    #SOB: Unclear etiology. EF 35% 8/2018, now recovered  -VQ scan w/ low probability of PE  -Given rapid rise renal markers would continue hold off on diuretics for now  -Daily weights, I/Os  -May need PFTs if above unrevealing  -Anemia may be contributing to his sx (Hgb 11 last month, now 8s)  #Afib: Hx of prior ablations with Dr. Pyle at [facility]. Follows Regularly with EP Q3 months  -Continue etoprolol to 100mg bid hold hr <60, sbp <100  -Hep gtt for cva ppx; will convert to oral agent if no plans for procedures  -Keep K >4, Mg >2 (severely hypokalemic on admission, on home K, Mg)  -Telemetry monitoring    #HTN:  -Continue losartan, metoprolol    [id number]

## 2019-04-22 NOTE — PROGRESS NOTE ADULT - PROBLEM SELECTOR PLAN 4
Pancreatic adenocarcinoma, s/p whipple surgery 10/2018, on adjuvant FOLFIRINOX, with progression of disease while on adjuvant chemo with new liver lesions, LAP and a lung nodule, primary oncologist Dr. Clark aware  Liver biopsy planned for Monday, plan for second line chemotherapy if bx proven to be metastatic pancreatic adenoca  Oncology recommendations appreciated

## 2019-04-22 NOTE — CHART NOTE - NSCHARTNOTEFT_GEN_A_CORE
Pt still with complaints of RUQ/flank pain.  Will order a dose of Oxycodone IR 5mg and await RUQ Sono to evaluate for source of pain.

## 2019-04-22 NOTE — PROGRESS NOTE ADULT - SUBJECTIVE AND OBJECTIVE BOX
24H hour events: No acute events. Pt with RUQ pain. Abd US ordered. Cr continues to trend up    MEDICATIONS  (STANDING):  dextrose 5%. 1000 milliLiter(s) (50 mL/Hr) IV Continuous <Continuous>  dextrose 50% Injectable 12.5 Gram(s) IV Push once  dextrose 50% Injectable 25 Gram(s) IV Push once  dextrose 50% Injectable 25 Gram(s) IV Push once  insulin glargine Injectable (LANTUS) 16 Unit(s) SubCutaneous <User Schedule>  insulin lispro (HumaLOG) corrective regimen sliding scale   SubCutaneous three times a day before meals  insulin lispro (HumaLOG) corrective regimen sliding scale   SubCutaneous at bedtime  lactated ringers. 1000 milliLiter(s) (100 mL/Hr) IV Continuous <Continuous>  loperamide 2 milliGRAM(s) Oral two times a day  magnesium oxide 400 milliGRAM(s) Oral daily  metoprolol tartrate 100 milliGRAM(s) Oral every 12 hours  pancrelipase  (CREON 36,000 Lipase Units) 2 Capsule(s) Oral three times a day with meals  pantoprazole    Tablet 40 milliGRAM(s) Oral <User Schedule>  prochlorperazine   Tablet 10 milliGRAM(s) Oral daily    MEDICATIONS  (PRN):  acetaminophen   Tablet .. 650 milliGRAM(s) Oral every 6 hours PRN Temp greater or equal to 38C (100.4F), Mild Pain (1 - 3), Moderate Pain (4 - 6)  aluminum hydroxide/magnesium hydroxide/simethicone Suspension 30 milliLiter(s) Oral every 4 hours PRN Dyspepsia  dextrose 40% Gel 15 Gram(s) Oral once PRN Blood Glucose LESS THAN 70 milliGRAM(s)/deciliter  glucagon  Injectable 1 milliGRAM(s) IntraMuscular once PRN Glucose LESS THAN 70 milligrams/deciliter      REVIEW OF SYSTEMS:  Very fatigued and sob walking to bathroom about 4 ft away. RUE swelling much improved.     PHYSICAL EXAM:  Vital Signs Last 24 Hrs  T(C): 36.7 (22 Apr 2019 05:51), Max: 36.7 (22 Apr 2019 05:51)  T(F): 98 (22 Apr 2019 05:51), Max: 98 (22 Apr 2019 05:51)  HR: 112 (22 Apr 2019 05:51) (107 - 119)  BP: 117/80 (22 Apr 2019 05:51) (105/73 - 121/76)  BP(mean): --  RR: 16 (22 Apr 2019 05:51) (16 - 18)  SpO2: 97% (22 Apr 2019 05:51) (95% - 100%)    I&O's Summary    21 Apr 2019 07:01  -  22 Apr 2019 07:00  --------------------------------------------------------  IN: 0 mL / OUT: 1350 mL / NET: -1350 mL      Appearance: NAD  HEENT:   Normal oral mucosa, PERRL, EOMI	  Cardiovascular: Normal S1 S2, No JVD, No murmurs, RUE pitting edema 1+ to elbow, b/l LE edema 1+ to shins  Respiratory: Lungs clear to auscultation	  Gastrointestinal:  Soft, Non-tender, + BS	  Neurologic: Non-focal  Vascular: Peripheral pulses palpable 2+ bilaterally      LABS:	 	                        8.6    16.24 )-----------( 259      ( 22 Apr 2019 07:01 )             28.0   04-22    140  |  104  |  31<H>  ----------------------------<  118<H>  4.4   |  24  |  1.78<H>    Ca    8.3<L>      22 Apr 2019 07:01  Phos  3.3     04-22  Mg     1.7     04-22    TPro  4.9<L>  /  Alb  2.3<L>  /  TBili  0.3  /  DBili  < 0.2  /  AST  124<H>  /  ALT  29  /  AlkPhos  207<H>  04-21 04-21    141  |  106  |  26<H>  ----------------------------<  111<H>  4.3   |  21<L>  |  1.55<H>  04-20    141  |  104  |  24<H>  ----------------------------<  166<H>  4.4   |  22  |  1.33<H>    Ca    7.8<L>      21 Apr 2019 04:21  Ca    8.2<L>      20 Apr 2019 16:08  Mg     1.8     04-21  Mg     1.7     04-20    proBNP: Serum Pro-Brain Natriuretic Peptide: 7013 pg/mL (04-18 @ 15:00)  Serum Pro-Brain Natriuretic Peptide: 87922 pg/mL (04-17 @ 19:05)      TELEMETRY: AF 120s, sustained 110s       Echo: Study Date: 4/19/2019  ------------------------------------------------------------------------  DIMENSIONS:  Dimensions:     Normal Values:  LA:     4.8 cm    2.0 - 4.0 cm  Ao:     4.1 cm    2.0 - 3.8 cm  SEPTUM: 0.8 cm  0.6 - 1.2 cm  PWT:    0.8 cm    0.6 - 1.1 cm  LVIDd:  6.5 cm    3.0 - 5.6 cm  LVIDs:    ---     1.8 - 4.0 cm  Derived Variables:  LVMI: 101 g/m2  RWT: 0.24  ------------------------------------------------------------------------  OBSERVATIONS:  Mitral Valve: Mitral annular calcification, otherwise  normal mitral valve.  Aortic Root: Normal aortic root.  Aortic Valve: Calcified trileaflet aortic valve with normal  opening.  Left Atrium: Normal left atrium.  LA volume index = 25  cc/m2.  Left Ventricle: Limited left ventricular function  assessment in setting of tachycardia, grossly mild global  left ventricular systolic dysfunction.  Normal left  ventricular internal dimensions and wall thicknesses.  Right Heart: Normal right atrium. Normal right ventricular  size and function. Normal tricuspid valve. Minimal  tricuspid regurgitation. Normal pulmonic valve.  Pericardium/Pleura Normal pericardium with trace pericardial  effusion.  ------------------------------------------------------------------------  CONCLUSIONS:  1. Normal left ventricular internal dimensions and wall  thicknesses.  2. Limited left ventricular function assessment in setting  of tachycardia, grossly mild global left ventricular  systolic dysfunction.  3. Normal right ventricular size and function.  ------------------------------------------------------------------------  Confirmed on  4/19/2019 - 18:03:22 by Reji Ruiz M.D. [facility]  -----------------------------------------------------------------------    CT Chest w/ IV Cont: 4/15/19    Interval Whipple procedure. Thickening and heterogeneity of small bowel loops associated with the choledochojejunostomy and   pancreaticojejunostomy may be related to enteritis and/or tumoral   infiltration in the periportal region.    Interval development of hepatic lesions compatible with metastatic   disease.     Interval development of small bowel mesenteric adenopathy.    Interval development of abdominal retroperitoneal adenopathy.    Interval development of a small amount of abdominal and moderate amount   of pelvic ascites.    New 3 mm left lower lobe lung nodule.      V/Q Scan: 4/21  IMPRESSION: Very low probability of pulmonary embolus.

## 2019-04-22 NOTE — PROGRESS NOTE ADULT - PROBLEM SELECTOR PLAN 3
Likely reactive, no evidence of infectious etiologies, afebrile, CXR without infiltrate, RUQ US with large heterogeneous mass in the right hepatic lobe, suspicious for   neoplasm  - monitor CBC

## 2019-04-22 NOTE — CHART NOTE - NSCHARTNOTEFT_GEN_A_CORE
Patient Age: 78  Patient Gender: male  Procedure (including site / side if known): Liver biopsy  Diagnosis / Indication: Liver lesion  Interventional Radiology Attending Physician: Dr. Adkins  Ordering Attending Physician:  Pertinent medical history: 79 y/o male, with a PmHx of Afib (on Eliquis), HTN, DM, Prostate Ca s/p Prosatectomy, Pancreatic Ca s/p whipple in November and chemo 2 weeks ago (on Folfirnox), presented with 1 week of increased fatigue, dec PO intake and SOB, +new liver lesion, oncology requesting liver bx   Pertinent labs:                        8.6    16.24 )-----------( 259      ( 22 Apr 2019 07:01 )             28.0   04-22    140  |  104  |  31<H>  ----------------------------<  118<H>  4.4   |  24  |  1.78<H>    Ca    8.3<L>      22 Apr 2019 07:01  Phos  3.3     04-22  Mg     1.7     04-22    TPro  4.9<L>  /  Alb  2.3<L>  /  TBili  0.3  /  DBili  < 0.2  /  AST  124<H>  /  ALT  29  /  AlkPhos  207<H>  04-21    PTT - ( 22 Apr 2019 07:01 )  PTT:66.7 SEC    Patient and Family aware? Yes    Mary Hill, pager #[phone]

## 2019-04-23 ENCOUNTER — APPOINTMENT (OUTPATIENT)
Dept: ULTRASOUND IMAGING | Facility: IMAGING CENTER | Age: 79
End: 2019-04-23

## 2019-04-23 ENCOUNTER — TRANSCRIPTION ENCOUNTER (OUTPATIENT)
Age: 79
End: 2019-04-23

## 2019-04-23 DIAGNOSIS — C25.9 MALIGNANT NEOPLASM OF PANCREAS, UNSPECIFIED: ICD-10-CM

## 2019-04-23 DIAGNOSIS — R52 PAIN, UNSPECIFIED: ICD-10-CM

## 2019-04-23 DIAGNOSIS — Z51.5 ENCOUNTER FOR PALLIATIVE CARE: ICD-10-CM

## 2019-04-23 LAB
ALBUMIN SERPL ELPH-MCNC: 2.4 G/DL — LOW (ref 3.3–5)
ALP SERPL-CCNC: 270 U/L — HIGH (ref 40–120)
ALT FLD-CCNC: 35 U/L — SIGNIFICANT CHANGE UP (ref 4–41)
ANION GAP SERPL CALC-SCNC: 16 MMO/L — HIGH (ref 7–14)
AST SERPL-CCNC: 142 U/L — HIGH (ref 4–40)
BILIRUB DIRECT SERPL-MCNC: < 0.2 MG/DL — SIGNIFICANT CHANGE UP (ref 0.1–0.2)
BILIRUB SERPL-MCNC: 0.5 MG/DL — SIGNIFICANT CHANGE UP (ref 0.2–1.2)
BUN SERPL-MCNC: 34 MG/DL — HIGH (ref 7–23)
CALCIUM SERPL-MCNC: 8.2 MG/DL — LOW (ref 8.4–10.5)
CHLORIDE SERPL-SCNC: 101 MMOL/L — SIGNIFICANT CHANGE UP (ref 98–107)
CO2 SERPL-SCNC: 21 MMOL/L — LOW (ref 22–31)
CREAT SERPL-MCNC: 1.61 MG/DL — HIGH (ref 0.5–1.3)
GLUCOSE SERPL-MCNC: 135 MG/DL — HIGH (ref 70–99)
HCT VFR BLD CALC: 31.7 % — LOW (ref 39–50)
HGB BLD-MCNC: 9.6 G/DL — LOW (ref 13–17)
MAGNESIUM SERPL-MCNC: 1.8 MG/DL — SIGNIFICANT CHANGE UP (ref 1.6–2.6)
MCHC RBC-ENTMCNC: 27.3 PG — SIGNIFICANT CHANGE UP (ref 27–34)
MCHC RBC-ENTMCNC: 30.3 % — LOW (ref 32–36)
MCV RBC AUTO: 90.1 FL — SIGNIFICANT CHANGE UP (ref 80–100)
NRBC # FLD: 0 K/UL — SIGNIFICANT CHANGE UP (ref 0–0)
PLATELET # BLD AUTO: 288 K/UL — SIGNIFICANT CHANGE UP (ref 150–400)
PMV BLD: 10.5 FL — SIGNIFICANT CHANGE UP (ref 7–13)
POTASSIUM SERPL-MCNC: 4.4 MMOL/L — SIGNIFICANT CHANGE UP (ref 3.5–5.3)
POTASSIUM SERPL-SCNC: 4.4 MMOL/L — SIGNIFICANT CHANGE UP (ref 3.5–5.3)
PROT SERPL-MCNC: 5.3 G/DL — LOW (ref 6–8.3)
RBC # BLD: 3.52 M/UL — LOW (ref 4.2–5.8)
RBC # FLD: 15.5 % — HIGH (ref 10.3–14.5)
SODIUM SERPL-SCNC: 138 MMOL/L — SIGNIFICANT CHANGE UP (ref 135–145)
WBC # BLD: 17.7 K/UL — HIGH (ref 3.8–10.5)
WBC # FLD AUTO: 17.7 K/UL — HIGH (ref 3.8–10.5)

## 2019-04-23 PROCEDURE — 99233 SBSQ HOSP IP/OBS HIGH 50: CPT

## 2019-04-23 PROCEDURE — 99232 SBSQ HOSP IP/OBS MODERATE 35: CPT

## 2019-04-23 PROCEDURE — 99223 1ST HOSP IP/OBS HIGH 75: CPT | Mod: GC

## 2019-04-23 RX ORDER — HYDROMORPHONE HYDROCHLORIDE 2 MG/ML
1 INJECTION INTRAMUSCULAR; INTRAVENOUS; SUBCUTANEOUS EVERY 4 HOURS
Qty: 0 | Refills: 0 | Status: DISCONTINUED | OUTPATIENT
Start: 2019-04-23 | End: 2019-04-26

## 2019-04-23 RX ORDER — METOPROLOL TARTRATE 50 MG
25 TABLET ORAL ONCE
Qty: 0 | Refills: 0 | Status: COMPLETED | OUTPATIENT
Start: 2019-04-23 | End: 2019-04-23

## 2019-04-23 RX ORDER — METOPROLOL TARTRATE 50 MG
50 TABLET ORAL
Qty: 0 | Refills: 0 | Status: DISCONTINUED | OUTPATIENT
Start: 2019-04-23 | End: 2019-04-25

## 2019-04-23 RX ORDER — ACETAMINOPHEN 500 MG
1000 TABLET ORAL ONCE
Qty: 0 | Refills: 0 | Status: DISCONTINUED | OUTPATIENT
Start: 2019-04-23 | End: 2019-04-26

## 2019-04-23 RX ORDER — OXYCODONE AND ACETAMINOPHEN 5; 325 MG/1; MG/1
1 TABLET ORAL EVERY 6 HOURS
Qty: 0 | Refills: 0 | Status: DISCONTINUED | OUTPATIENT
Start: 2019-04-23 | End: 2019-04-23

## 2019-04-23 RX ORDER — TRAZODONE HCL 50 MG
50 TABLET ORAL AT BEDTIME
Qty: 0 | Refills: 0 | Status: DISCONTINUED | OUTPATIENT
Start: 2019-04-23 | End: 2019-04-26

## 2019-04-23 RX ORDER — METOPROLOL TARTRATE 50 MG
125 TABLET ORAL
Qty: 0 | Refills: 0 | Status: DISCONTINUED | OUTPATIENT
Start: 2019-04-23 | End: 2019-04-23

## 2019-04-23 RX ORDER — DILTIAZEM HCL 120 MG
60 CAPSULE, EXT RELEASE 24 HR ORAL
Qty: 0 | Refills: 0 | Status: DISCONTINUED | OUTPATIENT
Start: 2019-04-23 | End: 2019-04-25

## 2019-04-23 RX ADMIN — Medication 100 MILLIGRAM(S): at 09:30

## 2019-04-23 RX ADMIN — MAGNESIUM OXIDE 400 MG ORAL TABLET 400 MILLIGRAM(S): 241.3 TABLET ORAL at 11:17

## 2019-04-23 RX ADMIN — Medication 2 MILLIGRAM(S): at 05:13

## 2019-04-23 RX ADMIN — Medication 1: at 13:13

## 2019-04-23 RX ADMIN — Medication 60 MILLIGRAM(S): at 18:12

## 2019-04-23 RX ADMIN — Medication 2 CAPSULE(S): at 08:44

## 2019-04-23 RX ADMIN — Medication 50 MILLIGRAM(S): at 23:35

## 2019-04-23 RX ADMIN — Medication 650 MILLIGRAM(S): at 09:14

## 2019-04-23 RX ADMIN — Medication 2 CAPSULE(S): at 11:55

## 2019-04-23 RX ADMIN — Medication 10 MILLIGRAM(S): at 11:54

## 2019-04-23 RX ADMIN — Medication 400 MILLIGRAM(S): at 01:17

## 2019-04-23 RX ADMIN — Medication 50 MILLIGRAM(S): at 18:12

## 2019-04-23 RX ADMIN — Medication 25 MILLIGRAM(S): at 11:17

## 2019-04-23 RX ADMIN — Medication 1: at 09:16

## 2019-04-23 RX ADMIN — Medication 650 MILLIGRAM(S): at 08:44

## 2019-04-23 RX ADMIN — HYDROMORPHONE HYDROCHLORIDE 1 MILLIGRAM(S): 2 INJECTION INTRAMUSCULAR; INTRAVENOUS; SUBCUTANEOUS at 11:55

## 2019-04-23 RX ADMIN — HYDROMORPHONE HYDROCHLORIDE 1 MILLIGRAM(S): 2 INJECTION INTRAMUSCULAR; INTRAVENOUS; SUBCUTANEOUS at 12:25

## 2019-04-23 RX ADMIN — Medication 2 CAPSULE(S): at 18:13

## 2019-04-23 RX ADMIN — Medication 2 MILLIGRAM(S): at 18:12

## 2019-04-23 RX ADMIN — Medication 1000 MILLIGRAM(S): at 02:16

## 2019-04-23 RX ADMIN — INSULIN GLARGINE 16 UNIT(S): 100 INJECTION, SOLUTION SUBCUTANEOUS at 11:18

## 2019-04-23 NOTE — PROGRESS NOTE ADULT - PROBLEM SELECTOR PLAN 2
Previously diagnosed with salmonella and completed antibiotic course, improved  GI PCR negative, stool cx negative, O&P pending

## 2019-04-23 NOTE — PROGRESS NOTE ADULT - PROBLEM SELECTOR PLAN 5
Rt upper extremity swelling, UE duplex negative for DVT  warm compress, keep elevated Hx of prior ablations with Dr. Pyle at [facility]. Follows Regularly with EP Q3 months  Off of AC, to restart tomorrow as per IR  HR inadequately controlled with metoprolol, will resume home regimen metoprolol 50 BID, diltiazem 60 BID  continue to monitor on telemetry

## 2019-04-23 NOTE — PROGRESS NOTE ADULT - PROBLEM SELECTOR PLAN 4
Pancreatic adenocarcinoma, s/p whipple surgery 10/2018, on adjuvant FOLFIRINOX, with progression of disease while on adjuvant chemo with new liver lesions, LAP and a lung nodule, primary oncologist Dr. Clark aware  s/p liver bx, plan for second line chemotherapy if bx proven to be metastatic pancreatic adenoca  Oncology recommendations appreciated Pancreatic adenocarcinoma, s/p whipple surgery 10/2018, on adjuvant FOLFIRINOX, with progression of disease while on adjuvant chemo with new liver lesions, LAP and a lung nodule, primary oncologist Dr. Clark aware  s/p liver bx, plan for second line chemotherapy if bx proven to be metastatic pancreatic adenoca  Palliative recommendations appreciated for pain management   Oncology recommendations appreciated

## 2019-04-23 NOTE — PROGRESS NOTE ADULT - PROBLEM SELECTOR PLAN 1
Likely 2/2 CHFe, elevated pro-BNP, pleural effusions on CT chest, LE pitting edema with negative dopplers, TTE grossly mild global left ventricular systolic dysfunction, limited assessment in setting of tachycardia, V/Q low probability  Holding lasix in setting of MANNY   2G of Sodium with 1.5L of fluid restriction   Strict I&Os, daily wts  Continue to monitor on telemetry   Cardiology following closely, recommendations appreciated

## 2019-04-23 NOTE — PROGRESS NOTE ADULT - ASSESSMENT
78m with pancreatic adenocarcinoma, s/p whipple surgery 10/2018, on adjuvant FOLFIRINOX, with progression of disease while on adjuvant chemo with new liver lesions, LAP and a lung nodule, Atrial Fibrillation (on Eliquis), p/w weakness, SOB for the past 10 days with worsening symptoms over the past few days, found to have high probnp.  Of note, patient was to have bx of liver lesions on Monday.   Of note, patient has had diarrhea since about 3 months ago. He was diagnosed with salmonella and completed antibiotic course.     Has RUQ pain in the setting of liver mets, pain uncontrolled overnight, was started on pain regimen by pal care, pain better controlled now.     -Appreciate pal care input  -Check liver tests daily, pain control  -Cardiology input appreciated  -f/u path, s/p bx 4/22  -Replete lytes as needed  -Supportive care, Pain control, nutrition, PT, DVT ppx  -outpt oncology follow up, plan for second line chemotherapy if bx proven to be metastatic pancreatic adenoca. Will need to be seen by palliative care outpatient.     Will follow. Please do not hesitate to call back with questions.     Annabel Avina MD  Medical Oncology Attending

## 2019-04-23 NOTE — CONSULT NOTE ADULT - PROBLEM SELECTOR RECOMMENDATION 3
Patient was off eliquis for biopsy, ac planned to be restarted prior to discharge home. Patient was off Eliquis for biopsy, ac planned to be restarted prior to discharge home.

## 2019-04-23 NOTE — CONSULT NOTE ADULT - ATTENDING COMMENTS
Pt seen with Fellow.  Agree with above plan.  Currently pt is s/p liver bx, plan is for follow up with oncology outpatient for further treatment.  Palliative care consulted for complex symptom management in the setting of advanced malignancy.   Recommendations for pain as above, rotating opiates given significant s/e with oxy.  Pt has good support at home, wife and adult children very involved.   Please page for uncontrolled symptoms.

## 2019-04-23 NOTE — PROGRESS NOTE ADULT - SUBJECTIVE AND OBJECTIVE BOX
INTERVAL HPI/OVERNIGHT EVENTS:  Patient seen at bedside. resting in bed. Pain seems better controlled       VITAL SIGNS:  T(F): 97.2 (04-23-19 @ 21:00)  HR: 98 (04-23-19 @ 21:00)  BP: 99/63 (04-23-19 @ 21:00)  RR: 19 (04-23-19 @ 21:00)  SpO2: 99% (04-23-19 @ 21:00)  Wt(kg): --    PHYSICAL EXAM:    Constitutional: NAD, resting in bed comfortably  Eyes: EOMI, sclera non-icteric  Neck: supple, no LAP  Respiratory: CTA b/l, good air entry b/l, no wheezing, rhonchi or crackels  Cardiovascular: RRR, normal S1S2, no M/R/G  Gastrointestinal: soft, NTND  Extremities: no edema  Neurological: AAOx3, non focal  Skin: Normal temperature    MEDICATIONS  (STANDING):  dextrose 5%. 1000 milliLiter(s) (50 mL/Hr) IV Continuous <Continuous>  dextrose 50% Injectable 12.5 Gram(s) IV Push once  dextrose 50% Injectable 25 Gram(s) IV Push once  dextrose 50% Injectable 25 Gram(s) IV Push once  diltiazem    Tablet 60 milliGRAM(s) Oral two times a day  insulin glargine Injectable (LANTUS) 16 Unit(s) SubCutaneous <User Schedule>  insulin lispro (HumaLOG) corrective regimen sliding scale   SubCutaneous three times a day before meals  insulin lispro (HumaLOG) corrective regimen sliding scale   SubCutaneous at bedtime  loperamide 2 milliGRAM(s) Oral two times a day  magnesium oxide 400 milliGRAM(s) Oral daily  metoprolol tartrate 50 milliGRAM(s) Oral two times a day  pancrelipase  (CREON 36,000 Lipase Units) 2 Capsule(s) Oral three times a day with meals  pantoprazole    Tablet 40 milliGRAM(s) Oral <User Schedule>  prochlorperazine   Tablet 10 milliGRAM(s) Oral daily  traZODone 50 milliGRAM(s) Oral at bedtime    MEDICATIONS  (PRN):  acetaminophen   Tablet .. 650 milliGRAM(s) Oral every 6 hours PRN Temp greater or equal to 38C (100.4F), Mild Pain (1 - 3), Moderate Pain (4 - 6)  acetaminophen  IVPB .. 1000 milliGRAM(s) IV Intermittent once PRN Temp greater or equal to 38C (100.4F), Severe Pain (7 - 10)  aluminum hydroxide/magnesium hydroxide/simethicone Suspension 30 milliLiter(s) Oral every 4 hours PRN Dyspepsia  dextrose 40% Gel 15 Gram(s) Oral once PRN Blood Glucose LESS THAN 70 milliGRAM(s)/deciliter  glucagon  Injectable 1 milliGRAM(s) IntraMuscular once PRN Glucose LESS THAN 70 milligrams/deciliter  HYDROmorphone   Tablet 1 milliGRAM(s) Oral every 4 hours PRN Severe Pain (7 - 10)      Allergies    adhesives (Hives)  No Known Drug Allergies    Intolerances        LABS:                        9.6    17.70 )-----------( 288      ( 23 Apr 2019 06:15 )             31.7     04-23    138  |  101  |  34<H>  ----------------------------<  135<H>  4.4   |  21<L>  |  1.61<H>    Ca    8.2<L>      23 Apr 2019 06:15  Phos  3.3     04-22  Mg     1.8     04-23    TPro  5.3<L>  /  Alb  2.4<L>  /  TBili  0.5  /  DBili  < 0.2  /  AST  142<H>  /  ALT  35  /  AlkPhos  270<H>  04-23    PTT - ( 22 Apr 2019 07:01 )  PTT:66.7 SEC      RADIOLOGY & ADDITIONAL TESTS:  Studies reviewed.

## 2019-04-23 NOTE — PROGRESS NOTE ADULT - PROBLEM SELECTOR PLAN 7
cont basal insulin for now  cont insulin ssc, hgba1-c  monitor fs Likely 2/2 GI losses and diuretics, stable  continue to supplement, monitor BMP closely

## 2019-04-23 NOTE — CONSULT NOTE ADULT - PROBLEM SELECTOR RECOMMENDATION 4
treatment pending liver biopsy, per oncology plan for second line chemotherapy if bx proven to be metastatic pancreatic adenocarcinoma

## 2019-04-23 NOTE — DISCHARGE NOTE PROVIDER - CARE PROVIDER_API CALL
Mukesh Clark)  Hematology; Internal Medicine; Medical Oncology  [address] [place] [address]  Phone: [phone]  Fax: [phone]  Follow Up Time: 2 weeks

## 2019-04-23 NOTE — PROGRESS NOTE ADULT - SUBJECTIVE AND OBJECTIVE BOX
Patient is a 78y old  Male who presents with a chief complaint of SOB X 10 days with increasing severity past few days (23 Apr 2019 11:41)    SUBJECTIVE / OVERNIGHT EVENTS:  Patient seen complaining of insomnia, abdominal pain, very lethargic when takes opiates. Very dissatisfied with hospital food.   Tele: Afib RVR ~115's    MEDICATIONS  (STANDING):  dextrose 5%. 1000 milliLiter(s) (50 mL/Hr) IV Continuous <Continuous>  dextrose 50% Injectable 12.5 Gram(s) IV Push once  dextrose 50% Injectable 25 Gram(s) IV Push once  dextrose 50% Injectable 25 Gram(s) IV Push once  insulin glargine Injectable (LANTUS) 16 Unit(s) SubCutaneous <User Schedule>  insulin lispro (HumaLOG) corrective regimen sliding scale   SubCutaneous three times a day before meals  insulin lispro (HumaLOG) corrective regimen sliding scale   SubCutaneous at bedtime  loperamide 2 milliGRAM(s) Oral two times a day  magnesium oxide 400 milliGRAM(s) Oral daily  metoprolol tartrate 125 milliGRAM(s) Oral two times a day  pancrelipase  (CREON 36,000 Lipase Units) 2 Capsule(s) Oral three times a day with meals  pantoprazole    Tablet 40 milliGRAM(s) Oral <User Schedule>  prochlorperazine   Tablet 10 milliGRAM(s) Oral daily  traZODone 50 milliGRAM(s) Oral at bedtime    MEDICATIONS  (PRN):  acetaminophen   Tablet .. 650 milliGRAM(s) Oral every 6 hours PRN Temp greater or equal to 38C (100.4F), Mild Pain (1 - 3), Moderate Pain (4 - 6)  acetaminophen  IVPB .. 1000 milliGRAM(s) IV Intermittent once PRN Temp greater or equal to 38C (100.4F), Severe Pain (7 - 10)  aluminum hydroxide/magnesium hydroxide/simethicone Suspension 30 milliLiter(s) Oral every 4 hours PRN Dyspepsia  dextrose 40% Gel 15 Gram(s) Oral once PRN Blood Glucose LESS THAN 70 milliGRAM(s)/deciliter  glucagon  Injectable 1 milliGRAM(s) IntraMuscular once PRN Glucose LESS THAN 70 milligrams/deciliter  HYDROmorphone   Tablet 1 milliGRAM(s) Oral every 4 hours PRN Severe Pain (7 - 10)      Vital Signs Last 24 Hrs  T(C): 36.6 (23 Apr 2019 09:24), Max: 36.6 (23 Apr 2019 05:11)  T(F): 97.8 (23 Apr 2019 09:24), Max: 97.9 (23 Apr 2019 05:11)  HR: 114 (23 Apr 2019 11:12) (109 - 117)  BP: 111/66 (23 Apr 2019 11:12) (111/66 - 131/87)  BP(mean): --  RR: 18 (23 Apr 2019 09:24) (18 - 18)  SpO2: 98% (23 Apr 2019 09:24) (98% - 100%)  CAPILLARY BLOOD GLUCOSE      POCT Blood Glucose.: 175 mg/dL (23 Apr 2019 12:46)  POCT Blood Glucose.: 173 mg/dL (23 Apr 2019 08:52)  POCT Blood Glucose.: 212 mg/dL (22 Apr 2019 21:32)  POCT Blood Glucose.: 154 mg/dL (22 Apr 2019 17:37)    I&O's Summary    23 Apr 2019 07:01  -  23 Apr 2019 14:25  --------------------------------------------------------  IN: 0 mL / OUT: 175 mL / NET: -175 mL      PHYSICAL EXAM  GENERAL: NAD, well-developed, cooperative with exam  HEAD:  Atraumatic, Normocephalic  EYES: EOMI, PERRLA, conjunctiva and sclera clear  NECK: Supple  CHEST/LUNG: CTA B/L  HEART: Irregularly irregular rhythm, tachycardic  ABDOMEN: Soft, Nontender, Nondistended; Bowel sounds present  EXTREMITIES:  2+ Peripheral Pulses, No clubbing, cyanosis, Rt UE edematous  PSYCH: AAOx3  SKIN: No rashes or lesions    LABS:                        9.6    17.70 )-----------( 288      ( 23 Apr 2019 06:15 )             31.7     04-23    138  |  101  |  34<H>  ----------------------------<  135<H>  4.4   |  21<L>  |  1.61<H>    Ca    8.2<L>      23 Apr 2019 06:15  Phos  3.3     04-22  Mg     1.8     04-23    TPro  5.3<L>  /  Alb  2.4<L>  /  TBili  0.5  /  DBili  < 0.2  /  AST  142<H>  /  ALT  35  /  AlkPhos  270<H>  04-23    PTT - ( 22 Apr 2019 07:01 )  PTT:66.7 SEC            RADIOLOGY & ADDITIONAL TESTS:    Imaging Personally Reviewed:  Consultant(s) Notes Reviewed:    Care Discussed with Consultants/Other Providers:

## 2019-04-23 NOTE — PROGRESS NOTE ADULT - PROBLEM SELECTOR PLAN 8
Hx of prior ablations with Dr. Pyle at [facility]. Follows Regularly with EP Q3 months  cont with heparin drip  DC diltiazem, continue metoprolol 100 BID, consider titrating for better HR control  continue to monitor on telemetry cont basal insulin for now  cont insulin ssc, hgba1-c  monitor fs

## 2019-04-23 NOTE — DISCHARGE NOTE PROVIDER - NSDCCPCAREPLAN_GEN_ALL_CORE_FT
PRINCIPAL DISCHARGE DIAGNOSIS  Diagnosis: Pancreatic carcinoma metastatic to liver  Assessment and Plan of Treatment:       SECONDARY DISCHARGE DIAGNOSES  Diagnosis: Hypokalemia  Assessment and Plan of Treatment:

## 2019-04-23 NOTE — CONSULT NOTE ADULT - SUBJECTIVE AND OBJECTIVE BOX
HPI:  77 y/o Male, with a PmHx of Atrial Fibrillation (on Eliquis), Benign colon polyps, Cataracts, DM II, GERD, HLD, Nephrolithiasis, Stress incontinence, MVP, CHF, Prostate Cancer ( s/p prostatectomy), Varicose veins, and Pancreatic Cancer ( s/p whipple chemo 4/4/19), presents today to the [facility] ED for SOB for the past 10 days with worsening symptoms over the past few days. Pt states he have been experiencing worsening dyspnea on exertion, especially when he takes "2 steps". Pt denies SOB at rest and comfortable with +2 pillow. Pt states he is experiencing worsening fatigue, weakness, decrease PO intake due to chemo interfering with taste buds and appetite, and unintentional wt loss of 20lbs since he been on chemo from 12/2018. Pt states his SOB is new, no prior hx of similar episodes. Pt also complains of lower extremities and upper extremities swelling. Pt states he noticed swelling for past few days and does not believe its from chemo, since last chemo was on 4/4/2019. Pt states he saw his Cardiologist 6 months ago, in which he did stress test and Echo with normal finding. Pt admits to being compliant with medication, but he states his diet have been poor due to his chemo interfering with this appetite. Pt admits he had side effects from his chemotherapy, in which he got diarrhea for past ~3months. Pt denies of any CP, palpations, fever, chills,  HA, blurry vision, orthopnea, paroxysmal nocturnal dyspnea, wheezing, cough, hemoptysis, recent travels, and sick contacts.  Pt also states he hasn't been feeling well and had gone to his Oncologist and had a CT Chest/Abd/Pelvis done 2 days ago and was told he has likely hepatic mets and is currently scheduled this coming Monday, April 22 for a biopsy. He appears comfortable at this time with his wife at his bedisde. He is being admitted to telemetry for acute on chronic decompensated heart failure, hypokalemia, and hypomagnesemia. (18 Apr 2019 08:08)    Patient reports 7/10 dull abdominal and back pain which occurs when he lays on his side or with exertion. He denies pain at rest when he is laying on his back.  Patient denies numbness or tingling,  Patient took oxy IR 5 mg during admission which made him feel "foggy" and he did not like the way he felt.  Patient also reports hallucinations after oxycodone in the past.  Patient also took tylenol with some relief however he states it only helped for about an hour.      PERTINENT PM/SXH:   Pancreatic cancer  Type II diabetes mellitus  Nephrolithiasis  Male stress incontinence  Kidney stone  Varicose vein  Bilateral cataracts  Appendicitis  Benign colon polyp  Prostate cancer  Afib  Hyperlipidemia  GERD (Gastroesophageal Reflux Disease)  DM (Diabetes Mellitus)  Renal Calculi  MVP (Mitral Valve Prolapse)  HTN - Hypertension    History of pancreatic surgery  Status post right knee surgery  Diabetes mellitus  Atrial fibrillation  Benign essential hypertension  Status post left knee replacement  S/P ablation operation for arrhythmia  Male stress incontinence  Varicose vein-s/p vein stripping 5 years ago  S/P arthroscopy  S/P prostatectomy  Cosmetic Surgery  S/P Colonoscopy with Polypectomy  S/P Appendectomy    FAMILY HISTORY:  FH: Alzheimers disease: father  FHx: diabetes mellitus: mother    ITEMS NOT CHECKED ARE NOT PRESENT    SOCIAL HISTORY:   Significant other/partner:  wife [ ]  Children:  son and daughter[ ]  Protestant/Spirituality:  Substance hx:  [ ]   Tobacco hx:  [ ]   Alcohol hx: [ ]   Home Opioid hx:  [ ] I-Stop Reference No: [id number]  Living Situation: [x ]Home  [ ]Long term care  [ ]Rehab [ ]Other    ADVANCE DIRECTIVES:    DNR  MOLST  [ ]  Living Will  [ ]   DECISION MAKER(s):  [ ] Health Care Proxy(s)  [ x] Surrogate(s)  [ ] Guardian           Name(s): Phone Number(s):  wife Denise Mata [phone]    BASELINE (I)ADL(s) (prior to admission):  [place]: [x ]Total  [ ] Moderate [ ]Dependent    Allergies    adhesives (Hives)  No Known Drug Allergies    Intolerances    MEDICATIONS  (STANDING):  dextrose 5%. 1000 milliLiter(s) (50 mL/Hr) IV Continuous <Continuous>  dextrose 50% Injectable 12.5 Gram(s) IV Push once  dextrose 50% Injectable 25 Gram(s) IV Push once  dextrose 50% Injectable 25 Gram(s) IV Push once  insulin glargine Injectable (LANTUS) 16 Unit(s) SubCutaneous <User Schedule>  insulin lispro (HumaLOG) corrective regimen sliding scale   SubCutaneous three times a day before meals  insulin lispro (HumaLOG) corrective regimen sliding scale   SubCutaneous at bedtime  loperamide 2 milliGRAM(s) Oral two times a day  magnesium oxide 400 milliGRAM(s) Oral daily  metoprolol tartrate 125 milliGRAM(s) Oral two times a day  pancrelipase  (CREON 36,000 Lipase Units) 2 Capsule(s) Oral three times a day with meals  pantoprazole    Tablet 40 milliGRAM(s) Oral <User Schedule>  prochlorperazine   Tablet 10 milliGRAM(s) Oral daily    MEDICATIONS  (PRN):  acetaminophen   Tablet .. 650 milliGRAM(s) Oral every 6 hours PRN Temp greater or equal to 38C (100.4F), Mild Pain (1 - 3), Moderate Pain (4 - 6)  aluminum hydroxide/magnesium hydroxide/simethicone Suspension 30 milliLiter(s) Oral every 4 hours PRN Dyspepsia  dextrose 40% Gel 15 Gram(s) Oral once PRN Blood Glucose LESS THAN 70 milliGRAM(s)/deciliter  glucagon  Injectable 1 milliGRAM(s) IntraMuscular once PRN Glucose LESS THAN 70 milligrams/deciliter  HYDROmorphone   Tablet 1 milliGRAM(s) Oral every 4 hours PRN Severe Pain (7 - 10)    PRESENT SYMPTOMS: [ ]Unable to obtain due to poor mentation   Source if other than patient:  [ ]Family   [ ]Team     Pain (Impact on QOL):    Location -       abdomen, back  Minimal acceptable level (0-10 scale):                    Aggrevating factors - laying on side, movement  Quality -  Radiation -  Severity (0-10 scale) -  0-7/10  Timing - worsens at night    PAIN AD Score:     [email or web address] (press ctrl +  left click to view)    Dyspnea:                           [ ]Mild [ ]Moderate [ ]Severe  Anxiety:                             [ ]Mild [ ]Moderate [ ]Severe  Fatigue:                             [x ]Mild [ ]Moderate [ ]Severe  Nausea:                             [x ]Mild [ ]Moderate [ ]Severe  Loss of appetite:              [ ]Mild [ ]Moderate [ ]Severe  Constipation:                    [ ]Mild [ ]Moderate [ ]Severe    Other Symptoms:  [ ]All other review of systems negative     Karnofsky Performance Score/Palliative Performance Status Version 2:    50     %  PHYSICAL EXAM:  Vital Signs Last 24 Hrs  T(C): 36.6 (23 Apr 2019 09:24), Max: 36.6 (23 Apr 2019 05:11)  T(F): 97.8 (23 Apr 2019 09:24), Max: 97.9 (23 Apr 2019 05:11)  HR: 114 (23 Apr 2019 11:12) (109 - 117)  BP: 111/66 (23 Apr 2019 11:12) (111/66 - 131/87)  BP(mean): --  RR: 18 (23 Apr 2019 09:24) (18 - 18)  SpO2: 98% (23 Apr 2019 09:24) (98% - 100%) I&O's Summary    23 Apr 2019 07:01  -  23 Apr 2019 11:41  --------------------------------------------------------  IN: 0 mL / OUT: 175 mL / NET: -175 mL    GENERAL:  [x ]Alert  [x ]Oriented x  3 [ ]Lethargic  [ ]Cachexia  [ ]Unarousable  [ x]Verbal  [ ]Non-Verbal  Behavioral:   [ ] Anxiety  [ ] Delirium [ ] Agitation [ ] Other  HEENT:  [x ]Normal   [ ]Dry mouth   [ ]ET Tube/Trach  [ ]Oral lesions  PULMONARY:   [x ]Clear [ ]Tachypnea  [ ]Audible excessive secretions   [ ]Rhonchi        [ ]Right [ ]Left [ ]Bilateral  [ ]Crackles        [ ]Right [ ]Left [ ]Bilateral  [ ]Wheezing     [ ]Right [ ]Left [ ]Bilateral  CARDIOVASCULAR:    [ ]Regular [x ]Irregular [ ]Tachy  [ ]Terry [ ]Murmur [ ]Other  GASTROINTESTINAL:  [ x]Soft  [ x]Distended   [ ]+BS  [x ]Non tender [ ]Tender  [ ]PEG [ ]OGT/ NGT  Last BM:   GENITOURINARY:  [x ]Normal [ ] Incontinent   [ ]Oliguria/Anuria   [ ]Ness  MUSCULOSKELETAL:   [x ]Normal   [ ]Weakness  [ ]Bed/Wheelchair bound [x ]Edema (b/l LE, L>R)  NEUROLOGIC:   [x ]No focal deficits  [ ] Cognitive impairment  [ ] Dysphagia [ ]Dysarthria [ ] Paresis [ ]Other   SKIN:   [x ]Normal   [ ]Pressure ulcer(s)  [ ]Rash    CRITICAL CARE:  [ ] Shock Present  [ ]Septic [ ]Cardiogenic [ ]Neurologic [ ]Hypovolemic  [ ]  Vasopressors [ ]  Inotropes   [ ] Respiratory failure present  [ ] Acute  [ ] Chronic [ ] Hypoxic  [ ] Hypercarbic [ ] Other  [ ] Other organ failure     LABS:                        9.6    17.70 )-----------( 288      ( 23 Apr 2019 06:15 )             31.7   04-23    138  |  101  |  34<H>  ----------------------------<  135<H>  4.4   |  21<L>  |  1.61<H>    Ca    8.2<L>      23 Apr 2019 06:15  Phos  3.3     04-22  Mg     1.8     04-23    TPro  5.3<L>  /  Alb  2.4<L>  /  TBili  0.5  /  DBili  < 0.2  /  AST  142<H>  /  ALT  35  /  AlkPhos  270<H>  04-23  PTT - ( 22 Apr 2019 07:01 )  PTT:66.7 SEC      RADIOLOGY & ADDITIONAL STUDIES:  CT a/p: IMPRESSION:     Interval Whipple procedure. Thickening and heterogeneity of small bowel   loops associated with the choledochojejunostomy and   pancreaticojejunostomy may be related to enteritis and/or tumoral   infiltration in the periportal region.    Interval development of hepatic lesions compatible with metastatic   disease.     Interval development of small bowel mesenteric adenopathy.    Interval development of abdominal retroperitoneal adenopathy.    Interval development of a small amount of abdominal and moderate amount   of pelvic ascites.    New 3 mm left lower lobe lung nodule.      PROTEIN CALORIE MALNUTRITION:   [ ] PPSV2 < or = to 30% [ ] significant weight loss  [ ] poor nutritional intake [ ] catabolic state [ ] anasarca     Albumin, Serum: 2.4 g/dL (04-23-19 @ 06:15)  Artificial Nutrition [ ]     REFERRALS:   [x ]Chaplaincy  [ ] Hospice  [ ]Child Life  [ x]Social Work  [ ]Case management [ ]Holistic Therapy   Goals of Care Discussion Document: HPI:  79 y/o Male, with a PmHx of Atrial Fibrillation (on Eliquis), Benign colon polyps, Cataracts, DM II, GERD, HLD, Nephrolithiasis, Stress incontinence, MVP, CHF, Prostate Cancer ( s/p prostatectomy), Varicose veins, and Pancreatic Cancer ( s/p whipple chemo 4/4/19), presents today to the [facility] ED for SOB for the past 10 days with worsening symptoms over the past few days. Pt states he have been experiencing worsening dyspnea on exertion, especially when he takes "2 steps". Pt denies SOB at rest and comfortable with +2 pillow. Pt states he is experiencing worsening fatigue, weakness, decrease PO intake due to chemo interfering with taste buds and appetite, and unintentional wt loss of 20lbs since he been on chemo from 12/2018. Pt states his SOB is new, no prior hx of similar episodes. Pt also complains of lower extremities and upper extremities swelling. Pt states he noticed swelling for past few days and does not believe its from chemo, since last chemo was on 4/4/2019. Pt states he saw his Cardiologist 6 months ago, in which he did stress test and Echo with normal finding. Pt admits to being compliant with medication, but he states his diet have been poor due to his chemo interfering with this appetite. Pt admits he had side effects from his chemotherapy, in which he got diarrhea for past ~3months. Pt denies of any CP, palpations, fever, chills,  HA, blurry vision, orthopnea, paroxysmal nocturnal dyspnea, wheezing, cough, hemoptysis, recent travels, and sick contacts.  Pt also states he hasn't been feeling well and had gone to his Oncologist and had a CT Chest/Abd/Pelvis done 2 days ago and was told he has likely hepatic mets and is currently scheduled this coming Monday, April 22 for a biopsy. He appears comfortable at this time with his wife at his bedisde. He is being admitted to telemetry for acute on chronic decompensated heart failure, hypokalemia, and hypomagnesemia. (18 Apr 2019 08:08)    Patient reports 7/10 dull abdominal and back pain which occurs when he lays on his side or with exertion. He denies pain at rest when he is laying on his back.  Patient denies numbness or tingling,  Patient took oxy IR 5 mg during admission which made him feel "foggy" and he did not like the way he felt.  Patient also reports hallucinations after oxycodone in the past.  Patient also took tylenol with some relief however he states it only helped for about an hour.      PERTINENT PM/SXH:   Pancreatic cancer  Type II diabetes mellitus  Nephrolithiasis  Male stress incontinence  Kidney stone  Varicose vein  Bilateral cataracts  Appendicitis  Benign colon polyp  Prostate cancer  Afib  Hyperlipidemia  GERD (Gastroesophageal Reflux Disease)  DM (Diabetes Mellitus)  Renal Calculi  MVP (Mitral Valve Prolapse)  HTN - Hypertension    History of pancreatic surgery  Status post right knee surgery  Diabetes mellitus  Atrial fibrillation  Benign essential hypertension  Status post left knee replacement  S/P ablation operation for arrhythmia  Male stress incontinence  Varicose vein-s/p vein stripping 5 years ago  S/P arthroscopy  S/P prostatectomy  Cosmetic Surgery  S/P Colonoscopy with Polypectomy  S/P Appendectomy    FAMILY HISTORY:  FH: Alzheimers disease: father  FHx: diabetes mellitus: mother    ITEMS NOT CHECKED ARE NOT PRESENT    SOCIAL HISTORY:   Significant other/partner:  wife [x ]  Children:  son and daughter[x ]  Christianity/Spirituality:  Substance hx:  [ ]   Tobacco hx:  [ ]   Alcohol hx: [ ]   Home Opioid hx:  [ ] I-Stop Reference No: [id number]  Living Situation: [x ]Home  [ ]Long term care  [ ]Rehab [ ]Other    ADVANCE DIRECTIVES:    DNR  MOLST  [ ]  Living Will  [ ]   DECISION MAKER(s):  [ ] Health Care Proxy(s)  [ x] Surrogate(s)  [ ] Guardian           Name(s): Phone Number(s):  wife Denise Mata [phone]    BASELINE (I)ADL(s) (prior to admission):  [place]: [x ]Total  [ ] Moderate [ ]Dependent    Allergies    adhesives (Hives)  No Known Drug Allergies    Intolerances    MEDICATIONS  (STANDING):  dextrose 5%. 1000 milliLiter(s) (50 mL/Hr) IV Continuous <Continuous>  dextrose 50% Injectable 12.5 Gram(s) IV Push once  dextrose 50% Injectable 25 Gram(s) IV Push once  dextrose 50% Injectable 25 Gram(s) IV Push once  insulin glargine Injectable (LANTUS) 16 Unit(s) SubCutaneous <User Schedule>  insulin lispro (HumaLOG) corrective regimen sliding scale   SubCutaneous three times a day before meals  insulin lispro (HumaLOG) corrective regimen sliding scale   SubCutaneous at bedtime  loperamide 2 milliGRAM(s) Oral two times a day  magnesium oxide 400 milliGRAM(s) Oral daily  metoprolol tartrate 125 milliGRAM(s) Oral two times a day  pancrelipase  (CREON 36,000 Lipase Units) 2 Capsule(s) Oral three times a day with meals  pantoprazole    Tablet 40 milliGRAM(s) Oral <User Schedule>  prochlorperazine   Tablet 10 milliGRAM(s) Oral daily    MEDICATIONS  (PRN):  acetaminophen   Tablet .. 650 milliGRAM(s) Oral every 6 hours PRN Temp greater or equal to 38C (100.4F), Mild Pain (1 - 3), Moderate Pain (4 - 6)  aluminum hydroxide/magnesium hydroxide/simethicone Suspension 30 milliLiter(s) Oral every 4 hours PRN Dyspepsia  dextrose 40% Gel 15 Gram(s) Oral once PRN Blood Glucose LESS THAN 70 milliGRAM(s)/deciliter  glucagon  Injectable 1 milliGRAM(s) IntraMuscular once PRN Glucose LESS THAN 70 milligrams/deciliter  HYDROmorphone   Tablet 1 milliGRAM(s) Oral every 4 hours PRN Severe Pain (7 - 10)    PRESENT SYMPTOMS: [ ]Unable to obtain due to poor mentation   Source if other than patient:  [ ]Family   [ ]Team     Pain (Impact on QOL):    Location -       abdomen, back  Minimal acceptable level (0-10 scale):                    Aggrevating factors - laying on side, movement  Quality -  Radiation -  Severity (0-10 scale) -  0-7/10  Timing - worsens at night    PAIN AD Score:     [email or web address] (press ctrl +  left click to view)    Dyspnea:                           [ ]Mild [ ]Moderate [ ]Severe  Anxiety:                             [ ]Mild [ ]Moderate [ ]Severe  Fatigue:                             [x ]Mild [ ]Moderate [ ]Severe  Nausea:                             [x ]Mild [ ]Moderate [ ]Severe  Loss of appetite:              [ ]Mild [ ]Moderate [ ]Severe  Constipation:                    [ ]Mild [ ]Moderate [ ]Severe    Other Symptoms:  [ ]All other review of systems negative     Karnofsky Performance Score/Palliative Performance Status Version 2:    50     %    PHYSICAL EXAM:  Vital Signs Last 24 Hrs  T(C): 36.6 (23 Apr 2019 09:24), Max: 36.6 (23 Apr 2019 05:11)  T(F): 97.8 (23 Apr 2019 09:24), Max: 97.9 (23 Apr 2019 05:11)  HR: 114 (23 Apr 2019 11:12) (109 - 117)  BP: 111/66 (23 Apr 2019 11:12) (111/66 - 131/87)  BP(mean): --  RR: 18 (23 Apr 2019 09:24) (18 - 18)  SpO2: 98% (23 Apr 2019 09:24) (98% - 100%) I&O's Summary    23 Apr 2019 07:01  -  23 Apr 2019 11:41  --------------------------------------------------------  IN: 0 mL / OUT: 175 mL / NET: -175 mL    GENERAL:  [x ]Alert  [x ]Oriented x  3 [ ]Lethargic  [ ]Cachexia  [ ]Unarousable  [ x]Verbal  [ ]Non-Verbal  Behavioral:   [ ] Anxiety  [ ] Delirium [ ] Agitation [ ] Other  HEENT:  [x ]Normal   [ ]Dry mouth   [ ]ET Tube/Trach  [ ]Oral lesions  PULMONARY:   [x ]Clear [ ]Tachypnea  [ ]Audible excessive secretions   [ ]Rhonchi        [ ]Right [ ]Left [ ]Bilateral  [ ]Crackles        [ ]Right [ ]Left [ ]Bilateral  [ ]Wheezing     [ ]Right [ ]Left [ ]Bilateral  CARDIOVASCULAR:    [ ]Regular [x ]Irregular [ ]Tachy  [ ]Terry [ ]Murmur [ ]Other  GASTROINTESTINAL:  [ x]Soft  [ x]Distended   [ ]+BS  [x ]Non tender [ ]Tender  [ ]PEG [ ]OGT/ NGT  Last BM:   GENITOURINARY:  [x ]Normal [ ] Incontinent   [ ]Oliguria/Anuria   [ ]Ness  MUSCULOSKELETAL:   [x ]Normal   [ ]Weakness  [ ]Bed/Wheelchair bound [x ]Edema (b/l LE, L>R)  NEUROLOGIC:   [x ]No focal deficits  [ ] Cognitive impairment  [ ] Dysphagia [ ]Dysarthria [ ] Paresis [ ]Other   SKIN:   [x ]Normal   [ ]Pressure ulcer(s)  [ ]Rash    CRITICAL CARE:  [ ] Shock Present  [ ]Septic [ ]Cardiogenic [ ]Neurologic [ ]Hypovolemic  [ ]  Vasopressors [ ]  Inotropes   [ ] Respiratory failure present  [ ] Acute  [ ] Chronic [ ] Hypoxic  [ ] Hypercarbic [ ] Other  [ ] Other organ failure     LABS:                        9.6    17.70 )-----------( 288      ( 23 Apr 2019 06:15 )             31.7   04-23    138  |  101  |  34<H>  ----------------------------<  135<H>  4.4   |  21<L>  |  1.61<H>    Ca    8.2<L>      23 Apr 2019 06:15  Phos  3.3     04-22  Mg     1.8     04-23    TPro  5.3<L>  /  Alb  2.4<L>  /  TBili  0.5  /  DBili  < 0.2  /  AST  142<H>  /  ALT  35  /  AlkPhos  270<H>  04-23  PTT - ( 22 Apr 2019 07:01 )  PTT:66.7 SEC    RADIOLOGY & ADDITIONAL STUDIES:    CT a/p: IMPRESSION:     Interval Whipple procedure. Thickening and heterogeneity of small bowel   loops associated with the choledochojejunostomy and   pancreaticojejunostomy may be related to enteritis and/or tumoral   infiltration in the periportal region.    Interval development of hepatic lesions compatible with metastatic   disease.     Interval development of small bowel mesenteric adenopathy.    Interval development of abdominal retroperitoneal adenopathy.    Interval development of a small amount of abdominal and moderate amount   of pelvic ascites.    New 3 mm left lower lobe lung nodule.    PROTEIN CALORIE MALNUTRITION:   [ ] PPSV2 < or = to 30% [ ] significant weight loss  [ ] poor nutritional intake [ ] catabolic state [ ] anasarca     Albumin, Serum: 2.4 g/dL (04-23-19 @ 06:15)  Artificial Nutrition [ ]     REFERRALS:   [x ]Chaplaincy  [ ] Hospice  [ ]Child Life  [ x]Social Work  [ ]Case management [ ]Holistic Therapy   Goals of Care Discussion Document:

## 2019-04-23 NOTE — CONSULT NOTE ADULT - PROBLEM SELECTOR RECOMMENDATION 9
patient has tried oxycodone and tylenol in the past.   recommend dilaudid 1mg PO tablet q 4 prn patient has tried oxycodone and Tylenol in the past.   Significant side effects with oxycodone, including hallucinations and altering his sensorium/MS.   Recommend rotating to Dilaudid 1mg PO tablet q 4 prn.   Bowel regimen to prevent opioid induced constipation.

## 2019-04-23 NOTE — PROGRESS NOTE ADULT - PROBLEM SELECTOR PLAN 6
Likely 2/2 GI losses and diuretics, stable  continue to supplement, monitor BMP closely Rt upper extremity swelling, UE duplex negative for DVT  warm compress, keep elevated

## 2019-04-23 NOTE — PROGRESS NOTE ADULT - PROBLEM SELECTOR PROBLEM 7
Type 2 diabetes mellitus with complication, unspecified whether long term insulin use Electrolyte abnormality

## 2019-04-23 NOTE — CONSULT NOTE ADULT - PROBLEM SELECTOR RECOMMENDATION 2
patient follows with Oncologist Dr. Clark  has lung mets and newly found mets to liver Patient follows with Oncologist Dr. Clark.  Metastatic pancreatic adenocarcinoma, s/p whipple surgery 10/2018, on adjuvant FOLFIRINOX, with progression of disease while on adjuvant chemo with new liver lesions, LAP and a lung nodule.  S/p liver bx, pending final path.  Plan is for f/u outpatient with oncology for further treatment.

## 2019-04-23 NOTE — PROGRESS NOTE ADULT - PROBLEM SELECTOR PROBLEM 8
Atrial fibrillation, unspecified type Type 2 diabetes mellitus with complication, unspecified whether long term insulin use

## 2019-04-23 NOTE — PROGRESS NOTE ADULT - ASSESSMENT
77 y/o Male, with a PmHx of Atrial Fibrillation (on Eliquis), Benign colon polyps, Cataracts, DM II, CHF, GERD, HLD, Nephrolithiasis, Stress incontinence, MVP, Prostate Cancer (s/p prostatectomy), Varicose veins, and Pancreatic Cancer (s/p whipple and is on chemo), presents today for SOB for 10 days with worsening symptoms past few days. Pt is admitted to telemetry for acute on chronic CHF in the setting of severe electrolyte derangements.               #MANNY:  Baseline Cr ~1.1-1.2, elevation likely 2/2 diuretics and lisinopril, improving  - s/p IVF, encourage po hydration  - renally dose medications, avoid nephrotoxic agents  - monitor BMP, urine output (0) independent

## 2019-04-23 NOTE — CONSULT NOTE ADULT - PROBLEM SELECTOR RECOMMENDATION 5
patient states his wife is his surrogate who would make medical decisions if patient is unable to.  patient to start dilaudid 1 mg po for pain. patient states his wife is his surrogate who would make medical decisions if patient is unable to.  patient to start dilaudid 1 mg po q4 prn for pain. Pt is full code. Patient states his wife is his surrogate who would make medical decisions if patient is unable to. Palliative care following for symptom management, plan is for pt to follow up outpatient with supportive oncology. Please include appointment on d/c summary.

## 2019-04-23 NOTE — DISCHARGE NOTE PROVIDER - HOSPITAL COURSE
This is a 77 yo M admitted with fatigue and decreased PO intake. Patient with acute on chronic CHF. Patient with hypokalemia and dyspnea. Patient with MANNY . He was found to have liver lesions on CT and he had a liver biopsy with IR on 4/22 the pathology showed + liver LESION (metastic disease from primary pancreatic CA). He had a CT chest that showed  CT Chest/Abd/Pelvis - Interval Whipple procedure. Thickening and heterogeneity of small bowel loops associated with the choledochojejunostomy and pancreaticojejunostomy may be related to enteritis and/or tumoral infiltration in the periportal region. Interval development of hepatic lesions compatible with metastatic disease. Interval development of small bowel mesenteric adenopathy. Interval development of abdominal retroperitoneal adenopathy. Interval development of a small amount of abdominal and moderate amount of pelvic ascites. New 3 mm left lower lobe lung nodule. He had lower extremity dopplers that showed no DVT .  ECHO with mild global LV dysfxn . The Right upper extremity doppler had no DVT.VQ scan with low probability for PE. CXR with bilateral pleural effusions , mild linear scar versus atelectasis in the left lower lung. The lungs are otherwise clear. Abdominal ultrasound with large heterogeneous mass in the right hepatic lobe suspicious for enoplasm. Renal ultrasound no hydronephrosis , bilateral pleural effusions and small volume abdominal ascites. Oncology ,cardiology, renal , palliative and ID were consulted during this hospital stay. Infectious disease recommended monitoring off antibiotics. Renal ordered albumin. Cardiology recommended lasix prn. No chem o at this time .  Patient and family opted for comfort care and hospice referral made. Sandy from hospice spoke to family and medications ordered were sent to [facility] . A prescription for a Cunningham clamp was made because he has a hx of urinary incontinence after his prostatectomy 10 years ago and he required a sphincterotomy and has an artificial sphincter and pump to drain his urine. Patient seen by urology (house) and the pump was deactivated and now patient is incontinent.     	Patient's wife filled out the MOLST form and he was made DNR/I. HE WENT HOME WITH HOSPICE 4/26.

## 2019-04-23 NOTE — CONSULT NOTE ADULT - ASSESSMENT
78 year old male pmh pancreatic ca with mets to lung and liver (s/p whipple), admitted to [facility] for acute on chronic decompensated heart failure.

## 2019-04-24 DIAGNOSIS — N17.9 ACUTE KIDNEY FAILURE, UNSPECIFIED: ICD-10-CM

## 2019-04-24 DIAGNOSIS — E87.1 HYPO-OSMOLALITY AND HYPONATREMIA: ICD-10-CM

## 2019-04-24 LAB
ALBUMIN SERPL ELPH-MCNC: 2.2 G/DL — LOW (ref 3.3–5)
ALP SERPL-CCNC: 279 U/L — HIGH (ref 40–120)
ALT FLD-CCNC: 37 U/L — SIGNIFICANT CHANGE UP (ref 4–41)
ANION GAP SERPL CALC-SCNC: 16 MMO/L — HIGH (ref 7–14)
APPEARANCE UR: CLEAR — SIGNIFICANT CHANGE UP
AST SERPL-CCNC: 148 U/L — HIGH (ref 4–40)
BACTERIA # UR AUTO: NEGATIVE — SIGNIFICANT CHANGE UP
BILIRUB DIRECT SERPL-MCNC: < 0.2 MG/DL — SIGNIFICANT CHANGE UP (ref 0.1–0.2)
BILIRUB SERPL-MCNC: 0.4 MG/DL — SIGNIFICANT CHANGE UP (ref 0.2–1.2)
BILIRUB UR-MCNC: SIGNIFICANT CHANGE UP
BLOOD UR QL VISUAL: NEGATIVE — SIGNIFICANT CHANGE UP
BUN SERPL-MCNC: 41 MG/DL — HIGH (ref 7–23)
CALCIUM SERPL-MCNC: 7.7 MG/DL — LOW (ref 8.4–10.5)
CHLORIDE SERPL-SCNC: 99 MMOL/L — SIGNIFICANT CHANGE UP (ref 98–107)
CHLORIDE UR-SCNC: 21 MMOL/L — SIGNIFICANT CHANGE UP
CO2 SERPL-SCNC: 19 MMOL/L — LOW (ref 22–31)
COLOR SPEC: YELLOW — SIGNIFICANT CHANGE UP
CREAT ?TM UR-MCNC: 256.4 MG/DL — SIGNIFICANT CHANGE UP
CREAT SERPL-MCNC: 1.85 MG/DL — HIGH (ref 0.5–1.3)
GLUCOSE SERPL-MCNC: 144 MG/DL — HIGH (ref 70–99)
GLUCOSE UR-MCNC: NEGATIVE — SIGNIFICANT CHANGE UP
HCT VFR BLD CALC: 29.5 % — LOW (ref 39–50)
HGB BLD-MCNC: 9 G/DL — LOW (ref 13–17)
HYALINE CASTS # UR AUTO: SIGNIFICANT CHANGE UP
KETONES UR-MCNC: NEGATIVE — SIGNIFICANT CHANGE UP
LEUKOCYTE ESTERASE UR-ACNC: NEGATIVE — SIGNIFICANT CHANGE UP
MAGNESIUM SERPL-MCNC: 1.9 MG/DL — SIGNIFICANT CHANGE UP (ref 1.6–2.6)
MCHC RBC-ENTMCNC: 28 PG — SIGNIFICANT CHANGE UP (ref 27–34)
MCHC RBC-ENTMCNC: 30.5 % — LOW (ref 32–36)
MCV RBC AUTO: 91.6 FL — SIGNIFICANT CHANGE UP (ref 80–100)
NITRITE UR-MCNC: NEGATIVE — SIGNIFICANT CHANGE UP
NRBC # FLD: 0 K/UL — SIGNIFICANT CHANGE UP (ref 0–0)
OSMOLALITY SERPL: 296 MOSMO/KG — HIGH (ref 275–295)
OSMOLALITY UR: 398 MOSMO/KG — SIGNIFICANT CHANGE UP (ref 50–1200)
PH UR: 5.5 — SIGNIFICANT CHANGE UP (ref 5–8)
PHOSPHATE SERPL-MCNC: 4.4 MG/DL — SIGNIFICANT CHANGE UP (ref 2.5–4.5)
PLATELET # BLD AUTO: 296 K/UL — SIGNIFICANT CHANGE UP (ref 150–400)
PMV BLD: 10.7 FL — SIGNIFICANT CHANGE UP (ref 7–13)
POTASSIUM SERPL-MCNC: 4.9 MMOL/L — SIGNIFICANT CHANGE UP (ref 3.5–5.3)
POTASSIUM SERPL-SCNC: 4.9 MMOL/L — SIGNIFICANT CHANGE UP (ref 3.5–5.3)
POTASSIUM UR-SCNC: 81 MMOL/L — SIGNIFICANT CHANGE UP
PROT SERPL-MCNC: 4.9 G/DL — LOW (ref 6–8.3)
PROT UR-MCNC: 30 — SIGNIFICANT CHANGE UP
PROT UR-MCNC: 61.9 MG/DL — SIGNIFICANT CHANGE UP
RBC # BLD: 3.22 M/UL — LOW (ref 4.2–5.8)
RBC # FLD: 15.7 % — HIGH (ref 10.3–14.5)
RBC CASTS # UR COMP ASSIST: SIGNIFICANT CHANGE UP (ref 0–?)
SODIUM SERPL-SCNC: 134 MMOL/L — LOW (ref 135–145)
SODIUM UR-SCNC: < 20 MMOL/L — SIGNIFICANT CHANGE UP
SP GR SPEC: 1.03 — SIGNIFICANT CHANGE UP (ref 1–1.04)
SQUAMOUS # UR AUTO: SIGNIFICANT CHANGE UP
UROBILINOGEN FLD QL: SIGNIFICANT CHANGE UP
WBC # BLD: 18.26 K/UL — HIGH (ref 3.8–10.5)
WBC # FLD AUTO: 18.26 K/UL — HIGH (ref 3.8–10.5)
WBC UR QL: SIGNIFICANT CHANGE UP (ref 0–?)

## 2019-04-24 PROCEDURE — 99233 SBSQ HOSP IP/OBS HIGH 50: CPT | Mod: GC

## 2019-04-24 PROCEDURE — 76770 US EXAM ABDO BACK WALL COMP: CPT | Mod: 26

## 2019-04-24 PROCEDURE — 99233 SBSQ HOSP IP/OBS HIGH 50: CPT

## 2019-04-24 PROCEDURE — 99222 1ST HOSP IP/OBS MODERATE 55: CPT

## 2019-04-24 PROCEDURE — 99223 1ST HOSP IP/OBS HIGH 75: CPT | Mod: GC

## 2019-04-24 RX ORDER — DOCUSATE SODIUM 100 MG
100 CAPSULE ORAL DAILY
Qty: 0 | Refills: 0 | Status: DISCONTINUED | OUTPATIENT
Start: 2019-04-24 | End: 2019-04-24

## 2019-04-24 RX ORDER — APIXABAN 2.5 MG/1
5 TABLET, FILM COATED ORAL EVERY 12 HOURS
Qty: 0 | Refills: 0 | Status: DISCONTINUED | OUTPATIENT
Start: 2019-04-24 | End: 2019-04-26

## 2019-04-24 RX ORDER — SENNA PLUS 8.6 MG/1
2 TABLET ORAL AT BEDTIME
Qty: 0 | Refills: 0 | Status: DISCONTINUED | OUTPATIENT
Start: 2019-04-24 | End: 2019-04-26

## 2019-04-24 RX ORDER — ALBUMIN HUMAN 25 %
50 VIAL (ML) INTRAVENOUS EVERY 8 HOURS
Qty: 0 | Refills: 0 | Status: DISCONTINUED | OUTPATIENT
Start: 2019-04-24 | End: 2019-04-24

## 2019-04-24 RX ORDER — ALBUMIN HUMAN 25 %
50 VIAL (ML) INTRAVENOUS EVERY 8 HOURS
Qty: 0 | Refills: 0 | Status: COMPLETED | OUTPATIENT
Start: 2019-04-24 | End: 2019-04-25

## 2019-04-24 RX ORDER — DOCUSATE SODIUM 100 MG
100 CAPSULE ORAL DAILY
Qty: 0 | Refills: 0 | Status: DISCONTINUED | OUTPATIENT
Start: 2019-04-24 | End: 2019-04-26

## 2019-04-24 RX ADMIN — Medication 50 MILLIGRAM(S): at 18:07

## 2019-04-24 RX ADMIN — PANTOPRAZOLE SODIUM 40 MILLIGRAM(S): 20 TABLET, DELAYED RELEASE ORAL at 08:25

## 2019-04-24 RX ADMIN — Medication 1: at 18:07

## 2019-04-24 RX ADMIN — Medication 650 MILLIGRAM(S): at 02:45

## 2019-04-24 RX ADMIN — Medication 50 MILLIGRAM(S): at 23:06

## 2019-04-24 RX ADMIN — Medication 2 MILLIGRAM(S): at 05:46

## 2019-04-24 RX ADMIN — HYDROMORPHONE HYDROCHLORIDE 1 MILLIGRAM(S): 2 INJECTION INTRAMUSCULAR; INTRAVENOUS; SUBCUTANEOUS at 04:00

## 2019-04-24 RX ADMIN — Medication 1: at 08:47

## 2019-04-24 RX ADMIN — Medication 50 MILLILITER(S): at 23:06

## 2019-04-24 RX ADMIN — APIXABAN 5 MILLIGRAM(S): 2.5 TABLET, FILM COATED ORAL at 18:07

## 2019-04-24 RX ADMIN — INSULIN GLARGINE 16 UNIT(S): 100 INJECTION, SOLUTION SUBCUTANEOUS at 11:11

## 2019-04-24 RX ADMIN — MAGNESIUM OXIDE 400 MG ORAL TABLET 400 MILLIGRAM(S): 241.3 TABLET ORAL at 11:40

## 2019-04-24 RX ADMIN — Medication 650 MILLIGRAM(S): at 02:15

## 2019-04-24 RX ADMIN — HYDROMORPHONE HYDROCHLORIDE 1 MILLIGRAM(S): 2 INJECTION INTRAMUSCULAR; INTRAVENOUS; SUBCUTANEOUS at 04:30

## 2019-04-24 RX ADMIN — Medication 10 MILLIGRAM(S): at 11:40

## 2019-04-24 RX ADMIN — Medication 2 CAPSULE(S): at 08:25

## 2019-04-24 RX ADMIN — Medication 60 MILLIGRAM(S): at 18:07

## 2019-04-24 NOTE — CONSULT NOTE ADULT - REASON FOR ADMISSION
SOB X 10 days with increasing severity past few days

## 2019-04-24 NOTE — PROGRESS NOTE ADULT - SUBJECTIVE AND OBJECTIVE BOX
INTERVAL HPI/OVERNIGHT EVENTS: Patient received two doses of 1mg PO morphine since it was started yesterday, with relief. Per patient's wife, patient's pain is controlled however he felt dizzy due to his blood pressure medications. She expresses concern about future treatments and states she wants him to be comfortable.  Patient stated he was not feeling well, defers to his wife.     DNR on chart:   Allergies    adhesives (Hives)  No Known Drug Allergies    Intolerances    MEDICATIONS  (STANDING):  dextrose 5%. 1000 milliLiter(s) (50 mL/Hr) IV Continuous <Continuous>  dextrose 50% Injectable 12.5 Gram(s) IV Push once  dextrose 50% Injectable 25 Gram(s) IV Push once  dextrose 50% Injectable 25 Gram(s) IV Push once  diltiazem    Tablet 60 milliGRAM(s) Oral two times a day  insulin glargine Injectable (LANTUS) 16 Unit(s) SubCutaneous <User Schedule>  insulin lispro (HumaLOG) corrective regimen sliding scale   SubCutaneous three times a day before meals  insulin lispro (HumaLOG) corrective regimen sliding scale   SubCutaneous at bedtime  loperamide 2 milliGRAM(s) Oral two times a day  magnesium oxide 400 milliGRAM(s) Oral daily  metoprolol tartrate 50 milliGRAM(s) Oral two times a day  pancrelipase  (CREON 36,000 Lipase Units) 2 Capsule(s) Oral three times a day with meals  pantoprazole    Tablet 40 milliGRAM(s) Oral <User Schedule>  prochlorperazine   Tablet 10 milliGRAM(s) Oral daily  traZODone 50 milliGRAM(s) Oral at bedtime    MEDICATIONS  (PRN):  acetaminophen   Tablet .. 650 milliGRAM(s) Oral every 6 hours PRN Temp greater or equal to 38C (100.4F), Mild Pain (1 - 3), Moderate Pain (4 - 6)  acetaminophen  IVPB .. 1000 milliGRAM(s) IV Intermittent once PRN Temp greater or equal to 38C (100.4F), Severe Pain (7 - 10)  aluminum hydroxide/magnesium hydroxide/simethicone Suspension 30 milliLiter(s) Oral every 4 hours PRN Dyspepsia  dextrose 40% Gel 15 Gram(s) Oral once PRN Blood Glucose LESS THAN 70 milliGRAM(s)/deciliter  glucagon  Injectable 1 milliGRAM(s) IntraMuscular once PRN Glucose LESS THAN 70 milligrams/deciliter  HYDROmorphone   Tablet 1 milliGRAM(s) Oral every 4 hours PRN Severe Pain (7 - 10)      ITEMS UNCHECKED ARE NOT PRESENT    PRESENT SYMPTOMS: [ ]Unable to obtain due to poor mentation   Source if other than patient:  [ ]Family   [ ]Team     Pain (Impact on QOL):    Location:  Minimal acceptable level (0-10 scale):            Aggravating factors:  Quality:  Radiation:  Severity (0-10 scale):    Timing:    Dyspnea:                           [ ]Mild [ ]Moderate [ ]Severe  Anxiety:                             [ ]Mild [ ]Moderate [ ]Severe  Fatigue:                             [ ]Mild [ ]Moderate [ ]Severe  Nausea:                             [ ]Mild [ ]Moderate [ ]Severe  Loss of appetite:              [ ]Mild [ ]Moderate [ ]Severe  Constipation:                    [ ]Mild [ ]Moderate [ ]Severe    PAIN AD Score:	  [email or web address].[email or web address]/cog/painad.pdf (Ctrl + left click to view)    Other Symptoms:  [ ]All other review of systems negative     Karnofsky Performance Score/Palliative Performance Status Version 2:         %    http://palliative.info/resource_material/PPSv2.pdf  PHYSICAL EXAM:  Vital Signs Last 24 Hrs  T(C): 36.6 (2019 09:57), Max: 36.7 (2019 15:30)  T(F): 97.8 (2019 09:57), Max: 98 (2019 15:30)  HR: 117 (2019 09:57) (98 - 117)  BP: 115/77 (2019 09:57) (95/64 - 115/77)  BP(mean): --  RR: 18 (2019 09:57) (16 - 19)  SpO2: 97% (2019 09:57) (97% - 100%) I&O's Summary    2019 07:  -  2019 07:00  --------------------------------------------------------  IN: 500 mL / OUT: 300 mL / NET: 200 mL    2019 07:  -  2019 13:06  --------------------------------------------------------  IN: 0 mL / OUT: 25 mL / NET: -25 mL     GENERAL: exam limited as patient states he is resting  [x ]Alert  [ ]Oriented x   [x ]Lethargic  [ ]Cachexia  [ ]Unarousable  [ x]Verbal  [ ]Non-Verbal    Behavioral:   [ ] Anxiety  [ ] Delirium [ ] Agitation [ ] Other    HEENT:  [ x]Normal   [ ]Dry mouth   [ ]ET Tube/Trach  [ ]Oral lesions    PULMONARY:   [ ]Clear [ ]Tachypnea  [ ]Audible excessive secretions   [ ]Rhonchi        [ ]Right [ ]Left [ ]Bilateral  [ ]Crackles        [ ]Right [ ]Left [ ]Bilateral  [ ]Wheezing     [ ]Right [ ]Left [ ]Bilateral    CARDIOVASCULAR:    [ ]Regular [ ]Irregular [ ]Tachy  [ ]Terry [ ]Murmur [ ]Other    GASTROINTESTINAL:  [ ]Soft  [ ]Distended   [ ]+BS  [ ]Non tender [ ]Tender  [ ]PEG [ ]OGT/ NGT   Last BM:    GENITOURINARY:  [ ]Normal [ ] Incontinent   [ ]Oliguria/Anuria   [ ]Ness    MUSCULOSKELETAL:   [x ]Normal   [ ]Weakness  [ ]Bed/Wheelchair bound [ ]Edema    NEUROLOGIC:   [x ]No focal deficits  [ ] Cognitive impairment  [ ] Dysphagia [ ]Dysarthria [ ] Paresis [ ]Other     SKIN:   [x ]Normal   [ ]Pressure ulcer(s)  [ ]Rash    CRITICAL CARE:  [ ] Shock Present  [ ]Septic [ ]Cardiogenic [ ]Neurologic [ ]Hypovolemic  [ ]  Vasopressors [ ]  Inotropes   [ ] Respiratory failure present  [ ] Acute  [ ] Chronic [ ] Hypoxic  [ ] Hypercarbic [ ] Other  [ ] Other organ failure     LABS:                        9.0    18.26 )-----------( 296      ( 2019 07:15 )             29.5   04-24    134<L>  |  99  |  41<H>  ----------------------------<  144<H>  4.9   |  19<L>  |  1.85<H>    Ca    7.7<L>      2019 07:15  Phos  4.4       Mg     1.9     -24    TPro  4.9<L>  /  Alb  2.2<L>  /  TBili  0.4  /  DBili  < 0.2  /  AST  148<H>  /  ALT  37  /  AlkPhos  279<H>        Urinalysis Basic - ( 2019 11:22 )    Color: YELLOW / Appearance: CLEAR / S.026 / pH: 5.5  Gluc: NEGATIVE / Ketone: NEGATIVE  / Bili: TRACE / Urobili: TRACE   Blood: NEGATIVE / Protein: 30 / Nitrite: NEGATIVE   Leuk Esterase: NEGATIVE / RBC: 0-2 / WBC 0-2   Sq Epi: OCC / Non Sq Epi: x / Bacteria: NEGATIVE      RADIOLOGY & ADDITIONAL STUDIES: none new    Protein Calorie Malnutrition Present: [ ] yes [ ] no  [ ] PPSV2 < or = 30%  [ ] significant weight loss [ ] poor nutritional intake [ ] anasarca [ ] catabolic state Albumin, Serum: 2.2 g/dL (19 @ 07:15)  Artificial Nutrition [ ]     REFERRALS:   [ ]Chaplaincy  [ ] Hospice  [ ]Child Life  [ ]Social Work  [ ]Case management [ ]Holistic Therapy   Goals of Care Document: INTERVAL HPI/OVERNIGHT EVENTS: Patient received two doses of 1mg PO morphine since it was started yesterday, with relief. Per patient's wife, patient's pain is controlled however he felt dizzy due to his blood pressure medications. She expresses concern about future treatments and states she wants him to be comfortable.  Patient stated he was not feeling well, defers to his wife.     DNR on chart: full code  Allergies    adhesives (Hives)  No Known Drug Allergies    Intolerances    MEDICATIONS  (STANDING):  dextrose 5%. 1000 milliLiter(s) (50 mL/Hr) IV Continuous <Continuous>  dextrose 50% Injectable 12.5 Gram(s) IV Push once  dextrose 50% Injectable 25 Gram(s) IV Push once  dextrose 50% Injectable 25 Gram(s) IV Push once  diltiazem    Tablet 60 milliGRAM(s) Oral two times a day  insulin glargine Injectable (LANTUS) 16 Unit(s) SubCutaneous <User Schedule>  insulin lispro (HumaLOG) corrective regimen sliding scale   SubCutaneous three times a day before meals  insulin lispro (HumaLOG) corrective regimen sliding scale   SubCutaneous at bedtime  loperamide 2 milliGRAM(s) Oral two times a day  magnesium oxide 400 milliGRAM(s) Oral daily  metoprolol tartrate 50 milliGRAM(s) Oral two times a day  pancrelipase  (CREON 36,000 Lipase Units) 2 Capsule(s) Oral three times a day with meals  pantoprazole    Tablet 40 milliGRAM(s) Oral <User Schedule>  prochlorperazine   Tablet 10 milliGRAM(s) Oral daily  traZODone 50 milliGRAM(s) Oral at bedtime    MEDICATIONS  (PRN):  acetaminophen   Tablet .. 650 milliGRAM(s) Oral every 6 hours PRN Temp greater or equal to 38C (100.4F), Mild Pain (1 - 3), Moderate Pain (4 - 6)  acetaminophen  IVPB .. 1000 milliGRAM(s) IV Intermittent once PRN Temp greater or equal to 38C (100.4F), Severe Pain (7 - 10)  aluminum hydroxide/magnesium hydroxide/simethicone Suspension 30 milliLiter(s) Oral every 4 hours PRN Dyspepsia  dextrose 40% Gel 15 Gram(s) Oral once PRN Blood Glucose LESS THAN 70 milliGRAM(s)/deciliter  glucagon  Injectable 1 milliGRAM(s) IntraMuscular once PRN Glucose LESS THAN 70 milligrams/deciliter  HYDROmorphone   Tablet 1 milliGRAM(s) Oral every 4 hours PRN Severe Pain (7 - 10)    ITEMS UNCHECKED ARE NOT PRESENT    PRESENT SYMPTOMS: [ ]Unable to obtain due to poor mentation   Source if other than patient:  [ ]Family   [ ]Team     Pain (Impact on QOL):  controlled with current regimen.   Location:  Minimal acceptable level (0-10 scale):            Aggravating factors:  Quality:  Radiation:  Severity (0-10 scale):    Timing:    Dyspnea:                           [ ]Mild [ ]Moderate [ ]Severe  Anxiety:                             [ ]Mild [ ]Moderate [ ]Severe  Fatigue:                             [ ]Mild [ ]Moderate [ ]Severe  Nausea:                             [ ]Mild [ ]Moderate [ ]Severe  Loss of appetite:              [ ]Mild [ ]Moderate [ ]Severe  Constipation:                    [ ]Mild [ ]Moderate [ ]Severe    PAIN AD Score:	  http://geriatrictoolkit.[facility]/cog/painad.pdf (Ctrl + left click to view)    Other Symptoms: dizzy  [x ]All other review of systems negative     Karnofsky Performance Score/Palliative Performance Status Version 2:  70  %    http://palliative.info/resource_material/PPSv2.pdf    PHYSICAL EXAM:  Vital Signs Last 24 Hrs  T(C): 36.6 (2019 09:57), Max: 36.7 (2019 15:30)  T(F): 97.8 (2019 09:57), Max: 98 (2019 15:30)  HR: 117 (2019 09:57) (98 - 117)  BP: 115/77 (2019 09:57) (95/64 - 115/77)  BP(mean): --  RR: 18 (2019 09:57) (16 - 19)  SpO2: 97% (2019 09:57) (97% - 100%) I&O's Summary    2019 07:  -  2019 07:00  --------------------------------------------------------  IN: 500 mL / OUT: 300 mL / NET: 200 mL    2019 07:01  -  2019 13:06  --------------------------------------------------------  IN: 0 mL / OUT: 25 mL / NET: -25 mL    GENERAL: exam limited as patient states he is resting  [x ]Alert  [x ]Oriented x 4  [x ]Lethargic  [ ]Cachexia  [ ]Unarousable  [ x]Verbal  [ ]Non-Verbal    Behavioral:   [ ] Anxiety  [ ] Delirium [ ] Agitation [ ] Other    HEENT:  [ x]Normal   [ ]Dry mouth   [ ]ET Tube/Trach  [ ]Oral lesions    PULMONARY:   [x ]Clear [ ]Tachypnea  [ ]Audible excessive secretions   [ ]Rhonchi        [ ]Right [ ]Left [ ]Bilateral  [ ]Crackles        [ ]Right [ ]Left [ ]Bilateral  [ ]Wheezing     [ ]Right [ ]Left [ ]Bilateral    CARDIOVASCULAR:    [ ]Regular [ ]Irregular [ x]Tachy  [ ]Terry [ ]Murmur [ ]Other    GASTROINTESTINAL:  [ ]Soft  [ ]Distended   [ ]+BS  [ ]Non tender [ ]Tender  [ ]PEG [ ]OGT/ NGT   Last BM:     GENITOURINARY:  [ ]Normal [ ] Incontinent   [ ]Oliguria/Anuria   [ ]Ness    MUSCULOSKELETAL:   [x ]Normal   [ ]Weakness  [ ]Bed/Wheelchair bound [ ]Edema    NEUROLOGIC:   [x ]No focal deficits  [ ] Cognitive impairment  [ ] Dysphagia [ ]Dysarthria [ ] Paresis [ ]Other     SKIN:   [x ]Normal   [ ]Pressure ulcer(s)  [ ]Rash    CRITICAL CARE:  [ ] Shock Present  [ ]Septic [ ]Cardiogenic [ ]Neurologic [ ]Hypovolemic  [ ]  Vasopressors [ ]  Inotropes   [ ] Respiratory failure present  [ ] Acute  [ ] Chronic [ ] Hypoxic  [ ] Hypercarbic [ ] Other  [ ] Other organ failure     LABS:                        9.0    18.26 )-----------( 296      ( 2019 07:15 )             29.5   04-24    134<L>  |  99  |  41<H>  ----------------------------<  144<H>  4.9   |  19<L>  |  1.85<H>    Ca    7.7<L>      2019 07:15  Phos  4.4     -  Mg     1.9     -24    TPro  4.9<L>  /  Alb  2.2<L>  /  TBili  0.4  /  DBili  < 0.2  /  AST  148<H>  /  ALT  37  /  AlkPhos  279<H>      Urinalysis Basic - ( 2019 11:22 )    Color: YELLOW / Appearance: CLEAR / S.026 / pH: 5.5  Gluc: NEGATIVE / Ketone: NEGATIVE  / Bili: TRACE / Urobili: TRACE   Blood: NEGATIVE / Protein: 30 / Nitrite: NEGATIVE   Leuk Esterase: NEGATIVE / RBC: 0-2 / WBC 0-2   Sq Epi: OCC / Non Sq Epi: x / Bacteria: NEGATIVE    RADIOLOGY & ADDITIONAL STUDIES: none new    Protein Calorie Malnutrition Present: [ ] yes [ ] no  [ ] PPSV2 < or = 30%  [ ] significant weight loss [ ] poor nutritional intake [ ] anasarca [ ] catabolic state Albumin, Serum: 2.2 g/dL (19 @ 07:15)  Artificial Nutrition [ ]     REFERRALS:   [ ]Chaplaincy  [ ] Hospice  [ ]Child Life  [ ]Social Work  [ ]Case management [ ]Holistic Therapy   Goals of Care Document:

## 2019-04-24 NOTE — PROGRESS NOTE ADULT - PROBLEM SELECTOR PLAN 1
patient responded well to 1mg dilaudid PO q 4 prn, received 2 doses in past 24 hours.  Patient's wife requested to see if smaller dose is available, however 1mg is the smallest dose that can be given by tablet per pharmacy, and liquid form not available.  continue bowel regimen patient responded well to 1mg Dilaudid PO q 4 prn, received 2 doses in past 24 hours.  Patient's wife requested to see if smaller dose is available, however 1mg is the smallest dose that can be given by tablet per pharmacy, and liquid form not available.  Continue bowel regimen PRN as pt has h/o diarrhea in the past.

## 2019-04-24 NOTE — PROGRESS NOTE ADULT - SUBJECTIVE AND OBJECTIVE BOX
Patient is a 78y old  Male who presents with a chief complaint of SOB X 10 days with increasing severity past few days (2019 14:34)        SUBJECTIVE / OVERNIGHT EVENTS:      MEDICATIONS  (STANDING):  albumin human 25% IVPB 50 milliLiter(s) IV Intermittent every 8 hours  dextrose 5%. 1000 milliLiter(s) (50 mL/Hr) IV Continuous <Continuous>  dextrose 50% Injectable 12.5 Gram(s) IV Push once  dextrose 50% Injectable 25 Gram(s) IV Push once  dextrose 50% Injectable 25 Gram(s) IV Push once  diltiazem    Tablet 60 milliGRAM(s) Oral two times a day  insulin glargine Injectable (LANTUS) 16 Unit(s) SubCutaneous <User Schedule>  insulin lispro (HumaLOG) corrective regimen sliding scale   SubCutaneous three times a day before meals  insulin lispro (HumaLOG) corrective regimen sliding scale   SubCutaneous at bedtime  magnesium oxide 400 milliGRAM(s) Oral daily  metoprolol tartrate 50 milliGRAM(s) Oral two times a day  pancrelipase  (CREON 36,000 Lipase Units) 2 Capsule(s) Oral three times a day with meals  pantoprazole    Tablet 40 milliGRAM(s) Oral <User Schedule>  prochlorperazine   Tablet 10 milliGRAM(s) Oral daily  traZODone 50 milliGRAM(s) Oral at bedtime    MEDICATIONS  (PRN):  acetaminophen   Tablet .. 650 milliGRAM(s) Oral every 6 hours PRN Temp greater or equal to 38C (100.4F), Mild Pain (1 - 3), Moderate Pain (4 - 6)  acetaminophen  IVPB .. 1000 milliGRAM(s) IV Intermittent once PRN Temp greater or equal to 38C (100.4F), Severe Pain (7 - 10)  aluminum hydroxide/magnesium hydroxide/simethicone Suspension 30 milliLiter(s) Oral every 4 hours PRN Dyspepsia  dextrose 40% Gel 15 Gram(s) Oral once PRN Blood Glucose LESS THAN 70 milliGRAM(s)/deciliter  docusate sodium 100 milliGRAM(s) Oral daily PRN Constipation  glucagon  Injectable 1 milliGRAM(s) IntraMuscular once PRN Glucose LESS THAN 70 milligrams/deciliter  HYDROmorphone   Tablet 1 milliGRAM(s) Oral every 4 hours PRN Severe Pain (7 - 10)  senna 2 Tablet(s) Oral at bedtime PRN Constipation      Vital Signs Last 24 Hrs  T(C): 36.6 (2019 09:57), Max: 36.7 (2019 15:30)  T(F): 97.8 (2019 09:57), Max: 98 (2019 15:30)  HR: 117 (2019 09:57) (98 - 117)  BP: 115/77 (2019 09:57) (95/64 - 115/77)  BP(mean): --  RR: 18 (2019 09:57) (16 - 19)  SpO2: 97% (2019 09:57) (97% - 100%)  CAPILLARY BLOOD GLUCOSE      POCT Blood Glucose.: 169 mg/dL (2019 08:43)  POCT Blood Glucose.: 149 mg/dL (2019 21:50)  POCT Blood Glucose.: 168 mg/dL (2019 17:08)    I&O's Summary    2019 07:01  -  2019 07:00  --------------------------------------------------------  IN: 500 mL / OUT: 300 mL / NET: 200 mL    2019 07:01  -  2019 15:05  --------------------------------------------------------  IN: 0 mL / OUT: 25 mL / NET: -25 mL          PHYSICAL EXAM  GENERAL: NAD, well-developed  HEAD:  Atraumatic, Normocephalic  EYES: EOMI, PERRLA, conjunctiva and sclera clear  NECK: Supple, No JVD  CHEST/LUNG: Clear to auscultation bilaterally; No wheeze  HEART: Regular rate and rhythm; No murmurs, rubs, or gallops  ABDOMEN: Soft, Nontender, Nondistended; Bowel sounds present  EXTREMITIES:  2+ Peripheral Pulses, No clubbing, cyanosis, or edema  PSYCH: AAOx3  SKIN: No rashes or lesions    LABS:                        9.0    18.26 )-----------( 296      ( 2019 07:15 )             29.5         134<L>  |  99  |  41<H>  ----------------------------<  144<H>  4.9   |  19<L>  |  1.85<H>    Ca    7.7<L>      2019 07:15  Phos  4.4       Mg     1.9         TPro  4.9<L>  /  Alb  2.2<L>  /  TBili  0.4  /  DBili  < 0.2  /  AST  148<H>  /  ALT  37  /  AlkPhos  279<H>            Urinalysis Basic - ( 2019 11:22 )    Color: YELLOW / Appearance: CLEAR / S.026 / pH: 5.5  Gluc: NEGATIVE / Ketone: NEGATIVE  / Bili: TRACE / Urobili: TRACE   Blood: NEGATIVE / Protein: 30 / Nitrite: NEGATIVE   Leuk Esterase: NEGATIVE / RBC: 0-2 / WBC 0-2   Sq Epi: OCC / Non Sq Epi: x / Bacteria: NEGATIVE          RADIOLOGY & ADDITIONAL TESTS:    Imaging Personally Reviewed:  Consultant(s) Notes Reviewed:    Care Discussed with Consultants/Other Providers: Patient is a 78y old  Male who presents with a chief complaint of SOB X 10 days with increasing severity past few days (2019 14:34)    SUBJECTIVE / OVERNIGHT EVENTS:  Patient seen reports little urine, pain is well controlled with current regimen, denies diarrhea/N/V, CP or dyspnea.     MEDICATIONS  (STANDING):  albumin human 25% IVPB 50 milliLiter(s) IV Intermittent every 8 hours  dextrose 5%. 1000 milliLiter(s) (50 mL/Hr) IV Continuous <Continuous>  dextrose 50% Injectable 12.5 Gram(s) IV Push once  dextrose 50% Injectable 25 Gram(s) IV Push once  dextrose 50% Injectable 25 Gram(s) IV Push once  diltiazem    Tablet 60 milliGRAM(s) Oral two times a day  insulin glargine Injectable (LANTUS) 16 Unit(s) SubCutaneous <User Schedule>  insulin lispro (HumaLOG) corrective regimen sliding scale   SubCutaneous three times a day before meals  insulin lispro (HumaLOG) corrective regimen sliding scale   SubCutaneous at bedtime  magnesium oxide 400 milliGRAM(s) Oral daily  metoprolol tartrate 50 milliGRAM(s) Oral two times a day  pancrelipase  (CREON 36,000 Lipase Units) 2 Capsule(s) Oral three times a day with meals  pantoprazole    Tablet 40 milliGRAM(s) Oral <User Schedule>  prochlorperazine   Tablet 10 milliGRAM(s) Oral daily  traZODone 50 milliGRAM(s) Oral at bedtime    MEDICATIONS  (PRN):  acetaminophen   Tablet .. 650 milliGRAM(s) Oral every 6 hours PRN Temp greater or equal to 38C (100.4F), Mild Pain (1 - 3), Moderate Pain (4 - 6)  acetaminophen  IVPB .. 1000 milliGRAM(s) IV Intermittent once PRN Temp greater or equal to 38C (100.4F), Severe Pain (7 - 10)  aluminum hydroxide/magnesium hydroxide/simethicone Suspension 30 milliLiter(s) Oral every 4 hours PRN Dyspepsia  dextrose 40% Gel 15 Gram(s) Oral once PRN Blood Glucose LESS THAN 70 milliGRAM(s)/deciliter  docusate sodium 100 milliGRAM(s) Oral daily PRN Constipation  glucagon  Injectable 1 milliGRAM(s) IntraMuscular once PRN Glucose LESS THAN 70 milligrams/deciliter  HYDROmorphone   Tablet 1 milliGRAM(s) Oral every 4 hours PRN Severe Pain (7 - 10)  senna 2 Tablet(s) Oral at bedtime PRN Constipation      Vital Signs Last 24 Hrs  T(C): 36.6 (2019 09:57), Max: 36.7 (2019 15:30)  T(F): 97.8 (2019 09:57), Max: 98 (2019 15:30)  HR: 117 (2019 09:57) (98 - 117)  BP: 115/77 (2019 09:57) (95/64 - 115/77)  BP(mean): --  RR: 18 (2019 09:57) (16 - 19)  SpO2: 97% (2019 09:57) (97% - 100%)  CAPILLARY BLOOD GLUCOSE      POCT Blood Glucose.: 169 mg/dL (2019 08:43)  POCT Blood Glucose.: 149 mg/dL (2019 21:50)  POCT Blood Glucose.: 168 mg/dL (2019 17:08)    I&O's Summary    2019 07:01  -  2019 07:00  --------------------------------------------------------  IN: 500 mL / OUT: 300 mL / NET: 200 mL    2019 07:  -  2019 15:05  --------------------------------------------------------  IN: 0 mL / OUT: 25 mL / NET: -25 mL          PHYSICAL EXAM  GENERAL: NAD, well-developed  HEAD:  Atraumatic, Normocephalic  EYES: EOMI, PERRLA, conjunctiva and sclera clear  NECK: Supple, No JVD  CHEST/LUNG: Clear to auscultation bilaterally; No wheeze  HEART: Regular rate and rhythm; No murmurs, rubs, or gallops  ABDOMEN: Soft, Nontender, Nondistended; Bowel sounds present  EXTREMITIES:  2+ Peripheral Pulses, No clubbing, cyanosis, or edema  PSYCH: AAOx3  SKIN: No rashes or lesions    LABS:                        9.0    18.26 )-----------( 296      ( 2019 07:15 )             29.5     04-24    134<L>  |  99  |  41<H>  ----------------------------<  144<H>  4.9   |  19<L>  |  1.85<H>    Ca    7.7<L>      2019 07:15  Phos  4.4     -24  Mg     1.9     -24    TPro  4.9<L>  /  Alb  2.2<L>  /  TBili  0.4  /  DBili  < 0.2  /  AST  148<H>  /  ALT  37  /  AlkPhos  279<H>  -24          Urinalysis Basic - ( 2019 11:22 )    Color: YELLOW / Appearance: CLEAR / S.026 / pH: 5.5  Gluc: NEGATIVE / Ketone: NEGATIVE  / Bili: TRACE / Urobili: TRACE   Blood: NEGATIVE / Protein: 30 / Nitrite: NEGATIVE   Leuk Esterase: NEGATIVE / RBC: 0-2 / WBC 0-2   Sq Epi: OCC / Non Sq Epi: x / Bacteria: NEGATIVE          RADIOLOGY & ADDITIONAL TESTS:    Imaging Personally Reviewed:  Consultant(s) Notes Reviewed:    Care Discussed with Consultants/Other Providers:

## 2019-04-24 NOTE — CONSULT NOTE ADULT - PROBLEM SELECTOR RECOMMENDATION 2
Pt with Na trending down today 134, suggest to obtain serum and urine osmo, etiology unclear. Pt with Na trending down today 134, in the setting of hypoalbuminemia and hypotension, with intravascular volume depletion. suggest to obtain serum and urine osmo, urine sodium. Start albumin.

## 2019-04-24 NOTE — CONSULT NOTE ADULT - SUBJECTIVE AND OBJECTIVE BOX
Debbie Pope - [phone]    Patient is a 78y old  Male who presents with a chief complaint of SOB X 10 days with increasing severity past few days (2019 13:04)    HPI:  78M with Atrial Fibrillation (on Eliquis), DM II, GERD, HLD, Nephrolithiasis, MVP, CHF, Prostate Cancer (x/p prostatectomy), Pancreatic Cancer (s/p whipple chemo 19) - admitted 19 with 10 days of SOB.  Noted to have elevated BNP.  Was treated with diuretic.  Last chemo FOLFIRINOX was 19.  Last dose of Neulasta?     CT PTA showed new hepatic lesions and was due to undergo biopsy PTA, however, as he was hospitalized, he underwent biopsy 19 by IR.  Since admission, his WBC has slowly been rising.  No fever.      ID asked to help management.    prior hospital charts reviewed [ x ]  primary team notes reviewed [ x ]  other consultant notes reviewed [ x]    PAST MEDICAL & SURGICAL HISTORY:  Pancreatic cancer  Type II diabetes mellitus: since , on insulin  Nephrolithiasis: , resolved  Male stress incontinence: S/p artificial male urinary sphincter placement  Varicose vein: (L) 5 years ago    had venous stripping   Bilateral cataracts: removed with lens implants  Benign colon polyp: removed colonoscopy   Prostate cancer:  prostatectomy  stable  Afib: 2006 s/p ablation  , afib resolved   on Eliquies  Hyperlipidemia: X 4 years not on any meds  GERD (Gastroesophageal Reflux Disease): X 10 years  Renal Calculi:   MVP (Mitral Valve Prolapse): X 8 years stable  History of pancreatic surgery: s/p Whipple procedure   Status post right knee surgery: on 2018  Status post left knee replacement:   S/P ablation operation for arrhythmia: in   Male stress incontinence: s/p artifical urinary sphincter 2012  Varicose vein-s/p vein stripping 5 years ago  S/P arthroscopy: right shoulder   S/P prostatectomy: radical robotic 6/10  Cosmetic Surgery: chin implant   S/P Colonoscopy with Polypectomy: in   S/P Appendectomy: in     Allergies  adhesives (Hives)  No Known Drug Allergies    ANTIMICROBIALS:  none    MEDICATIONS  (STANDING):  diltiazem    Tablet 60 two times a day  docusate sodium 100 daily  insulin glargine Injectable (LANTUS) 16 <User Schedule>  insulin lispro (HumaLOG) corrective regimen sliding scale  three times a day before meals  insulin lispro (HumaLOG) corrective regimen sliding scale  at bedtime  loperamide 2 two times a day  metoprolol tartrate 50 two times a day  pancrelipase  (CREON 36,000 Lipase Units) 2 three times a day with meals  pantoprazole    Tablet 40 <User Schedule>  prochlorperazine   Tablet 10 daily  traZODone 50 at bedtime    SOCIAL HISTORY:    FAMILY HISTORY:  FH: Alzheimers disease: father  FHx: diabetes mellitus: mother    REVIEW OF SYSTEMS  [  ] ROS unobtainable because:    [  ] All other systems negative except as noted below:	    Constitutional:  [ ] fever [ ] chills  [ ] weight loss  [ ] weakness  Skin:  [ ] rash [ ] phlebitis	  Eyes: [ ] icterus [ ] pain  [ ] discharge	  ENMT: [ ] sore throat  [ ] thrush [ ] ulcers [ ] exudates  Respiratory: [ ] dyspnea [ ] hemoptysis [ ] cough [ ] sputum	  Cardiovascular:  [ ] chest pain [ ] palpitations [ ] edema	  Gastrointestinal:  [ ] nausea [ ] vomiting [ ] diarrhea [ ] constipation [ ] pain	  Genitourinary:  [ ] dysuria [ ] frequency [ ] hematuria [ ] discharge [ ] flank pain  [ ] incontinence  Musculoskeletal:  [ ] myalgias [ ] arthralgias [ ] arthritis  [ ] back pain  Neurological:  [ ] headache [ ] seizures  [ ] confusion/altered mental status  Psychiatric:  [ ] anxiety [ ] depression	  Hematology/Lymphatics:  [ ] lymphadenopathy  Endocrine:  [ ] adrenal [ ] thyroid  Allergic/Immunologic:	 [ ] transplant [ ] seasonal    Vital Signs Last 24 Hrs  T(F): 97.8 (19 @ 09:57), Max: 99.2 (19 @ 14:44)    Vital Signs Last 24 Hrs  HR: 117 (19 @ 09:57) (98 - 117)  BP: 115/77 (19 @ 09:57) (95/64 - 115/77)  RR: 18 (19 @ 09:57)  SpO2: 97% (19 @ 09:57) (97% - 100%)  Wt(kg): --    Serum Pro-Brain Natriuretic Peptide: 7013: pg/mL pg/mL (19 @ 15:00)  Serum Pro-Brain Natriuretic Peptide: 15563: pg/mL (19 @ 19:05)    WBC Count: 18.26 (19 @ 07:15)  WBC Count: 17.70 (19 @ 06:15)  WBC Count: 16.24 (19 @ 07:01)  WBC Count: 16.24 (19 @ 07:01)  WBC Count: 16.75 (19 @ 04:21)  WBC Count: 16.75 (19 @ 04:21)  WBC Count: 14.07 (19 @ 02:18)  WBC Count: 14.07 (19 @ 02:18)  WBC Count: 14.27 (19 @ 16:57)  WBC Count: 13.58 (19 @ 15:00)  WBC Count: 10.96 (19 @ 19:05)                        9.0    18.26 )-----------( 296      ( 2019 07:15 )             29.5     134<L>  |  99  |  41<H>  ----------------------------<  144<H>  4.9   |  19<L>  |  1.85<H>    Ca    7.7<L>      2019 07:15  Phos  4.4       Mg     1.9         TPro  4.9<L>  /  Alb  2.2<L>  /  TBili  0.4  /  DBili  < 0.2  /  AST  148<H>  /  ALT  37  /  AlkPhos  279<H>      Urinalysis Basic - ( 2019 11:22 )  Color: YELLOW / Appearance: CLEAR / S.026 / pH: 5.5  Gluc: NEGATIVE / Ketone: NEGATIVE  / Bili: TRACE / Urobili: TRACE   Blood: NEGATIVE / Protein: 30 / Nitrite: NEGATIVE   Leuk Esterase: NEGATIVE / RBC: 0-2 / WBC 0-2   Sq Epi: OCC / Non Sq Epi: x / Bacteria: NEGATIVE    MICROBIOLOGY:  Culture - Stool (19 @ 09:41)    Culture - Stool:   **********************FINAL REPORT************************  CULTURE NEGATIVE FOR SALMONELLA, SHIGELLA, YERSINIA, E COLI  O157, AEROMONAS, PLESIOMONAS, CAMPYLOBACTER AND VIBRIO SP.  **********************************************************    Specimen Source: FECES    RADIOLOGY:  imaging below personally reviewed    IR Procedure (19 @ 15:34) >  Impression: Successful ultrasound-guided core needle biopsy of right hepatic mass.    US Abdomen Limited (19 @ 10:24) >  IMPRESSION: Large heterogeneous mass in the right hepatic lobe, suspicious for neoplasm.    Transthoracic Echocardiogram (19 @ 12:50) >  CONCLUSIONS:  1. Normal left ventricular internal dimensions and wall thicknesses.  2. Limited left ventricular function assessment in setting of tachycardia, grossly mild global left ventricular systolic dysfunction.  3. Normal right ventricular size and function.    US Duplex Venous Lower Ext Complete, Bilateral (19 @ 14:25) >  IMPRESSION:  No evidence of bilateral lower extremity deep venous thrombosis.    Xray Chest 2 Views PA/Lat (19 @ 20:17) >  IMPRESSION:   Clear lungs.    CT Chest w/ IV Cont (04.15.19 @ 10:49) >  IMPRESSION: Interval Whipple procedure. Thickening and heterogeneity of small bowel loops associated with the choledochojejunostomy and pancreaticojejunostomy may be related to enteritis and/or tumoral infiltration in the periportal region.  Interval development of hepatic lesions compatible with metastatic disease.  Interval development of small bowel mesenteric adenopathy.  Interval development of abdominal retroperitoneal adenopathy.  Interval development of a small amount of abdominal and moderate amount of pelvic ascites.  New 3 mm left lower lobe lung nodule. Debbie Pope - [phone]    Patient is a 78y old  Male who presents with a chief complaint of SOB X 10 days with increasing severity past few days (2019 13:04)    HPI:  78M with Atrial Fibrillation (on Eliquis), DM II, GERD, HLD, Nephrolithiasis, MVP, CHF, Prostate Cancer (x/p prostatectomy), Pancreatic Cancer (s/p whipple chemo 19) - admitted 19 with 10 days of SOB.  Noted to have elevated BNP.  Was treated with diuretics.  Last chemo FOLFIRINOX was -19.  3 days of decadron simultaneously with chemotherapy.  Then 5 days of Xarzio from -.     CT PTA showed new hepatic lesions and was due to undergo biopsy PTA, however, as he was hospitalized, he underwent biopsy 19 by IR.  Since admission, his WBC has slowly been rising.  No fever.  No diarrhea.  No headache. Some abdominal bloating.  still with CHACON and fatigue.  Rest of ROS otherwise negative.    ID asked to help management.    prior hospital charts reviewed [ x ]  primary team notes reviewed [ x ]  other consultant notes reviewed [ x]    PAST MEDICAL & SURGICAL HISTORY:  Pancreatic cancer  Type II diabetes mellitus: since , on insulin  Nephrolithiasis: , resolved  Male stress incontinence: S/p artificial male urinary sphincter placement  Varicose vein: (L) 5 years ago    had venous stripping   Bilateral cataracts: removed with lens implants  Benign colon polyp: removed colonoscopy   Prostate cancer:  prostatectomy  stable  Afib:  s/p ablation  , afib resolved   on Eliquies  Hyperlipidemia: X 4 years not on any meds  GERD (Gastroesophageal Reflux Disease): X 10 years  Renal Calculi:   MVP (Mitral Valve Prolapse): X 8 years stable  History of pancreatic surgery: s/p Whipple procedure   Status post right knee surgery: on 2018  Status post left knee replacement:   S/P ablation operation for arrhythmia: in   Male stress incontinence: s/p artifical urinary sphincter 2012  Varicose vein-s/p vein stripping 5 years ago  S/P arthroscopy: right shoulder   S/P prostatectomy: radical robotic 6/10  Cosmetic Surgery: chin implant   S/P Colonoscopy with Polypectomy: in   S/P Appendectomy: in 1950    Allergies  adhesives (Hives)  No Known Drug Allergies    ANTIMICROBIALS:  none    MEDICATIONS  (STANDING):  diltiazem    Tablet 60 two times a day  docusate sodium 100 daily  insulin glargine Injectable (LANTUS) 16 <User Schedule>  insulin lispro (HumaLOG) corrective regimen sliding scale  three times a day before meals  insulin lispro (HumaLOG) corrective regimen sliding scale  at bedtime  loperamide 2 two times a day  metoprolol tartrate 50 two times a day  pancrelipase  (CREON 36,000 Lipase Units) 2 three times a day with meals  pantoprazole    Tablet 40 <User Schedule>  prochlorperazine   Tablet 10 daily  traZODone 50 at bedtime    SOCIAL HISTORY:  does not drink or smoke    FAMILY HISTORY:  FH: Alzheimers disease: father  FHx: diabetes mellitus: mother    REVIEW OF SYSTEMS  [  ] ROS unobtainable because:    [x  ] All other systems negative except as noted below:	    Constitutional:  [ ] fever [ ] chills  [ ] weight loss  [ x] weakness  Skin:  [ ] rash [ ] phlebitis	  Eyes: [ ] icterus [ ] pain  [ ] discharge	  ENMT: [ ] sore throat  [ ] thrush [ ] ulcers [ ] exudates  Respiratory: [ x] dyspnea [ ] hemoptysis [ ] cough [ ] sputum	  Cardiovascular:  [ ] chest pain [ ] palpitations [x ] edema	  Gastrointestinal:  [ ] nausea [ ] vomiting [ ] diarrhea [ ] constipation [x ] bloating	  Genitourinary:  [ ] dysuria [ ] frequency [ ] hematuria [ ] discharge [ ] flank pain  [ ] incontinence  Musculoskeletal:  [ ] myalgias [ ] arthralgias [ ] arthritis  [ ] back pain  Neurological:  [ ] headache [ ] seizures  [ ] confusion/altered mental status  Psychiatric:  [ ] anxiety [ ] depression	  Hematology/Lymphatics:  [ ] lymphadenopathy  Endocrine:  [ ] adrenal [ ] thyroid  Allergic/Immunologic:	 [ ] transplant [ ] seasonal    Vital Signs Last 24 Hrs  T(F): 97.8 (19 @ 09:57), Max: 99.2 (19 @ 14:44)    Vital Signs Last 24 Hrs  HR: 117 (19 @ 09:57) (98 - 117)  BP: 115/77 (19 @ 09:57) (95/64 - 115/77)  RR: 18 (04-24-19 @ 09:57)  SpO2: 97% (19 @ 09:57) (97% - 100%)    PHYSICAL EXAM:  General: non-toxic  HEAD/EYES: anicteric  ENT:  supple  Cardiovascular:   S1, S2 +edema  Respiratory:  clear bilaterally  GI:  soft, non-tender, normal bowel sounds; distended  :  no villa  Musculoskeletal:  no synovitis  Neurologic:  grossly non-focal  Skin:  no rash  Psychiatric:  appropriate affect  Vascular:  no phlebitis    Serum Pro-Brain Natriuretic Peptide: 7013: pg/mL pg/mL (19 @ 15:00)  Serum Pro-Brain Natriuretic Peptide: 08313: pg/mL (19 @ 19:05)    WBC Count: 18.26 (19 @ 07:15)  WBC Count: 17.70 (19 @ 06:15)  WBC Count: 16.24 (19 @ 07:01)  WBC Count: 16.24 (19 @ 07:01)  WBC Count: 16.75 (19 @ 04:21)  WBC Count: 16.75 (19 @ 04:21)  WBC Count: 14.07 (19 @ 02:18)  WBC Count: 14.07 (19 @ 02:18)  WBC Count: 14.27 (19 @ 16:57)  WBC Count: 13.58 (19 @ 15:00)  WBC Count: 10.96 (19 @ 19:05)                        9.0    18.26 )-----------( 296      ( 2019 07:15 )             29.5     134<L>  |  99  |  41<H>  ----------------------------<  144<H>  4.9   |  19<L>  |  1.85<H>    Ca    7.7<L>      2019 07:15  Phos  4.4     -24  Mg     1.9     -24    TPro  4.9<L>  /  Alb  2.2<L>  /  TBili  0.4  /  DBili  < 0.2  /  AST  148<H>  /  ALT  37  /  AlkPhos  279<H>  04-24    Urinalysis Basic - ( 2019 11:22 )  Color: YELLOW / Appearance: CLEAR / S.026 / pH: 5.5  Gluc: NEGATIVE / Ketone: NEGATIVE  / Bili: TRACE / Urobili: TRACE   Blood: NEGATIVE / Protein: 30 / Nitrite: NEGATIVE   Leuk Esterase: NEGATIVE / RBC: 0-2 / WBC 0-2   Sq Epi: OCC / Non Sq Epi: x / Bacteria: NEGATIVE    MICROBIOLOGY:  Culture - Stool (19 @ 09:41)    Culture - Stool:   **********************FINAL REPORT************************  CULTURE NEGATIVE FOR SALMONELLA, SHIGELLA, YERSINIA, E COLI  O157, AEROMONAS, PLESIOMONAS, CAMPYLOBACTER AND VIBRIO SP.  **********************************************************    Specimen Source: FECES    RADIOLOGY:  imaging below personally reviewed    IR Procedure (19 @ 15:34) >  Impression: Successful ultrasound-guided core needle biopsy of right hepatic mass.    US Abdomen Limited (19 @ 10:24) >  IMPRESSION: Large heterogeneous mass in the right hepatic lobe, suspicious for neoplasm.    Transthoracic Echocardiogram (19 @ 12:50) >  CONCLUSIONS:  1. Normal left ventricular internal dimensions and wall thicknesses.  2. Limited left ventricular function assessment in setting of tachycardia, grossly mild global left ventricular systolic dysfunction.  3. Normal right ventricular size and function.    US Duplex Venous Lower Ext Complete, Bilateral (19 @ 14:25) >  IMPRESSION:  No evidence of bilateral lower extremity deep venous thrombosis.    Xray Chest 2 Views PA/Lat (19 @ 20:17) >  IMPRESSION:   Clear lungs.    CT Chest w/ IV Cont (04.15.19 @ 10:49) >  IMPRESSION: Interval Whipple procedure. Thickening and heterogeneity of small bowel loops associated with the choledochojejunostomy and pancreaticojejunostomy may be related to enteritis and/or tumoral infiltration in the periportal region.  Interval development of hepatic lesions compatible with metastatic disease.  Interval development of small bowel mesenteric adenopathy.  Interval development of abdominal retroperitoneal adenopathy.  Interval development of a small amount of abdominal and moderate amount of pelvic ascites.  New 3 mm left lower lobe lung nodule.

## 2019-04-24 NOTE — PROGRESS NOTE ADULT - PROBLEM SELECTOR PLAN 3
treatment pending liver biopsy, per oncology plan for second line chemotherapy if bx proven to be metastatic pancreatic adenocarcinoma.

## 2019-04-24 NOTE — CONSULT NOTE ADULT - ASSESSMENT
78M diabetes, afib, GERD, hyperlipidemia, prostate ca s/p prostatectomy with recently diagnosed pancreatic cancer s/p Whipple.  On chemotherapy - last 4/4/19-4/6/19 and followed by zarxio - last dose was 4/11.  New hepatic lesion s/p biopsy 4/22.  Steadily rising leukocytosis - suspect due to filgastrim rather than infection - no fever.  Clinically chronically ill but not acutely.    Suggest:  - observe off antibiotics  - if febrile or worsening of leukocytosis, then can obtain blood cultures but would hold off for now.    above d/w [id number]

## 2019-04-24 NOTE — PROGRESS NOTE ADULT - ASSESSMENT
78 year old male pmh pancreatic ca with mets to lung and liver (s/p whipple), admitted to [facility] for acute on chronic decompensated heart failure.

## 2019-04-24 NOTE — CONSULT NOTE ADULT - PROBLEM SELECTOR RECOMMENDATION 9
Pt with MANNY most likely hemodynamically mediated in the setting of poor oral intake, diarrhea, diuretic and ARB use, with possible ischemic ATN component. On review of labs in [facility], pt had scr of 0.77 in 2017 in 2018 was 0.94 mg/dl, in 08/18 pt had episode of MANNY scr peaked at ~8 developed ischemic ATN after having septic shock, did not require HD, scr improved progressively to 1 mg/dl (12/18). During this admission scr has been trending up from 1.1 mg/dl on 04/18/19 and peaked at 1.8 04/24/19. UA repeated today showing high specific gravity, hyaline cast compatible with pre renal state, renal sono noted not hydro. Even though pt is edematous seems pt is intravascular depleted, has low albumin. Suggest to start albumin 25% 50 ml every 8 hours for next 24 hours, monitor volume status and urine output closely, had PVR of 168 ml, last micturition, suggest to continue Bladder scan to r/o retention. Avoid nephrotoxics, monitor labs daily, renal adjust all meds. Obtain urine Na, K, Cl, spot prot and creat Pt with MANNY most likely hemodynamically mediated in the setting of poor oral intake, diarrhea, hypotension, diuretic and ARB use, with possible ischemic ATN component. On review of labs in [facility], pt had scr of 0.77 in 2017 in 2018 was 0.94 mg/dl, in 08/18 pt had episode of MANNY scr peaked at ~8 developed ischemic ATN after having septic shock, did not require HD, scr improved progressively to 1 mg/dl (12/18). During this admission scr has been trending up from 1.1 mg/dl on 04/18/19 and peaked at 1.8 04/24/19. UA repeated today showing high specific gravity, hyaline cast compatible with pre renal state, renal sono noted not hydro. Even though pt is edematous seems pt is intravascular depleted, has low albumin. Suggest to start albumin 25% 50 ml every 8 hours, monitor volume status and urine output closely, had PVR of 168 ml, last micturition, suggest to continue Bladder scan to r/o retention. Avoid nephrotoxics, monitor labs daily, renal adjust all meds. Obtain urine Na, K, Cl, spot prot and creat

## 2019-04-24 NOTE — CONSULT NOTE ADULT - SUBJECTIVE AND OBJECTIVE BOX
[facility] DIVISION OF KIDNEY DISEASES AND HYPERTENSION -- INITIAL CONSULT NOTE  --------------------------------------------------------------------------------  HPI:    79 y/o Male, with a PmHx of Atrial Fibrillation (on Eliquis), Benign colon polyps, Cataracts, DM II, GERD, HLD, Nephrolithiasis, Stress incontinence, MVP, CHF, Prostate Cancer ( s/p prostatectomy), Varicose veins, and Pancreatic Cancer ( s/p whipple chemo 4/4/19), presents today to the [facility] ED for SOB for 10 days before admission. Pt was admitted on 04/18/19. As per wife claims since last chemo treatment pt become very fatigued, having poor oral intake and worsening dyspnea, precipitated with exertion, uses 2 pillows but not new, also reports having diarrhea for the past 3 to 4 months, at least 3 episodes, watery, no blood. On admission day had scr of 1.3, K of 2.5  and Mg of 1.5, pt received potassium chloride and magnesium for the first 2 days, CXR on admission had clear lungs, on 04/15/19 had CT abdomen and chest with IV contrast showing new hepatic lesion and 3mm lung nodule, during hospital course pt had edema therefore was started on lasix 40 mg from 04/18-20/19, was stopped because scr started to rise, given increased in scr Nephrology consulted.     On review of labs in [facility], pt had scr of 0.77 in 2017 in 2018 was 0.94 mg/dl, in 08/18 pt had episode of MANNY scr peaked at ~8 developed ische    PAST HISTORY  --------------------------------------------------------------------------------  PAST MEDICAL & SURGICAL HISTORY:  Pancreatic cancer  Type II diabetes mellitus: since 2016, on insulin  Nephrolithiasis: 2008, resolved  Male stress incontinence: S/p artificial male urinary sphincter placement  Varicose vein: (L) 5 years ago    had venous stripping 2007  Bilateral cataracts: removed with lens implants  Benign colon polyp: removed colonoscopy 2014  Prostate cancer: 2010 prostatectomy  stable  Afib: 2006 s/p ablation 2006 , afib resolved   on Eliquies  Hyperlipidemia: X 4 years not on any meds  GERD (Gastroesophageal Reflux Disease): X 10 years  Renal Calculi: 2008  MVP (Mitral Valve Prolapse): X 8 years stable  History of pancreatic surgery: s/p Whipple procedure 11/18  Status post right knee surgery: on Jan, 2018  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 ,2018  S/P ablation operation for arrhythmia: in 2004/2005  Male stress incontinence: s/p artifical urinary sphincter 5/2012  Varicose vein-s/p vein stripping 5 years ago  S/P arthroscopy: right shoulder 12/11  S/P prostatectomy: radical robotic 6/10  Cosmetic Surgery: chin implant 2004  S/P Colonoscopy with Polypectomy: in 2009  S/P Appendectomy: in 1950    FAMILY HISTORY:  FH: Alzheimers disease: father  FHx: diabetes mellitus: mother    PAST SOCIAL HISTORY:    ALLERGIES & MEDICATIONS  --------------------------------------------------------------------------------  Allergies    adhesives (Hives)  No Known Drug Allergies    Intolerances      Standing Inpatient Medications  dextrose 5%. 1000 milliLiter(s) IV Continuous <Continuous>  dextrose 50% Injectable 12.5 Gram(s) IV Push once  dextrose 50% Injectable 25 Gram(s) IV Push once  dextrose 50% Injectable 25 Gram(s) IV Push once  diltiazem    Tablet 60 milliGRAM(s) Oral two times a day  insulin glargine Injectable (LANTUS) 16 Unit(s) SubCutaneous <User Schedule>  insulin lispro (HumaLOG) corrective regimen sliding scale   SubCutaneous three times a day before meals  insulin lispro (HumaLOG) corrective regimen sliding scale   SubCutaneous at bedtime  magnesium oxide 400 milliGRAM(s) Oral daily  metoprolol tartrate 50 milliGRAM(s) Oral two times a day  pancrelipase  (CREON 36,000 Lipase Units) 2 Capsule(s) Oral three times a day with meals  pantoprazole    Tablet 40 milliGRAM(s) Oral <User Schedule>  prochlorperazine   Tablet 10 milliGRAM(s) Oral daily  traZODone 50 milliGRAM(s) Oral at bedtime    PRN Inpatient Medications  acetaminophen   Tablet .. 650 milliGRAM(s) Oral every 6 hours PRN  acetaminophen  IVPB .. 1000 milliGRAM(s) IV Intermittent once PRN  aluminum hydroxide/magnesium hydroxide/simethicone Suspension 30 milliLiter(s) Oral every 4 hours PRN  dextrose 40% Gel 15 Gram(s) Oral once PRN  docusate sodium 100 milliGRAM(s) Oral daily PRN  glucagon  Injectable 1 milliGRAM(s) IntraMuscular once PRN  HYDROmorphone   Tablet 1 milliGRAM(s) Oral every 4 hours PRN  senna 2 Tablet(s) Oral at bedtime PRN      REVIEW OF SYSTEMS  --------------------------------------------------------------------------------  Gen: No weight changes, fatigue, fevers/chills, weakness  Skin: No rashes  Head/Eyes/Ears/Mouth: No headache; Normal hearing; Normal vision w/o blurriness; No sinus pain/discomfort, sore throat  Respiratory: No dyspnea, cough, wheezing, hemoptysis  CV: No chest pain, PND, orthopnea  GI: No abdominal pain, diarrhea, constipation, nausea, vomiting, melena, hematochezia  : No increased frequency, dysuria, hematuria, nocturia  MSK: No joint pain/swelling; no back pain; no edema  Neuro: No dizziness/lightheadedness, weakness, seizures, numbness, tingling  Heme: No easy bruising or bleeding  Endo: No heat/cold intolerance  Psych: No significant nervousness, anxiety, stress, depression    All other systems were reviewed and are negative, except as noted.    VITALS/PHYSICAL EXAM  --------------------------------------------------------------------------------  T(C): 36.6 (04-24-19 @ 09:57), Max: 36.7 (04-23-19 @ 15:30)  HR: 117 (04-24-19 @ 09:57) (98 - 117)  BP: 115/77 (04-24-19 @ 09:57) (95/64 - 115/77)  RR: 18 (04-24-19 @ 09:57) (16 - 19)  SpO2: 97% (04-24-19 @ 09:57) (97% - 100%)  Wt(kg): --        04-23-19 @ 07:01  -  04-24-19 @ 07:00  --------------------------------------------------------  IN: 500 mL / OUT: 300 mL / NET: 200 mL    04-24-19 @ 07:01  -  04-24-19 @ 14:34  --------------------------------------------------------  IN: 0 mL / OUT: 25 mL / NET: -25 mL      Physical Exam:  	Gen: NAD, well-appearing  	HEENT: PERRL, supple neck, clear oropharynx  	Pulm: CTA B/L  	CV: RRR, S1S2; no rub  	Back: No spinal or CVA tenderness; no sacral edema  	Abd: +BS, soft, nontender/nondistended  	: No suprapubic tenderness  	UE: Warm, FROM, no clubbing, intact strength; no edema; no asterixis  	LE: Warm, FROM, no clubbing, intact strength; no edema  	Neuro: No focal deficits, intact gait  	Psych: Normal affect and mood  	Skin: Warm, without rashes  	Vascular access:    LABS/STUDIES  --------------------------------------------------------------------------------              9.0    18.26 >-----------<  296      [04-24-19 @ 07:15]              29.5     134  |  99  |  41  ----------------------------<  144      [04-24-19 @ 07:15]  4.9   |  19  |  1.85        Ca     7.7     [04-24-19 @ 07:15]      Mg     1.9     [04-24-19 @ 07:15]      Phos  4.4     [04-24-19 @ 07:15]    TPro  4.9  /  Alb  2.2  /  TBili  0.4  /  DBili  < 0.2  /  AST  148  /  ALT  37  /  AlkPhos  279  [04-24-19 @ 07:15]          Creatinine Trend:  SCr 1.85 [04-24 @ 07:15]  SCr 1.61 [04-23 @ 06:15]  SCr 1.78 [04-22 @ 07:01]  SCr 1.55 [04-21 @ 04:21]  SCr 1.33 [04-20 @ 16:08]    Urinalysis - [04-24-19 @ 11:22]      Color YELLOW / Appearance CLEAR / SG 1.026 / pH 5.5      Gluc NEGATIVE / Ketone NEGATIVE  / Bili TRACE / Urobili TRACE       Blood NEGATIVE / Protein 30 / Leuk Est NEGATIVE / Nitrite NEGATIVE      RBC 0-2 / WBC 0-2 / Hyaline 1+ / Gran  / Sq Epi OCC / Non Sq Epi  / Bacteria NEGATIVE      PTH 75.85 (Ca --)      [04-18-19 @ 15:00]   --  HbA1c 8.3      [04-19-19 @ 06:10]  TSH 1.67      [04-18-19 @ 15:00]  Lipid: chol 101, , HDL 20, LDL 43      [04-18-19 @ 15:00]      LUZ ELENA: titer Negative, pattern --      [08-28-18 @ 19:46]  ANCA: cANCA Negative, pANCA Negative, atypical ANCA Negative      [08-28-18 @ 19:46]  anti-GBM <0.2      [08-29-18 @ 05:43] [facility] DIVISION OF KIDNEY DISEASES AND HYPERTENSION -- INITIAL CONSULT NOTE  --------------------------------------------------------------------------------  HPI:    79 y/o Male, with a PmHx of Atrial Fibrillation (on Eliquis), Benign colon polyps, Cataracts, DM II, GERD, HLD, Nephrolithiasis, Stress incontinence, MVP, CHF, Prostate Cancer ( s/p prostatectomy), Varicose veins, and Pancreatic Cancer ( s/p whipple chemo 4/4/19), presents today to the [facility] ED for SOB for 10 days before admission. Pt was admitted on 04/18/19. As per wife claims since last chemo treatment pt become very fatigued, having poor oral intake and worsening dyspnea, precipitated with exertion, uses 2 pillows but not new, also reports having diarrhea for the past 3 to 4 months, at least 3 episodes, watery, no blood. On admission day had scr of 1.3, K of 2.5  and Mg of 1.5, pt received potassium chloride and magnesium for the first 2 days, CXR on admission had clear lungs, on 04/15/19 had CT abdomen and chest with IV contrast showing new hepatic lesion and 3mm lung nodule, during hospital course pt had edema therefore was started on lasix 40 mg from 04/18-20/19, was stopped because scr started to rise, given increased in scr Nephrology consulted. Also had liver biopsy on 04/22/19.     On review of labs in [facility], pt had scr of 0.77 in 2017 in 2018 was 0.94 mg/dl, in 08/18 pt had episode of MANNY scr peaked at ~8 developed ischemic ATN after having septic shock, did not require HD, scr improved progressively to 1 mg/dl (12/18). During this admission scr has been trending up from 1.1 mg/dl on 04/18/19 and peaked at 1.8 04/24/19. During evaluation pt was awake claims is not eating having poor appetite, not drinking, having RUQ pain since liver biopsy, associated with Nausea but not vomiting, still having diarrhea.     PAST HISTORY  --------------------------------------------------------------------------------  PAST MEDICAL & SURGICAL HISTORY:  Pancreatic cancer  Type II diabetes mellitus: since 2016, on insulin  Nephrolithiasis: 2008, resolved  Male stress incontinence: S/p artificial male urinary sphincter placement  Varicose vein: (L) 5 years ago    had venous stripping 2007  Bilateral cataracts: removed with lens implants  Benign colon polyp: removed colonoscopy 2014  Prostate cancer: 2010 prostatectomy  stable  Afib: 2006 s/p ablation 2006 , afib resolved   on Eliquies  Hyperlipidemia: X 4 years not on any meds  GERD (Gastroesophageal Reflux Disease): X 10 years  Renal Calculi: 2008  MVP (Mitral Valve Prolapse): X 8 years stable  History of pancreatic surgery: s/p Whipple procedure 11/18  Status post right knee surgery: on Jan, 2018  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 ,2018  S/P ablation operation for arrhythmia: in 2004/2005  Male stress incontinence: s/p artifical urinary sphincter 5/2012  Varicose vein-s/p vein stripping 5 years ago  S/P arthroscopy: right shoulder 12/11  S/P prostatectomy: radical robotic 6/10  Cosmetic Surgery: chin implant 2004  S/P Colonoscopy with Polypectomy: in 2009  S/P Appendectomy: in 1950    FAMILY HISTORY:  FH: Alzheimers disease: father  FHx: diabetes mellitus: mother    PAST SOCIAL HISTORY:    ALLERGIES & MEDICATIONS  --------------------------------------------------------------------------------  Allergies    adhesives (Hives)  No Known Drug Allergies    Intolerances      Standing Inpatient Medications  dextrose 5%. 1000 milliLiter(s) IV Continuous <Continuous>  dextrose 50% Injectable 12.5 Gram(s) IV Push once  dextrose 50% Injectable 25 Gram(s) IV Push once  dextrose 50% Injectable 25 Gram(s) IV Push once  diltiazem    Tablet 60 milliGRAM(s) Oral two times a day  insulin glargine Injectable (LANTUS) 16 Unit(s) SubCutaneous <User Schedule>  insulin lispro (HumaLOG) corrective regimen sliding scale   SubCutaneous three times a day before meals  insulin lispro (HumaLOG) corrective regimen sliding scale   SubCutaneous at bedtime  magnesium oxide 400 milliGRAM(s) Oral daily  metoprolol tartrate 50 milliGRAM(s) Oral two times a day  pancrelipase  (CREON 36,000 Lipase Units) 2 Capsule(s) Oral three times a day with meals  pantoprazole    Tablet 40 milliGRAM(s) Oral <User Schedule>  prochlorperazine   Tablet 10 milliGRAM(s) Oral daily  traZODone 50 milliGRAM(s) Oral at bedtime    PRN Inpatient Medications  acetaminophen   Tablet .. 650 milliGRAM(s) Oral every 6 hours PRN  acetaminophen  IVPB .. 1000 milliGRAM(s) IV Intermittent once PRN  aluminum hydroxide/magnesium hydroxide/simethicone Suspension 30 milliLiter(s) Oral every 4 hours PRN  dextrose 40% Gel 15 Gram(s) Oral once PRN  docusate sodium 100 milliGRAM(s) Oral daily PRN  glucagon  Injectable 1 milliGRAM(s) IntraMuscular once PRN  HYDROmorphone   Tablet 1 milliGRAM(s) Oral every 4 hours PRN  senna 2 Tablet(s) Oral at bedtime PRN      REVIEW OF SYSTEMS  --------------------------------------------------------------------------------  Gen: +weight changes,+ fatigue,  no fevers/chills,  Respiratory: + dyspnea, cough, wheezing, hemoptysis  CV: No chest pain, PND, orthopnea  GI: +abdominal pain,+ diarrhea, constipation,+ nausea, no  vomiting, melena, hematochezia  : decreased uop   MSK: ++ edema  Neuro: No dizziness/lightheadedness, ++weakness,  no seizures, numbness, tingling    All other systems were reviewed and are negative, except as noted.    VITALS/PHYSICAL EXAM  --------------------------------------------------------------------------------  T(C): 36.6 (04-24-19 @ 09:57), Max: 36.7 (04-23-19 @ 15:30)  HR: 117 (04-24-19 @ 09:57) (98 - 117)  BP: 115/77 (04-24-19 @ 09:57) (95/64 - 115/77)  RR: 18 (04-24-19 @ 09:57) (16 - 19)  SpO2: 97% (04-24-19 @ 09:57) (97% - 100%)  Wt(kg): --        04-23-19 @ 07:01  -  04-24-19 @ 07:00  --------------------------------------------------------  IN: 500 mL / OUT: 300 mL / NET: 200 mL    04-24-19 @ 07:01  -  04-24-19 @ 14:34  --------------------------------------------------------  IN: 0 mL / OUT: 25 mL / NET: -25 mL      Physical Exam:    	Gen: fatigued appearing.   	HEENT:  supple neck, dry oral mucosa  	Pulm: Bibasilar crackles but minimal  	CV: RRR, S1S2; no rub  	Back: No spinal or CVA tenderness  	Abd: +BS, soft, tender RUQ, and suprapubic area.   	UE:++ edema; no asterixis  	LE: ++edema    LABS/STUDIES  --------------------------------------------------------------------------------              9.0    18.26 >-----------<  296      [04-24-19 @ 07:15]              29.5     134  |  99  |  41  ----------------------------<  144      [04-24-19 @ 07:15]  4.9   |  19  |  1.85        Ca     7.7     [04-24-19 @ 07:15]      Mg     1.9     [04-24-19 @ 07:15]      Phos  4.4     [04-24-19 @ 07:15]    TPro  4.9  /  Alb  2.2  /  TBili  0.4  /  DBili  < 0.2  /  AST  148  /  ALT  37  /  AlkPhos  279  [04-24-19 @ 07:15]          Creatinine Trend:  SCr 1.85 [04-24 @ 07:15]  SCr 1.61 [04-23 @ 06:15]  SCr 1.78 [04-22 @ 07:01]  SCr 1.55 [04-21 @ 04:21]  SCr 1.33 [04-20 @ 16:08]    Urinalysis - [04-24-19 @ 11:22]      Color YELLOW / Appearance CLEAR / SG 1.026 / pH 5.5      Gluc NEGATIVE / Ketone NEGATIVE  / Bili TRACE / Urobili TRACE       Blood NEGATIVE / Protein 30 / Leuk Est NEGATIVE / Nitrite NEGATIVE      RBC 0-2 / WBC 0-2 / Hyaline 1+ / Gran  / Sq Epi OCC / Non Sq Epi  / Bacteria NEGATIVE

## 2019-04-24 NOTE — PROGRESS NOTE ADULT - ASSESSMENT
79 y/o Male, with a PmHx of Atrial Fibrillation (on Eliquis), Benign colon polyps, Cataracts, DM II, CHF, GERD, HLD, Nephrolithiasis, Stress incontinence, MVP, Prostate Cancer (s/p prostatectomy), Varicose veins, and Pancreatic Cancer (s/p whipple and is on chemo), presents today for SOB for 10 days with worsening symptoms past few days. Pt is admitted to telemetry for acute on chronic CHF in the setting of severe electrolyte derangements.               #MANNY:  Baseline Cr ~1.1-1.2, Cr 1.6->1.85, oliguric, likely pre-renal with third-spacing, concern for ATN  Renal US without hydro, B/L pleural effusions, small volume abdominal ascites   - IV albumin 25% 50 q8  - strict I's and O's  - renally dose medications, avoid nephrotoxic agents  - monitor BMP, urine output    #Insomnia:  Trazodone 50 mg qHS 79 y/o Male, with a PmHx of Atrial Fibrillation (on Eliquis), Benign colon polyps, Cataracts, DM II, CHF, GERD, HLD, Nephrolithiasis, Stress incontinence, MVP, Prostate Cancer (s/p prostatectomy), Varicose veins, and Pancreatic Cancer (s/p whipple and is on chemo), presents today for SOB for 10 days with worsening symptoms past few days. Pt is admitted to telemetry for acute on chronic CHF in the setting of severe electrolyte derangements.               #MANNY:  Baseline Cr ~1.1-1.2, Cr 1.6->1.85, oliguric, likely pre-renal with third-spacing, concern for ATN  Renal US without hydro, B/L pleural effusions, small volume abdominal ascites   - IV albumin 25% 50 q8  - strict I's and O's  - renally dose medications, avoid nephrotoxic agents  - monitor BMP, urine output  - Nephrology recommendations appreciated     #Insomnia:  Trazodone 50 mg qHS

## 2019-04-24 NOTE — PROGRESS NOTE ADULT - PROBLEM SELECTOR PLAN 4
Pt is full code. Patient's wife expressed that she is concerned about patient having future chemo treatment, however will discuss what options are available with oncology and patient after discharge. Palliative care following for symptom management, plan is for pt to follow up outpatient with supportive oncology. Please include appointment on d/c summary.

## 2019-04-24 NOTE — PROGRESS NOTE ADULT - PROBLEM SELECTOR PLAN 5
Hx of prior ablations with Dr. Pyle at [facility]. Follows Regularly with EP Q3 months  Off of AC, to restart tomorrow as per IR  HR inadequately controlled with metoprolol, will resume home regimen metoprolol 50 BID, diltiazem 60 BID  continue to monitor on telemetry Hx of prior ablations with Dr. Pyle at [facility]. Follows Regularly with EP Q3 months  Off of AC, to restart today  HR inadequately controlled with metoprolol, will resume home regimen metoprolol 50 BID, diltiazem 60 BID  continue to monitor on telemetry

## 2019-04-24 NOTE — PROGRESS NOTE ADULT - PROBLEM SELECTOR PLAN 4
Pancreatic adenocarcinoma, s/p whipple surgery 10/2018, on adjuvant FOLFIRINOX, with progression of disease while on adjuvant chemo with new liver lesions, LAP and a lung nodule, primary oncologist Dr. Clark aware  s/p liver bx, plan for second line chemotherapy if bx proven to be metastatic pancreatic adenoca  Palliative recommendations appreciated for pain management   Oncology recommendations appreciated

## 2019-04-24 NOTE — PROGRESS NOTE ADULT - PROBLEM SELECTOR PLAN 2
Patient follows with Oncologist Dr. Clark.  Metastatic pancreatic adenocarcinoma, s/p whipple surgery 10/2018, on adjuvant FOLFIRINOX, with progression of disease while on adjuvant chemo with new liver lesions, LAP and a lung nodule.  S/p liver bx, pending final path.  Plan is for f/u outpatient with oncology for further treatment.

## 2019-04-24 NOTE — PROGRESS NOTE ADULT - PROBLEM SELECTOR PLAN 3
No evidence of infectious etiologies, afebrile, CXR without infiltrate, RUQ US with large heterogeneous mass in the right hepatic lobe, suspicious for   neoplasm  - evaluated by ID who recommends holding off on abx  - monitor CBC

## 2019-04-25 ENCOUNTER — TRANSCRIPTION ENCOUNTER (OUTPATIENT)
Age: 79
End: 2019-04-25

## 2019-04-25 DIAGNOSIS — Z71.89 OTHER SPECIFIED COUNSELING: ICD-10-CM

## 2019-04-25 LAB
ANION GAP SERPL CALC-SCNC: 16 MMO/L — HIGH (ref 7–14)
BASOPHILS # BLD AUTO: 0.03 K/UL — SIGNIFICANT CHANGE UP (ref 0–0.2)
BASOPHILS NFR BLD AUTO: 0.2 % — SIGNIFICANT CHANGE UP (ref 0–2)
BUN SERPL-MCNC: 48 MG/DL — HIGH (ref 7–23)
CALCIUM SERPL-MCNC: 8.3 MG/DL — LOW (ref 8.4–10.5)
CHLORIDE SERPL-SCNC: 100 MMOL/L — SIGNIFICANT CHANGE UP (ref 98–107)
CO2 SERPL-SCNC: 20 MMOL/L — LOW (ref 22–31)
CREAT SERPL-MCNC: 2 MG/DL — HIGH (ref 0.5–1.3)
EOSINOPHIL # BLD AUTO: 0.06 K/UL — SIGNIFICANT CHANGE UP (ref 0–0.5)
EOSINOPHIL NFR BLD AUTO: 0.4 % — SIGNIFICANT CHANGE UP (ref 0–6)
GLUCOSE SERPL-MCNC: 147 MG/DL — HIGH (ref 70–99)
HCT VFR BLD CALC: 28.6 % — LOW (ref 39–50)
HCT VFR BLD CALC: 28.6 % — LOW (ref 39–50)
HGB BLD-MCNC: 8.5 G/DL — LOW (ref 13–17)
HGB BLD-MCNC: 8.5 G/DL — LOW (ref 13–17)
IMM GRANULOCYTES NFR BLD AUTO: 1.2 % — SIGNIFICANT CHANGE UP (ref 0–1.5)
LYMPHOCYTES # BLD AUTO: 1.02 K/UL — SIGNIFICANT CHANGE UP (ref 1–3.3)
LYMPHOCYTES # BLD AUTO: 6.4 % — LOW (ref 13–44)
MAGNESIUM SERPL-MCNC: 1.9 MG/DL — SIGNIFICANT CHANGE UP (ref 1.6–2.6)
MCHC RBC-ENTMCNC: 27.4 PG — SIGNIFICANT CHANGE UP (ref 27–34)
MCHC RBC-ENTMCNC: 27.4 PG — SIGNIFICANT CHANGE UP (ref 27–34)
MCHC RBC-ENTMCNC: 29.7 % — LOW (ref 32–36)
MCHC RBC-ENTMCNC: 29.7 % — LOW (ref 32–36)
MCV RBC AUTO: 92.3 FL — SIGNIFICANT CHANGE UP (ref 80–100)
MCV RBC AUTO: 92.3 FL — SIGNIFICANT CHANGE UP (ref 80–100)
MONOCYTES # BLD AUTO: 1.89 K/UL — HIGH (ref 0–0.9)
MONOCYTES NFR BLD AUTO: 11.9 % — SIGNIFICANT CHANGE UP (ref 2–14)
NEUTROPHILS # BLD AUTO: 12.64 K/UL — HIGH (ref 1.8–7.4)
NEUTROPHILS NFR BLD AUTO: 79.9 % — HIGH (ref 43–77)
NRBC # FLD: 0 K/UL — SIGNIFICANT CHANGE UP (ref 0–0)
NRBC # FLD: 0 K/UL — SIGNIFICANT CHANGE UP (ref 0–0)
O+P SPEC CONC: SIGNIFICANT CHANGE UP
PLATELET # BLD AUTO: 248 K/UL — SIGNIFICANT CHANGE UP (ref 150–400)
PLATELET # BLD AUTO: 248 K/UL — SIGNIFICANT CHANGE UP (ref 150–400)
PMV BLD: 10.4 FL — SIGNIFICANT CHANGE UP (ref 7–13)
PMV BLD: 10.4 FL — SIGNIFICANT CHANGE UP (ref 7–13)
POTASSIUM SERPL-MCNC: 4.8 MMOL/L — SIGNIFICANT CHANGE UP (ref 3.5–5.3)
POTASSIUM SERPL-SCNC: 4.8 MMOL/L — SIGNIFICANT CHANGE UP (ref 3.5–5.3)
RBC # BLD: 3.1 M/UL — LOW (ref 4.2–5.8)
RBC # BLD: 3.1 M/UL — LOW (ref 4.2–5.8)
RBC # FLD: 16 % — HIGH (ref 10.3–14.5)
RBC # FLD: 16 % — HIGH (ref 10.3–14.5)
SODIUM SERPL-SCNC: 136 MMOL/L — SIGNIFICANT CHANGE UP (ref 135–145)
SPECIMEN SOURCE: SIGNIFICANT CHANGE UP
TRI STN SPEC: SIGNIFICANT CHANGE UP
WBC # BLD: 15.83 K/UL — HIGH (ref 3.8–10.5)
WBC # BLD: 15.83 K/UL — HIGH (ref 3.8–10.5)
WBC # FLD AUTO: 15.83 K/UL — HIGH (ref 3.8–10.5)
WBC # FLD AUTO: 15.83 K/UL — HIGH (ref 3.8–10.5)

## 2019-04-25 PROCEDURE — 99233 SBSQ HOSP IP/OBS HIGH 50: CPT

## 2019-04-25 PROCEDURE — 93010 ELECTROCARDIOGRAM REPORT: CPT

## 2019-04-25 PROCEDURE — 99232 SBSQ HOSP IP/OBS MODERATE 35: CPT | Mod: GC

## 2019-04-25 PROCEDURE — 99232 SBSQ HOSP IP/OBS MODERATE 35: CPT

## 2019-04-25 PROCEDURE — 99497 ADVNCD CARE PLAN 30 MIN: CPT | Mod: 25

## 2019-04-25 RX ORDER — AZTREONAM 2 G
VIAL (EA) INJECTION
Qty: 0 | Refills: 0 | Status: DISCONTINUED | OUTPATIENT
Start: 2019-04-25 | End: 2019-04-25

## 2019-04-25 RX ORDER — POLYETHYLENE GLYCOL 3350 17 G/17G
17 POWDER, FOR SOLUTION ORAL DAILY
Qty: 0 | Refills: 0 | Status: DISCONTINUED | OUTPATIENT
Start: 2019-04-25 | End: 2019-04-26

## 2019-04-25 RX ORDER — DILTIAZEM HCL 120 MG
30 CAPSULE, EXT RELEASE 24 HR ORAL
Qty: 0 | Refills: 0 | Status: DISCONTINUED | OUTPATIENT
Start: 2019-04-25 | End: 2019-04-26

## 2019-04-25 RX ORDER — METOPROLOL TARTRATE 50 MG
50 TABLET ORAL
Qty: 0 | Refills: 0 | Status: DISCONTINUED | OUTPATIENT
Start: 2019-04-25 | End: 2019-04-26

## 2019-04-25 RX ADMIN — Medication 50 MILLILITER(S): at 06:25

## 2019-04-25 RX ADMIN — INSULIN GLARGINE 16 UNIT(S): 100 INJECTION, SOLUTION SUBCUTANEOUS at 13:23

## 2019-04-25 RX ADMIN — Medication 1: at 17:38

## 2019-04-25 RX ADMIN — Medication 60 MILLIGRAM(S): at 17:34

## 2019-04-25 RX ADMIN — Medication 50 MILLIGRAM(S): at 17:34

## 2019-04-25 RX ADMIN — APIXABAN 5 MILLIGRAM(S): 2.5 TABLET, FILM COATED ORAL at 17:34

## 2019-04-25 RX ADMIN — MAGNESIUM OXIDE 400 MG ORAL TABLET 400 MILLIGRAM(S): 241.3 TABLET ORAL at 13:22

## 2019-04-25 RX ADMIN — Medication 10 MILLIGRAM(S): at 13:22

## 2019-04-25 RX ADMIN — Medication 50 MILLILITER(S): at 16:39

## 2019-04-25 RX ADMIN — APIXABAN 5 MILLIGRAM(S): 2.5 TABLET, FILM COATED ORAL at 06:24

## 2019-04-25 RX ADMIN — Medication 2 CAPSULE(S): at 18:23

## 2019-04-25 RX ADMIN — Medication 50 MILLIGRAM(S): at 06:25

## 2019-04-25 RX ADMIN — Medication 60 MILLIGRAM(S): at 06:24

## 2019-04-25 NOTE — PROGRESS NOTE ADULT - SUBJECTIVE AND OBJECTIVE BOX
INTERVAL HPI/OVERNIGHT EVENTS: Patient did not receive pain medication since yesterday afternoon.  Wife and daughter at bedside. Patient states he feels tired and "worn out". Pt recently treated for salmonella, now has not had bm in several days.     DNR on chart: full code  Allergies    adhesives (Hives)  No Known Drug Allergies    Intolerances    MEDICATIONS  (STANDING):  albumin human 25% IVPB 50 milliLiter(s) IV Intermittent every 8 hours  apixaban 5 milliGRAM(s) Oral every 12 hours  dextrose 5%. 1000 milliLiter(s) (50 mL/Hr) IV Continuous <Continuous>  dextrose 50% Injectable 12.5 Gram(s) IV Push once  dextrose 50% Injectable 25 Gram(s) IV Push once  dextrose 50% Injectable 25 Gram(s) IV Push once  diltiazem    Tablet 60 milliGRAM(s) Oral two times a day  insulin glargine Injectable (LANTUS) 16 Unit(s) SubCutaneous <User Schedule>  insulin lispro (HumaLOG) corrective regimen sliding scale   SubCutaneous three times a day before meals  insulin lispro (HumaLOG) corrective regimen sliding scale   SubCutaneous at bedtime  magnesium oxide 400 milliGRAM(s) Oral daily  metoprolol tartrate 50 milliGRAM(s) Oral two times a day  pancrelipase  (CREON 36,000 Lipase Units) 2 Capsule(s) Oral three times a day with meals  pantoprazole    Tablet 40 milliGRAM(s) Oral <User Schedule>  prochlorperazine   Tablet 10 milliGRAM(s) Oral daily  traZODone 50 milliGRAM(s) Oral at bedtime    MEDICATIONS  (PRN):  acetaminophen   Tablet .. 650 milliGRAM(s) Oral every 6 hours PRN Temp greater or equal to 38C (100.4F), Mild Pain (1 - 3), Moderate Pain (4 - 6)  acetaminophen  IVPB .. 1000 milliGRAM(s) IV Intermittent once PRN Temp greater or equal to 38C (100.4F), Severe Pain (7 - 10)  aluminum hydroxide/magnesium hydroxide/simethicone Suspension 30 milliLiter(s) Oral every 4 hours PRN Dyspepsia  dextrose 40% Gel 15 Gram(s) Oral once PRN Blood Glucose LESS THAN 70 milliGRAM(s)/deciliter  docusate sodium 100 milliGRAM(s) Oral daily PRN Constipation  glucagon  Injectable 1 milliGRAM(s) IntraMuscular once PRN Glucose LESS THAN 70 milligrams/deciliter  HYDROmorphone   Tablet 1 milliGRAM(s) Oral every 4 hours PRN Severe Pain (7 - 10)  polyethylene glycol 3350 17 Gram(s) Oral daily PRN Constipation  senna 2 Tablet(s) Oral at bedtime PRN Constipation      ITEMS UNCHECKED ARE NOT PRESENT    PRESENT SYMPTOMS: [ ]Unable to obtain due to poor mentation   Source if other than patient:  [ ]Family   [ ]Team     Pain (Impact on QOL):  controlled with current regimen.   Location:  Minimal acceptable level (0-10 scale):            Aggravating factors:  Quality:  Radiation:  Severity (0-10 scale):    Timing:    Dyspnea:                           [ ]Mild [ ]Moderate [ ]Severe  Anxiety:                             [ ]Mild [ ]Moderate [ ]Severe  Fatigue:                             [x ]Mild [ ]Moderate [ ]Severe  Nausea:                             [ ]Mild [ ]Moderate [ ]Severe  Loss of appetite:              [x ]Mild [ ]Moderate [ ]Severe  Constipation:                    [x ]Mild [ ]Moderate [ ]Severe    PAIN AD Score:	  [email or web address]/painad.pdf (Ctrl + left click to view)    Other Symptoms: dizzy  [x ]All other review of systems negative     Karnofsky Performance Score/Palliative Performance Status Version 2:  70  %    http://palliative.info/resource_material/PPSv2.pdf    PHYSICAL EXAM:  Vital Signs Last 24 Hrs  T(C): 36.3 (2019 06:23), Max: 36.7 (2019 15:12)  T(F): 97.3 (2019 06:23), Max: 98 (2019 15:12)  HR: 80 (2019 06:23) (70 - 122)  BP: 139/78 (2019 06:23) (100/70 - 139/78)  BP(mean): --  RR: 20 (2019 06:23) (19 - 22)  SpO2: 99% (2019 06:23) (97% - 100%)  GENERAL: exam limited as patient states he is resting  [x ]Alert  [x ]Oriented x 4  [x ]Lethargic  [ ]Cachexia  [ ]Unarousable  [ x]Verbal  [ ]Non-Verbal    Behavioral:   [ ] Anxiety  [ ] Delirium [ ] Agitation [ ] Other    HEENT:  [ x]Normal   [ ]Dry mouth   [ ]ET Tube/Trach  [ ]Oral lesions    PULMONARY:   [x ]Clear [ ]Tachypnea  [ ]Audible excessive secretions   [ ]Rhonchi        [ ]Right [ ]Left [ ]Bilateral  [ ]Crackles        [ ]Right [ ]Left [ ]Bilateral  [ ]Wheezing     [ ]Right [ ]Left [ ]Bilateral    CARDIOVASCULAR:    [ ]Regular [ ]Irregular [ x]Tachy  [ ]Terry [ ]Murmur [ ]Other    GASTROINTESTINAL:  [ ]Soft  [ ]Distended   [x ]+BS  [ ]Non tender [ ]Tender  [ ]PEG [ ]OGT/ NGT   Last BM:     GENITOURINARY:  [x ]Normal [ ] Incontinent   [ ]Oliguria/Anuria   [ ]Ness    MUSCULOSKELETAL:   [ ]Normal   [ ]Weakness  [ ]Bed/Wheelchair bound [ x]Edema    NEUROLOGIC:   [x ]No focal deficits  [ ] Cognitive impairment  [ ] Dysphagia [ ]Dysarthria [ ] Paresis [ ]Other     SKIN:   [x ]Normal   [ ]Pressure ulcer(s)  [ ]Rash    CRITICAL CARE:  [ ] Shock Present  [ ]Septic [ ]Cardiogenic [ ]Neurologic [ ]Hypovolemic  [ ]  Vasopressors [ ]  Inotropes   [ ] Respiratory failure present  [ ] Acute  [ ] Chronic [ ] Hypoxic  [ ] Hypercarbic [ ] Other  [ ] Other organ failure     LABS:                        8.5    15.83 )-----------( 248      ( 2019 06:35 )             28.6   -    136  |  100  |  48<H>  ----------------------------<  147<H>  4.8   |  20<L>  |  2.00<H>    Ca    8.3<L>      2019 06:35  Phos  4.4     -24  Mg     1.9         TPro  4.9<L>  /  Alb  2.2<L>  /  TBili  0.4  /  DBili  < 0.2  /  AST  148<H>  /  ALT  37  /  AlkPhos  279<H>  -24             Urinalysis Basic - ( 2019 11:22 )    Color: YELLOW / Appearance: CLEAR / S.026 / pH: 5.5  Gluc: NEGATIVE / Ketone: NEGATIVE  / Bili: TRACE / Urobili: TRACE   Blood: NEGATIVE / Protein: 30 / Nitrite: NEGATIVE   Leuk Esterase: NEGATIVE / RBC: 0-2 / WBC 0-2   Sq Epi: OCC / Non Sq Epi: x / Bacteria: NEGATIVE    RADIOLOGY & ADDITIONAL STUDIES: none new    Protein Calorie Malnutrition Present: [ ] yes [ ] no  [ ] PPSV2 < or = 30%  [ ] significant weight loss [ ] poor nutritional intake [ ] anasarca [ ] catabolic state Albumin, Serum: 2.2 g/dL (19 @ 07:15)  Artificial Nutrition [ ]     REFERRALS:   [ ]Chaplaincy  [ ] Hospice  [ ]Child Life  [ ]Social Work  [ ]Case management [ ]Holistic Therapy   Goals of Care Document: INTERVAL HPI/OVERNIGHT EVENTS: Patient did not receive pain medication since yesterday afternoon.  Wife and daughter at bedside. Patient states he feels tired and "worn out". Pt recently treated for salmonella, now has not had bm in several days.     DNR on chart: full code  Allergies    adhesives (Hives)  No Known Drug Allergies    Intolerances    MEDICATIONS  (STANDING):  albumin human 25% IVPB 50 milliLiter(s) IV Intermittent every 8 hours  apixaban 5 milliGRAM(s) Oral every 12 hours  dextrose 5%. 1000 milliLiter(s) (50 mL/Hr) IV Continuous <Continuous>  dextrose 50% Injectable 12.5 Gram(s) IV Push once  dextrose 50% Injectable 25 Gram(s) IV Push once  dextrose 50% Injectable 25 Gram(s) IV Push once  diltiazem    Tablet 60 milliGRAM(s) Oral two times a day  insulin glargine Injectable (LANTUS) 16 Unit(s) SubCutaneous <User Schedule>  insulin lispro (HumaLOG) corrective regimen sliding scale   SubCutaneous three times a day before meals  insulin lispro (HumaLOG) corrective regimen sliding scale   SubCutaneous at bedtime  magnesium oxide 400 milliGRAM(s) Oral daily  metoprolol tartrate 50 milliGRAM(s) Oral two times a day  pancrelipase  (CREON 36,000 Lipase Units) 2 Capsule(s) Oral three times a day with meals  pantoprazole    Tablet 40 milliGRAM(s) Oral <User Schedule>  prochlorperazine   Tablet 10 milliGRAM(s) Oral daily  traZODone 50 milliGRAM(s) Oral at bedtime    MEDICATIONS  (PRN):  acetaminophen   Tablet .. 650 milliGRAM(s) Oral every 6 hours PRN Temp greater or equal to 38C (100.4F), Mild Pain (1 - 3), Moderate Pain (4 - 6)  acetaminophen  IVPB .. 1000 milliGRAM(s) IV Intermittent once PRN Temp greater or equal to 38C (100.4F), Severe Pain (7 - 10)  aluminum hydroxide/magnesium hydroxide/simethicone Suspension 30 milliLiter(s) Oral every 4 hours PRN Dyspepsia  dextrose 40% Gel 15 Gram(s) Oral once PRN Blood Glucose LESS THAN 70 milliGRAM(s)/deciliter  docusate sodium 100 milliGRAM(s) Oral daily PRN Constipation  glucagon  Injectable 1 milliGRAM(s) IntraMuscular once PRN Glucose LESS THAN 70 milligrams/deciliter  HYDROmorphone   Tablet 1 milliGRAM(s) Oral every 4 hours PRN Severe Pain (7 - 10)  polyethylene glycol 3350 17 Gram(s) Oral daily PRN Constipation  senna 2 Tablet(s) Oral at bedtime PRN Constipation    ITEMS UNCHECKED ARE NOT PRESENT    PRESENT SYMPTOMS: [ ]Unable to obtain due to poor mentation   Source if other than patient:  [ ]Family   [ ]Team     Pain (Impact on QOL):  controlled with current regimen.   Location:  Minimal acceptable level (0-10 scale):            Aggravating factors:  Quality:  Radiation:  Severity (0-10 scale):    Timing:    Dyspnea:                           [ ]Mild [ ]Moderate [ ]Severe  Anxiety:                             [ ]Mild [ ]Moderate [ ]Severe  Fatigue:                             [x ]Mild [ ]Moderate [ ]Severe  Nausea:                             [ ]Mild [ ]Moderate [ ]Severe  Loss of appetite:              [x ]Mild [ ]Moderate [ ]Severe  Constipation:                    [x ]Mild [ ]Moderate [ ]Severe    PAIN AD Score:	  [email or web address].[email or web address]/painad.pdf (Ctrl + left click to view)    Other Symptoms: dizzy  [x ]All other review of systems negative     Karnofsky Performance Score/Palliative Performance Status Version 2:  50  %    http://palliative.info/resource_material/PPSv2.pdf    PHYSICAL EXAM:  Vital Signs Last 24 Hrs  T(C): 36.3 (2019 06:23), Max: 36.7 (2019 15:12)  T(F): 97.3 (2019 06:23), Max: 98 (2019 15:12)  HR: 80 (2019 06:23) (70 - 122)  BP: 139/78 (2019 06:23) (100/70 - 139/78)  BP(mean): --  RR: 20 (2019 06:23) (19 - 22)  SpO2: 99% (2019 06:23) (97% - 100%)  GENERAL: exam limited as patient states he is resting  [x ]Alert  [x ]Oriented x 4  [x ]Lethargic  [ ]Cachexia  [ ]Unarousable  [ x]Verbal  [ ]Non-Verbal    Behavioral:   [ ] Anxiety  [ ] Delirium [ ] Agitation [ ] Other    HEENT:  [ x]Normal   [ ]Dry mouth   [ ]ET Tube/Trach  [ ]Oral lesions    PULMONARY:   [x ]Clear [ ]Tachypnea  [ ]Audible excessive secretions   [ ]Rhonchi        [ ]Right [ ]Left [ ]Bilateral  [ ]Crackles        [ ]Right [ ]Left [ ]Bilateral  [ ]Wheezing     [ ]Right [ ]Left [ ]Bilateral    CARDIOVASCULAR:    [ ]Regular [ ]Irregular [ x]Tachy  [ ]Terry [ ]Murmur [ ]Other    GASTROINTESTINAL:  [ ]Soft  [ ]Distended   [x ]+BS  [ ]Non tender [ ]Tender  [ ]PEG [ ]OGT/ NGT   Last BM:     GENITOURINARY:  [x ]Normal [ ] Incontinent   [ ]Oliguria/Anuria   [ ]Ness    MUSCULOSKELETAL:   [ ]Normal   [ ]Weakness  [ ]Bed/Wheelchair bound [ x]Edema    NEUROLOGIC:   [x ]No focal deficits  [ ] Cognitive impairment  [ ] Dysphagia [ ]Dysarthria [ ] Paresis [ ]Other     SKIN:   [x ]Normal   [ ]Pressure ulcer(s)  [ ]Rash    CRITICAL CARE:  [ ] Shock Present  [ ]Septic [ ]Cardiogenic [ ]Neurologic [ ]Hypovolemic  [ ]  Vasopressors [ ]  Inotropes   [ ] Respiratory failure present  [ ] Acute  [ ] Chronic [ ] Hypoxic  [ ] Hypercarbic [ ] Other  [ ] Other organ failure     LABS:                        8.5    15.83 )-----------( 248      ( 2019 06:35 )             28.6   -    136  |  100  |  48<H>  ----------------------------<  147<H>  4.8   |  20<L>  |  2.00<H>    Ca    8.3<L>      2019 06:35  Phos  4.4     -24  Mg     1.9         TPro  4.9<L>  /  Alb  2.2<L>  /  TBili  0.4  /  DBili  < 0.2  /  AST  148<H>  /  ALT  37  /  AlkPhos  279<H>  -24             Urinalysis Basic - ( 2019 11:22 )    Color: YELLOW / Appearance: CLEAR / S.026 / pH: 5.5  Gluc: NEGATIVE / Ketone: NEGATIVE  / Bili: TRACE / Urobili: TRACE   Blood: NEGATIVE / Protein: 30 / Nitrite: NEGATIVE   Leuk Esterase: NEGATIVE / RBC: 0-2 / WBC 0-2   Sq Epi: OCC / Non Sq Epi: x / Bacteria: NEGATIVE    RADIOLOGY & ADDITIONAL STUDIES: reviewed, none new.    Protein Calorie Malnutrition Present: [ ] yes [ ] no  [ ] PPSV2 < or = 30%  [ ] significant weight loss [ ] poor nutritional intake [ ] anasarca [ ] catabolic state Albumin, Serum: 2.2 g/dL (19 @ 07:15)  Artificial Nutrition [ ]     REFERRALS:   [ ]Chaplaincy  [x ] Hospice  [ ]Child Life  [ ]Social Work  [ ]Case management [ ]Holistic Therapy   Goals of Care Document:

## 2019-04-25 NOTE — PROGRESS NOTE ADULT - ASSESSMENT
77 y/o Male, with a PmHx of Atrial Fibrillation (on Eliquis), Benign colon polyps, Cataracts, DM II, CHF, GERD, HLD, Nephrolithiasis, Stress incontinence, MVP, Prostate Cancer (s/p prostatectomy), Varicose veins, and Pancreatic Cancer (s/p whipple and is on chemo), presents today for SOB for 10 days with worsening symptoms past few days. Pt is admitted to telemetry for acute on chronic CHF in the setting of severe electrolyte derangements.               #MANNY:  Baseline Cr ~1.1-1.2, Cr 1.85->2, oliguric, likely pre-renal with third-spacing, concern for ATN  Renal US without hydro, B/L pleural effusions, small volume abdominal ascites, ,105  - continue IV albumin 25% 50 q8  - Urology consult for concern over high PVR, hx of artificial sphincter  - strict I's and O's  - renally dose medications, avoid nephrotoxic agents  - monitor BMP, urine output  - Nephrology recommendations appreciated     #Insomnia:  Trazodone 50 mg qHS

## 2019-04-25 NOTE — PROGRESS NOTE ADULT - SUBJECTIVE AND OBJECTIVE BOX
INTERVAL HPI/OVERNIGHT EVENTS:  Patient seen at bedside.  Had long discussion with wife and daughter at bedside regarding his goals of care. Patient and family would like to focus on quality of life.       VITAL SIGNS:  T(F): 97.3 (19 @ 06:23)  HR: 80 (19 @ 06:23)  BP: 139/78 (19 @ 06:23)  RR: 20 (19 @ 06:23)  SpO2: 99% (19 @ 06:23)  Wt(kg): --    PHYSICAL EXAM:    Constitutional: NAD, resting in bed comfortably    MEDICATIONS  (STANDING):  albumin human 25% IVPB 50 milliLiter(s) IV Intermittent every 8 hours  apixaban 5 milliGRAM(s) Oral every 12 hours  dextrose 5%. 1000 milliLiter(s) (50 mL/Hr) IV Continuous <Continuous>  dextrose 50% Injectable 12.5 Gram(s) IV Push once  dextrose 50% Injectable 25 Gram(s) IV Push once  dextrose 50% Injectable 25 Gram(s) IV Push once  diltiazem    Tablet 60 milliGRAM(s) Oral two times a day  insulin glargine Injectable (LANTUS) 16 Unit(s) SubCutaneous <User Schedule>  insulin lispro (HumaLOG) corrective regimen sliding scale   SubCutaneous three times a day before meals  insulin lispro (HumaLOG) corrective regimen sliding scale   SubCutaneous at bedtime  magnesium oxide 400 milliGRAM(s) Oral daily  metoprolol tartrate 50 milliGRAM(s) Oral two times a day  pancrelipase  (CREON 36,000 Lipase Units) 2 Capsule(s) Oral three times a day with meals  pantoprazole    Tablet 40 milliGRAM(s) Oral <User Schedule>  prochlorperazine   Tablet 10 milliGRAM(s) Oral daily  traZODone 50 milliGRAM(s) Oral at bedtime    MEDICATIONS  (PRN):  acetaminophen   Tablet .. 650 milliGRAM(s) Oral every 6 hours PRN Temp greater or equal to 38C (100.4F), Mild Pain (1 - 3), Moderate Pain (4 - 6)  acetaminophen  IVPB .. 1000 milliGRAM(s) IV Intermittent once PRN Temp greater or equal to 38C (100.4F), Severe Pain (7 - 10)  aluminum hydroxide/magnesium hydroxide/simethicone Suspension 30 milliLiter(s) Oral every 4 hours PRN Dyspepsia  dextrose 40% Gel 15 Gram(s) Oral once PRN Blood Glucose LESS THAN 70 milliGRAM(s)/deciliter  docusate sodium 100 milliGRAM(s) Oral daily PRN Constipation  glucagon  Injectable 1 milliGRAM(s) IntraMuscular once PRN Glucose LESS THAN 70 milligrams/deciliter  HYDROmorphone   Tablet 1 milliGRAM(s) Oral every 4 hours PRN Severe Pain (7 - 10)  polyethylene glycol 3350 17 Gram(s) Oral daily PRN Constipation  senna 2 Tablet(s) Oral at bedtime PRN Constipation      Allergies    adhesives (Hives)  No Known Drug Allergies    Intolerances        LABS:                        8.5    15.83 )-----------( 248      ( 2019 06:35 )             28.6     04    136  |  100  |  48<H>  ----------------------------<  147<H>  4.8   |  20<L>  |  2.00<H>    Ca    8.3<L>      2019 06:35  Phos  4.4     -24  Mg     1.9         TPro  4.9<L>  /  Alb  2.2<L>  /  TBili  0.4  /  DBili  < 0.2  /  AST  148<H>  /  ALT  37  /  AlkPhos  279<H>  -24      Urinalysis Basic - ( 2019 11:22 )    Color: YELLOW / Appearance: CLEAR / S.026 / pH: 5.5  Gluc: NEGATIVE / Ketone: NEGATIVE  / Bili: TRACE / Urobili: TRACE   Blood: NEGATIVE / Protein: 30 / Nitrite: NEGATIVE   Leuk Esterase: NEGATIVE / RBC: 0-2 / WBC 0-2   Sq Epi: OCC / Non Sq Epi: x / Bacteria: NEGATIVE        RADIOLOGY & ADDITIONAL TESTS:  Studies reviewed.

## 2019-04-25 NOTE — PROGRESS NOTE ADULT - PROBLEM SELECTOR PLAN 1
patient responded well to 1mg Dilaudid PO q 4 prn, no doses given yet today.    Continue bowel regimen PRN as pt has h/o diarrhea in the past.  will add miralax prn patient responded well to 1mg Dilaudid PO q 4 prn, no doses given yet today.    Continue bowel regimen PRN as pt has h/o diarrhea in the past.  will add Miralax prn

## 2019-04-25 NOTE — PROGRESS NOTE ADULT - ASSESSMENT
78M diabetes, afib, GERD, hyperlipidemia, prostate ca s/p prostatectomy with recently diagnosed pancreatic cancer s/p Whipple.  On chemotherapy - last 4/4/19-4/6/19 and followed by zarxio - last dose was 4/11.  New hepatic lesion s/p biopsy 4/22.  Steadily rising leukocytosis - most likely due to filgastrim.  It is already better.      Suggest:  - continue to observe off antibiotics  - please call ID if change in status

## 2019-04-25 NOTE — CHART NOTE - NSCHARTNOTEFT_GEN_A_CORE
Called by the [facility] about patient tAtila Mata who was having increased pauses on telemetry after receiving metoprolol and Cardizem for afib. Requested the patient be assessed by me. On arrival the primary PA and Cardiology NP at the bedside and discussing with the family the situation. Explained to the family the effects of the medication are temporary and the pauses and bradycardia would dissipate as the medication wore off. Recommend to have pacer pads placed on the patient in the event of symptomatic bradycardia if need. The patient's wife did not want pads placed and was leaning towards making the patient DNR and filling out a MOLST form. Once again explained to the patient's wife the pads were a precautionary measure at this time and the cause of the pauses and bradycardia was due to medication and wear off in time, but the patient's wife still did not want pads on the patient due to concern from pain in the may cause during resuscitative measures. Spoke with the cardiology NP who spoke with the overnight cardiology fellow and stated to change the metoprolol and cardizem dosing, not to give glucagon and pauses up to 5 seconds were acceptable in a patient with afib.     Plan   - c/w GOC discussion with family, though seems the family would like to de-escalate intervention and make the patient DNR  - change metoprolol and cardizem dosing per cards recs   - monitor on tele     René Greenwood MD PGY3  [facility] Pager #: [phone]  [facility] Pager #: [phone]

## 2019-04-25 NOTE — PROGRESS NOTE ADULT - PROBLEM SELECTOR PLAN 2
Patient follows with Oncologist Dr. Clark.  Metastatic pancreatic adenocarcinoma, s/p whipple surgery 10/2018, on adjuvant FOLFIRINOX, with progression of disease while on adjuvant chemo with new liver lesions, LAP and a lung nodule.  S/p liver bx, pending final path.  Plan is for f/u outpatient with oncology for further treatment. Patient follows with Oncologist Dr. Clark.  Metastatic pancreatic adenocarcinoma, s/p whipple surgery 10/2018, on adjuvant FOLFIRINOX, with progression of disease while on adjuvant chemo with new liver lesions, LAP and a lung nodule.  S/p liver bx, confirmed metastatic pancreatic CA.   Oncology had discussion with pt and family, at this point they want to focus on pts quality of life, agreeable to hospice services at home, confirmed with pts wife and daughter. Hospice referral made.

## 2019-04-25 NOTE — PROVIDER CONTACT NOTE (OTHER) - ASSESSMENT
Abnormal rhythm on monitor at 20:57. Tele tech and RN interpreted as 5 beats of V Tach. Patient sleeping. No s/s of distress.

## 2019-04-25 NOTE — PROVIDER CONTACT NOTE (OTHER) - ACTION/TREATMENT ORDERED:
No interventions ordered at this time. Will continue to monitor.
Printed strip shown to PA, interpreted as A Fib. No interventions ordered at this time. Will continue to monitor.
Deferring to family decision to move forward.

## 2019-04-25 NOTE — PROGRESS NOTE ADULT - ASSESSMENT
78m with pancreatic adenocarcinoma, s/p whipple surgery 10/2018, on adjuvant FOLFIRINOX, with progression of disease while on adjuvant chemo with new liver lesions, LAP and a lung nodule, Atrial Fibrillation (on Eliquis), p/w weakness, SOB for the past 10 days with worsening symptoms over the past few days, found to be in decompensated heart failure.   S/p liver bx 4/22, metastatic pancreatic cancer  Had long discussion with wife and daughter at bedside regarding his goals of care. Patient and family would like to focus on quality of life.     -Appreciate pal care input, home hospice referral  -Cardiology input appreciated  -Supportive care, Pain control, nutrition, PT, DVT ppx  -outpt oncology follow up if desired by patient. Primary oncologist Dr. Clark is aware.     Will follow. Please do not hesitate to call back with questions.     Annabel Avina MD  Medical Oncology Attending

## 2019-04-25 NOTE — PROGRESS NOTE ADULT - SUBJECTIVE AND OBJECTIVE BOX
[facility] DIVISION OF KIDNEY DISEASES AND HYPERTENSION -- FOLLOW UP NOTE  --------------------------------------------------------------------------------  Chief Complaint: sob    77 y/o Male, with a PmHx of Atrial Fibrillation (on Eliquis), Benign colon polyps, Cataracts, DM II, GERD, HLD, Nephrolithiasis, Stress incontinence, MVP, CHF, Prostate Cancer ( s/p prostatectomy), Varicose veins, and Pancreatic Cancer ( s/p whipple chemo 4/4/19), presents today to the [facility] ED for SOB for 10 days before admission. Pt was admitted on 04/18/19, hospital course complicated with MANNY hence nephrology consulted, scr increasing.     24 hour events/subjective:  Pt examined at bed side, as per wife pt still fatigued, poor oral intake received albumin, 425 cc of UOP in 24 hours, pt urinated 20cc during our evaluation bladder scan done showing 105 cc of PVR, no fever, or hypotension reported.       PAST HISTORY  --------------------------------------------------------------------------------  No significant changes to PMH, PSH, FHx, SHx, unless otherwise noted    ALLERGIES & MEDICATIONS  --------------------------------------------------------------------------------  Allergies    adhesives (Hives)  No Known Drug Allergies    Intolerances      Standing Inpatient Medications  albumin human 25% IVPB 50 milliLiter(s) IV Intermittent every 8 hours  apixaban 5 milliGRAM(s) Oral every 12 hours  dextrose 5%. 1000 milliLiter(s) IV Continuous <Continuous>  dextrose 50% Injectable 12.5 Gram(s) IV Push once  dextrose 50% Injectable 25 Gram(s) IV Push once  dextrose 50% Injectable 25 Gram(s) IV Push once  diltiazem    Tablet 60 milliGRAM(s) Oral two times a day  insulin glargine Injectable (LANTUS) 16 Unit(s) SubCutaneous <User Schedule>  insulin lispro (HumaLOG) corrective regimen sliding scale   SubCutaneous three times a day before meals  insulin lispro (HumaLOG) corrective regimen sliding scale   SubCutaneous at bedtime  magnesium oxide 400 milliGRAM(s) Oral daily  metoprolol tartrate 50 milliGRAM(s) Oral two times a day  pancrelipase  (CREON 36,000 Lipase Units) 2 Capsule(s) Oral three times a day with meals  pantoprazole    Tablet 40 milliGRAM(s) Oral <User Schedule>  prochlorperazine   Tablet 10 milliGRAM(s) Oral daily  traZODone 50 milliGRAM(s) Oral at bedtime    PRN Inpatient Medications  acetaminophen   Tablet .. 650 milliGRAM(s) Oral every 6 hours PRN  acetaminophen  IVPB .. 1000 milliGRAM(s) IV Intermittent once PRN  aluminum hydroxide/magnesium hydroxide/simethicone Suspension 30 milliLiter(s) Oral every 4 hours PRN  dextrose 40% Gel 15 Gram(s) Oral once PRN  docusate sodium 100 milliGRAM(s) Oral daily PRN  glucagon  Injectable 1 milliGRAM(s) IntraMuscular once PRN  HYDROmorphone   Tablet 1 milliGRAM(s) Oral every 4 hours PRN  senna 2 Tablet(s) Oral at bedtime PRN      REVIEW OF SYSTEMS  --------------------------------------------------------------------------------  Gen: +weight changes,+ fatigue,  no fevers/chills,  Respiratory: + dyspnea, cough, wheezing, hemoptysis  CV: No chest pain, PND, orthopnea  GI: +abdominal pain,+ diarrhea, constipation,+ nausea, no  vomiting, melena, hematochezia  : decreased uop   MSK: ++ edema  Neuro: No dizziness/lightheadedness, ++weakness,  no seizures, numbness, tingling    All other systems were reviewed and are negative, except as noted.    VITALS/PHYSICAL EXAM  --------------------------------------------------------------------------------  T(C): 36.3 (04-25-19 @ 06:23), Max: 36.7 (04-24-19 @ 15:12)  HR: 80 (04-25-19 @ 06:23) (70 - 122)  BP: 139/78 (04-25-19 @ 06:23) (100/70 - 139/78)  RR: 20 (04-25-19 @ 06:23) (19 - 22)  SpO2: 99% (04-25-19 @ 06:23) (97% - 100%)  Wt(kg): --        04-24-19 @ 07:01  -  04-25-19 @ 07:00  --------------------------------------------------------  IN: 250 mL / OUT: 425 mL / NET: -175 mL    04-25-19 @ 07:01  -  04-25-19 @ 10:05  --------------------------------------------------------  IN: 0 mL / OUT: 20 mL / NET: -20 mL      Physical Exam:    	Gen: fatigued appearing.   	HEENT:  supple neck, dry oral mucosa  	Pulm: Bibasilar crackles but minimal  	CV: RRR, S1S2; no rub  	Back: No spinal or CVA tenderness  	Abd: +BS, soft, tender RUQ, and suprapubic area.   	UE:++ edema; no asterixis  	LE: ++edema      LABS/STUDIES  --------------------------------------------------------------------------------              8.5    15.83 >-----------<  248      [04-25-19 @ 06:35]              28.6     136  |  100  |  48  ----------------------------<  147      [04-25-19 @ 06:35]  4.8   |  20  |  2.00        Ca     8.3     [04-25-19 @ 06:35]      Mg     1.9     [04-25-19 @ 06:35]      Phos  4.4     [04-24-19 @ 07:15]    TPro  4.9  /  Alb  2.2  /  TBili  0.4  /  DBili  < 0.2  /  AST  148  /  ALT  37  /  AlkPhos  279  [04-24-19 @ 07:15]    Serum Osmolality 296      [04-24-19 @ 07:15]    Creatinine Trend:  SCr 2.00 [04-25 @ 06:35]  SCr 1.85 [04-24 @ 07:15]  SCr 1.61 [04-23 @ 06:15]  SCr 1.78 [04-22 @ 07:01]  SCr 1.55 [04-21 @ 04:21]    Urinalysis - [04-24-19 @ 11:22]      Color YELLOW / Appearance CLEAR / SG 1.026 / pH 5.5      Gluc NEGATIVE / Ketone NEGATIVE  / Bili TRACE / Urobili TRACE       Blood NEGATIVE / Protein 30 / Leuk Est NEGATIVE / Nitrite NEGATIVE      RBC 0-2 / WBC 0-2 / Hyaline 1+ / Gran  / Sq Epi OCC / Non Sq Epi  / Bacteria NEGATIVE    Urine Creatinine 256.40      [04-24-19 @ 17:04]  Urine Protein 61.9      [04-24-19 @ 17:04]  Urine Sodium < 20      [04-24-19 @ 17:04]  Urine Potassium 81.0      [04-24-19 @ 17:04]  Urine Chloride 21      [04-24-19 @ 17:04]  Urine Osmolality 398      [04-24-19 @ 17:04]    PTH 75.85 (Ca --)      [04-18-19 @ 15:00]   --  HbA1c 8.3      [04-19-19 @ 06:10]  TSH 1.67      [04-18-19 @ 15:00]  Lipid: chol 101, , HDL 20, LDL 43      [04-18-19 @ 15:00]

## 2019-04-25 NOTE — PROGRESS NOTE ADULT - ASSESSMENT
78 year old male pmh pancreatic ca with mets to lung and liver (s/p whipple), admitted to [facility] for acute on chronic decompensated heart failure.

## 2019-04-25 NOTE — PROGRESS NOTE ADULT - PROBLEM SELECTOR PLAN 5
30 minutes spent of face to face discussion addressing advanced care planning. Hospice referral made.

## 2019-04-25 NOTE — PROGRESS NOTE ADULT - PROBLEM SELECTOR PLAN 5
Hx of prior ablations with Dr. Pyle at [facility]. Follows Regularly with EP Q3 months  Continue with eliquis  Continue home metoprolol 50 BID, diltiazem 60 BID  continue to monitor on telemetry

## 2019-04-25 NOTE — PROGRESS NOTE ADULT - PROBLEM SELECTOR PLAN 4
Pt is full code.   Patient having worsening renal function.  Palliative care following for symptom management, plan is for pt to follow up outpatient with supportive oncology. Please include appointment on d/c summary. Pt is full code.   Patient having worsening renal function.  Palliative care following for symptom management, initially plan was to follow up outpatient with supportive oncology, however today patient's wife and daughter requesting hospice evaluation.   Patient referred to hospice.

## 2019-04-25 NOTE — PROGRESS NOTE ADULT - PROBLEM SELECTOR PLAN 3
treatment pending liver biopsy, per oncology plan for second line chemotherapy if bx proven to be metastatic pancreatic adenocarcinoma. After discussion with oncology, plan is for referral to hospice for home hospice services.

## 2019-04-25 NOTE — PROGRESS NOTE ADULT - PROBLEM SELECTOR PLAN 1
Likely 2/2 CHFe, elevated pro-BNP, pleural effusions on CT chest, LE pitting edema with negative dopplers, TTE grossly mild global left ventricular systolic dysfunction, limited assessment in setting of tachycardia, V/Q low probability  Holding lasix in setting of MANNY   Strict I&Os, daily wts  Continue to monitor on telemetry   Cardiology recommendations appreciated

## 2019-04-25 NOTE — DISCHARGE NOTE NURSING/CASE MANAGEMENT/SOCIAL WORK - NSDCDPATPORTLINK_GEN_ALL_CORE
You can access the [facility] Patient Portal, offered by [facility], by registering with the following website: [email or web address]

## 2019-04-25 NOTE — CHART NOTE - NSCHARTNOTEFT_GEN_A_CORE
Called by tele PA about patient Attila Mata who was having increased  3-4.6 sec pauses on telemetry  since 7pm after receiving metoprolol and Cardizem for Afib at 6pm  for atrial fibrillation with SBP 90s. The patient was assessed by me. Patient was found in bed and wife at bedside .Patient denies chest pain, palpitation ,dizziness or HA .He c/o intermittent feeling hot x few days. /60,HR 70.Pateint wife was explained that the effects of the medication are temporary and the pauses and bradycardia would dissipate as the medication wore off. Recommend to have pacer pads placed on the patient in the event of symptomatic bradycardia if need. The patient's wife did not want pads placed  or any intervention and was leaning towards making the patient DNR and filling out a MOLST form.   Plan:  Spoke to cardiology fellow and recommended to decrease the Cardizem to 30mg and c/w metoprolol once pauses improve. Patient baseline  Afib    c/w GOC discussion with family,

## 2019-04-25 NOTE — PROGRESS NOTE ADULT - SUBJECTIVE AND OBJECTIVE BOX
Patient is a 78y old  Male who presents with a chief complaint of SOB X 10 days with increasing severity past few days (2019 13:04)    f/u leukocytosis    Interval History/ROS:  no fever.  no diarrhea.  some abdominal bloating.  swelling remains.  Remainder of ROS otherwise negative.    PAST MEDICAL & SURGICAL HISTORY:  Pancreatic cancer  Type II diabetes mellitus: since , on insulin  Nephrolithiasis: , resolved  Male stress incontinence: S/p artificial male urinary sphincter placement  Varicose vein: (L) 5 years ago    had venous stripping   Bilateral cataracts: removed with lens implants  Benign colon polyp: removed colonoscopy   Prostate cancer:  prostatectomy  stable  Afib:  s/p ablation  , afib resolved   on Eliquies  Hyperlipidemia: X 4 years not on any meds  GERD (Gastroesophageal Reflux Disease): X 10 years  Renal Calculi:   MVP (Mitral Valve Prolapse): X 8 years stable  History of pancreatic surgery: s/p Whipple procedure   Status post right knee surgery: on 2018  Status post left knee replacement:   S/P ablation operation for arrhythmia: in   Male stress incontinence: s/p artifical urinary sphincter 2012  Varicose vein-s/p vein stripping 5 years ago  S/P arthroscopy: right shoulder   S/P prostatectomy: radical robotic 6/10  Cosmetic Surgery: chin implant   S/P Colonoscopy with Polypectomy: in   S/P Appendectomy: in     Allergies  adhesives (Hives)  No Known Drug Allergies    ANTIMICROBIALS:  none    MEDICATIONS  (STANDING):  apixaban 5 every 12 hours  diltiazem    Tablet 60 two times a day  insulin glargine Injectable (LANTUS) 16 <User Schedule>  insulin lispro (HumaLOG) corrective regimen sliding scale  three times a day before meals  insulin lispro (HumaLOG) corrective regimen sliding scale  at bedtime  metoprolol tartrate 50 two times a day  pancrelipase  (CREON 36,000 Lipase Units) 2 three times a day with meals  pantoprazole    Tablet 40 <User Schedule>  prochlorperazine   Tablet 10 daily  traZODone 50 at bedtime    Vital Signs Last 24 Hrs  T(F): 97.3 (19 @ 06:23), Max: 98 (19 @ 15:12)  HR: 80 (19 @ 06:23)  BP: 139/78 (19 @ 06:23)  RR: 20 (19 @ 06:23)  SpO2: 99% (19 @ 06:23) (97% - 100%)    PHYSICAL EXAM:  General: non-toxic  HEAD/EYES: anicteric  ENT:  supple  Cardiovascular:   S1, S2 +edema  Respiratory:  clear bilaterally  GI:  soft, non-tender, normal bowel sounds; distended  :  no villa  Musculoskeletal:  no synovitis  Neurologic:  grossly non-focal  Skin:  no rash  Psychiatric:  appropriate affect  Vascular:  no phlebitis    Serum Pro-Brain Natriuretic Peptide: 7013: pg/mL pg/mL (19 @ 15:00)  Serum Pro-Brain Natriuretic Peptide: 37790: pg/mL (19 @ 19:05)                        8.5    15. )-----------( 248      ( 2019 06:35 )             28.6     136  |  100  |  48  ----------------------------<  147  4.8   |  20  |  2.00  Ca    8.3      2019 06:35Phos  4.4     Mg     1.9       TPro  4.9  /  Alb  2.2  /  TBili  0.4  /  DBili  < 0.2  /  AST  148  /  ALT  37  /  AlkPhos  279      WBC Count: 15.83 (19 @ 06:35)  WBC Count: 15.83 (19 @ 06:35)  WBC Count: 18.26 (19 @ 07:15)  WBC Count: 17.70 (19 @ 06:15)  WBC Count: 16.24 (19 @ 07:01)  WBC Count: 16.24 (19 @ 07:01)  WBC Count: 16.75 (19 @ 04:21)  WBC Count: 16.75 (19 @ 04:21)  WBC Count: 14.07 (19 @ 02:18)  WBC Count: 14.07 (19 @ 02:18)  WBC Count: 14.27 (19 @ 16:57)  WBC Count: 13.58 (19 @ 15:00)    Urinalysis Basic - ( 2019 11:22 )  Color: YELLOW / Appearance: CLEAR / S.026 / pH: 5.5  Gluc: NEGATIVE / Ketone: NEGATIVE  / Bili: TRACE / Urobili: TRACE   Blood: NEGATIVE / Protein: 30 / Nitrite: NEGATIVE   Leuk Esterase: NEGATIVE / RBC: 0-2 / WBC 0-2   Sq Epi: OCC / Non Sq Epi: x / Bacteria: NEGATIVE    MICROBIOLOGY:  GI panel negative    Culture - Stool (19 @ 09:41)    Culture - Stool:   **********************FINAL REPORT************************  CULTURE NEGATIVE FOR SALMONELLA, SHIGELLA, YERSINIA, E COLI  O157, AEROMONAS, PLESIOMONAS, CAMPYLOBACTER AND VIBRIO SP.  **********************************************************    Specimen Source: FECES    RADIOLOGY:  imaging below personally reviewed    IR Procedure (19 @ 15:34) >  Impression: Successful ultrasound-guided core needle biopsy of right hepatic mass.    US Abdomen Limited (19 @ 10:24) >  IMPRESSION: Large heterogeneous mass in the right hepatic lobe, suspicious for neoplasm.    Transthoracic Echocardiogram (19 @ 12:50) >  CONCLUSIONS:  1. Normal left ventricular internal dimensions and wall thicknesses.  2. Limited left ventricular function assessment in setting of tachycardia, grossly mild global left ventricular systolic dysfunction.  3. Normal right ventricular size and function.    US Duplex Venous Lower Ext Complete, Bilateral (19 @ 14:25) >  IMPRESSION:  No evidence of bilateral lower extremity deep venous thrombosis.    Xray Chest 2 Views PA/Lat (19 @ 20:17) >  IMPRESSION:   Clear lungs.    CT Chest w/ IV Cont (04.15.19 @ 10:49) >  IMPRESSION: Interval Whipple procedure. Thickening and heterogeneity of small bowel loops associated with the choledochojejunostomy and pancreaticojejunostomy may be related to enteritis and/or tumoral infiltration in the periportal region.  Interval development of hepatic lesions compatible with metastatic disease.  Interval development of small bowel mesenteric adenopathy.  Interval development of abdominal retroperitoneal adenopathy.  Interval development of a small amount of abdominal and moderate amount of pelvic ascites.  New 3 mm left lower lobe lung nodule.

## 2019-04-25 NOTE — PROGRESS NOTE ADULT - PROBLEM SELECTOR PLAN 1
Pt with MANNY most likely hemodynamically mediated in the setting of poor oral intake, diarrhea, hypotension, diuretic and ARB use, with possible ischemic ATN component. On review of labs in [facility], pt had scr of 0.77 in 2017 in 2018 was 0.94 mg/dl, in 08/18 pt had episode of MANNY scr peaked at ~8 developed ischemic ATN after having septic shock, did not require HD, scr improved progressively to 1 mg/dl (12/18). During this admission scr has been trending up from 1.1 mg/dl on 04/18/19 and now 2 from 1.8 yesterday.  has FeNA of 0.09%, compatible with pre renal, Even though pt is edematous,  pt is intravascular depleted, has low albumin. Suggest to continue with albumin 25% 50 ml every 8 hours, monitor volume status and urine output closely, continues to have Vishal PVR, suggest urology evaluation for possible catheter, retaining urine will put him at risk for UTI.Avoid nephrotoxics, monitor labs daily, renal adjust all meds.

## 2019-04-25 NOTE — CHART NOTE - NSCHARTNOTEFT_GEN_A_CORE
Patient with multiples pauses on tele after receiving cardizem 60mg and metoprolol 50mg at 6pm for Afib. Longest pause up to 4.6 sec. Most recent pause 4.15. Patient is asymptomatic. Zoll and atropine at bedside. Wife at bedside refusing placing pacer pads on patient at this time and would like to further discuss with family regarding code status. Per cardiology pauses are temporary due to medications. Recommended decreasing cardizem to 30mg at 10AM/10PM and ok to continue metoprolol 50mg bid as ordered. Discussed with hospitalist on call. Will hold medications if patient continues to have pauses. Will continue to monitor closely.    Vital Signs Last 24 Hrs  T(C): 36.7 (25 Apr 2019 21:03), Max: 37.1 (25 Apr 2019 19:44)  T(F): 98 (25 Apr 2019 21:03), Max: 98.7 (25 Apr 2019 19:44)  HR: 80 (25 Apr 2019 21:03) (52 - 118)  BP: 98/60 (25 Apr 2019 21:03) (95/63 - 139/78)  BP(mean): --  RR: 17 (25 Apr 2019 21:03) (17 - 20)  SpO2: 98% (25 Apr 2019 21:03) (98% - 100%)

## 2019-04-26 VITALS
HEART RATE: 115 BPM | DIASTOLIC BLOOD PRESSURE: 61 MMHG | RESPIRATION RATE: 16 BRPM | TEMPERATURE: 98 F | OXYGEN SATURATION: 98 % | SYSTOLIC BLOOD PRESSURE: 111 MMHG

## 2019-04-26 LAB
ANION GAP SERPL CALC-SCNC: 14 MMO/L — SIGNIFICANT CHANGE UP (ref 7–14)
BASOPHILS # BLD AUTO: 0.02 K/UL — SIGNIFICANT CHANGE UP (ref 0–0.2)
BASOPHILS NFR BLD AUTO: 0.1 % — SIGNIFICANT CHANGE UP (ref 0–2)
BUN SERPL-MCNC: 51 MG/DL — HIGH (ref 7–23)
CALCIUM SERPL-MCNC: 8.3 MG/DL — LOW (ref 8.4–10.5)
CHLORIDE SERPL-SCNC: 101 MMOL/L — SIGNIFICANT CHANGE UP (ref 98–107)
CO2 SERPL-SCNC: 21 MMOL/L — LOW (ref 22–31)
CREAT SERPL-MCNC: 2.04 MG/DL — HIGH (ref 0.5–1.3)
EOSINOPHIL # BLD AUTO: 0.06 K/UL — SIGNIFICANT CHANGE UP (ref 0–0.5)
EOSINOPHIL NFR BLD AUTO: 0.3 % — SIGNIFICANT CHANGE UP (ref 0–6)
GLUCOSE SERPL-MCNC: 178 MG/DL — HIGH (ref 70–99)
HCT VFR BLD CALC: 29 % — LOW (ref 39–50)
HCT VFR BLD CALC: 29 % — LOW (ref 39–50)
HGB BLD-MCNC: 8.8 G/DL — LOW (ref 13–17)
HGB BLD-MCNC: 8.8 G/DL — LOW (ref 13–17)
IMM GRANULOCYTES NFR BLD AUTO: 1.3 % — SIGNIFICANT CHANGE UP (ref 0–1.5)
LYMPHOCYTES # BLD AUTO: 0.88 K/UL — LOW (ref 1–3.3)
LYMPHOCYTES # BLD AUTO: 4.9 % — LOW (ref 13–44)
MAGNESIUM SERPL-MCNC: 2.2 MG/DL — SIGNIFICANT CHANGE UP (ref 1.6–2.6)
MCHC RBC-ENTMCNC: 28.2 PG — SIGNIFICANT CHANGE UP (ref 27–34)
MCHC RBC-ENTMCNC: 28.2 PG — SIGNIFICANT CHANGE UP (ref 27–34)
MCHC RBC-ENTMCNC: 30.3 % — LOW (ref 32–36)
MCHC RBC-ENTMCNC: 30.3 % — LOW (ref 32–36)
MCV RBC AUTO: 92.9 FL — SIGNIFICANT CHANGE UP (ref 80–100)
MCV RBC AUTO: 92.9 FL — SIGNIFICANT CHANGE UP (ref 80–100)
MONOCYTES # BLD AUTO: 2.15 K/UL — HIGH (ref 0–0.9)
MONOCYTES NFR BLD AUTO: 11.9 % — SIGNIFICANT CHANGE UP (ref 2–14)
NEUTROPHILS # BLD AUTO: 14.71 K/UL — HIGH (ref 1.8–7.4)
NEUTROPHILS NFR BLD AUTO: 81.5 % — HIGH (ref 43–77)
NRBC # FLD: 0 K/UL — SIGNIFICANT CHANGE UP (ref 0–0)
NRBC # FLD: 0 K/UL — SIGNIFICANT CHANGE UP (ref 0–0)
PLATELET # BLD AUTO: 235 K/UL — SIGNIFICANT CHANGE UP (ref 150–400)
PLATELET # BLD AUTO: 235 K/UL — SIGNIFICANT CHANGE UP (ref 150–400)
PMV BLD: 10.2 FL — SIGNIFICANT CHANGE UP (ref 7–13)
PMV BLD: 10.2 FL — SIGNIFICANT CHANGE UP (ref 7–13)
POTASSIUM SERPL-MCNC: 4.7 MMOL/L — SIGNIFICANT CHANGE UP (ref 3.5–5.3)
POTASSIUM SERPL-SCNC: 4.7 MMOL/L — SIGNIFICANT CHANGE UP (ref 3.5–5.3)
RBC # BLD: 3.12 M/UL — LOW (ref 4.2–5.8)
RBC # BLD: 3.12 M/UL — LOW (ref 4.2–5.8)
RBC # FLD: 16.4 % — HIGH (ref 10.3–14.5)
RBC # FLD: 16.4 % — HIGH (ref 10.3–14.5)
SODIUM SERPL-SCNC: 136 MMOL/L — SIGNIFICANT CHANGE UP (ref 135–145)
WBC # BLD: 18.05 K/UL — HIGH (ref 3.8–10.5)
WBC # BLD: 18.05 K/UL — HIGH (ref 3.8–10.5)
WBC # FLD AUTO: 18.05 K/UL — HIGH (ref 3.8–10.5)
WBC # FLD AUTO: 18.05 K/UL — HIGH (ref 3.8–10.5)

## 2019-04-26 PROCEDURE — 99232 SBSQ HOSP IP/OBS MODERATE 35: CPT | Mod: GC

## 2019-04-26 PROCEDURE — 99233 SBSQ HOSP IP/OBS HIGH 50: CPT | Mod: GC

## 2019-04-26 PROCEDURE — 99239 HOSP IP/OBS DSCHRG MGMT >30: CPT

## 2019-04-26 RX ORDER — PROCHLORPERAZINE MALEATE 5 MG
0 TABLET ORAL
Qty: 0 | Refills: 0 | COMMUNITY

## 2019-04-26 RX ORDER — DILTIAZEM HCL 120 MG
1 CAPSULE, EXT RELEASE 24 HR ORAL
Qty: 0 | Refills: 0 | COMMUNITY
Start: 2019-04-26

## 2019-04-26 RX ORDER — DOCUSATE SODIUM 100 MG
1 CAPSULE ORAL
Qty: 30 | Refills: 0 | OUTPATIENT
Start: 2019-04-26 | End: 2019-05-25

## 2019-04-26 RX ORDER — LOPERAMIDE HCL 2 MG
2 TABLET ORAL
Qty: 0 | Refills: 0 | COMMUNITY

## 2019-04-26 RX ORDER — MAGNESIUM OXIDE 400 MG ORAL TABLET 241.3 MG
2 TABLET ORAL
Qty: 60 | Refills: 0 | OUTPATIENT
Start: 2019-04-26 | End: 2019-05-25

## 2019-04-26 RX ORDER — LOSARTAN POTASSIUM 100 MG/1
1 TABLET, FILM COATED ORAL
Qty: 0 | Refills: 0 | COMMUNITY

## 2019-04-26 RX ORDER — POLYETHYLENE GLYCOL 3350 17 G/17G
17 POWDER, FOR SOLUTION ORAL
Qty: 340 | Refills: 0 | OUTPATIENT
Start: 2019-04-26 | End: 2019-05-25

## 2019-04-26 RX ORDER — PANTOPRAZOLE SODIUM 20 MG/1
1 TABLET, DELAYED RELEASE ORAL
Qty: 15 | Refills: 0 | OUTPATIENT
Start: 2019-04-26 | End: 2019-05-25

## 2019-04-26 RX ORDER — TRAZODONE HCL 50 MG
1 TABLET ORAL
Qty: 30 | Refills: 0 | OUTPATIENT
Start: 2019-04-26 | End: 2019-05-25

## 2019-04-26 RX ORDER — HYDROMORPHONE HYDROCHLORIDE 2 MG/ML
0.5 INJECTION INTRAMUSCULAR; INTRAVENOUS; SUBCUTANEOUS
Qty: 90 | Refills: 0 | OUTPATIENT
Start: 2019-04-26 | End: 2019-05-25

## 2019-04-26 RX ORDER — POTASSIUM CHLORIDE 20 MEQ
1 PACKET (EA) ORAL
Qty: 0 | Refills: 0 | COMMUNITY

## 2019-04-26 RX ORDER — PROCHLORPERAZINE MALEATE 5 MG
1 TABLET ORAL
Qty: 30 | Refills: 0 | OUTPATIENT
Start: 2019-04-26 | End: 2019-05-25

## 2019-04-26 RX ORDER — METOPROLOL TARTRATE 50 MG
1 TABLET ORAL
Qty: 0 | Refills: 0 | COMMUNITY
Start: 2019-04-26

## 2019-04-26 RX ORDER — MAGNESIUM OXIDE 400 MG ORAL TABLET 241.3 MG
1 TABLET ORAL
Qty: 0 | Refills: 0 | COMMUNITY

## 2019-04-26 RX ORDER — HYDROMORPHONE HYDROCHLORIDE 2 MG/ML
1 INJECTION INTRAMUSCULAR; INTRAVENOUS; SUBCUTANEOUS
Qty: 0 | Refills: 0 | COMMUNITY
Start: 2019-04-26

## 2019-04-26 RX ORDER — LIPASE/PROTEASE/AMYLASE 16-48-48K
0 CAPSULE,DELAYED RELEASE (ENTERIC COATED) ORAL
Qty: 0 | Refills: 0 | COMMUNITY

## 2019-04-26 RX ORDER — LIPASE/PROTEASE/AMYLASE 16-48-48K
2 CAPSULE,DELAYED RELEASE (ENTERIC COATED) ORAL
Qty: 180 | Refills: 0 | OUTPATIENT
Start: 2019-04-26 | End: 2019-05-25

## 2019-04-26 RX ORDER — APIXABAN 2.5 MG/1
1 TABLET, FILM COATED ORAL
Qty: 0 | Refills: 0 | COMMUNITY
Start: 2019-04-26

## 2019-04-26 RX ORDER — PANTOPRAZOLE SODIUM 20 MG/1
1 TABLET, DELAYED RELEASE ORAL
Qty: 0 | Refills: 0 | COMMUNITY

## 2019-04-26 RX ORDER — ALBUMIN HUMAN 25 %
50 VIAL (ML) INTRAVENOUS EVERY 8 HOURS
Qty: 0 | Refills: 0 | Status: DISCONTINUED | OUTPATIENT
Start: 2019-04-26 | End: 2019-04-26

## 2019-04-26 RX ORDER — SENNA PLUS 8.6 MG/1
2 TABLET ORAL
Qty: 60 | Refills: 0 | OUTPATIENT
Start: 2019-04-26 | End: 2019-05-25

## 2019-04-26 RX ADMIN — HYDROMORPHONE HYDROCHLORIDE 1 MILLIGRAM(S): 2 INJECTION INTRAMUSCULAR; INTRAVENOUS; SUBCUTANEOUS at 18:15

## 2019-04-26 RX ADMIN — Medication 10 MILLIGRAM(S): at 11:11

## 2019-04-26 RX ADMIN — Medication 2 CAPSULE(S): at 11:12

## 2019-04-26 RX ADMIN — Medication 50 MILLIGRAM(S): at 00:10

## 2019-04-26 RX ADMIN — APIXABAN 5 MILLIGRAM(S): 2.5 TABLET, FILM COATED ORAL at 07:09

## 2019-04-26 RX ADMIN — Medication 30 MILLIGRAM(S): at 11:11

## 2019-04-26 RX ADMIN — APIXABAN 5 MILLIGRAM(S): 2.5 TABLET, FILM COATED ORAL at 17:20

## 2019-04-26 RX ADMIN — HYDROMORPHONE HYDROCHLORIDE 1 MILLIGRAM(S): 2 INJECTION INTRAMUSCULAR; INTRAVENOUS; SUBCUTANEOUS at 04:40

## 2019-04-26 RX ADMIN — HYDROMORPHONE HYDROCHLORIDE 1 MILLIGRAM(S): 2 INJECTION INTRAMUSCULAR; INTRAVENOUS; SUBCUTANEOUS at 17:14

## 2019-04-26 RX ADMIN — MAGNESIUM OXIDE 400 MG ORAL TABLET 400 MILLIGRAM(S): 241.3 TABLET ORAL at 11:11

## 2019-04-26 RX ADMIN — Medication 50 MILLILITER(S): at 13:39

## 2019-04-26 RX ADMIN — Medication 2 CAPSULE(S): at 07:13

## 2019-04-26 RX ADMIN — Medication 30 MILLIGRAM(S): at 00:10

## 2019-04-26 RX ADMIN — HYDROMORPHONE HYDROCHLORIDE 1 MILLIGRAM(S): 2 INJECTION INTRAMUSCULAR; INTRAVENOUS; SUBCUTANEOUS at 04:03

## 2019-04-26 RX ADMIN — INSULIN GLARGINE 16 UNIT(S): 100 INJECTION, SOLUTION SUBCUTANEOUS at 11:02

## 2019-04-26 RX ADMIN — Medication 50 MILLIGRAM(S): at 07:09

## 2019-04-26 RX ADMIN — Medication 50 MILLIGRAM(S): at 17:21

## 2019-04-26 RX ADMIN — Medication 2 CAPSULE(S): at 17:20

## 2019-04-26 RX ADMIN — PANTOPRAZOLE SODIUM 40 MILLIGRAM(S): 20 TABLET, DELAYED RELEASE ORAL at 07:10

## 2019-04-26 NOTE — PROGRESS NOTE ADULT - PROVIDER SPECIALTY LIST ADULT
Cardiology
Heme/Onc
Hospitalist
Infectious Disease
Nephrology
Nephrology
Palliative Care
Heme/Onc
Hospitalist

## 2019-04-26 NOTE — PROVIDER CONTACT NOTE (MEDICATION) - BACKGROUND
Pt. having frequent pauses this evening to 4.6s, VSS except bradycardia at times to the 40s, asymptomatic

## 2019-04-26 NOTE — PROGRESS NOTE ADULT - ASSESSMENT
77 y/o Male, with a PmHx of Atrial Fibrillation (on Eliquis), Benign colon polyps, Cataracts, DM II, CHF, GERD, HLD, Nephrolithiasis, Stress incontinence, MVP, Prostate Cancer (s/p prostatectomy), Varicose veins, and Pancreatic Cancer (s/p whipple and is on chemo), presents today for SOB for 10 days with worsening symptoms past few days. Pt is admitted to telemetry for acute on chronic CHF in the setting of severe electrolyte derangements.               #MANNY:  Baseline Cr ~1.1-1.2, Cr 1.85->2, oliguric, likely pre-renal with third-spacing, concern for ATN  Renal US without hydro, B/L pleural effusions, small volume abdominal ascites, ,105  - patient to go to home hospice  - No additional blood-work necessary  - Nephrology recommendations appreciated     #Insomnia:  Trazodone 50 mg qHS

## 2019-04-26 NOTE — PROGRESS NOTE ADULT - SUBJECTIVE AND OBJECTIVE BOX
INTERVAL HPI/OVERNIGHT EVENTS: Patient received 1 dose of PO dilaudid in past 24 hours.  Patient's family at bedside, states patient was very alert and awake last night, however today patient is a little sleepy however easily awakend.     DNR on chart: no    Allergies    adhesives (Hives)  No Known Drug Allergies    Intolerances    MEDICATIONS  (STANDING):  albumin human 25% IVPB 50 milliLiter(s) IV Intermittent every 8 hours  apixaban 5 milliGRAM(s) Oral every 12 hours  dextrose 5%. 1000 milliLiter(s) (50 mL/Hr) IV Continuous <Continuous>  dextrose 50% Injectable 12.5 Gram(s) IV Push once  dextrose 50% Injectable 25 Gram(s) IV Push once  dextrose 50% Injectable 25 Gram(s) IV Push once  diltiazem    Tablet 30 milliGRAM(s) Oral <User Schedule>  insulin glargine Injectable (LANTUS) 16 Unit(s) SubCutaneous <User Schedule>  insulin lispro (HumaLOG) corrective regimen sliding scale   SubCutaneous three times a day before meals  insulin lispro (HumaLOG) corrective regimen sliding scale   SubCutaneous at bedtime  magnesium oxide 400 milliGRAM(s) Oral daily  metoprolol tartrate 50 milliGRAM(s) Oral two times a day  pancrelipase  (CREON 36,000 Lipase Units) 2 Capsule(s) Oral three times a day with meals  pantoprazole    Tablet 40 milliGRAM(s) Oral <User Schedule>  prochlorperazine   Tablet 10 milliGRAM(s) Oral daily  traZODone 50 milliGRAM(s) Oral at bedtime    MEDICATIONS  (PRN):  acetaminophen   Tablet .. 650 milliGRAM(s) Oral every 6 hours PRN Temp greater or equal to 38C (100.4F), Mild Pain (1 - 3), Moderate Pain (4 - 6)  acetaminophen  IVPB .. 1000 milliGRAM(s) IV Intermittent once PRN Temp greater or equal to 38C (100.4F), Severe Pain (7 - 10)  aluminum hydroxide/magnesium hydroxide/simethicone Suspension 30 milliLiter(s) Oral every 4 hours PRN Dyspepsia  dextrose 40% Gel 15 Gram(s) Oral once PRN Blood Glucose LESS THAN 70 milliGRAM(s)/deciliter  docusate sodium 100 milliGRAM(s) Oral daily PRN Constipation  glucagon  Injectable 1 milliGRAM(s) IntraMuscular once PRN Glucose LESS THAN 70 milligrams/deciliter  HYDROmorphone   Tablet 1 milliGRAM(s) Oral every 4 hours PRN Severe Pain (7 - 10)  polyethylene glycol 3350 17 Gram(s) Oral daily PRN Constipation  senna 2 Tablet(s) Oral at bedtime PRN Constipation      ITEMS UNCHECKED ARE NOT PRESENT    PRESENT SYMPTOMS: [ ]Unable to obtain due to poor mentation   Source if other than patient:  [ ]Family   [ ]Team     Pain (Impact on QOL):  controlled with current regimen.   Location:  Minimal acceptable level (0-10 scale):            Aggravating factors:  Quality:  Radiation:  Severity (0-10 scale):  3/10  Timing:    Dyspnea:                           [ ]Mild [ ]Moderate [ ]Severe  Anxiety:                             [ ]Mild [ ]Moderate [ ]Severe  Fatigue:                             [x ]Mild [ ]Moderate [ ]Severe  Nausea:                             [ ]Mild [ ]Moderate [ ]Severe  Loss of appetite:              [x ]Mild [ ]Moderate [ ]Severe  Constipation:                    [x ]Mild [ ]Moderate [ ]Severe    PAIN AD Score:	  [email or web address] (Ctrl + left click to view)    Other Symptoms: dizzy  [x ]All other review of systems negative     Karnofsky Performance Score/Palliative Performance Status Version 2:  50  %    http://palliative.info/resource_material/[email or web address].pdf    PHYSICAL EXAM:  Vital Signs Last 24 Hrs  T(C): 36.4 (26 Apr 2019 11:07), Max: 37.1 (25 Apr 2019 19:44)  T(F): 97.6 (26 Apr 2019 11:07), Max: 98.7 (25 Apr 2019 19:44)  HR: 91 (26 Apr 2019 11:07) (52 - 118)  BP: 110/59 (26 Apr 2019 11:07) (95/63 - 124/61)  BP(mean): --  RR: 18 (26 Apr 2019 11:07) (16 - 18)  SpO2: 98% (26 Apr 2019 11:07) (98% - 100%)    GENERAL: exam limited as patient states he is resting  [x ]Alert  [x ]Oriented x 3 (initially states he is at [facility], then corrects to [facility])  [x ]Lethargic  [ ]Cachexia  [ ]Unarousable  [ x]Verbal  [ ]Non-Verbal    Behavioral:   [ ] Anxiety  [ ] Delirium [ ] Agitation [ ] Other    HEENT:  [ x]Normal   [ ]Dry mouth   [ ]ET Tube/Trach  [ ]Oral lesions    PULMONARY:   [x ]Clear [ ]Tachypnea  [ ]Audible excessive secretions   [ ]Rhonchi        [ ]Right [ ]Left [ ]Bilateral  [ ]Crackles        [ ]Right [ ]Left [ ]Bilateral  [ ]Wheezing     [ ]Right [ ]Left [ ]Bilateral    CARDIOVASCULAR:    [ ]Regular [ ]Irregular [ x]Tachy  [ ]Terry [ ]Murmur [ ]Other    GASTROINTESTINAL:  [ ]Soft  [ ]Distended   [x ]+BS  [ ]Non tender [ ]Tender  [ ]PEG [ ]OGT/ NGT   Last BM:     GENITOURINARY:  [x ]Normal [ ] Incontinent   [ ]Oliguria/Anuria   [ ]Ness    MUSCULOSKELETAL:   [ ]Normal   [ ]Weakness  [ ]Bed/Wheelchair bound [ x]Edema    NEUROLOGIC:   [x ]No focal deficits  [ ] Cognitive impairment  [ ] Dysphagia [ ]Dysarthria [ ] Paresis [ ]Other     SKIN:   [x ]Normal   [ ]Pressure ulcer(s)  [ ]Rash    CRITICAL CARE:  [ ] Shock Present  [ ]Septic [ ]Cardiogenic [ ]Neurologic [ ]Hypovolemic  [ ]  Vasopressors [ ]  Inotropes   [ ] Respiratory failure present  [ ] Acute  [ ] Chronic [ ] Hypoxic  [ ] Hypercarbic [ ] Other  [ ] Other organ failure     LABS:                                   8.8    18.05 )-----------( 235      ( 26 Apr 2019 05:50 )             29.0   04-26    136  |  101  |  51<H>  ----------------------------<  178<H>  4.7   |  21<L>  |  2.04<H>    Ca    8.3<L>      26 Apr 2019 05:50  Mg     2.2     04-26      RADIOLOGY & ADDITIONAL STUDIES: reviewed, none new.    Protein Calorie Malnutrition Present: [ ] yes [ ] no  [ ] PPSV2 < or = 30%  [ ] significant weight loss [ ] poor nutritional intake [ ] anasarca [ ] catabolic state Albumin, Serum: 2.2 g/dL (04-24-19 @ 07:15)  Artificial Nutrition [ ]     REFERRALS:   [ ]Chaplaincy  [x ] Hospice  [ ]Child Life  [ ]Social Work  [ ]Case management [ ]Holistic Therapy   Goals of Care Document: INTERVAL HPI/OVERNIGHT EVENTS: Patient received 1 dose of PO dilaudid in past 24 hours.  Patient's family at bedside, states patient was very alert and awake last night, however today patient is a little sleepy however easily awakend.     DNR on chart: no    Allergies    adhesives (Hives)  No Known Drug Allergies    Intolerances    MEDICATIONS  (STANDING):  albumin human 25% IVPB 50 milliLiter(s) IV Intermittent every 8 hours  apixaban 5 milliGRAM(s) Oral every 12 hours  dextrose 5%. 1000 milliLiter(s) (50 mL/Hr) IV Continuous <Continuous>  dextrose 50% Injectable 12.5 Gram(s) IV Push once  dextrose 50% Injectable 25 Gram(s) IV Push once  dextrose 50% Injectable 25 Gram(s) IV Push once  diltiazem    Tablet 30 milliGRAM(s) Oral <User Schedule>  insulin glargine Injectable (LANTUS) 16 Unit(s) SubCutaneous <User Schedule>  insulin lispro (HumaLOG) corrective regimen sliding scale   SubCutaneous three times a day before meals  insulin lispro (HumaLOG) corrective regimen sliding scale   SubCutaneous at bedtime  magnesium oxide 400 milliGRAM(s) Oral daily  metoprolol tartrate 50 milliGRAM(s) Oral two times a day  pancrelipase  (CREON 36,000 Lipase Units) 2 Capsule(s) Oral three times a day with meals  pantoprazole    Tablet 40 milliGRAM(s) Oral <User Schedule>  prochlorperazine   Tablet 10 milliGRAM(s) Oral daily  traZODone 50 milliGRAM(s) Oral at bedtime    MEDICATIONS  (PRN):  acetaminophen   Tablet .. 650 milliGRAM(s) Oral every 6 hours PRN Temp greater or equal to 38C (100.4F), Mild Pain (1 - 3), Moderate Pain (4 - 6)  acetaminophen  IVPB .. 1000 milliGRAM(s) IV Intermittent once PRN Temp greater or equal to 38C (100.4F), Severe Pain (7 - 10)  aluminum hydroxide/magnesium hydroxide/simethicone Suspension 30 milliLiter(s) Oral every 4 hours PRN Dyspepsia  dextrose 40% Gel 15 Gram(s) Oral once PRN Blood Glucose LESS THAN 70 milliGRAM(s)/deciliter  docusate sodium 100 milliGRAM(s) Oral daily PRN Constipation  glucagon  Injectable 1 milliGRAM(s) IntraMuscular once PRN Glucose LESS THAN 70 milligrams/deciliter  HYDROmorphone   Tablet 1 milliGRAM(s) Oral every 4 hours PRN Severe Pain (7 - 10)  polyethylene glycol 3350 17 Gram(s) Oral daily PRN Constipation  senna 2 Tablet(s) Oral at bedtime PRN Constipation    ITEMS UNCHECKED ARE NOT PRESENT    PRESENT SYMPTOMS: [ ]Unable to obtain due to poor mentation   Source if other than patient:  [ ]Family   [ ]Team     Pain (Impact on QOL):  controlled with current regimen.   Location:  Minimal acceptable level (0-10 scale):            Aggravating factors:  Quality:  Radiation:  Severity (0-10 scale):  3/10  Timing:    Dyspnea:                           [ ]Mild [ ]Moderate [ ]Severe  Anxiety:                             [ ]Mild [ ]Moderate [ ]Severe  Fatigue:                             [x ]Mild [ ]Moderate [ ]Severe  Nausea:                             [ ]Mild [ ]Moderate [ ]Severe  Loss of appetite:              [x ]Mild [ ]Moderate [ ]Severe  Constipation:                    [x ]Mild [ ]Moderate [ ]Severe    PAIN AD Score:	  [email or web address] (Ctrl + left click to view)    Other Symptoms: dizzy  [x ]All other review of systems negative     Karnofsky Performance Score/Palliative Performance Status Version 2:  50  %    http://palliative.info/resource_material/[email or web address].pdf    PHYSICAL EXAM:  Vital Signs Last 24 Hrs  T(C): 36.4 (26 Apr 2019 11:07), Max: 37.1 (25 Apr 2019 19:44)  T(F): 97.6 (26 Apr 2019 11:07), Max: 98.7 (25 Apr 2019 19:44)  HR: 91 (26 Apr 2019 11:07) (52 - 118)  BP: 110/59 (26 Apr 2019 11:07) (95/63 - 124/61)  BP(mean): --  RR: 18 (26 Apr 2019 11:07) (16 - 18)  SpO2: 98% (26 Apr 2019 11:07) (98% - 100%)    GENERAL: exam limited as patient states he is resting  [x ]Alert  [x ]Oriented x 3 (initially states he is at [facility], then corrects to [facility])  [x ]Lethargic  [ ]Cachexia  [ ]Unarousable  [ x]Verbal  [ ]Non-Verbal    Behavioral:   [ ] Anxiety  [ ] Delirium [ ] Agitation [ ] Other    HEENT:  [ x]Normal   [ ]Dry mouth   [ ]ET Tube/Trach  [ ]Oral lesions    PULMONARY:   [x ]Clear [ ]Tachypnea  [ ]Audible excessive secretions   [ ]Rhonchi        [ ]Right [ ]Left [ ]Bilateral  [ ]Crackles        [ ]Right [ ]Left [ ]Bilateral  [ ]Wheezing     [ ]Right [ ]Left [ ]Bilateral    CARDIOVASCULAR:    [ ]Regular [ ]Irregular [ x]Tachy  [ ]Terry [ ]Murmur [ ]Other    GASTROINTESTINAL:  [ ]Soft  [ ]Distended   [x ]+BS  [ ]Non tender [ ]Tender  [ ]PEG [ ]OGT/ NGT   Last BM:     GENITOURINARY:  [x ]Normal [ ] Incontinent   [ ]Oliguria/Anuria   [ ]Ness    MUSCULOSKELETAL:   [ ]Normal   [ ]Weakness  [ ]Bed/Wheelchair bound [ x]Edema    NEUROLOGIC:   [x ]No focal deficits  [ ] Cognitive impairment  [ ] Dysphagia [ ]Dysarthria [ ] Paresis [ ]Other     SKIN:   [x ]Normal   [ ]Pressure ulcer(s)  [ ]Rash    CRITICAL CARE:  [ ] Shock Present  [ ]Septic [ ]Cardiogenic [ ]Neurologic [ ]Hypovolemic  [ ]  Vasopressors [ ]  Inotropes   [ ] Respiratory failure present  [ ] Acute  [ ] Chronic [ ] Hypoxic  [ ] Hypercarbic [ ] Other  [ ] Other organ failure     LABS:                                   8.8    18.05 )-----------( 235      ( 26 Apr 2019 05:50 )             29.0   04-26    136  |  101  |  51<H>  ----------------------------<  178<H>  4.7   |  21<L>  |  2.04<H>    Ca    8.3<L>      26 Apr 2019 05:50  Mg     2.2     04-26    RADIOLOGY & ADDITIONAL STUDIES: reviewed, none new.    Protein Calorie Malnutrition Present: [ ] yes [ ] no  [ ] PPSV2 < or = 30%  [ ] significant weight loss [ ] poor nutritional intake [ ] anasarca [ ] catabolic state Albumin, Serum: 2.2 g/dL (04-24-19 @ 07:15)  Artificial Nutrition [ ]     REFERRALS:   [ ]Chaplaincy  [x ] Hospice  [ ]Child Life  [ ]Social Work  [ ]Case management [ ]Holistic Therapy   Goals of Care Document:

## 2019-04-26 NOTE — PROGRESS NOTE ADULT - SUBJECTIVE AND OBJECTIVE BOX
[facility] DIVISION OF KIDNEY DISEASES AND HYPERTENSION -- FOLLOW UP NOTE  --------------------------------------------------------------------------------  Chief Complaint: sob    79 y/o Male, with a PmHx of Atrial Fibrillation (on Eliquis), Benign colon polyps, Cataracts, DM II, GERD, HLD, Nephrolithiasis, Stress incontinence, MVP, CHF, Prostate Cancer ( s/p prostatectomy), Varicose veins, and Pancreatic Cancer ( s/p whipple chemo 4/4/19), presents today to the [facility] ED for SOB for 10 days before admission. Pt was admitted on 04/18/19, hospital course complicated with MANNY hence nephrology consulted, scr increasing.     24 hour events/subjective:  Pt examined at bed side, family present, pt now hospice, planning for comfort approach.  Pt confused.     PAST HISTORY  --------------------------------------------------------------------------------  No significant changes to PMH, PSH, FHx, SHx, unless otherwise noted    ALLERGIES & MEDICATIONS  --------------------------------------------------------------------------------  Allergies    adhesives (Hives)  No Known Drug Allergies    Intolerances      Standing Inpatient Medications  albumin human 25% IVPB 50 milliLiter(s) IV Intermittent every 8 hours  apixaban 5 milliGRAM(s) Oral every 12 hours  dextrose 5%. 1000 milliLiter(s) IV Continuous <Continuous>  dextrose 50% Injectable 12.5 Gram(s) IV Push once  dextrose 50% Injectable 25 Gram(s) IV Push once  dextrose 50% Injectable 25 Gram(s) IV Push once  diltiazem    Tablet 30 milliGRAM(s) Oral <User Schedule>  insulin glargine Injectable (LANTUS) 16 Unit(s) SubCutaneous <User Schedule>  insulin lispro (HumaLOG) corrective regimen sliding scale   SubCutaneous three times a day before meals  insulin lispro (HumaLOG) corrective regimen sliding scale   SubCutaneous at bedtime  magnesium oxide 400 milliGRAM(s) Oral daily  metoprolol tartrate 50 milliGRAM(s) Oral two times a day  pancrelipase  (CREON 36,000 Lipase Units) 2 Capsule(s) Oral three times a day with meals  pantoprazole    Tablet 40 milliGRAM(s) Oral <User Schedule>  prochlorperazine   Tablet 10 milliGRAM(s) Oral daily  traZODone 50 milliGRAM(s) Oral at bedtime    PRN Inpatient Medications  acetaminophen   Tablet .. 650 milliGRAM(s) Oral every 6 hours PRN  acetaminophen  IVPB .. 1000 milliGRAM(s) IV Intermittent once PRN  aluminum hydroxide/magnesium hydroxide/simethicone Suspension 30 milliLiter(s) Oral every 4 hours PRN  dextrose 40% Gel 15 Gram(s) Oral once PRN  docusate sodium 100 milliGRAM(s) Oral daily PRN  glucagon  Injectable 1 milliGRAM(s) IntraMuscular once PRN  HYDROmorphone   Tablet 1 milliGRAM(s) Oral every 4 hours PRN  polyethylene glycol 3350 17 Gram(s) Oral daily PRN  senna 2 Tablet(s) Oral at bedtime PRN      REVIEW OF SYSTEMS  --------------------------------------------------------------------------------  Not able to obtain.     VITALS/PHYSICAL EXAM  --------------------------------------------------------------------------------  T(C): 37 (04-26-19 @ 05:56), Max: 37.1 (04-25-19 @ 19:44)  HR: 114 (04-26-19 @ 05:56) (52 - 118)  BP: 121/69 (04-26-19 @ 05:56) (95/63 - 124/61)  RR: 17 (04-26-19 @ 05:56) (16 - 18)  SpO2: 98% (04-26-19 @ 05:56) (98% - 100%)  Wt(kg): --        04-25-19 @ 07:01  -  04-26-19 @ 07:00  --------------------------------------------------------  IN: 200 mL / OUT: 320 mL / NET: -120 mL      Physical Exam:    	Gen: fatigued appearing.   	HEENT:  supple neck, dry oral mucosa  	Pulm: Bibasilar crackles but minimal  	CV: RRR, S1S2; no rub  	Back: No spinal or CVA tenderness  	Abd: +BS, soft, tender RUQ, and suprapubic area.   	UE:++ edema; no asterixis  	LE: ++edema        LABS/STUDIES  --------------------------------------------------------------------------------              8.8    18.05 >-----------<  235      [04-26-19 @ 05:50]              29.0     136  |  101  |  51  ----------------------------<  178      [04-26-19 @ 05:50]  4.7   |  21  |  2.04        Ca     8.3     [04-26-19 @ 05:50]      Mg     2.2     [04-26-19 @ 05:50]        Creatinine Trend:  SCr 2.04 [04-26 @ 05:50]  SCr 2.00 [04-25 @ 06:35]  SCr 1.85 [04-24 @ 07:15]  SCr 1.61 [04-23 @ 06:15]  SCr 1.78 [04-22 @ 07:01]

## 2019-04-26 NOTE — PROGRESS NOTE ADULT - PROBLEM SELECTOR PROBLEM 10
Gastroesophageal reflux disease, esophagitis presence not specified
Gastroesophageal reflux disease, esophagitis presence not specified
Advanced care planning/counseling discussion
Need for prophylactic measure

## 2019-04-26 NOTE — PROGRESS NOTE ADULT - PROBLEM SELECTOR PROBLEM 2
Diarrhea, unspecified type
Hyponatremia
Pancreatic cancer
Diarrhea, unspecified type

## 2019-04-26 NOTE — PROGRESS NOTE ADULT - ASSESSMENT
78 year old male pmh pancreatic ca with mets to lung and liver (s/p whipple), admitted to [facility] for acute on chronic decompensated heart failure.

## 2019-04-26 NOTE — PROVIDER CONTACT NOTE (MEDICATION) - SITUATION
Medication window for diltiazem 30mg and lopressor open, RICHARD Pope called Tele PAULA James to clarify.

## 2019-04-26 NOTE — PROGRESS NOTE ADULT - PROBLEM SELECTOR PLAN 10
cont with Pantoprazole  elevated head to 45 degree angle after every meal
cont with Pantoprazole  elevated head to 45 degree angle after every meal
40 minutes was spent with family reviewing GOC, MOLST form completed, patient to be made DNR/DNI, no feeding tube, no rehospitalization, OK for IVF and antibiotics. To go home with home hospice this afternoon.
pt is on Eliquis, not needed

## 2019-04-26 NOTE — PROGRESS NOTE ADULT - PROBLEM SELECTOR PLAN 1
Pt with MANNY most likely hemodynamically mediated in the setting of poor oral intake, diarrhea, hypotension, diuretic and ARB use, with possible ischemic ATN component. On review of labs in [facility], pt had scr of 0.77 in 2017 in 2018 was 0.94 mg/dl, in 08/18 pt had episode of MANNY scr peaked at ~8 developed ischemic ATN after having septic shock, did not require HD, scr improved progressively to 1 mg/dl (12/18). During this admission scr has been trending up from 1.1 mg/dl on 04/18/19 and now 2 stable,  has FeNA of 0.09%, compatible with pre renal, Even though pt is edematous,  pt is intravascular depleted, has low albumin. Suggest to continue with palliative approach, pt hospice, stop blood work.   Will sign off re consult if needed.

## 2019-04-26 NOTE — PROGRESS NOTE ADULT - REASON FOR ADMISSION
SOB X 10 days with increasing severity past few days

## 2019-04-26 NOTE — PROGRESS NOTE ADULT - PROBLEM SELECTOR PLAN 1
patient responded well to 1mg Dilaudid PO q 4 prn, received one dose today.    Continue bowel regimen PRN as pt has h/o diarrhea in the past.  Miralax prn

## 2019-04-26 NOTE — PROGRESS NOTE ADULT - PROBLEM SELECTOR PLAN 1
Likely 2/2 CHFe, elevated pro-BNP, pleural effusions on CT chest, LE pitting edema with negative dopplers, TTE grossly mild global left ventricular systolic dysfunction, limited assessment in setting of tachycardia, V/Q low probability  Holding lasix in setting of MANNY   Home with hospice today  Cardiology recommendations appreciated

## 2019-04-26 NOTE — PROGRESS NOTE ADULT - ATTENDING COMMENTS
BUN/SCr up today.  Hold diuretics and liberalize fluid intake.
Consider Lasix 40 iv qd for 1-2 days to see if pt gets some relief.
The patient was seen and examined today with the fellow, Dr. Nj, and I agree with the History, Physical Exam and Plan in the record. Labs and imaging reviewed by me.
Patient examined and ROS reviewed. A case of MANNY with multiple morbidities causing hypovolemia, hypotension and hypoalbuminemia. Advised fluid balance.
Pt seen with fellow.  Agree with above plan.  Continue with Dilaudid PRN for pain.  Plan is for d/c home with hospice services, consents signed.
Pt seen with Fellow.  Agree with above plan.  Continue with PRN Dilaudid.  Plan is for f/u with supportive oncology as outpatient.
Pt seen with Fellow.  Agree with above plan.  Palliative care following for symptom management, after discussion with oncology pt agreeable to meet with hospice.   Patient referred to hospice.
I spent 40 minutes coordinating this patient's discharge, explained the importance of OP F/U.

## 2019-04-26 NOTE — PROGRESS NOTE ADULT - PROBLEM SELECTOR PROBLEM 1
Dyspnea, unspecified type
MANNY (acute kidney injury)
MANNY (acute kidney injury)
Pain
Dyspnea, unspecified type

## 2019-04-26 NOTE — PROGRESS NOTE ADULT - PROBLEM SELECTOR PROBLEM 4
call Dr. Mo to schedule your appointment for 1-2 weeks
Malignant neoplasm metastatic to lung, unspecified laterality
Malignant neoplasm metastatic to lung, unspecified laterality
Liver metastases
Palliative care by specialist
Liver metastases
Malignant neoplasm metastatic to lung, unspecified laterality

## 2019-04-26 NOTE — PROGRESS NOTE ADULT - PROBLEM SELECTOR PLAN 3
No evidence of infectious etiologies, afebrile, CXR without infiltrate, RUQ US with large heterogeneous mass in the right hepatic lobe, suspicious for   neoplasm  - evaluated by ID who recommends holding off on abx

## 2019-04-26 NOTE — PROGRESS NOTE ADULT - PROBLEM SELECTOR PLAN 9
cont with Pantoprazole  elevated head to 45 degree angle after every meal
Hx of prior ablations with Dr. Pyle at [facility]. Follows Regularly with EP Q3 months  cont with heparin drip  DC diltiazem, increase metoprolol to 100 BID  continue to monitor on telemetry
cont with Pantoprazole  elevated head to 45 degree angle after every meal
Hx of prior ablations with Dr. Pyle at [facility]. Follows Regularly with EP Q3 months  cont with heparin drip  DC diltiazem, continue metoprolol 100 BID, consider titrating for better HR control  continue to monitor on telemetry
cont with Pantoprazole  elevated head to 45 degree angle after every meal
pt is on Eliquis, not needed

## 2019-04-26 NOTE — PROGRESS NOTE ADULT - SUBJECTIVE AND OBJECTIVE BOX
Patient is a 78y old  Male who presents with a chief complaint of SOB X 10 days with increasing severity past few days (26 Apr 2019 11:55)    SUBJECTIVE / OVERNIGHT EVENTS:  Patient seen with members of family at bedside, denying any complaints.  Tele: Multiple 3-4.6 sec pauses, Afib 110's    MEDICATIONS  (STANDING):  albumin human 25% IVPB 50 milliLiter(s) IV Intermittent every 8 hours  apixaban 5 milliGRAM(s) Oral every 12 hours  dextrose 5%. 1000 milliLiter(s) (50 mL/Hr) IV Continuous <Continuous>  dextrose 50% Injectable 12.5 Gram(s) IV Push once  dextrose 50% Injectable 25 Gram(s) IV Push once  dextrose 50% Injectable 25 Gram(s) IV Push once  diltiazem    Tablet 30 milliGRAM(s) Oral <User Schedule>  insulin glargine Injectable (LANTUS) 16 Unit(s) SubCutaneous <User Schedule>  insulin lispro (HumaLOG) corrective regimen sliding scale   SubCutaneous three times a day before meals  insulin lispro (HumaLOG) corrective regimen sliding scale   SubCutaneous at bedtime  magnesium oxide 400 milliGRAM(s) Oral daily  metoprolol tartrate 50 milliGRAM(s) Oral two times a day  pancrelipase  (CREON 36,000 Lipase Units) 2 Capsule(s) Oral three times a day with meals  pantoprazole    Tablet 40 milliGRAM(s) Oral <User Schedule>  prochlorperazine   Tablet 10 milliGRAM(s) Oral daily  traZODone 50 milliGRAM(s) Oral at bedtime    MEDICATIONS  (PRN):  acetaminophen   Tablet .. 650 milliGRAM(s) Oral every 6 hours PRN Temp greater or equal to 38C (100.4F), Mild Pain (1 - 3), Moderate Pain (4 - 6)  acetaminophen  IVPB .. 1000 milliGRAM(s) IV Intermittent once PRN Temp greater or equal to 38C (100.4F), Severe Pain (7 - 10)  aluminum hydroxide/magnesium hydroxide/simethicone Suspension 30 milliLiter(s) Oral every 4 hours PRN Dyspepsia  dextrose 40% Gel 15 Gram(s) Oral once PRN Blood Glucose LESS THAN 70 milliGRAM(s)/deciliter  docusate sodium 100 milliGRAM(s) Oral daily PRN Constipation  glucagon  Injectable 1 milliGRAM(s) IntraMuscular once PRN Glucose LESS THAN 70 milligrams/deciliter  HYDROmorphone   Tablet 1 milliGRAM(s) Oral every 4 hours PRN Severe Pain (7 - 10)  polyethylene glycol 3350 17 Gram(s) Oral daily PRN Constipation  senna 2 Tablet(s) Oral at bedtime PRN Constipation      Vital Signs Last 24 Hrs  T(C): 36.5 (26 Apr 2019 13:38), Max: 37.1 (25 Apr 2019 19:44)  T(F): 97.7 (26 Apr 2019 13:38), Max: 98.7 (25 Apr 2019 19:44)  HR: 47 (26 Apr 2019 13:38) (47 - 118)  BP: 105/56 (26 Apr 2019 13:38) (95/63 - 124/61)  BP(mean): --  RR: 18 (26 Apr 2019 13:38) (16 - 18)  SpO2: 98% (26 Apr 2019 13:38) (98% - 100%)  CAPILLARY BLOOD GLUCOSE      POCT Blood Glucose.: 180 mg/dL (26 Apr 2019 11:00)  POCT Blood Glucose.: 173 mg/dL (26 Apr 2019 08:52)  POCT Blood Glucose.: 199 mg/dL (25 Apr 2019 17:23)    I&O's Summary    25 Apr 2019 07:01  -  26 Apr 2019 07:00  --------------------------------------------------------  IN: 200 mL / OUT: 320 mL / NET: -120 mL    26 Apr 2019 07:01  -  26 Apr 2019 13:57  --------------------------------------------------------  IN: 250 mL / OUT: 0 mL / NET: 250 mL          PHYSICAL EXAM  GENERAL: NAD, well-developed  HEAD:  Atraumatic, Normocephalic  EYES: EOMI, PERRLA, conjunctiva and sclera clear  NECK: Supple, No JVD  CHEST/LUNG: Clear to auscultation bilaterally; No wheeze  HEART: Regular rate and rhythm; No murmurs, rubs, or gallops  ABDOMEN: Soft, Nontender, Nondistended; Bowel sounds present  EXTREMITIES:  2+ Peripheral Pulses, No clubbing, cyanosis, or edema  PSYCH: AAOx3  SKIN: No rashes or lesions    LABS:                        8.8    18.05 )-----------( 235      ( 26 Apr 2019 05:50 )             29.0     04-26    136  |  101  |  51<H>  ----------------------------<  178<H>  4.7   |  21<L>  |  2.04<H>    Ca    8.3<L>      26 Apr 2019 05:50  Mg     2.2     04-26                  RADIOLOGY & ADDITIONAL TESTS:    Imaging Personally Reviewed:  Consultant(s) Notes Reviewed:    Care Discussed with Consultants/Other Providers: Patient is a 78y old  Male who presents with a chief complaint of SOB X 10 days with increasing severity past few days (26 Apr 2019 11:55)    SUBJECTIVE / OVERNIGHT EVENTS:  Overnight, patient was noted to have multiple pauses longest ~4 seconds, family refused pacer pads being placed as precautionary measure. This morning, patient seen with members of family at bedside, denying any complaints.  Tele: Multiple 3-4.6 sec pauses, Afib 110's    MEDICATIONS  (STANDING):  albumin human 25% IVPB 50 milliLiter(s) IV Intermittent every 8 hours  apixaban 5 milliGRAM(s) Oral every 12 hours  dextrose 5%. 1000 milliLiter(s) (50 mL/Hr) IV Continuous <Continuous>  dextrose 50% Injectable 12.5 Gram(s) IV Push once  dextrose 50% Injectable 25 Gram(s) IV Push once  dextrose 50% Injectable 25 Gram(s) IV Push once  diltiazem    Tablet 30 milliGRAM(s) Oral <User Schedule>  insulin glargine Injectable (LANTUS) 16 Unit(s) SubCutaneous <User Schedule>  insulin lispro (HumaLOG) corrective regimen sliding scale   SubCutaneous three times a day before meals  insulin lispro (HumaLOG) corrective regimen sliding scale   SubCutaneous at bedtime  magnesium oxide 400 milliGRAM(s) Oral daily  metoprolol tartrate 50 milliGRAM(s) Oral two times a day  pancrelipase  (CREON 36,000 Lipase Units) 2 Capsule(s) Oral three times a day with meals  pantoprazole    Tablet 40 milliGRAM(s) Oral <User Schedule>  prochlorperazine   Tablet 10 milliGRAM(s) Oral daily  traZODone 50 milliGRAM(s) Oral at bedtime    MEDICATIONS  (PRN):  acetaminophen   Tablet .. 650 milliGRAM(s) Oral every 6 hours PRN Temp greater or equal to 38C (100.4F), Mild Pain (1 - 3), Moderate Pain (4 - 6)  acetaminophen  IVPB .. 1000 milliGRAM(s) IV Intermittent once PRN Temp greater or equal to 38C (100.4F), Severe Pain (7 - 10)  aluminum hydroxide/magnesium hydroxide/simethicone Suspension 30 milliLiter(s) Oral every 4 hours PRN Dyspepsia  dextrose 40% Gel 15 Gram(s) Oral once PRN Blood Glucose LESS THAN 70 milliGRAM(s)/deciliter  docusate sodium 100 milliGRAM(s) Oral daily PRN Constipation  glucagon  Injectable 1 milliGRAM(s) IntraMuscular once PRN Glucose LESS THAN 70 milligrams/deciliter  HYDROmorphone   Tablet 1 milliGRAM(s) Oral every 4 hours PRN Severe Pain (7 - 10)  polyethylene glycol 3350 17 Gram(s) Oral daily PRN Constipation  senna 2 Tablet(s) Oral at bedtime PRN Constipation      Vital Signs Last 24 Hrs  T(C): 36.5 (26 Apr 2019 13:38), Max: 37.1 (25 Apr 2019 19:44)  T(F): 97.7 (26 Apr 2019 13:38), Max: 98.7 (25 Apr 2019 19:44)  HR: 47 (26 Apr 2019 13:38) (47 - 118)  BP: 105/56 (26 Apr 2019 13:38) (95/63 - 124/61)  BP(mean): --  RR: 18 (26 Apr 2019 13:38) (16 - 18)  SpO2: 98% (26 Apr 2019 13:38) (98% - 100%)  CAPILLARY BLOOD GLUCOSE      POCT Blood Glucose.: 180 mg/dL (26 Apr 2019 11:00)  POCT Blood Glucose.: 173 mg/dL (26 Apr 2019 08:52)  POCT Blood Glucose.: 199 mg/dL (25 Apr 2019 17:23)    I&O's Summary    25 Apr 2019 07:01  -  26 Apr 2019 07:00  --------------------------------------------------------  IN: 200 mL / OUT: 320 mL / NET: -120 mL    26 Apr 2019 07:01  -  26 Apr 2019 13:57  --------------------------------------------------------  IN: 250 mL / OUT: 0 mL / NET: 250 mL          PHYSICAL EXAM  GENERAL: NAD, well-developed  HEAD:  Atraumatic, Normocephalic  EYES: EOMI, PERRLA, conjunctiva and sclera clear  NECK: Supple, No JVD  CHEST/LUNG: Clear to auscultation bilaterally; No wheeze  HEART: Regular rate and rhythm; No murmurs, rubs, or gallops  ABDOMEN: Soft, Nontender, Nondistended; Bowel sounds present  EXTREMITIES:  2+ Peripheral Pulses, No clubbing, cyanosis, or edema  PSYCH: AAOx3  SKIN: No rashes or lesions    LABS:                        8.8    18.05 )-----------( 235      ( 26 Apr 2019 05:50 )             29.0     04-26    136  |  101  |  51<H>  ----------------------------<  178<H>  4.7   |  21<L>  |  2.04<H>    Ca    8.3<L>      26 Apr 2019 05:50  Mg     2.2     04-26                  RADIOLOGY & ADDITIONAL TESTS:    Imaging Personally Reviewed:  Consultant(s) Notes Reviewed:    Care Discussed with Consultants/Other Providers:

## 2019-04-26 NOTE — PROGRESS NOTE ADULT - PROBLEM SELECTOR PLAN 2
Patient follows with Oncologist Dr. Clark.  Metastatic pancreatic adenocarcinoma, s/p whipple surgery 10/2018, on adjuvant FOLFIRINOX, with progression of disease while on adjuvant chemo with new liver lesions, LAP and a lung nodule.  S/p liver bx, confirmed metastatic pancreatic CA.   Oncology had discussion with pt and family, at this point they want to focus on pts quality of life, agreeable to hospice services at home, confirmed with pts wife and daughter. Patient evaluated for hospice, planned for home hospice pending equipment.

## 2019-04-26 NOTE — PROGRESS NOTE ADULT - PROBLEM SELECTOR PROBLEM 3
Leukocytosis, unspecified type
Leukocytosis, unspecified type
Liver metastases
Malignant neoplasm metastatic to lung, unspecified laterality
Leukocytosis, unspecified type
Malignant neoplasm metastatic to lung, unspecified laterality
Leukocytosis, unspecified type

## 2019-04-26 NOTE — PROGRESS NOTE ADULT - PROBLEM SELECTOR PLAN 4
Pt is full code.   Patient having worsening renal function.  Palliative care following for symptom management, initially plan was to follow up outpatient with supportive oncology, however today patient's wife and daughter requesting hospice evaluation.   Patient referred to hospice.

## 2019-04-26 NOTE — PROGRESS NOTE ADULT - PROBLEM SELECTOR PROBLEM 9
Gastroesophageal reflux disease, esophagitis presence not specified
Atrial fibrillation, unspecified type
Gastroesophageal reflux disease, esophagitis presence not specified
Need for prophylactic measure
Atrial fibrillation, unspecified type

## 2019-04-26 NOTE — PROVIDER CONTACT NOTE (MEDICATION) - RECOMMENDATIONS
Per verbal orders, Tele PAULA James stated give 30 mg diltiazem, hold lopressor. Per Tele Emar orders, 30 mg diltiazem given, lopressor held. Tele PAULA James aware.

## 2019-04-29 ENCOUNTER — INBOUND DOCUMENT (OUTPATIENT)
Age: 79
End: 2019-04-29

## 2019-05-02 ENCOUNTER — APPOINTMENT (OUTPATIENT)
Dept: INFUSION THERAPY | Facility: HOSPITAL | Age: 79
End: 2019-05-02

## 2019-05-03 ENCOUNTER — APPOINTMENT (OUTPATIENT)
Dept: HEMATOLOGY ONCOLOGY | Facility: CLINIC | Age: 79
End: 2019-05-03

## 2019-05-04 ENCOUNTER — APPOINTMENT (OUTPATIENT)
Dept: INFUSION THERAPY | Facility: HOSPITAL | Age: 79
End: 2019-05-04

## 2019-05-16 ENCOUNTER — APPOINTMENT (OUTPATIENT)
Dept: INFUSION THERAPY | Facility: HOSPITAL | Age: 79
End: 2019-05-16

## 2019-05-16 NOTE — ED PROVIDER NOTE - CROS ED ENMT EARS NEG
no tinnitus Alert and oriented to person, place and time, memory intact, behavior appropriate to situation, PERRL.

## 2019-05-18 ENCOUNTER — APPOINTMENT (OUTPATIENT)
Dept: INFUSION THERAPY | Facility: HOSPITAL | Age: 79
End: 2019-05-18

## 2019-05-30 ENCOUNTER — APPOINTMENT (OUTPATIENT)
Dept: INFUSION THERAPY | Facility: HOSPITAL | Age: 79
End: 2019-05-30

## 2019-06-01 ENCOUNTER — APPOINTMENT (OUTPATIENT)
Dept: INFUSION THERAPY | Facility: HOSPITAL | Age: 79
End: 2019-06-01

## 2019-06-13 ENCOUNTER — APPOINTMENT (OUTPATIENT)
Dept: INFUSION THERAPY | Facility: HOSPITAL | Age: 79
End: 2019-06-13

## 2019-06-15 ENCOUNTER — APPOINTMENT (OUTPATIENT)
Dept: INFUSION THERAPY | Facility: HOSPITAL | Age: 79
End: 2019-06-15

## 2019-07-09 PROBLEM — C25.0 MALIGNANT NEOPLASM OF HEAD OF PANCREAS: Status: ACTIVE | Noted: 2019-07-09

## 2019-07-09 PROBLEM — Z90.410 HISTORY OF PANCREATECTOMY: Status: ACTIVE | Noted: 2019-07-09

## 2019-11-19 NOTE — H&P PST ADULT - NSANTHBPHIGHRD_ENT_A_CORE
Health Maintenance Due   Topic Date Due   • Shingles Vaccine (1 of 2) 01/10/2014   • Hepatitis C Screening  01/10/2015   • Influenza Vaccine (1) 09/01/2019       Patient is due for topics listed above, she wishes to proceed with Immunization(s) Influenza, but is not proceeding with Immunization(s) Shingles and Hepatitis C Screening at this time. The following has occured:            Yes

## 2020-05-15 NOTE — PROGRESS NOTE ADULT - ASSESSMENT
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  Plan for eventual surgical resection with pancreaticoduodenectomy.     PLAN  - Surgical Oncologist Dr. George to see the patient in the hospital on Tuesday 8/21.  Dr[place] George [place] so plan to follow up with Dr. George in office as an outpatient to plan for surgical intervention. No surgical indication to hold Elaquis at this time.  Pre surgical testing will evaluate as an outpatient and determine timing to hold Elaquis preoperatively.  - Follow up TTE for medical and cardiac risk optimization and stratification for planned surgery.  - Trend bilirubin, WBC  - Diet: Reg  - Pain control with PO/IV medications.    - Continue home medications  - OOB, ambulate as tolerated  - continue chemical VTE ppx    - Plan discussed with Dr. 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  Plan for eventual surgical resection with pancreaticoduodenectomy.     PLAN  - Surgical Oncologist Dr. George to see the patient in the hospital on Tuesday 8/21. Recommend continue T lovenox for afib. Hold elaquis, Plan for whipple this week. Will follow up with date for surgery.   - Follow up TTE for medical and cardiac risk optimization and stratification for planned surgery.  - Trend bilirubin, WBC  - Diet: Reg  - Pain control with PO/IV medications.    - Continue home medications  - OOB, ambulate as tolerated  - continue chemical VTE ppx    - Plan discussed with Dr. George     [address] team surgery General

## 2020-12-06 NOTE — ED PROVIDER NOTE - GASTROINTESTINAL, MLM
Based on our clinical examination and testing, we feel that you are safe for discharge home today with the following precautions. Please note that if your condition changes, we would like to see you again. You may return at any time if you have further concerns. Following up with your primary care and/or specialist as indicated is paramount for your overall health and wellness. Return to ER immediately if having severe pain, persistent fevers >100.4F, difficulty breathing, persistent vomiting or vomiting blood, altered mentation or confusion, or any other concerning symptoms. Please follow up as indicated below, and take medications as directed by pharmacy on prescription bottle and as indicated verbally to you here in the ER. Thank you for visiting our Emergency Department today. Please let us know if there is anything else that we can help you with today. Abdomen soft, non-tender, no guarding.

## 2020-12-16 PROBLEM — J06.9 VIRAL UPPER RESPIRATORY TRACT INFECTION WITH COUGH: Status: RESOLVED | Noted: 2018-12-11 | Resolved: 2020-12-16

## 2021-02-03 NOTE — ED PROVIDER NOTE - CARE PLAN
Name band; Principal Discharge DX:	Symptomatic bradycardia  Secondary Diagnosis:	Atrial fibrillation

## 2021-02-22 NOTE — DISCHARGE NOTE ADULT - CARE PROVIDERS DIRECT ADDRESSES
Routing refill request to provider for review/approval because:  Drug not on the [facility] refill protocol       Jaky Driver RN, BSN, PHN   ,[email or web address],DirectAddress_Unknown,DirectAddress_Unknown

## 2021-07-14 NOTE — H&P ADULT - ASSESSMENT
[LE] : Sensory: Intact in bilateral lower extremities [de-identified] : Constitutional\par o Appearance : well-nourished, well developed, alert, in no acute distress \par Head and Face\par o Head :\par ¦ Inspection : atraumatic, normocephalic\par o Face :\par ¦ Inspection : no visible rash or discoloration\par Respiratory\par o Respiratory Effort: breathing unlabored \par Neurologic\par o Mental Status Examination :\par ¦ Orientation : alert and oriented X 3\par Psychiatric\par o Mood and Affect: mood normal, affect appropriate\par Cardiovascular\par o Observation/Palpation : - no swelling\par Lymphatic\par o Additional Nodes : No palpable lymph nodes present\par \par Right Lower Extremity\par o Buttock : no tenderness, swelling or deformities \par o Right Hip :\par    - Inspection/Palpation : no tenderness, no swelling or deformities\par    - Range of Motion : full and painless in all planes, no crepitance\par    - Stability : joint stability intact\par    - Strength : extension, flexion, adduction, abduction, internal rotation and external rotation 4+/5 \par    - Tests: Brian’s test negative\par o Right Knee :\par ¦ Inspection/Palpation : medial compartment tenderness, no swelling\par ¦ Range of Motion : 0-120 ° , no crepitance \par ¦ Stability : no valgus or varus instability present on provocative testing\par ¦ Strength : flexion and extension 5/5 \par ¦ Tests and Signs : negative Anterior Drawer, negative Lachman, negative Nick\par \par Left Lower Extremity\par o Buttock : no tenderness, swelling or deformities \par o Left Hip :\par    - Inspection/Palpation : no tenderness, no swelling or deformities\par    - Range of Motion : full and painless in all planes, no crepitance\par    - Stability : joint stability intact\par    - Strength : extension, flexion, adduction, abduction, internal rotation and external rotation 4/5  \par    - Tests: Brian’s test negative\par \par o Left Knee :\par ¦ Inspection/Palpation : peripatellar tenderness to palpation, mild knee swelling, mild calf swelling, ecchymosis in the medial thigh and distal lateral calf, calf is soft and nontender, sutures were removed, steri strips and benzoin were applied, well-healed incision\par ¦ Range of Motion : 0-90°, no crepitance, good patellofemoral glide, can SLR, no extensor lag\par ¦ Stability : no valgus or varus instability present on provocative testing\par ¦ Strength : flexion and extension 4/5 \par ¦ Tests and Signs : negative Anterior Drawer\par \par DP/PT: 2+ bilaterally \par \par Gait and Station:\par Gait: heel/toe on the left with a walker, no significant extremity swelling or lymphedema, good proprioception and balance\par \par Radiology Results:\par o Left Knee : Standing AP and lateral views of the knee were obtained and revealed excellent position of her left total knee replacement.  78 yo male admitted for intractable abdominal pain likely secondary to choledocholithiasis in setting of jaundice, transaminitis, and CBD of 17mm

## 2021-07-19 NOTE — ED ADULT NURSE NOTE - MUSCULOSKELETAL WDL
Can you give the ok to pick it up early?
Okay, tell pharmacy it's okay to  early.
Please advise
Pt can choose a pharmacy wherever he is going and we can send a prescription there.
Tae at the pharmacy states he can fill for tomorrow.
The patient calls in and states that his prescription if ready at the pharmacy, but the pharmacy says it is 2 days to early to pick it up. Patient is going to be working out of town starting tomorrow, and would like to be able to  prescription before he leaves town. The prescription is for Gabapentin.
Full range of motion of upper and lower extremities, no joint tenderness/swelling.

## 2021-09-08 NOTE — ED PROVIDER NOTE - CADM POA URETHRAL CATHETER
Patient calling to inform staff that 8 days left of topomax and goes through mail order and is worried about running out before a refill comes.    Elier Wallace at 1:53 PM on 9/8/2021  [facility]  Phone [phone]     Refill Request ATORVASTATIN 40 MG TABLET and metFORMIN HCL 1,000 MG TABLET     Last Seen: 6/22/2018    Last Written: 10/31/19 atorvastatin 90 tablets with 2 refills  10/31/19 180 tablets with 2 refills metformin    Next Appointment:   Future Appointments   Date Time Provider Kathrine Bangura   8/24/2020  9:40 AM C Sammie Simmonds, MD [facility]             Requested Prescriptions     Pending Prescriptions Disp Refills    atorvastatin (LIPITOR) 40 MG tablet [Pharmacy Med Name: ATORVASTATIN 40 MG TABLET] 90 tablet 1     Sig: TAKE ONE TABLET BY MOUTH DAILY    metFORMIN (GLUCOPHAGE) 1000 MG tablet [Pharmacy Med Name: metFORMIN HCL 1,000 MG TABLET] 180 tablet 1     Sig: TAKE ONE TABLET BY MOUTH TWICE A DAY WITH MEALS No

## 2021-12-16 NOTE — DISCHARGE NOTE ADULT - MEDICATION SUMMARY - MEDICATIONS TO TAKE
I will START or STAY ON the medications listed below when I get home from the hospital:    aspirin  -- 81 mg  by mouth daily   -- Indication: For cad prevention    ramipril 10 mg oral capsule  -- 1 cap(s) by mouth once a day  -- Indication: For  hypertension    apixaban 5 mg oral tablet  -- 1 tab(s) by mouth 2 times a day  -- Indication: For  afib    glipiZIDE  --  5 mg by mouth  daily  -- Indication: For Diaebtes    Lipitor  --  10 mg by mouth daily   -- Indication: For Hyperlipidemia    Emily  --  10/40 mg by mouth  daily  -- Indication: For Hypertension    metoprolol  --  100 mg by mouth daily   -- Indication: For Hypertension    pantoprazole  -- 40 mg  by mouth daily   -- Indication: For GERD (Gastroesophageal Reflux Disease) Negative

## 2022-02-15 NOTE — ED ADULT NURSE NOTE - CAS ELECT INFOMATION PROVIDED
DC instructions Protopic Counseling: Patient may experience a mild burning sensation during topical application. Protopic is not approved in children less than 2 years of age. There have been case reports of hematologic and skin malignancies in patients using topical calcineurin inhibitors although causality is questionable.

## 2022-03-21 NOTE — PHYSICAL THERAPY INITIAL EVALUATION ADULT - PHYSICAL ASSIST/NONPHYSICAL ASSIST: STAND/SIT, REHAB EVAL
March 21, 2022      [facility] [address] [place] [address]  Phone: [phone]  Fax: [phone]       Patient: Michael Thao   YOB: 2004  Date of Visit: 03/21/2022    To Whom It May Concern:    KIKE Thao  was at [facility] on 03/21/2022. The patient may return to work/school on 3/23/2022 with no restrictions. If you have any questions or concerns, or if I can be of further assistance, please do not hesitate to contact me.    Sincerely,  Karla BEYP;   Elissa Mcclendon RN      1 person assist/nonverbal cues (demo/gestures)/verbal cues

## 2022-05-05 NOTE — ED ADULT TRIAGE NOTE - HEIGHT IN INCHES
Pre op complete. Questions answered. Resting comfortably. Call light in reach. Belongings placed in locker   2

## 2022-05-09 NOTE — PATIENT PROFILE ADULT - NSPROCHRONICPAIN_GEN_A_NUR
Care Management/Social Work Discharge Arrangements: [facility] - [phone] will contact you to set up your first visit for Nursing services. Please call them with any questions. If you do not receive a phone call from the agency within 24 to 48 hours, please contact them directly.
no

## 2022-06-13 NOTE — DISCHARGE NOTE ADULT - NSFTFSTATUSABRD_GEN_ALL_CORE
The patient has been re-examined and I agree with the above assessment or I updated with my findings. LEAVING HOME IS CONTRAINDICATED:

## 2022-10-05 NOTE — PATIENT PROFILE ADULT. - BILL OF RIGHTS/ADMISSION INFORMATION PROVIDED TO:
[de-identified] : Patient is s/p right knee quad tendon repair\par Doing well.\par Pain controlled.\par \par NAD\par Right knee:\par Incisions healed, c/d/i\par Mild swelling\par ROM not assessed\par no palpable deformity\par Neg Instability\par NVI\par Compartments soft and NT\par \par s/p right knee arthroscopy\par Went over surgery in detail\par Will start PT\par Continue pain control\par fu in 6 weeks\par All questions answered\par  Patient Representative

## 2023-05-31 NOTE — CONSULT NOTE ADULT - NSHPATTENDINGPLANDISCUSS_GEN_ALL_CORE
To reach Cardiology Attending call during weekdays Spectra [phone] or Fellow [phone].
Dr. George [facility]
SICU
(2) more than 100 beats/min

## 2023-07-25 NOTE — PROGRESS NOTE ADULT - PROBLEM SELECTOR PLAN 5
Hx of prior ablations with Dr. Pyle at [facility]. Follows Regularly with EP Q3 months  Continue with eliquis  Continue metoprolol 50 BID, diltiazem lowered 60-> 30 mg BID in setting of frequent pauses  continue to monitor on telemetry No

## 2024-02-01 NOTE — CHART NOTE - NSCHARTNOTEFT_GEN_A_CORE
Upon Nutritional Assessment by the Registered Dietitian your patient was determined to meet criteria / has evidence of the following diagnosis/diagnoses:          [ ]  Mild Protein Calorie Malnutrition        [ ]  Moderate Protein Calorie Malnutrition        [x ] Severe Protein Calorie Malnutrition        [ ] Unspecified Protein Calorie Malnutrition        [ ] Underweight / BMI <19        [ ] Morbid Obesity / BMI > 40      Findings as based on:  [x ] Comprehensive nutrition assessment   [ ] Nutrition Focused Physical Exam  [ ] Other:       Nutrition Plan/Recommendations:  recommend Nepro supplement 2 times/day, consider appetite stimulant        PROVIDER Section:     By signing this assessment you are acknowledging and agree with the diagnosis/diagnoses assigned by the Registered Dietitian    Comments: Warm/Dry

## 2024-02-27 NOTE — DISCHARGE NOTE ADULT - PRINCIPAL DIAGNOSIS
Date: 2/27/2024  Start Time: 1020  End Time:  1100  Number of Participants: 10    Type of Group: Psychoeducation    Wellness Binder Information  Module Name:  ID of stress    Patient's Goal:  Patient will be able to ID symptoms of stress, and ways to improve handling of daily/life stressors.     Notes:  CTRS discussed symptoms of stress and some tips on handling of stressors.  Patient was an active participant in discussion and provided input into discussion.  Patient was accepting of handout.     Status After Intervention:  Improved    Participation Level: Active Listener and Interactive    Participation Quality: Appropriate and Attentive      Speech:  normal      Thought Process/Content: Logical      Affective Functioning: Congruent      Mood: euthymic      Level of consciousness:  Alert and Attentive      Response to Learning: Able to verbalize current knowledge/experience, Able to verbalize/acknowledge new learning, and Progressing to goal      Endings: None Reported    Modes of Intervention: Education, Exploration, and Clarifying      Discipline Responsible: Recreational Therapist      Signature:  SOPHIA Upton     Obstructive jaundice

## 2024-04-12 NOTE — PROGRESS NOTE ADULT - NSHPATTENDINGPLANDISCUSS_GEN_ALL_CORE
Detail Level: Simple
cardiology fellow, Dr. CHINA Harrell; patient seen and examined.  Hx., exam and labs as above.  I agree with the assessment and recommendations.
cardiology fellow, Dr. CHINA Harrell; patient seen and examined.  Hx., exam and labs as above.  I agree with the assessment and recommendations.
Pt and wife
Alicia at Dr Randhawa's office  [phone]
Patient, wife and NP
wife bedside and LEISA Arevalo
wife bedside and NP Shanlele
LEISA Arevalo and wife bedside, all consultants
LEISA milton and wife bedside

## 2024-05-21 NOTE — CHART NOTE - NSCHARTNOTEFT_GEN_A_CORE
SICU PA Note    Bedside ultrasound performed to evaluate volume status in setting of elevated creatinine.  LV appeared underfilled on parasternal long view, overall preserved contractility.  IVC showed increased respiratory variation with deep inspiration.  Pt given 25% Albumin q6hrs x 4 doses. Will f/u repeat labs.  Above discussed with Dr. Ko. No assistance needed

## 2024-10-14 NOTE — PROGRESS NOTE ADULT - ASSESSMENT
78 yo man with HTN, pre-diabetes, HLD, afib ablation 2004/2005 and DCCV 2017 on Eliquis with ILR, GERD. 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   AFib/flutter with RVR, suspicion of tachy-georges syndrome, CHADsVASC 4  Asymptomatic, but new LVD, perhaps related to myopathy of tachycardia (no awareness of AF) or element of stress CM.    #AFib/flutter with RVR currently - rate controlled  --  c/w metoprolol, and increase to 25 BID  -- Monitor on tele if downgraded from SICU  -- if no plan for surgery would start pt on AC with heparin gtt.     #HFrEF  -- EF recovered on latest TTE, likely due to better rate control now.   -- Pts MANNY seems to be a function of pre-renal azotemia.   -- would hold off on diuretics at this time.   -- agree with fluids for now.       #HTN  -- Well controlled currently     #Pre-operative medical optimization  -- no further cardiac w/u prior to procedure except echo      Lydia Olivera MD  Cardiology Fellow - PGY-4  LIKIMO: [id number]  NS: [phone] [id number] No

## 2024-12-04 NOTE — ED PROVIDER NOTE - THROAT, MLM
Patient needs a refill on Eliqus Anticoagulant only has 4 pills left.  
uvula midline, no vesicles, no redness, and no oropharyngeal exudate.

## 2025-03-21 NOTE — GOALS OF CARE CONVERSATION - PERSONAL ADVANCE DIRECTIVE - CONVERSATION DETAILS
[facility] - Consents signed. A walker with wheels and O2 conc at 2L prn for delivery Sat AM at family request from [facility].
[facility] - Family decided to take pt home today. Requested a hosp bed, MICAH and commode in addition to the walker and O2 that was to be delivered from [facility] on Sat AM and is being delivered this afternoon. Hospice is ready to deliver care.
258

## 2025-05-30 NOTE — PRE-OP CHECKLIST - LATEX ALLERGY
Zepbound Approved     Prior Authorization Number: [id number]  Dates of approval: 5/29/2025-11/29/2025   1st trial initial - must lose 5% body weight to qualify for renewal  Current Weight: 243  lbs   BMI: 35  Height: 70 In  If you have any questions on trials or wait time please look at the [facility] Handbook for more information Topic # 3010.   no

## 2025-06-04 NOTE — PATIENT PROFILE ADULT. - FUNCTIONAL SCREEN CURRENT LEVEL: AMBULATION, MLM
Patient states [facility] hasn't received the meds ordered on 6/2/25.    Placed patient on hold and contacted [facility].  Per pharmacy Metformin and omeprazole too soon. These will be due for refill on 8/22    Hydrocortisone is not covered under her insurance.  Pharmacy would like to no if  wants to order an alternative?    (0) independent